# Patient Record
Sex: MALE | Race: WHITE | NOT HISPANIC OR LATINO | Employment: OTHER | URBAN - METROPOLITAN AREA
[De-identification: names, ages, dates, MRNs, and addresses within clinical notes are randomized per-mention and may not be internally consistent; named-entity substitution may affect disease eponyms.]

---

## 2019-04-17 ENCOUNTER — OFFICE VISIT (OUTPATIENT)
Dept: ORTHOPEDIC SURGERY | Facility: CLINIC | Age: 74
End: 2019-04-17

## 2019-04-17 VITALS — OXYGEN SATURATION: 98 % | WEIGHT: 253.31 LBS | HEIGHT: 75 IN | HEART RATE: 74 BPM | BODY MASS INDEX: 31.5 KG/M2

## 2019-04-17 DIAGNOSIS — E11.65 UNCONTROLLED TYPE 2 DIABETES MELLITUS WITH HYPERGLYCEMIA (HCC): ICD-10-CM

## 2019-04-17 DIAGNOSIS — E11.42 TYPE 2 DIABETES MELLITUS WITH DIABETIC POLYNEUROPATHY, WITHOUT LONG-TERM CURRENT USE OF INSULIN (HCC): ICD-10-CM

## 2019-04-17 DIAGNOSIS — R22.41 MASS OF LESSER TOE OF RIGHT FOOT: ICD-10-CM

## 2019-04-17 DIAGNOSIS — S90.821A BLISTER (NONTHERMAL), RIGHT FOOT, INITIAL ENCOUNTER: ICD-10-CM

## 2019-04-17 DIAGNOSIS — M79.671 RIGHT FOOT PAIN: Primary | ICD-10-CM

## 2019-04-17 DIAGNOSIS — R09.89 DECREASED PULSES IN FEET: ICD-10-CM

## 2019-04-17 DIAGNOSIS — I99.8 VASCULAR CALCIFICATION: ICD-10-CM

## 2019-04-17 PROCEDURE — 99204 OFFICE O/P NEW MOD 45 MIN: CPT | Performed by: ORTHOPAEDIC SURGERY

## 2019-04-17 RX ORDER — LANOLIN ALCOHOL/MO/W.PET/CERES
1000 CREAM (GRAM) TOPICAL DAILY
COMMUNITY

## 2019-04-17 RX ORDER — LOSARTAN POTASSIUM 100 MG/1
100 TABLET ORAL DAILY
Refills: 3 | COMMUNITY
Start: 2019-02-18 | End: 2021-05-29 | Stop reason: HOSPADM

## 2019-04-17 RX ORDER — NEBIVOLOL HYDROCHLORIDE 10 MG/1
5 TABLET ORAL 2 TIMES DAILY
Refills: 7 | Status: ON HOLD | COMMUNITY
Start: 2019-03-04 | End: 2020-07-20

## 2019-04-17 RX ORDER — PANTOPRAZOLE SODIUM 40 MG/1
40 TABLET, DELAYED RELEASE ORAL DAILY
Refills: 2 | COMMUNITY
Start: 2019-03-31

## 2019-04-17 RX ORDER — CEPHALEXIN 500 MG/1
500 CAPSULE ORAL 2 TIMES DAILY
COMMUNITY
End: 2019-05-01 | Stop reason: HOSPADM

## 2019-04-17 RX ORDER — HYDROCHLOROTHIAZIDE 25 MG/1
25 TABLET ORAL DAILY
Refills: 3 | Status: ON HOLD | COMMUNITY
Start: 2019-03-05 | End: 2021-05-28

## 2019-04-17 RX ORDER — METOCLOPRAMIDE 10 MG/1
10 TABLET ORAL 2 TIMES DAILY
Refills: 8 | COMMUNITY
Start: 2019-03-31

## 2019-04-17 RX ORDER — AMLODIPINE BESYLATE 10 MG/1
10 TABLET ORAL NIGHTLY
Refills: 0 | COMMUNITY
Start: 2019-03-04

## 2019-04-17 RX ORDER — CLONIDINE HYDROCHLORIDE 0.1 MG/1
0.1 TABLET ORAL 2 TIMES DAILY
Status: ON HOLD | COMMUNITY
End: 2022-08-24

## 2019-04-17 NOTE — PROGRESS NOTES
NEW PATIENT    Patient: Amol Aguirre  : 1945    Primary Care Provider: Donte Briones MD    Requesting Provider: As above    Pain of the Right Foot (Big toe)      History    Chief Complaint: Right foot problems    History of Present Illness: This is a very pleasant gentleman here today with his wife.  He thought he was just coming regarding a cyst on his great toe, it turns out he has quite significant problems in a very complicated history.  He has had a several month history of a painful nodule on the dorsal aspect of the right great toe at the level of the DIP joint.  It hurts more with activity and in shoes.  He has aching and throbbing.  However he also went fishing this past weekend.  He wore his usual shoes, and he was out fishing sitting down all day.  When he got home and took the right shoe off, he found an enormous blister.  The way I can put this together, is that he has significant venous stasis, the shoes got too tight, and caused necrosis on the right foot.  The blister is about 10 x 8 cm on the dorsal lateral aspect of the right foot.  It was unroofed by his general surgeon.  They have been using Silvadene on it.  He also has been on antibiotics, there is some local redness around the blistered area, but no proximal cellulitis.  He also is diabetic, for more than 20 years.  His most recent hemoglobin A1c was 8 indicating poor control.  He is not aware of any specific vascular problems.  He also has plantar fibromatosis, and Dupuytren's in the left hand.  Neither of those are symptomatic.    Current Outpatient Medications on File Prior to Visit   Medication Sig Dispense Refill   • amLODIPine (NORVASC) 10 MG tablet Take 10 mg by mouth Daily.  0   • BYSTOLIC 10 MG tablet TK /2 T PO BID  7   • cephalexin (KEFLEX) 500 MG capsule Take 500 mg by mouth 2 (Two) Times a Day.     • CloNIDine (CATAPRES) 0.1 MG tablet Take 0.1 mg by mouth 2 (Two) Times a Day.     • hydrochlorothiazide  (HYDRODIURIL) 25 MG tablet Take  by mouth Daily.  3   • Insulin Glargine (TOUJEO SOLOSTAR) 300 UNIT/ML solution pen-injector Inject  under the skin into the appropriate area as directed.     • losartan (COZAAR) 100 MG tablet Take 100 mg by mouth Daily.  3   • metFORMIN (GLUCOPHAGE) 500 MG tablet TK 2 TS PO BID  1   • metoclopramide (REGLAN) 10 MG tablet TK 1 T PO BEFORE MEALS AND QHS  8   • pantoprazole (PROTONIX) 40 MG EC tablet Take 40 mg by mouth Daily.  2   • vitamin B-12 (CYANOCOBALAMIN) 1000 MCG tablet Take 1,000 mcg by mouth Daily.       No current facility-administered medications on file prior to visit.       No Known Allergies   Past Medical History:   Diagnosis Date   • Acid reflux    • Diabetes (CMS/HCC)    • Hypertension      Past Surgical History:   Procedure Laterality Date   • CHOLECYSTECTOMY     • KNEE SURGERY Right     TKA     Family History   Problem Relation Age of Onset   • Cancer Mother    • Hypertension Mother    • Hypertension Father    • Heart attack Father       Social History     Socioeconomic History   • Marital status:      Spouse name: Not on file   • Number of children: Not on file   • Years of education: Not on file   • Highest education level: Not on file   Tobacco Use   • Smoking status: Former Smoker     Types: Cigarettes     Start date:      Last attempt to quit:      Years since quittin.3   • Smokeless tobacco: Never Used   Substance and Sexual Activity   • Alcohol use: No     Frequency: Never   • Drug use: No   • Sexual activity: Defer        Review of Systems   Constitutional: Negative.    HENT: Negative.    Eyes: Positive for visual disturbance.   Respiratory: Positive for cough, shortness of breath and wheezing.    Cardiovascular: Negative.    Gastrointestinal: Positive for nausea.   Endocrine: Negative.    Genitourinary: Positive for difficulty urinating.   Musculoskeletal: Positive for arthralgias (foot pain) and back pain.   Skin: Negative.   "  Allergic/Immunologic: Negative.    Neurological: Positive for light-headedness and numbness.   Hematological: Negative.    Psychiatric/Behavioral: Negative.        The following portions of the patient's history were reviewed and updated as appropriate: allergies, current medications, past family history, past medical history, past social history, past surgical history and problem list.    Physical Exam:   Pulse 74   Ht 190.5 cm (75\")   Wt 115 kg (253 lb 4.9 oz)   SpO2 98%   BMI 31.66 kg/m²   GENERAL: Body habitus: obese    Lower extremity edema: Right: 2+ pitting; Leftt: 1+ pitting    Varicose veins:  Right: venous stasis pigment and shiny, atrophic skin; Left: venous stasis pigment and shiny, atrophic skin    Gait: antalgic     Mental Status:  awake and alert; oriented to person, place, and time    Voice:  clear  SKIN:  venous stasis pigment    Hair Growth:  Right:diminished; Left:  diminished  NAILS: Toenails: thick  HEENT: Head: Normocephalic, atraumatic,  without obvious abnormality.  eye: normal external eye, no icterus  ears: normal external ears  nose: normal external nose  pharynx: dental hygiene adequate  PULM:  Repiratory effort normal  CV:  Dorsalis Pedis:  Right: not palpable; Left:not palpable    Posterior Tibial: Right:not palpable; Left:not palpable    Capillary Refill:  Brisk  MSK:  Hand:right handed and Dupuytren's left, Mild, small contracture      Tibia:  Right:  tender over subcutaneous border; Left:  tender over subcutaneous border      Ankle:  Right: non tender; Left:  non tender      Foot:  Right:  Very large dorsal lateral blister, the base is healing, he has some erythema surrounding it but no proximal cellulitis.  He has a tender nodule on the dorsal aspect of the great toe at the DIP joint level slightly lateral aspect.  Moderate hammertoes.  No other tenderness.  Dense decrease in sensation to the [place] fiber; Left:  non tender      NEURO: Heel Walking:  Right:  unable " to test; Left:  unable to test    Toe Walking:  Right:  unable to test; Left:  unable to test     [place]-Dewey 5.07 monofilament test: Almost completely absent both feet    Lower extremity sensation: diminished     Reflexes:  Biceps:  Right:  2+; Left:  2+           Quads:  Right:  0; Left:  0           Ankle:  Right:  0; Left:  0      Calf Atrophy:none    Motor Function: all 5/5         Medical Decision Making    Data Review:   ordered and reviewed x-rays today, reviewed prior lab results and reviewed outside records    Assessment and Plan/ Diagnosis/Treatment options:   1. Right foot pain  He came here initially just for the painful lesion on the right great toe.  But he has a multitude of problems.  I do not palpate pulses in either foot and he has a vascular calcification on his radiographs down to the very smallest vessels.  Before we make any type of plan for the mass on the great toe we need noninvasive vascular studies.  Also we need an MRI to determine if the mass is solid or cystic.  If it is cystic and he has limited blood flow or adequate blood flow, I can try to aspirate it.  If he has adequate blood flow we can either aspirate or operate on it.  The data we are going to obtain however is critical to make those decisions.  If it were painful, another option would be to simply observe it.  I will see him back following the studies.    2. Decreased pulses in feet  As above I do not palpate pulses in either foot, and he has extensive vascular calcifications on x-ray      3. Vascular calcification  As above      4. Uncontrolled type 2 diabetes mellitus with hyperglycemia (CMS/HCC)  He needs to improve his glucose control, he has dense neuropathy and extensive vascular calcification if his A1c is not 7 or less, I will not operate on him we will simply aspirate the lesion      5. Type 2 diabetes mellitus with diabetic polyneuropathy, without long-term current use of insulin (CMS/HCC)  As above    6.  Blister (nonthermal), right foot, initial encounter  I think he had swelling in his shoe because necrosis in this foot.  He has more edema on the right compared with the left.  He absolutely needs to start doing diabetic foot care.  I would also recommend he wear support stockings once this blistered area heals.  For today we redressed it for him and I want him to use Ace wraps toes to knee.  They should not be wrapped tightly they should just be gently rolled on to provide compression.    7. Mass of lesser toe of right foot  As above

## 2019-04-22 ENCOUNTER — HOSPITAL ENCOUNTER (INPATIENT)
Facility: HOSPITAL | Age: 74
LOS: 9 days | Discharge: HOME | End: 2019-05-01
Attending: INTERNAL MEDICINE | Admitting: INTERNAL MEDICINE

## 2019-04-22 ENCOUNTER — TELEPHONE (OUTPATIENT)
Dept: ORTHOPEDIC SURGERY | Facility: CLINIC | Age: 74
End: 2019-04-22

## 2019-04-22 DIAGNOSIS — R09.89 DECREASED PULSES IN FEET: ICD-10-CM

## 2019-04-22 DIAGNOSIS — M79.671 RIGHT FOOT PAIN: ICD-10-CM

## 2019-04-22 DIAGNOSIS — Z74.09 IMPAIRED FUNCTIONAL MOBILITY, BALANCE, GAIT, AND ENDURANCE: Primary | ICD-10-CM

## 2019-04-22 DIAGNOSIS — E10.621 DIABETIC ULCER OF TOE OF LEFT FOOT ASSOCIATED WITH TYPE 1 DIABETES MELLITUS, WITH NECROSIS OF BONE (HCC): ICD-10-CM

## 2019-04-22 DIAGNOSIS — E11.42 TYPE 2 DIABETES MELLITUS WITH DIABETIC POLYNEUROPATHY, WITHOUT LONG-TERM CURRENT USE OF INSULIN (HCC): ICD-10-CM

## 2019-04-22 DIAGNOSIS — L97.524 DIABETIC ULCER OF TOE OF LEFT FOOT ASSOCIATED WITH TYPE 1 DIABETES MELLITUS, WITH NECROSIS OF BONE (HCC): ICD-10-CM

## 2019-04-22 DIAGNOSIS — S90.821A BLISTER (NONTHERMAL), RIGHT FOOT, INITIAL ENCOUNTER: ICD-10-CM

## 2019-04-22 PROBLEM — E11.621 DIABETIC FOOT ULCERS (HCC): Status: ACTIVE | Noted: 2019-04-22

## 2019-04-22 PROBLEM — L97.509 DIABETIC FOOT ULCERS (HCC): Status: ACTIVE | Noted: 2019-04-22

## 2019-04-22 LAB
GLUCOSE BLDC GLUCOMTR-MCNC: 257 MG/DL (ref 70–130)
GLUCOSE BLDC GLUCOMTR-MCNC: 271 MG/DL (ref 70–130)

## 2019-04-22 PROCEDURE — 63710000001 INSULIN DETEMIR PER 5 UNITS: Performed by: INTERNAL MEDICINE

## 2019-04-22 PROCEDURE — 25010000002 DAPTOMYCIN PER 1 MG: Performed by: INTERNAL MEDICINE

## 2019-04-22 PROCEDURE — 99222 1ST HOSP IP/OBS MODERATE 55: CPT | Performed by: ORTHOPAEDIC SURGERY

## 2019-04-22 PROCEDURE — 25010000002 HEPARIN (PORCINE) PER 1000 UNITS: Performed by: INTERNAL MEDICINE

## 2019-04-22 PROCEDURE — 25010000002 PIPERACILLIN SOD-TAZOBACTAM PER 1 G: Performed by: INTERNAL MEDICINE

## 2019-04-22 PROCEDURE — 82962 GLUCOSE BLOOD TEST: CPT

## 2019-04-22 PROCEDURE — 63710000001 INSULIN LISPRO (HUMAN) PER 5 UNITS: Performed by: INTERNAL MEDICINE

## 2019-04-22 PROCEDURE — 63710000001 DIPHENHYDRAMINE PER 50 MG: Performed by: INTERNAL MEDICINE

## 2019-04-22 RX ORDER — LOSARTAN POTASSIUM 25 MG/1
100 TABLET ORAL DAILY
Status: DISCONTINUED | OUTPATIENT
Start: 2019-04-22 | End: 2019-04-25

## 2019-04-22 RX ORDER — DOCUSATE SODIUM 100 MG/1
100 CAPSULE, LIQUID FILLED ORAL 2 TIMES DAILY PRN
Status: DISCONTINUED | OUTPATIENT
Start: 2019-04-22 | End: 2019-05-01 | Stop reason: HOSPADM

## 2019-04-22 RX ORDER — SODIUM CHLORIDE 0.9 % (FLUSH) 0.9 %
3-10 SYRINGE (ML) INJECTION AS NEEDED
Status: DISCONTINUED | OUTPATIENT
Start: 2019-04-22 | End: 2019-05-01 | Stop reason: HOSPADM

## 2019-04-22 RX ORDER — DIPHENHYDRAMINE HCL 25 MG
25 CAPSULE ORAL NIGHTLY PRN
Status: DISCONTINUED | OUTPATIENT
Start: 2019-04-22 | End: 2019-05-01 | Stop reason: HOSPADM

## 2019-04-22 RX ORDER — CLONIDINE HYDROCHLORIDE 0.1 MG/1
0.1 TABLET ORAL EVERY 12 HOURS SCHEDULED
Status: DISCONTINUED | OUTPATIENT
Start: 2019-04-22 | End: 2019-05-01 | Stop reason: HOSPADM

## 2019-04-22 RX ORDER — NEBIVOLOL 5 MG/1
5 TABLET ORAL
Status: DISCONTINUED | OUTPATIENT
Start: 2019-04-22 | End: 2019-05-01 | Stop reason: HOSPADM

## 2019-04-22 RX ORDER — BUDESONIDE AND FORMOTEROL FUMARATE DIHYDRATE 80; 4.5 UG/1; UG/1
2 AEROSOL RESPIRATORY (INHALATION)
COMMUNITY

## 2019-04-22 RX ORDER — AMLODIPINE BESYLATE 10 MG/1
10 TABLET ORAL DAILY
Status: DISCONTINUED | OUTPATIENT
Start: 2019-04-22 | End: 2019-05-01 | Stop reason: HOSPADM

## 2019-04-22 RX ORDER — ASPIRIN 81 MG/1
81 TABLET ORAL DAILY
COMMUNITY
End: 2021-03-26

## 2019-04-22 RX ORDER — HEPARIN SODIUM 5000 [USP'U]/ML
5000 INJECTION, SOLUTION INTRAVENOUS; SUBCUTANEOUS EVERY 8 HOURS SCHEDULED
Status: DISCONTINUED | OUTPATIENT
Start: 2019-04-22 | End: 2019-05-01 | Stop reason: HOSPADM

## 2019-04-22 RX ORDER — DEXTROSE MONOHYDRATE 25 G/50ML
25 INJECTION, SOLUTION INTRAVENOUS
Status: DISCONTINUED | OUTPATIENT
Start: 2019-04-22 | End: 2019-05-01 | Stop reason: HOSPADM

## 2019-04-22 RX ORDER — NICOTINE POLACRILEX 4 MG
15 LOZENGE BUCCAL
Status: DISCONTINUED | OUTPATIENT
Start: 2019-04-22 | End: 2019-05-01 | Stop reason: HOSPADM

## 2019-04-22 RX ORDER — ACETAMINOPHEN 325 MG/1
650 TABLET ORAL EVERY 4 HOURS PRN
Status: DISCONTINUED | OUTPATIENT
Start: 2019-04-22 | End: 2019-05-01 | Stop reason: HOSPADM

## 2019-04-22 RX ORDER — BUDESONIDE AND FORMOTEROL FUMARATE DIHYDRATE 80; 4.5 UG/1; UG/1
2 AEROSOL RESPIRATORY (INHALATION)
Status: DISCONTINUED | OUTPATIENT
Start: 2019-04-22 | End: 2019-05-01 | Stop reason: HOSPADM

## 2019-04-22 RX ORDER — SODIUM CHLORIDE 0.9 % (FLUSH) 0.9 %
3 SYRINGE (ML) INJECTION EVERY 12 HOURS SCHEDULED
Status: DISCONTINUED | OUTPATIENT
Start: 2019-04-22 | End: 2019-05-01 | Stop reason: HOSPADM

## 2019-04-22 RX ORDER — METOCLOPRAMIDE 5 MG/1
10 TABLET ORAL
Status: DISCONTINUED | OUTPATIENT
Start: 2019-04-22 | End: 2019-05-01 | Stop reason: HOSPADM

## 2019-04-22 RX ORDER — MUPIROCIN CALCIUM 20 MG/G
CREAM TOPICAL EVERY 12 HOURS SCHEDULED
Status: DISCONTINUED | OUTPATIENT
Start: 2019-04-22 | End: 2019-05-01 | Stop reason: CLARIF

## 2019-04-22 RX ADMIN — MUPIROCIN: 2 CREAM TOPICAL at 22:55

## 2019-04-22 RX ADMIN — ACETAMINOPHEN 650 MG: 325 TABLET, FILM COATED ORAL at 22:56

## 2019-04-22 RX ADMIN — DAPTOMYCIN 600 MG: 500 INJECTION, POWDER, LYOPHILIZED, FOR SOLUTION INTRAVENOUS at 20:52

## 2019-04-22 RX ADMIN — CLONIDINE HYDROCHLORIDE 0.1 MG: 0.1 TABLET ORAL at 22:56

## 2019-04-22 RX ADMIN — TAZOBACTAM SODIUM AND PIPERACILLIN SODIUM 3.38 G: 375; 3 INJECTION, SOLUTION INTRAVENOUS at 22:34

## 2019-04-22 RX ADMIN — METOCLOPRAMIDE 10 MG: 10 TABLET ORAL at 22:56

## 2019-04-22 RX ADMIN — INSULIN LISPRO 4 UNITS: 100 INJECTION, SOLUTION INTRAVENOUS; SUBCUTANEOUS at 22:57

## 2019-04-22 RX ADMIN — NEBIVOLOL HYDROCHLORIDE 5 MG: 5 TABLET ORAL at 23:01

## 2019-04-22 RX ADMIN — SILVER SULFADIAZINE: 10 CREAM TOPICAL at 22:00

## 2019-04-22 RX ADMIN — HEPARIN SODIUM 5000 UNITS: 5000 INJECTION INTRAVENOUS; SUBCUTANEOUS at 22:57

## 2019-04-22 RX ADMIN — DIPHENHYDRAMINE HYDROCHLORIDE 25 MG: 25 CAPSULE ORAL at 22:57

## 2019-04-22 RX ADMIN — INSULIN DETEMIR 30 UNITS: 100 INJECTION, SOLUTION SUBCUTANEOUS at 22:57

## 2019-04-23 ENCOUNTER — APPOINTMENT (OUTPATIENT)
Dept: CARDIOLOGY | Facility: HOSPITAL | Age: 74
End: 2019-04-23

## 2019-04-23 LAB
ANION GAP SERPL CALCULATED.3IONS-SCNC: 14 MMOL/L
BUN BLD-MCNC: 13 MG/DL (ref 8–23)
BUN/CREAT SERPL: 9.2 (ref 7–25)
CALCIUM SPEC-SCNC: 9.5 MG/DL (ref 8.6–10.5)
CHLORIDE SERPL-SCNC: 102 MMOL/L (ref 98–107)
CO2 SERPL-SCNC: 22 MMOL/L (ref 22–29)
CREAT BLD-MCNC: 1.41 MG/DL (ref 0.76–1.27)
CRP SERPL-MCNC: 3.65 MG/DL (ref 0–0.5)
DEPRECATED RDW RBC AUTO: 43.4 FL (ref 37–54)
ERYTHROCYTE [DISTWIDTH] IN BLOOD BY AUTOMATED COUNT: 13.4 % (ref 12.3–15.4)
ERYTHROCYTE [SEDIMENTATION RATE] IN BLOOD: 66 MM/HR (ref 0–20)
GFR SERPL CREATININE-BSD FRML MDRD: 49 ML/MIN/1.73
GLUCOSE BLD-MCNC: 250 MG/DL (ref 65–99)
GLUCOSE BLDC GLUCOMTR-MCNC: 140 MG/DL (ref 70–130)
GLUCOSE BLDC GLUCOMTR-MCNC: 243 MG/DL (ref 70–130)
GLUCOSE BLDC GLUCOMTR-MCNC: 287 MG/DL (ref 70–130)
GLUCOSE BLDC GLUCOMTR-MCNC: 297 MG/DL (ref 70–130)
HCT VFR BLD AUTO: 34.5 % (ref 37.5–51)
HGB BLD-MCNC: 10.9 G/DL (ref 13–17.7)
MCH RBC QN AUTO: 27.9 PG (ref 26.6–33)
MCHC RBC AUTO-ENTMCNC: 31.6 G/DL (ref 31.5–35.7)
MCV RBC AUTO: 88.2 FL (ref 79–97)
PLATELET # BLD AUTO: 317 10*3/MM3 (ref 140–450)
PMV BLD AUTO: 9.4 FL (ref 6–12)
POTASSIUM BLD-SCNC: 4 MMOL/L (ref 3.5–5.2)
RBC # BLD AUTO: 3.91 10*6/MM3 (ref 4.14–5.8)
SODIUM BLD-SCNC: 138 MMOL/L (ref 136–145)
WBC NRBC COR # BLD: 6.54 10*3/MM3 (ref 3.4–10.8)

## 2019-04-23 PROCEDURE — 93923 UPR/LXTR ART STDY 3+ LVLS: CPT | Performed by: INTERNAL MEDICINE

## 2019-04-23 PROCEDURE — 63710000001 DIPHENHYDRAMINE PER 50 MG: Performed by: INTERNAL MEDICINE

## 2019-04-23 PROCEDURE — 63710000001 INSULIN DETEMIR PER 5 UNITS: Performed by: INTERNAL MEDICINE

## 2019-04-23 PROCEDURE — 94799 UNLISTED PULMONARY SVC/PX: CPT

## 2019-04-23 PROCEDURE — 94640 AIRWAY INHALATION TREATMENT: CPT

## 2019-04-23 PROCEDURE — 86140 C-REACTIVE PROTEIN: CPT | Performed by: INTERNAL MEDICINE

## 2019-04-23 PROCEDURE — 87205 SMEAR GRAM STAIN: CPT | Performed by: ORTHOPAEDIC SURGERY

## 2019-04-23 PROCEDURE — 87070 CULTURE OTHR SPECIMN AEROBIC: CPT | Performed by: ORTHOPAEDIC SURGERY

## 2019-04-23 PROCEDURE — 25010000002 PIPERACILLIN SOD-TAZOBACTAM PER 1 G: Performed by: INTERNAL MEDICINE

## 2019-04-23 PROCEDURE — 87186 SC STD MICRODIL/AGAR DIL: CPT | Performed by: ORTHOPAEDIC SURGERY

## 2019-04-23 PROCEDURE — 85027 COMPLETE CBC AUTOMATED: CPT | Performed by: INTERNAL MEDICINE

## 2019-04-23 PROCEDURE — 99231 SBSQ HOSP IP/OBS SF/LOW 25: CPT | Performed by: ORTHOPAEDIC SURGERY

## 2019-04-23 PROCEDURE — 82962 GLUCOSE BLOOD TEST: CPT

## 2019-04-23 PROCEDURE — 86901 BLOOD TYPING SEROLOGIC RH(D): CPT

## 2019-04-23 PROCEDURE — 97116 GAIT TRAINING THERAPY: CPT

## 2019-04-23 PROCEDURE — 86900 BLOOD TYPING SEROLOGIC ABO: CPT

## 2019-04-23 PROCEDURE — 93923 UPR/LXTR ART STDY 3+ LVLS: CPT

## 2019-04-23 PROCEDURE — 80048 BASIC METABOLIC PNL TOTAL CA: CPT

## 2019-04-23 PROCEDURE — 25010000002 HEPARIN (PORCINE) PER 1000 UNITS: Performed by: INTERNAL MEDICINE

## 2019-04-23 PROCEDURE — 85652 RBC SED RATE AUTOMATED: CPT | Performed by: INTERNAL MEDICINE

## 2019-04-23 PROCEDURE — 97161 PT EVAL LOW COMPLEX 20 MIN: CPT

## 2019-04-23 PROCEDURE — 25010000002 DAPTOMYCIN PER 1 MG: Performed by: INTERNAL MEDICINE

## 2019-04-23 RX ORDER — NICOTINE POLACRILEX 4 MG
15 LOZENGE BUCCAL
Status: DISCONTINUED | OUTPATIENT
Start: 2019-04-23 | End: 2019-05-01 | Stop reason: HOSPADM

## 2019-04-23 RX ORDER — DEXTROSE MONOHYDRATE 25 G/50ML
25 INJECTION, SOLUTION INTRAVENOUS
Status: DISCONTINUED | OUTPATIENT
Start: 2019-04-23 | End: 2019-05-01 | Stop reason: HOSPADM

## 2019-04-23 RX ADMIN — AMLODIPINE BESYLATE 10 MG: 10 TABLET ORAL at 08:27

## 2019-04-23 RX ADMIN — SODIUM CHLORIDE, PRESERVATIVE FREE 3 ML: 5 INJECTION INTRAVENOUS at 08:27

## 2019-04-23 RX ADMIN — CLONIDINE HYDROCHLORIDE 0.1 MG: 0.1 TABLET ORAL at 08:27

## 2019-04-23 RX ADMIN — BUDESONIDE AND FORMOTEROL FUMARATE DIHYDRATE 2 PUFF: 80; 4.5 AEROSOL RESPIRATORY (INHALATION) at 19:54

## 2019-04-23 RX ADMIN — MUPIROCIN: 2 CREAM TOPICAL at 20:38

## 2019-04-23 RX ADMIN — BUDESONIDE AND FORMOTEROL FUMARATE DIHYDRATE 2 PUFF: 80; 4.5 AEROSOL RESPIRATORY (INHALATION) at 09:52

## 2019-04-23 RX ADMIN — DAPTOMYCIN 600 MG: 500 INJECTION, POWDER, LYOPHILIZED, FOR SOLUTION INTRAVENOUS at 20:30

## 2019-04-23 RX ADMIN — ACETAMINOPHEN 650 MG: 325 TABLET, FILM COATED ORAL at 08:26

## 2019-04-23 RX ADMIN — DIPHENHYDRAMINE HYDROCHLORIDE 25 MG: 25 CAPSULE ORAL at 20:30

## 2019-04-23 RX ADMIN — HEPARIN SODIUM 5000 UNITS: 5000 INJECTION INTRAVENOUS; SUBCUTANEOUS at 20:30

## 2019-04-23 RX ADMIN — METOCLOPRAMIDE 10 MG: 10 TABLET ORAL at 08:26

## 2019-04-23 RX ADMIN — METOCLOPRAMIDE 10 MG: 10 TABLET ORAL at 20:30

## 2019-04-23 RX ADMIN — HEPARIN SODIUM 5000 UNITS: 5000 INJECTION INTRAVENOUS; SUBCUTANEOUS at 15:04

## 2019-04-23 RX ADMIN — SILVER SULFADIAZINE: 10 CREAM TOPICAL at 20:38

## 2019-04-23 RX ADMIN — CLONIDINE HYDROCHLORIDE 0.1 MG: 0.1 TABLET ORAL at 20:30

## 2019-04-23 RX ADMIN — LOSARTAN POTASSIUM 100 MG: 25 TABLET, FILM COATED ORAL at 08:27

## 2019-04-23 RX ADMIN — HEPARIN SODIUM 5000 UNITS: 5000 INJECTION INTRAVENOUS; SUBCUTANEOUS at 05:17

## 2019-04-23 RX ADMIN — INSULIN LISPRO 4 UNITS: 100 INJECTION, SOLUTION INTRAVENOUS; SUBCUTANEOUS at 18:35

## 2019-04-23 RX ADMIN — INSULIN DETEMIR 30 UNITS: 100 INJECTION, SOLUTION SUBCUTANEOUS at 20:31

## 2019-04-23 RX ADMIN — INSULIN LISPRO 6 UNITS: 100 INJECTION, SOLUTION INTRAVENOUS; SUBCUTANEOUS at 20:31

## 2019-04-23 RX ADMIN — METOCLOPRAMIDE 10 MG: 10 TABLET ORAL at 18:34

## 2019-04-23 RX ADMIN — DOCUSATE SODIUM 100 MG: 100 CAPSULE, LIQUID FILLED ORAL at 08:26

## 2019-04-23 RX ADMIN — METOCLOPRAMIDE 10 MG: 10 TABLET ORAL at 12:22

## 2019-04-23 RX ADMIN — INSULIN LISPRO 3 UNITS: 100 INJECTION, SOLUTION INTRAVENOUS; SUBCUTANEOUS at 12:22

## 2019-04-23 RX ADMIN — TAZOBACTAM SODIUM AND PIPERACILLIN SODIUM 3.38 G: 375; 3 INJECTION, SOLUTION INTRAVENOUS at 20:15

## 2019-04-23 RX ADMIN — TAZOBACTAM SODIUM AND PIPERACILLIN SODIUM 3.38 G: 375; 3 INJECTION, SOLUTION INTRAVENOUS at 12:22

## 2019-04-23 RX ADMIN — MUPIROCIN: 2 CREAM TOPICAL at 08:26

## 2019-04-23 RX ADMIN — TAZOBACTAM SODIUM AND PIPERACILLIN SODIUM 3.38 G: 375; 3 INJECTION, SOLUTION INTRAVENOUS at 05:17

## 2019-04-23 RX ADMIN — ACETAMINOPHEN 650 MG: 325 TABLET, FILM COATED ORAL at 18:34

## 2019-04-23 RX ADMIN — NEBIVOLOL HYDROCHLORIDE 5 MG: 5 TABLET ORAL at 08:27

## 2019-04-24 ENCOUNTER — APPOINTMENT (OUTPATIENT)
Dept: MRI IMAGING | Facility: HOSPITAL | Age: 74
End: 2019-04-24

## 2019-04-24 LAB
ABO GROUP BLD: NORMAL
ABO GROUP BLD: NORMAL
ALBUMIN SERPL-MCNC: 3.4 G/DL (ref 3.5–5.2)
ALBUMIN/GLOB SERPL: 1 G/DL
ALP SERPL-CCNC: 131 U/L (ref 39–117)
ALT SERPL W P-5'-P-CCNC: 43 U/L (ref 1–41)
ANION GAP SERPL CALCULATED.3IONS-SCNC: 14 MMOL/L
AST SERPL-CCNC: 24 U/L (ref 1–40)
BH CV LOWER ARTERIAL LEFT ABI RATIO: 1.5
BH CV LOWER ARTERIAL LEFT DORSALIS PEDIS SYS MAX: 246 MMHG
BH CV LOWER ARTERIAL LEFT HIGH THIGH SYS MAX: 223 MMHG
BH CV LOWER ARTERIAL LEFT LOW THIGH SYS MAX: 2.1 MMHG
BH CV LOWER ARTERIAL LEFT POPLITEAL SYS MAX: 237 MMHG
BH CV LOWER ARTERIAL LEFT POST TIBIAL SYS MAX: 226 MMHG
BH CV LOWER ARTERIAL RIGHT ABI RATIO: 0.75
BH CV LOWER ARTERIAL RIGHT HIGH THIGH SYS MAX: 248 MMHG
BH CV LOWER ARTERIAL RIGHT LOW THIGH SYS MAX: 2.2 MMHG
BH CV LOWER ARTERIAL RIGHT POPLITEAL SYS MAX: 240 MMHG
BH CV LOWER ARTERIAL RIGHT POST TIBIAL SYS MAX: 120 MMHG
BILIRUB SERPL-MCNC: 0.3 MG/DL (ref 0.2–1.2)
BLD GP AB SCN SERPL QL: NEGATIVE
BUN BLD-MCNC: 10 MG/DL (ref 8–23)
BUN/CREAT SERPL: 8 (ref 7–25)
CALCIUM SPEC-SCNC: 9.4 MG/DL (ref 8.6–10.5)
CHLORIDE SERPL-SCNC: 106 MMOL/L (ref 98–107)
CK SERPL-CCNC: 27 U/L (ref 20–200)
CO2 SERPL-SCNC: 21 MMOL/L (ref 22–29)
CREAT BLD-MCNC: 1.25 MG/DL (ref 0.76–1.27)
GFR SERPL CREATININE-BSD FRML MDRD: 56 ML/MIN/1.73
GLOBULIN UR ELPH-MCNC: 3.4 GM/DL
GLUCOSE BLD-MCNC: 132 MG/DL (ref 65–99)
GLUCOSE BLDC GLUCOMTR-MCNC: 124 MG/DL (ref 70–130)
GLUCOSE BLDC GLUCOMTR-MCNC: 156 MG/DL (ref 70–130)
GLUCOSE BLDC GLUCOMTR-MCNC: 181 MG/DL (ref 70–130)
GLUCOSE BLDC GLUCOMTR-MCNC: 217 MG/DL (ref 70–130)
GLUCOSE BLDC GLUCOMTR-MCNC: 284 MG/DL (ref 70–130)
POTASSIUM BLD-SCNC: 4 MMOL/L (ref 3.5–5.2)
PROT SERPL-MCNC: 6.8 G/DL (ref 6–8.5)
RH BLD: NEGATIVE
RH BLD: NEGATIVE
SODIUM BLD-SCNC: 141 MMOL/L (ref 136–145)
T&S EXPIRATION DATE: NORMAL
UPPER ARTERIAL LEFT ARM BRACHIAL SYS MAX: 159 MMHG
UPPER ARTERIAL RIGHT ARM BRACHIAL SYS MAX: 148 MMHG

## 2019-04-24 PROCEDURE — 82550 ASSAY OF CK (CPK): CPT | Performed by: INTERNAL MEDICINE

## 2019-04-24 PROCEDURE — 86850 RBC ANTIBODY SCREEN: CPT | Performed by: PHYSICIAN ASSISTANT

## 2019-04-24 PROCEDURE — 86901 BLOOD TYPING SEROLOGIC RH(D): CPT | Performed by: PHYSICIAN ASSISTANT

## 2019-04-24 PROCEDURE — 99231 SBSQ HOSP IP/OBS SF/LOW 25: CPT | Performed by: ORTHOPAEDIC SURGERY

## 2019-04-24 PROCEDURE — 25010000002 PIPERACILLIN SOD-TAZOBACTAM PER 1 G: Performed by: INTERNAL MEDICINE

## 2019-04-24 PROCEDURE — 05HY33Z INSERTION OF INFUSION DEVICE INTO UPPER VEIN, PERCUTANEOUS APPROACH: ICD-10-PCS | Performed by: INTERNAL MEDICINE

## 2019-04-24 PROCEDURE — 25010000002 HEPARIN (PORCINE) PER 1000 UNITS: Performed by: INTERNAL MEDICINE

## 2019-04-24 PROCEDURE — C1751 CATH, INF, PER/CENT/MIDLINE: HCPCS

## 2019-04-24 PROCEDURE — 97116 GAIT TRAINING THERAPY: CPT

## 2019-04-24 PROCEDURE — 25010000002 DAPTOMYCIN PER 1 MG: Performed by: INTERNAL MEDICINE

## 2019-04-24 PROCEDURE — A9577 INJ MULTIHANCE: HCPCS | Performed by: INTERNAL MEDICINE

## 2019-04-24 PROCEDURE — 82962 GLUCOSE BLOOD TEST: CPT

## 2019-04-24 PROCEDURE — 94799 UNLISTED PULMONARY SVC/PX: CPT

## 2019-04-24 PROCEDURE — 73720 MRI LWR EXTREMITY W/O&W/DYE: CPT

## 2019-04-24 PROCEDURE — G0108 DIAB MANAGE TRN  PER INDIV: HCPCS | Performed by: REGISTERED NURSE

## 2019-04-24 PROCEDURE — 0 GADOBENATE DIMEGLUMINE 529 MG/ML SOLUTION: Performed by: INTERNAL MEDICINE

## 2019-04-24 PROCEDURE — 63710000001 INSULIN DETEMIR PER 5 UNITS: Performed by: INTERNAL MEDICINE

## 2019-04-24 PROCEDURE — C1894 INTRO/SHEATH, NON-LASER: HCPCS

## 2019-04-24 PROCEDURE — 80053 COMPREHEN METABOLIC PANEL: CPT | Performed by: INTERNAL MEDICINE

## 2019-04-24 PROCEDURE — 86900 BLOOD TYPING SEROLOGIC ABO: CPT | Performed by: PHYSICIAN ASSISTANT

## 2019-04-24 PROCEDURE — 63710000001 DIPHENHYDRAMINE PER 50 MG: Performed by: INTERNAL MEDICINE

## 2019-04-24 RX ORDER — SODIUM CHLORIDE 0.9 % (FLUSH) 0.9 %
10 SYRINGE (ML) INJECTION AS NEEDED
Status: DISCONTINUED | OUTPATIENT
Start: 2019-04-24 | End: 2019-05-01 | Stop reason: HOSPADM

## 2019-04-24 RX ORDER — SODIUM CHLORIDE 0.9 % (FLUSH) 0.9 %
10 SYRINGE (ML) INJECTION EVERY 12 HOURS SCHEDULED
Status: DISCONTINUED | OUTPATIENT
Start: 2019-04-24 | End: 2019-05-01 | Stop reason: HOSPADM

## 2019-04-24 RX ADMIN — METOCLOPRAMIDE 10 MG: 10 TABLET ORAL at 20:18

## 2019-04-24 RX ADMIN — METOCLOPRAMIDE 10 MG: 10 TABLET ORAL at 13:13

## 2019-04-24 RX ADMIN — HEPARIN SODIUM 5000 UNITS: 5000 INJECTION INTRAVENOUS; SUBCUTANEOUS at 13:25

## 2019-04-24 RX ADMIN — DIPHENHYDRAMINE HYDROCHLORIDE 25 MG: 25 CAPSULE ORAL at 20:33

## 2019-04-24 RX ADMIN — TAZOBACTAM SODIUM AND PIPERACILLIN SODIUM 3.38 G: 375; 3 INJECTION, SOLUTION INTRAVENOUS at 13:25

## 2019-04-24 RX ADMIN — CLONIDINE HYDROCHLORIDE 0.1 MG: 0.1 TABLET ORAL at 20:17

## 2019-04-24 RX ADMIN — INSULIN LISPRO 10 UNITS: 100 INJECTION, SOLUTION INTRAVENOUS; SUBCUTANEOUS at 08:18

## 2019-04-24 RX ADMIN — BUDESONIDE AND FORMOTEROL FUMARATE DIHYDRATE 2 PUFF: 80; 4.5 AEROSOL RESPIRATORY (INHALATION) at 08:49

## 2019-04-24 RX ADMIN — INSULIN DETEMIR 30 UNITS: 100 INJECTION, SOLUTION SUBCUTANEOUS at 20:33

## 2019-04-24 RX ADMIN — ACETAMINOPHEN 650 MG: 325 TABLET, FILM COATED ORAL at 20:33

## 2019-04-24 RX ADMIN — GADOBENATE DIMEGLUMINE 15 ML: 529 INJECTION, SOLUTION INTRAVENOUS at 13:30

## 2019-04-24 RX ADMIN — CLONIDINE HYDROCHLORIDE 0.1 MG: 0.1 TABLET ORAL at 08:18

## 2019-04-24 RX ADMIN — INSULIN LISPRO 4 UNITS: 100 INJECTION, SOLUTION INTRAVENOUS; SUBCUTANEOUS at 17:59

## 2019-04-24 RX ADMIN — INSULIN LISPRO 2 UNITS: 100 INJECTION, SOLUTION INTRAVENOUS; SUBCUTANEOUS at 13:15

## 2019-04-24 RX ADMIN — INSULIN LISPRO 2 UNITS: 100 INJECTION, SOLUTION INTRAVENOUS; SUBCUTANEOUS at 08:18

## 2019-04-24 RX ADMIN — TAZOBACTAM SODIUM AND PIPERACILLIN SODIUM 3.38 G: 375; 3 INJECTION, SOLUTION INTRAVENOUS at 20:19

## 2019-04-24 RX ADMIN — DAPTOMYCIN 600 MG: 500 INJECTION, POWDER, LYOPHILIZED, FOR SOLUTION INTRAVENOUS at 20:19

## 2019-04-24 RX ADMIN — BUDESONIDE AND FORMOTEROL FUMARATE DIHYDRATE 2 PUFF: 80; 4.5 AEROSOL RESPIRATORY (INHALATION) at 21:13

## 2019-04-24 RX ADMIN — AMLODIPINE BESYLATE 10 MG: 10 TABLET ORAL at 08:18

## 2019-04-24 RX ADMIN — TAZOBACTAM SODIUM AND PIPERACILLIN SODIUM 3.38 G: 375; 3 INJECTION, SOLUTION INTRAVENOUS at 05:33

## 2019-04-24 RX ADMIN — SODIUM CHLORIDE, PRESERVATIVE FREE 10 ML: 5 INJECTION INTRAVENOUS at 22:21

## 2019-04-24 RX ADMIN — MUPIROCIN: 2 CREAM TOPICAL at 20:18

## 2019-04-24 RX ADMIN — METOCLOPRAMIDE 10 MG: 10 TABLET ORAL at 17:58

## 2019-04-24 RX ADMIN — HEPARIN SODIUM 5000 UNITS: 5000 INJECTION INTRAVENOUS; SUBCUTANEOUS at 05:33

## 2019-04-24 RX ADMIN — INSULIN LISPRO 10 UNITS: 100 INJECTION, SOLUTION INTRAVENOUS; SUBCUTANEOUS at 17:58

## 2019-04-24 RX ADMIN — INSULIN LISPRO 10 UNITS: 100 INJECTION, SOLUTION INTRAVENOUS; SUBCUTANEOUS at 13:14

## 2019-04-24 RX ADMIN — SILVER SULFADIAZINE: 10 CREAM TOPICAL at 20:18

## 2019-04-24 RX ADMIN — MUPIROCIN: 2 CREAM TOPICAL at 08:19

## 2019-04-24 RX ADMIN — HEPARIN SODIUM 5000 UNITS: 5000 INJECTION INTRAVENOUS; SUBCUTANEOUS at 20:18

## 2019-04-24 RX ADMIN — NEBIVOLOL HYDROCHLORIDE 5 MG: 5 TABLET ORAL at 08:18

## 2019-04-24 RX ADMIN — INSULIN LISPRO 6 UNITS: 100 INJECTION, SOLUTION INTRAVENOUS; SUBCUTANEOUS at 20:20

## 2019-04-24 RX ADMIN — METOCLOPRAMIDE 10 MG: 10 TABLET ORAL at 08:18

## 2019-04-24 RX ADMIN — SODIUM CHLORIDE, PRESERVATIVE FREE 3 ML: 5 INJECTION INTRAVENOUS at 20:20

## 2019-04-24 RX ADMIN — LOSARTAN POTASSIUM 100 MG: 25 TABLET, FILM COATED ORAL at 08:18

## 2019-04-25 ENCOUNTER — APPOINTMENT (OUTPATIENT)
Dept: GENERAL RADIOLOGY | Facility: HOSPITAL | Age: 74
End: 2019-04-25

## 2019-04-25 ENCOUNTER — APPOINTMENT (OUTPATIENT)
Dept: CT IMAGING | Facility: HOSPITAL | Age: 74
End: 2019-04-25

## 2019-04-25 LAB
ANION GAP SERPL CALCULATED.3IONS-SCNC: 20 MMOL/L
ARTERIAL PATENCY WRIST A: ABNORMAL
ATMOSPHERIC PRESS: ABNORMAL MMHG
BACTERIA SPEC AEROBE CULT: ABNORMAL
BACTERIA SPEC AEROBE CULT: ABNORMAL
BASE EXCESS BLDA CALC-SCNC: -3.5 MMOL/L (ref 0–2)
BDY SITE: ABNORMAL
BODY TEMPERATURE: 37 C
BUN BLD-MCNC: 12 MG/DL (ref 8–23)
BUN/CREAT SERPL: 6.9 (ref 7–25)
CALCIUM SPEC-SCNC: 9.3 MG/DL (ref 8.6–10.5)
CHLORIDE SERPL-SCNC: 104 MMOL/L (ref 98–107)
CO2 BLDA-SCNC: 20.7 MMOL/L (ref 23–27)
CO2 SERPL-SCNC: 18 MMOL/L (ref 22–29)
COHGB MFR BLD: 0.5 % (ref 0–2)
CREAT BLD-MCNC: 1.74 MG/DL (ref 0.76–1.27)
D DIMER PPP FEU-MCNC: >20 MCGFEU/ML (ref 0–0.56)
EPAP: 0
GFR SERPL CREATININE-BSD FRML MDRD: 38 ML/MIN/1.73
GLUCOSE BLD-MCNC: 214 MG/DL (ref 65–99)
GLUCOSE BLDC GLUCOMTR-MCNC: 139 MG/DL (ref 70–130)
GLUCOSE BLDC GLUCOMTR-MCNC: 237 MG/DL (ref 70–130)
GLUCOSE BLDC GLUCOMTR-MCNC: 258 MG/DL (ref 70–130)
GLUCOSE BLDC GLUCOMTR-MCNC: 273 MG/DL (ref 70–130)
GLUCOSE BLDC GLUCOMTR-MCNC: 89 MG/DL (ref 70–130)
GRAM STN SPEC: ABNORMAL
HCO3 BLDA-SCNC: 19.8 MMOL/L (ref 20–26)
HCT VFR BLD CALC: 36.4 %
HGB BLDA-MCNC: 11.9 G/DL (ref 13.5–17.5)
HOROWITZ INDEX BLD+IHG-RTO: 28 %
IPAP: 0
METHGB BLD QL: 1.1 % (ref 0–1.5)
MODALITY: ABNORMAL
NOTE: ABNORMAL
OXYHGB MFR BLDV: 88.4 % (ref 94–99)
PAW @ PEAK INSP FLOW SETTING VENT: 0 CMH2O
PCO2 BLDA: 29.6 MM HG
PCO2 TEMP ADJ BLD: 29.6 MM HG (ref 35–48)
PH BLDA: 7.43 PH UNITS (ref 7.35–7.45)
PH, TEMP CORRECTED: 7.43 PH UNITS
PO2 BLDA: 55.6 MM HG (ref 83–108)
PO2 TEMP ADJ BLD: 55.6 MM HG (ref 83–108)
POTASSIUM BLD-SCNC: 3.7 MMOL/L (ref 3.5–5.2)
POTASSIUM BLD-SCNC: 3.7 MMOL/L (ref 3.5–5.2)
SODIUM BLD-SCNC: 142 MMOL/L (ref 136–145)
TOTAL RATE: 0 BREATHS/MINUTE
TROPONIN T SERPL-MCNC: <0.01 NG/ML (ref 0–0.03)
TROPONIN T SERPL-MCNC: <0.01 NG/ML (ref 0–0.03)

## 2019-04-25 PROCEDURE — 25010000002 DAPTOMYCIN PER 1 MG: Performed by: INTERNAL MEDICINE

## 2019-04-25 PROCEDURE — 94799 UNLISTED PULMONARY SVC/PX: CPT

## 2019-04-25 PROCEDURE — 25010000002 MORPHINE PER 10 MG: Performed by: INTERNAL MEDICINE

## 2019-04-25 PROCEDURE — 63710000001 INSULIN DETEMIR PER 5 UNITS: Performed by: INTERNAL MEDICINE

## 2019-04-25 PROCEDURE — 99221 1ST HOSP IP/OBS SF/LOW 40: CPT | Performed by: THORACIC SURGERY (CARDIOTHORACIC VASCULAR SURGERY)

## 2019-04-25 PROCEDURE — 80048 BASIC METABOLIC PNL TOTAL CA: CPT | Performed by: INTERNAL MEDICINE

## 2019-04-25 PROCEDURE — 84132 ASSAY OF SERUM POTASSIUM: CPT | Performed by: ANESTHESIOLOGY

## 2019-04-25 PROCEDURE — 82805 BLOOD GASES W/O2 SATURATION: CPT

## 2019-04-25 PROCEDURE — 25010000002 HEPARIN (PORCINE) PER 1000 UNITS: Performed by: INTERNAL MEDICINE

## 2019-04-25 PROCEDURE — 36600 WITHDRAWAL OF ARTERIAL BLOOD: CPT

## 2019-04-25 PROCEDURE — 99231 SBSQ HOSP IP/OBS SF/LOW 25: CPT | Performed by: ORTHOPAEDIC SURGERY

## 2019-04-25 PROCEDURE — 25010000002 PROCHLORPERAZINE EDISYLATE PER 10 MG: Performed by: INTERNAL MEDICINE

## 2019-04-25 PROCEDURE — 93005 ELECTROCARDIOGRAM TRACING: CPT | Performed by: INTERNAL MEDICINE

## 2019-04-25 PROCEDURE — 82962 GLUCOSE BLOOD TEST: CPT

## 2019-04-25 PROCEDURE — 25010000002 ONDANSETRON PER 1 MG: Performed by: INTERNAL MEDICINE

## 2019-04-25 PROCEDURE — 71045 X-RAY EXAM CHEST 1 VIEW: CPT

## 2019-04-25 PROCEDURE — 71275 CT ANGIOGRAPHY CHEST: CPT

## 2019-04-25 PROCEDURE — 85379 FIBRIN DEGRADATION QUANT: CPT | Performed by: INTERNAL MEDICINE

## 2019-04-25 PROCEDURE — 0 IOPAMIDOL PER 1 ML: Performed by: INTERNAL MEDICINE

## 2019-04-25 PROCEDURE — 63710000001 DIPHENHYDRAMINE PER 50 MG: Performed by: INTERNAL MEDICINE

## 2019-04-25 PROCEDURE — 93010 ELECTROCARDIOGRAM REPORT: CPT | Performed by: INTERNAL MEDICINE

## 2019-04-25 PROCEDURE — 84484 ASSAY OF TROPONIN QUANT: CPT | Performed by: INTERNAL MEDICINE

## 2019-04-25 PROCEDURE — 25010000002 PIPERACILLIN SOD-TAZOBACTAM PER 1 G: Performed by: INTERNAL MEDICINE

## 2019-04-25 RX ORDER — ASPIRIN 325 MG
325 TABLET ORAL ONCE
Status: COMPLETED | OUTPATIENT
Start: 2019-04-25 | End: 2019-04-25

## 2019-04-25 RX ORDER — DIPHENHYDRAMINE HCL 25 MG
25 CAPSULE ORAL EVERY 6 HOURS
Status: DISCONTINUED | OUTPATIENT
Start: 2019-04-25 | End: 2019-05-01 | Stop reason: HOSPADM

## 2019-04-25 RX ORDER — MORPHINE SULFATE 2 MG/ML
2 INJECTION, SOLUTION INTRAMUSCULAR; INTRAVENOUS ONCE
Status: COMPLETED | OUTPATIENT
Start: 2019-04-25 | End: 2019-04-25

## 2019-04-25 RX ORDER — PROCHLORPERAZINE 25 MG
25 SUPPOSITORY, RECTAL RECTAL EVERY 12 HOURS PRN
Status: DISCONTINUED | OUTPATIENT
Start: 2019-04-25 | End: 2019-05-01 | Stop reason: HOSPADM

## 2019-04-25 RX ORDER — FAMOTIDINE 10 MG/ML
20 INJECTION, SOLUTION INTRAVENOUS ONCE
Status: CANCELLED | OUTPATIENT
Start: 2019-04-25 | End: 2019-04-25

## 2019-04-25 RX ORDER — DIPHENHYDRAMINE HCL 25 MG
25 CAPSULE ORAL ONCE
Status: COMPLETED | OUTPATIENT
Start: 2019-04-25 | End: 2019-04-25

## 2019-04-25 RX ORDER — NITROGLYCERIN 0.4 MG/1
0.4 TABLET SUBLINGUAL
Status: DISCONTINUED | OUTPATIENT
Start: 2019-04-25 | End: 2019-05-01 | Stop reason: HOSPADM

## 2019-04-25 RX ORDER — SODIUM CHLORIDE 0.9 % (FLUSH) 0.9 %
3-10 SYRINGE (ML) INJECTION AS NEEDED
Status: CANCELLED | OUTPATIENT
Start: 2019-04-25

## 2019-04-25 RX ORDER — SODIUM CHLORIDE 0.9 % (FLUSH) 0.9 %
3 SYRINGE (ML) INJECTION EVERY 12 HOURS SCHEDULED
Status: CANCELLED | OUTPATIENT
Start: 2019-04-25

## 2019-04-25 RX ORDER — SODIUM CHLORIDE, SODIUM LACTATE, POTASSIUM CHLORIDE, CALCIUM CHLORIDE 600; 310; 30; 20 MG/100ML; MG/100ML; MG/100ML; MG/100ML
125 INJECTION, SOLUTION INTRAVENOUS CONTINUOUS
Status: DISCONTINUED | OUTPATIENT
Start: 2019-04-25 | End: 2019-05-01 | Stop reason: HOSPADM

## 2019-04-25 RX ORDER — PROCHLORPERAZINE MALEATE 5 MG/1
5 TABLET ORAL EVERY 6 HOURS PRN
Status: DISCONTINUED | OUTPATIENT
Start: 2019-04-25 | End: 2019-05-01 | Stop reason: HOSPADM

## 2019-04-25 RX ORDER — ONDANSETRON 2 MG/ML
4 INJECTION INTRAMUSCULAR; INTRAVENOUS EVERY 6 HOURS PRN
Status: DISCONTINUED | OUTPATIENT
Start: 2019-04-25 | End: 2019-05-01 | Stop reason: HOSPADM

## 2019-04-25 RX ADMIN — MORPHINE SULFATE 2 MG: 2 INJECTION, SOLUTION INTRAMUSCULAR; INTRAVENOUS at 04:30

## 2019-04-25 RX ADMIN — IOPAMIDOL 80 ML: 755 INJECTION, SOLUTION INTRAVENOUS at 07:15

## 2019-04-25 RX ADMIN — DIPHENHYDRAMINE HYDROCHLORIDE 25 MG: 25 CAPSULE ORAL at 09:31

## 2019-04-25 RX ADMIN — TAZOBACTAM SODIUM AND PIPERACILLIN SODIUM 3.38 G: 375; 3 INJECTION, SOLUTION INTRAVENOUS at 12:34

## 2019-04-25 RX ADMIN — INSULIN LISPRO 10 UNITS: 100 INJECTION, SOLUTION INTRAVENOUS; SUBCUTANEOUS at 12:34

## 2019-04-25 RX ADMIN — DIPHENHYDRAMINE HYDROCHLORIDE 25 MG: 25 CAPSULE ORAL at 04:48

## 2019-04-25 RX ADMIN — DIPHENHYDRAMINE HYDROCHLORIDE 25 MG: 25 CAPSULE ORAL at 18:18

## 2019-04-25 RX ADMIN — ASPIRIN 325 MG ORAL TABLET 325 MG: 325 PILL ORAL at 05:05

## 2019-04-25 RX ADMIN — DAPTOMYCIN 600 MG: 500 INJECTION, POWDER, LYOPHILIZED, FOR SOLUTION INTRAVENOUS at 20:50

## 2019-04-25 RX ADMIN — METOCLOPRAMIDE 10 MG: 10 TABLET ORAL at 20:59

## 2019-04-25 RX ADMIN — METOCLOPRAMIDE 10 MG: 10 TABLET ORAL at 12:58

## 2019-04-25 RX ADMIN — INSULIN LISPRO 6 UNITS: 100 INJECTION, SOLUTION INTRAVENOUS; SUBCUTANEOUS at 21:00

## 2019-04-25 RX ADMIN — METOCLOPRAMIDE 10 MG: 10 TABLET ORAL at 18:15

## 2019-04-25 RX ADMIN — LOSARTAN POTASSIUM 100 MG: 25 TABLET, FILM COATED ORAL at 09:31

## 2019-04-25 RX ADMIN — NEBIVOLOL HYDROCHLORIDE 5 MG: 5 TABLET ORAL at 09:31

## 2019-04-25 RX ADMIN — ONDANSETRON 4 MG: 2 INJECTION INTRAMUSCULAR; INTRAVENOUS at 04:37

## 2019-04-25 RX ADMIN — INSULIN DETEMIR 15 UNITS: 100 INJECTION, SOLUTION SUBCUTANEOUS at 09:30

## 2019-04-25 RX ADMIN — INSULIN LISPRO 10 UNITS: 100 INJECTION, SOLUTION INTRAVENOUS; SUBCUTANEOUS at 09:33

## 2019-04-25 RX ADMIN — DIPHENHYDRAMINE HYDROCHLORIDE 25 MG: 25 CAPSULE ORAL at 21:00

## 2019-04-25 RX ADMIN — AMLODIPINE BESYLATE 10 MG: 10 TABLET ORAL at 09:31

## 2019-04-25 RX ADMIN — TAZOBACTAM SODIUM AND PIPERACILLIN SODIUM 3.38 G: 375; 3 INJECTION, SOLUTION INTRAVENOUS at 05:05

## 2019-04-25 RX ADMIN — HEPARIN SODIUM 5000 UNITS: 5000 INJECTION INTRAVENOUS; SUBCUTANEOUS at 21:00

## 2019-04-25 RX ADMIN — HEPARIN SODIUM 5000 UNITS: 5000 INJECTION INTRAVENOUS; SUBCUTANEOUS at 14:13

## 2019-04-25 RX ADMIN — SILVER SULFADIAZINE: 10 CREAM TOPICAL at 21:11

## 2019-04-25 RX ADMIN — INSULIN LISPRO 10 UNITS: 100 INJECTION, SOLUTION INTRAVENOUS; SUBCUTANEOUS at 18:15

## 2019-04-25 RX ADMIN — SODIUM CHLORIDE, POTASSIUM CHLORIDE, SODIUM LACTATE AND CALCIUM CHLORIDE 100 ML/HR: 600; 310; 30; 20 INJECTION, SOLUTION INTRAVENOUS at 09:35

## 2019-04-25 RX ADMIN — CLONIDINE HYDROCHLORIDE 0.1 MG: 0.1 TABLET ORAL at 09:31

## 2019-04-25 RX ADMIN — TAZOBACTAM SODIUM AND PIPERACILLIN SODIUM 3.38 G: 375; 3 INJECTION, SOLUTION INTRAVENOUS at 22:00

## 2019-04-25 RX ADMIN — PROCHLORPERAZINE EDISYLATE 5 MG: 5 INJECTION INTRAMUSCULAR; INTRAVENOUS at 06:05

## 2019-04-25 RX ADMIN — ACETAMINOPHEN 650 MG: 325 TABLET, FILM COATED ORAL at 21:07

## 2019-04-25 RX ADMIN — INSULIN LISPRO 6 UNITS: 100 INJECTION, SOLUTION INTRAVENOUS; SUBCUTANEOUS at 12:34

## 2019-04-25 RX ADMIN — MUPIROCIN: 2 CREAM TOPICAL at 12:37

## 2019-04-25 RX ADMIN — BUDESONIDE AND FORMOTEROL FUMARATE DIHYDRATE 2 PUFF: 80; 4.5 AEROSOL RESPIRATORY (INHALATION) at 19:37

## 2019-04-25 RX ADMIN — INSULIN LISPRO 4 UNITS: 100 INJECTION, SOLUTION INTRAVENOUS; SUBCUTANEOUS at 09:31

## 2019-04-25 RX ADMIN — MUPIROCIN: 2 CREAM TOPICAL at 21:12

## 2019-04-25 RX ADMIN — CLONIDINE HYDROCHLORIDE 0.1 MG: 0.1 TABLET ORAL at 20:58

## 2019-04-25 RX ADMIN — INSULIN DETEMIR 30 UNITS: 100 INJECTION, SOLUTION SUBCUTANEOUS at 21:05

## 2019-04-25 RX ADMIN — BUDESONIDE AND FORMOTEROL FUMARATE DIHYDRATE 2 PUFF: 80; 4.5 AEROSOL RESPIRATORY (INHALATION) at 08:06

## 2019-04-26 LAB
ANION GAP SERPL CALCULATED.3IONS-SCNC: 13 MMOL/L
BACTERIA SPEC AEROBE CULT: NORMAL
BUN BLD-MCNC: 15 MG/DL (ref 8–23)
BUN/CREAT SERPL: 8.3 (ref 7–25)
CALCIUM SPEC-SCNC: 8.5 MG/DL (ref 8.6–10.5)
CHLORIDE SERPL-SCNC: 104 MMOL/L (ref 98–107)
CO2 SERPL-SCNC: 22 MMOL/L (ref 22–29)
CREAT BLD-MCNC: 1.81 MG/DL (ref 0.76–1.27)
GFR SERPL CREATININE-BSD FRML MDRD: 37 ML/MIN/1.73
GLUCOSE BLD-MCNC: 134 MG/DL (ref 65–99)
GLUCOSE BLDC GLUCOMTR-MCNC: 116 MG/DL (ref 70–130)
GLUCOSE BLDC GLUCOMTR-MCNC: 127 MG/DL (ref 70–130)
GLUCOSE BLDC GLUCOMTR-MCNC: 169 MG/DL (ref 70–130)
GLUCOSE BLDC GLUCOMTR-MCNC: 220 MG/DL (ref 70–130)
GRAM STN SPEC: NORMAL
POTASSIUM BLD-SCNC: 3.9 MMOL/L (ref 3.5–5.2)
SODIUM BLD-SCNC: 139 MMOL/L (ref 136–145)

## 2019-04-26 PROCEDURE — 82962 GLUCOSE BLOOD TEST: CPT

## 2019-04-26 PROCEDURE — 63710000001 DIPHENHYDRAMINE PER 50 MG: Performed by: INTERNAL MEDICINE

## 2019-04-26 PROCEDURE — 80048 BASIC METABOLIC PNL TOTAL CA: CPT | Performed by: INTERNAL MEDICINE

## 2019-04-26 PROCEDURE — 25010000002 HEPARIN (PORCINE) PER 1000 UNITS: Performed by: INTERNAL MEDICINE

## 2019-04-26 PROCEDURE — 25010000002 DAPTOMYCIN PER 1 MG: Performed by: INTERNAL MEDICINE

## 2019-04-26 PROCEDURE — 25010000002 ERTAPENEM PER 500 MG: Performed by: INTERNAL MEDICINE

## 2019-04-26 PROCEDURE — 63710000001 INSULIN DETEMIR PER 5 UNITS: Performed by: INTERNAL MEDICINE

## 2019-04-26 PROCEDURE — 94799 UNLISTED PULMONARY SVC/PX: CPT

## 2019-04-26 PROCEDURE — 99231 SBSQ HOSP IP/OBS SF/LOW 25: CPT | Performed by: PHYSICIAN ASSISTANT

## 2019-04-26 PROCEDURE — 25010000002 PIPERACILLIN SOD-TAZOBACTAM PER 1 G: Performed by: INTERNAL MEDICINE

## 2019-04-26 PROCEDURE — 99231 SBSQ HOSP IP/OBS SF/LOW 25: CPT | Performed by: ORTHOPAEDIC SURGERY

## 2019-04-26 RX ADMIN — BUDESONIDE AND FORMOTEROL FUMARATE DIHYDRATE 2 PUFF: 80; 4.5 AEROSOL RESPIRATORY (INHALATION) at 20:38

## 2019-04-26 RX ADMIN — CLONIDINE HYDROCHLORIDE 0.1 MG: 0.1 TABLET ORAL at 08:35

## 2019-04-26 RX ADMIN — CLONIDINE HYDROCHLORIDE 0.1 MG: 0.1 TABLET ORAL at 22:24

## 2019-04-26 RX ADMIN — METOCLOPRAMIDE 10 MG: 10 TABLET ORAL at 17:31

## 2019-04-26 RX ADMIN — INSULIN DETEMIR 30 UNITS: 100 INJECTION, SOLUTION SUBCUTANEOUS at 22:25

## 2019-04-26 RX ADMIN — INSULIN LISPRO 10 UNITS: 100 INJECTION, SOLUTION INTRAVENOUS; SUBCUTANEOUS at 08:36

## 2019-04-26 RX ADMIN — DIPHENHYDRAMINE HYDROCHLORIDE 25 MG: 25 CAPSULE ORAL at 11:26

## 2019-04-26 RX ADMIN — HEPARIN SODIUM 5000 UNITS: 5000 INJECTION INTRAVENOUS; SUBCUTANEOUS at 22:25

## 2019-04-26 RX ADMIN — INSULIN LISPRO 10 UNITS: 100 INJECTION, SOLUTION INTRAVENOUS; SUBCUTANEOUS at 17:32

## 2019-04-26 RX ADMIN — SODIUM CHLORIDE, PRESERVATIVE FREE 10 ML: 5 INJECTION INTRAVENOUS at 22:56

## 2019-04-26 RX ADMIN — HEPARIN SODIUM 5000 UNITS: 5000 INJECTION INTRAVENOUS; SUBCUTANEOUS at 05:27

## 2019-04-26 RX ADMIN — BUDESONIDE AND FORMOTEROL FUMARATE DIHYDRATE 2 PUFF: 80; 4.5 AEROSOL RESPIRATORY (INHALATION) at 09:51

## 2019-04-26 RX ADMIN — ERTAPENEM SODIUM 1 G: 1 INJECTION, POWDER, LYOPHILIZED, FOR SOLUTION INTRAMUSCULAR; INTRAVENOUS at 12:55

## 2019-04-26 RX ADMIN — AMLODIPINE BESYLATE 10 MG: 10 TABLET ORAL at 08:36

## 2019-04-26 RX ADMIN — ACETAMINOPHEN 650 MG: 325 TABLET, FILM COATED ORAL at 22:34

## 2019-04-26 RX ADMIN — DAPTOMYCIN 600 MG: 500 INJECTION, POWDER, LYOPHILIZED, FOR SOLUTION INTRAVENOUS at 22:25

## 2019-04-26 RX ADMIN — METOCLOPRAMIDE 10 MG: 10 TABLET ORAL at 22:24

## 2019-04-26 RX ADMIN — METOCLOPRAMIDE 10 MG: 10 TABLET ORAL at 08:35

## 2019-04-26 RX ADMIN — METOCLOPRAMIDE 10 MG: 10 TABLET ORAL at 12:54

## 2019-04-26 RX ADMIN — HEPARIN SODIUM 5000 UNITS: 5000 INJECTION INTRAVENOUS; SUBCUTANEOUS at 13:00

## 2019-04-26 RX ADMIN — DIPHENHYDRAMINE HYDROCHLORIDE 25 MG: 25 CAPSULE ORAL at 22:25

## 2019-04-26 RX ADMIN — MUPIROCIN: 2 CREAM TOPICAL at 12:56

## 2019-04-26 RX ADMIN — DIPHENHYDRAMINE HYDROCHLORIDE 25 MG: 25 CAPSULE ORAL at 17:31

## 2019-04-26 RX ADMIN — TAZOBACTAM SODIUM AND PIPERACILLIN SODIUM 3.38 G: 375; 3 INJECTION, SOLUTION INTRAVENOUS at 05:27

## 2019-04-26 RX ADMIN — INSULIN LISPRO 10 UNITS: 100 INJECTION, SOLUTION INTRAVENOUS; SUBCUTANEOUS at 12:55

## 2019-04-26 RX ADMIN — NEBIVOLOL HYDROCHLORIDE 5 MG: 5 TABLET ORAL at 08:35

## 2019-04-26 RX ADMIN — INSULIN LISPRO 2 UNITS: 100 INJECTION, SOLUTION INTRAVENOUS; SUBCUTANEOUS at 08:37

## 2019-04-26 RX ADMIN — INSULIN LISPRO 4 UNITS: 100 INJECTION, SOLUTION INTRAVENOUS; SUBCUTANEOUS at 22:25

## 2019-04-26 RX ADMIN — DIPHENHYDRAMINE HYDROCHLORIDE 25 MG: 25 CAPSULE ORAL at 03:21

## 2019-04-27 LAB
ANION GAP SERPL CALCULATED.3IONS-SCNC: 13 MMOL/L
BUN BLD-MCNC: 12 MG/DL (ref 8–23)
BUN/CREAT SERPL: 9 (ref 7–25)
CALCIUM SPEC-SCNC: 9.3 MG/DL (ref 8.6–10.5)
CHLORIDE SERPL-SCNC: 104 MMOL/L (ref 98–107)
CO2 SERPL-SCNC: 24 MMOL/L (ref 22–29)
CREAT BLD-MCNC: 1.33 MG/DL (ref 0.76–1.27)
GFR SERPL CREATININE-BSD FRML MDRD: 52 ML/MIN/1.73
GLUCOSE BLD-MCNC: 131 MG/DL (ref 65–99)
GLUCOSE BLDC GLUCOMTR-MCNC: 121 MG/DL (ref 70–130)
GLUCOSE BLDC GLUCOMTR-MCNC: 148 MG/DL (ref 70–130)
GLUCOSE BLDC GLUCOMTR-MCNC: 156 MG/DL (ref 70–130)
GLUCOSE BLDC GLUCOMTR-MCNC: 221 MG/DL (ref 70–130)
POTASSIUM BLD-SCNC: 4.3 MMOL/L (ref 3.5–5.2)
SODIUM BLD-SCNC: 141 MMOL/L (ref 136–145)

## 2019-04-27 PROCEDURE — 63710000001 INSULIN DETEMIR PER 5 UNITS: Performed by: INTERNAL MEDICINE

## 2019-04-27 PROCEDURE — 25010000002 HEPARIN (PORCINE) PER 1000 UNITS: Performed by: INTERNAL MEDICINE

## 2019-04-27 PROCEDURE — 63710000001 INSULIN LISPRO (HUMAN) PER 5 UNITS: Performed by: INTERNAL MEDICINE

## 2019-04-27 PROCEDURE — 82962 GLUCOSE BLOOD TEST: CPT

## 2019-04-27 PROCEDURE — 25010000002 DAPTOMYCIN PER 1 MG: Performed by: INTERNAL MEDICINE

## 2019-04-27 PROCEDURE — 94799 UNLISTED PULMONARY SVC/PX: CPT

## 2019-04-27 PROCEDURE — 80048 BASIC METABOLIC PNL TOTAL CA: CPT | Performed by: INTERNAL MEDICINE

## 2019-04-27 PROCEDURE — 63710000001 DIPHENHYDRAMINE PER 50 MG: Performed by: INTERNAL MEDICINE

## 2019-04-27 PROCEDURE — 25010000002 ERTAPENEM PER 500 MG: Performed by: INTERNAL MEDICINE

## 2019-04-27 RX ADMIN — DIPHENHYDRAMINE HYDROCHLORIDE 25 MG: 25 CAPSULE ORAL at 16:41

## 2019-04-27 RX ADMIN — INSULIN LISPRO 10 UNITS: 100 INJECTION, SOLUTION INTRAVENOUS; SUBCUTANEOUS at 08:19

## 2019-04-27 RX ADMIN — DIPHENHYDRAMINE HYDROCHLORIDE 25 MG: 25 CAPSULE ORAL at 04:22

## 2019-04-27 RX ADMIN — MUPIROCIN: 2 CREAM TOPICAL at 10:30

## 2019-04-27 RX ADMIN — HEPARIN SODIUM 5000 UNITS: 5000 INJECTION INTRAVENOUS; SUBCUTANEOUS at 05:45

## 2019-04-27 RX ADMIN — INSULIN LISPRO 4 UNITS: 100 INJECTION, SOLUTION INTRAVENOUS; SUBCUTANEOUS at 20:36

## 2019-04-27 RX ADMIN — INSULIN DETEMIR 30 UNITS: 100 INJECTION, SOLUTION SUBCUTANEOUS at 20:36

## 2019-04-27 RX ADMIN — METOCLOPRAMIDE 10 MG: 10 TABLET ORAL at 12:33

## 2019-04-27 RX ADMIN — INSULIN LISPRO 10 UNITS: 100 INJECTION, SOLUTION INTRAVENOUS; SUBCUTANEOUS at 16:41

## 2019-04-27 RX ADMIN — DAPTOMYCIN 600 MG: 500 INJECTION, POWDER, LYOPHILIZED, FOR SOLUTION INTRAVENOUS at 20:39

## 2019-04-27 RX ADMIN — CLONIDINE HYDROCHLORIDE 0.1 MG: 0.1 TABLET ORAL at 08:20

## 2019-04-27 RX ADMIN — ACETAMINOPHEN 650 MG: 325 TABLET, FILM COATED ORAL at 19:04

## 2019-04-27 RX ADMIN — DIPHENHYDRAMINE HYDROCHLORIDE 25 MG: 25 CAPSULE ORAL at 20:35

## 2019-04-27 RX ADMIN — METOCLOPRAMIDE 10 MG: 10 TABLET ORAL at 16:41

## 2019-04-27 RX ADMIN — DIPHENHYDRAMINE HYDROCHLORIDE 25 MG: 25 CAPSULE ORAL at 12:30

## 2019-04-27 RX ADMIN — NEBIVOLOL HYDROCHLORIDE 5 MG: 5 TABLET ORAL at 08:20

## 2019-04-27 RX ADMIN — METOCLOPRAMIDE 10 MG: 10 TABLET ORAL at 20:35

## 2019-04-27 RX ADMIN — HEPARIN SODIUM 5000 UNITS: 5000 INJECTION INTRAVENOUS; SUBCUTANEOUS at 20:36

## 2019-04-27 RX ADMIN — ERTAPENEM SODIUM 1 G: 1 INJECTION, POWDER, LYOPHILIZED, FOR SOLUTION INTRAMUSCULAR; INTRAVENOUS at 08:18

## 2019-04-27 RX ADMIN — METOCLOPRAMIDE 10 MG: 10 TABLET ORAL at 08:20

## 2019-04-27 RX ADMIN — SODIUM CHLORIDE, PRESERVATIVE FREE 10 ML: 5 INJECTION INTRAVENOUS at 20:36

## 2019-04-27 RX ADMIN — INSULIN LISPRO 2 UNITS: 100 INJECTION, SOLUTION INTRAVENOUS; SUBCUTANEOUS at 08:19

## 2019-04-27 RX ADMIN — BUDESONIDE AND FORMOTEROL FUMARATE DIHYDRATE 2 PUFF: 80; 4.5 AEROSOL RESPIRATORY (INHALATION) at 19:14

## 2019-04-27 RX ADMIN — CLONIDINE HYDROCHLORIDE 0.1 MG: 0.1 TABLET ORAL at 20:35

## 2019-04-27 RX ADMIN — BUDESONIDE AND FORMOTEROL FUMARATE DIHYDRATE 2 PUFF: 80; 4.5 AEROSOL RESPIRATORY (INHALATION) at 09:35

## 2019-04-27 RX ADMIN — SODIUM CHLORIDE, PRESERVATIVE FREE 10 ML: 5 INJECTION INTRAVENOUS at 08:23

## 2019-04-27 RX ADMIN — HEPARIN SODIUM 5000 UNITS: 5000 INJECTION INTRAVENOUS; SUBCUTANEOUS at 16:41

## 2019-04-27 RX ADMIN — AMLODIPINE BESYLATE 10 MG: 10 TABLET ORAL at 08:20

## 2019-04-27 RX ADMIN — INSULIN LISPRO 10 UNITS: 100 INJECTION, SOLUTION INTRAVENOUS; SUBCUTANEOUS at 12:29

## 2019-04-28 LAB
ANION GAP SERPL CALCULATED.3IONS-SCNC: 14 MMOL/L
BUN BLD-MCNC: 9 MG/DL (ref 8–23)
BUN/CREAT SERPL: 6.3 (ref 7–25)
CALCIUM SPEC-SCNC: 9.2 MG/DL (ref 8.6–10.5)
CHLORIDE SERPL-SCNC: 102 MMOL/L (ref 98–107)
CO2 SERPL-SCNC: 24 MMOL/L (ref 22–29)
CREAT BLD-MCNC: 1.44 MG/DL (ref 0.76–1.27)
GFR SERPL CREATININE-BSD FRML MDRD: 48 ML/MIN/1.73
GLUCOSE BLD-MCNC: 142 MG/DL (ref 65–99)
GLUCOSE BLDC GLUCOMTR-MCNC: 117 MG/DL (ref 70–130)
GLUCOSE BLDC GLUCOMTR-MCNC: 160 MG/DL (ref 70–130)
GLUCOSE BLDC GLUCOMTR-MCNC: 165 MG/DL (ref 70–130)
GLUCOSE BLDC GLUCOMTR-MCNC: 186 MG/DL (ref 70–130)
POTASSIUM BLD-SCNC: 4.4 MMOL/L (ref 3.5–5.2)
SODIUM BLD-SCNC: 140 MMOL/L (ref 136–145)

## 2019-04-28 PROCEDURE — 82962 GLUCOSE BLOOD TEST: CPT

## 2019-04-28 PROCEDURE — 63710000001 DIPHENHYDRAMINE PER 50 MG: Performed by: INTERNAL MEDICINE

## 2019-04-28 PROCEDURE — 80048 BASIC METABOLIC PNL TOTAL CA: CPT | Performed by: PHYSICIAN ASSISTANT

## 2019-04-28 PROCEDURE — 94799 UNLISTED PULMONARY SVC/PX: CPT

## 2019-04-28 PROCEDURE — 25010000002 HEPARIN (PORCINE) PER 1000 UNITS: Performed by: INTERNAL MEDICINE

## 2019-04-28 PROCEDURE — 25010000002 ERTAPENEM PER 500 MG: Performed by: INTERNAL MEDICINE

## 2019-04-28 PROCEDURE — 25010000002 DAPTOMYCIN PER 1 MG: Performed by: INTERNAL MEDICINE

## 2019-04-28 PROCEDURE — 63710000001 INSULIN DETEMIR PER 5 UNITS: Performed by: INTERNAL MEDICINE

## 2019-04-28 PROCEDURE — 99231 SBSQ HOSP IP/OBS SF/LOW 25: CPT | Performed by: THORACIC SURGERY (CARDIOTHORACIC VASCULAR SURGERY)

## 2019-04-28 RX ORDER — METAXALONE 800 MG/1
800 TABLET ORAL 3 TIMES DAILY PRN
Status: DISCONTINUED | OUTPATIENT
Start: 2019-04-28 | End: 2019-05-01 | Stop reason: HOSPADM

## 2019-04-28 RX ADMIN — DIPHENHYDRAMINE HYDROCHLORIDE 25 MG: 25 CAPSULE ORAL at 09:29

## 2019-04-28 RX ADMIN — MUPIROCIN 1 APPLICATION: 2 CREAM TOPICAL at 21:54

## 2019-04-28 RX ADMIN — BUDESONIDE AND FORMOTEROL FUMARATE DIHYDRATE 2 PUFF: 80; 4.5 AEROSOL RESPIRATORY (INHALATION) at 07:55

## 2019-04-28 RX ADMIN — CLONIDINE HYDROCHLORIDE 0.1 MG: 0.1 TABLET ORAL at 08:15

## 2019-04-28 RX ADMIN — HEPARIN SODIUM 5000 UNITS: 5000 INJECTION INTRAVENOUS; SUBCUTANEOUS at 06:08

## 2019-04-28 RX ADMIN — INSULIN LISPRO 10 UNITS: 100 INJECTION, SOLUTION INTRAVENOUS; SUBCUTANEOUS at 12:07

## 2019-04-28 RX ADMIN — INSULIN LISPRO 10 UNITS: 100 INJECTION, SOLUTION INTRAVENOUS; SUBCUTANEOUS at 08:14

## 2019-04-28 RX ADMIN — METAXALONE 800 MG: 800 TABLET ORAL at 13:15

## 2019-04-28 RX ADMIN — HEPARIN SODIUM 5000 UNITS: 5000 INJECTION INTRAVENOUS; SUBCUTANEOUS at 21:51

## 2019-04-28 RX ADMIN — INSULIN LISPRO 2 UNITS: 100 INJECTION, SOLUTION INTRAVENOUS; SUBCUTANEOUS at 18:19

## 2019-04-28 RX ADMIN — HEPARIN SODIUM 5000 UNITS: 5000 INJECTION INTRAVENOUS; SUBCUTANEOUS at 15:42

## 2019-04-28 RX ADMIN — SODIUM CHLORIDE, PRESERVATIVE FREE 3 ML: 5 INJECTION INTRAVENOUS at 21:52

## 2019-04-28 RX ADMIN — SILVER SULFADIAZINE 1 APPLICATION: 10 CREAM TOPICAL at 21:54

## 2019-04-28 RX ADMIN — METAXALONE 800 MG: 800 TABLET ORAL at 21:51

## 2019-04-28 RX ADMIN — ERTAPENEM SODIUM 1 G: 1 INJECTION, POWDER, LYOPHILIZED, FOR SOLUTION INTRAMUSCULAR; INTRAVENOUS at 09:29

## 2019-04-28 RX ADMIN — METOCLOPRAMIDE 10 MG: 10 TABLET ORAL at 08:14

## 2019-04-28 RX ADMIN — BUDESONIDE AND FORMOTEROL FUMARATE DIHYDRATE 2 PUFF: 80; 4.5 AEROSOL RESPIRATORY (INHALATION) at 20:14

## 2019-04-28 RX ADMIN — DIPHENHYDRAMINE HYDROCHLORIDE 25 MG: 25 CAPSULE ORAL at 21:51

## 2019-04-28 RX ADMIN — AMLODIPINE BESYLATE 10 MG: 10 TABLET ORAL at 08:14

## 2019-04-28 RX ADMIN — ACETAMINOPHEN 650 MG: 325 TABLET, FILM COATED ORAL at 22:03

## 2019-04-28 RX ADMIN — INSULIN DETEMIR 30 UNITS: 100 INJECTION, SOLUTION SUBCUTANEOUS at 21:52

## 2019-04-28 RX ADMIN — NEBIVOLOL HYDROCHLORIDE 5 MG: 5 TABLET ORAL at 08:14

## 2019-04-28 RX ADMIN — SODIUM CHLORIDE, PRESERVATIVE FREE 10 ML: 5 INJECTION INTRAVENOUS at 09:29

## 2019-04-28 RX ADMIN — METOCLOPRAMIDE 10 MG: 10 TABLET ORAL at 21:54

## 2019-04-28 RX ADMIN — DOCUSATE SODIUM 100 MG: 100 CAPSULE, LIQUID FILLED ORAL at 21:51

## 2019-04-28 RX ADMIN — DIPHENHYDRAMINE HYDROCHLORIDE 25 MG: 25 CAPSULE ORAL at 15:43

## 2019-04-28 RX ADMIN — DIPHENHYDRAMINE HYDROCHLORIDE 25 MG: 25 CAPSULE ORAL at 04:06

## 2019-04-28 RX ADMIN — INSULIN LISPRO 2 UNITS: 100 INJECTION, SOLUTION INTRAVENOUS; SUBCUTANEOUS at 12:08

## 2019-04-28 RX ADMIN — METOCLOPRAMIDE 10 MG: 10 TABLET ORAL at 18:18

## 2019-04-28 RX ADMIN — MUPIROCIN: 2 CREAM TOPICAL at 09:30

## 2019-04-28 RX ADMIN — METOCLOPRAMIDE 10 MG: 10 TABLET ORAL at 12:07

## 2019-04-28 RX ADMIN — ACETAMINOPHEN 650 MG: 325 TABLET, FILM COATED ORAL at 12:55

## 2019-04-28 RX ADMIN — INSULIN LISPRO 10 UNITS: 100 INJECTION, SOLUTION INTRAVENOUS; SUBCUTANEOUS at 18:19

## 2019-04-28 RX ADMIN — DAPTOMYCIN 600 MG: 500 INJECTION, POWDER, LYOPHILIZED, FOR SOLUTION INTRAVENOUS at 21:52

## 2019-04-28 RX ADMIN — CLONIDINE HYDROCHLORIDE 0.1 MG: 0.1 TABLET ORAL at 21:51

## 2019-04-28 RX ADMIN — ACETAMINOPHEN 650 MG: 325 TABLET, FILM COATED ORAL at 06:15

## 2019-04-28 RX ADMIN — INSULIN LISPRO 2 UNITS: 100 INJECTION, SOLUTION INTRAVENOUS; SUBCUTANEOUS at 08:15

## 2019-04-29 LAB
25(OH)D3 SERPL-MCNC: 14.8 NG/ML (ref 30–100)
ANION GAP SERPL CALCULATED.3IONS-SCNC: 14 MMOL/L
BACTERIA UR QL AUTO: NORMAL /HPF
BILIRUB UR QL STRIP: NEGATIVE
BUN BLD-MCNC: 10 MG/DL (ref 8–23)
BUN/CREAT SERPL: 6.8 (ref 7–25)
CALCIUM SPEC-SCNC: 9 MG/DL (ref 8.6–10.5)
CHLORIDE SERPL-SCNC: 104 MMOL/L (ref 98–107)
CLARITY UR: CLEAR
CO2 SERPL-SCNC: 23 MMOL/L (ref 22–29)
COLOR UR: YELLOW
CREAT BLD-MCNC: 1.46 MG/DL (ref 0.76–1.27)
CREAT UR-MCNC: 53.3 MG/DL
DEPRECATED RDW RBC AUTO: 43.5 FL (ref 37–54)
EOSINOPHIL SPEC QL MICRO: 0 % EOS/100 CELLS (ref 0–0)
ERYTHROCYTE [DISTWIDTH] IN BLOOD BY AUTOMATED COUNT: 13.6 % (ref 12.3–15.4)
GFR SERPL CREATININE-BSD FRML MDRD: 47 ML/MIN/1.73
GLUCOSE BLD-MCNC: 135 MG/DL (ref 65–99)
GLUCOSE BLDC GLUCOMTR-MCNC: 149 MG/DL (ref 70–130)
GLUCOSE BLDC GLUCOMTR-MCNC: 169 MG/DL (ref 70–130)
GLUCOSE BLDC GLUCOMTR-MCNC: 174 MG/DL (ref 70–130)
GLUCOSE BLDC GLUCOMTR-MCNC: 181 MG/DL (ref 70–130)
GLUCOSE UR STRIP-MCNC: NEGATIVE MG/DL
HCT VFR BLD AUTO: 32.3 % (ref 37.5–51)
HGB BLD-MCNC: 10.5 G/DL (ref 13–17.7)
HGB UR QL STRIP.AUTO: NEGATIVE
HYALINE CASTS UR QL AUTO: NORMAL /LPF
KETONES UR QL STRIP: NEGATIVE
LDH SERPL-CCNC: 391 U/L (ref 135–225)
LEUKOCYTE ESTERASE UR QL STRIP.AUTO: NEGATIVE
MCH RBC QN AUTO: 28.4 PG (ref 26.6–33)
MCHC RBC AUTO-ENTMCNC: 32.5 G/DL (ref 31.5–35.7)
MCV RBC AUTO: 87.3 FL (ref 79–97)
NITRITE UR QL STRIP: NEGATIVE
PH UR STRIP.AUTO: 5.5 [PH] (ref 5–8)
PHOSPHATE SERPL-MCNC: 3.3 MG/DL (ref 2.5–4.5)
PLATELET # BLD AUTO: 322 10*3/MM3 (ref 140–450)
PMV BLD AUTO: 8.8 FL (ref 6–12)
POTASSIUM BLD-SCNC: 4.4 MMOL/L (ref 3.5–5.2)
PROT UR QL STRIP: ABNORMAL
PROT UR-MCNC: 38.3 MG/DL
PTH-INTACT SERPL-MCNC: 77.2 PG/ML (ref 15–65)
RBC # BLD AUTO: 3.7 10*6/MM3 (ref 4.14–5.8)
RBC # UR: NORMAL /HPF
REF LAB TEST METHOD: NORMAL
SODIUM BLD-SCNC: 141 MMOL/L (ref 136–145)
SODIUM UR-SCNC: 45 MMOL/L
SP GR UR STRIP: 1.01 (ref 1–1.03)
SQUAMOUS #/AREA URNS HPF: NORMAL /HPF
URATE SERPL-MCNC: 5.6 MG/DL (ref 3.4–7)
UROBILINOGEN UR QL STRIP: ABNORMAL
WBC NRBC COR # BLD: 7.64 10*3/MM3 (ref 3.4–10.8)
WBC UR QL AUTO: NORMAL /HPF

## 2019-04-29 PROCEDURE — 81001 URINALYSIS AUTO W/SCOPE: CPT | Performed by: INTERNAL MEDICINE

## 2019-04-29 PROCEDURE — 87205 SMEAR GRAM STAIN: CPT | Performed by: INTERNAL MEDICINE

## 2019-04-29 PROCEDURE — 84156 ASSAY OF PROTEIN URINE: CPT | Performed by: INTERNAL MEDICINE

## 2019-04-29 PROCEDURE — 84100 ASSAY OF PHOSPHORUS: CPT | Performed by: INTERNAL MEDICINE

## 2019-04-29 PROCEDURE — 82570 ASSAY OF URINE CREATININE: CPT | Performed by: INTERNAL MEDICINE

## 2019-04-29 PROCEDURE — 94799 UNLISTED PULMONARY SVC/PX: CPT

## 2019-04-29 PROCEDURE — 25010000002 ERTAPENEM PER 500 MG: Performed by: INTERNAL MEDICINE

## 2019-04-29 PROCEDURE — 82306 VITAMIN D 25 HYDROXY: CPT | Performed by: INTERNAL MEDICINE

## 2019-04-29 PROCEDURE — 63710000001 DIPHENHYDRAMINE PER 50 MG: Performed by: INTERNAL MEDICINE

## 2019-04-29 PROCEDURE — 83615 LACTATE (LD) (LDH) ENZYME: CPT | Performed by: INTERNAL MEDICINE

## 2019-04-29 PROCEDURE — 63710000001 INSULIN LISPRO (HUMAN) PER 5 UNITS: Performed by: INTERNAL MEDICINE

## 2019-04-29 PROCEDURE — 25010000002 DAPTOMYCIN PER 1 MG: Performed by: INTERNAL MEDICINE

## 2019-04-29 PROCEDURE — 80048 BASIC METABOLIC PNL TOTAL CA: CPT | Performed by: NURSE PRACTITIONER

## 2019-04-29 PROCEDURE — 25010000002 ONDANSETRON PER 1 MG: Performed by: INTERNAL MEDICINE

## 2019-04-29 PROCEDURE — 83970 ASSAY OF PARATHORMONE: CPT | Performed by: INTERNAL MEDICINE

## 2019-04-29 PROCEDURE — 99231 SBSQ HOSP IP/OBS SF/LOW 25: CPT | Performed by: THORACIC SURGERY (CARDIOTHORACIC VASCULAR SURGERY)

## 2019-04-29 PROCEDURE — 85027 COMPLETE CBC AUTOMATED: CPT | Performed by: INTERNAL MEDICINE

## 2019-04-29 PROCEDURE — 99231 SBSQ HOSP IP/OBS SF/LOW 25: CPT | Performed by: ORTHOPAEDIC SURGERY

## 2019-04-29 PROCEDURE — 82962 GLUCOSE BLOOD TEST: CPT

## 2019-04-29 PROCEDURE — 84550 ASSAY OF BLOOD/URIC ACID: CPT | Performed by: INTERNAL MEDICINE

## 2019-04-29 PROCEDURE — 63710000001 INSULIN DETEMIR PER 5 UNITS: Performed by: INTERNAL MEDICINE

## 2019-04-29 PROCEDURE — 84300 ASSAY OF URINE SODIUM: CPT | Performed by: INTERNAL MEDICINE

## 2019-04-29 PROCEDURE — 25010000002 HEPARIN (PORCINE) PER 1000 UNITS: Performed by: INTERNAL MEDICINE

## 2019-04-29 RX ORDER — ACETYLCYSTEINE 200 MG/ML
600 SOLUTION ORAL; RESPIRATORY (INHALATION) EVERY 12 HOURS SCHEDULED
Status: DISCONTINUED | OUTPATIENT
Start: 2019-04-29 | End: 2019-05-01 | Stop reason: HOSPADM

## 2019-04-29 RX ADMIN — ERTAPENEM SODIUM 1 G: 1 INJECTION, POWDER, LYOPHILIZED, FOR SOLUTION INTRAMUSCULAR; INTRAVENOUS at 09:32

## 2019-04-29 RX ADMIN — ONDANSETRON 4 MG: 2 INJECTION INTRAMUSCULAR; INTRAVENOUS at 19:43

## 2019-04-29 RX ADMIN — AMLODIPINE BESYLATE 10 MG: 10 TABLET ORAL at 08:31

## 2019-04-29 RX ADMIN — BUDESONIDE AND FORMOTEROL FUMARATE DIHYDRATE 2 PUFF: 80; 4.5 AEROSOL RESPIRATORY (INHALATION) at 08:17

## 2019-04-29 RX ADMIN — DIPHENHYDRAMINE HYDROCHLORIDE 25 MG: 25 CAPSULE ORAL at 10:39

## 2019-04-29 RX ADMIN — ACETYLCYSTEINE 600 MG: 200 SOLUTION ORAL; RESPIRATORY (INHALATION) at 21:08

## 2019-04-29 RX ADMIN — NEBIVOLOL HYDROCHLORIDE 5 MG: 5 TABLET ORAL at 08:31

## 2019-04-29 RX ADMIN — METOCLOPRAMIDE 10 MG: 10 TABLET ORAL at 08:31

## 2019-04-29 RX ADMIN — DAPTOMYCIN 600 MG: 500 INJECTION, POWDER, LYOPHILIZED, FOR SOLUTION INTRAVENOUS at 21:07

## 2019-04-29 RX ADMIN — CLONIDINE HYDROCHLORIDE 0.1 MG: 0.1 TABLET ORAL at 08:32

## 2019-04-29 RX ADMIN — SILVER SULFADIAZINE 1 APPLICATION: 10 CREAM TOPICAL at 21:09

## 2019-04-29 RX ADMIN — MUPIROCIN 1 APPLICATION: 2 CREAM TOPICAL at 21:09

## 2019-04-29 RX ADMIN — SODIUM CHLORIDE, PRESERVATIVE FREE 10 ML: 5 INJECTION INTRAVENOUS at 21:09

## 2019-04-29 RX ADMIN — DIPHENHYDRAMINE HYDROCHLORIDE 25 MG: 25 CAPSULE ORAL at 21:08

## 2019-04-29 RX ADMIN — METOCLOPRAMIDE 10 MG: 10 TABLET ORAL at 17:33

## 2019-04-29 RX ADMIN — METOCLOPRAMIDE 10 MG: 10 TABLET ORAL at 12:37

## 2019-04-29 RX ADMIN — METAXALONE 800 MG: 800 TABLET ORAL at 12:37

## 2019-04-29 RX ADMIN — HEPARIN SODIUM 5000 UNITS: 5000 INJECTION INTRAVENOUS; SUBCUTANEOUS at 15:22

## 2019-04-29 RX ADMIN — INSULIN LISPRO 2 UNITS: 100 INJECTION, SOLUTION INTRAVENOUS; SUBCUTANEOUS at 12:38

## 2019-04-29 RX ADMIN — INSULIN LISPRO 2 UNITS: 100 INJECTION, SOLUTION INTRAVENOUS; SUBCUTANEOUS at 08:31

## 2019-04-29 RX ADMIN — SODIUM CHLORIDE, PRESERVATIVE FREE 3 ML: 5 INJECTION INTRAVENOUS at 19:44

## 2019-04-29 RX ADMIN — DOCUSATE SODIUM 100 MG: 100 CAPSULE, LIQUID FILLED ORAL at 21:08

## 2019-04-29 RX ADMIN — CLONIDINE HYDROCHLORIDE 0.1 MG: 0.1 TABLET ORAL at 21:07

## 2019-04-29 RX ADMIN — METOCLOPRAMIDE 10 MG: 10 TABLET ORAL at 21:07

## 2019-04-29 RX ADMIN — INSULIN LISPRO 2 UNITS: 100 INJECTION, SOLUTION INTRAVENOUS; SUBCUTANEOUS at 21:08

## 2019-04-29 RX ADMIN — BUDESONIDE AND FORMOTEROL FUMARATE DIHYDRATE 2 PUFF: 80; 4.5 AEROSOL RESPIRATORY (INHALATION) at 20:58

## 2019-04-29 RX ADMIN — METAXALONE 800 MG: 800 TABLET ORAL at 21:07

## 2019-04-29 RX ADMIN — INSULIN LISPRO 10 UNITS: 100 INJECTION, SOLUTION INTRAVENOUS; SUBCUTANEOUS at 08:31

## 2019-04-29 RX ADMIN — INSULIN LISPRO 10 UNITS: 100 INJECTION, SOLUTION INTRAVENOUS; SUBCUTANEOUS at 12:38

## 2019-04-29 RX ADMIN — INSULIN DETEMIR 30 UNITS: 100 INJECTION, SOLUTION SUBCUTANEOUS at 21:07

## 2019-04-29 RX ADMIN — DIPHENHYDRAMINE HYDROCHLORIDE 25 MG: 25 CAPSULE ORAL at 15:22

## 2019-04-29 RX ADMIN — HEPARIN SODIUM 5000 UNITS: 5000 INJECTION INTRAVENOUS; SUBCUTANEOUS at 21:08

## 2019-04-29 RX ADMIN — INSULIN LISPRO 10 UNITS: 100 INJECTION, SOLUTION INTRAVENOUS; SUBCUTANEOUS at 17:33

## 2019-04-29 RX ADMIN — MUPIROCIN: 2 CREAM TOPICAL at 09:32

## 2019-04-29 RX ADMIN — SODIUM CHLORIDE, PRESERVATIVE FREE 10 ML: 5 INJECTION INTRAVENOUS at 09:31

## 2019-04-30 ENCOUNTER — ANESTHESIA (OUTPATIENT)
Dept: PERIOP | Facility: HOSPITAL | Age: 74
End: 2019-04-30

## 2019-04-30 ENCOUNTER — ANESTHESIA EVENT (OUTPATIENT)
Dept: PERIOP | Facility: HOSPITAL | Age: 74
End: 2019-04-30

## 2019-04-30 ENCOUNTER — APPOINTMENT (OUTPATIENT)
Dept: GENERAL RADIOLOGY | Facility: HOSPITAL | Age: 74
End: 2019-04-30

## 2019-04-30 LAB
ACT BLD: 153 SECONDS (ref 82–152)
ANION GAP SERPL CALCULATED.3IONS-SCNC: 13 MMOL/L
BUN BLD-MCNC: 11 MG/DL (ref 8–23)
BUN/CREAT SERPL: 7.6 (ref 7–25)
CALCIUM SPEC-SCNC: 9.6 MG/DL (ref 8.6–10.5)
CHLORIDE SERPL-SCNC: 101 MMOL/L (ref 98–107)
CK SERPL-CCNC: 88 U/L (ref 20–200)
CO2 SERPL-SCNC: 24 MMOL/L (ref 22–29)
CREAT BLD-MCNC: 1.45 MG/DL (ref 0.76–1.27)
GFR SERPL CREATININE-BSD FRML MDRD: 47 ML/MIN/1.73
GLUCOSE BLD-MCNC: 169 MG/DL (ref 65–99)
GLUCOSE BLDC GLUCOMTR-MCNC: 155 MG/DL (ref 70–130)
GLUCOSE BLDC GLUCOMTR-MCNC: 160 MG/DL (ref 70–130)
GLUCOSE BLDC GLUCOMTR-MCNC: 175 MG/DL (ref 70–130)
GLUCOSE BLDC GLUCOMTR-MCNC: 200 MG/DL (ref 70–130)
POTASSIUM BLD-SCNC: 4.4 MMOL/L (ref 3.5–5.2)
SODIUM BLD-SCNC: 138 MMOL/L (ref 136–145)

## 2019-04-30 PROCEDURE — 63710000001 INSULIN LISPRO (HUMAN) PER 5 UNITS: Performed by: INTERNAL MEDICINE

## 2019-04-30 PROCEDURE — 94799 UNLISTED PULMONARY SVC/PX: CPT

## 2019-04-30 PROCEDURE — 25010000002 HEPARIN (PORCINE) PER 1000 UNITS: Performed by: THORACIC SURGERY (CARDIOTHORACIC VASCULAR SURGERY)

## 2019-04-30 PROCEDURE — 0 IODIXANOL PER 1 ML: Performed by: THORACIC SURGERY (CARDIOTHORACIC VASCULAR SURGERY)

## 2019-04-30 PROCEDURE — 80048 BASIC METABOLIC PNL TOTAL CA: CPT | Performed by: INTERNAL MEDICINE

## 2019-04-30 PROCEDURE — 25010000002 DAPTOMYCIN PER 1 MG: Performed by: INTERNAL MEDICINE

## 2019-04-30 PROCEDURE — 25010000002 ERTAPENEM PER 500 MG: Performed by: INTERNAL MEDICINE

## 2019-04-30 PROCEDURE — 75625 CONTRAST EXAM ABDOMINL AORTA: CPT

## 2019-04-30 PROCEDURE — C1894 INTRO/SHEATH, NON-LASER: HCPCS | Performed by: THORACIC SURGERY (CARDIOTHORACIC VASCULAR SURGERY)

## 2019-04-30 PROCEDURE — 75716 ARTERY X-RAYS ARMS/LEGS: CPT

## 2019-04-30 PROCEDURE — 75716 ARTERY X-RAYS ARMS/LEGS: CPT | Performed by: THORACIC SURGERY (CARDIOTHORACIC VASCULAR SURGERY)

## 2019-04-30 PROCEDURE — 63710000001 INSULIN DETEMIR PER 5 UNITS: Performed by: INTERNAL MEDICINE

## 2019-04-30 PROCEDURE — 75625 CONTRAST EXAM ABDOMINL AORTA: CPT | Performed by: THORACIC SURGERY (CARDIOTHORACIC VASCULAR SURGERY)

## 2019-04-30 PROCEDURE — 99231 SBSQ HOSP IP/OBS SF/LOW 25: CPT | Performed by: THORACIC SURGERY (CARDIOTHORACIC VASCULAR SURGERY)

## 2019-04-30 PROCEDURE — 63710000001 DIPHENHYDRAMINE PER 50 MG: Performed by: INTERNAL MEDICINE

## 2019-04-30 PROCEDURE — C1887 CATHETER, GUIDING: HCPCS | Performed by: THORACIC SURGERY (CARDIOTHORACIC VASCULAR SURGERY)

## 2019-04-30 PROCEDURE — 25010000002 PHENYLEPHRINE PER 1 ML: Performed by: ANESTHESIOLOGY

## 2019-04-30 PROCEDURE — 82962 GLUCOSE BLOOD TEST: CPT

## 2019-04-30 PROCEDURE — C1769 GUIDE WIRE: HCPCS | Performed by: THORACIC SURGERY (CARDIOTHORACIC VASCULAR SURGERY)

## 2019-04-30 PROCEDURE — 36200 PLACE CATHETER IN AORTA: CPT | Performed by: THORACIC SURGERY (CARDIOTHORACIC VASCULAR SURGERY)

## 2019-04-30 PROCEDURE — B41DYZZ FLUOROSCOPY OF AORTA AND BILATERAL LOWER EXTREMITY ARTERIES USING OTHER CONTRAST: ICD-10-PCS | Performed by: THORACIC SURGERY (CARDIOTHORACIC VASCULAR SURGERY)

## 2019-04-30 PROCEDURE — 82550 ASSAY OF CK (CPK): CPT | Performed by: INTERNAL MEDICINE

## 2019-04-30 PROCEDURE — 25010000002 HEPARIN (PORCINE) PER 1000 UNITS: Performed by: INTERNAL MEDICINE

## 2019-04-30 PROCEDURE — 25010000002 PROPOFOL 10 MG/ML EMULSION: Performed by: ANESTHESIOLOGY

## 2019-04-30 PROCEDURE — 85347 COAGULATION TIME ACTIVATED: CPT

## 2019-04-30 RX ORDER — SODIUM CHLORIDE 9 MG/ML
INJECTION, SOLUTION INTRAVENOUS CONTINUOUS PRN
Status: DISCONTINUED | OUTPATIENT
Start: 2019-04-30 | End: 2019-04-30 | Stop reason: SURG

## 2019-04-30 RX ORDER — SODIUM CHLORIDE 0.9 % (FLUSH) 0.9 %
3-10 SYRINGE (ML) INJECTION AS NEEDED
Status: DISCONTINUED | OUTPATIENT
Start: 2019-04-30 | End: 2019-04-30 | Stop reason: HOSPADM

## 2019-04-30 RX ORDER — MAGNESIUM HYDROXIDE 1200 MG/15ML
LIQUID ORAL AS NEEDED
Status: DISCONTINUED | OUTPATIENT
Start: 2019-04-30 | End: 2019-04-30 | Stop reason: HOSPADM

## 2019-04-30 RX ORDER — PROPOFOL 10 MG/ML
VIAL (ML) INTRAVENOUS AS NEEDED
Status: DISCONTINUED | OUTPATIENT
Start: 2019-04-30 | End: 2019-04-30 | Stop reason: SURG

## 2019-04-30 RX ORDER — LIDOCAINE HYDROCHLORIDE 10 MG/ML
0.5 INJECTION, SOLUTION EPIDURAL; INFILTRATION; INTRACAUDAL; PERINEURAL ONCE AS NEEDED
Status: DISCONTINUED | OUTPATIENT
Start: 2019-04-30 | End: 2019-04-30 | Stop reason: HOSPADM

## 2019-04-30 RX ORDER — SODIUM CHLORIDE, SODIUM LACTATE, POTASSIUM CHLORIDE, CALCIUM CHLORIDE 600; 310; 30; 20 MG/100ML; MG/100ML; MG/100ML; MG/100ML
9 INJECTION, SOLUTION INTRAVENOUS CONTINUOUS
Status: DISCONTINUED | OUTPATIENT
Start: 2019-04-30 | End: 2019-05-01 | Stop reason: HOSPADM

## 2019-04-30 RX ORDER — SODIUM CHLORIDE 0.9 % (FLUSH) 0.9 %
3 SYRINGE (ML) INJECTION EVERY 12 HOURS SCHEDULED
Status: DISCONTINUED | OUTPATIENT
Start: 2019-04-30 | End: 2019-04-30 | Stop reason: HOSPADM

## 2019-04-30 RX ORDER — LABETALOL HYDROCHLORIDE 5 MG/ML
5 INJECTION, SOLUTION INTRAVENOUS ONCE
Status: COMPLETED | OUTPATIENT
Start: 2019-04-30 | End: 2019-04-30

## 2019-04-30 RX ORDER — IODIXANOL 320 MG/ML
INJECTION, SOLUTION INTRAVASCULAR AS NEEDED
Status: DISCONTINUED | OUTPATIENT
Start: 2019-04-30 | End: 2019-04-30 | Stop reason: HOSPADM

## 2019-04-30 RX ORDER — FAMOTIDINE 20 MG/1
20 TABLET, FILM COATED ORAL ONCE
Status: DISCONTINUED | OUTPATIENT
Start: 2019-04-30 | End: 2019-04-30 | Stop reason: HOSPADM

## 2019-04-30 RX ORDER — LIDOCAINE HYDROCHLORIDE 10 MG/ML
INJECTION, SOLUTION INFILTRATION; PERINEURAL AS NEEDED
Status: DISCONTINUED | OUTPATIENT
Start: 2019-04-30 | End: 2019-04-30 | Stop reason: HOSPADM

## 2019-04-30 RX ORDER — SODIUM CHLORIDE 450 MG/100ML
100 INJECTION, SOLUTION INTRAVENOUS CONTINUOUS
Status: CANCELLED | OUTPATIENT
Start: 2019-04-30 | End: 2019-05-06

## 2019-04-30 RX ORDER — FAMOTIDINE 10 MG/ML
20 INJECTION, SOLUTION INTRAVENOUS ONCE
Status: DISCONTINUED | OUTPATIENT
Start: 2019-04-30 | End: 2019-04-30 | Stop reason: HOSPADM

## 2019-04-30 RX ORDER — FAMOTIDINE 20 MG/1
20 TABLET, FILM COATED ORAL ONCE
Status: COMPLETED | OUTPATIENT
Start: 2019-04-30 | End: 2019-04-30

## 2019-04-30 RX ADMIN — CLONIDINE HYDROCHLORIDE 0.1 MG: 0.1 TABLET ORAL at 21:08

## 2019-04-30 RX ADMIN — ACETYLCYSTEINE 600 MG: 200 SOLUTION ORAL; RESPIRATORY (INHALATION) at 21:09

## 2019-04-30 RX ADMIN — BUDESONIDE AND FORMOTEROL FUMARATE DIHYDRATE 2 PUFF: 80; 4.5 AEROSOL RESPIRATORY (INHALATION) at 21:45

## 2019-04-30 RX ADMIN — DAPTOMYCIN 600 MG: 500 INJECTION, POWDER, LYOPHILIZED, FOR SOLUTION INTRAVENOUS at 21:30

## 2019-04-30 RX ADMIN — METAXALONE 800 MG: 800 TABLET ORAL at 21:07

## 2019-04-30 RX ADMIN — DIPHENHYDRAMINE HYDROCHLORIDE 25 MG: 25 CAPSULE ORAL at 21:08

## 2019-04-30 RX ADMIN — INSULIN LISPRO 2 UNITS: 100 INJECTION, SOLUTION INTRAVENOUS; SUBCUTANEOUS at 09:27

## 2019-04-30 RX ADMIN — SODIUM CHLORIDE, POTASSIUM CHLORIDE, SODIUM LACTATE AND CALCIUM CHLORIDE 125 ML/HR: 600; 310; 30; 20 INJECTION, SOLUTION INTRAVENOUS at 01:29

## 2019-04-30 RX ADMIN — METOCLOPRAMIDE 10 MG: 10 TABLET ORAL at 21:08

## 2019-04-30 RX ADMIN — PHENYLEPHRINE HYDROCHLORIDE 50 MCG: 10 INJECTION INTRAVENOUS at 18:22

## 2019-04-30 RX ADMIN — INSULIN DETEMIR 30 UNITS: 100 INJECTION, SOLUTION SUBCUTANEOUS at 21:09

## 2019-04-30 RX ADMIN — INSULIN LISPRO 2 UNITS: 100 INJECTION, SOLUTION INTRAVENOUS; SUBCUTANEOUS at 21:08

## 2019-04-30 RX ADMIN — HEPARIN SODIUM 5000 UNITS: 5000 INJECTION INTRAVENOUS; SUBCUTANEOUS at 21:08

## 2019-04-30 RX ADMIN — PROPOFOL 100 MG: 10 INJECTION, EMULSION INTRAVENOUS at 18:22

## 2019-04-30 RX ADMIN — CLONIDINE HYDROCHLORIDE 0.1 MG: 0.1 TABLET ORAL at 09:27

## 2019-04-30 RX ADMIN — BUDESONIDE AND FORMOTEROL FUMARATE DIHYDRATE 2 PUFF: 80; 4.5 AEROSOL RESPIRATORY (INHALATION) at 09:15

## 2019-04-30 RX ADMIN — NEBIVOLOL HYDROCHLORIDE 5 MG: 5 TABLET ORAL at 09:28

## 2019-04-30 RX ADMIN — ERTAPENEM SODIUM 1 G: 1 INJECTION, POWDER, LYOPHILIZED, FOR SOLUTION INTRAMUSCULAR; INTRAVENOUS at 09:27

## 2019-04-30 RX ADMIN — FAMOTIDINE 20 MG: 20 TABLET ORAL at 15:21

## 2019-04-30 RX ADMIN — AMLODIPINE BESYLATE 10 MG: 10 TABLET ORAL at 09:27

## 2019-04-30 RX ADMIN — METAXALONE 800 MG: 800 TABLET ORAL at 12:55

## 2019-04-30 RX ADMIN — SODIUM CHLORIDE: 9 INJECTION, SOLUTION INTRAVENOUS at 18:05

## 2019-04-30 RX ADMIN — SODIUM CHLORIDE, POTASSIUM CHLORIDE, SODIUM LACTATE AND CALCIUM CHLORIDE 125 ML/HR: 600; 310; 30; 20 INJECTION, SOLUTION INTRAVENOUS at 21:36

## 2019-04-30 RX ADMIN — ACETAMINOPHEN 650 MG: 325 TABLET, FILM COATED ORAL at 21:08

## 2019-04-30 RX ADMIN — LABETALOL 20 MG/4 ML (5 MG/ML) INTRAVENOUS SYRINGE 5 MG: at 19:28

## 2019-04-30 RX ADMIN — SODIUM CHLORIDE, PRESERVATIVE FREE 3 ML: 5 INJECTION INTRAVENOUS at 09:29

## 2019-04-30 RX ADMIN — SILVER SULFADIAZINE 1 APPLICATION: 10 CREAM TOPICAL at 21:32

## 2019-04-30 RX ADMIN — PROPOFOL 50 MG: 10 INJECTION, EMULSION INTRAVENOUS at 18:23

## 2019-04-30 RX ADMIN — SODIUM CHLORIDE, PRESERVATIVE FREE 3 ML: 5 INJECTION INTRAVENOUS at 21:31

## 2019-04-30 RX ADMIN — MUPIROCIN 1 APPLICATION: 2 CREAM TOPICAL at 21:32

## 2019-04-30 RX ADMIN — INSULIN LISPRO 2 UNITS: 100 INJECTION, SOLUTION INTRAVENOUS; SUBCUTANEOUS at 12:51

## 2019-05-01 VITALS
HEART RATE: 120 BPM | TEMPERATURE: 97.7 F | SYSTOLIC BLOOD PRESSURE: 140 MMHG | DIASTOLIC BLOOD PRESSURE: 78 MMHG | RESPIRATION RATE: 16 BRPM | WEIGHT: 252 LBS | HEIGHT: 75 IN | BODY MASS INDEX: 31.33 KG/M2 | OXYGEN SATURATION: 95 %

## 2019-05-01 LAB
ANION GAP SERPL CALCULATED.3IONS-SCNC: 12 MMOL/L
BUN BLD-MCNC: 9 MG/DL (ref 8–23)
BUN/CREAT SERPL: 7.2 (ref 7–25)
CALCIUM SPEC-SCNC: 9 MG/DL (ref 8.6–10.5)
CHLORIDE SERPL-SCNC: 104 MMOL/L (ref 98–107)
CO2 SERPL-SCNC: 24 MMOL/L (ref 22–29)
CREAT BLD-MCNC: 1.25 MG/DL (ref 0.76–1.27)
GFR SERPL CREATININE-BSD FRML MDRD: 56 ML/MIN/1.73
GLUCOSE BLD-MCNC: 199 MG/DL (ref 65–99)
GLUCOSE BLDC GLUCOMTR-MCNC: 145 MG/DL (ref 70–130)
GLUCOSE BLDC GLUCOMTR-MCNC: 182 MG/DL (ref 70–130)
POTASSIUM BLD-SCNC: 4.1 MMOL/L (ref 3.5–5.2)
SODIUM BLD-SCNC: 140 MMOL/L (ref 136–145)

## 2019-05-01 PROCEDURE — 82962 GLUCOSE BLOOD TEST: CPT

## 2019-05-01 PROCEDURE — 80048 BASIC METABOLIC PNL TOTAL CA: CPT | Performed by: INTERNAL MEDICINE

## 2019-05-01 PROCEDURE — 94799 UNLISTED PULMONARY SVC/PX: CPT

## 2019-05-01 PROCEDURE — 25010000002 ERTAPENEM PER 500 MG: Performed by: INTERNAL MEDICINE

## 2019-05-01 PROCEDURE — 99231 SBSQ HOSP IP/OBS SF/LOW 25: CPT | Performed by: ORTHOPAEDIC SURGERY

## 2019-05-01 PROCEDURE — 25010000002 HEPARIN (PORCINE) PER 1000 UNITS: Performed by: INTERNAL MEDICINE

## 2019-05-01 PROCEDURE — 63710000001 DIPHENHYDRAMINE PER 50 MG: Performed by: INTERNAL MEDICINE

## 2019-05-01 RX ORDER — PSEUDOEPHEDRINE HCL 30 MG
100 TABLET ORAL 2 TIMES DAILY PRN
Qty: 60 EACH | Refills: 0 | Status: SHIPPED | OUTPATIENT
Start: 2019-05-01 | End: 2020-07-31

## 2019-05-01 RX ORDER — METAXALONE 800 MG/1
800 TABLET ORAL 3 TIMES DAILY PRN
Qty: 30 TABLET | Refills: 0 | Status: SHIPPED | OUTPATIENT
Start: 2019-05-01 | End: 2020-07-31

## 2019-05-01 RX ADMIN — AMLODIPINE BESYLATE 10 MG: 10 TABLET ORAL at 09:36

## 2019-05-01 RX ADMIN — METOCLOPRAMIDE 10 MG: 10 TABLET ORAL at 12:38

## 2019-05-01 RX ADMIN — SODIUM CHLORIDE, POTASSIUM CHLORIDE, SODIUM LACTATE AND CALCIUM CHLORIDE 125 ML/HR: 600; 310; 30; 20 INJECTION, SOLUTION INTRAVENOUS at 05:31

## 2019-05-01 RX ADMIN — HEPARIN SODIUM 5000 UNITS: 5000 INJECTION INTRAVENOUS; SUBCUTANEOUS at 05:31

## 2019-05-01 RX ADMIN — METOCLOPRAMIDE 10 MG: 10 TABLET ORAL at 09:36

## 2019-05-01 RX ADMIN — DIPHENHYDRAMINE HYDROCHLORIDE 25 MG: 25 CAPSULE ORAL at 05:31

## 2019-05-01 RX ADMIN — NEBIVOLOL HYDROCHLORIDE 5 MG: 5 TABLET ORAL at 09:36

## 2019-05-01 RX ADMIN — ACETYLCYSTEINE 600 MG: 200 SOLUTION ORAL; RESPIRATORY (INHALATION) at 09:37

## 2019-05-01 RX ADMIN — INSULIN LISPRO 2 UNITS: 100 INJECTION, SOLUTION INTRAVENOUS; SUBCUTANEOUS at 12:39

## 2019-05-01 RX ADMIN — INSULIN LISPRO 10 UNITS: 100 INJECTION, SOLUTION INTRAVENOUS; SUBCUTANEOUS at 12:39

## 2019-05-01 RX ADMIN — MUPIROCIN 1 APPLICATION: 2 CREAM TOPICAL at 11:29

## 2019-05-01 RX ADMIN — BUDESONIDE AND FORMOTEROL FUMARATE DIHYDRATE 2 PUFF: 80; 4.5 AEROSOL RESPIRATORY (INHALATION) at 08:57

## 2019-05-01 RX ADMIN — DIPHENHYDRAMINE HYDROCHLORIDE 25 MG: 25 CAPSULE ORAL at 09:36

## 2019-05-01 RX ADMIN — HEPARIN SODIUM 5000 UNITS: 5000 INJECTION INTRAVENOUS; SUBCUTANEOUS at 14:53

## 2019-05-01 RX ADMIN — SODIUM CHLORIDE, PRESERVATIVE FREE 3 ML: 5 INJECTION INTRAVENOUS at 09:36

## 2019-05-01 RX ADMIN — ERTAPENEM SODIUM 1 G: 1 INJECTION, POWDER, LYOPHILIZED, FOR SOLUTION INTRAMUSCULAR; INTRAVENOUS at 09:41

## 2019-05-01 RX ADMIN — INSULIN LISPRO 10 UNITS: 100 INJECTION, SOLUTION INTRAVENOUS; SUBCUTANEOUS at 09:37

## 2019-05-01 RX ADMIN — CLONIDINE HYDROCHLORIDE 0.1 MG: 0.1 TABLET ORAL at 09:36

## 2019-05-01 RX ADMIN — METAXALONE 800 MG: 800 TABLET ORAL at 12:38

## 2019-05-01 NOTE — PROGRESS NOTES
Continued Stay Note  [facility]     Patient Name: Amol Aguirre  MRN: [id number]  Today's Date: 5/1/2019    Admit Date: 4/22/2019    Discharge Plan     Row Name 05/01/19 5583       Plan    Plan Comments  Pt and wife agreeable to one time copayment of $10 for IV antibiotics and home infusions.     Row Name 05/01/19 6929       Plan    Plan Comments  Pt to be discharged home today with wife and do home antibiotic infusions through [facility]. CM contacted Usman at [facility] and orders have been received for antibiotics for atleast 6 weeks. Usman will run bower check and notify CM.  Cm contacted [facility] and arranged for follow up visit with Dr. Garcia on May 9th at 1:15pm.  Pt has had PICC line dressing done today and will have dressing changes and lab draws in office weekly. Pt and wife are agreeable to discharge plan and deny further needs at this time.     Final Discharge Disposition Code  01 - home or self-care        Discharge Codes    No documentation.       Expected Discharge Date and Time     Expected Discharge Date Expected Discharge Time    May 1, 2019             Kasey Crawford

## 2019-05-01 NOTE — PLAN OF CARE
Problem: Patient Care Overview  Goal: Plan of Care Review  Outcome: Ongoing (interventions implemented as appropriate)   05/01/19 0636   Coping/Psychosocial   Plan of Care Reviewed With patient   Plan of Care Review   Progress no change   OTHER   Outcome Summary Right foot numb. DRessing CDI. Single lumen PICC line dressing change this am to ADRIAN. Patient is ordered bedrest until 0700. CHG precautions maintained. Left femoral dressing CDI, no drainage nor swelling. Positve pulses. RIght pedal pulse unable to palpate without usage of doppler.        Problem: Fall Risk (Adult)  Goal: Absence of Fall  Outcome: Ongoing (interventions implemented as appropriate)      Problem: Wound (Includes Pressure Injury) (Adult)  Goal: Signs and Symptoms of Listed Potential Problems Will be Absent, Minimized or Managed (Wound)  Outcome: Ongoing (interventions implemented as appropriate)

## 2019-05-01 NOTE — PROGRESS NOTES
Infectious Disease Progress note  Amol Aguirre  1945  [id number]    Date of Consult: 4/23/2019  Admission Date: 4/22/2019    Requesting Provider: Dr Juarez  Evaluating Physician: Easton Garcia MD    Chief Complaint: foot ulcer    Reason for Consultation: foot ulcer    History of present illness:   Patient is a 74 y.o.  Yr old male with history of poorly controlled DM2, venous stasis disease, asthma, HTN. Recent travel to [place] to go fishing then developed ulceration on his foot.  He was out fishing all day and developed swelling and sweaty feet and when he took off his shoe he noticed ulceration on the dorsal aspect of the right foot.  He was seen by his PCP and started on Keflex and Silvadene on 4/12.  Despite antibiotics and wound care the wound progresses.  He was seen by Dr Juarez last week due to mass on his right great toe.  Dr Juarez though the chronic venous stasis, could be contributing to the ulceration.  She is scheduled vascular studies and MRI.  Soon after that visit he presented to [facility] on 4/19with increasing redness and a new ulceration between his third and fourth toes.  MRI done 4/22 with possible early changes in the fourth metatarsal head of osteomyelitis.  Wound culture there grew staph epidermidis and enterococcus. Given daptomycin, linezolid and zosyn.  A1c at [facility] was 10.5     He was transferred to [facility] yesterday for further evaluation. Dr Juarez contacted me to help manage antibiotics.  WBC normal.  Inflammatory markers slightly elevated.  Wound cultures obtained and are pending.  He has been started on empiric daptomycin and Zosyn.  Afebrile thus far.  He is noticed some recession of erythema since starting antibiotics.  He says the drainage has always been serous and was never purulent.    4/24: Stable.  Tolerating current antibiotics well.  Afebrile.  Multiple vascular and imaging studies are pending.  Not much pain in his foot.   Thinks the swelling is improving    4/25: Had what sounds like acute anaphylaxis to chlorhexidine wash prior to angiogram this morning.  He had acute onset of chest pressure, hives and tongue swelling.  This resolved with Benadryl.  Currently he feels well, rash much improved as well as tongue swelling.  Angiogram on hold pending improvement of creatinine    4/26: Stable overnight. No further medication reactions. Feels well. No pain in foot.     4/29: stable over the weekend except creatine not much better. No more hives. Awaiting clearance for angiogram. He has been up walking around.    4/30: stable. Awaiting aortogram later today. No new complaints.     5/1: s/p aortogram yesterday: severe RLE arterial occulusion with no targets amenable to revascularization. He is worried about findings. Having more muscle spasms today.     ROS:  No fevers or chills. No n/v/d. No rash. No new ADR to Abx.       Allergies   Allergen Reactions   • Chlorhexidine Shortness Of Breath, Itching and Rash     Pt developed a rash, itching, and shortness of breath after receiving a CHG bath on 4/24/19.   • Latex Itching       Antibiotics:  Anti-Infectives (From admission, onward)    Ordered     Dose/Rate Route Frequency Start Stop    04/26/19 0952  ertapenem (INVanz) 1 g/100 mL 0.9% NS VTB (mbp)     Lulú Lopez, [facility] reviewed the order on 04/28/19 1412.   Ordering Provider:  Easton Garcia MD    1 g  over 30 Minutes Intravenous Every 24 Hours Scheduled 04/26/19 1100 05/26/19 0859    04/22/19 1859  DAPTOmycin (CUBICIN) 600 mg in sodium chloride 0.9 % 50 mL IVPB     Comments:  Consult to dose Dapto   Easton Garcia MD reviewed the order on 04/23/19 1149.   Ordering Provider:  Easton Garcia MD    6 mg/kg × 96.3 kg (Adjusted)  100 mL/hr over 30 Minutes Intravenous Every 24 Hours 04/22/19 2000 05/07/19 1147    04/22/19 1903  piperacillin-tazobactam (ZOSYN) 3.375 g in iso-osmotic dextrose 50 ml (premix)     Ordering  Provider:  Pennie Foster MD    3.375 g  over 30 Minutes Intravenous Once 04/22/19 2000 04/22/19 2304          Other Medications:  Current Facility-Administered Medications   Medication Dose Route Frequency Provider Last Rate Last Dose   • acetaminophen (TYLENOL) tablet 650 mg  650 mg Oral Q4H PRN Pennie Foster MD   650 mg at 04/30/19 2108   • acetylcysteine (MUCOMYST) 20 % solution 600 mg  600 mg Oral Q12H Parish, Marisela Littlejohn MD   600 mg at 04/30/19 2109   • amLODIPine (NORVASC) tablet 10 mg  10 mg Oral Daily Pennie Foster MD   10 mg at 04/30/19 0927   • budesonide-formoterol (SYMBICORT) 80-4.5 MCG/ACT inhaler 2 puff  2 puff Inhalation BID - RT Pennie Foster MD   2 puff at 05/01/19 0857   • CloNIDine (CATAPRES) tablet 0.1 mg  0.1 mg Oral Q12H Pennie Foster MD   0.1 mg at 04/30/19 2108   • DAPTOmycin (CUBICIN) 600 mg in sodium chloride 0.9 % 50 mL IVPB  6 mg/kg (Adjusted) Intravenous Q24H Easton Garcia  mL/hr at 04/30/19 2130 600 mg at 04/30/19 2130   • dextrose (D50W) 25 g/ 50mL Intravenous Solution 25 g  25 g Intravenous Q15 Min PRN Pennie Foster MD       • dextrose (D50W) 25 g/ 50mL Intravenous Solution 25 g  25 g Intravenous Q15 Min PRN Pennie Foster MD       • dextrose (GLUTOSE) oral gel 15 g  15 g Oral Q15 Min PRN Pennie Foster MD       • dextrose (GLUTOSE) oral gel 15 g  15 g Oral Q15 Min PRN Pennie Foster MD       • diphenhydrAMINE (BENADRYL) capsule 25 mg  25 mg Oral Nightly PRN Pennie Foster MD   25 mg at 04/27/19 2035   • diphenhydrAMINE (BENADRYL) capsule 25 mg  25 mg Oral Q6H Pennie Foster MD   25 mg at 05/01/19 0531   • docusate sodium (COLACE) capsule 100 mg  100 mg Oral BID PRN Pennie Foster MD   100 mg at 04/29/19 2108   • ertapenem (INVanz) 1 g/100 mL 0.9% NS VTB (mbp)  1 g Intravenous Q24H Easton Garcia MD   1 g at 04/30/19 0945   • glucagon (human recombinant) (GLUCAGEN DIAGNOSTIC) injection 1  mg  1 mg Subcutaneous PRN Pennie Foster MD       • glucagon (human recombinant) (GLUCAGEN DIAGNOSTIC) injection 1 mg  1 mg Subcutaneous PRN Pennie Foster MD       • heparin (porcine) 5000 UNIT/ML injection 5,000 Units  5,000 Units Subcutaneous Q8H Pennie Foster MD   5,000 Units at 05/01/19 0531   • insulin detemir (LEVEMIR) injection 30 Units  30 Units Subcutaneous Nightly Pennie Foster MD   30 Units at 04/30/19 2109   • insulin lispro (humaLOG) injection 0-9 Units  0-9 Units Subcutaneous 4x Daily With Meals & Nightly Pennie Foster MD   2 Units at 04/30/19 2108   • insulin lispro (humaLOG) injection 10 Units  10 Units Subcutaneous TID With Meals Pennie Foster MD   10 Units at 04/29/19 1733   • lactated ringers infusion  9 mL/hr Intravenous Continuous Enrique Bethea MD       • lactated ringers infusion  125 mL/hr Intravenous Continuous Parish, Marisela Littlejohn  mL/hr at 05/01/19 0531 125 mL/hr at 05/01/19 0531   • lactated ringers infusion  9 mL/hr Intravenous Continuous Sina Malin MD       • metaxalone (SKELAXIN) tablet 800 mg  800 mg Oral TID PRN Erica Reyez APRN   800 mg at 04/30/19 2107   • metoclopramide (REGLAN) tablet 10 mg  10 mg Oral 4x Daily AC & at Bedtime Pennie Foster MD   10 mg at 04/30/19 2108   • mupirocin (BACTROBAN) 2 % cream   Topical Q12H Juju Weber MD   1 application at 04/30/19 2132   • nebivolol (BYSTOLIC) tablet 5 mg  5 mg Oral Q24H Pennie Foster MD   5 mg at 04/30/19 0928   • nitroglycerin (NITROSTAT) SL tablet 0.4 mg  0.4 mg Sublingual Q5 Min PRN Pennie Foster MD       • ondansetron (ZOFRAN) injection 4 mg  4 mg Intravenous Q6H PRN Pennie Foster MD   4 mg at 04/1945   • Pharmacy dosing Zosyn   Does not apply Continuous PRN Pennie Foster MD       • prochlorperazine (COMPAZINE) injection 5 mg  5 mg Intravenous Q6H PRN Pennie Foster MD   5 mg at 04/25/19 0605    Or   •  "prochlorperazine (COMPAZINE) tablet 5 mg  5 mg Oral Q6H PRN Pennie Foster MD        Or   • prochlorperazine (COMPAZINE) suppository 25 mg  25 mg Rectal Q12H PRN Pennie Foster MD       • silver sulfadiazine (SILVADENE, SSD) 1 % cream   Topical Q24H Juju Weber MD   1 application at 04/30/19 2132   • sodium chloride 0.9 % flush 10 mL  10 mL Intravenous Q12H Easton Garcia MD   10 mL at 04/29/19 2109   • sodium chloride 0.9 % flush 10 mL  10 mL Intravenous PRN Easton Garcia MD       • sodium chloride 0.9 % flush 3 mL  3 mL Intravenous Q12H Pennie Foster MD   3 mL at 04/30/19 2131   • sodium chloride 0.9 % flush 3-10 mL  3-10 mL Intravenous PRN Pennie Foster MD           Physical Exam:   Vital Signs   /68 (BP Location: Left arm, Patient Position: Lying)   Pulse 82   Temp 98 °F (36.7 °C) (Oral)   Resp 16   Ht 190.5 cm (75\")   Wt 114 kg (252 lb)   SpO2 95%   BMI 31.50 kg/m²     GENERAL: Awake and alert, in no acute distress.   HEENT: Normocephalic, atraumatic.  PERRL. EOMI. No conjunctival injection. No icterus.  No evidence of tongue swelling   HEART: RRR; No murmur.  LUNGS: Clear to auscultation bilaterally without wheezing, rales, rhonchi. Normal respiratory effort. Nonlabored.  ABDOMEN: Soft, nontender, nondistended. Positive bowel sounds. No rebound or guarding.  R foot: 4 x 5 cm superficial ulceration of the dorsal aspect of the right foot with improved erythema but still has significant mucoid drainage.  Also an area of ulceration in between the third and fourth toes, dry.   Area is nontender to palpation  L foot: no lesions   MSK: FROM without joint effusions noted arms/legs.    SKIN: Hives have nearly resolved  NEURO: Oriented to PPT. bilateral lower extremity neuropathy  PSYCHIATRIC: Normal insight and judgement. Cooperative with PE  PICC line    Laboratory Data    Results from last 7 days   Lab Units 04/29/19  0505   WBC 10*3/mm3 7.64   HEMOGLOBIN g/dL " 10.5*   HEMATOCRIT % 32.3*   PLATELETS 10*3/mm3 322     Results from last 7 days   Lab Units 05/01/19  0533   SODIUM mmol/L 140   POTASSIUM mmol/L 4.1   CHLORIDE mmol/L 104   CO2 mmol/L 24.0   BUN mg/dL 9   CREATININE mg/dL 1.25   GLUCOSE mg/dL 199*   CALCIUM mg/dL 9.0     Estimated Creatinine Clearance: 68.5 mL/min (by C-G formula based on SCr of 1.25 mg/dL).                Microbiology:       [facility]:  4/19 wound culture: light growth Staph epidermitis and Enterococcus faecalis, (PCN sensitive)  4/19 blood cx negative      [facility]   4/23 wound culture x2: Klebsiella and Morganella       Radiology:  Ct Angiogram Chest With & Without Contrast    Result Date: 4/25/2019  1. Negative for pulmonary embolus. 2. 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 No dissection. 3. Extensive atherosclerotic disease throughout the thoracic aorta and coronary arteries. 4. Mild elevation of the right hemidiaphragm and mild bibasilar atelectasis. No acute pulmonary process. 5. Indeterminate 2 cm soft tissue density along the posterior inferior aspect of the right thyroid lobe. Exophytic thyroid lesion is not excluded and further characterization with nonemergent thyroid ultrasound may be considered. Signer Name: Robin Charles MD  Signed: 4/25/2019 6:41 AM  Workstation Name: [id number]     Xr Chest 1 View    Result Date: 4/25/2019  No acute findings. Signer Name: Jorge Jolly MD  Signed: 4/25/2019 5:12 AM  Workstation Name: [id number]     Mri Foot Right With & Without Contrast    Result Date: 4/27/2019  1. Suspected mild/early osteomyelitis of the fourth digit. 2. Peripheral edema and enhancement of the fifth metatarsal head, concerning for early articular infection. 3. Reactive marrow edema in the third and fifth digit phalanges, osteomyelitis is not suspected. 4. Capsular thickening and what appears to be enhancing fibrosis along the dorsal margin of the first MTP joint, and plantar margin of the first-second webspace. No  evidence of underlying inflammatory fluid collection or cyst.   D:  04/24/2019 E:  04/24/2019  This report was finalized on 4/27/2019 11:00 AM by DR. Navneet Patton MD.         MRI done at [facility] on 4/22:   probable reactive edema in the head of the fourth metatarsal, cannot exclude early osteomyelitis    Impression:   1. Diabetic foot ulceration and infection, acute osteomyelitis of 4th metatarsal: based on rpt MRI done at [facility]. Coag negative staph and enterococcus from OSH could just both be skin gee.  Repeat cultures with Klebsiella and Morganella at [facility].  Certainly at risk for polymicrobial infection with gram-negative bacteria due to poorly controlled diabetes. Poor blood flow also contributing.     2. Severe LE arterial vascular disease, especially in RLE: based on 4/30 aortogram, no targets amenable to revascularization  3. Poorly controlled DM2  4. CKD stage 3  5. Asthma  6. Anaphylaxis to chlorhexidine: improved    PLAN:   - trend BMP  - weekly CK while on daptomycin  - weekly ESR, CRP    - Continue daptomycin 600 mg daily for gram positive coverage  - ertapenem 1 gm daily    - Will need a prolonged course of the above antibiotics 6 weeks at least, if not longer due to poor blood flow. Final duration TBD based on clinical improvement, labs, etc. Likely home on the above IV antibiotics through [facility]. Discussed with case management who will begin pricing antibiotics.    - wound care per protocol     Discharge orders:  - IV daptomycin 600 mg daily  - IV ertapenem 1 gm daily   - Sterile PICC dressing change weekly. Can be done in [facility] office  - Weekly labs CBC w/diff, CMP, ESR, CRP, CK [facility]  - Follow up in ID clinic a few days after discharge.     Will follow. High risk for limb loss if not treated aggressively.     Easton Garcia MD  5/1/2019

## 2019-05-01 NOTE — DISCHARGE INSTR - ACTIVITY
Dressing Changes daily and limited activity per details that patient and wife discussed thouruoghly with Dr. Larson at the bedside

## 2019-05-01 NOTE — PROGRESS NOTES
Orthopedic Foot/Ankle Progress Note    Subjective   Chief Complaint:right diabetic foot problems    Systemic or Specific Complaints: I discussed the problem with Dr White.  I discussed with the patient and his wife.  I think that at this time it is safer to continue to treat the toe non-operatively.  My concern is that a toe amputation would have to be left open (because of where the ulcer is, it would be nearly impossible to close the incision) and that would be less likely to heal than continuing dressing changes and antibiotics to see if the toe ulcer will heal.  He understands.  If it gets worse, we might have to do the toe amputation, or even a BKA.  He reports he has been thinking about the problem, and if the toe does not heal he would likely elect for a BKA.    Objective     Vital signs in last 24 hours:  Temp:  [97.5 °F (36.4 °C)-98.1 °F (36.7 °C)] 98.1 °F (36.7 °C)  Heart Rate:  [] 106  Resp:  [16-18] 16  BP: (134-162)/(61-83) 144/77    Neurovascular: No change in dense neuropathy   Wound: Right foot unchanged         Data Review  Lab Results (last 24 hours)     Procedure Component Value Units Date/Time    POC Glucose Once [[id number]]  (Abnormal) Collected:  05/01/19 1125    Specimen:  Blood Updated:  05/01/19 1134     Glucose 182 mg/dL     POC Glucose Once [[id number]]  (Abnormal) Collected:  05/01/19 0723    Specimen:  Blood Updated:  05/01/19 0736     Glucose 145 mg/dL     Basic Metabolic Panel [[id number]]  (Abnormal) Collected:  05/01/19 0533    Specimen:  Blood Updated:  05/01/19 0627     Glucose 199 mg/dL      BUN 9 mg/dL      Creatinine 1.25 mg/dL      Sodium 140 mmol/L      Potassium 4.1 mmol/L      Chloride 104 mmol/L      CO2 24.0 mmol/L      Calcium 9.0 mg/dL      eGFR Non African Amer 56 mL/min/1.73      BUN/Creatinine Ratio 7.2     Anion Gap 12.0 mmol/L     Narrative:       GFR Normal >60  Chronic Kidney Disease <60  Kidney Failure <15    POC Glucose Once [[id number]]  (Abnormal)  Collected:  04/30/19 2031    Specimen:  Blood Updated:  04/30/19 2035     Glucose 160 mg/dL     POC Activated Clotting Time [[id number]]  (Abnormal) Collected:  04/30/19 1917    Specimen:  Blood Updated:  04/30/19 1921     Activated Clotting Time  153 Seconds      Comment: Serial Number: [id number]:  [id number]       POC Glucose Once [[id number]]  (Abnormal) Collected:  04/30/19 1641    Specimen:  Blood Updated:  04/30/19 1644     Glucose 155 mg/dL             Assessment/Plan   Diagnosis- FVD, osteomyelitis likely of toe, DM, neuropathy   as above, I discussed the problems in detail with patient and wife.  Ok to go home with antibiotics and dressing changes.  Very limited activity .  I will see him in a week, sooner if any problems          Juju Weber MD    Date: 5/1/2019  Time: 12:13 PM

## 2019-05-01 NOTE — PROGRESS NOTES
Continued Stay Note  [facility]     Patient Name: Amol Aguirre  MRN: [id number]  Today's Date: 5/1/2019    Admit Date: 4/22/2019    Discharge Plan     Row Name 05/01/19 1059       Plan    Plan Comments  Pt to be discharged home today with wife and do home antibiotic infusions through [facility]. CM contacted Usman at [facility] and orders have been received for antibiotics for atleast 6 weeks. Usman will run bower check and notify CM.  Cm contacted [facility] and arranged for follow up visit with Dr. Garcia on May 9th at 1:15pm.  Pt has had PICC line dressing done today and will have dressing changes and lab draws in office weekly. Pt and wife are agreeable to discharge plan and deny further needs at this time.     Final Discharge Disposition Code  01 - home or self-care        Discharge Codes    No documentation.       Expected Discharge Date and Time     Expected Discharge Date Expected Discharge Time    May 1, 2019             Kasey Crawford

## 2019-05-01 NOTE — PROGRESS NOTES
"   LOS: 9 days    Patient Care Team:  Donte Briones MD as PCP - General (Internal Medicine)    Reason For Visit:  F/U CKD  Subjective           Review of Systems:    Pulm: No soa   CV:  No CP      Objective       acetylcysteine 600 mg Oral Q12H   amLODIPine 10 mg Oral Daily   budesonide-formoterol 2 puff Inhalation BID - RT   CloNIDine 0.1 mg Oral Q12H   DAPTOmycin 6 mg/kg (Adjusted) Intravenous Q24H   diphenhydrAMINE 25 mg Oral Q6H   ertapenem 1 g Intravenous Q24H   heparin (porcine) 5,000 Units Subcutaneous Q8H   insulin detemir 30 Units Subcutaneous Nightly   insulin lispro 0-9 Units Subcutaneous 4x Daily With Meals & Nightly   insulin lispro 10 Units Subcutaneous TID With Meals   metoclopramide 10 mg Oral 4x Daily AC & at Bedtime   mupirocin  Topical Q12H   nebivolol 5 mg Oral Q24H   silver sulfadiazine  Topical Q24H   sodium chloride 10 mL Intravenous Q12H   sodium chloride 3 mL Intravenous Q12H       lactated ringers 9 mL/hr    lactated ringers 125 mL/hr Last Rate: 125 mL/hr (05/01/19 0531)   lactated ringers 9 mL/hr    Pharmacy Consult           Vital Signs:  Blood pressure 146/68, pulse 71, temperature 98 °F (36.7 °C), temperature source Oral, resp. rate 16, height 190.5 cm (75\"), weight 114 kg (252 lb), SpO2 95 %.    Flowsheet Rows      First Filed Value   Admission Height  190.5 cm (75\") Documented at 04/22/2019 1822   Admission Weight  114 kg (252 lb) Documented at 04/22/2019 1822          04/30 0701 - 05/01 0700  In: 2594 [I.V.:2594]  Out: 900 [Urine:900]    Physical Exam:    General Appearance: NAD, alert and cooperative, Ox3  Eyes: PER, conjunctivae and sclerae normal, no icterus  Lungs: respirations regular and unlabored, no crepitus, clear to auscultation  Heart/CV: regular rhythm & normal rate, no murmur, no gallop, no rub and no edema  Abdomen: not distended, soft, non-tender, no masses,  bowel sounds present  Skin: No rash, Warm and dry    Radiology:            Labs:  Results from last " 7 days   Lab Units 04/29/19  0505   WBC 10*3/mm3 7.64   HEMOGLOBIN g/dL 10.5*   HEMATOCRIT % 32.3*   PLATELETS 10*3/mm3 322     Results from last 7 days   Lab Units 05/01/19  0533 04/30/19  0501 04/29/19  1630 04/29/19  0420 04/28/19  1000   SODIUM mmol/L 140 138  --  141 140   POTASSIUM mmol/L 4.1 4.4  --  4.4 4.4   CHLORIDE mmol/L 104 101  --  104 102   CO2 mmol/L 24.0 24.0  --  23.0 24.0   BUN mg/dL 9 11  --  10 9   CREATININE mg/dL 1.25 1.45*  --  1.46* 1.44*   CALCIUM mg/dL 9.0 9.6  --  9.0 9.2   PHOSPHORUS mg/dL  --   --  3.3  --   --      Results from last 7 days   Lab Units 05/01/19  0533   GLUCOSE mg/dL 199*           Results from last 7 days   Lab Units 04/25/19  0440   PH, ARTERIAL pH units 7.433   PO2 ART mm Hg 55.6*   PCO2, ARTERIAL mm Hg 29.6   HCO3 ART mmol/L 19.8*       Results from last 7 days   Lab Units 04/29/19  1818   COLOR UA  Yellow   CLARITY UA  Clear   PH, URINE  5.5   SPECIFIC GRAVITY, URINE  1.012   GLUCOSE UA  Negative   KETONES UA  Negative   BILIRUBIN UA  Negative   PROTEIN UA  30 mg/dL (1+)*   BLOOD UA  Negative   LEUKOCYTES UA  Negative   NITRITE UA  Negative       Estimated Creatinine Clearance: 68.5 mL/min (by C-G formula based on SCr of 1.25 mg/dL).      Assessment       Blister (nonthermal), right foot, initial encounter    Right foot pain    Decreased pulses in feet    Type 2 diabetes mellitus with diabetic polyneuropathy, without long-term current use of insulin (CMS/[facility])    Diabetic foot ulcers (CMS/[facility])            Impression: CKD. ANEMIA. SO FAR STABLE GFR AFTER CONTRAST YESTERDAY.             Recommendations: LAB AM.      Amol Delvalle MD  05/01/19  8:10 AM

## 2019-05-01 NOTE — DISCHARGE SUMMARY
Patient Name: Amol Aguirre  MRN: [id number]  : 1945  DOS: 2019    Attending: Pennie Foster MD    Primary Care Provider: Donte Briones MD    Date of Admission:.2019  5:47 PM    Date of Discharge:  2019    Discharge Diagnosis:     S/P aortogram with run-offs    Diabetic ulcer right 4th toe.    Blister (nonthermal), right foot, initial encounter    Right foot pain    Decreased pulses in feet, PAD.    Type 2 diabetes mellitus with diabetic polyneuropathy     Diabetic foot ulcers (CMS/HCC)    Acute kidney failure chronic kidney disease stage III, baseline at discharge.    Chest pain, in setting of ADR to skin surgical prep. Ruled out for PE/MI.    Skin rash, anaphylactic reaction, ADR to chlorhexidine.    Mass on right toe, being followed by Dr. Juarez      Hospital Course  Patient is a 74 y.o. male presented in transfer from [facility] for higher level of care and specialty consultation.  He has long standing hx of DM2 for close to 25 years. Has been on insulin for about 2 years. Doesn't check his blood glucose regularly. Recent Hgb A1C 8. He has dense DPN and evidence of PAD.     He saw  for the 1st time last week on Wed. Apparently on Monday he had a large blister on the doral aspect of right foot noted at the end of a day where he was fishing with shoes removed at end of the day.  The blister was unroofed by his surgeon . Pt was on Keflex.  Noted 's visit with findings c/w PAD, mass on right great toe, among other problems addressed.     Friday 3 days prior to admission he was admitted to [facility] with above and a foot ulcer in the webspace 3rd -4th, and was noted to have AKF on CKD 3.      He received abx over the weekend ( Zosyn, Dapto, ? Zyvox). Noted worsening of ulcer clinically.   He had an MRI done there, disc with patient.   was contacted and I accepted him in direct admit for further medical management.    Known hx of DPN, CKD, Diabetic  gastroparesis ( on reglan).    CT surgery was consulted to evaluation vascular supply to lower extremities. He had an anaphylactic reaction to the skin prep for his procedure with acute onset of chest pressure, hives and tongue swelling. This resolved with Benadryl. His procedure was postponed. He also had elevated creatinine further delaying his procedure. After cleared by nephrology, he underwent aortogram with run-off. Unfortunately, he was found to have severe RLE arterial occulusion with no targets amenable to revascularization    [facility], Dr. Garcia was consulted for antibiotic management. He received IV antibiotics while inpatient. He will be discharged with a PICC line and IV antibiotics to be infused at home. He will follow-up outpatient.    Nephrology was consulted due to increased creatinine and clearance for procedure with contrast. He was given IVF and mucomyst prior to procedure for renal protection.  Patient has stage III CKD that is stable currently.     He will need to follow-up with his PCP Friday for BMP and DM management.     While inpatient his metformin was stopped and mealtime bolus insulin was added to his DM regimen for better glucose control. He was seen by DM educator.        Consultants:  Dr. Juarez, orthopedics  Dr. White, cardiothoracic surgery  Dr. Garcia, [facility]  Dr. Lugo, nephrology    Procedures Performed  Procedure(s):  AORTAGRAM WITH OR WITHOUT RUNOFFS    Surgeon(s):  Carrillo White MD      Pertinent Test Results:    I reviewed the patient's new clinical results.   Results from last 7 days   Lab Units 04/29/19  0505   WBC 10*3/mm3 7.64   HEMOGLOBIN g/dL 10.5*   HEMATOCRIT % 32.3*   PLATELETS 10*3/mm3 322     Results from last 7 days   Lab Units 05/01/19  0533 04/30/19  0501 04/29/19  0420   SODIUM mmol/L 140 138 141   POTASSIUM mmol/L 4.1 4.4 4.4   CHLORIDE mmol/L 104 101 104   CO2 mmol/L 24.0 24.0 23.0   BUN mg/dL 9 11 10   CREATININE mg/dL 1.25 1.45* 1.46*   CALCIUM mg/dL 9.0  "9.6 9.0   GLUCOSE mg/dL 199* 169* 135*     Results for ASAEL MONDRAGON (MRN [id number]) as of 2019 15:51   Ref. Range 2019 20:31 2019 05:33 2019 07:23 2019 11:25   Glucose Latest Ref Range: 70 - 130 mg/dL 160 (H) 199 (H) 145 (H) 182 (H)     I reviewed the patient's new imaging including images and reports.      Physical therapy: () Pt increased gait distance to 120 feet with CGA, RW and post-op shoe. Pt refused use of knee walker and maintains TDWB on R LE. Pt anticipates discharge home tomorrow, will continue to progress mobility as able.     Discharge Assessment:    Vital Signs  Visit Vitals  /78 (BP Location: Left arm, Patient Position: Sitting)   Pulse 120   Temp 97.7 °F (36.5 °C) (Oral)   Resp 16   Ht 190.5 cm (75\")   Wt 114 kg (252 lb)   SpO2 95%   BMI 31.50 kg/m²     Temp (24hrs), Av.9 °F (36.6 °C), Min:97.7 °F (36.5 °C), Max:98.1 °F (36.7 °C)      General Appearance:    Alert, cooperative, in no acute distress   Lungs:     Clear to auscultation,respirations regular, even and                   unlabored    Heart:    Regular rhythm and normal rate, normal S1 and S2   Abdomen:     Normal bowel sounds, no masses, no organomegaly, soft        non-tender, non-distended, no guarding, no rebound                 tenderness   Extremities:   Dressing on right foot  CDI.  Postop shoe on   Pulses:   Pulses palpable and equal bilaterally   Skin:   No bleeding, bruising or rash. PICC   Neurologic:   Cranial nerves 2 - 12 grossly intact, sensation intact       Discharge Disposition: Home    Discharge Medications     Discharge Medications      New Medications      Instructions Start Date   DAPTOmycin 600 mg in sodium chloride 0.9 % 50 mL   6 mg/kg, Intravenous, Every 24 Hours, Per [facility]      docusate sodium 100 MG capsule   100 mg, Oral, 2 Times Daily PRN      ertapenem 1 gm/100ml solution IV  Commonly known as:  INVanz   1 g, Intravenous, Every 24 Hours Scheduled, Per [facility]   Start " Date:  5/2/2019     insulin lispro 100 UNIT/ML injection  Commonly known as:  humaLOG   10 Units, Subcutaneous, 3 Times Daily With Meals      metaxalone 800 MG tablet  Commonly known as:  SKELAXIN   800 mg, Oral, 3 Times Daily PRN      mupirocin 2 % ointment  Commonly known as:  BACTROBAN   Topical, Every 12 Hours Scheduled      silver sulfadiazine 1 % cream  Commonly known as:  SILVADENE, SSD   Topical, Every 24 Hours Scheduled         Continue These Medications      Instructions Start Date   amLODIPine 10 MG tablet  Commonly known as:  NORVASC   10 mg, Oral, Daily      aspirin 81 MG EC tablet   81 mg, Oral, Daily      budesonide-formoterol 80-4.5 MCG/ACT inhaler  Commonly known as:  SYMBICORT   2 puffs, Inhalation, 2 Times Daily - RT      BYSTOLIC 10 MG tablet  Generic drug:  nebivolol   TK 1/2 T PO BID      CloNIDine 0.1 MG tablet  Commonly known as:  CATAPRES   0.1 mg, Oral, 2 Times Daily      hydrochlorothiazide 25 MG tablet  Commonly known as:  HYDRODIURIL   Oral, Daily      losartan 100 MG tablet  Commonly known as:  COZAAR   100 mg, Oral, Daily      metoclopramide 10 MG tablet  Commonly known as:  REGLAN   TK 1 T PO BEFORE MEALS AND QHS      pantoprazole 40 MG EC tablet  Commonly known as:  PROTONIX   40 mg, Oral, Daily      TOUJEO SOLOSTAR 300 UNIT/ML solution pen-injector  Generic drug:  Insulin Glargine   Subcutaneous      vitamin B-12 1000 MCG tablet  Commonly known as:  CYANOCOBALAMIN   1,000 mcg, Oral, Daily         Stop These Medications    cephalexin 500 MG capsule  Commonly known as:  KEFLEX     metFORMIN 500 MG tablet  Commonly known as:  GLUCOPHAGE            Discharge Diet: Consistent carb diet    Activity at Discharge: Very limited activity     Follow-up Appointments  Dr. Juarez per her orders  Renal per orders  [facility] per orders  PCP Friday BMP and DM management  Dr. White per orders    Discharge took over 30 min.    VIPUL Alvarez  05/01/19  3:57 PM     I have personally performed the  evaluation on this patient. My history is consistant  with above documentation . My exam finding are listed above. I have personally reviewed and discussed the above formulated discharge plan with patient, his wife, and Adriano.      CC: Dr. Briones

## 2019-05-02 ENCOUNTER — READMISSION MANAGEMENT (OUTPATIENT)
Dept: CALL CENTER | Facility: HOSPITAL | Age: 74
End: 2019-05-02

## 2019-05-02 NOTE — OUTREACH NOTE
Prep Survey      Responses   Facility patient discharged from?  [place]   Is patient eligible?  Yes   Discharge diagnosis  S/P aortogram with run-offs, Diabetic ulcer right 4th toe   Does the patient have one of the following disease processes/diagnoses(primary or secondary)?  Other   Does the patient have Home health ordered?  No   Is there a DME ordered?  Yes   What DME was ordered?  PICC line and IV antibiotics per [facility]   Comments regarding appointments  PICC line dressing changes and lab draws in [facility] office weekly   Prep survey completed?  Yes          Mariana Fairchild RN

## 2019-05-03 ENCOUNTER — READMISSION MANAGEMENT (OUTPATIENT)
Dept: CALL CENTER | Facility: HOSPITAL | Age: 74
End: 2019-05-03

## 2019-05-03 NOTE — OUTREACH NOTE
Medical Week 1 Survey      Responses   Facility patient discharged from?  [place]   Does the patient have one of the following disease processes/diagnoses(primary or secondary)?  Other   Is there a successful TCM telephone encounter documented?  No   Week 1 attempt successful?  No   Unsuccessful attempts  Attempt 1          Leisa Duncan RN

## 2019-05-06 ENCOUNTER — READMISSION MANAGEMENT (OUTPATIENT)
Dept: CALL CENTER | Facility: HOSPITAL | Age: 74
End: 2019-05-06

## 2019-05-06 NOTE — OUTREACH NOTE
Medical Week 1 Survey      Responses   Facility patient discharged from?  [facility]   Does the patient have one of the following disease processes/diagnoses(primary or secondary)?  Other   Is there a successful TCM telephone encounter documented?  No   Week 1 attempt successful?  Yes   Call start time  1125   Call end time  1127   Discharge diagnosis  S/P aortogram with run-offs, Diabetic ulcer right 4th toe   Is patient permission given to speak with other caregiver?  Yes   List who call center can speak with  wife   Medication alerts for this patient  Wife is doing IV abx infusions   Meds reviewed with patient/caregiver?  Yes   Is the patient having any side effects they believe may be caused by any medication additions or changes?  No   Does the patient have all medications ordered at discharge?  Yes   Is the patient taking all medications as directed (includes completed medication regime)?  Yes   Comments regarding appointments  PICC line dressing changes and lab draws in [facility] office weekly   Does the patient have a primary care provider?   Yes   Does the patient have an appointment with their PCP within 7 days of discharge?  Yes   Has the patient kept scheduled appointments due by today?  Yes   What DME was ordered?  PICC line and IV antibiotics per [facility]   Psychosocial issues?  No   Comments  Wife feels wound looking some better. n   Did the patient receive a copy of their discharge instructions?  Yes   Nursing interventions  Reviewed instructions with patient   What is the patient's perception of their health status since discharge?  Improving   Is the patient/caregiver able to teach back signs and symptoms related to disease process for when to call PCP?  Yes   Is the patient/caregiver able to teach back signs and symptoms related to disease process for when to call 911?  Yes   Is the patient/caregiver able to teach back the hierarchy of who to call/visit for symptoms/problems? PCP,  Specialist, Home health nurse, Urgent Care, ED, 911  Yes   Week 1 call completed?  Yes          Cheyenne Kruger RN

## 2019-05-09 ENCOUNTER — LAB (OUTPATIENT)
Dept: LAB | Facility: HOSPITAL | Age: 74
End: 2019-05-09

## 2019-05-09 ENCOUNTER — TRANSCRIBE ORDERS (OUTPATIENT)
Dept: LAB | Facility: HOSPITAL | Age: 74
End: 2019-05-09

## 2019-05-09 DIAGNOSIS — M86.171 ACUTE OSTEOMYELITIS OF RIGHT ANKLE OR FOOT (HCC): ICD-10-CM

## 2019-05-09 DIAGNOSIS — M86.171 ACUTE OSTEOMYELITIS OF RIGHT ANKLE OR FOOT (HCC): Primary | ICD-10-CM

## 2019-05-09 LAB
ALBUMIN SERPL-MCNC: 3.6 G/DL (ref 3.5–5.2)
ALBUMIN/GLOB SERPL: 0.8 G/DL
ALP SERPL-CCNC: 143 U/L (ref 39–117)
ALT SERPL W P-5'-P-CCNC: 33 U/L (ref 1–41)
ANION GAP SERPL CALCULATED.3IONS-SCNC: 14 MMOL/L
AST SERPL-CCNC: 35 U/L (ref 1–40)
BASOPHILS # BLD AUTO: 0.05 10*3/MM3 (ref 0–0.2)
BASOPHILS NFR BLD AUTO: 0.5 % (ref 0–1.5)
BILIRUB SERPL-MCNC: 0.3 MG/DL (ref 0.2–1.2)
BUN BLD-MCNC: 31 MG/DL (ref 8–23)
BUN/CREAT SERPL: 19.6 (ref 7–25)
CALCIUM SPEC-SCNC: 10.6 MG/DL (ref 8.6–10.5)
CHLORIDE SERPL-SCNC: 100 MMOL/L (ref 98–107)
CK SERPL-CCNC: 53 U/L (ref 20–200)
CO2 SERPL-SCNC: 22 MMOL/L (ref 22–29)
CREAT BLD-MCNC: 1.58 MG/DL (ref 0.76–1.27)
CRP SERPL-MCNC: 2.37 MG/DL (ref 0–0.5)
DEPRECATED RDW RBC AUTO: 42.1 FL (ref 37–54)
EOSINOPHIL # BLD AUTO: 0.4 10*3/MM3 (ref 0–0.4)
EOSINOPHIL NFR BLD AUTO: 3.7 % (ref 0.3–6.2)
ERYTHROCYTE [DISTWIDTH] IN BLOOD BY AUTOMATED COUNT: 13.4 % (ref 12.3–15.4)
ERYTHROCYTE [SEDIMENTATION RATE] IN BLOOD: 67 MM/HR (ref 0–20)
GFR SERPL CREATININE-BSD FRML MDRD: 43 ML/MIN/1.73
GLOBULIN UR ELPH-MCNC: 4.4 GM/DL
GLUCOSE BLD-MCNC: 323 MG/DL (ref 65–99)
HCT VFR BLD AUTO: 37.2 % (ref 37.5–51)
HGB BLD-MCNC: 12.3 G/DL (ref 13–17.7)
IMM GRANULOCYTES # BLD AUTO: 0.03 10*3/MM3 (ref 0–0.05)
IMM GRANULOCYTES NFR BLD AUTO: 0.3 % (ref 0–0.5)
LYMPHOCYTES # BLD AUTO: 1.38 10*3/MM3 (ref 0.7–3.1)
LYMPHOCYTES NFR BLD AUTO: 12.9 % (ref 19.6–45.3)
MCH RBC QN AUTO: 28.6 PG (ref 26.6–33)
MCHC RBC AUTO-ENTMCNC: 33.1 G/DL (ref 31.5–35.7)
MCV RBC AUTO: 86.5 FL (ref 79–97)
MONOCYTES # BLD AUTO: 0.7 10*3/MM3 (ref 0.1–0.9)
MONOCYTES NFR BLD AUTO: 6.6 % (ref 5–12)
NEUTROPHILS # BLD AUTO: 8.11 10*3/MM3 (ref 1.7–7)
NEUTROPHILS NFR BLD AUTO: 76 % (ref 42.7–76)
PLATELET # BLD AUTO: 623 10*3/MM3 (ref 140–450)
PMV BLD AUTO: 9.9 FL (ref 6–12)
POTASSIUM BLD-SCNC: 5.9 MMOL/L (ref 3.5–5.2)
PROT SERPL-MCNC: 8 G/DL (ref 6–8.5)
RBC # BLD AUTO: 4.3 10*6/MM3 (ref 4.14–5.8)
SODIUM BLD-SCNC: 136 MMOL/L (ref 136–145)
WBC NRBC COR # BLD: 10.67 10*3/MM3 (ref 3.4–10.8)

## 2019-05-09 PROCEDURE — 86140 C-REACTIVE PROTEIN: CPT | Performed by: INTERNAL MEDICINE

## 2019-05-09 PROCEDURE — 85025 COMPLETE CBC W/AUTO DIFF WBC: CPT

## 2019-05-09 PROCEDURE — 36415 COLL VENOUS BLD VENIPUNCTURE: CPT | Performed by: INTERNAL MEDICINE

## 2019-05-09 PROCEDURE — 85652 RBC SED RATE AUTOMATED: CPT

## 2019-05-09 PROCEDURE — 80053 COMPREHEN METABOLIC PANEL: CPT

## 2019-05-09 PROCEDURE — 82550 ASSAY OF CK (CPK): CPT

## 2019-05-10 ENCOUNTER — LAB (OUTPATIENT)
Dept: LAB | Facility: HOSPITAL | Age: 74
End: 2019-05-10

## 2019-05-10 ENCOUNTER — TRANSCRIBE ORDERS (OUTPATIENT)
Dept: LAB | Facility: HOSPITAL | Age: 74
End: 2019-05-10

## 2019-05-10 DIAGNOSIS — L97.509 DIABETIC FOOT ULCER WITH OSTEOMYELITIS (HCC): ICD-10-CM

## 2019-05-10 DIAGNOSIS — A49.8 KLEBSIELLA INFECTION: ICD-10-CM

## 2019-05-10 DIAGNOSIS — A49.8 STAPHYLOCOCCUS EPIDERMIDIS INFECTION: ICD-10-CM

## 2019-05-10 DIAGNOSIS — L97.512 RIGHT FOOT ULCER, WITH FAT LAYER EXPOSED (HCC): ICD-10-CM

## 2019-05-10 DIAGNOSIS — M86.9 DIABETIC FOOT ULCER WITH OSTEOMYELITIS (HCC): ICD-10-CM

## 2019-05-10 DIAGNOSIS — A49.1 ENTEROCOCCAL INFECTION: ICD-10-CM

## 2019-05-10 DIAGNOSIS — E11.69 DIABETIC FOOT ULCER WITH OSTEOMYELITIS (HCC): ICD-10-CM

## 2019-05-10 DIAGNOSIS — IMO0002: ICD-10-CM

## 2019-05-10 DIAGNOSIS — E11.621 DIABETIC FOOT ULCER WITH OSTEOMYELITIS (HCC): ICD-10-CM

## 2019-05-10 DIAGNOSIS — M86.10 ACUTE OSTEOMYELITIS (HCC): ICD-10-CM

## 2019-05-10 DIAGNOSIS — N18.30 CHRONIC KIDNEY DISEASE, STAGE III (MODERATE) (HCC): ICD-10-CM

## 2019-05-10 DIAGNOSIS — I73.9 PVD (PERIPHERAL VASCULAR DISEASE) (HCC): ICD-10-CM

## 2019-05-10 DIAGNOSIS — M86.10 ACUTE OSTEOMYELITIS (HCC): Primary | ICD-10-CM

## 2019-05-10 DIAGNOSIS — I10 BENIGN ESSENTIAL HYPERTENSION: ICD-10-CM

## 2019-05-10 DIAGNOSIS — I87.8 CHRONIC VENOUS STASIS: ICD-10-CM

## 2019-05-10 LAB — POTASSIUM BLD-SCNC: 4.9 MMOL/L (ref 3.5–5.1)

## 2019-05-10 PROCEDURE — 84132 ASSAY OF SERUM POTASSIUM: CPT

## 2019-05-13 ENCOUNTER — OFFICE VISIT (OUTPATIENT)
Dept: ORTHOPEDIC SURGERY | Facility: CLINIC | Age: 74
End: 2019-05-13

## 2019-05-13 VITALS — BODY MASS INDEX: 31.25 KG/M2 | HEART RATE: 69 BPM | WEIGHT: 251.32 LBS | HEIGHT: 75 IN | OXYGEN SATURATION: 98 %

## 2019-05-13 DIAGNOSIS — L97.514 SKIN ULCER OF TOE OF RIGHT FOOT WITH NECROSIS OF BONE (HCC): ICD-10-CM

## 2019-05-13 DIAGNOSIS — E11.42 TYPE 2 DIABETES MELLITUS WITH DIABETIC POLYNEUROPATHY, WITHOUT LONG-TERM CURRENT USE OF INSULIN (HCC): ICD-10-CM

## 2019-05-13 DIAGNOSIS — R22.41 MASS OF LESSER TOE OF RIGHT FOOT: ICD-10-CM

## 2019-05-13 DIAGNOSIS — E11.65 UNCONTROLLED TYPE 2 DIABETES MELLITUS WITH HYPERGLYCEMIA (HCC): Primary | ICD-10-CM

## 2019-05-13 DIAGNOSIS — S90.821A BLISTER (NONTHERMAL), RIGHT FOOT, INITIAL ENCOUNTER: ICD-10-CM

## 2019-05-13 PROCEDURE — 97597 DBRDMT OPN WND 1ST 20 CM/<: CPT | Performed by: ORTHOPAEDIC SURGERY

## 2019-05-13 PROCEDURE — 99213 OFFICE O/P EST LOW 20 MIN: CPT | Performed by: ORTHOPAEDIC SURGERY

## 2019-05-13 NOTE — PROGRESS NOTES
ESTABLISHED PATIENT    Patient: Amol Aguirre  : 1945    Primary Care Provider: Donte Briones MD    Requesting Provider: As above    Follow-up of the Right Foot (After MRI 2019, RIZWAN 2019)      History    Chief Complaint: Right diabetic foot problems    History of Present Illness: He returns with his wife after being hospitalized for cellulitis in the right foot, the very large pressure blister on the dorsal foot, and gangrene of the fourth toe.  Dr. Easton Garcia was his infectious disease consultant.  Dr. Carrillo Jimenez evaluated his vascular status.  He has severe small vessel disease in the right leg.  He is currently on antibiotics, MRI showed probable osteomyelitis of the fourth toe, he has a gangrenous ulcer on the medial aspect of the fourth toe.  The very large dorsal pressure blister area, is slowly granulating on the foot.  He has been nonweightbearing.  They have been doing Silvadene dressing on the blister area, Bactroban on the necrotic ulcer.  We talked in the hospital about whether the fourth toe could be amputated on its own.  Both Dr. Jimenez and I did not think that he had enough blood flow to heal that area.  Currently our plan is to see if it will auto amputate.  The patient and his wife understand, and the patient has expressed a desire to proceed with a below-knee amputation if the antibiotics and time do not allow the problem to heal.  He originally was sent to me with a mass on the great toe, but clearly he does not have enough healing potential to operate on that.  The MRI showed that the mass is probably fibrous tissue.    Current Outpatient Medications on File Prior to Visit   Medication Sig Dispense Refill   • amLODIPine (NORVASC) 10 MG tablet Take 10 mg by mouth Daily.  0   • aspirin 81 MG EC tablet Take 81 mg by mouth Daily.     • budesonide-formoterol (SYMBICORT) 80-4.5 MCG/ACT inhaler Inhale 2 puffs 2 (Two) Times a Day.     • BYSTOLIC 10 MG tablet TK  1/2 T PO BID  7   • CloNIDine (CATAPRES) 0.1 MG tablet Take 0.1 mg by mouth 2 (Two) Times a Day.     • DICLOFENAC PO Take  by mouth.     • docusate sodium 100 MG capsule Take 100 mg by mouth 2 (Two) Times a Day As Needed for Constipation. 60 each 0   • ertapenem (INVanz) 1 g/100 mL 0.9% NS VTB (mbp) Infuse 100 mL into a venous catheter Daily for 24 doses. Per [facility] 2400 mL 0   • hydrochlorothiazide (HYDRODIURIL) 25 MG tablet Take  by mouth Daily.  3   • Insulin Glargine (TOUJEO SOLOSTAR) 300 UNIT/ML solution pen-injector Inject  under the skin into the appropriate area as directed.     • insulin lispro (humaLOG) 100 UNIT/ML injection Inject 10 Units under the skin into the appropriate area as directed 3 (Three) Times a Day With Meals for 30 days. 900 Units 0   • losartan (COZAAR) 100 MG tablet Take 100 mg by mouth Daily.  3   • metaxalone (SKELAXIN) 800 MG tablet Take 1 tablet by mouth 3 (Three) Times a Day As Needed for Muscle Spasms. 30 tablet 0   • metoclopramide (REGLAN) 10 MG tablet TK 1 T PO BEFORE MEALS AND QHS  8   • mupirocin (BACTROBAN) 2 % ointment Apply  topically to the appropriate area as directed Every 12 (Twelve) Hours.     • pantoprazole (PROTONIX) 40 MG EC tablet Take 40 mg by mouth Daily.  2   • silver sulfadiazine (SILVADENE, SSD) 1 % cream Apply  topically to the appropriate area as directed Daily.     • vitamin B-12 (CYANOCOBALAMIN) 1000 MCG tablet Take 1,000 mcg by mouth Daily.       No current facility-administered medications on file prior to visit.       Allergies   Allergen Reactions   • Chlorhexidine Shortness Of Breath, Itching and Rash     Pt developed a rash, itching, and shortness of breath after receiving a CHG bath on 4/24/19.   • Latex Itching      Past Medical History:   Diagnosis Date   • Acid reflux    • Asthma    • Diabetes (CMS/HCC)    • Hypertension      Past Surgical History:   Procedure Laterality Date   • AORTAGRAM N/A 4/30/2019    Procedure: AORTAGRAM WITH OR WITHOUT  "RUNOFFS;  Surgeon: Carrillo White MD;  Location: [place] [address];  Service: Vascular   • CHOLECYSTECTOMY     • KNEE SURGERY Right     TKA     Family History   Problem Relation Age of Onset   • Cancer Mother    • Hypertension Mother    • Hypertension Father    • Heart attack Father       Social History     Socioeconomic History   • Marital status:      Spouse name: Not on file   • Number of children: Not on file   • Years of education: Not on file   • Highest education level: Not on file   Tobacco Use   • Smoking status: Former Smoker     Types: Cigarettes     Start date:      Last attempt to quit:      Years since quittin.3   • Smokeless tobacco: Never Used   Substance and Sexual Activity   • Alcohol use: No     Frequency: Never   • Drug use: No   • Sexual activity: Defer        Review of Systems   Constitutional: Negative.    HENT: Negative.    Eyes: Negative.    Respiratory: Negative.    Cardiovascular: Negative.    Gastrointestinal: Negative.    Endocrine: Negative.    Genitourinary: Negative.    Musculoskeletal: Positive for arthralgias.   Skin: Negative.    Allergic/Immunologic: Negative.    Neurological: Negative.    Hematological: Negative.    Psychiatric/Behavioral: Negative.        The following portions of the patient's history were reviewed and updated as appropriate: allergies, current medications, past family history, past medical history, past social history, past surgical history and problem list.    Physical Exam:   Pulse 69   Ht 190.5 cm (75\")   Wt 114 kg (251 lb 5.2 oz)   SpO2 98%   BMI 31.41 kg/m²   GENERAL: Body habitus: overweight    Lower extremity edema: Left: trace; Right: none    Varicose veins:  Left: mild; Right: mild    Gait: using walker     Mental Status:  awake and alert; oriented to person, place, and time    Voice:  clear      MSK:      Foot:  Right:  No pulses palpable in the right foot, change in the slowly granulating area of the very large blister on " the dorsal lateral foot.  It encompasses almost the whole area from the third metatarsal laterally.  The fourth toe medial ulcer shows a necrotic area and dried area that may be periosteum.  The toe is still faintly rather than the other toes, but much less swelling than when he was in the hospital.  No purulence.  I debrided loose skin and tissue from both areas on his foot.  This was done using sterile technique, pickups and scissors.  It was sharp debridement, approximately 5 cm².  No change in dense neuropathy, no new ulcers, no signs of new Charcot    ; Left:  non tender      Medical Decision Making    Data Review:   reviewed radiology images and reviewed radiology results    Assessment/Plan/Diagnosis/Treatment Options:   1. Skin ulcer of toe of right foot with necrosis of bone (CMS/HCC)  Gangrenous ulcer on the fourth toe.  Its less swollen than it was in the hospital, and dryer.  Again were going to see if it will auto amputate.  The antibiotics have helped the cellulitis.  The MRI was suggestive of early osteomyelitis of the phalanx.  He does not have enough blood flow for primary toe amputation.  My concern would be that the wound would have to be left open, and that would be even less likely to heal then this gangrenous ulcer.  Continue Bactroban on this ulcer.  Silvadene on the dorsal lateral blistered area.  I will see him in 4 weeks.  Sooner if anything gets worse.  Per Dr. White and his vascular colleagues, there is no way to improve the blood flow based on the angiogram.  If this does not auto amputate, we will talk about higher amputation.    2. Uncontrolled type 2 diabetes mellitus with hyperglycemia (CMS/HCC)  He had better glucose control in the hospital, he needs to continue that at home    3. Type 2 diabetes mellitus with diabetic polyneuropathy, without long-term current use of insulin (CMS/HCC)  Dense neuropathy, significant vascular disease on the right    4. Blister (nonthermal), right foot,  initial encounter  As above are going to use Silvadene    5. Mass of lesser toe of right foot  He was originally sent to me for a mass of the great toe, the MRI shows its probably fibrous,  there is no way it can be operated on because of the lack of blood flow

## 2019-05-14 ENCOUNTER — READMISSION MANAGEMENT (OUTPATIENT)
Dept: CALL CENTER | Facility: HOSPITAL | Age: 74
End: 2019-05-14

## 2019-05-14 NOTE — OUTREACH NOTE
Medical Week 2 Survey      Responses   Facility patient discharged from?  [facility]   Does the patient have one of the following disease processes/diagnoses(primary or secondary)?  Other   Week 2 attempt successful?  Yes   Call start time  1449   Discharge diagnosis  S/P aortogram with run-offs, Diabetic ulcer right 4th toe   Call end time  1454   Person spoke with today (if not patient) and relationship  Janie, wife   Meds reviewed with patient/caregiver?  Yes   Is the patient having any side effects they believe may be caused by any medication additions or changes?  No   Does the patient have all medications ordered at discharge?  Yes   Is the patient taking all medications as directed (includes completed medication regime)?  Yes   Does the patient have a primary care provider?   Yes   Does the patient have an appointment with their PCP within 7 days of discharge?  Yes   Has the patient kept scheduled appointments due by today?  Yes   Has home health visited the patient within 72 hours of discharge?  N/A   Psychosocial issues?  No   Comments  wife states toe is healing, according to Dr Weber,    Did the patient receive a copy of their discharge instructions?  Yes   Nursing interventions  Reviewed instructions with patient   Is the patient/caregiver able to teach back signs and symptoms related to disease process for when to call PCP?  Yes   Is the patient/caregiver able to teach back signs and symptoms related to disease process for when to call 911?  Yes   Is the patient/caregiver able to teach back the hierarchy of who to call/visit for symptoms/problems? PCP, Specialist, Home health nurse, Urgent Care, ED, 911  Yes   Week 2 Call Completed?  Yes          Jennifer Mera RN

## 2019-05-16 ENCOUNTER — TRANSCRIBE ORDERS (OUTPATIENT)
Dept: LAB | Facility: HOSPITAL | Age: 74
End: 2019-05-16

## 2019-05-16 ENCOUNTER — LAB (OUTPATIENT)
Dept: LAB | Facility: HOSPITAL | Age: 74
End: 2019-05-16

## 2019-05-16 DIAGNOSIS — E87.5 HYPERPOTASSEMIA: ICD-10-CM

## 2019-05-16 DIAGNOSIS — M86.171 ACUTE OSTEOMYELITIS OF RIGHT ANKLE OR FOOT (HCC): ICD-10-CM

## 2019-05-16 DIAGNOSIS — E87.5 HYPERPOTASSEMIA: Primary | ICD-10-CM

## 2019-05-16 LAB
ALBUMIN SERPL-MCNC: 3.5 G/DL (ref 3.5–5.2)
ALBUMIN/GLOB SERPL: 0.9 G/DL
ALP SERPL-CCNC: 149 U/L (ref 39–117)
ALT SERPL W P-5'-P-CCNC: 33 U/L (ref 1–41)
ANION GAP SERPL CALCULATED.3IONS-SCNC: 15 MMOL/L
AST SERPL-CCNC: 25 U/L (ref 1–40)
BASOPHILS # BLD AUTO: 0.06 10*3/MM3 (ref 0–0.2)
BASOPHILS NFR BLD AUTO: 0.5 % (ref 0–1.5)
BILIRUB SERPL-MCNC: 0.4 MG/DL (ref 0.2–1.2)
BUN BLD-MCNC: 34 MG/DL (ref 8–23)
BUN/CREAT SERPL: 20.7 (ref 7–25)
CALCIUM SPEC-SCNC: 10.3 MG/DL (ref 8.6–10.5)
CHLORIDE SERPL-SCNC: 99 MMOL/L (ref 98–107)
CK SERPL-CCNC: 28 U/L (ref 20–200)
CO2 SERPL-SCNC: 21 MMOL/L (ref 22–29)
CREAT BLD-MCNC: 1.64 MG/DL (ref 0.76–1.27)
CRP SERPL-MCNC: 14.04 MG/DL (ref 0–0.5)
DEPRECATED RDW RBC AUTO: 41.9 FL (ref 37–54)
EOSINOPHIL # BLD AUTO: 0.62 10*3/MM3 (ref 0–0.4)
EOSINOPHIL NFR BLD AUTO: 5 % (ref 0.3–6.2)
ERYTHROCYTE [DISTWIDTH] IN BLOOD BY AUTOMATED COUNT: 13.3 % (ref 12.3–15.4)
ERYTHROCYTE [SEDIMENTATION RATE] IN BLOOD: 77 MM/HR (ref 0–20)
GFR SERPL CREATININE-BSD FRML MDRD: 41 ML/MIN/1.73
GLOBULIN UR ELPH-MCNC: 4 GM/DL
GLUCOSE BLD-MCNC: 327 MG/DL (ref 65–99)
HCT VFR BLD AUTO: 36.1 % (ref 37.5–51)
HGB BLD-MCNC: 11.7 G/DL (ref 13–17.7)
IMM GRANULOCYTES # BLD AUTO: 0.03 10*3/MM3 (ref 0–0.05)
IMM GRANULOCYTES NFR BLD AUTO: 0.2 % (ref 0–0.5)
LYMPHOCYTES # BLD AUTO: 1.17 10*3/MM3 (ref 0.7–3.1)
LYMPHOCYTES NFR BLD AUTO: 9.4 % (ref 19.6–45.3)
MCH RBC QN AUTO: 27.7 PG (ref 26.6–33)
MCHC RBC AUTO-ENTMCNC: 32.4 G/DL (ref 31.5–35.7)
MCV RBC AUTO: 85.5 FL (ref 79–97)
MONOCYTES # BLD AUTO: 0.79 10*3/MM3 (ref 0.1–0.9)
MONOCYTES NFR BLD AUTO: 6.3 % (ref 5–12)
NEUTROPHILS # BLD AUTO: 9.82 10*3/MM3 (ref 1.7–7)
NEUTROPHILS NFR BLD AUTO: 78.6 % (ref 42.7–76)
PLATELET # BLD AUTO: 535 10*3/MM3 (ref 140–450)
PMV BLD AUTO: 9.9 FL (ref 6–12)
POTASSIUM BLD-SCNC: 4.9 MMOL/L (ref 3.5–5.2)
PROT SERPL-MCNC: 7.5 G/DL (ref 6–8.5)
RBC # BLD AUTO: 4.22 10*6/MM3 (ref 4.14–5.8)
SODIUM BLD-SCNC: 135 MMOL/L (ref 136–145)
WBC NRBC COR # BLD: 12.49 10*3/MM3 (ref 3.4–10.8)

## 2019-05-16 PROCEDURE — 85652 RBC SED RATE AUTOMATED: CPT

## 2019-05-16 PROCEDURE — 85025 COMPLETE CBC W/AUTO DIFF WBC: CPT

## 2019-05-16 PROCEDURE — 86140 C-REACTIVE PROTEIN: CPT | Performed by: RADIOLOGY

## 2019-05-16 PROCEDURE — 80053 COMPREHEN METABOLIC PANEL: CPT

## 2019-05-16 PROCEDURE — 36415 COLL VENOUS BLD VENIPUNCTURE: CPT | Performed by: RADIOLOGY

## 2019-05-16 PROCEDURE — 82550 ASSAY OF CK (CPK): CPT

## 2019-05-22 ENCOUNTER — READMISSION MANAGEMENT (OUTPATIENT)
Dept: CALL CENTER | Facility: HOSPITAL | Age: 74
End: 2019-05-22

## 2019-05-22 NOTE — OUTREACH NOTE
Medical Week 3 Survey      Responses   Facility patient discharged from?  [facility]   Does the patient have one of the following disease processes/diagnoses(primary or secondary)?  Other   Week 3 attempt successful?  Yes   Call start time  1228   Call end time  1231   Discharge diagnosis  S/P aortogram with run-offs, Diabetic ulcer right 4th toe   Meds reviewed with patient/caregiver?  Yes   Is the patient having any side effects they believe may be caused by any medication additions or changes?  No   Does the patient have all medications ordered at discharge?  Yes   Is the patient taking all medications as directed (includes completed medication regime)?  Yes   Does the patient have a primary care provider?   Yes   Does the patient have an appointment with their PCP within 7 days of discharge?  Yes   Has the patient kept scheduled appointments due by today?  Yes   Has home health visited the patient within 72 hours of discharge?  N/A   Psychosocial issues?  No   Comments  wife states toe is healing, according to Dr Weber,    Did the patient receive a copy of their discharge instructions?  Yes   Nursing interventions  Reviewed instructions with patient   What is the patient's perception of their health status since discharge?  Improving   Is the patient/caregiver able to teach back signs and symptoms related to disease process for when to call PCP?  Yes   Is the patient/caregiver able to teach back signs and symptoms related to disease process for when to call 911?  Yes   Is the patient/caregiver able to teach back the hierarchy of who to call/visit for symptoms/problems? PCP, Specialist, Home health nurse, Urgent Care, ED, 911  Yes   Week 3 Call Completed?  Yes          Dorothy Jasso RN

## 2019-05-23 ENCOUNTER — TRANSCRIBE ORDERS (OUTPATIENT)
Dept: LAB | Facility: HOSPITAL | Age: 74
End: 2019-05-23

## 2019-05-23 ENCOUNTER — LAB (OUTPATIENT)
Dept: LAB | Facility: HOSPITAL | Age: 74
End: 2019-05-23

## 2019-05-23 DIAGNOSIS — E87.5 HYPERPOTASSEMIA: Primary | ICD-10-CM

## 2019-05-23 DIAGNOSIS — M86.171 ACUTE OSTEOMYELITIS OF RIGHT ANKLE OR FOOT (HCC): ICD-10-CM

## 2019-05-23 DIAGNOSIS — E87.5 HYPERPOTASSEMIA: ICD-10-CM

## 2019-05-23 LAB
ALBUMIN SERPL-MCNC: 3.7 G/DL (ref 3.5–5.2)
ALBUMIN/GLOB SERPL: 0.8 G/DL
ALP SERPL-CCNC: 167 U/L (ref 39–117)
ALT SERPL W P-5'-P-CCNC: 34 U/L (ref 1–41)
ANION GAP SERPL CALCULATED.3IONS-SCNC: 16 MMOL/L
AST SERPL-CCNC: 34 U/L (ref 1–40)
BASOPHILS # BLD AUTO: 0.06 10*3/MM3 (ref 0–0.2)
BASOPHILS NFR BLD AUTO: 0.7 % (ref 0–1.5)
BILIRUB SERPL-MCNC: 0.3 MG/DL (ref 0.2–1.2)
BUN BLD-MCNC: 32 MG/DL (ref 8–23)
BUN/CREAT SERPL: 20.6 (ref 7–25)
CALCIUM SPEC-SCNC: 10.6 MG/DL (ref 8.6–10.5)
CHLORIDE SERPL-SCNC: 99 MMOL/L (ref 98–107)
CK SERPL-CCNC: 33 U/L (ref 20–200)
CO2 SERPL-SCNC: 22 MMOL/L (ref 22–29)
CREAT BLD-MCNC: 1.55 MG/DL (ref 0.76–1.27)
CRP SERPL-MCNC: 5.76 MG/DL (ref 0–0.5)
DEPRECATED RDW RBC AUTO: 41.7 FL (ref 37–54)
EOSINOPHIL # BLD AUTO: 0.44 10*3/MM3 (ref 0–0.4)
EOSINOPHIL NFR BLD AUTO: 4.9 % (ref 0.3–6.2)
ERYTHROCYTE [DISTWIDTH] IN BLOOD BY AUTOMATED COUNT: 13.4 % (ref 12.3–15.4)
ERYTHROCYTE [SEDIMENTATION RATE] IN BLOOD: 98 MM/HR (ref 0–20)
GFR SERPL CREATININE-BSD FRML MDRD: 44 ML/MIN/1.73
GLOBULIN UR ELPH-MCNC: 4.5 GM/DL
GLUCOSE BLD-MCNC: 257 MG/DL (ref 65–99)
HCT VFR BLD AUTO: 36.1 % (ref 37.5–51)
HGB BLD-MCNC: 11.6 G/DL (ref 13–17.7)
IMM GRANULOCYTES # BLD AUTO: 0.03 10*3/MM3 (ref 0–0.05)
IMM GRANULOCYTES NFR BLD AUTO: 0.3 % (ref 0–0.5)
LYMPHOCYTES # BLD AUTO: 1.31 10*3/MM3 (ref 0.7–3.1)
LYMPHOCYTES NFR BLD AUTO: 14.6 % (ref 19.6–45.3)
MCH RBC QN AUTO: 27.4 PG (ref 26.6–33)
MCHC RBC AUTO-ENTMCNC: 32.1 G/DL (ref 31.5–35.7)
MCV RBC AUTO: 85.3 FL (ref 79–97)
MONOCYTES # BLD AUTO: 0.49 10*3/MM3 (ref 0.1–0.9)
MONOCYTES NFR BLD AUTO: 5.5 % (ref 5–12)
NEUTROPHILS # BLD AUTO: 6.68 10*3/MM3 (ref 1.7–7)
NEUTROPHILS NFR BLD AUTO: 74.3 % (ref 42.7–76)
PLATELET # BLD AUTO: 503 10*3/MM3 (ref 140–450)
PMV BLD AUTO: 10.2 FL (ref 6–12)
POTASSIUM BLD-SCNC: 5.1 MMOL/L (ref 3.5–5.2)
PROT SERPL-MCNC: 8.2 G/DL (ref 6–8.5)
RBC # BLD AUTO: 4.23 10*6/MM3 (ref 4.14–5.8)
SODIUM BLD-SCNC: 137 MMOL/L (ref 136–145)
WBC NRBC COR # BLD: 8.98 10*3/MM3 (ref 3.4–10.8)

## 2019-05-23 PROCEDURE — 86140 C-REACTIVE PROTEIN: CPT

## 2019-05-23 PROCEDURE — 85025 COMPLETE CBC W/AUTO DIFF WBC: CPT

## 2019-05-23 PROCEDURE — 82550 ASSAY OF CK (CPK): CPT | Performed by: INTERNAL MEDICINE

## 2019-05-23 PROCEDURE — 85652 RBC SED RATE AUTOMATED: CPT

## 2019-05-23 PROCEDURE — 80053 COMPREHEN METABOLIC PANEL: CPT

## 2019-05-23 PROCEDURE — 36415 COLL VENOUS BLD VENIPUNCTURE: CPT

## 2019-05-30 ENCOUNTER — READMISSION MANAGEMENT (OUTPATIENT)
Dept: CALL CENTER | Facility: HOSPITAL | Age: 74
End: 2019-05-30

## 2019-05-30 ENCOUNTER — LAB (OUTPATIENT)
Dept: LAB | Facility: HOSPITAL | Age: 74
End: 2019-05-30

## 2019-05-30 ENCOUNTER — TRANSCRIBE ORDERS (OUTPATIENT)
Dept: LAB | Facility: HOSPITAL | Age: 74
End: 2019-05-30

## 2019-05-30 DIAGNOSIS — E87.5 HYPERPOTASSEMIA: ICD-10-CM

## 2019-05-30 DIAGNOSIS — M86.171 ACUTE OSTEOMYELITIS OF RIGHT ANKLE OR FOOT (HCC): ICD-10-CM

## 2019-05-30 DIAGNOSIS — E87.5 HYPERPOTASSEMIA: Primary | ICD-10-CM

## 2019-05-30 LAB
ALBUMIN SERPL-MCNC: 3.7 G/DL (ref 3.5–5.2)
ALBUMIN/GLOB SERPL: 0.9 G/DL
ALP SERPL-CCNC: 170 U/L (ref 39–117)
ALT SERPL W P-5'-P-CCNC: 31 U/L (ref 1–41)
ANION GAP SERPL CALCULATED.3IONS-SCNC: 11 MMOL/L
AST SERPL-CCNC: 25 U/L (ref 1–40)
BASOPHILS # BLD AUTO: 0.05 10*3/MM3 (ref 0–0.2)
BASOPHILS NFR BLD AUTO: 0.5 % (ref 0–1.5)
BILIRUB SERPL-MCNC: 0.3 MG/DL (ref 0.2–1.2)
BUN BLD-MCNC: 28 MG/DL (ref 8–23)
BUN/CREAT SERPL: 17.9 (ref 7–25)
CALCIUM SPEC-SCNC: 11 MG/DL (ref 8.6–10.5)
CHLORIDE SERPL-SCNC: 97 MMOL/L (ref 98–107)
CK SERPL-CCNC: 20 U/L (ref 20–200)
CO2 SERPL-SCNC: 24 MMOL/L (ref 22–29)
CREAT BLD-MCNC: 1.56 MG/DL (ref 0.76–1.27)
CRP SERPL-MCNC: 2.6 MG/DL (ref 0–0.5)
DEPRECATED RDW RBC AUTO: 41.9 FL (ref 37–54)
EOSINOPHIL # BLD AUTO: 0.53 10*3/MM3 (ref 0–0.4)
EOSINOPHIL NFR BLD AUTO: 5.3 % (ref 0.3–6.2)
ERYTHROCYTE [DISTWIDTH] IN BLOOD BY AUTOMATED COUNT: 13.5 % (ref 12.3–15.4)
ERYTHROCYTE [SEDIMENTATION RATE] IN BLOOD: 109 MM/HR (ref 0–20)
GFR SERPL CREATININE-BSD FRML MDRD: 43 ML/MIN/1.73
GLOBULIN UR ELPH-MCNC: 4.2 GM/DL
GLUCOSE BLD-MCNC: 339 MG/DL (ref 65–99)
HCT VFR BLD AUTO: 37.5 % (ref 37.5–51)
HGB BLD-MCNC: 12.1 G/DL (ref 13–17.7)
IMM GRANULOCYTES # BLD AUTO: 0.02 10*3/MM3 (ref 0–0.05)
IMM GRANULOCYTES NFR BLD AUTO: 0.2 % (ref 0–0.5)
LYMPHOCYTES # BLD AUTO: 1.91 10*3/MM3 (ref 0.7–3.1)
LYMPHOCYTES NFR BLD AUTO: 19 % (ref 19.6–45.3)
MCH RBC QN AUTO: 27.5 PG (ref 26.6–33)
MCHC RBC AUTO-ENTMCNC: 32.3 G/DL (ref 31.5–35.7)
MCV RBC AUTO: 85.2 FL (ref 79–97)
MONOCYTES # BLD AUTO: 0.45 10*3/MM3 (ref 0.1–0.9)
MONOCYTES NFR BLD AUTO: 4.5 % (ref 5–12)
NEUTROPHILS # BLD AUTO: 7.11 10*3/MM3 (ref 1.7–7)
NEUTROPHILS NFR BLD AUTO: 70.7 % (ref 42.7–76)
PLATELET # BLD AUTO: 612 10*3/MM3 (ref 140–450)
PMV BLD AUTO: 10.1 FL (ref 6–12)
POTASSIUM BLD-SCNC: 5.2 MMOL/L (ref 3.5–5.2)
PROT SERPL-MCNC: 7.9 G/DL (ref 6–8.5)
RBC # BLD AUTO: 4.4 10*6/MM3 (ref 4.14–5.8)
SODIUM BLD-SCNC: 132 MMOL/L (ref 136–145)
WBC NRBC COR # BLD: 10.05 10*3/MM3 (ref 3.4–10.8)

## 2019-05-30 PROCEDURE — 36415 COLL VENOUS BLD VENIPUNCTURE: CPT

## 2019-05-30 PROCEDURE — 82550 ASSAY OF CK (CPK): CPT | Performed by: INTERNAL MEDICINE

## 2019-05-30 PROCEDURE — 85025 COMPLETE CBC W/AUTO DIFF WBC: CPT

## 2019-05-30 PROCEDURE — 86140 C-REACTIVE PROTEIN: CPT

## 2019-05-30 PROCEDURE — 80053 COMPREHEN METABOLIC PANEL: CPT

## 2019-05-30 PROCEDURE — 85652 RBC SED RATE AUTOMATED: CPT

## 2019-05-30 NOTE — OUTREACH NOTE
Medical Week 4 Survey      Responses   Facility patient discharged from?  [place]   Does the patient have one of the following disease processes/diagnoses(primary or secondary)?  Other   Week 4 attempt successful?  Yes   Call start time  1349   Call end time  1349   Discharge diagnosis  S/P aortogram with run-offs, Diabetic ulcer right 4th toe   Is patient permission given to speak with other caregiver?  Yes   List who call center can speak with  wife   Meds reviewed with patient/caregiver?  Yes   Is the patient having any side effects they believe may be caused by any medication additions or changes?  No   Is the patient taking all medications as directed (includes completed medication regime)?  Yes   Has the patient kept scheduled appointments due by today?  Yes   Is the patient still receiving Home Health Services?  N/A   Psychosocial issues?  No   What is the patient's perception of their health status since discharge?  Improving   Is the patient/caregiver able to teach back signs and symptoms related to disease process for when to call PCP?  Yes   Is the patient/caregiver able to teach back signs and symptoms related to disease process for when to call 911?  Yes   Is the patient/caregiver able to teach back the hierarchy of who to call/visit for symptoms/problems? PCP, Specialist, Home health nurse, Urgent Care, ED, 911  Yes   Week 4 Call Completed?  Yes   Would the patient like one additional call?  No   Graduated  Yes   Did the patient feel the follow up calls were helpful during their recovery period?  Yes   Was the number of calls appropriate?  Yes          Nany Salinas RN

## 2019-05-31 ENCOUNTER — OFFICE VISIT (OUTPATIENT)
Dept: ORTHOPEDIC SURGERY | Facility: CLINIC | Age: 74
End: 2019-05-31

## 2019-05-31 VITALS — HEART RATE: 80 BPM | WEIGHT: 251.32 LBS | OXYGEN SATURATION: 99 % | BODY MASS INDEX: 31.25 KG/M2 | HEIGHT: 75 IN

## 2019-05-31 DIAGNOSIS — R09.89 DECREASED PULSES IN FEET: ICD-10-CM

## 2019-05-31 DIAGNOSIS — E11.42 TYPE 2 DIABETES MELLITUS WITH DIABETIC POLYNEUROPATHY, WITHOUT LONG-TERM CURRENT USE OF INSULIN (HCC): ICD-10-CM

## 2019-05-31 DIAGNOSIS — I99.8 VASCULAR CALCIFICATION: ICD-10-CM

## 2019-05-31 DIAGNOSIS — I96 GANGRENE (HCC): ICD-10-CM

## 2019-05-31 DIAGNOSIS — S90.821A BLISTER (NONTHERMAL), RIGHT FOOT, INITIAL ENCOUNTER: ICD-10-CM

## 2019-05-31 DIAGNOSIS — L97.514 SKIN ULCER OF TOE OF RIGHT FOOT WITH NECROSIS OF BONE (HCC): Primary | ICD-10-CM

## 2019-05-31 PROCEDURE — 99214 OFFICE O/P EST MOD 30 MIN: CPT | Performed by: ORTHOPAEDIC SURGERY

## 2019-05-31 NOTE — PROGRESS NOTES
ESTABLISHED PATIENT    Patient: Amol Aguirre  : 1945    Primary Care Provider: Donte Briones MD    Requesting Provider: As above    Follow-up and Pain of the Right Foot (Skin Ulcer of Toe of Right Foot with Necrosis of Bone )      History    Chief Complaint: Worsening of right foot gangrene fourth toe    History of Present Illness: He returns today with his wife, his wife notes that there is more necrotic tissue on the fourth toe.  She notes that a round spot popped up, it was black, and it has now coalesced with the medial side of the fourth toe which is almost fully gangrenous.  It is dry gangrene, but it is necrotic.  He has not had any fevers or chills.  The very large dorsal foot wound has not made any progress in healing.  He believes that he is resigned to proceeding with a below-knee amputation.    Current Outpatient Medications on File Prior to Visit   Medication Sig Dispense Refill   • amLODIPine (NORVASC) 10 MG tablet Take 10 mg by mouth Daily.  0   • aspirin 81 MG EC tablet Take 81 mg by mouth Daily.     • budesonide-formoterol (SYMBICORT) 80-4.5 MCG/ACT inhaler Inhale 2 puffs 2 (Two) Times a Day.     • BYSTOLIC 10 MG tablet TK  T PO BID  7   • CloNIDine (CATAPRES) 0.1 MG tablet Take 0.1 mg by mouth 2 (Two) Times a Day.     • DICLOFENAC PO Take  by mouth.     • docusate sodium 100 MG capsule Take 100 mg by mouth 2 (Two) Times a Day As Needed for Constipation. 60 each 0   • hydrochlorothiazide (HYDRODIURIL) 25 MG tablet Take  by mouth Daily.  3   • Insulin Glargine (TOUJEO SOLOSTAR) 300 UNIT/ML solution pen-injector Inject  under the skin into the appropriate area as directed.     • insulin lispro (humaLOG) 100 UNIT/ML injection Inject 10 Units under the skin into the appropriate area as directed 3 (Three) Times a Day With Meals for 30 days. 900 Units 0   • losartan (COZAAR) 100 MG tablet Take 100 mg by mouth Daily.  3   • metaxalone (SKELAXIN) 800 MG tablet Take 1 tablet by  mouth 3 (Three) Times a Day As Needed for Muscle Spasms. 30 tablet 0   • metoclopramide (REGLAN) 10 MG tablet TK 1 T PO BEFORE MEALS AND QHS  8   • mupirocin (BACTROBAN) 2 % ointment Apply  topically to the appropriate area as directed Every 12 (Twelve) Hours.     • pantoprazole (PROTONIX) 40 MG EC tablet Take 40 mg by mouth Daily.  2   • silver sulfadiazine (SILVADENE, SSD) 1 % cream Apply  topically to the appropriate area as directed Daily.     • vitamin B-12 (CYANOCOBALAMIN) 1000 MCG tablet Take 1,000 mcg by mouth Daily.       No current facility-administered medications on file prior to visit.       Allergies   Allergen Reactions   • Chlorhexidine Shortness Of Breath, Itching and Rash     Pt developed a rash, itching, and shortness of breath after receiving a CHG bath on 19.   • Latex Itching      Past Medical History:   Diagnosis Date   • Acid reflux    • Asthma    • Diabetes (CMS/HCC)    • Hypertension      Past Surgical History:   Procedure Laterality Date   • AORTAGRAM N/A 2019    Procedure: AORTAGRAM WITH OR WITHOUT RUNOFFS;  Surgeon: Carrillo White MD;  Location: [place] [address];  Service: Vascular   • CHOLECYSTECTOMY     • KNEE SURGERY Right     TKA     Family History   Problem Relation Age of Onset   • Cancer Mother    • Hypertension Mother    • Hypertension Father    • Heart attack Father       Social History     Socioeconomic History   • Marital status:      Spouse name: Not on file   • Number of children: Not on file   • Years of education: Not on file   • Highest education level: Not on file   Tobacco Use   • Smoking status: Former Smoker     Types: Cigarettes     Start date:      Last attempt to quit:      Years since quittin.4   • Smokeless tobacco: Never Used   Substance and Sexual Activity   • Alcohol use: No     Frequency: Never   • Drug use: No   • Sexual activity: Defer        Review of Systems   Constitutional: Negative.    HENT: Negative.    Eyes: Negative.  "   Respiratory: Negative.    Cardiovascular: Negative.    Gastrointestinal: Negative.    Endocrine: Negative.    Genitourinary: Negative.    Musculoskeletal: Positive for joint swelling.   Skin: Negative.    Allergic/Immunologic: Negative.    Neurological: Negative.    Hematological: Negative.    Psychiatric/Behavioral: Negative.        The following portions of the patient's history were reviewed and updated as appropriate: allergies, current medications, past family history, past medical history, past social history, past surgical history and problem list.    Physical Exam:   Pulse 80   Ht 190.5 cm (75\")   Wt 114 kg (251 lb 5.2 oz)   SpO2 99%   BMI 31.41 kg/m²   GENERAL: Body habitus: overweight    Lower extremity edema: Left: trace; Right: trace    Gait: in wheelchair     Mental Status:  awake and alert; oriented to person, place, and time  MSK  Diabetic Foot Exam:  Right: the entire medial aspect of the 4th toe is necrotic, dry, no purulence, toe is thinner and more dessicated than last vist, no healing of the very large dorsal area on the foot that started with a blister from his boot, foot is cool Left:  No ulcer  NEURO Sensation:  absent     Medical Decision Making    Data Review:   phone conversation with physician I discussed this with Dr Hernandez (Dr Garcia out of [facility])    Assessment/Plan/Diagnosis/Treatment Options:   1. Gangrene (CMS/HCC)  The gangrene on the toe has progressed, the toe is thinner, desiccated now.  The very large wound on the dorsal foot has made no progress.  He and his wife and I discussed this in detail.  He is understandably sad, but he would like to proceed with below knee amputation.  I think this is the most reasonable plan.  A simple toe amputation will not heal - the toe is necrotic into the MTPJ now, and given the lack of progress of the very large dorsal woundPlan to do this next Tuesday.  We discussed the risks including but not limited to: death, infection, neurovascular " damage, strokes, heart attacks, blood clots, chronic pain, deformity, stiffness, need for  further surgery,  Higher amputation, etc.  Questions asked and answered in detail.  We dicussed going to rehab after to get stronger.          2. Skin ulcer of toe of right foot with necrosis of bone (CMS/HCC)  See above    3. Type 2 diabetes mellitus with diabetic polyneuropathy, without long-term current use of insulin (CMS/HCC)  Will re-check HGA1c    4. Vascular calcification  Severe small vessel disease    5. Decreased pulses in feet  Severe disease, no reconstruction    6. Blister (nonthermal), right foot, initial encounter  No progress inhealing      I did knee xray to look at total knee prosthesis, we will be able to do a standard BKA below the hardware

## 2019-06-03 ENCOUNTER — ANESTHESIA EVENT (OUTPATIENT)
Dept: PERIOP | Facility: HOSPITAL | Age: 74
End: 2019-06-03

## 2019-06-03 RX ORDER — OXYCODONE HYDROCHLORIDE AND ACETAMINOPHEN 5; 325 MG/1; MG/1
1-2 TABLET ORAL EVERY 6 HOURS PRN
Qty: 60 TABLET | Refills: 0 | Status: SHIPPED | OUTPATIENT
Start: 2019-06-03 | End: 2019-10-09

## 2019-06-03 RX ORDER — HYDROCODONE BITARTRATE AND ACETAMINOPHEN 7.5; 325 MG/1; MG/1
1-2 TABLET ORAL EVERY 6 HOURS PRN
Qty: 60 TABLET | Refills: 0 | Status: SHIPPED | OUTPATIENT
Start: 2019-06-03 | End: 2019-10-09

## 2019-06-03 RX ORDER — FAMOTIDINE 10 MG/ML
20 INJECTION, SOLUTION INTRAVENOUS ONCE
Status: CANCELLED | OUTPATIENT
Start: 2019-06-03 | End: 2019-06-03

## 2019-06-03 RX ORDER — ONDANSETRON 4 MG/1
4 TABLET, FILM COATED ORAL EVERY 6 HOURS PRN
Qty: 30 TABLET | Refills: 0 | Status: SHIPPED | OUTPATIENT
Start: 2019-06-03 | End: 2020-07-20 | Stop reason: HOSPADM

## 2019-06-04 ENCOUNTER — ANESTHESIA (OUTPATIENT)
Dept: PERIOP | Facility: HOSPITAL | Age: 74
End: 2019-06-04

## 2019-06-04 ENCOUNTER — HOSPITAL ENCOUNTER (INPATIENT)
Facility: HOSPITAL | Age: 74
LOS: 4 days | Discharge: NURSING, REHAB OR LONG-TERM CARE | End: 2019-06-08
Attending: ORTHOPAEDIC SURGERY | Admitting: ORTHOPAEDIC SURGERY

## 2019-06-04 DIAGNOSIS — L97.514 SKIN ULCER OF TOE OF RIGHT FOOT WITH NECROSIS OF BONE (HCC): ICD-10-CM

## 2019-06-04 DIAGNOSIS — I96 GANGRENE (HCC): ICD-10-CM

## 2019-06-04 DIAGNOSIS — Z74.09 IMPAIRED MOBILITY AND ADLS: Primary | ICD-10-CM

## 2019-06-04 DIAGNOSIS — E11.42 TYPE 2 DIABETES MELLITUS WITH DIABETIC POLYNEUROPATHY, WITHOUT LONG-TERM CURRENT USE OF INSULIN (HCC): ICD-10-CM

## 2019-06-04 DIAGNOSIS — S90.821A BLISTER (NONTHERMAL), RIGHT FOOT, INITIAL ENCOUNTER: ICD-10-CM

## 2019-06-04 DIAGNOSIS — Z78.9 IMPAIRED MOBILITY AND ADLS: Primary | ICD-10-CM

## 2019-06-04 DIAGNOSIS — R09.89 DECREASED PULSES IN FEET: ICD-10-CM

## 2019-06-04 DIAGNOSIS — I99.8 VASCULAR CALCIFICATION: ICD-10-CM

## 2019-06-04 PROBLEM — N18.9 CKD (CHRONIC KIDNEY DISEASE): Status: ACTIVE | Noted: 2019-06-04

## 2019-06-04 PROBLEM — I10 HTN (HYPERTENSION): Status: ACTIVE | Noted: 2019-06-04

## 2019-06-04 PROBLEM — Z89.511 S/P BKA (BELOW KNEE AMPUTATION), RIGHT (HCC): Status: ACTIVE | Noted: 2019-06-04

## 2019-06-04 LAB
ANION GAP SERPL CALCULATED.3IONS-SCNC: 17 MMOL/L
BASOPHILS # BLD AUTO: 0.05 10*3/MM3 (ref 0–0.2)
BASOPHILS NFR BLD AUTO: 0.5 % (ref 0–1.5)
BUN BLD-MCNC: 33 MG/DL (ref 8–23)
BUN/CREAT SERPL: 24.3 (ref 7–25)
CALCIUM SPEC-SCNC: 10.7 MG/DL (ref 8.6–10.5)
CHLORIDE SERPL-SCNC: 98 MMOL/L (ref 98–107)
CO2 SERPL-SCNC: 21 MMOL/L (ref 22–29)
CREAT BLD-MCNC: 1.36 MG/DL (ref 0.76–1.27)
DEPRECATED RDW RBC AUTO: 41.7 FL (ref 37–54)
EOSINOPHIL # BLD AUTO: 0.44 10*3/MM3 (ref 0–0.4)
EOSINOPHIL NFR BLD AUTO: 4.3 % (ref 0.3–6.2)
ERYTHROCYTE [DISTWIDTH] IN BLOOD BY AUTOMATED COUNT: 13.5 % (ref 12.3–15.4)
GFR SERPL CREATININE-BSD FRML MDRD: 51 ML/MIN/1.73
GLUCOSE BLD-MCNC: 281 MG/DL (ref 65–99)
GLUCOSE BLDC GLUCOMTR-MCNC: 257 MG/DL (ref 70–130)
GLUCOSE BLDC GLUCOMTR-MCNC: 259 MG/DL (ref 70–130)
GLUCOSE BLDC GLUCOMTR-MCNC: 297 MG/DL (ref 70–130)
HCT VFR BLD AUTO: 34.5 % (ref 37.5–51)
HGB BLD-MCNC: 11.1 G/DL (ref 13–17.7)
IMM GRANULOCYTES # BLD AUTO: 0.01 10*3/MM3 (ref 0–0.05)
IMM GRANULOCYTES NFR BLD AUTO: 0.1 % (ref 0–0.5)
LYMPHOCYTES # BLD AUTO: 1.65 10*3/MM3 (ref 0.7–3.1)
LYMPHOCYTES NFR BLD AUTO: 16.3 % (ref 19.6–45.3)
MCH RBC QN AUTO: 27.1 PG (ref 26.6–33)
MCHC RBC AUTO-ENTMCNC: 32.2 G/DL (ref 31.5–35.7)
MCV RBC AUTO: 84.1 FL (ref 79–97)
MONOCYTES # BLD AUTO: 0.61 10*3/MM3 (ref 0.1–0.9)
MONOCYTES NFR BLD AUTO: 6 % (ref 5–12)
NEUTROPHILS # BLD AUTO: 7.4 10*3/MM3 (ref 1.7–7)
NEUTROPHILS NFR BLD AUTO: 72.9 % (ref 42.7–76)
PLATELET # BLD AUTO: 492 10*3/MM3 (ref 140–450)
PMV BLD AUTO: 9.7 FL (ref 6–12)
POTASSIUM BLD-SCNC: 4.8 MMOL/L (ref 3.5–5.2)
RBC # BLD AUTO: 4.1 10*6/MM3 (ref 4.14–5.8)
SODIUM BLD-SCNC: 136 MMOL/L (ref 136–145)
WBC NRBC COR # BLD: 10.15 10*3/MM3 (ref 3.4–10.8)

## 2019-06-04 PROCEDURE — 85025 COMPLETE CBC W/AUTO DIFF WBC: CPT | Performed by: ANESTHESIOLOGY

## 2019-06-04 PROCEDURE — 87206 SMEAR FLUORESCENT/ACID STAI: CPT | Performed by: ORTHOPAEDIC SURGERY

## 2019-06-04 PROCEDURE — 87176 TISSUE HOMOGENIZATION CULTR: CPT | Performed by: ORTHOPAEDIC SURGERY

## 2019-06-04 PROCEDURE — 25010000002 ERTAPENEM PER 500 MG: Performed by: ORTHOPAEDIC SURGERY

## 2019-06-04 PROCEDURE — 0Y6H0Z1 DETACHMENT AT RIGHT LOWER LEG, HIGH, OPEN APPROACH: ICD-10-PCS | Performed by: ORTHOPAEDIC SURGERY

## 2019-06-04 PROCEDURE — 25010000002 ROPIVACAINE PER 1 MG: Performed by: NURSE ANESTHETIST, CERTIFIED REGISTERED

## 2019-06-04 PROCEDURE — 87075 CULTR BACTERIA EXCEPT BLOOD: CPT | Performed by: ORTHOPAEDIC SURGERY

## 2019-06-04 PROCEDURE — 63710000001 DIPHENHYDRAMINE PER 50 MG: Performed by: ORTHOPAEDIC SURGERY

## 2019-06-04 PROCEDURE — 27880 AMPUTATION OF LOWER LEG: CPT | Performed by: ORTHOPAEDIC SURGERY

## 2019-06-04 PROCEDURE — A9270 NON-COVERED ITEM OR SERVICE: HCPCS | Performed by: NURSE PRACTITIONER

## 2019-06-04 PROCEDURE — 94640 AIRWAY INHALATION TREATMENT: CPT

## 2019-06-04 PROCEDURE — 63710000001 INSULIN DETEMIR PER 5 UNITS: Performed by: NURSE PRACTITIONER

## 2019-06-04 PROCEDURE — 25010000002 FENTANYL CITRATE (PF) 100 MCG/2ML SOLUTION: Performed by: NURSE ANESTHETIST, CERTIFIED REGISTERED

## 2019-06-04 PROCEDURE — 25010000002 ONDANSETRON PER 1 MG: Performed by: NURSE ANESTHETIST, CERTIFIED REGISTERED

## 2019-06-04 PROCEDURE — 87205 SMEAR GRAM STAIN: CPT | Performed by: ORTHOPAEDIC SURGERY

## 2019-06-04 PROCEDURE — 87070 CULTURE OTHR SPECIMN AEROBIC: CPT | Performed by: ORTHOPAEDIC SURGERY

## 2019-06-04 PROCEDURE — 82962 GLUCOSE BLOOD TEST: CPT

## 2019-06-04 PROCEDURE — 94799 UNLISTED PULMONARY SVC/PX: CPT

## 2019-06-04 PROCEDURE — 25010000002 PROPOFOL 10 MG/ML EMULSION: Performed by: NURSE ANESTHETIST, CERTIFIED REGISTERED

## 2019-06-04 PROCEDURE — 87116 MYCOBACTERIA CULTURE: CPT | Performed by: ORTHOPAEDIC SURGERY

## 2019-06-04 PROCEDURE — 87102 FUNGUS ISOLATION CULTURE: CPT | Performed by: ORTHOPAEDIC SURGERY

## 2019-06-04 PROCEDURE — 25010000002 DAPTOMYCIN PER 1 MG: Performed by: ORTHOPAEDIC SURGERY

## 2019-06-04 PROCEDURE — 63710000001 INSULIN LISPRO (HUMAN) PER 5 UNITS: Performed by: NURSE PRACTITIONER

## 2019-06-04 PROCEDURE — 88311 DECALCIFY TISSUE: CPT | Performed by: ORTHOPAEDIC SURGERY

## 2019-06-04 PROCEDURE — 89060 EXAM SYNOVIAL FLUID CRYSTALS: CPT | Performed by: ORTHOPAEDIC SURGERY

## 2019-06-04 PROCEDURE — 80048 BASIC METABOLIC PNL TOTAL CA: CPT | Performed by: ANESTHESIOLOGY

## 2019-06-04 PROCEDURE — 25010000002 DEXAMETHASONE PER 1 MG: Performed by: NURSE ANESTHETIST, CERTIFIED REGISTERED

## 2019-06-04 PROCEDURE — 88307 TISSUE EXAM BY PATHOLOGIST: CPT | Performed by: ORTHOPAEDIC SURGERY

## 2019-06-04 RX ORDER — ONDANSETRON 2 MG/ML
INJECTION INTRAMUSCULAR; INTRAVENOUS AS NEEDED
Status: DISCONTINUED | OUTPATIENT
Start: 2019-06-04 | End: 2019-06-04 | Stop reason: SURG

## 2019-06-04 RX ORDER — PROMETHAZINE HYDROCHLORIDE 25 MG/ML
12.5 INJECTION, SOLUTION INTRAMUSCULAR; INTRAVENOUS EVERY 4 HOURS PRN
Status: DISCONTINUED | OUTPATIENT
Start: 2019-06-04 | End: 2019-06-08 | Stop reason: HOSPADM

## 2019-06-04 RX ORDER — PANTOPRAZOLE SODIUM 40 MG/1
40 TABLET, DELAYED RELEASE ORAL
Status: DISCONTINUED | OUTPATIENT
Start: 2019-06-05 | End: 2019-06-08 | Stop reason: HOSPADM

## 2019-06-04 RX ORDER — LABETALOL HYDROCHLORIDE 5 MG/ML
10 INJECTION, SOLUTION INTRAVENOUS EVERY 4 HOURS PRN
Status: DISCONTINUED | OUTPATIENT
Start: 2019-06-04 | End: 2019-06-08 | Stop reason: HOSPADM

## 2019-06-04 RX ORDER — DIPHENHYDRAMINE HYDROCHLORIDE 50 MG/ML
25 INJECTION INTRAMUSCULAR; INTRAVENOUS NIGHTLY PRN
Status: DISCONTINUED | OUTPATIENT
Start: 2019-06-04 | End: 2019-06-08 | Stop reason: HOSPADM

## 2019-06-04 RX ORDER — PROMETHAZINE HYDROCHLORIDE 25 MG/ML
6.25 INJECTION, SOLUTION INTRAMUSCULAR; INTRAVENOUS ONCE AS NEEDED
Status: DISCONTINUED | OUTPATIENT
Start: 2019-06-04 | End: 2019-06-04 | Stop reason: HOSPADM

## 2019-06-04 RX ORDER — NEBIVOLOL 5 MG/1
10 TABLET ORAL
Status: DISCONTINUED | OUTPATIENT
Start: 2019-06-04 | End: 2019-06-04

## 2019-06-04 RX ORDER — FAMOTIDINE 20 MG/1
20 TABLET, FILM COATED ORAL ONCE
Status: COMPLETED | OUTPATIENT
Start: 2019-06-04 | End: 2019-06-04

## 2019-06-04 RX ORDER — SODIUM CHLORIDE, SODIUM LACTATE, POTASSIUM CHLORIDE, CALCIUM CHLORIDE 600; 310; 30; 20 MG/100ML; MG/100ML; MG/100ML; MG/100ML
9 INJECTION, SOLUTION INTRAVENOUS CONTINUOUS
Status: DISCONTINUED | OUTPATIENT
Start: 2019-06-04 | End: 2019-06-08 | Stop reason: HOSPADM

## 2019-06-04 RX ORDER — METAXALONE 800 MG/1
800 TABLET ORAL 3 TIMES DAILY PRN
Status: DISCONTINUED | OUTPATIENT
Start: 2019-06-04 | End: 2019-06-08 | Stop reason: HOSPADM

## 2019-06-04 RX ORDER — LIDOCAINE HYDROCHLORIDE 10 MG/ML
INJECTION, SOLUTION INFILTRATION; PERINEURAL AS NEEDED
Status: DISCONTINUED | OUTPATIENT
Start: 2019-06-04 | End: 2019-06-04 | Stop reason: SURG

## 2019-06-04 RX ORDER — LOSARTAN POTASSIUM 25 MG/1
100 TABLET ORAL DAILY
Status: DISCONTINUED | OUTPATIENT
Start: 2019-06-05 | End: 2019-06-08 | Stop reason: HOSPADM

## 2019-06-04 RX ORDER — NEBIVOLOL 5 MG/1
10 TABLET ORAL
Status: DISCONTINUED | OUTPATIENT
Start: 2019-06-05 | End: 2019-06-08 | Stop reason: HOSPADM

## 2019-06-04 RX ORDER — SODIUM CHLORIDE 0.9 % (FLUSH) 0.9 %
3-10 SYRINGE (ML) INJECTION AS NEEDED
Status: DISCONTINUED | OUTPATIENT
Start: 2019-06-04 | End: 2019-06-04 | Stop reason: HOSPADM

## 2019-06-04 RX ORDER — NALOXONE HCL 0.4 MG/ML
0.4 VIAL (ML) INJECTION
Status: DISCONTINUED | OUTPATIENT
Start: 2019-06-04 | End: 2019-06-08 | Stop reason: HOSPADM

## 2019-06-04 RX ORDER — ROPIVACAINE HYDROCHLORIDE 5 MG/ML
INJECTION, SOLUTION EPIDURAL; INFILTRATION; PERINEURAL AS NEEDED
Status: DISCONTINUED | OUTPATIENT
Start: 2019-06-04 | End: 2019-06-04 | Stop reason: SURG

## 2019-06-04 RX ORDER — CHOLECALCIFEROL (VITAMIN D3) 125 MCG
5 CAPSULE ORAL NIGHTLY PRN
Status: DISCONTINUED | OUTPATIENT
Start: 2019-06-04 | End: 2019-06-08 | Stop reason: HOSPADM

## 2019-06-04 RX ORDER — ATROPINE SULFATE 1 MG/ML
0.5 INJECTION, SOLUTION INTRAMUSCULAR; INTRAVENOUS; SUBCUTANEOUS ONCE AS NEEDED
Status: DISCONTINUED | OUTPATIENT
Start: 2019-06-04 | End: 2019-06-04 | Stop reason: HOSPADM

## 2019-06-04 RX ORDER — DIPHENHYDRAMINE HCL 25 MG
25 CAPSULE ORAL NIGHTLY PRN
Status: DISCONTINUED | OUTPATIENT
Start: 2019-06-04 | End: 2019-06-08 | Stop reason: HOSPADM

## 2019-06-04 RX ORDER — DEXTROSE MONOHYDRATE 25 G/50ML
25 INJECTION, SOLUTION INTRAVENOUS
Status: DISCONTINUED | OUTPATIENT
Start: 2019-06-04 | End: 2019-06-08 | Stop reason: HOSPADM

## 2019-06-04 RX ORDER — PROPOFOL 10 MG/ML
VIAL (ML) INTRAVENOUS AS NEEDED
Status: DISCONTINUED | OUTPATIENT
Start: 2019-06-04 | End: 2019-06-04 | Stop reason: SURG

## 2019-06-04 RX ORDER — SODIUM CHLORIDE 0.9 % (FLUSH) 0.9 %
3 SYRINGE (ML) INJECTION EVERY 12 HOURS SCHEDULED
Status: DISCONTINUED | OUTPATIENT
Start: 2019-06-04 | End: 2019-06-04 | Stop reason: HOSPADM

## 2019-06-04 RX ORDER — NICOTINE POLACRILEX 4 MG
15 LOZENGE BUCCAL
Status: DISCONTINUED | OUTPATIENT
Start: 2019-06-04 | End: 2019-06-08 | Stop reason: HOSPADM

## 2019-06-04 RX ORDER — OXYCODONE HYDROCHLORIDE AND ACETAMINOPHEN 5; 325 MG/1; MG/1
2 TABLET ORAL EVERY 4 HOURS PRN
Status: DISCONTINUED | OUTPATIENT
Start: 2019-06-04 | End: 2019-06-08 | Stop reason: HOSPADM

## 2019-06-04 RX ORDER — CLONIDINE HYDROCHLORIDE 0.1 MG/1
0.1 TABLET ORAL EVERY 12 HOURS SCHEDULED
Status: DISCONTINUED | OUTPATIENT
Start: 2019-06-04 | End: 2019-06-08 | Stop reason: HOSPADM

## 2019-06-04 RX ORDER — LIDOCAINE HYDROCHLORIDE 10 MG/ML
0.5 INJECTION, SOLUTION EPIDURAL; INFILTRATION; INTRACAUDAL; PERINEURAL ONCE AS NEEDED
Status: COMPLETED | OUTPATIENT
Start: 2019-06-04 | End: 2019-06-04

## 2019-06-04 RX ORDER — MAGNESIUM HYDROXIDE 1200 MG/15ML
LIQUID ORAL AS NEEDED
Status: DISCONTINUED | OUTPATIENT
Start: 2019-06-04 | End: 2019-06-04 | Stop reason: HOSPADM

## 2019-06-04 RX ORDER — AMLODIPINE BESYLATE 10 MG/1
10 TABLET ORAL DAILY
Status: DISCONTINUED | OUTPATIENT
Start: 2019-06-04 | End: 2019-06-08 | Stop reason: HOSPADM

## 2019-06-04 RX ORDER — ACETAMINOPHEN 325 MG/1
650 TABLET ORAL EVERY 6 HOURS PRN
Status: DISCONTINUED | OUTPATIENT
Start: 2019-06-04 | End: 2019-06-08 | Stop reason: HOSPADM

## 2019-06-04 RX ORDER — PROMETHAZINE HYDROCHLORIDE 25 MG/1
25 SUPPOSITORY RECTAL ONCE AS NEEDED
Status: DISCONTINUED | OUTPATIENT
Start: 2019-06-04 | End: 2019-06-04 | Stop reason: HOSPADM

## 2019-06-04 RX ORDER — DOCUSATE SODIUM 100 MG/1
100 CAPSULE, LIQUID FILLED ORAL 2 TIMES DAILY PRN
Status: DISCONTINUED | OUTPATIENT
Start: 2019-06-04 | End: 2019-06-08 | Stop reason: HOSPADM

## 2019-06-04 RX ORDER — OXYCODONE HYDROCHLORIDE AND ACETAMINOPHEN 5; 325 MG/1; MG/1
1 TABLET ORAL EVERY 4 HOURS PRN
Status: DISCONTINUED | OUTPATIENT
Start: 2019-06-04 | End: 2019-06-08 | Stop reason: HOSPADM

## 2019-06-04 RX ORDER — METOCLOPRAMIDE 10 MG/1
10 TABLET ORAL
Status: DISCONTINUED | OUTPATIENT
Start: 2019-06-04 | End: 2019-06-08 | Stop reason: HOSPADM

## 2019-06-04 RX ORDER — EPHEDRINE SULFATE 50 MG/ML
5 INJECTION, SOLUTION INTRAVENOUS ONCE AS NEEDED
Status: DISCONTINUED | OUTPATIENT
Start: 2019-06-04 | End: 2019-06-04 | Stop reason: HOSPADM

## 2019-06-04 RX ORDER — FENTANYL CITRATE 50 UG/ML
50 INJECTION, SOLUTION INTRAMUSCULAR; INTRAVENOUS
Status: DISCONTINUED | OUTPATIENT
Start: 2019-06-04 | End: 2019-06-04 | Stop reason: HOSPADM

## 2019-06-04 RX ORDER — HYDROCODONE BITARTRATE AND ACETAMINOPHEN 7.5; 325 MG/1; MG/1
1 TABLET ORAL EVERY 4 HOURS PRN
Status: DISCONTINUED | OUTPATIENT
Start: 2019-06-04 | End: 2019-06-08 | Stop reason: HOSPADM

## 2019-06-04 RX ORDER — PROMETHAZINE HYDROCHLORIDE 25 MG/1
25 TABLET ORAL ONCE AS NEEDED
Status: DISCONTINUED | OUTPATIENT
Start: 2019-06-04 | End: 2019-06-04 | Stop reason: HOSPADM

## 2019-06-04 RX ORDER — BISACODYL 10 MG
10 SUPPOSITORY, RECTAL RECTAL DAILY PRN
Status: DISCONTINUED | OUTPATIENT
Start: 2019-06-04 | End: 2019-06-08 | Stop reason: HOSPADM

## 2019-06-04 RX ORDER — DIPHENOXYLATE HYDROCHLORIDE AND ATROPINE SULFATE 2.5; .025 MG/1; MG/1
1 TABLET ORAL DAILY
Status: DISCONTINUED | OUTPATIENT
Start: 2019-06-05 | End: 2019-06-08 | Stop reason: HOSPADM

## 2019-06-04 RX ORDER — HYDROMORPHONE HYDROCHLORIDE 1 MG/ML
0.5 INJECTION, SOLUTION INTRAMUSCULAR; INTRAVENOUS; SUBCUTANEOUS
Status: DISCONTINUED | OUTPATIENT
Start: 2019-06-04 | End: 2019-06-04 | Stop reason: HOSPADM

## 2019-06-04 RX ORDER — ROPIVACAINE HYDROCHLORIDE 2 MG/ML
8 INJECTION, SOLUTION EPIDURAL; INFILTRATION CONTINUOUS
Status: DISCONTINUED | OUTPATIENT
Start: 2019-06-04 | End: 2019-06-08 | Stop reason: HOSPADM

## 2019-06-04 RX ORDER — SODIUM CHLORIDE AND POTASSIUM CHLORIDE 150; 450 MG/100ML; MG/100ML
75 INJECTION, SOLUTION INTRAVENOUS CONTINUOUS
Status: DISCONTINUED | OUTPATIENT
Start: 2019-06-04 | End: 2019-06-05

## 2019-06-04 RX ORDER — FENTANYL CITRATE 50 UG/ML
INJECTION, SOLUTION INTRAMUSCULAR; INTRAVENOUS AS NEEDED
Status: DISCONTINUED | OUTPATIENT
Start: 2019-06-04 | End: 2019-06-04 | Stop reason: SURG

## 2019-06-04 RX ORDER — BACITRACIN 50000 [IU]/1
INJECTION, POWDER, FOR SOLUTION INTRAMUSCULAR AS NEEDED
Status: DISCONTINUED | OUTPATIENT
Start: 2019-06-04 | End: 2019-06-04 | Stop reason: HOSPADM

## 2019-06-04 RX ORDER — DEXAMETHASONE SODIUM PHOSPHATE 4 MG/ML
INJECTION, SOLUTION INTRA-ARTICULAR; INTRALESIONAL; INTRAMUSCULAR; INTRAVENOUS; SOFT TISSUE AS NEEDED
Status: DISCONTINUED | OUTPATIENT
Start: 2019-06-04 | End: 2019-06-04 | Stop reason: SURG

## 2019-06-04 RX ORDER — ONDANSETRON 2 MG/ML
4 INJECTION INTRAMUSCULAR; INTRAVENOUS EVERY 6 HOURS PRN
Status: DISCONTINUED | OUTPATIENT
Start: 2019-06-04 | End: 2019-06-08 | Stop reason: HOSPADM

## 2019-06-04 RX ORDER — BUDESONIDE AND FORMOTEROL FUMARATE DIHYDRATE 80; 4.5 UG/1; UG/1
2 AEROSOL RESPIRATORY (INHALATION)
Status: DISCONTINUED | OUTPATIENT
Start: 2019-06-04 | End: 2019-06-08 | Stop reason: HOSPADM

## 2019-06-04 RX ORDER — HYDROCODONE BITARTRATE AND ACETAMINOPHEN 7.5; 325 MG/1; MG/1
2 TABLET ORAL EVERY 4 HOURS PRN
Status: DISCONTINUED | OUTPATIENT
Start: 2019-06-04 | End: 2019-06-08 | Stop reason: HOSPADM

## 2019-06-04 RX ADMIN — PROPOFOL 160 MG: 10 INJECTION, EMULSION INTRAVENOUS at 12:17

## 2019-06-04 RX ADMIN — INSULIN LISPRO 6 UNITS: 100 INJECTION, SOLUTION INTRAVENOUS; SUBCUTANEOUS at 20:38

## 2019-06-04 RX ADMIN — ROPIVACAINE HYDROCHLORIDE 8 ML/HR: 2 INJECTION, SOLUTION EPIDURAL; INFILTRATION at 13:55

## 2019-06-04 RX ADMIN — AMLODIPINE BESYLATE 10 MG: 10 TABLET ORAL at 15:39

## 2019-06-04 RX ADMIN — POTASSIUM CHLORIDE AND SODIUM CHLORIDE 75 ML/HR: 450; 150 INJECTION, SOLUTION INTRAVENOUS at 15:40

## 2019-06-04 RX ADMIN — INSULIN LISPRO 6 UNITS: 100 INJECTION, SOLUTION INTRAVENOUS; SUBCUTANEOUS at 17:31

## 2019-06-04 RX ADMIN — INSULIN DETEMIR 30 UNITS: 100 INJECTION, SOLUTION SUBCUTANEOUS at 20:35

## 2019-06-04 RX ADMIN — LIDOCAINE HYDROCHLORIDE 50 MG: 10 INJECTION, SOLUTION INFILTRATION; PERINEURAL at 12:18

## 2019-06-04 RX ADMIN — FAMOTIDINE 20 MG: 20 TABLET, FILM COATED ORAL at 10:27

## 2019-06-04 RX ADMIN — HYDROCODONE BITARTRATE AND ACETAMINOPHEN 1 TABLET: 7.5; 325 TABLET ORAL at 22:00

## 2019-06-04 RX ADMIN — PROPOFOL 50 MG: 10 INJECTION, EMULSION INTRAVENOUS at 12:52

## 2019-06-04 RX ADMIN — DEXAMETHASONE SODIUM PHOSPHATE 8 MG: 4 INJECTION, SOLUTION INTRAMUSCULAR; INTRAVENOUS at 12:18

## 2019-06-04 RX ADMIN — ROPIVACAINE HYDROCHLORIDE 10 ML: 5 INJECTION, SOLUTION EPIDURAL; INFILTRATION; PERINEURAL at 13:28

## 2019-06-04 RX ADMIN — FENTANYL CITRATE 100 MCG: 50 INJECTION, SOLUTION INTRAMUSCULAR; INTRAVENOUS at 13:00

## 2019-06-04 RX ADMIN — LIDOCAINE HYDROCHLORIDE 0.5 ML: 10 INJECTION, SOLUTION EPIDURAL; INFILTRATION; INTRACAUDAL; PERINEURAL at 10:34

## 2019-06-04 RX ADMIN — INSULIN LISPRO 10 UNITS: 100 INJECTION, SOLUTION INTRAVENOUS; SUBCUTANEOUS at 17:32

## 2019-06-04 RX ADMIN — ERTAPENEM SODIUM 1 G: 1 INJECTION, POWDER, LYOPHILIZED, FOR SOLUTION INTRAMUSCULAR; INTRAVENOUS at 11:03

## 2019-06-04 RX ADMIN — DAPTOMYCIN 600 MG: 500 INJECTION, POWDER, LYOPHILIZED, FOR SOLUTION INTRAVENOUS at 11:58

## 2019-06-04 RX ADMIN — MELATONIN TAB 5 MG 5 MG: 5 TAB at 22:01

## 2019-06-04 RX ADMIN — BUDESONIDE AND FORMOTEROL FUMARATE DIHYDRATE 2 PUFF: 80; 4.5 AEROSOL RESPIRATORY (INHALATION) at 20:39

## 2019-06-04 RX ADMIN — METOCLOPRAMIDE HYDROCHLORIDE 10 MG: 10 TABLET ORAL at 17:30

## 2019-06-04 RX ADMIN — METOCLOPRAMIDE HYDROCHLORIDE 10 MG: 10 TABLET ORAL at 20:35

## 2019-06-04 RX ADMIN — DAPTOMYCIN 600 MG: 500 INJECTION, POWDER, LYOPHILIZED, FOR SOLUTION INTRAVENOUS at 12:18

## 2019-06-04 RX ADMIN — CLONIDINE HYDROCHLORIDE 0.1 MG: 0.1 TABLET ORAL at 20:35

## 2019-06-04 RX ADMIN — PROPOFOL 40 MG: 10 INJECTION, EMULSION INTRAVENOUS at 12:51

## 2019-06-04 RX ADMIN — DIPHENHYDRAMINE HYDROCHLORIDE 25 MG: 25 CAPSULE ORAL at 22:00

## 2019-06-04 RX ADMIN — ONDANSETRON 4 MG: 2 INJECTION INTRAMUSCULAR; INTRAVENOUS at 13:31

## 2019-06-04 RX ADMIN — EPHEDRINE SULFATE 10 MG: 50 INJECTION INTRAMUSCULAR; INTRAVENOUS; SUBCUTANEOUS at 13:10

## 2019-06-04 RX ADMIN — SODIUM CHLORIDE, POTASSIUM CHLORIDE, SODIUM LACTATE AND CALCIUM CHLORIDE 9 ML/HR: 600; 310; 30; 20 INJECTION, SOLUTION INTRAVENOUS at 10:34

## 2019-06-04 NOTE — OP NOTE
Operative Report    06/04/19  1:31 PM    Preoperative diagnosis: gangrene right foot/toe    Postoperative diagnosis: Same    Anesthesia: Gen. with blocks for postop pain control    Surgeon: Juju Weber M.D.    Assistant: Francoise MIN, present for the entire procedure including prepping, draping, retraction, closure, dressing.    Operative procedure: Right below-knee amputation    Operative indications: This is an extremely pleasant 74-year-old gentleman with long-standing poorly controlled diabetes with severe neuropathy and severe vascular disease.  He has a gangrene of the fourth toe, and he has a very large gangrenous wound on the dorsal lateral aspect of the right foot.  It occurred when he wore a boot all day while he was fishing.  He has failed to progress in healing.  He has had angiograms which showed severe small vessel disease with no reconstructable vessels.  He has had progression of the gangrene on the toe.  He and his wife and I discussed the options.  Given the extraordinarily poor chances of healing the foot he elected to proceed with below-knee amputation.  At the time of surgery I did not find any sign of infection at the level of amputation.  He had exceedingly calcified vessels that were present in all compartments.    Operative procedure: The patient was taken to the operating room where general anesthesia was induced without difficulty.  He was already on IV antibiotics, he was given preoperative blocks.  The right leg was prepped and draped in the usual sterile fashion with a well-padded tourniquet on the thigh.  The appropriate timeout was called.  The leg was elevated and tourniquet inflated to 350 mmHg, tourniquet time was 24 minutes.  The gangrenous foot had been prepped so as to completely exclude it from the clean portion of the procedure.  The amputation bone cut was marked 10 cm below the tibial tubercle, and the skin flap was marked 1 cm further.  I incised this sharply and  create a posterior L-shaped flap.  The muscles were divided with electrocautery.  The vessels were suture-ligated.  The vessels were extremely heavily calcified.  The nerves were placed on stretch, transected sharply, and allowed to retract proximally.  The tibia was transected with an oscillating saw at 10 cm distal to the tibial tubercle.  Care was taken to create a smooth anterior bevel.  The fibula was transected 1 cm proximal.  The posterior flap was then created bluntly.  The vessels were suture-ligated.  The nerves were placed on stretch transected sharply and allowed to retract proximally.  The posterior muscles were transected at the distal end of the flap.  The sural nerve was identified placed on stretch and at least 10 cm of it was removed sharply and allowed to retract proximally.  Tourniquet was released.  There was good hemostasis.  Cultures of the tibia were sent per routine and there is no signs of infection.  Pulsavac was used to irrigate copiously with antibiotic solution.  The muscle flap was then sutured with creating a myodesis to the tibia and anterior muscle compartments.  Incision was closed with Monocryl and staples.  Incision was dressed sterilely, he was placed in the stump wrapping dressing and had a posterior splint applied.  He was awakened, extubated and transferred to recovery room in stable condition.  Postop plan will be admission for antibiotics and pain control.    Estimated blood loss: 50 cc    Specimens: Cultures and permanent    Drains: None    Complications: None    Juju Weber MD  06/04/19  1:31 PM

## 2019-06-04 NOTE — ANESTHESIA PREPROCEDURE EVALUATION
Anesthesia Evaluation                  Airway   Mallampati: I  TM distance: >3 FB  Neck ROM: full  No difficulty expected  Dental - normal exam     Pulmonary - normal exam   (+) asthma,   Cardiovascular - normal exam    (+) hypertension,       Neuro/Psych  (+) numbness,     GI/Hepatic/Renal/Endo    (+)  GERD,  diabetes mellitus,     Musculoskeletal     Abdominal  - normal exam    Bowel sounds: normal.   Substance History      OB/GYN          Other                        Anesthesia Plan    ASA 3     general   (POPLITEAL CATH/ADDUCTOR BLOCK(MIX) FOR POST OP PAIN)  intravenous induction   Anesthetic plan, all risks, benefits, and alternatives have been provided, discussed and informed consent has been obtained with: patient.    Plan discussed with CRNA.

## 2019-06-04 NOTE — PLAN OF CARE
Problem: Patient Care Overview  Goal: Plan of Care Review  Outcome: Ongoing (interventions implemented as appropriate)   06/04/19 1757   Coping/Psychosocial   Plan of Care Reviewed With patient;spouse   Plan of Care Review   Progress improving   OTHER   Outcome Summary vitals WNL pt room air denies pain right lower extremity up on pillows pt has single picc on rt uper arm for infusions iv cath on left handnerve cath at 8 sites CDI

## 2019-06-04 NOTE — BRIEF OP NOTE
Orthopedics right below knee amputation  Brief Op Note    Amol Aguirre  6/4/2019    Pre-op Diagnosis:   Gangrene (CMS/HCC) [I96]  Skin ulcer of toe of right foot with necrosis of bone (CMS/HCC) [L97.514]  Type 2 diabetes mellitus with diabetic polyneuropathy, without long-term current use of insulin (CMS/HCC) [E11.42]  Vascular calcification [I99.8]  Decreased pulses in feet [R09.89]  Blister (nonthermal), right foot, initial encounter [S90.821A] gangrene, dry, right foot    Post-op Diagnosis:  same       Post-Op Diagnosis Codes:     * Gangrene (CMS/HCC) [I96]     * Skin ulcer of toe of right foot with necrosis of bone (CMS/HCC) [L97.514]     * Type 2 diabetes mellitus with diabetic polyneuropathy, without long-term current use of insulin (CMS/HCC) [E11.42]     * Vascular calcification [I99.8]     * Decreased pulses in feet [R09.89]     * Blister (nonthermal), right foot, initial encounter [S90.821A]    Procedure(s):  right below knee amputation    Surgeon(s):  Juju Weber MD    Anesthesia:  General with Block    Staff:   Circulator: Simon Garcia RN; Farrah Arauz RN; Jerri Mckeon RN  Scrub Person: Elezaar Smith; Debra Nichole  Assistant: Francoise Obrien PA-C    Estimated Blood Loss:50cc      Specimens: culture and permanent      Drains:  none    Complications:  None    Tourniquet:: 24min    Dressing:splint    Disposition:rr stable    Juju Weber MD     Date: 6/4/2019  Time: 1:30 PM

## 2019-06-04 NOTE — PROGRESS NOTES
Orthopedic Foot/Ankle Progress Note    Subjective     Post-Op: right BKA   Systemic or Specific Complaints: some pain, sleepy  Objective     Vital signs in last 24 hours:  Temp:  [97.8 °F (36.6 °C)-98 °F (36.7 °C)] 98 °F (36.7 °C)  Heart Rate:  [57-61] 61  Resp:  [16] 16  BP: (128-138)/(59-71) 138/60    Neurovascular: Right stump dry dressing   Wound: Dry dressing right BKA         Data Review  Lab Results (last 24 hours)     Procedure Component Value Units Date/Time    Tissue Pathology Exam [[id number]] Collected:  06/04/19 1257    Specimen:  Tissue from Leg, Right Updated:  06/04/19 1321    Basic Metabolic Panel [[id number]]  (Abnormal) Collected:  06/04/19 1036    Specimen:  Blood Updated:  06/04/19 1210     Glucose 281 mg/dL      BUN 33 mg/dL      Creatinine 1.36 mg/dL      Sodium 136 mmol/L      Potassium 4.8 mmol/L      Chloride 98 mmol/L      CO2 21.0 mmol/L      Calcium 10.7 mg/dL      eGFR Non African Amer 51 mL/min/1.73      BUN/Creatinine Ratio 24.3     Anion Gap 17.0 mmol/L     Narrative:       GFR Normal >60  Chronic Kidney Disease <60  Kidney Failure <15    CBC & Differential [[id number]] Collected:  06/04/19 1035    Specimen:  Blood Updated:  06/04/19 1048    Narrative:       The following orders were created for panel order CBC & Differential.  Procedure                               Abnormality         Status                     ---------                               -----------         ------                     CBC Auto Differential[[id number]]        Abnormal            Final result                 Please view results for these tests on the individual orders.    CBC Auto Differential [[id number]]  (Abnormal) Collected:  06/04/19 1035    Specimen:  Blood Updated:  06/04/19 1048     WBC 10.15 10*3/mm3      RBC 4.10 10*6/mm3      Hemoglobin 11.1 g/dL      Hematocrit 34.5 %      MCV 84.1 fL      MCH 27.1 pg      MCHC 32.2 g/dL      RDW 13.5 %      RDW-SD 41.7 fl      MPV 9.7 fL      Platelets 492  10*3/mm3      Neutrophil % 72.9 %      Lymphocyte % 16.3 %      Monocyte % 6.0 %      Eosinophil % 4.3 %      Basophil % 0.5 %      Immature Grans % 0.1 %      Neutrophils, Absolute 7.40 10*3/mm3      Lymphocytes, Absolute 1.65 10*3/mm3      Monocytes, Absolute 0.61 10*3/mm3      Eosinophils, Absolute 0.44 10*3/mm3      Basophils, Absolute 0.05 10*3/mm3      Immature Grans, Absolute 0.01 10*3/mm3     POC Glucose Once [[id number]]  (Abnormal) Collected:  06/04/19 1039    Specimen:  Blood Updated:  06/04/19 1042     Glucose 259 mg/dL             Assessment/Plan     Status post- stable, admit for pain control, [facility] [place] placement           Juju Weber MD    Date: 6/4/2019  Time: 2:02 PM

## 2019-06-04 NOTE — CONSULTS
Infectious Disease Consult  Amol Aguirre  1945  [id number]    Date of Consult: 6/4/2019  Admission Date: 6/4/2019    Requesting Provider: Juju Juarez MD  Evaluating Physician: Easton Garcia MD    Chief Complaint: foot infection    Reason for Consultation: s/p BKA    History of present illness:   Patient is a 74 y.o.  Yr old male with history of poorly controlled DM2, venous stasis disease, asthma, HTN. Recent travel to [place] to go fishing then developed ulceration on his foot.  He was out fishing all day and developed swelling and sweaty feet and when he took off his shoe he noticed ulceration on the dorsal aspect of the right foot.  He was seen by his PCP and started on Keflex and Silvadene on 4/12.  Despite antibiotics and wound care the wound progresses.  He was seen by Dr Juarez last week due to mass on his right great toe.  Dr Juarez though the chronic venous stasis, could be contributing to the ulceration.  She is scheduled vascular studies and MRI.  Soon after that visit he presented to [facility] on 4/19with increasing redness and a new ulceration between his third and fourth toes.  MRI done 4/22 with possible early changes in the fourth metatarsal head of osteomyelitis.  Wound culture there grew staph epidermidis and enterococcus. Given daptomycin, linezolid and zosyn.  A1c at [facility] was 10.5      He was transferred to [facility] on 4/22 for further evaluation. Dr Juarez contacted me to help manage antibiotics.    Wound culture grew Klebsiella and Morganella.  He had an aortogram which showed significant right lower extremity peripheral vascular disease however no sufficient targets for revascularization.  He was discharged on daptomycin and ertapenem and I followed him for the next few weeks in clinic.  Initially he seemed to improve however last week he was noted to have acute worsening of the ulceration in his foot which progressed to steve gangrene.    DeGnore evaluated him and recommended BKA which was performed today.     Resting comfortably post-op with family in the room.       Review of Systems  Constitutional-- No Fever, chills or sweats.  Appetite ok  Heent-- No new vision, hearing or throat complaints.  No epistaxis or oral sores.  Denies odynophagia or dysphagia.  No headache, photophobia or neck stiffness.  CV-- No chest pain, palpitation or syncope  Resp-- No SOB/cough/Hemoptysis  GI- No nausea, vomiting, or diarrhea.   -- No dysuria, hematuria, or flank pain.  Denies hesitancy, urgency or flank pain.  Lymph- no swollen lymph nodes in neck/axilla or groin.   Heme- No active bruising or bleeding  MS-- wound infection as above  Neuro-- No acute focal weakness   Skin-- No rashes, lesions, ulcers. No skin breakdown      Past Medical History:   Diagnosis Date   • Acid reflux    • Asthma    • Diabetes (CMS/HCC)    • Hypertension        Past Surgical History:   Procedure Laterality Date   • AORTAGRAM N/A 2019    Procedure: AORTAGRAM WITH OR WITHOUT RUNOFFS;  Surgeon: Carrillo White MD;  Location: [place] [address];  Service: Vascular   • CHOLECYSTECTOMY     • KNEE SURGERY Right     TKA       Social History     Socioeconomic History   • Marital status:      Spouse name: Not on file   • Number of children: Not on file   • Years of education: Not on file   • Highest education level: Not on file   Tobacco Use   • Smoking status: Former Smoker     Types: Cigarettes     Start date:      Last attempt to quit:      Years since quittin.4   • Smokeless tobacco: Never Used   Substance and Sexual Activity   • Alcohol use: No     Frequency: Never   • Drug use: No   • Sexual activity: Defer       family history includes Cancer in his mother; Heart attack in his father; Hypertension in his father and mother.    Allergies   Allergen Reactions   • Chlorhexidine Shortness Of Breath, Itching and Rash     Pt developed a rash, itching, and shortness of  breath after receiving a CHG bath on 4/24/19.   • Latex Itching       Antibiotics:  Anti-Infectives (From admission, onward)    Ordered     Dose/Rate Route Frequency Start Stop    06/04/19 1017  DAPTOmycin (CUBICIN) 600 mg in sodium chloride 0.9 % 50 mL IVPB     Ordering Provider:  Juju Weber MD    600 mg  100 mL/hr over 30 Minutes Intravenous Once 06/04/19 1019 06/04/19 1218    06/04/19 1017  ertapenem (INVanz) 1 g/100 mL 0.9% NS VTB (mbp)     Ordering Provider:  Juju Weber MD    1 g  over 30 Minutes Intravenous Once 06/04/19 1019 06/04/19 1133          Other Medications:  Current Facility-Administered Medications   Medication Dose Route Frequency Provider Last Rate Last Dose   • acetaminophen (TYLENOL) tablet 650 mg  650 mg Oral Q6H PRN Erica Reyez APRN       • amLODIPine (NORVASC) tablet 10 mg  10 mg Oral Daily Erica Reyez APRN   10 mg at 06/04/19 1539   • bisacodyl (DULCOLAX) suppository 10 mg  10 mg Rectal Daily PRN Juju Weber MD       • budesonide-formoterol (SYMBICORT) 80-4.5 MCG/ACT inhaler 2 puff  2 puff Inhalation BID - RT Erica Reyez APRN       • CloNIDine (CATAPRES) tablet 0.1 mg  0.1 mg Oral Q12H Erica Reyez APRN       • dextrose (D50W) 25 g/ 50mL Intravenous Solution 25 g  25 g Intravenous Q15 Min PRN Erica Reyez APRN       • dextrose (GLUTOSE) oral gel 15 g  15 g Oral Q15 Min PRN Erica Reyez APRN       • diphenhydrAMINE (BENADRYL) capsule 25 mg  25 mg Oral Nightly PRN Juju Weber MD        Or   • diphenhydrAMINE (BENADRYL) injection 25 mg  25 mg Intravenous Nightly PRN Juju Weber MD       • docusate sodium (COLACE) capsule 100 mg  100 mg Oral BID PRN Erica Reyez APRN       • [START ON 6/5/2019] enoxaparin (LOVENOX) syringe 40 mg  40 mg Subcutaneous Daily Juju Weber MD       • glucagon (human recombinant) (GLUCAGEN DIAGNOSTIC) injection 1 mg  1 mg Subcutaneous PRN Erica Reyez APRN       • HYDROcodone-acetaminophen  (NORCO) 7.5-325 MG per tablet 1 tablet  1 tablet Oral Q4H PRN Juju Weber MD       • HYDROcodone-acetaminophen (NORCO) 7.5-325 MG per tablet 2 tablet  2 tablet Oral Q4H PRN Juju Weber MD       • HYDROmorphone (DILAUDID) injection 1 mg  1 mg Intravenous Q2H PRN Juju Weber MD        And   • naloxone (NARCAN) injection 0.4 mg  0.4 mg Intravenous Q5 Min PRN Juju Weber MD       • insulin detemir (LEVEMIR) injection 30 Units  30 Units Subcutaneous Nightly Erica Reyez APRN       • insulin lispro (humaLOG) injection 0-9 Units  0-9 Units Subcutaneous 4x Daily With Meals & Nightly Erica Reyez APRN   6 Units at 06/04/19 1731   • insulin lispro (humaLOG) injection 10 Units  10 Units Subcutaneous TID With Meals Erica Reyez APRN   10 Units at 06/04/19 1732   • labetalol (NORMODYNE,TRANDATE) injection 10 mg  10 mg Intravenous Q4H PRN Erica Reyez APRN       • lactated ringers infusion  9 mL/hr Intravenous Continuous Dmitry Gonzalez MD 9 mL/hr at 06/04/19 1034 9 mL/hr at 06/04/19 1034   • [START ON 6/5/2019] losartan (COZAAR) tablet 100 mg  100 mg Oral Daily Erica Reyez APRN       • magnesium hydroxide (MILK OF MAGNESIA) suspension 2400 mg/10mL 10 mL  10 mL Oral Daily PRN Juju Weber MD       • melatonin tablet 5 mg  5 mg Oral Nightly PRN Erica Reyez APRN       • metaxalone (SKELAXIN) tablet 800 mg  800 mg Oral TID PRN Erica Reyez APRN       • metoclopramide (REGLAN) tablet 10 mg  10 mg Oral 4x Daily AC & at Bedtime Erica Reyez APRN   10 mg at 06/04/19 1730   • [START ON 6/5/2019] multivitamin (THERAGRAN) tablet 1 tablet  1 tablet Oral Daily Juju Weber MD       • [START ON 6/5/2019] nebivolol (BYSTOLIC) tablet 10 mg  10 mg Oral Q24H Nghia Ronquillo formerly [facility]       • ondansetron (ZOFRAN) injection 4 mg  4 mg Intravenous Q6H PRN Juju Weber MD       • oxyCODONE-acetaminophen (PERCOCET) 5-325 MG per tablet 1 tablet  1 tablet Oral Q4H PRN Juju Weber,  "MD       • oxyCODONE-acetaminophen (PERCOCET) 5-325 MG per tablet 2 tablet  2 tablet Oral Q4H PRN Juju Weber MD       • [START ON 6/5/2019] pantoprazole (PROTONIX) EC tablet 40 mg  40 mg Oral Q AM Erica Reyez APRN       • promethazine (PHENERGAN) injection 12.5 mg  12.5 mg Intramuscular Q4H PRN Juju Weber MD       • promethazine (PHENERGAN) injection 12.5 mg  12.5 mg Intravenous Q4H PRN Juju Weber MD       • ropivacaine (NAROPIN) 0.2% peripheral nerve cath (moog)  8 mL/hr Peripheral Nerve Continuous Nghia Oliva CRNA 8 mL/hr at 06/04/19 1355 8 mL/hr at 06/04/19 1355   • sodium chloride 0.45 % with KCl 20 mEq/L infusion  75 mL/hr Intravenous Continuous Juju Weber MD 75 mL/hr at 06/04/19 1540 75 mL/hr at 06/04/19 1540   • sodium chloride 0.9 % bolus 500 mL  500 mL Intravenous TID PRN Erica Reyez APRN           Physical Exam:   Vital Signs   /55 (BP Location: Right arm, Patient Position: Lying)   Pulse 62   Temp 98.2 °F (36.8 °C) (Temporal)   Resp 16   Ht 190.5 cm (75\")   Wt 110 kg (242 lb)   SpO2 98%   BMI 30.25 kg/m²     GENERAL: Awake and alert, in no acute distress.   HEENT: Normocephalic, atraumatic.  PERRL. EOMI. No conjunctival injection. No icterus. Oropharynx clear without evidence of thrush or exudate.   NECK: Supple without nuchal rigidity.  HEART: RRR; No murmur.  LUNGS: Clear to auscultation bilaterally without wheezing, rales, rhonchi. Normal respiratory effort. Nonlabored.  ABDOMEN: Soft, nontender, nondistended. Positive bowel sounds. No rebound or guarding.  EXT:  No cyanosis, clubbing or edema.  : Without Mcdonald catheter.  MSK: s/p BKA on the right, bandaged  SKIN: Warm and dry without cutaneous eruptions on Inspection/palpation.    NEURO: Oriented to PPT. No focal deficits on motor/sensory exam at arms/legs.  PSYCHIATRIC: Normal insight and judgement. Cooperative with [facility]    Laboratory Data    Results from last 7 days   Lab Units " 06/04/19  1035 05/30/19  1237   WBC 10*3/mm3 10.15 10.05   HEMOGLOBIN g/dL 11.1* 12.1*   HEMATOCRIT % 34.5* 37.5   PLATELETS 10*3/mm3 492* 612*     Results from last 7 days   Lab Units 06/04/19  1036   SODIUM mmol/L 136   POTASSIUM mmol/L 4.8   CHLORIDE mmol/L 98   CO2 mmol/L 21.0*   BUN mg/dL 33*   CREATININE mg/dL 1.36*   GLUCOSE mg/dL 281*   CALCIUM mg/dL 10.7*     Estimated Creatinine Clearance: 61.9 mL/min (A) (by C-G formula based on SCr of 1.36 mg/dL (H)).  Results from last 7 days   Lab Units 05/30/19  1237   ALK PHOS U/L 170*   BILIRUBIN mg/dL 0.3   ALT (SGPT) U/L 31   AST (SGOT) U/L 25     Results from last 7 days   Lab Units 05/30/19  1237   SED RATE mm/hr 109*     Results from last 7 days   Lab Units 05/30/19  1237   CRP mg/dL 2.60*       Microbiology:      [facility]:  4/19 wound culture: light growth Staph epidermitis and Enterococcus faecalis, (PCN sensitive)  4/19 blood cx negative      [facility]   4/23 wound culture x2: Klebsiella and Morganella     6/4 OR cultures pending      Radiology:  No radiology results for the last 7 days       Impression:   1. Diabetic foot ulceration and acute osteomyelitis of 4th metatarsal, steve gangrene of the foot, s/p BKA on 6/4. Prior cultures with Klebsiella and Morganella at [facility].     2. Severe LE arterial vascular disease, especially in RLE: based on 4/30 aortogram, no targets amenable to revascularization  2. Poorly controlled DM2  3. CKD stage 3  4. Asthma  5. Anaphylaxis to chlorhexidine: improved    PLAN: Thank you for asking us to see Amol Aguirre, I recommend the following:     - f/u pending OR cultures, although I expect these will be negative  - trend CBC, CMP    - continue daptomycin and ertapenem for a few days post-op to ensure good wound healing    - tentative discharge plans for discharge to [facility] per patient. I can follow him there if needed    Easton Garcia MD  6/4/2019

## 2019-06-04 NOTE — INTERVAL H&P NOTE
Pre-Op H&P (See Recent Office Note Attached for Full H&P)    Review of Systems:  General ROS:  no fever, chills, rashes, No change since last office visit  Cardiovascular ROS: no chest pain or dyspnea on exertion.  History of +stress echo with nonobstructive disease on LHC at [facility] 2017 per pt report.  Obtained EKG from 2017 and unchanged EKG today.  Respiratory ROS: no cough, shortness of breath, or wheezing    Meds:    No current facility-administered medications on file prior to encounter.      Current Outpatient Medications on File Prior to Encounter   Medication Sig Dispense Refill   • amLODIPine (NORVASC) 10 MG tablet Take 10 mg by mouth Daily.  0   • aspirin 81 MG EC tablet Take 81 mg by mouth Daily.     • budesonide-formoterol (SYMBICORT) 80-4.5 MCG/ACT inhaler Inhale 2 puffs 2 (Two) Times a Day.     • BYSTOLIC 10 MG tablet TK 1/2 T PO BID  7   • CloNIDine (CATAPRES) 0.1 MG tablet Take 0.1 mg by mouth 2 (Two) Times a Day.     • DICLOFENAC PO Take  by mouth.     • hydrochlorothiazide (HYDRODIURIL) 25 MG tablet Take  by mouth Daily.  3   • Insulin Glargine (TOUJEO SOLOSTAR) 300 UNIT/ML solution pen-injector Inject  under the skin into the appropriate area as directed.     • losartan (COZAAR) 100 MG tablet Take 100 mg by mouth Daily.  3   • metaxalone (SKELAXIN) 800 MG tablet Take 1 tablet by mouth 3 (Three) Times a Day As Needed for Muscle Spasms. 30 tablet 0   • metoclopramide (REGLAN) 10 MG tablet TK 1 T PO BEFORE MEALS AND QHS  8   • pantoprazole (PROTONIX) 40 MG EC tablet Take 40 mg by mouth Daily.  2   • silver sulfadiazine (SILVADENE, SSD) 1 % cream Apply  topically to the appropriate area as directed Daily.     • vitamin B-12 (CYANOCOBALAMIN) 1000 MCG tablet Take 1,000 mcg by mouth Daily.     • docusate sodium 100 MG capsule Take 100 mg by mouth 2 (Two) Times a Day As Needed for Constipation. 60 each 0   • mupirocin (BACTROBAN) 2 % ointment Apply  topically to the appropriate area as directed Every 12  "(Twelve) Hours.         Vital Signs:  /71 (BP Location: Right arm, Patient Position: Lying)   Pulse 61   Temp 97.8 °F (36.6 °C) (Tympanic)   Resp 16   Ht 190.5 cm (75\")   Wt 110 kg (242 lb)   SpO2 98%   BMI 30.25 kg/m²     Physical Exam:    CV:  S1S2 regular rate and rhythm, no murmur               Resp:  Clear to auscultation; respirations regular, even and unlabored    Results Review:     Lab Results   Component Value Date    WBC 10.15 06/04/2019    HGB 11.1 (L) 06/04/2019    HCT 34.5 (L) 06/04/2019    MCV 84.1 06/04/2019     (H) 06/04/2019    NEUTROABS 7.40 (H) 06/04/2019    GLUCOSE 339 (H) 05/30/2019    BUN 28 (H) 05/30/2019    CREATININE 1.56 (H) 05/30/2019    EGFRIFNONA 43 (L) 05/30/2019     (L) 05/30/2019    K 5.2 05/30/2019    CL 97 (L) 05/30/2019    CO2 24.0 05/30/2019    PHOS 3.3 04/29/2019    CALCIUM 11.0 (H) 05/30/2019    ALBUMIN 3.70 05/30/2019    AST 25 05/30/2019    ALT 31 05/30/2019    BILITOT 0.3 05/30/2019        I reviewed the patient's new clinical results.    Cancer Staging (if applicable)  Cancer Patient: __ yes _x_no __unknown; If yes, clinical stage T:__ N:__M:__, stage group or __N/A    Assessment/Plan:    1. Gangrene (CMS/HCC)  The gangrene on the toe has progressed, the toe is thinner, desiccated now.  The very large wound on the dorsal foot has made no progress.  He and his wife and I discussed this in detail.  He is understandably sad, but he would like to proceed with below knee amputation.  I think this is the most reasonable plan.  A simple toe amputation will not heal - the toe is necrotic into the MTPJ now, and given the lack of progress of the very large dorsal wound.  We discussed the risks including but not limited to: death, infection, neurovascular damage, strokes, heart attacks, blood clots, chronic pain, deformity, stiffness, need for  further surgery,  Higher amputation, etc.  Questions asked and answered in detail.  We dicussed going to rehab after " to get stronger.      2. Skin ulcer of toe of right foot with necrosis of bone (CMS/HCC)  See above    3. Type 2 diabetes mellitus with diabetic polyneuropathy, without long-term current use of insulin (CMS/HCC)  Will re-check HGA1c    4. Vascular calcification  Severe small vessel disease    5. Decreased pulses in feet  Severe disease, no reconstruction    6. Blister (nonthermal), right foot, initial encounter  No progress inhealing    I did knee xray to look at total knee prosthesis, we will be able to do a standard BKA below the hardware    VIPUL Lugo  6/4/2019   11:14 AM

## 2019-06-04 NOTE — ANESTHESIA POSTPROCEDURE EVALUATION
Patient: Amol Aguirre    Procedure Summary     Date:  06/04/19 Room / Location:   [facility]  [facility]    Anesthesia Start:  1213 Anesthesia Stop:  1342    Procedure:  right below knee amputation (Right Leg Lower) Diagnosis:       Gangrene (CMS/HCC)      Skin ulcer of toe of right foot with necrosis of bone (CMS/HCC)      Type 2 diabetes mellitus with diabetic polyneuropathy, without long-term current use of insulin (CMS/HCC)      Vascular calcification      Decreased pulses in feet      Blister (nonthermal), right foot, initial encounter      (Gangrene (CMS/HCC) [I96])      (Skin ulcer of toe of right foot with necrosis of bone (CMS/HCC) [L97.514])      (Type 2 diabetes mellitus with diabetic polyneuropathy, without long-term current use of insulin (CMS/HCC) [E11.42])      (Vascular calcification [I99.8])      (Decreased pulses in feet [R09.89])      (Blister (nonthermal), right foot, initial encounter [S90.821A])    Surgeon:  Juju Weber MD Provider:  Cy Whiting MD    Anesthesia Type:  general ASA Status:  3          Anesthesia Type: general  Last vitals  BP   128/71 (06/04/19 1048)   Temp   97.8 °F (36.6 °C) (06/04/19 1048)   Pulse   61 (06/04/19 1048)   Resp   16 (06/04/19 1048)     SpO2   98 % (06/04/19 1048)     Post Anesthesia Care and Evaluation    Patient location during evaluation: PACU  Patient participation: complete - patient participated  Level of consciousness: awake and alert  Pain score: 0  Pain management: adequate  Airway patency: patent  Anesthetic complications: No anesthetic complications  PONV Status: none  Cardiovascular status: hemodynamically stable and acceptable  Respiratory status: nonlabored ventilation, acceptable and nasal cannula  Hydration status: acceptable

## 2019-06-04 NOTE — PLAN OF CARE
Problem: Pain, Acute (Adult)  Goal: Identify Related Risk Factors and Signs and Symptoms  Outcome: Ongoing (interventions implemented as appropriate)   06/04/19 3467   Pain, Acute (Adult)   Related Risk Factors (Acute Pain) procedure/treatment   Signs and Symptoms (Acute Pain) alteration in muscle tone

## 2019-06-04 NOTE — ANESTHESIA PROCEDURE NOTES
Airway  Urgency: elective    Airway not difficult    General Information and Staff    Patient location during procedure: OR  Anesthesiologist: Tra Anderson MD    Indications and Patient Condition  Indications for airway management: airway protection    Preoxygenated: yes  Mask difficulty assessment: 1 - vent by mask    Final Airway Details  Final airway type: supraglottic airway      Successful airway: classic  Size 5    Number of attempts at approach: 1    Additional Comments  LMA placed without difficulty, ventilation with assist, equal breath sounds and symmetric chest rise and fall

## 2019-06-04 NOTE — H&P
Patient Name: Amol Aguirre  MRN: [id number]  : 1945  DOS: 2019    Attending: Juju Juarez MD    Primary Care Provider: Donte Briones MD      Chief complaint:  gangrene right foot/toe    Subjective   Patient is a 74 y.o. male presented for Right below-knee amputation by Dr. Juarez.    He underwent surgery under GA. He tolerated surgery well and is admitted for further medical management.    He is known to us from previous admission 19 for DM ulcer.     From previous admission: (((CT surgery was consulted to evaluation vascular supply to lower extremities. He had an anaphylactic reaction to the skin prep for his procedure with acute onset of chest pressure, hives and tongue swelling. This resolved with Benadryl. His procedure was postponed. He also had elevated creatinine further delaying his procedure. After cleared by nephrology, he underwent aortogram with run-off. Unfortunately, he was found to have severe RLE arterial occulusion with no targets amenable to revascularization.   [facility], Dr. Garcia was consulted for antibiotic management. He received IV antibiotics while inpatient. He will be discharged with a PICC line and IV antibiotics to be infused at home. He will follow-up outpatient.   Nephrology was consulted due to increased creatinine and clearance for procedure with contrast. He was given IVF and mucomyst prior to procedure for renal protection.  Patient has stage III CKD that is stable currently.)))     Per Dr. Juarez's note: ((This is an extremely pleasant 74-year-old gentleman with long-standing poorly controlled diabetes with severe neuropathy and severe vascular disease.  He has a gangrene of the fourth toe, and he has a very large gangrenous wound on the dorsal lateral aspect of the right foot.  It occurred when he wore a boot all day while he was fishing.  He has failed to progress in healing.  He has had angiograms which showed severe small vessel disease  with no reconstructable vessels.  He has had progression of the gangrene on the toe.  He and his wife and I discussed the options.  Given the extraordinarily poor chances of healing the foot he elected to proceed with below-knee amputation.  At the time of surgery I did not find any sign of infection at the level of amputation.  He had exceedingly calcified vessels that were present in all compartments.))    When seen postop he is doing well. His pain is well controlled. He denies nausea, shortness of breath or chest pain. No hx of DVT or PE.      Allergies:  Allergies   Allergen Reactions   • Chlorhexidine Shortness Of Breath, Itching and Rash     Pt developed a rash, itching, and shortness of breath after receiving a CHG bath on 4/24/19.   • Latex Itching       Meds:  Medications Prior to Admission   Medication Sig Dispense Refill Last Dose   • amLODIPine (NORVASC) 10 MG tablet Take 10 mg by mouth Daily.  0 6/4/2019 at 0730   • aspirin 81 MG EC tablet Take 81 mg by mouth Daily.   6/3/2019 at 193   • budesonide-formoterol (SYMBICORT) 80-4.5 MCG/ACT inhaler Inhale 2 puffs 2 (Two) Times a Day.   Past Week at Unknown time   • BYSTOLIC 10 MG tablet TK 1/2 T PO BID  7 6/4/2019 at 0730   • CloNIDine (CATAPRES) 0.1 MG tablet Take 0.1 mg by mouth 2 (Two) Times a Day.   6/4/2019 at 0730   • DICLOFENAC PO Take  by mouth.   Past Week at Unknown time   • hydrochlorothiazide (HYDRODIURIL) 25 MG tablet Take  by mouth Daily.  3 6/3/2019 at 0730   • HYDROcodone-acetaminophen (NORCO) 7.5-325 MG per tablet Take 1-2 tablets by mouth Every 6 (Six) Hours As Needed for Moderate Pain  (as needed for pain). 60 tablet 0 6/3/2019 at 1930   • Insulin Glargine (TOUJEO SOLOSTAR) 300 UNIT/ML solution pen-injector Inject  under the skin into the appropriate area as directed.   6/3/2019 at 1930   • losartan (COZAAR) 100 MG tablet Take 100 mg by mouth Daily.  3 6/4/2019 at 0730   • metaxalone (SKELAXIN) 800 MG tablet Take 1 tablet by mouth 3  (Three) Times a Day As Needed for Muscle Spasms. 30 tablet 0 Past Month at Unknown time   • metoclopramide (REGLAN) 10 MG tablet TK 1 T PO BEFORE MEALS AND QHS  8 6/3/2019 at 1930   • pantoprazole (PROTONIX) 40 MG EC tablet Take 40 mg by mouth Daily.  2 6/3/2019 at 0730   • silver sulfadiazine (SILVADENE, SSD) 1 % cream Apply  topically to the appropriate area as directed Daily.   6/3/2019 at 1930   • vitamin B-12 (CYANOCOBALAMIN) 1000 MCG tablet Take 1,000 mcg by mouth Daily.   6/3/2019 at 0730   • docusate sodium 100 MG capsule Take 100 mg by mouth 2 (Two) Times a Day As Needed for Constipation. 60 each 0 never used   • mupirocin (BACTROBAN) 2 % ointment Apply  topically to the appropriate area as directed Every 12 (Twelve) Hours.   not used   • ondansetron (ZOFRAN) 4 MG tablet Take 1 tablet by mouth Every 6 (Six) Hours As Needed for Nausea or Vomiting. 30 tablet 0 not used   • oxyCODONE-acetaminophen (PERCOCET) 5-325 MG per tablet Take 1-2 tablets by mouth Every 6 (Six) Hours As Needed for Severe Pain  (as needed for severe pain). 60 tablet 0 not used       History:   Past Medical History:   Diagnosis Date   • Acid reflux    • Asthma    • Diabetes (CMS/HCC)    • Hypertension      Past Surgical History:   Procedure Laterality Date   • AORTAGRAM N/A 2019    Procedure: AORTAGRAM WITH OR WITHOUT RUNOFFS;  Surgeon: Carrillo White MD;  Location: [place] [address];  Service: Vascular   • CHOLECYSTECTOMY     • KNEE SURGERY Right     TKA     Family History   Problem Relation Age of Onset   • Cancer Mother    • Hypertension Mother    • Hypertension Father    • Heart attack Father      Social History     Tobacco Use   • Smoking status: Former Smoker     Types: Cigarettes     Start date:      Last attempt to quit: 1975     Years since quittin.4   • Smokeless tobacco: Never Used   Substance Use Topics   • Alcohol use: No     Frequency: Never   • Drug use: No   He is  with no children. 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    Review of Systems  Pertinent items are noted in HPI, all other systems reviewed and negative    Vital Signs  Visit Vitals  /55 (BP Location: Right arm, Patient Position: Lying)   Pulse 62   Temp 98.2 °F (36.8 °C) (Temporal)   Resp 16   Ht 190.5 cm (75\")   Wt 110 kg (242 lb)   SpO2 98%   BMI 30.25 kg/m²       Physical Exam:    General Appearance:    Alert, cooperative, in no acute distress   Head:    Normocephalic, without obvious abnormality, atraumatic   Eyes:            Lids and lashes normal, conjunctivae and sclerae normal, no   icterus, no pallor, corneas clear   Ears:    Ears appear intact with no abnormalities noted   Neck:   No adenopathy, supple, trachea midline, no thyromegaly   Lungs:     Clear to auscultation,respirations regular, even and                   unlabored    Heart:    Regular rhythm and normal rate, normal S1 and S2, no            murmur, no gallop   Abdomen:     Normal bowel sounds, no masses, no organomegaly, soft        non-tender, non-distended, no guarding, no rebound                 tenderness   Genitalia:    Deferred   Extremities:   Right stump dressing CDI. Nerve block present   Pulses:   Pulses palpable and equal bilaterally   Skin:   No bleeding, bruising or rash   Neurologic:   Cranial nerves 2 - 12 grossly intact, sensation intact      I reviewed the patient's new clinical results.       Results from last 7 days   Lab Units 06/04/19  1035 05/30/19  1237   WBC 10*3/mm3 10.15 10.05   HEMOGLOBIN g/dL 11.1* 12.1*   HEMATOCRIT % 34.5* 37.5   PLATELETS 10*3/mm3 492* 612*     Results from last 7 days   Lab Units 06/04/19  1036 05/30/19  1237   SODIUM mmol/L 136 132*   POTASSIUM mmol/L 4.8 5.2   CHLORIDE mmol/L 98 97*   CO2 mmol/L 21.0* 24.0   BUN mg/dL 33* 28*   CREATININE mg/dL 1.36* 1.56*   CALCIUM mg/dL 10.7* 11.0*   BILIRUBIN mg/dL  --  0.3   ALK PHOS U/L  --  170*   ALT (SGPT) U/L  --  31   AST (SGOT) U/L  --  25   GLUCOSE mg/dL 281* 339*     Lab Results "   Component Value Date    HGBA1C 6.5 (H) 08/18/2014       Assessment and Plan:     S/P BKA (below knee amputation), right (CMS/HCC)    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 PT/OT- RLE NWB  2. Pain control-prns, popliteal nerve block   3. IS-encourage  4. DVT proph- mechs/Lovenox  5. Bowel regimen  6. Resume home medications as appropriate  7. Monitor post-op labs  8. Discharge planning for [facility]. CM consulted    Consult [facility]    HTN  - Continue home norvasc, bystolic, catapres, cozaar  - Hold HCTZ  - Monitor BP   - Holding parameters for BP meds  - Labetalol PRN for SBP>170    DM  - hgb A1c in AM  - Levemir nightly  - Mealtime bolus with holding paramenters  - Accu-Check AC and HS with moderate dose SSI    CKD  - BMP in AM      VIPUL Alvarez  06/04/19  3:07 PM

## 2019-06-05 PROBLEM — E87.5 HYPERKALEMIA: Status: ACTIVE | Noted: 2019-06-05

## 2019-06-05 PROBLEM — D72.829 LEUKOCYTOSIS: Status: ACTIVE | Noted: 2019-06-05

## 2019-06-05 LAB
ANION GAP SERPL CALCULATED.3IONS-SCNC: 14 MMOL/L
BUN BLD-MCNC: 38 MG/DL (ref 8–23)
BUN/CREAT SERPL: 25.9 (ref 7–25)
CALCIUM SPEC-SCNC: 9.4 MG/DL (ref 8.6–10.5)
CHLORIDE SERPL-SCNC: 100 MMOL/L (ref 98–107)
CO2 SERPL-SCNC: 18 MMOL/L (ref 22–29)
CREAT BLD-MCNC: 1.47 MG/DL (ref 0.76–1.27)
DEPRECATED RDW RBC AUTO: 41.2 FL (ref 37–54)
ERYTHROCYTE [DISTWIDTH] IN BLOOD BY AUTOMATED COUNT: 13.6 % (ref 12.3–15.4)
GFR SERPL CREATININE-BSD FRML MDRD: 47 ML/MIN/1.73
GLUCOSE BLD-MCNC: 266 MG/DL (ref 65–99)
GLUCOSE BLDC GLUCOMTR-MCNC: 208 MG/DL (ref 70–130)
GLUCOSE BLDC GLUCOMTR-MCNC: 230 MG/DL (ref 70–130)
GLUCOSE BLDC GLUCOMTR-MCNC: 242 MG/DL (ref 70–130)
GLUCOSE BLDC GLUCOMTR-MCNC: 247 MG/DL (ref 70–130)
HBA1C MFR BLD: 9.8 % (ref 4.8–5.6)
HCT VFR BLD AUTO: 30.6 % (ref 37.5–51)
HGB BLD-MCNC: 10.1 G/DL (ref 13–17.7)
MCH RBC QN AUTO: 27.6 PG (ref 26.6–33)
MCHC RBC AUTO-ENTMCNC: 33 G/DL (ref 31.5–35.7)
MCV RBC AUTO: 83.6 FL (ref 79–97)
PLATELET # BLD AUTO: 421 10*3/MM3 (ref 140–450)
PMV BLD AUTO: 9.6 FL (ref 6–12)
POTASSIUM BLD-SCNC: 5.5 MMOL/L (ref 3.5–5.2)
RBC # BLD AUTO: 3.66 10*6/MM3 (ref 4.14–5.8)
SODIUM BLD-SCNC: 132 MMOL/L (ref 136–145)
WBC NRBC COR # BLD: 14.07 10*3/MM3 (ref 3.4–10.8)

## 2019-06-05 PROCEDURE — 83036 HEMOGLOBIN GLYCOSYLATED A1C: CPT | Performed by: NURSE PRACTITIONER

## 2019-06-05 PROCEDURE — 82962 GLUCOSE BLOOD TEST: CPT

## 2019-06-05 PROCEDURE — G0378 HOSPITAL OBSERVATION PER HR: HCPCS

## 2019-06-05 PROCEDURE — 94799 UNLISTED PULMONARY SVC/PX: CPT

## 2019-06-05 PROCEDURE — 25010000002 DAPTOMYCIN PER 1 MG: Performed by: INTERNAL MEDICINE

## 2019-06-05 PROCEDURE — 97162 PT EVAL MOD COMPLEX 30 MIN: CPT

## 2019-06-05 PROCEDURE — 85027 COMPLETE CBC AUTOMATED: CPT | Performed by: ORTHOPAEDIC SURGERY

## 2019-06-05 PROCEDURE — 63710000001 DIPHENHYDRAMINE PER 50 MG: Performed by: ORTHOPAEDIC SURGERY

## 2019-06-05 PROCEDURE — 63710000001 INSULIN DETEMIR PER 5 UNITS: Performed by: INTERNAL MEDICINE

## 2019-06-05 PROCEDURE — 97110 THERAPEUTIC EXERCISES: CPT

## 2019-06-05 PROCEDURE — 63710000001 INSULIN LISPRO (HUMAN) PER 5 UNITS: Performed by: NURSE PRACTITIONER

## 2019-06-05 PROCEDURE — 25010000002 ROPIVACAINE PER 1 MG: Performed by: NURSE ANESTHETIST, CERTIFIED REGISTERED

## 2019-06-05 PROCEDURE — 25010000002 ENOXAPARIN PER 10 MG: Performed by: ORTHOPAEDIC SURGERY

## 2019-06-05 PROCEDURE — 80048 BASIC METABOLIC PNL TOTAL CA: CPT | Performed by: ORTHOPAEDIC SURGERY

## 2019-06-05 PROCEDURE — 25010000002 ERTAPENEM PER 500 MG: Performed by: INTERNAL MEDICINE

## 2019-06-05 PROCEDURE — 99024 POSTOP FOLLOW-UP VISIT: CPT | Performed by: ORTHOPAEDIC SURGERY

## 2019-06-05 RX ORDER — SODIUM CHLORIDE 9 MG/ML
50 INJECTION, SOLUTION INTRAVENOUS CONTINUOUS
Status: DISCONTINUED | OUTPATIENT
Start: 2019-06-05 | End: 2019-06-08 | Stop reason: HOSPADM

## 2019-06-05 RX ADMIN — CLONIDINE HYDROCHLORIDE 0.1 MG: 0.1 TABLET ORAL at 20:42

## 2019-06-05 RX ADMIN — MUPIROCIN: 20 OINTMENT TOPICAL at 10:19

## 2019-06-05 RX ADMIN — INSULIN LISPRO 4 UNITS: 100 INJECTION, SOLUTION INTRAVENOUS; SUBCUTANEOUS at 17:35

## 2019-06-05 RX ADMIN — INSULIN LISPRO 4 UNITS: 100 INJECTION, SOLUTION INTRAVENOUS; SUBCUTANEOUS at 20:42

## 2019-06-05 RX ADMIN — PANTOPRAZOLE SODIUM 40 MG: 40 TABLET, DELAYED RELEASE ORAL at 06:24

## 2019-06-05 RX ADMIN — NEBIVOLOL HYDROCHLORIDE 10 MG: 5 TABLET ORAL at 08:02

## 2019-06-05 RX ADMIN — LOSARTAN POTASSIUM 100 MG: 25 TABLET, FILM COATED ORAL at 08:02

## 2019-06-05 RX ADMIN — INSULIN LISPRO 10 UNITS: 100 INJECTION, SOLUTION INTRAVENOUS; SUBCUTANEOUS at 08:03

## 2019-06-05 RX ADMIN — DIPHENHYDRAMINE HYDROCHLORIDE 25 MG: 25 CAPSULE ORAL at 20:49

## 2019-06-05 RX ADMIN — INSULIN LISPRO 10 UNITS: 100 INJECTION, SOLUTION INTRAVENOUS; SUBCUTANEOUS at 11:45

## 2019-06-05 RX ADMIN — INSULIN LISPRO 4 UNITS: 100 INJECTION, SOLUTION INTRAVENOUS; SUBCUTANEOUS at 11:43

## 2019-06-05 RX ADMIN — ROPIVACAINE HYDROCHLORIDE 8 ML/HR: 2 INJECTION, SOLUTION EPIDURAL; INFILTRATION at 12:40

## 2019-06-05 RX ADMIN — METOCLOPRAMIDE HYDROCHLORIDE 10 MG: 10 TABLET ORAL at 11:49

## 2019-06-05 RX ADMIN — BUDESONIDE AND FORMOTEROL FUMARATE DIHYDRATE 2 PUFF: 80; 4.5 AEROSOL RESPIRATORY (INHALATION) at 20:25

## 2019-06-05 RX ADMIN — DAPTOMYCIN 550 MG: 500 INJECTION, POWDER, LYOPHILIZED, FOR SOLUTION INTRAVENOUS at 11:41

## 2019-06-05 RX ADMIN — AMLODIPINE BESYLATE 10 MG: 10 TABLET ORAL at 08:02

## 2019-06-05 RX ADMIN — INSULIN DETEMIR 15 UNITS: 100 INJECTION, SOLUTION SUBCUTANEOUS at 11:42

## 2019-06-05 RX ADMIN — METOCLOPRAMIDE HYDROCHLORIDE 10 MG: 10 TABLET ORAL at 20:42

## 2019-06-05 RX ADMIN — BUDESONIDE AND FORMOTEROL FUMARATE DIHYDRATE 2 PUFF: 80; 4.5 AEROSOL RESPIRATORY (INHALATION) at 07:31

## 2019-06-05 RX ADMIN — Medication 1 TABLET: at 08:02

## 2019-06-05 RX ADMIN — INSULIN LISPRO 4 UNITS: 100 INJECTION, SOLUTION INTRAVENOUS; SUBCUTANEOUS at 08:03

## 2019-06-05 RX ADMIN — SODIUM CHLORIDE 50 ML/HR: 9 INJECTION, SOLUTION INTRAVENOUS at 10:19

## 2019-06-05 RX ADMIN — INSULIN DETEMIR 35 UNITS: 100 INJECTION, SOLUTION SUBCUTANEOUS at 20:41

## 2019-06-05 RX ADMIN — MELATONIN TAB 5 MG 5 MG: 5 TAB at 20:49

## 2019-06-05 RX ADMIN — METOCLOPRAMIDE HYDROCHLORIDE 10 MG: 10 TABLET ORAL at 08:01

## 2019-06-05 RX ADMIN — METOCLOPRAMIDE HYDROCHLORIDE 10 MG: 10 TABLET ORAL at 17:34

## 2019-06-05 RX ADMIN — HYDROCODONE BITARTRATE AND ACETAMINOPHEN 1 TABLET: 7.5; 325 TABLET ORAL at 20:49

## 2019-06-05 RX ADMIN — CLONIDINE HYDROCHLORIDE 0.1 MG: 0.1 TABLET ORAL at 08:02

## 2019-06-05 RX ADMIN — ENOXAPARIN SODIUM 40 MG: 40 INJECTION SUBCUTANEOUS at 08:03

## 2019-06-05 RX ADMIN — INSULIN LISPRO 10 UNITS: 100 INJECTION, SOLUTION INTRAVENOUS; SUBCUTANEOUS at 17:34

## 2019-06-05 RX ADMIN — ERTAPENEM SODIUM 1 G: 1 INJECTION, POWDER, LYOPHILIZED, FOR SOLUTION INTRAMUSCULAR; INTRAVENOUS at 10:31

## 2019-06-05 NOTE — PLAN OF CARE
Problem: Patient Care Overview  Goal: Plan of Care Review  Outcome: Ongoing (interventions implemented as appropriate)   06/05/19 0806   Coping/Psychosocial   Plan of Care Reviewed With patient;spouse   OTHER   Outcome Summary Patient demonstrated difficulty controlling his walker. I have concerns about him going home with is wife who may not be able to assist him as he is a large man. Recommend Inpatient Acute Rehab at discharge.

## 2019-06-05 NOTE — PROGRESS NOTES
"IM progress note      Amol Aguirre  [id number]  1945     LOS: 1 day     Attending: Juju Weber MD    Primary Care Provider: Donte Briones MD      Chief Complaint/Reason for visit:  RBKA    Subjective   Doing well. Good pain control. Denies f/c/n/v/sob/cp.  (Noted, patient seen, agree.)wy  Objective     Vital Signs  Visit Vitals  /60 (BP Location: Left arm, Patient Position: Sitting)   Pulse 64   Temp 97.9 °F (36.6 °C) (Oral)   Resp 16   Ht 190.5 cm (75\")   Wt 110 kg (242 lb)   SpO2 95%   BMI 30.25 kg/m²     Temp (24hrs), Av.9 °F (36.6 °C), Min:97.6 °F (36.4 °C), Max:98.3 °F (36.8 °C)      Nutrition: PO    Respiratory: RA    Physical Therapy: Patient demonstrated difficulty controling his walker. I have consernes  about him going home with is wife who may not be able to assist him as he is  a large mas    Physical Exam:     General Appearance:    Alert, cooperative, in no acute distress   Head:    Normocephalic, without obvious abnormality, atraumatic    Lungs:     Normal effort, symmetric chest rise, no crepitus, clear to      auscultation bilaterally             Heart:    Regular rhythm and normal rate, normal S1 and S2   Abdomen:     Normal bowel sounds, no masses, no organomegaly, soft        non-tender, non-distended, no guarding, no rebound                tenderness   Extremities:   Right stump dressing CDI. Nerve block present    Pulses:   Pulses palpable and equal bilaterally   Skin:   No bleeding, bruising or rash   Neurologic:   Moves all extremities with no obvious focal motor deficit.  Cranial nerves 2 - 12 grossly intact     Results Review:     I reviewed the patient's new clinical results.   Results from last 7 days   Lab Units 19  0454 19  1035 19  1237   WBC 10*3/mm3 14.07* 10.15 10.05   HEMOGLOBIN g/dL 10.1* 11.1* 12.1*   HEMATOCRIT % 30.6* 34.5* 37.5   PLATELETS 10*3/mm3 421 492* 612*     Results from last 7 days   Lab Units 19  0454 " 06/04/19  1036 05/30/19  1237   SODIUM mmol/L 132* 136 132*   POTASSIUM mmol/L 5.5* 4.8 5.2   CHLORIDE mmol/L 100 98 97*   CO2 mmol/L 18.0* 21.0* 24.0   BUN mg/dL 38* 33* 28*   CREATININE mg/dL 1.47* 1.36* 1.56*   CALCIUM mg/dL 9.4 10.7* 11.0*   BILIRUBIN mg/dL  --   --  0.3   ALK PHOS U/L  --   --  170*   ALT (SGPT) U/L  --   --  31   AST (SGOT) U/L  --   --  25   GLUCOSE mg/dL 266* 281* 339*     Results for ASAEL MONDRAGON (MRN [id number]) as of 6/5/2019 15:46   Ref. Range 6/4/2019 20:37 6/5/2019 04:54 6/5/2019 07:06 6/5/2019 11:36   Glucose Latest Ref Range: 70 - 130 mg/dL 297 (H) 266 (H) 247 (H) 242 (H)     I reviewed the patient's new imaging including images and reports.    All medications reviewed.     amLODIPine 10 mg Oral Daily   budesonide-formoterol 2 puff Inhalation BID - RT   CloNIDine 0.1 mg Oral Q12H   DAPTOmycin 6 mg/kg (Adjusted) Intravenous Q24H   enoxaparin 40 mg Subcutaneous Daily   ertapenem 1 g Intravenous Q24H   insulin detemir 30 Units Subcutaneous Nightly   insulin lispro 0-9 Units Subcutaneous 4x Daily With Meals & Nightly   insulin lispro 10 Units Subcutaneous TID With Meals   losartan 100 mg Oral Daily   metoclopramide 10 mg Oral 4x Daily AC & at Bedtime   multivitamin 1 tablet Oral Daily   mupirocin  Topical Q24H   nebivolol 10 mg Oral Q24H   pantoprazole 40 mg Oral Q AM       Assessment/Plan     S/P BKA (below knee amputation), right (CMS/HCC)    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 PT/OT- NWB RLE  2. Pain control-prns, popliteal nerve block   3. IS-encouraged  4. DVT proph- mechs/Lovenox  5. Bowel regimen  6. Monitor post-op labs  7. DC planning for  [facility]. CM following    [facility], Dr. Garcia  - continue daptomycin and ertapenem. Assuming OR cultures negative, will treat for 3 days post op, with last dose on 6/7    HTN  - Continue home norvasc, bystolic, catapres, cozaar  - Hold HCTZ  - Monitor BP   - Holding parameters for BP meds  - Labetalol PRN for SBP>170     DM  - hgb A1c on 6/5/19 9.8  - Levemir nightly. Additional dose of levemir this morning ( increased HS Levemir and meal humalog)wy  - Mealtime bolus with holding paramenters  - Accu-Check AC and HS with moderate dose SSI     CKD (stable)    Hyponatremia, hypokalemia  - IVFs changed  - BMP in AM      VIPUL Alvarez  06/05/19  3:43 PM   I have personally performed the evaluation on this patient. My history is consistent  with HPI obtained. My exam findings are listed above. I have personally reviewed and discussed the above formulated treatment plan with pt, wife, and . VIPUL.wy.

## 2019-06-05 NOTE — PLAN OF CARE
Problem: Patient Care Overview  Goal: Plan of Care Review  Outcome: Ongoing (interventions implemented as appropriate)   06/05/19 1713   Coping/Psychosocial   Plan of Care Reviewed With patient   Plan of Care Review   Progress improving   OTHER   Outcome Summary patient has had minimal complaints of pain managed wtith ropivicain pump rate unchanged.  Denies numbness. Has been able to transfer to chair with assist of two and up to [facility]. Stump rewrapped by PT as it had accidentally come off while working with them Dr. Juarez notified. Vitals stable. Voiding spontaneously. BG remains elevated 15 units of levemir given once in the morning bS remains in the 200's will continue to monitor         Problem: Skin Injury Risk (Adult)  Goal: Identify Related Risk Factors and Signs and Symptoms  Outcome: Ongoing (interventions implemented as appropriate)   06/05/19 1713   Skin Injury Risk (Adult)   Related Risk Factors (Skin Injury Risk) advanced age;mobility impaired     Goal: Skin Health and Integrity  Outcome: Ongoing (interventions implemented as appropriate)   06/05/19 1713   Skin Injury Risk (Adult)   Skin Health and Integrity making progress toward outcome       Problem: Fall Risk (Adult)  Goal: Identify Related Risk Factors and Signs and Symptoms  Outcome: Ongoing (interventions implemented as appropriate)   06/05/19 1713   Fall Risk (Adult)   Related Risk Factors (Fall Risk) age-related changes;fatigue/slow reaction;gait/mobility problems   Signs and Symptoms (Fall Risk) presence of risk factors     Goal: Absence of Fall  Outcome: Ongoing (interventions implemented as appropriate)   06/05/19 1713   Fall Risk (Adult)   Absence of Fall making progress toward outcome       Problem: Diabetes, Type 2 (Adult)  Goal: Signs and Symptoms of Listed Potential Problems Will be Absent, Minimized or Managed (Diabetes, Type 2)  Outcome: Ongoing (interventions implemented as appropriate)   06/05/19 1713   Goal/Outcome Evaluation    Problems Assessed (Type 2 Diabetes) all   Problems Present (Type 2 Diabetes) hyperglycemia

## 2019-06-05 NOTE — PROGRESS NOTES
Discharge Planning Assessment  [facility]     Patient Name: Amol Aguirre  MRN: [id number]  Today's Date: 6/5/2019    Admit Date: 6/4/2019    Discharge Needs Assessment     Row Name 06/05/19 1019       Living Environment    Lives With  spouse    Current Living Arrangements  home/apartment/condo    Primary Care Provided by  self    Provides Primary Care For  no one    Family Caregiver if Needed  spouse    Quality of Family Relationships  helpful;involved;supportive    Able to Return to Prior Arrangements  other (see comments)       Resource/Environmental Concerns    Resource/Environmental Concerns  none    Transportation Concerns  car, none       Transition Planning    Patient/Family Anticipates Transition to  inpatient rehabilitation facility    Patient/Family Anticipated Services at Transition  none    Transportation Anticipated  family or friend will provide       Discharge Needs Assessment    Readmission Within the Last 30 Days  no previous admission in last 30 days    Concerns to be Addressed  discharge planning    Equipment Currently Used at Home  cane, straight;walker, rolling;wheelchair    Anticipated Changes Related to Illness  none    Equipment Needed After Discharge  none    Discharge Facility/Level of Care Needs  acute rehab    Offered/Gave Vendor List  yes    Patient's Choice of Community Agency(s)  [facility]        Discharge Plan     Row Name 06/05/19 1020       Plan    Plan  IDP    Patient/Family in Agreement with Plan  yes    Plan Comments  Pt lives in [place] co w wife who will transport at d/c. Pt wants to go to [facility]. Obs status explained but being monitored. Cherelle quiros [facility] took referral and will monitor.  Pt on Cubicin but AB TBD - he will need LT AB.  Pt has DME from knee replacement 2 years ago. His home is not handicap accessible, and his Wheelchair is Bariatric and doesn't fit through the doors in his home. Wife wants to purchase a smaller one. The RW he has was paid by insurance 2 years ago -  if he goes to [facility] it will not be pursued by CM here. Pt has not had HH in the past - He went to outpt PT at [facility] [place].  Pt PCP is Donte Briones and he has an affordable copay for his meds w his insurance. Pt goal is TBD but poss [facility] w wife to transport. CM will follow.    Final Discharge Disposition Code  03 - skilled nursing facility (SNF)        Destination      No service coordination in this encounter.      Durable Medical Equipment      No service coordination in this encounter.      Dialysis/Infusion      No service coordination in this encounter.      Home Medical Care      No service coordination in this encounter.      Therapy      No service coordination in this encounter.      Community Resources      No service coordination in this encounter.          Demographic Summary     Row Name 06/05/19 1018       General Information    Admission Type  observation    Arrived From  home    Referral Source  admission list    Reason for Consult  discharge planning    Preferred Language  English     Used During This Interaction  no       Contact Information    Permission Granted to Share Info With  ;family/designee    Contact Information Comments  Janie (wife/EC)  [phone]        Functional Status     Row Name 06/05/19 1018       Functional Status    Usual Activity Tolerance  good       Functional Status, IADL    Medications  assistive person    Meal Preparation  assistive person    Housekeeping  assistive person    Laundry  assistive person    Shopping  assistive person        Psychosocial    No documentation.       Abuse/Neglect    No documentation.       Legal    No documentation.       Substance Abuse    No documentation.       Patient Forms    No documentation.           Kenia Owens RN

## 2019-06-05 NOTE — THERAPY EVALUATION
Acute Care - Physical Therapy Initial Evaluation  [facility]     Patient Name: Amol Aguirre  : 1945  MRN: [id number]  Today's Date: 2019   Onset of Illness/Injury or Date of Surgery: 19  Date of Referral to PT: 19  Referring Physician: Dr Weber      Admit Date: 2019    Visit Dx:     ICD-10-CM ICD-9-CM   1. Gangrene (CMS/Formerly [facility]) I96 785.4   2. Skin ulcer of toe of right foot with necrosis of bone (CMS/Formerly [facility]) L97.514 707.15   3. Type 2 diabetes mellitus with diabetic polyneuropathy, without long-term current use of insulin (CMS/Formerly [facility]) E11.42 250.60     357.2   4. Vascular calcification I99.8 459.89   5. Decreased pulses in feet R09.89 785.9   6. Blister (nonthermal), right foot, initial encounter S90.821A 917.2     Patient Active Problem List   Diagnosis   • Right foot pain   • Decreased pulses in feet   • Vascular calcification   • Uncontrolled type 2 diabetes mellitus with hyperglycemia (CMS/Formerly [facility])   • Type 2 diabetes mellitus with diabetic polyneuropathy, without long-term current use of insulin (CMS/Formerly [facility])   • Mass of lesser toe of right foot   • Blister (nonthermal), right foot, initial encounter   • Diabetic foot ulcers (CMS/Formerly [facility])   • Skin ulcer of toe of right foot with necrosis of bone (CMS/Formerly [facility])   • Gangrene (CMS/Formerly [facility])   • S/P BKA (below knee amputation), right (CMS/Formerly [facility])   • HTN (hypertension)   • CKD (chronic kidney disease)     Past Medical History:   Diagnosis Date   • Acid reflux    • Asthma    • Diabetes (CMS/Formerly [facility])    • Hypertension      Past Surgical History:   Procedure Laterality Date   • AORTAGRAM N/A 2019    Procedure: AORTAGRAM WITH OR WITHOUT RUNOFFS;  Surgeon: Carrillo White MD;  Location: [facility] OR 15;  Service: Vascular   • BELOW KNEE AMPUTATION Right 2019    Procedure: BELOW KNEE AMPUTATION RIGHT;  Surgeon: Juju Weber MD;  Location: [facility] OR;  Service: Orthopedics   • CHOLECYSTECTOMY     • KNEE SURGERY Right     TKA        PT ASSESSMENT (last 12  hours)      Physical Therapy Evaluation     Row Name 06/05/19 0850          PT Evaluation Time/Intention    Subjective Information  no complaints  -SC     Document Type  evaluation  -SC     Mode of Treatment  physical therapy  -SC     Patient Effort  excellent  -SC     Symptoms Noted During/After Treatment  none  -SC     Comment  dressing fell to floor off his leg when in standing and could not be replaced. MD called to address this  (Significant)  stump rewrapped after treatment per MD request  -SC     Row Name 06/05/19 0850          General Information    Patient Profile Reviewed?  yes  -SC     Onset of Illness/Injury or Date of Surgery  06/04/19  -SC     Referring Physician  Dr Juarez  -SC     Patient Observations  alert;cooperative;agree to therapy  -SC     Patient/Family Observations  wife  -SC     General Observations of Patient  dressing intact in supine / fell off in standing--wound looks good, minimal bleeding along staple line, nerve cath intact  -SC     Prior Level of Function  independent:;all household mobility;gait;transfer used walker and heel wt only to walk in home  -SC     Equipment Currently Used at Home  wheelchair;commode, bedside;walker, rolling  -SC     Pertinent History of Current Functional Problem  hx of DM, not with gangrene R foot s/p R BKA  -SC     Existing Precautions/Restrictions  fall;other (see comments)  (Significant)  nerve cath- lack of proprioception  -SC     Risks Reviewed  patient and family:;increased discomfort;change in vital signs;increased drainage  -SC     Benefits Reviewed  family:;improve function;increase independence;increase strength  -SC     Barriers to Rehab  previous functional deficit  -SC     Row Name 06/05/19 0850          Relationship/Environment    Primary Source of Support/Comfort  spouse  -SC     Row Name 06/05/19 0850          Resource/Environmental Concerns    Current Living Arrangements  home/apartment/condo  -SC     Row Name 06/05/19 0850           Cognitive Assessment/Intervention- PT/OT    Orientation Status (Cognition)  oriented x 4  -SC     Follows Commands (Cognition)  follows one step commands;over 90% accuracy  -SC     Safety Deficit (Cognitive)  judgment;problem solving  -SC     Row Name 06/05/19 0850          Safety Issues, Functional Mobility    Safety Issues Affecting Function (Mobility)  judgment;problem solving  -SC     Impairments Affecting Function (Mobility)  balance;motor control;coordination;sensation/sensory awareness;strength  -SC     Comment, Safety Issues/Impairments (Mobility)  nerve cath affecting R leg coordination /proprioception  -SC     Row Name 06/05/19 0850          Mobility Assessment/Treatment    Extremity Weight-bearing Status  right lower extremity  -SC     Right Lower Extremity (Weight-bearing Status)  non weight-bearing (NWB)  -SC     Row Name 06/05/19 0850          Bed Mobility Assessment/Treatment    Bed Mobility Assessment/Treatment  scooting/bridging;supine-sit  -SC     Scooting/Bridging [place] (Bed Mobility)  conditional independence  -SC     Bed Mobility, Safety Issues  decreased use of legs for bridging/pushing  -SC     Assistive Device (Bed Mobility)  bed rails  -SC     Row Name 06/05/19 0850          Transfer Assessment/Treatment    Transfer Assessment/Treatment  sit-stand transfer;stand-sit transfer;bed-chair transfer  -SC     Comment (Transfers)  worked on standing from edge of bed with cues for hand placement. Demonstrated lack of control over R leg 2 to nerve cath. Transfer to chair required mod assist especially with keeping walker close to him.  DRESSING FELL OFF ON STANDING AND UNABLE TO REPLACE.   -SC     Bed-Chair [place] (Transfers)  2 person assist;moderate assist (50% patient effort)  -SC     Assistive Device (Bed-Chair Transfers)  walker, front-wheeled  -SC     Sit-Stand [place] (Transfers)  verbal cues;contact guard;2 person assist  -SC     Stand-Sit [place] (Transfers)  minimum  assist (75% patient effort);verbal cues  -[facility] Name 06/05/19 0850          Sit-Stand Transfer    Assistive Device (Sit-Stand Transfers)  walker, front-wheeled  -[facility] Name 06/05/19 0850          Stand-Sit Transfer    Assistive Device (Stand-Sit Transfers)  walker, front-wheeled  -[facility] Name 06/05/19 0850          Gait/Stairs Assessment/Training    Comment (Gait/Stairs)  SEE TRANSFERS--LIMITED TO JUST CHAIR TRANSFERS AS HIS PROTECTIVE DRESSING FELL OFF IN STANDING .  -[facility] Name 06/05/19 0850          General ROM    GENERAL ROM COMMENTS  WFL ALL JTS  -[facility] Name 06/05/19 0850          MMT (Manual Muscle Testing)    Rt Lower Ext  -- QUADS 3+/5, HIP FLEX 3+/5  -SC     Lt Lower Ext  -- grossly 4+/5   -[facility] Name 06/05/19 0850          Motor Assessment/Intervention    Additional Documentation  Balance (Group);Balance Interventions (Group);Gross Motor Coordination (Group);Therapeutic Exercise (Group);Therapeutic Exercise Interventions (Group)  -[facility] Name 06/05/19 0850          Therapeutic Exercise    90388 - PT Therapeutic Exercise Minutes  10  -SC     32388 - PT Therapeutic Activity Minutes  5  -[facility] Name 06/05/19 0850          Therapeutic Exercise    Lower Extremity (Therapeutic Exercise)  quad sets, bilateral;SLR (straight leg raise), right  -SC     Lower Extremity Range of Motion (Therapeutic Exercise)  hip abduction/adduction, right  -SC     Exercise Type (Therapeutic Exercise)  AROM (active range of motion);AAROM (active assistive range of motion);isometric contraction, static  -SC     Position (Therapeutic Exercise)  supine  -SC     Sets/Reps (Therapeutic Exercise)  10  -[facility] Name 06/05/19 0850          Gross Motor Coordination    Gross Motor Impairments  motor control  -SC     Gross Motor Skill, Impairments Detail  r leg impaired 2 to nerve cath  -[facility] Name 06/05/19 0850          Balance    Balance  dynamic standing balance;static sitting balance;static  standing balance;dynamic sitting balance  -SC     Row Name 06/05/19 0850          Static Standing Balance    Level of [place] (Supported Standing, Static Balance)  contact guard assist;2 person assist  -SC     Assistive Device Utilized (Supported Standing, Static Balance)  walker, rolling  -SC     Row Name 06/05/19 0850          Dynamic Standing Balance    Level of [place], Reaches Outside Midline (Standing, Dynamic Balance)  2 person assist;moderate assist, 50 to 74% patient effort  -SC     Assistive Device Utilized (Supported Standing, Dynamic Balance)  walker, rolling  -SC     Comment, Reaches Outside Midline (Standing, Dynamic Balance)  cues for staying close to walker  -SC     Row Name 06/05/19 0850          Sensory Assessment/Intervention    Sensory General Assessment  light touch sensation deficits identified;proprioception deficits identified r BKA 2 to nerve cath  -[facility] Name 06/05/19 0850          Pain Assessment    Additional Documentation  Pain Scale: Numbers Pre/Post-Treatment (Group)  -[facility] Name 06/05/19 0850          Pain Scale: Numbers Pre/Post-Treatment    Pain Scale: Numbers, Pretreatment  0/10 - no pain  -SC     Pain Scale: Numbers, Post-Treatment  0/10 - no pain  -SC     Row Name             Wound 06/04/19 1309 Right leg incision    Wound - Properties Group Date first assessed: 06/04/19  -LD Time first assessed: 1309  -LD Side: Right  -LD Location: leg  -LD Type: incision  -LD    Row Name 06/05/19 0850          Coping    Observed Emotional State  calm;cooperative  -SC     Verbalized Emotional State  acceptance  -SC     Row Name 06/05/19 0850          Plan of Care Review    Plan of Care Reviewed With  patient;spouse  -SC     Row Name 06/05/19 0850          Physical Therapy Clinical Impression    Date of Referral to PT  06/05/19  -SC     PT Diagnosis (PT Clinical Impression)  impaired mobility  -SC     Patient/Family Goals Statement (PT Clinical Impression)  walk  -SC      Criteria for Skilled Interventions Met (PT Clinical Impression)  yes;treatment indicated  -SC     Pathology/Pathophysiology Noted (Describe Specifically for Each System)  neuromuscular;musculoskeletal;integumentary  -SC     Impairments Found (describe specific impairments)  gait, locomotion, and balance;integumentary integrity;joint integrity and mobility;motor function;muscle performance;sensory Integrity  -SC     Rehab Potential (PT Clinical Summary)  good, to achieve stated therapy goals  -SC     Care Plan Review (PT)  risks/benefits reviewed;care plan/treatment goals reviewed;evaluation/treatment results reviewed  -SC     Care Plan Review, Other Participant (PT Clinical Impression)  spouse  -SC     Patient/Family Concerns, Anticipated Discharge Disposition (PT)  wife conserned about caring for patient  -SC     Row Name 06/05/19 0850          Physical Therapy Goals    Bed Mobility Goal Selection (PT)  bed mobility, PT goal 1  -SC     Transfer Goal Selection (PT)  transfer, PT goal 1  -SC     Gait Training Goal Selection (PT)  gait training, PT goal 1  -SC     Wheelchair Locomotion Goal Selection (PT)  wheelchair locomotion, PT goal 1  -SC     Additional Documentation  Wheelchair Locomotion Goal Selection (PT) (Row)  -SC     Row Name 06/05/19 0850          Bed Mobility Goal 1 (PT)    Activity/Assistive Device (Bed Mobility Goal 1, PT)  sit to supine/supine to sit  -SC     [place] Level/Cues Needed (Bed Mobility Goal 1, PT)  independent  -SC     Time Frame (Bed Mobility Goal 1, PT)  long term goal (LTG);10 days  -SC     Progress/Outcomes (Bed Mobility Goal 1, PT)  goal met  -SC     Row Name 06/05/19 0850          Transfer Goal 1 (PT)    Activity/Assistive Device (Transfer Goal 1, PT)  sit-to-stand/stand-to-sit;bed-to-chair/chair-to-bed;walker, rolling  -SC     [place] Level/Cues Needed (Transfer Goal 1, PT)  standby assist  -SC     Time Frame (Transfer Goal 1, PT)  long term goal (LTG);10 days  -SC      Row Name 06/05/19 0850          Gait Training Goal 1 (PT)    Activity/Assistive Device (Gait Training Goal 1, PT)  gait (walking locomotion);walker, rolling  -SC     [place] Level (Gait Training Goal 1, PT)  contact guard assist  -SC     Distance (Gait Goal 1, PT)  50  -SC     Time Frame (Gait Training Goal 1, PT)  long term goal (LTG);10 days  -SC     Row Name 06/05/19 0850          Wheelchair Locomotion Goal 1 (PT)    Activity (Wheelchair Locomotion Goal 1, PT)  steering;doorway navigation;stopping;forward propulsion;backward propulsion  -SC     [place] Level/Cues Needed (Wheelchair Locomotion Goal 1, PT)  independent  -SC     Distance Goal 1 (Wheelchair Locomotion, PT)  150  -SC     Time Frame (Wheelchair Locomotion Goal 1, PT)  long term goal (LTG);10 days  -SC     Row Name 06/05/19 0850          Patient Education Goal (PT)    Activity (Patient Education Goal, PT)  hep, safety precautions  -SC     [place]/Cues/Accuracy (Memory Goal 2, PT)  demonstrates adequately  -SC     Time Frame (Patient Education Goal, PT)  long term goal (LTG);10 days  -SC     Row Name 06/05/19 0850          Positioning and Restraints    Pre-Treatment Position  in bed  -SC     Post Treatment Position  chair  -SC     In Chair  sitting;reclined;notified nsg;encouraged to call for assist;call light within reach;exit alarm on;with family/caregiver  -SC       User Key  (r) = Recorded By, (t) = Taken By, (c) = Cosigned By    Initials Name Provider Type    SC Cyndy Fofana, PT Physical Therapist    Farrah Sepulveda RN Registered Nurse        Physical Therapy Education     Title: PT OT SLP Therapies (Done)     Topic: Physical Therapy (Done)     Point: Mobility training (Done)     Learning Progress Summary           Patient RAHEEL Tam, ILYA COY,NR by SC at 6/5/2019  8:50 AM    Comment:  reviewed safety with mobility                   Point: Home exercise program (Done)     Learning Progress Summary           Patient RAHEEL Tam,  ILYA COY NR by SC at 6/5/2019  8:50 AM    Comment:  reviewed safety with mobility                   Point: Body mechanics (Done)     Learning Progress Summary           Patient RAHEEL Tam, ILYA COY NR by SC at 6/5/2019  8:50 AM    Comment:  reviewed safety with mobility                   Point: Precautions (Done)     Learning Progress Summary           Patient RAHEEL Tam, ILYA COY,DIANE by SC at 6/5/2019  8:50 AM    Comment:  reviewed safety with mobility                               User Key     Initials Effective Dates Name Provider Type Discipline    SC 06/19/15 -  Cyndy Fofana, PT Physical Therapist PT              PT Recommendation and Plan  Anticipated Discharge Disposition (PT): inpatient rehabilitation facility  Planned Therapy Interventions (PT Eval): bed mobility training, gait training, home exercise program, patient/family education, strengthening, transfer training, wheelchair management/propulsion training  Therapy Frequency (PT Clinical Impression): daily  Outcome Summary/Treatment Plan (PT)  Anticipated Equipment Needs at Discharge (PT): (NEW W/C)  Anticipated Discharge Disposition (PT): inpatient rehabilitation facility  Patient/Family Concerns, Anticipated Discharge Disposition (PT): wife conserned about caring for patient  Plan of Care Reviewed With: patient, spouse  Outcome Summary: Patient demonstrated difficulty controling his walker. I have consernes  about him going home with is wife who may not be able to assist him as he is  a large mas  Outcome Measures     Row Name 06/05/19 0845             How much help from another person do you currently need...    Turning from your back to your side while in flat bed without using bedrails?  3  -SC      Moving from lying on back to sitting on the side of a flat bed without bedrails?  3  -SC      Moving to and from a bed to a chair (including a wheelchair)?  2  -SC      Standing up from a chair using your arms (e.g., wheelchair, bedside chair)?  2  -SC       Climbing 3-5 steps with a railing?  1  -SC      To walk in hospital room?  2  -SC      AM-PAC 6 Clicks Score  13  -SC         Functional Assessment    Outcome Measure Options  AM-PAC 6 Clicks Basic Mobility (PT)  -SC        User Key  (r) = Recorded By, (t) = Taken By, (c) = Cosigned By    Initials Name Provider Type    SC Cyndy Fofana, PT Physical Therapist         Time Calculation:   PT Charges     Row Name 06/05/19 0850             Time Calculation    Start Time  0850  -SC      PT Received On  06/05/19  -SC      PT Goal Re-Cert Due Date  06/15/19  -SC         Time Calculation- PT    TCU Minutes- PT  15 min  -SC         Timed Charges    09012 - PT Therapeutic Exercise Minutes  10  -SC      92679 - PT Therapeutic Activity Minutes  5  -SC        User Key  (r) = Recorded By, (t) = Taken By, (c) = Cosigned By    Initials Name Provider Type    SC Cyndy Fofana, PT Physical Therapist        Therapy Charges for Today     Code Description Service Date Service Provider Modifiers Qty    89122203922 HC PT THER PROC EA 15 MIN 6/5/2019 Cyndy Fofana, PT GP 1    02254479521 HC PT EVAL MOD COMPLEXITY 4 6/5/2019 Cyndy Fofana, PT GP 1    06128550904 HC PT THER SUPP EA 15 MIN 6/5/2019 Cyndy Fofana, PT GP 2          PT G-Codes  Outcome Measure Options: AM-PAC 6 Clicks Basic Mobility (PT)  AM-PAC 6 Clicks Score: 13      Cyndy Fofana, PT  6/5/2019

## 2019-06-05 NOTE — PROGRESS NOTES
Infectious Disease Progress Note  Amol Aguirre  1945  [id number]    Date of Consult: 6/4/2019  Admission Date: 6/4/2019    Requesting Provider: Juju Juarez MD  Evaluating Physician: Easton Garcia MD    Chief Complaint: foot infection    Reason for Consultation: s/p BKA    History of present illness:   Patient is a 74 y.o.  Yr old male with history of poorly controlled DM2, venous stasis disease, asthma, HTN. Recent travel to [place] to go fishing then developed ulceration on his foot.  He was out fishing all day and developed swelling and sweaty feet and when he took off his shoe he noticed ulceration on the dorsal aspect of the right foot.  He was seen by his PCP and started on Keflex and Silvadene on 4/12.  Despite antibiotics and wound care the wound progresses.  He was seen by Dr Juarez last week due to mass on his right great toe.  Dr Juarez though the chronic venous stasis, could be contributing to the ulceration.  She is scheduled vascular studies and MRI.  Soon after that visit he presented to [facility] on 4/19with increasing redness and a new ulceration between his third and fourth toes.  MRI done 4/22 with possible early changes in the fourth metatarsal head of osteomyelitis.  Wound culture there grew staph epidermidis and enterococcus. Given daptomycin, linezolid and zosyn.  A1c at [facility] was 10.5      He was transferred to [facility] on 4/22 for further evaluation. Dr Juarez contacted me to help manage antibiotics.    Wound culture grew Klebsiella and Morganella.  He had an aortogram which showed significant right lower extremity peripheral vascular disease however no sufficient targets for revascularization.  He was discharged on daptomycin and ertapenem and I followed him for the next few weeks in clinic.  Initially he seemed to improve however last week he was noted to have acute worsening of the ulceration in his foot which progressed to steve  gangrene.  Dr. Weber evaluated him and recommended BKA which was performed today.     Resting comfortably post-op with family in the room.     6/5: doing well. No pain in right leg. Eating ok. No other new complaints. Anxious for discharge.     ROS:  No fevers or chills. No n/v/d. No rash. No new ADR to Abx.       Allergies   Allergen Reactions   • Chlorhexidine Shortness Of Breath, Itching and Rash     Pt developed a rash, itching, and shortness of breath after receiving a CHG bath on 4/24/19.   • Latex Itching       Antibiotics:  Anti-Infectives (From admission, onward)    Ordered     Dose/Rate Route Frequency Start Stop    06/04/19 1826  DAPTOmycin (CUBICIN) 550 mg in sodium chloride 0.9 % 50 mL IVPB     Ordering Provider:  Easton Garcia MD    6 mg/kg × 94.7 kg (Adjusted)  100 mL/hr over 30 Minutes Intravenous Every 24 Hours 06/05/19 1200 06/12/19 1159    06/04/19 1826  ertapenem (INVanz) 1 g/100 mL 0.9% NS VTB (mbp)     Ordering Provider:  Easton Garcia MD    1 g  over 30 Minutes Intravenous Every 24 Hours 06/05/19 1100 06/12/19 1059    06/04/19 1017  DAPTOmycin (CUBICIN) 600 mg in sodium chloride 0.9 % 50 mL IVPB     Ordering Provider:  Juju Weber MD    600 mg  100 mL/hr over 30 Minutes Intravenous Once 06/04/19 1019 06/04/19 1218    06/04/19 1017  ertapenem (INVanz) 1 g/100 mL 0.9% NS VTB (mbp)     Ordering Provider:  Juju Weber MD    1 g  over 30 Minutes Intravenous Once 06/04/19 1019 06/04/19 1133          Other Medications:  Current Facility-Administered Medications   Medication Dose Route Frequency Provider Last Rate Last Dose   • acetaminophen (TYLENOL) tablet 650 mg  650 mg Oral Q6H PRN Erica Reyez APRN       • amLODIPine (NORVASC) tablet 10 mg  10 mg Oral Daily Erica Reyez APRN   10 mg at 06/05/19 0802   • bisacodyl (DULCOLAX) suppository 10 mg  10 mg Rectal Daily PRN Juju Weber MD       • budesonide-formoterol (SYMBICORT) 80-4.5 MCG/ACT inhaler 2 puff  2  puff Inhalation BID - RT Erica Reyez APRN   2 puff at 06/05/19 0731   • CloNIDine (CATAPRES) tablet 0.1 mg  0.1 mg Oral Q12H Erica Reyez APRN   0.1 mg at 06/05/19 0802   • DAPTOmycin (CUBICIN) 550 mg in sodium chloride 0.9 % 50 mL IVPB  6 mg/kg (Adjusted) Intravenous Q24H Easton Garcia  mL/hr at 06/05/19 1141 550 mg at 06/05/19 1141   • dextrose (D50W) 25 g/ 50mL Intravenous Solution 25 g  25 g Intravenous Q15 Min PRN Erica Reyez APRN       • dextrose (GLUTOSE) oral gel 15 g  15 g Oral Q15 Min PRN Erica Reyez APRN       • diphenhydrAMINE (BENADRYL) capsule 25 mg  25 mg Oral Nightly PRN Juju Weber MD   25 mg at 06/04/19 2200    Or   • diphenhydrAMINE (BENADRYL) injection 25 mg  25 mg Intravenous Nightly PRN Juju Weber MD       • docusate sodium (COLACE) capsule 100 mg  100 mg Oral BID PRN Erica Reyez APRN       • enoxaparin (LOVENOX) syringe 40 mg  40 mg Subcutaneous Daily Juju Weber MD   40 mg at 06/05/19 0803   • ertapenem (INVanz) 1 g/100 mL 0.9% NS VTB (mbp)  1 g Intravenous Q24H Easton Garcia MD   1 g at 06/05/19 1031   • glucagon (human recombinant) (GLUCAGEN DIAGNOSTIC) injection 1 mg  1 mg Subcutaneous PRN Erica Reyez APRN       • HYDROcodone-acetaminophen (NORCO) 7.5-325 MG per tablet 1 tablet  1 tablet Oral Q4H PRN Juju Weber MD   1 tablet at 06/04/19 2200   • HYDROcodone-acetaminophen (NORCO) 7.5-325 MG per tablet 2 tablet  2 tablet Oral Q4H PRN Juju Weber MD       • HYDROmorphone (DILAUDID) injection 1 mg  1 mg Intravenous Q2H PRN Juju Weber MD        And   • naloxone (NARCAN) injection 0.4 mg  0.4 mg Intravenous Q5 Min PRN Juju Weber MD       • insulin detemir (LEVEMIR) injection 30 Units  30 Units Subcutaneous Nightly Erica Reyez APRN   30 Units at 06/04/19 2035   • insulin lispro (humaLOG) injection 0-9 Units  0-9 Units Subcutaneous 4x Daily With Meals & Nightly Ercia Reyez APRN   4 Units at  06/05/19 1143   • insulin lispro (humaLOG) injection 10 Units  10 Units Subcutaneous TID With Meals Erica Reyez, VIPUL   10 Units at 06/05/19 1145   • labetalol (NORMODYNE,TRANDATE) injection 10 mg  10 mg Intravenous Q4H PRN Erica Reyez, APROMAR       • lactated ringers infusion  9 mL/hr Intravenous Continuous Dmitry Gonzalez MD 9 mL/hr at 06/04/19 1034 9 mL/hr at 06/04/19 1034   • losartan (COZAAR) tablet 100 mg  100 mg Oral Daily Erica Reyez, APRN   100 mg at 06/05/19 0802   • magnesium hydroxide (MILK OF MAGNESIA) suspension 2400 mg/10mL 10 mL  10 mL Oral Daily PRN Juju Weber MD       • melatonin tablet 5 mg  5 mg Oral Nightly PRN Erica Reyez APRN   5 mg at 06/04/19 2201   • metaxalone (SKELAXIN) tablet 800 mg  800 mg Oral TID PRN Erica Reyez APRN       • metoclopramide (REGLAN) tablet 10 mg  10 mg Oral 4x Daily AC & at Bedtime Erica Reyez APRN   10 mg at 06/05/19 1149   • multivitamin (THERAGRAN) tablet 1 tablet  1 tablet Oral Daily Juju Weber MD   1 tablet at 06/05/19 0802   • mupirocin (BACTROBAN) 2 % ointment   Topical Q24H Juju Weber MD       • nebivolol (BYSTOLIC) tablet 10 mg  10 mg Oral Q24H Nghia Ronquillo [facility]   10 mg at 06/05/19 0802   • ondansetron (ZOFRAN) injection 4 mg  4 mg Intravenous Q6H PRN Juju Weber MD       • oxyCODONE-acetaminophen (PERCOCET) 5-325 MG per tablet 1 tablet  1 tablet Oral Q4H PRN Juju Weber MD       • oxyCODONE-acetaminophen (PERCOCET) 5-325 MG per tablet 2 tablet  2 tablet Oral Q4H PRN Juju Weber MD       • pantoprazole (PROTONIX) EC tablet 40 mg  40 mg Oral Q AM Erica Reyez, APRN   40 mg at 06/05/19 0624   • promethazine (PHENERGAN) injection 12.5 mg  12.5 mg Intramuscular Q4H PRN Juju Weber MD       • promethazine (PHENERGAN) injection 12.5 mg  12.5 mg Intravenous Q4H PRN Juju Weber MD       • ropivacaine (NAROPIN) 0.2% peripheral nerve cath (moog)  8 mL/hr Peripheral Nerve Continuous Oliva,  "Nghia THOMAS, CRNA 8 mL/hr at 06/04/19 1355 8 mL/hr at 06/04/19 1355   • sodium chloride 0.9 % bolus 500 mL  500 mL Intravenous TID PRN Erica Reyez APRN       • sodium chloride 0.9 % infusion  50 mL/hr Intravenous Continuous Erica Reyez APRN 50 mL/hr at 06/05/19 1019 50 mL/hr at 06/05/19 1019       Physical Exam:   Vital Signs   /60 (BP Location: Left arm, Patient Position: Sitting)   Pulse 64   Temp 97.9 °F (36.6 °C) (Oral)   Resp 16   Ht 190.5 cm (75\")   Wt 110 kg (242 lb)   SpO2 95%   BMI 30.25 kg/m²     GENERAL: Awake and alert, in no acute distress.   HEENT: Normocephalic, atraumatic.  PERRL. EOMI. No conjunctival injection. No icterus. Oropharynx clear without evidence of thrush or exudate.   HEART: RRR; No murmur.  LUNGS: Clear to auscultation bilaterally without wheezing, rales, rhonchi. Normal respiratory effort. Nonlabored.  ABDOMEN: Soft, nontender, nondistended. Positive bowel sounds. No rebound or guarding.  EXT:  No cyanosis, clubbing or edema.  : Without Mcdonald catheter.  MSK: s/p BKA on the right, bandaged, non-tender. + nerve block  SKIN: Warm and dry without cutaneous eruptions on Inspection/palpation.    NEURO: Oriented to PPT. No focal deficits on motor/sensory exam at arms/legs.  PSYCHIATRIC: Normal insight and judgement. Cooperative with NICOLE CUEVAS [facility]    Laboratory Data    Results from last 7 days   Lab Units 06/05/19  0454 06/04/19  1035 05/30/19  1237   WBC 10*3/mm3 14.07* 10.15 10.05   HEMOGLOBIN g/dL 10.1* 11.1* 12.1*   HEMATOCRIT % 30.6* 34.5* 37.5   PLATELETS 10*3/mm3 421 492* 612*     Results from last 7 days   Lab Units 06/05/19  0454   SODIUM mmol/L 132*   POTASSIUM mmol/L 5.5*   CHLORIDE mmol/L 100   CO2 mmol/L 18.0*   BUN mg/dL 38*   CREATININE mg/dL 1.47*   GLUCOSE mg/dL 266*   CALCIUM mg/dL 9.4     Estimated Creatinine Clearance: 57.3 mL/min (A) (by C-G formula based on SCr of 1.47 mg/dL (H)).  Results from last 7 days   Lab Units 05/30/19  1237   ALK PHOS U/L " 170*   BILIRUBIN mg/dL 0.3   ALT (SGPT) U/L 31   AST (SGOT) U/L 25     Results from last 7 days   Lab Units 05/30/19  1237   SED RATE mm/hr 109*     Results from last 7 days   Lab Units 05/30/19  1237   CRP mg/dL 2.60*       Microbiology:      [facility]:  4/19 wound culture: light growth Staph epidermitis and Enterococcus faecalis, (PCN sensitive)  4/19 blood cx negative      [facility]   4/23 wound culture x2: Klebsiella and Morganella     6/4 OR cultures pending, NGTD      Radiology:  No radiology results for the last 7 days       Impression:   1. Diabetic foot ulceration and acute osteomyelitis of 4th metatarsal, steve gangrene of the foot, s/p BKA on 6/4. Prior cultures with Klebsiella and Morganella at [facility].   OR cultures pending  2. Severe LE arterial vascular disease, especially in RLE: based on 4/30 aortogram, no targets amenable to revascularization  2. Poorly controlled DM2  3. CKD stage 3  4. Asthma  5. Anaphylaxis to chlorhexidine: improved    PLAN:    - f/u pending OR cultures, although I expect these will be negative  - trend CBC, CMP    - continue daptomycin and ertapenem. Assuming OR cultures negative, will treat for 3 days post op, with last dose on 6/7    - Discharge planning in process. If he goes to [facility] I can follow him there if needed    Easton Garcia MD  6/5/2019

## 2019-06-05 NOTE — PLAN OF CARE
Problem: Patient Care Overview  Goal: Plan of Care Review  Outcome: Ongoing (interventions implemented as appropriate)   06/05/19 0549   Coping/Psychosocial   Plan of Care Reviewed With patient   Plan of Care Review   Progress no change   OTHER   Outcome Summary VSS, voids well, picc in right arm, ropivicane @ 8 ML/HR, will continue to monitor for changes.        Problem: Pain, Acute (Adult)  Goal: Identify Related Risk Factors and Signs and Symptoms  Outcome: Ongoing (interventions implemented as appropriate)      Problem: Skin Injury Risk (Adult)  Goal: Identify Related Risk Factors and Signs and Symptoms  Outcome: Ongoing (interventions implemented as appropriate)      Problem: Fall Risk (Adult)  Goal: Identify Related Risk Factors and Signs and Symptoms  Outcome: Ongoing (interventions implemented as appropriate)      Problem: Diabetes, Type 2 (Adult)  Goal: Signs and Symptoms of Listed Potential Problems Will be Absent, Minimized or Managed (Diabetes, Type 2)  Outcome: Ongoing (interventions implemented as appropriate)

## 2019-06-06 PROBLEM — D64.9 ANEMIA: Status: ACTIVE | Noted: 2019-06-06

## 2019-06-06 PROBLEM — G89.18 ACUTE POSTOPERATIVE PAIN: Status: ACTIVE | Noted: 2019-06-06

## 2019-06-06 LAB
ANION GAP SERPL CALCULATED.3IONS-SCNC: 12 MMOL/L
BUN BLD-MCNC: 33 MG/DL (ref 8–23)
BUN/CREAT SERPL: 27.7 (ref 7–25)
CALCIUM SPEC-SCNC: 9.6 MG/DL (ref 8.6–10.5)
CHLORIDE SERPL-SCNC: 103 MMOL/L (ref 98–107)
CO2 SERPL-SCNC: 23 MMOL/L (ref 22–29)
CREAT BLD-MCNC: 1.19 MG/DL (ref 0.76–1.27)
DEPRECATED RDW RBC AUTO: 41.8 FL (ref 37–54)
ERYTHROCYTE [DISTWIDTH] IN BLOOD BY AUTOMATED COUNT: 13.8 % (ref 12.3–15.4)
GFR SERPL CREATININE-BSD FRML MDRD: 59 ML/MIN/1.73
GLUCOSE BLD-MCNC: 140 MG/DL (ref 65–99)
GLUCOSE BLDC GLUCOMTR-MCNC: 123 MG/DL (ref 70–130)
GLUCOSE BLDC GLUCOMTR-MCNC: 152 MG/DL (ref 70–130)
GLUCOSE BLDC GLUCOMTR-MCNC: 152 MG/DL (ref 70–130)
GLUCOSE BLDC GLUCOMTR-MCNC: 168 MG/DL (ref 70–130)
HCT VFR BLD AUTO: 27.8 % (ref 37.5–51)
HGB BLD-MCNC: 9.1 G/DL (ref 13–17.7)
MCH RBC QN AUTO: 27.6 PG (ref 26.6–33)
MCHC RBC AUTO-ENTMCNC: 32.7 G/DL (ref 31.5–35.7)
MCV RBC AUTO: 84.2 FL (ref 79–97)
PLATELET # BLD AUTO: 381 10*3/MM3 (ref 140–450)
PMV BLD AUTO: 9.2 FL (ref 6–12)
POTASSIUM BLD-SCNC: 4.2 MMOL/L (ref 3.5–5.2)
RBC # BLD AUTO: 3.3 10*6/MM3 (ref 4.14–5.8)
SODIUM BLD-SCNC: 138 MMOL/L (ref 136–145)
WBC NRBC COR # BLD: 10.41 10*3/MM3 (ref 3.4–10.8)

## 2019-06-06 PROCEDURE — G0378 HOSPITAL OBSERVATION PER HR: HCPCS

## 2019-06-06 PROCEDURE — 80048 BASIC METABOLIC PNL TOTAL CA: CPT | Performed by: NURSE PRACTITIONER

## 2019-06-06 PROCEDURE — 25010000002 ENOXAPARIN PER 10 MG: Performed by: ORTHOPAEDIC SURGERY

## 2019-06-06 PROCEDURE — 63710000001 DIPHENHYDRAMINE PER 50 MG: Performed by: ORTHOPAEDIC SURGERY

## 2019-06-06 PROCEDURE — 94799 UNLISTED PULMONARY SVC/PX: CPT

## 2019-06-06 PROCEDURE — 25010000002 DAPTOMYCIN PER 1 MG: Performed by: INTERNAL MEDICINE

## 2019-06-06 PROCEDURE — 63710000001 INSULIN LISPRO (HUMAN) PER 5 UNITS: Performed by: NURSE PRACTITIONER

## 2019-06-06 PROCEDURE — 97110 THERAPEUTIC EXERCISES: CPT

## 2019-06-06 PROCEDURE — 63710000001 INSULIN LISPRO (HUMAN) PER 5 UNITS: Performed by: INTERNAL MEDICINE

## 2019-06-06 PROCEDURE — 25010000002 ERTAPENEM PER 500 MG: Performed by: INTERNAL MEDICINE

## 2019-06-06 PROCEDURE — 25010000002 ROPIVACAINE PER 1 MG: Performed by: NURSE ANESTHETIST, CERTIFIED REGISTERED

## 2019-06-06 PROCEDURE — 63710000001 INSULIN DETEMIR PER 5 UNITS: Performed by: INTERNAL MEDICINE

## 2019-06-06 PROCEDURE — 97116 GAIT TRAINING THERAPY: CPT

## 2019-06-06 PROCEDURE — 99024 POSTOP FOLLOW-UP VISIT: CPT | Performed by: ORTHOPAEDIC SURGERY

## 2019-06-06 PROCEDURE — 85027 COMPLETE CBC AUTOMATED: CPT | Performed by: INTERNAL MEDICINE

## 2019-06-06 PROCEDURE — 82962 GLUCOSE BLOOD TEST: CPT

## 2019-06-06 PROCEDURE — 97165 OT EVAL LOW COMPLEX 30 MIN: CPT

## 2019-06-06 RX ADMIN — BUDESONIDE AND FORMOTEROL FUMARATE DIHYDRATE 2 PUFF: 80; 4.5 AEROSOL RESPIRATORY (INHALATION) at 20:59

## 2019-06-06 RX ADMIN — MELATONIN TAB 5 MG 5 MG: 5 TAB at 20:47

## 2019-06-06 RX ADMIN — MUPIROCIN: 20 OINTMENT TOPICAL at 08:23

## 2019-06-06 RX ADMIN — INSULIN LISPRO 14 UNITS: 100 INJECTION, SOLUTION INTRAVENOUS; SUBCUTANEOUS at 17:52

## 2019-06-06 RX ADMIN — HYDROCODONE BITARTRATE AND ACETAMINOPHEN 1 TABLET: 7.5; 325 TABLET ORAL at 08:15

## 2019-06-06 RX ADMIN — PANTOPRAZOLE SODIUM 40 MG: 40 TABLET, DELAYED RELEASE ORAL at 05:30

## 2019-06-06 RX ADMIN — CLONIDINE HYDROCHLORIDE 0.1 MG: 0.1 TABLET ORAL at 20:47

## 2019-06-06 RX ADMIN — LOSARTAN POTASSIUM 100 MG: 25 TABLET, FILM COATED ORAL at 08:15

## 2019-06-06 RX ADMIN — HYDROCODONE BITARTRATE AND ACETAMINOPHEN 1 TABLET: 7.5; 325 TABLET ORAL at 20:46

## 2019-06-06 RX ADMIN — ROPIVACAINE HYDROCHLORIDE 8 ML/HR: 2 INJECTION, SOLUTION EPIDURAL; INFILTRATION at 11:20

## 2019-06-06 RX ADMIN — DAPTOMYCIN 550 MG: 500 INJECTION, POWDER, LYOPHILIZED, FOR SOLUTION INTRAVENOUS at 11:59

## 2019-06-06 RX ADMIN — METOCLOPRAMIDE HYDROCHLORIDE 10 MG: 10 TABLET ORAL at 08:15

## 2019-06-06 RX ADMIN — INSULIN LISPRO 2 UNITS: 100 INJECTION, SOLUTION INTRAVENOUS; SUBCUTANEOUS at 12:00

## 2019-06-06 RX ADMIN — Medication 1 TABLET: at 08:15

## 2019-06-06 RX ADMIN — INSULIN LISPRO 14 UNITS: 100 INJECTION, SOLUTION INTRAVENOUS; SUBCUTANEOUS at 11:59

## 2019-06-06 RX ADMIN — METOCLOPRAMIDE HYDROCHLORIDE 10 MG: 10 TABLET ORAL at 10:41

## 2019-06-06 RX ADMIN — INSULIN LISPRO 14 UNITS: 100 INJECTION, SOLUTION INTRAVENOUS; SUBCUTANEOUS at 08:16

## 2019-06-06 RX ADMIN — METOCLOPRAMIDE HYDROCHLORIDE 10 MG: 10 TABLET ORAL at 17:55

## 2019-06-06 RX ADMIN — DIPHENHYDRAMINE HYDROCHLORIDE 25 MG: 25 CAPSULE ORAL at 20:47

## 2019-06-06 RX ADMIN — INSULIN DETEMIR 35 UNITS: 100 INJECTION, SOLUTION SUBCUTANEOUS at 20:45

## 2019-06-06 RX ADMIN — INSULIN LISPRO 2 UNITS: 100 INJECTION, SOLUTION INTRAVENOUS; SUBCUTANEOUS at 17:53

## 2019-06-06 RX ADMIN — HYDROCODONE BITARTRATE AND ACETAMINOPHEN 1 TABLET: 7.5; 325 TABLET ORAL at 03:38

## 2019-06-06 RX ADMIN — INSULIN LISPRO 2 UNITS: 100 INJECTION, SOLUTION INTRAVENOUS; SUBCUTANEOUS at 20:56

## 2019-06-06 RX ADMIN — BUDESONIDE AND FORMOTEROL FUMARATE DIHYDRATE 2 PUFF: 80; 4.5 AEROSOL RESPIRATORY (INHALATION) at 09:32

## 2019-06-06 RX ADMIN — CLONIDINE HYDROCHLORIDE 0.1 MG: 0.1 TABLET ORAL at 08:15

## 2019-06-06 RX ADMIN — METOCLOPRAMIDE HYDROCHLORIDE 10 MG: 10 TABLET ORAL at 20:47

## 2019-06-06 RX ADMIN — ERTAPENEM SODIUM 1 G: 1 INJECTION, POWDER, LYOPHILIZED, FOR SOLUTION INTRAMUSCULAR; INTRAVENOUS at 10:41

## 2019-06-06 RX ADMIN — ENOXAPARIN SODIUM 40 MG: 40 INJECTION SUBCUTANEOUS at 08:16

## 2019-06-06 RX ADMIN — HYDROCODONE BITARTRATE AND ACETAMINOPHEN 1 TABLET: 7.5; 325 TABLET ORAL at 14:28

## 2019-06-06 RX ADMIN — NEBIVOLOL HYDROCHLORIDE 10 MG: 5 TABLET ORAL at 08:15

## 2019-06-06 RX ADMIN — SODIUM CHLORIDE 50 ML/HR: 9 INJECTION, SOLUTION INTRAVENOUS at 22:45

## 2019-06-06 RX ADMIN — AMLODIPINE BESYLATE 10 MG: 10 TABLET ORAL at 08:15

## 2019-06-06 NOTE — PLAN OF CARE
Problem: Patient Care Overview  Goal: Plan of Care Review  Outcome: Ongoing (interventions implemented as appropriate)   06/06/19 1110   Coping/Psychosocial   Plan of Care Reviewed With patient   Plan of Care Review   Progress improving   OTHER   Outcome Summary Patient demonstrated improved motor control with exercises and gait. He ambulated 20 feet with contact assistance. He fatigues quickly.

## 2019-06-06 NOTE — PROGRESS NOTES
"IM progress note      Amol Aguirre  [id number]  1945     LOS: 1 day     Attending: Juju Weber MD    Primary Care Provider: Donte Briones MD      Chief Complaint/Reason for visit:  MARSHALL    Subjective   Feels good. Having more pain today. Denies f/c/n/v/sob/cp.    Objective     Vital Signs  Visit Vitals  /66 (BP Location: Left arm, Patient Position: Lying)   Pulse 57   Temp 97.4 °F (36.3 °C) (Axillary)   Resp 16   Ht 190.5 cm (75\")   Wt 110 kg (242 lb)   SpO2 96%   BMI 30.25 kg/m²     Temp (24hrs), Av.6 °F (36.4 °C), Min:97.4 °F (36.3 °C), Max:97.8 °F (36.6 °C)      Nutrition: PO    Respiratory: RA    Physical Therapy: Patient demonstrated improved motor control with exercises and gait. He ambulated 20 feet with contact assistance. He fatigues quickly.    Physical Exam:     General Appearance:    Alert, cooperative, in no acute distress   Head:    Normocephalic, without obvious abnormality, atraumatic    Lungs:     Normal effort, symmetric chest rise, no crepitus, clear to      auscultation bilaterally             Heart:    Regular rhythm and normal rate, normal S1 and S2   Abdomen:     Normal bowel sounds, no masses, no organomegaly, soft        non-tender, non-distended, no guarding, no rebound                tenderness   Extremities:   Right stump dressing CDI. Nerve block present    Pulses:   Pulses palpable and equal bilaterally   Skin:   No bleeding, bruising or rash   Neurologic:   Moves all extremities with no obvious focal motor deficit.  Cranial nerves 2 - 12 grossly intact     Results Review:     I reviewed the patient's new clinical results.   Results from last 7 days   Lab Units 19  0540 19  04519  1035   WBC 10*3/mm3 10.41 14.07* 10.15   HEMOGLOBIN g/dL 9.1* 10.1* 11.1*   HEMATOCRIT % 27.8* 30.6* 34.5*   PLATELETS 10*3/mm3 381 421 492*     Results from last 7 days   Lab Units 19  0540 19  1036   SODIUM mmol/L 138 132* " 136   POTASSIUM mmol/L 4.2 5.5* 4.8   CHLORIDE mmol/L 103 100 98   CO2 mmol/L 23.0 18.0* 21.0*   BUN mg/dL 33* 38* 33*   CREATININE mg/dL 1.19 1.47* 1.36*   CALCIUM mg/dL 9.6 9.4 10.7*   GLUCOSE mg/dL 140* 266* 281*     Results for ASAEL MONDRAGON (MRN [id number]) as of 6/6/2019 12:54   Ref. Range 6/5/2019 20:32 6/6/2019 05:40 6/6/2019 07:28 6/6/2019 11:40   Glucose Latest Ref Range: 70 - 130 mg/dL 208 (H) 140 (H) 123 152 (H)     I reviewed the patient's new imaging including images and reports.    All medications reviewed.     amLODIPine 10 mg Oral Daily   budesonide-formoterol 2 puff Inhalation BID - RT   CloNIDine 0.1 mg Oral Q12H   DAPTOmycin 6 mg/kg (Adjusted) Intravenous Q24H   enoxaparin 40 mg Subcutaneous Daily   ertapenem 1 g Intravenous Q24H   insulin detemir 35 Units Subcutaneous Nightly   insulin lispro 0-9 Units Subcutaneous 4x Daily With Meals & Nightly   insulin lispro 14 Units Subcutaneous TID With Meals   losartan 100 mg Oral Daily   metoclopramide 10 mg Oral 4x Daily AC & at Bedtime   multivitamin 1 tablet Oral Daily   mupirocin  Topical Q24H   nebivolol 10 mg Oral Q24H   pantoprazole 40 mg Oral Q AM       Assessment/Plan     S/P BKA (below knee amputation), right (CMS/HCC)    Skin ulcer of toe of right foot with necrosis of bone (CMS/HCC)    Decreased pulses in feet    Blister (nonthermal), right foot, initial encounter    Gangrene (CMS/HCC)    Vascular calcification    Type 2 diabetes mellitus with diabetic polyneuropathy, without long-term current use of insulin (CMS/HCC)    HTN (hypertension)    CKD (chronic kidney disease)    Leukocytosis, likely reactive    Hyperkalemia    Anemia    Acute postoperative pain      Plan  1. PT/OT- NWB RLE  2. Pain control-prns, popliteal nerve block   3. IS-encouraged  4. DVT proph- mechs/Lovenox  5. Bowel regimen  6. Monitor post-op labs  7. DC planning for [facility]. CM following    [facility], Dr. Garcia  - continue daptomycin and ertapenem. Assuming OR cultures  negative, will treat for 3 days post op, with last dose on 6/7    HTN  - Continue home norvasc, bystolic, catapres, cozaar  - Hold HCTZ  - Monitor BP   - Holding parameters for BP meds  - Labetalol PRN for SBP>170     DM  - hgb A1c on 6/5/19 9.8  - Levemir nightly  - Mealtime bolus with holding paramenters  - Accu-Check AC and HS with moderate dose SSI     CKD (stable)    Hyponatremia, hypokalemia, resolved      VIPUL Alvarez  06/06/19  12:55 PM   I

## 2019-06-06 NOTE — PROGRESS NOTES
This is a note for 6/5/19- I saw the patient at 8:30am, and then had to go to the office.  I was so busy in the office that I forgot to enter the daily progress note for this patient.  I left the office at 8:30pm and did not realize that I had forgotten to put a note in until this morning.      S: he had reasonable pain control 6/5/19    O: no fever, vital signs stable,   PE: stump dressing dry     A/P: stable, plan for PT, [facility] placement.        -cultures pending (no sign of infection at the time of surgery at the amputation level      -HCT-30.6      -glucose 266

## 2019-06-06 NOTE — THERAPY TREATMENT NOTE
Acute Care - Physical Therapy Treatment Note  [facility]     Patient Name: Amol Aguirre  : 1945  MRN: [id number]  Today's Date: 2019  Onset of Illness/Injury or Date of Surgery: 19  Date of Referral to PT: 19  Referring Physician: Dr Juarez    Admit Date: 2019    Visit Dx:    ICD-10-CM ICD-9-CM   1. Gangrene (CMS/[facility]) I96 785.4   2. 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 Type 2 diabetes mellitus with diabetic polyneuropathy, without long-term current use of insulin (CMS/[facility]) E11.42 250.60     357.2   4. Vascular calcification I99.8 459.89   5. Decreased pulses in feet R09.89 785.9   6. 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 (CMS/[facility])   • Gangrene (CMS/[facility])   • S/P BKA (below knee amputation), right (CMS/[facility])   • HTN (hypertension)   • CKD (chronic kidney disease)   • Leukocytosis, likely reactive   • Hyperkalemia       Therapy Treatment    Rehabilitation Treatment Summary     Row Name 19 1110             Treatment Time/Intention    Discipline  physical therapist  -SC      Document Type  therapy note (daily note)  -SC      Subjective Information  no complaints  -SC      Mode of Treatment  physical therapy  -SC      Patient/Family Observations  wife  -SC      Care Plan Review  risks/benefits reviewed  -SC      Therapy Frequency (PT Clinical Impression)  daily  -SC      Patient Effort  excellent  -SC      Existing  Precautions/Restrictions  fall  -SC      Treatment Considerations/Comments  DR Juarez rewrapped stump this am  -SC      Patient Response to Treatment  improving  -SC      Recorded by [SC] Cyndy Fofana, PT 06/06/19 1205      Row Name 06/06/19 1110             Cognitive Assessment/Intervention- PT/OT    Orientation Status (Cognition)  oriented x 4  -SC      Follows Commands (Cognition)  follows one step commands;over 90% accuracy  -SC      Safety Deficit (Cognitive)  judgment  -SC      Recorded by [SC] Cyndy Fofana, PT 06/06/19 1205      Row Name 06/06/19 1110             Safety Issues, Functional Mobility    Safety Issues Affecting Function (Mobility)  insight into deficits/self awareness;judgment  -SC      Impairments Affecting Function (Mobility)  balance;motor control;coordination;sensation/sensory awareness;strength  -SC      Recorded by [SC] Cyndy Fofana, PT 06/06/19 1205      Row Name 06/06/19 1110             Mobility Assessment/Intervention    Extremity Weight-bearing Status  right lower extremity  -SC      Right Lower Extremity (Weight-bearing Status)  non weight-bearing (NWB)  -SC      Recorded by [SC] Cyndy Fofana, PT 06/06/19 1205      Row Name 06/06/19 1110             Bed Mobility Assessment/Treatment    Bed Mobility Assessment/Treatment  scooting/bridging;supine-sit  -SC      Scooting/Bridging [place] (Bed Mobility)  conditional independence;independent  -SC      Supine-Sit [place] (Bed Mobility)  conditional independence  -SC      Assistive Device (Bed Mobility)  bed rails  -SC      Recorded by [SC] Cyndy Fofana, PT 06/06/19 1205      Row Name 06/06/19 1110             Transfer Assessment/Treatment    Transfer Assessment/Treatment  sit-stand transfer;stand-sit transfer  -SC      Comment (Transfers)  cues for hand placement . Demonstrated good technique  -SC      Recorded by [SC] Cyndy Fofana, PT 06/06/19 1205      Row Name 06/06/19 1110             Sit-Stand Transfer     Sit-Stand Harper (Transfers)  verbal cues;contact guard;2 person assist  -SC      Assistive Device (Sit-Stand Transfers)  walker, front-wheeled  -SC      Recorded by [SC] Cyndy Fofana, PT 06/06/19 1205      Row Name 06/06/19 1110             Stand-Sit Transfer    Stand-Sit [place] (Transfers)  verbal cues;2 person assist;contact guard  -SC      Recorded by [SC] Cyndy Fofana, PT 06/06/19 1205      Row Name 06/06/19 1110             Gait/Stairs Assessment/Training    42922 - Gait Training Minutes   10  -SC      Gait/Stairs Assessment/Training  gait/ambulation independence  -SC      Harper Level (Gait)  verbal cues;contact guard;2 person assist  -SC      Assistive Device (Gait)  walker, front-wheeled  -SC      Distance in Feet (Gait)  20  -SC      Pattern (Gait)  step-to  -SC      Deviations/Abnormal Patterns (Gait)  stride length decreased;liza decreased  -SC      Comment (Gait/Stairs)  Cues for sequencing walker and pushing with arms. Followed by chair and sat when fatigued  -SC      Recorded by [SC] Cyndy Fofana, PT 06/06/19 1205      Row Name 06/06/19 1110             Therapeutic Exercise    35644 - PT Therapeutic Exercise Minutes  10  -SC      19304 - PT Therapeutic Activity Minutes  3  -SC      Recorded by [SC] Cyndy Fofana, PT 06/06/19 1205      Row Name 06/06/19 1110             Therapeutic Exercise    Lower Extremity (Therapeutic Exercise)  LAQ (long arc quad), right;quad sets, right;SLR (straight leg raise), right hip abd/add  -SC      Position (Therapeutic Exercise)  seated;supine  -SC      Sets/Reps (Therapeutic Exercise)  10-20  -SC      Recorded by [SC] yCndy Fofana, PT 06/06/19 1205      Row Name 06/06/19 1110             Gross Motor Coordination    Gross Motor Impairments  motor control  -SC      Recorded by [SC] Cyndy Fofana, PT 06/06/19 1205      Row Name 06/06/19 1110             Balance    Balance  static sitting balance;static standing balance;dynamic sitting  balance;dynamic standing balance  -SC      Recorded by [SC] Cyndy Fofana, PT 06/06/19 1205      Row Name 06/06/19 1110             Static Sitting Balance    Level of [place] (Unsupported Sitting, Static Balance)  independent  -SC      Sitting Position (Unsupported Sitting, Static Balance)  sitting on edge of bed  -SC      Recorded by [SC] Cyndy Fofana, PT 06/06/19 1205      Row Name 06/06/19 1110             Dynamic Sitting Balance    Level of [place], Reaches Outside Midline (Sitting, Dynamic Balance)  independent  -SC      Sitting Position, Reaches Outside Midline (Sitting, Dynamic Balance)  sitting on edge of bed  -SC      Recorded by [SC] Cyndy Fofana, PT 06/06/19 1205      Row Name 06/06/19 1110             Static Standing Balance    Level of [place] (Supported Standing, Static Balance)  2 person assist;contact guard assist  -SC      Assistive Device Utilized (Supported Standing, Static Balance)  walker, rolling  -SC      Comment (Supported Standing, Static Balance)  cues for keeping wt shifted on L side  -SC      Recorded by [SC] Cyndy Fofana, PT 06/06/19 1205      Row Name 06/06/19 1110             Dynamic Standing Balance    Level of [place], Reaches Outside Midline (Standing, Dynamic Balance)  contact guard assist;2 person assist  -SC      Assistive Device Utilized (Supported Standing, Dynamic Balance)  walker, rolling  -SC      Comment, Reaches Outside Midline (Standing, Dynamic Balance)  cues for correct step length  -SC      Recorded by [SC] Cyndy Fofana, PT 06/06/19 1205      Row Name 06/06/19 1110             Positioning and Restraints    Pre-Treatment Position  in bed  -SC      Post Treatment Position  chair  -SC      In Chair  sitting;reclined;notified nsg;call light within reach;encouraged to call for assist;exit alarm on  -SC      Recorded by [SC] Cyndy Fofana, PT 06/06/19 1205      Row Name 06/06/19 1110             Pain Assessment    Additional Documentation   Pain Scale: FACES Pre/Post-Treatment (Group)  -SC      Recorded by [SC] Cyndy Fofana, PT 06/06/19 1205      Row Name 06/06/19 1110             Pain Scale: Numbers Pre/Post-Treatment    Pain Location - Side  Right  -SC      Pain Location - Orientation  lower  -SC      Pain Location  extremity  -SC      Recorded by [SC] Cyndy Fofana, PT 06/06/19 1205      Row Name 06/06/19 1110             Pain Scale: FACES Pre/Post-Treatment    Pain: FACES Scale, Pretreatment  2-->hurts little bit  -SC      Pain: FACES Scale, Post-Treatment  2-->hurts little bit  -SC      Recorded by [SC] Cyndy Fofana, PT 06/06/19 1205      Row Name                Wound 06/04/19 1309 Right leg incision    Wound - Properties Group Date first assessed: 06/04/19 [LD] Time first assessed: 1309 [LD] Side: Right [LD] Location: leg [LD] Type: incision [LD] Recorded by:  [LD] Farrah Arauz RN 06/04/19 1309    Row Name 06/06/19 1110             Coping    Observed Emotional State  calm;cooperative  -SC      Verbalized Emotional State  acceptance  -SC      Recorded by [SC] Cyndy Fofana, PT 06/06/19 1205      Row Name 06/06/19 1110             Plan of Care Review    Plan of Care Reviewed With  patient  -SC      Recorded by [SC] Cyndy Fofana, PT 06/06/19 1205      Row Name 06/06/19 1110             Outcome Summary/Treatment Plan (PT)    Daily Summary of Progress (PT)  progress toward functional goals is good  -SC      Recorded by [SC] Cyndy Fofana, PT 06/06/19 1205        User Key  (r) = Recorded By, (t) = Taken By, (c) = Cosigned By    Initials Name Effective Dates Discipline    SC Cyndy Fofana, PT 06/19/15 -  PT    LD Farrah Arauz RN 06/16/16 -  Nurse          Wound 06/04/19 1309 Right leg incision (Active)   Dressing Appearance dry;intact;no drainage 6/6/2019  8:15 AM   Closure ASHLEE 6/6/2019  8:15 AM   Base dressing in place, unable to visualize 6/6/2019  8:15 AM   Drainage Amount none 6/6/2019  8:15 AM   Dressing Care, Wound  elastic bandage 6/6/2019  8:15 AM           Physical Therapy Education     Title: PT OT SLP Therapies (In Progress)     Topic: Physical Therapy (In Progress)     Point: Mobility training (In Progress)     Learning Progress Summary           Patient Eager, E, NR by SC at 6/6/2019 11:10 AM    Comment:  reviewed safety with mobility    Eager, E, VU,DU,NR by SC at 6/5/2019  8:50 AM    Comment:  reviewed safety with mobility                   Point: Home exercise program (In Progress)     Learning Progress Summary           Patient Eager, E, NR by SC at 6/6/2019 11:10 AM    Comment:  reviewed safety with mobility    Eager, E, VU,DU,NR by SC at 6/5/2019  8:50 AM    Comment:  reviewed safety with mobility                   Point: Body mechanics (In Progress)     Learning Progress Summary           Patient Eager, E, NR by SC at 6/6/2019 11:10 AM    Comment:  reviewed safety with mobility    Eager, E, VU,DU,NR by SC at 6/5/2019  8:50 AM    Comment:  reviewed safety with mobility                   Point: Precautions (In Progress)     Learning Progress Summary           Patient Eager, E, NR by SC at 6/6/2019 11:10 AM    Comment:  reviewed safety with mobility    Eager, E, VU,DU,NR by SC at 6/5/2019  8:50 AM    Comment:  reviewed safety with mobility                               User Key     Initials Effective Dates Name Provider Type Discipline    SC 06/19/15 -  Cyndy Fofana, PT Physical Therapist PT                PT Recommendation and Plan  Anticipated Discharge Disposition (PT): inpatient rehabilitation facility  Planned Therapy Interventions (PT Eval): bed mobility training, gait training, home exercise program, patient/family education, strengthening, transfer training, wheelchair management/propulsion training  Therapy Frequency (PT Clinical Impression): daily  Outcome Summary/Treatment Plan (PT)  Daily Summary of Progress (PT): progress toward functional goals is good  Anticipated Equipment Needs at Discharge (PT):  (NEW W/C)  Anticipated Discharge Disposition (PT): inpatient rehabilitation facility  Patient/Family Concerns, Anticipated Discharge Disposition (PT): wife conserned about caring for patient  Plan of Care Reviewed With: patient  Progress: improving  Outcome Summary: Patient demonstrated improved motor control with exercises and gait. He ambulated 20 feet with contact assistance. He fatigues quickly.  Outcome Measures     Row Name 06/06/19 1110 06/05/19 0850          How much help from another person do you currently need...    Turning from your back to your side while in flat bed without using bedrails?  3  -SC  3  -SC     Moving from lying on back to sitting on the side of a flat bed without bedrails?  3  -SC  3  -SC     Moving to and from a bed to a chair (including a wheelchair)?  3  -SC  2  -SC     Standing up from a chair using your arms (e.g., wheelchair, bedside chair)?  3  -SC  2  -SC     Climbing 3-5 steps with a railing?  1  -SC  1  -SC     To walk in hospital room?  3  -SC  2  -SC     AM-PAC 6 Clicks Score  16  -SC  13  -SC        Functional Assessment    Outcome Measure Options  AM-PAC 6 Clicks Basic Mobility (PT)  -SC  AM-PAC 6 Clicks Basic Mobility (PT)  -SC       User Key  (r) = Recorded By, (t) = Taken By, (c) = Cosigned By    Initials Name Provider Type    SC Cyndy Fofana, PT Physical Therapist         Time Calculation:   PT Charges     Row Name 06/06/19 1207 06/06/19 1110          Time Calculation    Start Time  --  1110  -SC     PT Received On  --  06/06/19  -SC     PT Goal Re-Cert Due Date  --  06/15/19  -SC        Time Calculation- PT    TCU Minutes- PT  23 min  -SC  --        Timed Charges    22072 - PT Therapeutic Exercise Minutes  --  10  -SC     29253 - Gait Training Minutes   --  10  -SC     66592 - PT Therapeutic Activity Minutes  --  3  -SC       User Key  (r) = Recorded By, (t) = Taken By, (c) = Cosigned By    Initials Name Provider Type    Cyndy Deluna, PT Physical Therapist         Therapy Charges for Today     Code Description Service Date Service Provider Modifiers Qty    05893442893 HC PT THER PROC EA 15 MIN 6/5/2019 Ct, Cyndy ARCOS, PT GP 1    27514012174 HC PT EVAL MOD COMPLEXITY 4 6/5/2019 Ct, Cyndy ARCOS, PT GP 1    45126872741 HC PT THER SUPP EA 15 MIN 6/5/2019 Ct, Kalion JOSSELYN, PT GP 2    16730940729 HC PT THER PROC EA 15 MIN 6/6/2019 Ct, Cyndy ARCOS, PT GP 1    63715992584 HC GAIT TRAINING EA 15 MIN 6/6/2019 Ct, Cyndy ARCOS, PT GP 1    15034207718 HC PT THER SUPP EA 15 MIN 6/6/2019 Ct, Cyndy ARCOS, PT GP 2          PT G-Codes  Outcome Measure Options: AM-PAC 6 Clicks Basic Mobility (PT)  AM-PAC 6 Clicks Score: 16    Cyndy POSADA Ct, PT  6/6/2019

## 2019-06-06 NOTE — PLAN OF CARE
Problem: Patient Care Overview  Goal: Plan of Care Review  Outcome: Ongoing (interventions implemented as appropriate)   06/06/19 3316   Coping/Psychosocial   Plan of Care Reviewed With spouse;patient   OTHER   Outcome Summary Pt CGA for t/f and lower body dressing task. Pt limited by pain and balance. Pt requiring assist with ADLs and mobility. Recommend D/C to inpatient rehabilitation.

## 2019-06-06 NOTE — THERAPY EVALUATION
Acute Care - Occupational Therapy Initial Evaluation  [facility]     Patient Name: Amol Aguirre  : 1945  MRN: [id number]  Today's Date: 2019  Onset of Illness/Injury or Date of Surgery: (P) 01  Date of Referral to OT: (P) 19  Referring Physician: MD Weber (P).    Admit Date: 2019       ICD-10-CM ICD-9-CM   1. Impaired mobility and ADLs Z74.09 799.89   2. Gangrene (CMS/[facility]) I96 785.4   3. Skin ulcer of toe of right foot with necrosis of bone (CMS/HCC) L97.514 707.15   4. Type 2 diabetes mellitus with diabetic polyneuropathy, without long-term current use of insulin (CMS/HCC) E11.42 250.60     357.2   5. Vascular calcification I99.8 459.89   6. Decreased pulses in feet R09.89 785.9   7. Blister (nonthermal), right foot, initial encounter S90.821A 917.2     Patient Active Problem List   Diagnosis   • Right foot pain   • Decreased pulses in feet   • Vascular calcification   • Uncontrolled type 2 diabetes mellitus with hyperglycemia (CMS/HCC)   • Type 2 diabetes mellitus with diabetic polyneuropathy, without long-term current use of insulin (CMS/HCC)   • Mass of lesser toe of right foot   • Blister (nonthermal), right foot, initial encounter   • Diabetic foot ulcers (CMS/HCC)   • Skin ulcer of toe of right foot with necrosis of bone (CMS/[facility])   • Gangrene (CMS/HCC)   • S/P BKA (below knee amputation), right (CMS/HCC)   • HTN (hypertension)   • CKD (chronic kidney disease)   • Leukocytosis, likely reactive   • Hyperkalemia   • Anemia   • Acute postoperative pain     Past Medical History:   Diagnosis Date   • Acid reflux    • Asthma    • Diabetes (CMS/HCC)    • Hypertension      Past Surgical History:   Procedure Laterality Date   • AORTAGRAM N/A 2019    Procedure: AORTAGRAM WITH OR WITHOUT RUNOFFS;  Surgeon: Carrillo White MD;  Location: [place] [address];  Service: Vascular   • BELOW KNEE AMPUTATION Right 2019    Procedure: BELOW KNEE AMPUTATION RIGHT;  Surgeon:  Juju Weber MD;  Location: [facility];  Service: Orthopedics   • CHOLECYSTECTOMY     • KNEE SURGERY Right     TKA          OT ASSESSMENT FLOWSHEET (last 12 hours)      Occupational Therapy Evaluation     Row Name 06/06/19 1711                   OT Evaluation Time/Intention    Subjective Information  no complaints  (Pended)   -        Document Type  evaluation  (Pended)   -        Mode of Treatment  occupational therapy  (Pended)   -        Patient Effort  good  (Pended)   -        Symptoms Noted During/After Treatment  none  (Pended)   -        Comment  I attest as a qualified practitioner that I was present for the entire session of this patient's care without engagement in other tasks and that the student's participation was guided by my direction and skilled clinical judgement.   (Pended)   -           General Information    Patient Profile Reviewed?  yes  (Pended)   -        Onset of Illness/Injury or Date of Surgery  06/04/01  (Pended)   -        Referring Physician  MD Tia.  (Pended)   -        Patient Observations  alert;cooperative;agree to therapy  (Pended)   -        Patient/Family Observations  Spouse present.  (Pended)   -        Prior Level of Function  independent:;feeding;grooming;dressing;bathing;cooking;cleaning;transfer  (Pended)   -        Equipment Currently Used at Home  wheelchair;walker, rolling;shower chair  (Pended)   -        Pertinent History of Current Functional Problem  Pt with gangrene of R foot and history of DM2. Pt POD#2 R BKA.  (Pended)   -        Existing Precautions/Restrictions  fall  (Significant)   (Pended)  Nerve Catheter. R BKA  -        Risks Reviewed  patient:;spouse/S.O.:;LOB;nausea/vomiting;dizziness;increased discomfort;change in vital signs;increased drainage;lines disloged  (Pended)   -        Benefits Reviewed  patient:;spouse/S.O.:;improve function;increase independence;increase strength;increase balance;decrease  pain;decrease risk of DVT;improve skin integrity;increase knowledge  (Pended)   -        Barriers to Rehab  none identified  (Pended)   -           Relationship/Environment    Lives With  spouse  (Pended)   -        Family Caregiver if Needed  spouse  (Pended)   -           Resource/Environmental Concerns    Current Living Arrangements  home/apartment/condo  (Pended)   -        Resource/Environmental Concerns  home accessibility  (Pended)   -        Home Accessibility Concerns  bathroom not accessible  (Pended)   -           Cognitive Assessment/Interventions    Additional Documentation  Cognitive Assessment/Intervention (Group)  (Pended)   -           Cognitive Assessment/Intervention- PT/OT    Affect/Mental Status (Cognitive)  WNL  (Pended)   -        Orientation Status (Cognition)  oriented x 4  (Pended)   -        Follows Commands (Cognition)  follows one step commands;over 90% accuracy  (Pended)   -        Personal Safety Interventions  fall prevention program maintained;gait belt;nonskid shoes/slippers when out of bed  (Pended)   -           Safety Issues, Functional Mobility    Impairments Affecting Function (Mobility)  balance;pain;sensation/sensory awareness  (Pended)   -           Mobility Assessment/Treatment    Extremity Weight-bearing Status  right lower extremity  (Pended)   -        Right Lower Extremity (Weight-bearing Status)  non weight-bearing (NWB)  (Pended)   -           Bed Mobility Assessment/Treatment    Bed Mobility Assessment/Treatment  scooting/bridging;supine-sit;sit-supine  (Pended)   -        Scooting/Bridging [place] (Bed Mobility)  supervision  (Pended)   -        Supine-Sit [place] (Bed Mobility)  supervision  (Pended)   -        Sit-Supine [place] (Bed Mobility)  supervision  (Pended)   -        Bed Mobility, Safety Issues  decreased use of legs for bridging/pushing  (Pended)   -        Assistive Device (Bed Mobility)  bed  rails;head of bed elevated  (Pended)   -        Comment (Bed Mobility)  Pt able to maintain NWB status during bed mobility.  (Pended)   -           Functional Mobility    Functional Mobility- Ind. Level  not tested  (Pended)   -           Transfer Assessment/Treatment    Transfer Assessment/Treatment  sit-stand transfer;stand-sit transfer  (Pended)   -        Maintains Weight-bearing Status (Transfers)  able to maintain  (Pended)   -        Comment (Transfers)  Pt with no increased dizzines in standing.   (Pended)   -           Sit-Stand Transfer    Sit-Stand [place] (Transfers)  contact guard;verbal cues  (Pended)   -        Assistive Device (Sit-Stand Transfers)  walker, front-wheeled  (Pended)   -           Stand-Sit Transfer    Stand-Sit [place] (Transfers)  contact guard;verbal cues  (Pended)   -        Assistive Device (Stand-Sit Transfers)  walker, front-wheeled  (Pended)   -           ADL Assessment/Intervention    BADL Assessment/Intervention  lower body dressing;bathing  (Pended)   -           Lower Body Dressing Assessment/Training    Lower Body Dressing [place] Level  doff;don;socks;contact guard assist  (Pended)   -        Lower Body Dressing Position  edge of bed sitting  (Pended)   -        Comment (Lower Body Dressing)  Pt able to cross leg to don/doff sock.   (Pended)   -           BADL Safety/Performance    Impairments, BADL Safety/Performance  balance;pain;sensory awareness  (Pended)   -           General ROM    GENERAL ROM COMMENTS  BUE WFL  (Pended)   -           MMT (Manual Muscle Testing)    General MMT Comments  BUE 5/5  (Pended)   -           Motor Assessment/Interventions    Additional Documentation  Balance (Group)  (Pended)   -           Balance    Balance  static sitting balance;static standing balance;dynamic sitting balance  (Pended)   -           Static Sitting Balance    Level of [place] (Unsupported Sitting, Static Balance)   supervision  (Pended)   -        Sitting Position (Unsupported Sitting, Static Balance)  sitting on edge of bed  (Pended)   -        Time Able to Maintain Position (Unsupported Sitting, Static Balance)  3 to 4 minutes  (Pended)   -           Dynamic Sitting Balance    Level of [place], Reaches Outside Midline (Sitting, Dynamic Balance)  supervision  (Pended)   -        Sitting Position, Reaches Outside Midline (Sitting, Dynamic Balance)  sitting on edge of bed  (Pended)   -        Comment, Reaches Outside Midline (Sitting, Dynamic Balance)  Pt with no LOB during LB dressing task.   (Pended)   -           Static Standing Balance    Level of [place] (Supported Standing, Static Balance)  contact guard assist  (Pended)   -        Time Able to Maintain Position (Supported Standing, Static Balance)  15 to 30 seconds  (Pended)   -        Assistive Device Utilized (Supported Standing, Static Balance)  walker, rolling  (Pended)   -        Comment (Supported Standing, Static Balance)  Pt with no LOB during standing.   (Pended)   -           Positioning and Restraints    Pre-Treatment Position  in bed  (Pended)   -        Post Treatment Position  bed  (Pended)   -        In Bed  supine;call light within reach;encouraged to call for assist;exit alarm on;with family/caregiver;RLE elevated  (Pended)   -           Pain Assessment    Additional Documentation  Pain Scale: Numbers Pre/Post-Treatment (Group)  (Pended)   -           Pain Scale: Numbers Pre/Post-Treatment    Pain Scale: Numbers, Pretreatment  3/10  (Pended)   -        Pain Scale: Numbers, Post-Treatment  3/10  (Pended)   -        Pain Location - Side  Right  (Pended)   -        Pain Location - Orientation  generalized  (Pended)   -        Pain Location  knee  (Pended)   -        Pre/Post Treatment Pain Comment  Tolerated.  (Pended)   -        Pain Intervention(s)  Repositioned;Ambulation/increased activity  (Pended)    -CH           Wound 06/04/19 1309 Right leg incision    Wound - Properties Group Date first assessed: 06/04/19  -LD Time first assessed: 1309  -LD Side: Right  -LD Location: leg  -LD Type: incision  -LD       Clinical Impression (OT)    Date of Referral to OT  06/04/19  (Pended)   -        OT Diagnosis  Decreased independence with ADLs  (Pended)   -CH        Patient/Family Goals Statement (OT Eval)  Return to PLOF.   (Pended)   -CH        Criteria for Skilled Therapeutic Interventions Met (OT Eval)  yes;treatment indicated  (Pended)   -        Rehab Potential (OT Eval)  good, to achieve stated therapy goals  (Pended)   -        Therapy Frequency (OT Eval)  daily  (Pended)   -        Anticipated Discharge Disposition (OT)  inpatient rehabilitation facility  (Pended)   -           Vital Signs    Pre Systolic BP Rehab  147  (Pended)   -CH        Pre Treatment Diastolic BP  75  (Pended)   -CH        Pretreatment Heart Rate (beats/min)  61  (Pended)   -CH        Posttreatment Heart Rate (beats/min)  67  (Pended)   -CH        Pre SpO2 (%)  94  (Pended)   -CH        O2 Delivery Pre Treatment  room air  (Pended)   -CH        Post SpO2 (%)  94  (Pended)   -CH        O2 Delivery Post Treatment  room air  (Pended)   -CH        Pre Patient Position  Supine  (Pended)   -CH        Intra Patient Position  Standing  (Pended)   -CH        Post Patient Position  Supine  (Pended)   -           OT Goals    Transfer Goal Selection (OT)  transfer, OT goal 1  (Pended)   -        Dressing Goal Selection (OT)  dressing, OT goal 1  (Pended)   -        Toileting Goal Selection (OT)  toileting, OT goal 1  (Pended)   -        Balance Goal Selection (OT)  balance, OT goal 1  (Pended)   -        Additional Documentation  —  (Pended)   -           Transfer Goal 1 (OT)    Activity/Assistive Device (Transfer Goal 1, OT)  sit-to-stand/stand-to-sit;toilet  (Pended)   -        [place] Level/Cues Needed (Transfer Goal 1, OT)   minimum assist (75% or more patient effort);verbal cues required  (Pended)   -        Time Frame (Transfer Goal 1, OT)  long term goal (LTG);by discharge  (Pended)   -CH        Progress/Outcome (Transfer Goal 1, OT)  goal ongoing  (Pended)   -           Dressing Goal 1 (OT)    Activity/Assistive Device (Dressing Goal 1, OT)  lower body dressing  (Pended)  Don/Doff sock and pants.  -        [place]/Cues Needed (Dressing Goal 1, OT)  minimum assist (75% or more patient effort);verbal cues required  (Pended)   -        Time Frame (Dressing Goal 1, OT)  long term goal (LTG);by discharge  (Pended)   -        Progress/Outcome (Dressing Goal 1, OT)  goal ongoing  (Pended)   -           Toileting Goal 1 (OT)    Activity/Device (Toileting Goal 1, OT)  adjust/manage clothing;perform perineal hygiene;commode, bedside without drop arms  (Pended)   -        [place] Level/Cues Needed (Toileting Goal 1, OT)  minimum assist (75% or more patient effort);verbal cues required  (Pended)   -        Time Frame (Toileting Goal 1, OT)  long term goal (LTG);by discharge  (Pended)   -        Progress/Outcome (Toileting Goal 1, OT)  goal ongoing  (Pended)   -           Balance Goal 1 (OT)    Activity/Assistive Device (Balance Goal 1, OT)  standing, dynamic  (Pended)  While completeing LB dressing.   -        [place] Level/Cues Needed (Balance Goal 1, OT)  moderate assist (50-74% patient effort);verbal cues required  (Pended)   -        Time Frame (Balance Goal 1, OT)  long term goal (LTG);by discharge  (Pended)   -        Progress/Outcomes (Balance Goal 1, OT)  goal ongoing  (Pended)   -           Living Environment    Home Accessibility  tub/shower is not walk in  (Pended)   -          User Key  (r) = Recorded By, (t) = Taken By, (c) = Cosigned By    Initials Name Effective Dates    Farrah Sepulveda RN 06/16/16 -     CH Ray Lyles, OT Student 03/13/19 -          Occupational  Therapy Education     Title: PT OT SLP Therapies (In Progress)     Topic: Occupational Therapy (In Progress)     Point: ADL training (Done)     Description: Instruct learner(s) on proper safety adaptation and remediation techniques during self care or transfers.   Instruct in proper use of assistive devices.    Learning Progress Summary           Patient Acceptance, E, VU by  at 6/6/2019  5:11 PM    Comment:  Pt educated on safe t/f techniques, the benefits of therapy and the role of OT.   Significant Other Acceptance, E, VU by  at 6/6/2019  5:11 PM    Comment:  Pt educated on safe t/f techniques, the benefits of therapy and the role of OT.                   Point: Precautions (Done)     Description: Instruct learner(s) on prescribed precautions during self-care and functional transfers.    Learning Progress Summary           Patient Acceptance, E, VU by  at 6/6/2019  5:11 PM    Comment:  Pt educated on safe t/f techniques, the benefits of therapy and the role of OT.   Significant Other Acceptance, E, VU by  at 6/6/2019  5:11 PM    Comment:  Pt educated on safe t/f techniques, the benefits of therapy and the role of OT.                   Point: Body mechanics (Done)     Description: Instruct learner(s) on proper positioning and spine alignment during self-care, functional mobility activities and/or exercises.    Learning Progress Summary           Patient Acceptance, E, VU by  at 6/6/2019  5:11 PM    Comment:  Pt educated on safe t/f techniques, the benefits of therapy and the role of OT.   Significant Other Acceptance, E, VU by  at 6/6/2019  5:11 PM    Comment:  Pt educated on safe t/f techniques, the benefits of therapy and the role of OT.                               User Key     Initials Effective Dates Name Provider Type Discipline     03/13/19 -  Ray Lyles OT Student OT Student OT                  OT Recommendation and Plan  Outcome Summary/Treatment Plan (OT)  Anticipated  Discharge Disposition (OT): (P) inpatient rehabilitation facility  Therapy Frequency (OT Eval): (P) daily  Plan of Care Review  Plan of Care Reviewed With: (P) spouse, patient  Plan of Care Reviewed With: (P) spouse, patient  Outcome Summary: (P) Pt CGA for t/f and lower body dressing task. Pt limited by pain and balance. Pt requiring assist with ADLs and mobility. Recommend D/C to inpatient rehabilitation.    Outcome Measures     Row Name 06/06/19 1711 06/06/19 1110 06/05/19 0850       How much help from another person do you currently need...    Turning from your back to your side while in flat bed without using bedrails?  —  3  -SC  3  -SC    Moving from lying on back to sitting on the side of a flat bed without bedrails?  —  3  -SC  3  -SC    Moving to and from a bed to a chair (including a wheelchair)?  —  3  -SC  2  -SC    Standing up from a chair using your arms (e.g., wheelchair, bedside chair)?  —  3  -SC  2  -SC    Climbing 3-5 steps with a railing?  —  1  -SC  1  -SC    To walk in hospital room?  —  3  -SC  2  -SC    AM-PAC 6 Clicks Score  —  16  -SC  13  -SC       How much help from another is currently needed...    Putting on and taking off regular lower body clothing?  2  (Pended)   -CH  —  —    Bathing (including washing, rinsing, and drying)  2  (Pended)   -CH  —  —    Toileting (which includes using toilet bed pan or urinal)  2  (Pended)   -CH  —  —    Putting on and taking off regular upper body clothing  3  (Pended)   -CH  —  —    Taking care of personal grooming (such as brushing teeth)  3  (Pended)   -CH  —  —    Eating meals  3  (Pended)   -CH  —  —    Score  15  (Pended)   -AR (r) CH (t)  —  —       Functional Assessment    Outcome Measure Options  AM-PAC 6 Clicks Daily Activity (OT)  (Pended)   -CH  AM-PAC 6 Clicks Basic Mobility (PT)  -SC  AM-PAC 6 Clicks Basic Mobility (PT)  -SC      User Key  (r) = Recorded By, (t) = Taken By, (c) = Cosigned By    Initials Name Provider Type    ANAN Fofana,  Cyndy ARCOS, PT Physical Therapist    Erica Lim, OT Occupational Therapist    CH Ray Lyles, OT Student OT Student          Time Calculation:   Time Calculation- OT     Row Name 06/06/19 1711 06/06/19 1110          Time Calculation- OT    OT Start Time  1711  (Pended)   -CH  —     OT Received On  06/06/19  (Pended)   -CH  —     OT Goal Re-Cert Due Date  06/16/19  (Pended)   -CH  —        Timed Charges    03379 - Gait Training Minutes   —  10  -SC       User Key  (r) = Recorded By, (t) = Taken By, (c) = Cosigned By    Initials Name Provider Type    Cyndy Deluna, PT Physical Therapist    CH Ray Lyles, OT Student OT Student        Therapy Charges for Today     Code Description Service Date Service Provider Modifiers Qty    [id number]  OT EVAL LOW COMPLEXITY 4 6/6/2019 Ray Lyles, OT Student GO 1               JEROME Hinson  6/6/2019

## 2019-06-06 NOTE — PLAN OF CARE
Problem: Patient Care Overview  Goal: Plan of Care Review  Outcome: Ongoing (interventions implemented as appropriate)   06/06/19 1540   Coping/Psychosocial   Plan of Care Reviewed With patient   Plan of Care Review   Progress improving   OTHER   Outcome Summary Patient has been able to ambulate aprox 20 ft. Has been up to chair today. Had a BM this moring better glucose control today. Moderate RLE pain managed with Norco. Able to void vitals stable. Dressing CDI will continue to monitor        Problem: Skin Injury Risk (Adult)  Goal: Identify Related Risk Factors and Signs and Symptoms  Outcome: Ongoing (interventions implemented as appropriate)   06/06/19 1540   Skin Injury Risk (Adult)   Related Risk Factors (Skin Injury Risk) advanced age;mobility impaired       Problem: Fall Risk (Adult)  Goal: Identify Related Risk Factors and Signs and Symptoms  Outcome: Outcome(s) achieved Date Met: 06/06/19 06/06/19 1540   Fall Risk (Adult)   Related Risk Factors (Fall Risk) age-related changes;fatigue/slow reaction;gait/mobility problems   Signs and Symptoms (Fall Risk) presence of risk factors     Goal: Absence of Fall  Outcome: Ongoing (interventions implemented as appropriate)   06/06/19 1540   Fall Risk (Adult)   Absence of Fall achieves outcome

## 2019-06-06 NOTE — PROGRESS NOTES
Orthopedic  Progress Note    Subjective     Post-Operative Day: 2 Days Post-Ops/p right BKA    Systemic or Specific Complaints: some pain over night  Objective     Vital signs in last 24 hours:  Temp:  [97.4 °F (36.3 °C)-97.9 °F (36.6 °C)] 97.5 °F (36.4 °C)  Heart Rate:  [59-78] 78  Resp:  [16-18] 16  BP: (105-162)/(60-84) 162/84    Neurovascular: No change in bilateral dense neuropathy   Wound: Right stump dressing changed, healing well, no erythema, no necrosis, minimal swelling, small spot of old blood on dressing         Data Review  Lab Results (last 24 hours)     Procedure Component Value Units Date/Time    Tissue / Bone Culture - Tissue, Leg, Right [[id number]] Collected:  06/04/19 1301    Specimen:  Tissue from Leg, Right Updated:  06/06/19 1048     Tissue Culture No growth at 2 days     Gram Stain No WBCs or organisms seen    POC Glucose Once [[id number]]  (Normal) Collected:  06/06/19 0728    Specimen:  Blood Updated:  06/06/19 0730     Glucose 123 mg/dL     Basic Metabolic Panel [[id number]]  (Abnormal) Collected:  06/06/19 0540    Specimen:  Blood Updated:  06/06/19 0727     Glucose 140 mg/dL      BUN 33 mg/dL      Creatinine 1.19 mg/dL      Sodium 138 mmol/L      Potassium 4.2 mmol/L      Chloride 103 mmol/L      CO2 23.0 mmol/L      Calcium 9.6 mg/dL      eGFR Non African Amer 59 mL/min/1.73      BUN/Creatinine Ratio 27.7     Anion Gap 12.0 mmol/L     Narrative:       GFR Normal >60  Chronic Kidney Disease <60  Kidney Failure <15    CBC (No Diff) [[id number]]  (Abnormal) Collected:  06/06/19 0540    Specimen:  Blood Updated:  06/06/19 0600     WBC 10.41 10*3/mm3      RBC 3.30 10*6/mm3      Hemoglobin 9.1 g/dL      Hematocrit 27.8 %      MCV 84.2 fL      MCH 27.6 pg      MCHC 32.7 g/dL      RDW 13.8 %      RDW-SD 41.8 fl      MPV 9.2 fL      Platelets 381 10*3/mm3     POC Glucose Once [[id number]]  (Abnormal) Collected:  06/05/19 2032    Specimen:  Blood Updated:  06/05/19 2037     Glucose 208 mg/dL     POC  Glucose Once [[id number]]  (Abnormal) Collected:  06/05/19 1550    Specimen:  Blood Updated:  06/05/19 1601     Glucose 230 mg/dL     POC Glucose Once [[id number]]  (Abnormal) Collected:  06/05/19 1136    Specimen:  Blood Updated:  06/05/19 1149     Glucose 242 mg/dL     AFB Culture - Tissue, Leg, Right [[id number]] Collected:  06/04/19 1301    Specimen:  Tissue from Leg, Right Updated:  06/05/19 1117     AFB Stain No acid fast bacilli seen on concentrated smear            Assessment/Plan     Status post- right BKA- doing well, some pain overnight      -rehab placement, at risk for falls at home, his wife cannot lift him     -HCT 27- blood loss anemia, will check in am     -cultures pending, if negative stop antibiotics tomorrow.       -small ulcer left toe, clean no sign of infection           Juju Weber MD    Date: 6/6/2019  Time: 11:13 AM

## 2019-06-06 NOTE — PROGRESS NOTES
[facility]    Acute pain service Inpatient Progress Note    Patient Name: Amol Aguirre  :  1945  MRN:  [id number]        Acute Pain  Service Inpatient Progress Note:    Analgesia:Good  Pain Score:4/10  LOC: alert and awake  Resp Status: room air  Cardiac: VS stable  Side Effects:None  Catheter Site:clean, dressing intact and dry  Cath type: peripheral nerve cath with ON Q  Infusion rate: 8ml/hr  Catheter Plan:Catheter to remain Insitu and Continue catheter infusion rate unchanged  Comments: Patient having pain in the femoral nerve distribution.

## 2019-06-06 NOTE — PLAN OF CARE
Problem: Patient Care Overview  Goal: Plan of Care Review  Outcome: Ongoing (interventions implemented as appropriate)   06/06/19 0558   Coping/Psychosocial   Plan of Care Reviewed With patient   Plan of Care Review   Progress no change   OTHER   Outcome Summary ropivican running at 8ML/HR, VSS, minimal pain, 1 PRn pain intervention needed during the night, voids well, will continue to monitor for changes.        Problem: Pain, Acute (Adult)  Goal: Identify Related Risk Factors and Signs and Symptoms  Outcome: Ongoing (interventions implemented as appropriate)      Problem: Skin Injury Risk (Adult)  Goal: Identify Related Risk Factors and Signs and Symptoms  Outcome: Ongoing (interventions implemented as appropriate)      Problem: Fall Risk (Adult)  Goal: Identify Related Risk Factors and Signs and Symptoms  Outcome: Ongoing (interventions implemented as appropriate)      Problem: Diabetes, Type 2 (Adult)  Goal: Signs and Symptoms of Listed Potential Problems Will be Absent, Minimized or Managed (Diabetes, Type 2)  Outcome: Ongoing (interventions implemented as appropriate)

## 2019-06-07 LAB
BACTERIA SPEC AEROBE CULT: NORMAL
DEPRECATED RDW RBC AUTO: 44 FL (ref 37–54)
ERYTHROCYTE [DISTWIDTH] IN BLOOD BY AUTOMATED COUNT: 13.8 % (ref 12.3–15.4)
GLUCOSE BLDC GLUCOMTR-MCNC: 129 MG/DL (ref 70–130)
GLUCOSE BLDC GLUCOMTR-MCNC: 134 MG/DL (ref 70–130)
GLUCOSE BLDC GLUCOMTR-MCNC: 171 MG/DL (ref 70–130)
GLUCOSE BLDC GLUCOMTR-MCNC: 203 MG/DL (ref 70–130)
GRAM STN SPEC: NORMAL
HCT VFR BLD AUTO: 28.9 % (ref 37.5–51)
HGB BLD-MCNC: 8.9 G/DL (ref 13–17.7)
MCH RBC QN AUTO: 26.8 PG (ref 26.6–33)
MCHC RBC AUTO-ENTMCNC: 30.8 G/DL (ref 31.5–35.7)
MCV RBC AUTO: 87 FL (ref 79–97)
PLATELET # BLD AUTO: 364 10*3/MM3 (ref 140–450)
PMV BLD AUTO: 10 FL (ref 6–12)
RBC # BLD AUTO: 3.32 10*6/MM3 (ref 4.14–5.8)
WBC NRBC COR # BLD: 7.99 10*3/MM3 (ref 3.4–10.8)

## 2019-06-07 PROCEDURE — 25010000002 ERTAPENEM PER 500 MG: Performed by: INTERNAL MEDICINE

## 2019-06-07 PROCEDURE — 63710000001 INSULIN DETEMIR PER 5 UNITS: Performed by: INTERNAL MEDICINE

## 2019-06-07 PROCEDURE — 63710000001 INSULIN LISPRO (HUMAN) PER 5 UNITS: Performed by: NURSE PRACTITIONER

## 2019-06-07 PROCEDURE — 99024 POSTOP FOLLOW-UP VISIT: CPT | Performed by: ORTHOPAEDIC SURGERY

## 2019-06-07 PROCEDURE — 85027 COMPLETE CBC AUTOMATED: CPT | Performed by: ORTHOPAEDIC SURGERY

## 2019-06-07 PROCEDURE — 82962 GLUCOSE BLOOD TEST: CPT

## 2019-06-07 PROCEDURE — 25010000002 DAPTOMYCIN PER 1 MG: Performed by: INTERNAL MEDICINE

## 2019-06-07 PROCEDURE — 97116 GAIT TRAINING THERAPY: CPT

## 2019-06-07 PROCEDURE — 25010000002 ENOXAPARIN PER 10 MG: Performed by: ORTHOPAEDIC SURGERY

## 2019-06-07 PROCEDURE — 63710000001 DIPHENHYDRAMINE PER 50 MG: Performed by: ORTHOPAEDIC SURGERY

## 2019-06-07 PROCEDURE — 97110 THERAPEUTIC EXERCISES: CPT

## 2019-06-07 PROCEDURE — 94799 UNLISTED PULMONARY SVC/PX: CPT

## 2019-06-07 PROCEDURE — 63710000001 INSULIN LISPRO (HUMAN) PER 5 UNITS: Performed by: INTERNAL MEDICINE

## 2019-06-07 PROCEDURE — 25010000002 ROPIVACAINE PER 1 MG: Performed by: NURSE ANESTHETIST, CERTIFIED REGISTERED

## 2019-06-07 RX ORDER — CHOLECALCIFEROL (VITAMIN D3) 125 MCG
5 CAPSULE ORAL NIGHTLY PRN
Start: 2019-06-07 | End: 2021-08-27

## 2019-06-07 RX ORDER — ACETAMINOPHEN 325 MG/1
650 TABLET ORAL EVERY 6 HOURS PRN
Start: 2019-06-07 | End: 2019-10-09

## 2019-06-07 RX ORDER — BUPIVACAINE HYDROCHLORIDE 2.5 MG/ML
INJECTION, SOLUTION EPIDURAL; INFILTRATION; INTRACAUDAL
Status: DISCONTINUED | OUTPATIENT
Start: 2019-06-07 | End: 2019-06-07 | Stop reason: SURG

## 2019-06-07 RX ADMIN — INSULIN LISPRO 14 UNITS: 100 INJECTION, SOLUTION INTRAVENOUS; SUBCUTANEOUS at 16:47

## 2019-06-07 RX ADMIN — INSULIN LISPRO 4 UNITS: 100 INJECTION, SOLUTION INTRAVENOUS; SUBCUTANEOUS at 20:28

## 2019-06-07 RX ADMIN — ROPIVACAINE HYDROCHLORIDE 8 ML/HR: 2 INJECTION, SOLUTION EPIDURAL; INFILTRATION at 10:51

## 2019-06-07 RX ADMIN — HYDROCODONE BITARTRATE AND ACETAMINOPHEN 1 TABLET: 7.5; 325 TABLET ORAL at 05:07

## 2019-06-07 RX ADMIN — LOSARTAN POTASSIUM 100 MG: 25 TABLET, FILM COATED ORAL at 08:11

## 2019-06-07 RX ADMIN — ERTAPENEM SODIUM 1 G: 1 INJECTION, POWDER, LYOPHILIZED, FOR SOLUTION INTRAMUSCULAR; INTRAVENOUS at 10:51

## 2019-06-07 RX ADMIN — METOCLOPRAMIDE HYDROCHLORIDE 10 MG: 10 TABLET ORAL at 08:11

## 2019-06-07 RX ADMIN — ENOXAPARIN SODIUM 40 MG: 40 INJECTION SUBCUTANEOUS at 09:59

## 2019-06-07 RX ADMIN — BUDESONIDE AND FORMOTEROL FUMARATE DIHYDRATE 2 PUFF: 80; 4.5 AEROSOL RESPIRATORY (INHALATION) at 21:02

## 2019-06-07 RX ADMIN — PANTOPRAZOLE SODIUM 40 MG: 40 TABLET, DELAYED RELEASE ORAL at 05:07

## 2019-06-07 RX ADMIN — BUDESONIDE AND FORMOTEROL FUMARATE DIHYDRATE 2 PUFF: 80; 4.5 AEROSOL RESPIRATORY (INHALATION) at 08:42

## 2019-06-07 RX ADMIN — MUPIROCIN: 20 OINTMENT TOPICAL at 08:14

## 2019-06-07 RX ADMIN — ACETAMINOPHEN 650 MG: 325 TABLET ORAL at 14:54

## 2019-06-07 RX ADMIN — BUPIVACAINE HYDROCHLORIDE 30 ML: 2.5 INJECTION, SOLUTION EPIDURAL; INFILTRATION; INTRACAUDAL; PERINEURAL at 08:40

## 2019-06-07 RX ADMIN — INSULIN LISPRO 2 UNITS: 100 INJECTION, SOLUTION INTRAVENOUS; SUBCUTANEOUS at 16:48

## 2019-06-07 RX ADMIN — MELATONIN TAB 5 MG 5 MG: 5 TAB at 20:22

## 2019-06-07 RX ADMIN — METOCLOPRAMIDE HYDROCHLORIDE 10 MG: 10 TABLET ORAL at 20:22

## 2019-06-07 RX ADMIN — DOCUSATE SODIUM 100 MG: 100 CAPSULE, LIQUID FILLED ORAL at 10:51

## 2019-06-07 RX ADMIN — CLONIDINE HYDROCHLORIDE 0.1 MG: 0.1 TABLET ORAL at 08:11

## 2019-06-07 RX ADMIN — AMLODIPINE BESYLATE 10 MG: 10 TABLET ORAL at 08:11

## 2019-06-07 RX ADMIN — METOCLOPRAMIDE HYDROCHLORIDE 10 MG: 10 TABLET ORAL at 16:47

## 2019-06-07 RX ADMIN — INSULIN LISPRO 14 UNITS: 100 INJECTION, SOLUTION INTRAVENOUS; SUBCUTANEOUS at 08:11

## 2019-06-07 RX ADMIN — Medication 1 TABLET: at 08:11

## 2019-06-07 RX ADMIN — DIPHENHYDRAMINE HYDROCHLORIDE 25 MG: 25 CAPSULE ORAL at 20:22

## 2019-06-07 RX ADMIN — INSULIN LISPRO 14 UNITS: 100 INJECTION, SOLUTION INTRAVENOUS; SUBCUTANEOUS at 12:04

## 2019-06-07 RX ADMIN — NEBIVOLOL HYDROCHLORIDE 10 MG: 5 TABLET ORAL at 14:54

## 2019-06-07 RX ADMIN — METOCLOPRAMIDE HYDROCHLORIDE 10 MG: 10 TABLET ORAL at 10:51

## 2019-06-07 RX ADMIN — HYDROCODONE BITARTRATE AND ACETAMINOPHEN 1 TABLET: 7.5; 325 TABLET ORAL at 20:26

## 2019-06-07 RX ADMIN — CLONIDINE HYDROCHLORIDE 0.1 MG: 0.1 TABLET ORAL at 20:19

## 2019-06-07 RX ADMIN — INSULIN DETEMIR 35 UNITS: 100 INJECTION, SOLUTION SUBCUTANEOUS at 20:26

## 2019-06-07 RX ADMIN — DAPTOMYCIN 550 MG: 500 INJECTION, POWDER, LYOPHILIZED, FOR SOLUTION INTRAVENOUS at 12:04

## 2019-06-07 NOTE — PLAN OF CARE
Problem: Patient Care Overview  Goal: Plan of Care Review  Outcome: Ongoing (interventions implemented as appropriate)   06/07/19 1549   Coping/Psychosocial   Plan of Care Reviewed With patient   Plan of Care Review   Progress improving   OTHER   Outcome Summary Pain well controlled with ropivicaine and tylenol, up with PT and ambulated to door, up in chair for a few hrs. PICC line discontinued by April STiffanie ayers RN, Antibiotics completed. Plan for H on saturday, voiding well, VSS       Problem: Pain, Acute (Adult)  Goal: Identify Related Risk Factors and Signs and Symptoms  Outcome: Ongoing (interventions implemented as appropriate)   06/07/19 1549   Pain, Acute (Adult)   Related Risk Factors (Acute Pain) patient perception;procedure/treatment;surgery   Signs and Symptoms (Acute Pain) verbalization of pain descriptors       Problem: Skin Injury Risk (Adult)  Goal: Identify Related Risk Factors and Signs and Symptoms  Outcome: Ongoing (interventions implemented as appropriate)   06/07/19 1549   Skin Injury Risk (Adult)   Related Risk Factors (Skin Injury Risk) hospitalization prolonged;mobility impaired

## 2019-06-07 NOTE — PROGRESS NOTES
Orthopedic  Progress Note    Subjective     Post-Operative Day: 3 Days Post-Op s/p right BKA    Systemic or Specific Complaints: doing well, pain well controlled  Objective     Vital signs in last 24 hours:  Temp:  [97.4 °F (36.3 °C)-98.9 °F (37.2 °C)] 97.5 °F (36.4 °C)  Heart Rate:  [57-71] 62  Resp:  [16-18] 18  BP: (124-154)/() 148/77    Neurovascular: No change in dense neuropathy   Wound: Right stump clean, dry         Data Review  Lab Results (last 24 hours)     Procedure Component Value Units Date/Time    POC Glucose Once [[id number]]  (Abnormal) Collected:  06/07/19 0734    Specimen:  Blood Updated:  06/07/19 0735     Glucose 134 mg/dL     CBC (No Diff) [[id number]]  (Abnormal) Collected:  06/07/19 0415    Specimen:  Blood Updated:  06/07/19 0523     WBC 7.99 10*3/mm3      RBC 3.32 10*6/mm3      Hemoglobin 8.9 g/dL      Hematocrit 28.9 %      MCV 87.0 fL      MCH 26.8 pg      MCHC 30.8 g/dL      RDW 13.8 %      RDW-SD 44.0 fl      MPV 10.0 fL      Platelets 364 10*3/mm3     POC Glucose Once [[id number]]  (Abnormal) Collected:  06/06/19 2055    Specimen:  Blood Updated:  06/06/19 2057     Glucose 168 mg/dL     Tissue Pathology Exam [[id number]] Collected:  06/04/19 1257    Specimen:  Tissue from Leg, Right Updated:  06/06/19 1712     Case Report --     Surgical Pathology Report                         Case: [id number]                                  Authorizing Provider:  Juju Weber MD        Collected:           06/04/2019 12:57 PM          Ordering Location:     [facility]   Received:            06/04/2019 01:21 PM                                 OR                                                                           Pathologist:           Juan Pablo Mckenzie MD                                                           Specimen:    Leg, Right, right lower leg                                                                 Clinical Information --     The working history is type 2  diabetes, gangrene and skin ulcer.       Final Diagnosis --      RIGHT LOWER LEG AMPUTATION:  Gangrenous necrosis with cutaneous ulceration and acute inflammation.  Small vessel occlusive disease  Cutaneous, subcutaneous and skeletal muscular margin of resection viable.  Calcific atherosclerotic luminal narrowing, anterior and posterior tibial arteries.  Bone, no demonstrable osteomyelitis in planes of section examined.    DGD/dlb                 Gross Description --     Received fresh labeled as right lower leg is a 38.5 cm in length intact right below the knee amputation including 17.0 cm of exposed tibia and fibula at the proximal aspect.  The attached foot measures 29.0 cm in length and has five intact digits showing yellow thickened toenails.  On the lateral aspect of the foot there is a 9.0 x 7.0 cm gray/pink, necrotic, well demarcated lesion 18.5 cm from the skin and soft tissue resection margin and 33.0 cm from the bone resection margin.  The lesion extends to the underlying calcified bone.  The remaining skin is dry and flaky.  No other masses are appreciated.  The anterior tibial and posterior tibial vessels show severe atherosclerotic plaque formation and luminal stenosis.  Representative sections are submitted.  Summary of sections:  A - skin and soft tissue margin, taken en face; B - marrow from the tibial resection margin; C - gangrenous lesion; D - bone underlying the gangrenous lesion submitted following decalcification and E - anterior and posterior tibial vessels.  HBM/dlb        Microscopic Description --     Sections of the amputation show a viable cutaneous, subcutaneous and muscular margin.  The lesion shows ulceration and necrosis with an acute inflammatory infiltrate in the region.  Many of the blood vessels show near total obliteration of their lumen.  Sections of the bone show viable osteocytes within the lacunae and no definite acute inflammation.        POC Glucose Once [[id number]]   (Abnormal) Collected:  06/06/19 1636    Specimen:  Blood Updated:  06/06/19 1638     Glucose 152 mg/dL     POC Glucose Once [[id number]]  (Abnormal) Collected:  06/06/19 1140    Specimen:  Blood Updated:  06/06/19 1143     Glucose 152 mg/dL     Tissue / Bone Culture - Tissue, Leg, Right [[id number]] Collected:  06/04/19 1301    Specimen:  Tissue from Leg, Right Updated:  06/06/19 1048     Tissue Culture No growth at 2 days     Gram Stain No WBCs or organisms seen            Assessment/Plan     Status post- stable, ok to go to rehab, see me in 2 weeks or sooner if any problems           Juju Weber MD    Date: 6/7/2019  Time: 8:43 AM

## 2019-06-07 NOTE — PROGRESS NOTES
Case Management Discharge Note    Final Note: Mr. Aguirre has an inpatient rehab bed at [facility] on Saturday, 6/8/1, if medically ready.  [facility] ambulance is scheduled for Saturday at 12noon.  PCS form in the chart.  Please call report to [facility] at ph [phone].  Please have a copy of the DC summary and AVS in the DC packet.  Thank you.    Destination - Selection Complete      Service Provider Request Status Selected Services Address Phone Number Fax Number    [facility] Selected Inpatient Rehabilitation [address] [id number] [phone]      Durable Medical Equipment      No service has been selected for the patient.      Dialysis/Infusion      No service has been selected for the patient.      Home Medical Care      No service has been selected for the patient.      Therapy      No service has been selected for the patient.      Community Resources      No service has been selected for the patient.        Transportation Services  Ambulance: [facility]    Final Discharge Disposition Code: 62 - inpatient rehab facility

## 2019-06-07 NOTE — PROGRESS NOTES
"IM progress note      Amol Aguirre  [id number]  1945     LOS: 3 days     Attending: Juju Weber MD    Primary Care Provider: Donte Briones MD      Chief Complaint/Reason for visit:  RBKA    Subjective   Doing well. Better pain control today. Denies f/c/n/v/sob/cp.  ( noted/ agree. Overall better. Ambulated with PT 20 ft)wy  Objective     Vital Signs  Visit Vitals  /65 (BP Location: Left arm, Patient Position: Lying)   Pulse 65   Temp 97.7 °F (36.5 °C) (Oral)   Resp 16   Ht 190.5 cm (75\")   Wt 110 kg (242 lb)   SpO2 96%   BMI 30.25 kg/m²     Temp (24hrs), Av °F (36.7 °C), Min:97.5 °F (36.4 °C), Max:98.9 °F (37.2 °C)      Nutrition: PO    Respiratory: RA    Physical Therapy: patient ambulated 20 feet with step to gait pattern, followed with chair for safety, distance limited by weakness and fatigue. HEP completed.    Physical Exam:     General Appearance:    Alert, cooperative, in no acute distress   Head:    Normocephalic, without obvious abnormality, atraumatic    Lungs:     Normal effort, symmetric chest rise, no crepitus, clear to      auscultation bilaterally             Heart:    Regular rhythm and normal rate, normal S1 and S2   Abdomen:     Normal bowel sounds, no masses, no organomegaly, soft        non-tender, non-distended, no guarding, no rebound                tenderness   Extremities:   Right stump dressing CDI. Nerve block present    Pulses:   Pulses palpable and equal bilaterally   Skin:   No bleeding, bruising or rash   Neurologic:   Moves all extremities with no obvious focal motor deficit.  Cranial nerves 2 - 12 grossly intact     Results Review:     I reviewed the patient's new clinical results.   Results from last 7 days   Lab Units 19  0415 19  0540 19  0454   WBC 10*3/mm3 7.99 10.41 14.07*   HEMOGLOBIN g/dL 8.9* 9.1* 10.1*   HEMATOCRIT % 28.9* 27.8* 30.6*   PLATELETS 10*3/mm3 364 381 421     Results from last 7 days   Lab Units " 06/06/19  0540 06/05/19  0454 06/04/19  1036   SODIUM mmol/L 138 132* 136   POTASSIUM mmol/L 4.2 5.5* 4.8   CHLORIDE mmol/L 103 100 98   CO2 mmol/L 23.0 18.0* 21.0*   BUN mg/dL 33* 38* 33*   CREATININE mg/dL 1.19 1.47* 1.36*   CALCIUM mg/dL 9.6 9.4 10.7*   GLUCOSE mg/dL 140* 266* 281*     Results for ASAEL MONDRAGON (MRN [id number]) as of 6/7/2019 13:56   Ref. Range 6/6/2019 16:36 6/6/2019 20:55 6/7/2019 04:15 6/7/2019 07:34 6/7/2019 11:42   Glucose Latest Ref Range: 70 - 130 mg/dL 152 (H) 168 (H)  134 (H) 129     I reviewed the patient's new imaging including images and reports.    All medications reviewed.     amLODIPine 10 mg Oral Daily   budesonide-formoterol 2 puff Inhalation BID - RT   CloNIDine 0.1 mg Oral Q12H   enoxaparin 40 mg Subcutaneous Daily   insulin detemir 35 Units Subcutaneous Nightly   insulin lispro 0-9 Units Subcutaneous 4x Daily With Meals & Nightly   insulin lispro 14 Units Subcutaneous TID With Meals   losartan 100 mg Oral Daily   metoclopramide 10 mg Oral 4x Daily AC & at Bedtime   multivitamin 1 tablet Oral Daily   mupirocin  Topical Q24H   nebivolol 10 mg Oral Q24H   pantoprazole 40 mg Oral Q AM       Assessment/Plan     S/P BKA (below knee amputation), right (CMS/HCC)    Skin ulcer of toe of right foot with necrosis of bone (CMS/HCC)    Decreased pulses in feet    Blister (nonthermal), right foot, initial encounter    Gangrene (CMS/HCC)    Vascular calcification    Type 2 diabetes mellitus with diabetic polyneuropathy, without long-term current use of insulin (CMS/HCC)    HTN (hypertension)    CKD (chronic kidney disease)    Leukocytosis, likely reactive    Hyperkalemia    Anemia    Acute postoperative pain      Plan  1. PT/OT- NWB RLE  2. Pain control-prns, popliteal nerve block   3. IS-encouraged  4. DVT proph- mechs/Lovenox  5. Bowel regimen  6. Monitor post-op labs  7. DC planning for [facility] tomorrow. CM following    [facility], Dr. Garcia  - completed abx, PICC dc'd    HTN  -  Continue home norvasc, bystolic, catapres, cozaar  - Hold HCTZ  - Monitor BP   - Holding parameters for BP meds  - Labetalol PRN for SBP>170     DM  - hgb A1c on 6/5/19 9.8  - Levemir nightly  - Mealtime bolus with holding paramenters  - Accu-Check AC and HS with moderate dose SSI     CKD (stable)    Hyponatremia, hypokalemia, resolved      VIPUL Alvarez  06/07/19  1:55 PM     I have personally performed the evaluation on this patient. My history is consistent  with HPI obtained. My exam findings are listed above. I have personally reviewed and discussed the above formulated treatment plan with pt and [email or web address]

## 2019-06-07 NOTE — ANESTHESIA PROCEDURE NOTES
Peripheral Block      Patient reassessed immediately prior to procedure    Patient location during procedure: pre-op  Start time: 6/4/2019 8:41 AM  Reason for block: at surgeon's request and post-op pain management  Performed by  Anesthesiologist: Dmitry Gonzalez MD  Preanesthetic Checklist  Completed: patient identified, site marked, surgical consent, pre-op evaluation, timeout performed, IV checked, risks and benefits discussed and monitors and equipment checked  Prep:  Sterile barriers:cap, gloves, mask and sterile barriers  Prep: ChloraPrep  Patient monitoring: blood pressure monitoring, continuous pulse oximetry and EKG  Procedure  Sedation:yes  Performed under: local infiltration  Guidance:ultrasound guided  Images:still images obtained    Laterality:right  Block Type:popliteal  Injection Technique:catheter  Needle Type:echogenic  Needle Gauge:18 G  Resistance on Injection: none  Catheter Size:20 G  Cath Depth at skin: 12 cm    Medications Used: bupivacaine PF (MARCAINE) 0.25 % injection, 30 mL      Post Assessment  Injection Assessment: negative aspiration for heme, no paresthesia on injection and incremental injection  Patient Tolerance:comfortable throughout block  Complications:no  Additional Notes  Procedure:                                                         The pt was placed in  lateral position.  The Insertion site was  prepped and Draped in sterile fashion.  The pt was anesthetized with  IV Sedation( see meds).  Skin and cutaneous tissue was infiltrated and anesthetized with 1% Lidocaine 3 mls via a 25g needle.  A BBraun 4 inch 18g echogenic needle was then  inserted approximately 3 cm proximal to the popliteal sylvia a at the lateral mid biceps femoris and advanced In-plane with Ultrasound guidance.  Normal Saline PSF was utilized for hydrodissection of tissue.  The popliteal artery was visualized and the common peroneal and tibial bifurcation was located.  LA injection spread was visualized,  injection was incremental 1-5ml, injection pressure was normal or little, no intraneural injection, no vascular injection.  .  A BBraun 20g wire stylet catheter was placed via the needle with ultrasound visualization and confirmation with NS fluid bolus. The labeled Catheter was then secured at insertions site with skin afix,  mastisol, steristrips  and a CHG transparent dressing was placed over. Thank you

## 2019-06-07 NOTE — PROGRESS NOTES
Continued Stay Note  [facility]     Patient Name: Amol Aguirre  MRN: [id number]  Today's Date: 6/7/2019    Admit Date: 6/4/2019    Discharge Plan     Row Name 06/07/19 0838       Plan    Plan  [facility]    Patient/Family in Agreement with Plan  yes    Plan Comments  Followed up with Cherelle at [facility] this morning regarding Mr. Aguirre rehab referral.  [facility] is continuing to follow Mr. Aguirre for medical readiness and final antibiotic plan.    CM will updated Mr. Aceves's wife by phone and will plan to see the patient at the bedside today.   CM will continue to follow.    Final Discharge Disposition Code  62 - inpatient rehab facility        Discharge Codes    No documentation.             Ellen Mccabe

## 2019-06-07 NOTE — THERAPY TREATMENT NOTE
Acute Care - Physical Therapy Treatment Note  [facility]     Patient Name: Amol Aguirre  : 1945  MRN: [id number]  Today's Date: 2019  Onset of Illness/Injury or Date of Surgery: 01  Date of Referral to PT: 19  Referring Physician: MD Tia.    Admit Date: 2019    Visit Dx:    ICD-10-CM ICD-9-CM   1. Impaired mobility and ADLs Z74.09 799.89   2. Gangrene (CMS/Formerly [facility]) I96 785.4   3. Skin ulcer of toe of right foot with necrosis of bone (CMS/Formerly [facility]) L97.514 707.15   4. Type 2 diabetes mellitus with diabetic polyneuropathy, without long-term current use of insulin (CMS/Formerly [facility]) E11.42 250.60     357.2   5. Vascular calcification I99.8 459.89   6. Decreased pulses in feet R09.89 785.9   7. Blister (nonthermal), right foot, initial encounter S90.821A 917.2     Patient Active Problem List   Diagnosis   • Right foot pain   • Decreased pulses in feet   • Vascular calcification   • Uncontrolled type 2 diabetes mellitus with hyperglycemia (CMS/Formerly [facility])   • Type 2 diabetes mellitus with diabetic polyneuropathy, without long-term current use of insulin (CMS/Formerly [facility])   • Mass of lesser toe of right foot   • Blister (nonthermal), right foot, initial encounter   • Diabetic foot ulcers (CMS/Formerly [facility])   • Skin ulcer of toe of right foot with necrosis of bone (CMS/Formerly [facility])   • Gangrene (CMS/Formerly [facility])   • S/P BKA (below knee amputation), right (CMS/Formerly [facility])   • HTN (hypertension)   • CKD (chronic kidney disease)   • Leukocytosis, likely reactive   • Hyperkalemia   • Anemia   • Acute postoperative pain       Therapy Treatment    Rehabilitation Treatment Summary     Row Name 19 0855             Treatment Time/Intention    Discipline  physical therapy assistant  -AS      Document Type  therapy note (daily note)  -AS      Subjective Information  no complaints  -AS      Mode of Treatment  physical therapy  -AS      Patient/Family Observations  no family at bedside, patient supine and agreed to therapy.  -AS      Patient Effort   good  -AS      Existing Precautions/Restrictions  fall;other (see comments) R BKA, nerve catheter  -AS      Recorded by [AS] Misti Starkey, [facility] 06/07/19 1050      Row Name 06/07/19 0855             Cognitive Assessment/Intervention- PT/OT    Affect/Mental Status (Cognitive)  WNL  -AS      Orientation Status (Cognition)  oriented x 4  -AS      Follows Commands (Cognition)  follows one step commands;over 90% accuracy;repetition of directions required;verbal cues/prompting required  -AS      Safety Deficit (Cognitive)  mild deficit;safety precautions awareness;safety precautions follow-through/compliance  -AS      Personal Safety Interventions  fall prevention program maintained;gait belt;nonskid shoes/slippers when out of bed;other (see comments) exit alarm  -AS      Recorded by [AS] Misti Starkey, [facility] 06/07/19 1050      Row Name 06/07/19 0855             Bed Mobility Assessment/Treatment    Supine-Sit [place] (Bed Mobility)  set up;supervision  -AS      Comment (Bed Mobility)  patient demonstrated safe technique  -AS      Recorded by [AS] Misti Starkey, [facility] 06/07/19 1050      Row Name 06/07/19 0855             Sit-Stand Transfer    Sit-Stand [place] (Transfers)  verbal cues;minimum assist (75% patient effort)  -AS      Assistive Device (Sit-Stand Transfers)  walker, front-wheeled  -AS      Recorded by [AS] Misti Starkey, [facility] 06/07/19 1050      Row Name 06/07/19 0855             Stand-Sit Transfer    Stand-Sit [place] (Transfers)  verbal cues;contact guard;2 person assist  -AS      Assistive Device (Stand-Sit Transfers)  walker, front-wheeled  -AS      Recorded by [AS] Misti Starkey, [facility] 06/07/19 1050      Row Name 06/07/19 0855             Gait/Stairs Assessment/Training    16177 - Gait Training Minutes   15  -AS      Gait/Stairs Assessment/Training  gait/ambulation assistive device  -AS      [place] Level (Gait)  verbal cues;contact guard;2 person assist  -AS       Assistive Device (Gait)  walker, front-wheeled  -AS      Distance in Feet (Gait)  20  -AS      Pattern (Gait)  step-to  -AS      Deviations/Abnormal Patterns (Gait)  liza decreased;gait speed decreased  -AS      Comment (Gait/Stairs)  patient ambulated 20 feet with step to gait pattern, faollowed with chair for safety, distance limited by weakness and fatigue.  -AS      Recorded by [AS] Misti Starkey, [facility] 06/07/19 1050      Row Name 06/07/19 0855             Motor Skills Assessment/Interventions    Additional Documentation  Therapeutic Exercise (Group)  -AS      Recorded by [AS] Misti Starkey, [facility] 06/07/19 1050      Row Name 06/07/19 0855             Therapeutic Exercise    29084 - PT Therapeutic Exercise Minutes  8  -AS      Recorded by [AS] Misti Starkey [facility] 06/07/19 1050      Row Name 06/07/19 0855             Therapeutic Exercise    Lower Extremity (Therapeutic Exercise)  gluteal sets;LAQ (long arc quad), right;marching while seated;quad sets, right  -AS      Lower Extremity Range of Motion (Therapeutic Exercise)  hip flexion/extension, right;hip abduction/adduction, right  -AS      Exercise Type (Therapeutic Exercise)  AROM (active range of motion)  -AS      Position (Therapeutic Exercise)  supine;seated  -AS      Sets/Reps (Therapeutic Exercise)  1/10  -AS      Recorded by [AS] Misti Starkey, [facility] 06/07/19 1050      Row Name 06/07/19 0855             Positioning and Restraints    Pre-Treatment Position  in bed  -AS      Post Treatment Position  chair  -AS      In Chair  reclined;call light within reach;encouraged to call for assist;exit alarm on;waffle cushion;on mechanical lift sling;RLE elevated  -AS      Recorded by [AS] Misti Starkey, [facility] 06/07/19 1050      Row Name 06/07/19 0855             Pain Scale: Numbers Pre/Post-Treatment    Pain Scale: Numbers, Pretreatment  0/10 - no pain  -AS      Pain Scale: Numbers, Post-Treatment  0/10 - no pain  -AS      Recorded by [AS]  Misti Starkey, PTA 06/07/19 1050      Row Name                Wound 06/04/19 1309 Right leg incision    Wound - Properties Group Date first assessed: 06/04/19 [LD] Time first assessed: 1309 [LD] Side: Right [LD] Location: leg [LD] Type: incision [LD] Recorded by:  [SAMARA] Farrah Arauz RN 06/04/19 1309      User Key  (r) = Recorded By, (t) = Taken By, (c) = Cosigned By    Initials Name Effective Dates Discipline    AS Misti Starkey, PTA 06/22/15 -  PT    LD Farrah Arauz RN 06/16/16 -  Nurse          Wound 06/04/19 1309 Right leg incision (Active)   Dressing Appearance dry;intact;no drainage 6/6/2019  8:46 PM   Closure ASHLEE 6/6/2019  8:46 PM   Base dressing in place, unable to visualize 6/6/2019  8:46 PM   Drainage Amount none 6/6/2019  8:46 PM   Dressing Care, Wound elastic bandage 6/6/2019  8:46 PM           Physical Therapy Education     Title: PT OT SLP Therapies (In Progress)     Topic: Physical Therapy (In Progress)     Point: Mobility training (In Progress)     Learning Progress Summary           Patient Acceptance, E, NR by AS at 6/7/2019 10:50 AM    Eager, E, NR by SC at 6/6/2019 11:10 AM    Comment:  reviewed safety with mobility    Eager, E, VU,DU,NR by SC at 6/5/2019  8:50 AM    Comment:  reviewed safety with mobility                   Point: Home exercise program (In Progress)     Learning Progress Summary           Patient Acceptance, E, NR by AS at 6/7/2019 10:50 AM    Eager, E, NR by SC at 6/6/2019 11:10 AM    Comment:  reviewed safety with mobility    Eager, E, VU,DU,NR by SC at 6/5/2019  8:50 AM    Comment:  reviewed safety with mobility                   Point: Body mechanics (In Progress)     Learning Progress Summary           Patient Acceptance, E, NR by AS at 6/7/2019 10:50 AM    Eager, E, NR by SC at 6/6/2019 11:10 AM    Comment:  reviewed safety with mobility    Eager, E, VU,DU,NR by SC at 6/5/2019  8:50 AM    Comment:  reviewed safety with mobility                   Point:  Precautions (In Progress)     Learning Progress Summary           Patient Peyton, RAHEEL, NR by AS at 6/7/2019 10:50 AM    RAHEEL Tam, NR by SC at 6/6/2019 11:10 AM    Comment:  reviewed safety with mobility    RAHEEL Tam VU, DU,NR by SC at 6/5/2019  8:50 AM    Comment:  reviewed safety with mobility                               User Key     Initials Effective Dates Name Provider Type Discipline    SC 06/19/15 -  Cyndy Fofana, PT Physical Therapist PT    AS 06/22/15 -  Misti Starkey PTA Physical Therapy Assistant PT                PT Recommendation and Plan     Plan of Care Reviewed With: patient  Progress: improving  Outcome Summary: patient ambulated 20 feet with step to gait pattern, followed with chair for safety, distance limited by weakness and fatigue. HEP completed.  Outcome Measures     Row Name 06/07/19 0855 06/06/19 1711 06/06/19 1110       How much help from another person do you currently need...    Turning from your back to your side while in flat bed without using bedrails?  4  -AS  --  3  -SC    Moving from lying on back to sitting on the side of a flat bed without bedrails?  4  -AS  --  3  -SC    Moving to and from a bed to a chair (including a wheelchair)?  3  -AS  --  3  -SC    Standing up from a chair using your arms (e.g., wheelchair, bedside chair)?  3  -AS  --  3  -SC    Climbing 3-5 steps with a railing?  1  -AS  --  1  -SC    To walk in hospital room?  3  -AS  --  3  -SC    AM-PAC 6 Clicks Score  18  -AS  --  16  -SC       How much help from another is currently needed...    Putting on and taking off regular lower body clothing?  --  2  -AR (r) CH (t) AR (c)  --    Bathing (including washing, rinsing, and drying)  --  2  -AR (r) CH (t) AR (c)  --    Toileting (which includes using toilet bed pan or urinal)  --  2  -AR (r) CH (t) AR (c)  --    Putting on and taking off regular upper body clothing  --  3  -AR (r) CH (t) AR (c)  --    Taking care of personal grooming (such as brushing  teeth)  --  3  -AR (r) CH (t) AR (c)  --    Eating meals  --  3  -AR (r) CH (t) AR (c)  --    Score  --  15  -AR (r) CH (t)  --       Functional Assessment    Outcome Measure Options  AM-PAC 6 Clicks Basic Mobility (PT)  -AS  AM-PAC 6 Clicks Daily Activity (OT)  -AR (r) CH (t) AR (c)  AM-[facility] 6 Clicks Basic Mobility (PT)  -SC    Row Name 06/05/19 0850             How much help from another person do you currently need...    Turning from your back to your side while in flat bed without using bedrails?  3  -SC      Moving from lying on back to sitting on the side of a flat bed without bedrails?  3  -SC      Moving to and from a bed to a chair (including a wheelchair)?  2  -SC      Standing up from a chair using your arms (e.g., wheelchair, bedside chair)?  2  -SC      Climbing 3-5 steps with a railing?  1  -SC      To walk in hospital room?  2  -SC      AM-PAC 6 Clicks Score  13  -SC         Functional Assessment    Outcome Measure Options  AM-[facility] 6 Clicks Basic Mobility (PT)  -SC        User Key  (r) = Recorded By, (t) = Taken By, (c) = Cosigned By    Initials Name Provider Type    SC Cyndy Fofana, PT Physical Therapist    Erica Lim, OT Occupational Therapist    AS Misti Starkey, HANNAH Physical Therapy Assistant    Ray Hathaway, OT Student OT Student         Time Calculation:   PT Charges     Row Name 06/07/19 0855             Time Calculation    Start Time  0855  -AS      PT Received On  06/07/19  -AS      PT Goal Re-Cert Due Date  06/15/19  -AS         Timed Charges    01920 - PT Therapeutic Exercise Minutes  8  -AS      71229 - Gait Training Minutes   15  -AS        User Key  (r) = Recorded By, (t) = Taken By, (c) = Cosigned By    Initials Name Provider Type    AS Misti Starkey, PTA Physical Therapy Assistant        Therapy Charges for Today     Code Description Service Date Service Provider Modifiers Qty    75105872097 HC GAIT TRAINING EA 15 MIN 6/7/2019 Misti Starkey,  PTA GP 1    44138098917 HC PT THER PROC EA 15 MIN 6/7/2019 Misti Starkey, PTA GP 1    32800302509 HC PT THER SUPP EA 15 MIN 6/7/2019 Misti Starkey, PTA GP 2          PT G-Codes  Outcome Measure Options: AM-PAC 6 Clicks Basic Mobility (PT)  AM-PAC 6 Clicks Score: 18  Score: 15    Misti Starkey PTA  6/7/2019

## 2019-06-07 NOTE — PROGRESS NOTES
Infectious Disease Progress Note  Amol Aguirre  1945  [id number]    Date of Consult: 6/4/2019  Admission Date: 6/4/2019    Requesting Provider: Juju Juarez MD  Evaluating Physician: Easton Garcia MD    Chief Complaint: foot infection    Reason for Consultation: s/p BKA    History of present illness:   Patient is a 74 y.o.  Yr old male with history of poorly controlled DM2, venous stasis disease, asthma, HTN. Recent travel to [place] to go fishing then developed ulceration on his foot.  He was out fishing all day and developed swelling and sweaty feet and when he took off his shoe he noticed ulceration on the dorsal aspect of the right foot.  He was seen by his PCP and started on Keflex and Silvadene on 4/12.  Despite antibiotics and wound care the wound progresses.  He was seen by Dr Juarez last week due to mass on his right great toe.  Dr Juarez though the chronic venous stasis, could be contributing to the ulceration.  She is scheduled vascular studies and MRI.  Soon after that visit he presented to [facility] on 4/19with increasing redness and a new ulceration between his third and fourth toes.  MRI done 4/22 with possible early changes in the fourth metatarsal head of osteomyelitis.  Wound culture there grew staph epidermidis and enterococcus. Given daptomycin, linezolid and zosyn.  A1c at [facility] was 10.5      He was transferred to [facility] on 4/22 for further evaluation. Dr Juarez contacted me to help manage antibiotics.    Wound culture grew Klebsiella and Morganella.  He had an aortogram which showed significant right lower extremity peripheral vascular disease however no sufficient targets for revascularization.  He was discharged on daptomycin and ertapenem and I followed him for the next few weeks in clinic.  Initially he seemed to improve however last week he was noted to have acute worsening of the ulceration in his foot which progressed to steve  gangrene.  Dr. Weber evaluated him and recommended BKA which was performed today.     Resting comfortably post-op with family in the room.     6/5: doing well. No pain in right leg. Eating ok. No other new complaints. Anxious for discharge.     6/7: Recovering well from surgery.  Still has nerve block to the right leg and pain is well controlled.  He is afebrile.  He is eating better. Dressing changed yesterday    ROS:  No fevers or chills. No n/v/d. No rash. No new ADR to Abx.       Allergies   Allergen Reactions   • Chlorhexidine Shortness Of Breath, Itching and Rash     Pt developed a rash, itching, and shortness of breath after receiving a CHG bath on 4/24/19.   • Latex Itching       Antibiotics:  Anti-Infectives (From admission, onward)    Ordered     Dose/Rate Route Frequency Start Stop    06/04/19 1826  DAPTOmycin (CUBICIN) 550 mg in sodium chloride 0.9 % 50 mL IVPB     Ordering Provider:  Easton Garcia MD    6 mg/kg × 94.7 kg (Adjusted)  100 mL/hr over 30 Minutes Intravenous Every 24 Hours 06/05/19 1200 06/12/19 1159    06/04/19 1826  ertapenem (INVanz) 1 g/100 mL 0.9% NS VTB (mbp)     Shannon Patel, [facility] reviewed the order on 06/07/19 0829.   Ordering Provider:  Easton Garcia MD    1 g  over 30 Minutes Intravenous Every 24 Hours 06/05/19 1100 06/12/19 1059    06/04/19 1017  DAPTOmycin (CUBICIN) 600 mg in sodium chloride 0.9 % 50 mL IVPB     Ordering Provider:  Juju Weber MD    600 mg  100 mL/hr over 30 Minutes Intravenous Once 06/04/19 1019 06/04/19 1218    06/04/19 1017  ertapenem (INVanz) 1 g/100 mL 0.9% NS VTB (mbp)     Ordering Provider:  Juju Weber MD    1 g  over 30 Minutes Intravenous Once 06/04/19 1019 06/04/19 1133          Other Medications:  Current Facility-Administered Medications   Medication Dose Route Frequency Provider Last Rate Last Dose   • acetaminophen (TYLENOL) tablet 650 mg  650 mg Oral Q6H PRN Erica Reyez, VIPUL       • amLODIPine (NORVASC) tablet  10 mg  10 mg Oral Daily Erica Reyez APRN   10 mg at 06/07/19 0811   • bisacodyl (DULCOLAX) suppository 10 mg  10 mg Rectal Daily PRN Juju Weber MD       • budesonide-formoterol (SYMBICORT) 80-4.5 MCG/ACT inhaler 2 puff  2 puff Inhalation BID - RT Erica Reyez APRN   2 puff at 06/07/19 0842   • CloNIDine (CATAPRES) tablet 0.1 mg  0.1 mg Oral Q12H Erica Reyez APRN   0.1 mg at 06/07/19 0811   • DAPTOmycin (CUBICIN) 550 mg in sodium chloride 0.9 % 50 mL IVPB  6 mg/kg (Adjusted) Intravenous Q24H Easton Garcia  mL/hr at 06/06/19 1159 550 mg at 06/06/19 1159   • dextrose (D50W) 25 g/ 50mL Intravenous Solution 25 g  25 g Intravenous Q15 Min PRN Erica Reyez APRN       • dextrose (GLUTOSE) oral gel 15 g  15 g Oral Q15 Min PRN Erica Reyez APRN       • diphenhydrAMINE (BENADRYL) capsule 25 mg  25 mg Oral Nightly PRN Juju Weber MD   25 mg at 06/06/19 2047    Or   • diphenhydrAMINE (BENADRYL) injection 25 mg  25 mg Intravenous Nightly PRN Juju Weber MD       • docusate sodium (COLACE) capsule 100 mg  100 mg Oral BID PRN Erica Reyez APRN       • enoxaparin (LOVENOX) syringe 40 mg  40 mg Subcutaneous Daily Juju Weber MD   40 mg at 06/07/19 0959   • ertapenem (INVanz) 1 g/100 mL 0.9% NS VTB (mbp)  1 g Intravenous Q24H Easton Garcia MD   1 g at 06/06/19 1041   • glucagon (human recombinant) (GLUCAGEN DIAGNOSTIC) injection 1 mg  1 mg Subcutaneous PRN Erica Reyez APRN       • HYDROcodone-acetaminophen (NORCO) 7.5-325 MG per tablet 1 tablet  1 tablet Oral Q4H PRN Juju Weber MD   1 tablet at 06/07/19 0507   • HYDROcodone-acetaminophen (NORCO) 7.5-325 MG per tablet 2 tablet  2 tablet Oral Q4H PRN Juju Weber MD       • HYDROmorphone (DILAUDID) injection 1 mg  1 mg Intravenous Q2H PRN Juju Weber MD        And   • naloxone (NARCAN) injection 0.4 mg  0.4 mg Intravenous Q5 Min PRN Juju Weber MD       • insulin detemir (LEVEMIR)  injection 35 Units  35 Units Subcutaneous Nightly Pennie Foster MD   35 Units at 06/06/19 2045   • insulin lispro (humaLOG) injection 0-9 Units  0-9 Units Subcutaneous 4x Daily With Meals & Nightly Erica Reyez APRN   2 Units at 06/06/19 2056   • insulin lispro (humaLOG) injection 14 Units  14 Units Subcutaneous TID With Meals Pennie Foster MD   14 Units at 06/07/19 0811   • labetalol (NORMODYNE,TRANDATE) injection 10 mg  10 mg Intravenous Q4H PRN Erica Reyez, APRN       • lactated ringers infusion  9 mL/hr Intravenous Continuous Dmitry Gonzalez MD 9 mL/hr at 06/04/19 1034 9 mL/hr at 06/04/19 1034   • losartan (COZAAR) tablet 100 mg  100 mg Oral Daily Erica Reyez APRN   100 mg at 06/07/19 0811   • magnesium hydroxide (MILK OF MAGNESIA) suspension 2400 mg/10mL 10 mL  10 mL Oral Daily PRN Juju Weber MD       • melatonin tablet 5 mg  5 mg Oral Nightly PRN Erica Reyez APRN   5 mg at 06/06/19 2047   • metaxalone (SKELAXIN) tablet 800 mg  800 mg Oral TID PRN Erica Reyez APRN       • metoclopramide (REGLAN) tablet 10 mg  10 mg Oral 4x Daily AC & at Bedtime Erica Reyez APRN   10 mg at 06/07/19 0811   • multivitamin (THERAGRAN) tablet 1 tablet  1 tablet Oral Daily Juju Weber MD   1 tablet at 06/07/19 0811   • mupirocin (BACTROBAN) 2 % ointment   Topical Q24H Juju Weber MD       • nebivolol (BYSTOLIC) tablet 10 mg  10 mg Oral Q24H Nghia Ronquillo [facility]   10 mg at 06/06/19 0815   • ondansetron (ZOFRAN) injection 4 mg  4 mg Intravenous Q6H PRN Juju Weber MD       • oxyCODONE-acetaminophen (PERCOCET) 5-325 MG per tablet 1 tablet  1 tablet Oral Q4H PRN Juju Weber MD       • oxyCODONE-acetaminophen (PERCOCET) 5-325 MG per tablet 2 tablet  2 tablet Oral Q4H PRN Juju Weber MD       • pantoprazole (PROTONIX) EC tablet 40 mg  40 mg Oral Q AM Erica Reyez APRN   40 mg at 06/07/19 0507   • promethazine (PHENERGAN) injection 12.5 mg  12.5 mg  "Intramuscular Q4H PRN Juju Weber MD       • promethazine (PHENERGAN) injection 12.5 mg  12.5 mg Intravenous Q4H PRN Juju Weber MD       • ropivacaine (NAROPIN) 0.2% peripheral nerve cath (moog)  8 mL/hr Peripheral Nerve Continuous Nghia Oliva, CRNA 8 mL/hr at 06/06/19 1120 8 mL/hr at 06/06/19 1120   • sodium chloride 0.9 % bolus 500 mL  500 mL Intravenous TID PRN Erica Reyez APRN       • sodium chloride 0.9 % infusion  50 mL/hr Intravenous Continuous Erica Reyez APRN 50 mL/hr at 06/06/19 2245 50 mL/hr at 06/06/19 2245       Physical Exam:   Vital Signs   /67 (BP Location: Left arm, Patient Position: Lying)   Pulse 62   Temp 97.5 °F (36.4 °C) (Oral)   Resp 18   Ht 190.5 cm (75\")   Wt 110 kg (242 lb)   SpO2 94%   BMI 30.25 kg/m²     GENERAL: Awake and alert, in no acute distress.   HEENT: Normocephalic, atraumatic.  PERRL. EOMI. No conjunctival injection. No icterus.   HEART: RRR; No murmur.  LUNGS: Clear to auscultation bilaterally without wheezing, rales, rhonchi.   ABDOMEN: Soft, nontender, nondistended. Positive bowel sounds. No rebound or guarding.  EXT:  No cyanosis, clubbing or edema.  : Without Mcdonald catheter.  MSK: s/p BKA on the right, bandaged, non-tender. + nerve block  SKIN: Warm and dry without cutaneous eruptions on Inspection/palpation.    NEURO: Oriented to PPT. No focal deficits on motor/sensory exam at arms/legs.  PSYCHIATRIC: Normal insight and judgement. Cooperative with NICOLE CUEVAS [facility]    Laboratory Data    Results from last 7 days   Lab Units 06/07/19  0415 06/06/19  0540 06/05/19  0454   WBC 10*3/mm3 7.99 10.41 14.07*   HEMOGLOBIN g/dL 8.9* 9.1* 10.1*   HEMATOCRIT % 28.9* 27.8* 30.6*   PLATELETS 10*3/mm3 364 381 421     Results from last 7 days   Lab Units 06/06/19  0540   SODIUM mmol/L 138   POTASSIUM mmol/L 4.2   CHLORIDE mmol/L 103   CO2 mmol/L 23.0   BUN mg/dL 33*   CREATININE mg/dL 1.19   GLUCOSE mg/dL 140*   CALCIUM mg/dL 9.6     Estimated " Creatinine Clearance: 70.7 mL/min (by C-G formula based on SCr of 1.19 mg/dL).                Microbiology:      [facility]:  4/19 wound culture: light growth Staph epidermitis and Enterococcus faecalis, (PCN sensitive)  4/19 blood cx negative      [facility]   4/23 wound culture x2: Klebsiella and Morganella     6/4 OR cultures pending, NGTD      Radiology:  No radiology results for the last 7 days       Impression:   1. Diabetic foot ulceration and acute osteomyelitis of 4th metatarsal, steve gangrene of the foot, s/p BKA on 6/4. Prior cultures with Klebsiella and Morganella at [facility].   OR cultures negative so far.  2. Severe LE arterial vascular disease, especially in RLE: based on 4/30 aortogram, no targets amenable to revascularization  2. Poorly controlled DM2  3. CKD stage 3  4. Asthma    PLAN:    - f/u pending OR cultures.  Routine bacterial is negative.  Acid-fast, anaerobic and fungal are pending    -Final doses of daptomycin and ertapenem around noon today.  This will complete a standard 3-day postop course. Monitor off antibiotics  - remove PICC after last doses of antibiotics    - wound care per prootcol    - ok to transfer to rehab after antibiotics are completed today from my standpoint.  I will sign off for now but I am happy to reevaluate him at [facility] if there are any new issues    Easton Garcia MD  6/7/2019

## 2019-06-07 NOTE — PROGRESS NOTES
[place]    Acute pain service Inpatient Progress Note    Patient Name: Amol Aguirre  :  1945  MRN:  [id number]        Acute Pain  Service Inpatient Progress Note:    Analgesia:Good  LOC: alert and awake  Resp Status: room air  Cardiac: VS stable  Side Effects:None  Catheter Site:clean, dressing intact and dry  Cath type: peripheral nerve cath with ON Q  Infusion rate: 8ml/hr  Catheter Plan:Catheter to remain Insitu and Continue catheter infusion rate unchanged  Comments: We will keep the infusion until the time he leaves for rehab

## 2019-06-07 NOTE — ADDENDUM NOTE
Addendum  created 06/07/19 0841 by Nghia Oliva, CRNA    Child order released for a procedure order, Intraprocedure Blocks edited, LDA created via procedure documentation, LDA removed via procedure documentation, Sign clinical note

## 2019-06-07 NOTE — NURSING NOTE
Prior to central line removal, order for the removal of catheter was verified, patient was assessed, necessary materials were gathered and   Patient and wife educated regarding procedure .    Patient was positioned supine to ensure that the insertion site was at or below the level of the heart.    Hands were washed, clean gloves (latex free) were applied and central line dressing was gently removed. Catheter exit site was not cultured.     A new pair of clean gloves (latex free) were then applied. Insertion site was cleansed with Betadine swab using a circular motion beginning at the insertion site and moving outward for 30 seconds and allowed to dry.     Clamp on line was not present.     Patient preformed valsalva maneuver during line removal.     The central line was grasped at the insertion site and slowly pulled outward parallel to the skin. Resistance not met.    After central line was completely removed, a sterile 4x4 gauze pad was used to apply light pressure until bleeding stopped. At that time, petroleum-based gauze and a sterile occlusive dressing was applied to exit site.     Patient was instructed to keep dressing in place for at least 24 hours and recline.     Catheter was inspected after removal and was intact}. Tip of central line was not sent for culture. Patient tolerated procedure well.

## 2019-06-07 NOTE — PLAN OF CARE
Problem: Patient Care Overview  Goal: Plan of Care Review  Outcome: Ongoing (interventions implemented as appropriate)   06/07/19 1050   Coping/Psychosocial   Plan of Care Reviewed With patient   Plan of Care Review   Progress improving   OTHER   Outcome Summary patient ambulated 20 feet with step to gait pattern, followed with chair for safety, distance limited by weakness and fatigue. HEP completed.

## 2019-06-07 NOTE — DISCHARGE SUMMARY
Patient Name: Amol Aguirre  MRN: [id number]  : 1945  DOS: 2019    Attending: Juju Juarez MD    Primary Care Provider: Donte Briones MD    Date of Admission:.2019  9:54 AM    Date of Discharge:  2019    Discharge Diagnosis:     S/P BKA (below knee amputation), right (CMS/HCC)    Decreased pulses in feet    Vascular calcification    Type 2 diabetes mellitus with diabetic polyneuropathy, without long-term current use of insulin (CMS/HCC)    Blister (nonthermal), right foot, initial encounter    Skin ulcer of toe of right foot with necrosis of bone (CMS/HCC)    Gangrene (CMS/HCC)    HTN (hypertension)    CKD (chronic kidney disease)    Leukocytosis, likely reactive    Hyperkalemia    Anemia    Acute postoperative pain      Hospital Course  Patient is a 74 y.o. male presented for Right below-knee amputation by Dr. Juarez.     He underwent surgery under GA. He tolerated surgery well and was admitted for further medical management.     He is known to us from previous admission 19 for DM ulcer.      From previous admission: (((CT surgery was consulted to evaluation vascular supply to lower extremities. He had an anaphylactic reaction to the skin prep for his procedure with acute onset of chest pressure, hives and tongue swelling. This resolved with Benadryl. His procedure was postponed. He also had elevated creatinine further delaying his procedure. After cleared by nephrology, he underwent aortogram with run-off. Unfortunately, he was found to have severe RLE arterial occulusion with no targets amenable to revascularization.   [facility], Dr. Garcia was consulted for antibiotic management. He received IV antibiotics while inpatient. He will be discharged with a PICC line and IV antibiotics to be infused at home. He will follow-up outpatient.   Nephrology was consulted due to increased creatinine and clearance for procedure with contrast. He was given IVF and mucomyst prior to  procedure for renal protection.  Patient has stage III CKD that is stable currently.)))     Per Dr. Juarez's note: ((This is an extremely pleasant 74-year-old gentleman with long-standing poorly controlled diabetes with severe neuropathy and severe vascular disease.  He has a gangrene of the fourth toe, and he has a very large gangrenous wound on the dorsal lateral aspect of the right foot.  It occurred when he wore a boot all day while he was fishing.  He has failed to progress in healing.  He has had angiograms which showed severe small vessel disease with no reconstructable vessels.  He has had progression of the gangrene on the toe.  He and his wife and I discussed the options.  Given the extraordinarily poor chances of healing the foot he elected to proceed with below-knee amputation.  At the time of surgery I did not find any sign of infection at the level of amputation.  He had exceedingly calcified vessels that were present in all compartments.))    Dr. Garcia, [facility], was consulted for antibiotic management. The patient had a PICC line and received IV daptomycin for 3 days postop until cultures were negative. The PICC line will be discontinued prior to discharge.    Adjustments were made to his insulin while inpatient for better glucose control. While inpatient he was given Levemir 35 units nightly, 14 units of Humalog TID with meals and additional sliding scale.    Patient was provided pain medications as needed for pain control, along with popliteal nerve block infusion of Ropivacaine.    Adjustments were made to pain medications to optimize postop pain management. Risks and benefits of opiate medications discussed with patient.    He was seen by PT has progressed well over his stay.  He used an IS for atelectasis prophylaxis and Lovenox along with mechanicals for DVT prophylaxis.  Home medications were resumed as appropriate, and labs were monitored and remained fairly stable.     With the progress he  has made, he is ready for DC to [facility] today.    Discussed with patient regarding plan and he shows understanding and agreement.          Procedures Performed  06/04/19 1:31 PM     Preoperative diagnosis: gangrene right foot/toe     Postoperative diagnosis: Same     Anesthesia: Gen. with blocks for postop pain control     Surgeon: Juju Weber M.D.     Operative procedure: Right below-knee amputation      Pertinent Test Results:    I reviewed the patient's new clinical results.   Results from last 7 days   Lab Units 06/07/19  0415 06/06/19  0540 06/05/19  0454   WBC 10*3/mm3 7.99 10.41 14.07*   HEMOGLOBIN g/dL 8.9* 9.1* 10.1*   HEMATOCRIT % 28.9* 27.8* 30.6*   PLATELETS 10*3/mm3 364 381 421     Results from last 7 days   Lab Units 06/06/19  0540 06/05/19 0454 06/04/19  1036   SODIUM mmol/L 138 132* 136   POTASSIUM mmol/L 4.2 5.5* 4.8   CHLORIDE mmol/L 103 100 98   CO2 mmol/L 23.0 18.0* 21.0*   BUN mg/dL 33* 38* 33*   CREATININE mg/dL 1.19 1.47* 1.36*   CALCIUM mg/dL 9.6 9.4 10.7*   GLUCOSE mg/dL 140* 266* 281*        Results for ASAEL MONDRAGON (MRN [id number]) as of 6/8/2019 08:26   Ref. Range 6/7/2019 11:42 6/7/2019 15:57 6/7/2019 20:25 6/8/2019 04:51 6/8/2019 07:29   Glucose Latest Ref Range: 70 - 130 mg/dL 129 171 (H) 203 (H)  143 (H)     Microbiology Results (last 21 days)     Collected  Updated  Procedure  Result Status      06/04/2019 1301  06/07/2019 1246  Anaerobic Culture - Tissue, Leg, Right [[id number]]   Tissue from Leg, Right     Preliminary result  Anaerobic Culture No anaerobes isolated at 3 days             06/04/2019 1301  06/04/2019 1414  Fungus Culture - Tissue, Leg, Right [[id number]]   Tissue from Leg, Right     In process  No result data             06/04/2019 1301  06/07/2019 0906  Tissue / Bone Culture - Tissue, Leg, Right [[id number]]   Tissue from Leg, Right     Final result  Tissue Culture No growth at 3 days   Gram Stain No WBCs or organisms seen             06/04/2019 1301  06/05/2019  "1117  AFB Culture - Tissue, Leg, Right [[id number]]   Tissue from Leg, Right     Preliminary result  AFB Stain No acid fast bacilli seen on concentrated smear                 I reviewed the patient's new imaging including images and reports.      Physical therapy: patient ambulated 20 feet with step to gait pattern, followed with chair for safety, distance limited by weakness and fatigue. HEP completed.    Discharge Assessment:    Vital Signs  Visit Vitals  /84 (BP Location: Left arm, Patient Position: Lying)   Pulse 62   Temp 98.1 °F (36.7 °C) (Oral)   Resp 16   Ht 190.5 cm (75\")   Wt 110 kg (242 lb)   SpO2 96%   BMI 30.25 kg/m²     Temp (24hrs), Av.3 °F (36.8 °C), Min:97.7 °F (36.5 °C), Max:98.6 °F (37 °C)      General Appearance:    Alert, cooperative, in no acute distress   Lungs:     Clear to auscultation,respirations regular, even and                   unlabored    Heart:    Regular rhythm and normal rate, normal S1 and S2   Abdomen:     Normal bowel sounds, no masses, no organomegaly, soft        non-tender, non-distended, no guarding, no rebound                 tenderness   Extremities:   Right stump dressing CDI.Popliteal  Nerve block cath present ( to be removed prior to discharge)        Skin:   No bleeding, bruising or rash   Neurologic:   Cranial nerves 2 - 12 grossly intact, sensation intact       Discharge Disposition: [facility]    Discharge Medications     Discharge Medications      New Medications      Instructions Start Date   acetaminophen 325 MG tablet  Commonly known as:  TYLENOL   650 mg, Oral, Every 6 Hours PRN      enoxaparin 40 MG/0.4ML solution syringe  Commonly known as:  LOVENOX   40 mg, Subcutaneous, Daily, For 2 weeks      melatonin 5 MG tablet tablet   5 mg, Oral, Nightly PRN         Continue These Medications      Instructions Start Date   amLODIPine 10 MG tablet  Commonly known as:  NORVASC   10 mg, Oral, Daily      aspirin 81 MG EC tablet   81 mg, Oral, Daily    "   budesonide-formoterol 80-4.5 MCG/ACT inhaler  Commonly known as:  SYMBICORT   2 puffs, Inhalation, 2 Times Daily - RT      BYSTOLIC 10 MG tablet  Generic drug:  nebivolol   TK 1/2 T PO BID      CloNIDine 0.1 MG tablet  Commonly known as:  CATAPRES   0.1 mg, Oral, 2 Times Daily      DICLOFENAC PO  Notes to patient:  RESUME AS TAKING AT HOME   Oral      docusate sodium 100 MG capsule   100 mg, Oral, 2 Times Daily PRN      hydrochlorothiazide 25 MG tablet  Commonly known as:  HYDRODIURIL   Oral, Daily      HYDROcodone-acetaminophen 7.5-325 MG per tablet  Commonly known as:  NORCO   1-2 tablets, Oral, Every 6 Hours PRN      losartan 100 MG tablet  Commonly known as:  COZAAR   100 mg, Oral, Daily      metaxalone 800 MG tablet  Commonly known as:  SKELAXIN   800 mg, Oral, 3 Times Daily PRN      metoclopramide 10 MG tablet  Commonly known as:  REGLAN   TK 1 T PO BEFORE MEALS AND QHS      ondansetron 4 MG tablet  Commonly known as:  ZOFRAN   4 mg, Oral, Every 6 Hours PRN      oxyCODONE-acetaminophen 5-325 MG per tablet  Commonly known as:  PERCOCET   1-2 tablets, Oral, Every 6 Hours PRN      pantoprazole 40 MG EC tablet  Commonly known as:  PROTONIX   40 mg, Oral, Daily      TOUJEO SOLOSTAR 300 UNIT/ML solution pen-injector  Generic drug:  Insulin Glargine  Notes to patient:  RESUME AS TAKING AT HOME   Subcutaneous      vitamin B-12 1000 MCG tablet  Commonly known as:  CYANOCOBALAMIN   1,000 mcg, Oral, Daily         Stop These Medications    mupirocin 2 % ointment  Commonly known as:  BACTROBAN     silver sulfadiazine 1 % cream  Commonly known as:  SILVADENE, SSD          Of note is that the insulin regimen we are using currently at the hospital includes Levemir 35 units subcutaneously at night, Humalog 14 units subcutaneously 3 times daily with meals, and a Humalog correction protocol ranging from 0 to 9 units on a sliding scale.      Discharge Diet: Consistent carb diet    Activity at Discharge: NWB RLE    Follow-up  Appointments  Dr. Juarez per her orders    Discharge took over 30 min.    Pennie Foster MD  06/08/19  8:25 AM

## 2019-06-08 VITALS
BODY MASS INDEX: 30.09 KG/M2 | SYSTOLIC BLOOD PRESSURE: 138 MMHG | TEMPERATURE: 98.4 F | HEIGHT: 75 IN | DIASTOLIC BLOOD PRESSURE: 65 MMHG | RESPIRATION RATE: 16 BRPM | WEIGHT: 242 LBS | HEART RATE: 65 BPM | OXYGEN SATURATION: 94 %

## 2019-06-08 LAB
CK SERPL-CCNC: 150 U/L (ref 20–200)
CYTO UR: NORMAL
GLUCOSE BLDC GLUCOMTR-MCNC: 143 MG/DL (ref 70–130)
GLUCOSE BLDC GLUCOMTR-MCNC: 160 MG/DL (ref 70–130)
LAB AP CASE REPORT: NORMAL
LAB AP CLINICAL INFORMATION: NORMAL
PATH REPORT.FINAL DX SPEC: NORMAL
PATH REPORT.GROSS SPEC: NORMAL

## 2019-06-08 PROCEDURE — 82962 GLUCOSE BLOOD TEST: CPT

## 2019-06-08 PROCEDURE — 99024 POSTOP FOLLOW-UP VISIT: CPT | Performed by: ORTHOPAEDIC SURGERY

## 2019-06-08 PROCEDURE — 63710000001 INSULIN LISPRO (HUMAN) PER 5 UNITS: Performed by: NURSE PRACTITIONER

## 2019-06-08 PROCEDURE — 82550 ASSAY OF CK (CPK): CPT

## 2019-06-08 PROCEDURE — 97110 THERAPEUTIC EXERCISES: CPT

## 2019-06-08 PROCEDURE — 25010000002 ENOXAPARIN PER 10 MG: Performed by: ORTHOPAEDIC SURGERY

## 2019-06-08 PROCEDURE — 94799 UNLISTED PULMONARY SVC/PX: CPT

## 2019-06-08 PROCEDURE — 63710000001 INSULIN LISPRO (HUMAN) PER 5 UNITS: Performed by: INTERNAL MEDICINE

## 2019-06-08 RX ADMIN — ENOXAPARIN SODIUM 40 MG: 40 INJECTION SUBCUTANEOUS at 08:05

## 2019-06-08 RX ADMIN — CLONIDINE HYDROCHLORIDE 0.1 MG: 0.1 TABLET ORAL at 08:06

## 2019-06-08 RX ADMIN — INSULIN LISPRO 14 UNITS: 100 INJECTION, SOLUTION INTRAVENOUS; SUBCUTANEOUS at 08:06

## 2019-06-08 RX ADMIN — BUDESONIDE AND FORMOTEROL FUMARATE DIHYDRATE 2 PUFF: 80; 4.5 AEROSOL RESPIRATORY (INHALATION) at 09:19

## 2019-06-08 RX ADMIN — LOSARTAN POTASSIUM 100 MG: 25 TABLET, FILM COATED ORAL at 08:05

## 2019-06-08 RX ADMIN — INSULIN LISPRO 2 UNITS: 100 INJECTION, SOLUTION INTRAVENOUS; SUBCUTANEOUS at 12:00

## 2019-06-08 RX ADMIN — METOCLOPRAMIDE HYDROCHLORIDE 10 MG: 10 TABLET ORAL at 08:06

## 2019-06-08 RX ADMIN — HYDROCODONE BITARTRATE AND ACETAMINOPHEN 1 TABLET: 7.5; 325 TABLET ORAL at 11:59

## 2019-06-08 RX ADMIN — NEBIVOLOL HYDROCHLORIDE 10 MG: 5 TABLET ORAL at 08:05

## 2019-06-08 RX ADMIN — METOCLOPRAMIDE HYDROCHLORIDE 10 MG: 10 TABLET ORAL at 11:29

## 2019-06-08 RX ADMIN — Medication 1 TABLET: at 08:06

## 2019-06-08 RX ADMIN — PANTOPRAZOLE SODIUM 40 MG: 40 TABLET, DELAYED RELEASE ORAL at 05:51

## 2019-06-08 RX ADMIN — INSULIN LISPRO 14 UNITS: 100 INJECTION, SOLUTION INTRAVENOUS; SUBCUTANEOUS at 11:59

## 2019-06-08 RX ADMIN — AMLODIPINE BESYLATE 10 MG: 10 TABLET ORAL at 08:06

## 2019-06-08 RX ADMIN — MUPIROCIN 1 APPLICATION: 20 OINTMENT TOPICAL at 08:08

## 2019-06-08 NOTE — PLAN OF CARE
Problem: Patient Care Overview  Goal: Plan of Care Review  Outcome: Ongoing (interventions implemented as appropriate)   06/08/19 1140   Coping/Psychosocial   Plan of Care Reviewed With patient;spouse   Plan of Care Review   Progress improving   OTHER   Outcome Summary Pt able to perform supine to/from sit with supervision. Good participation and tolerance for LE therapeutic exercise. Continue per IPPT POC.

## 2019-06-08 NOTE — THERAPY TREATMENT NOTE
Acute Care - Physical Therapy Treatment Note  [facility]     Patient Name: Amol Aguirre  : 1945  MRN: [id number]  Today's Date: 2019  Onset of Illness/Injury or Date of Surgery: 01  Date of Referral to PT: 19  Referring Physician: MD Tia.    Admit Date: 2019    Visit Dx:    ICD-10-CM ICD-9-CM   1. Impaired mobility and ADLs Z74.09 799.89   2. Gangrene (CMS/[facility]) I96 785.4   3. Skin ulcer of toe of right foot with necrosis of bone (CMS/[facility]) L97.514 707.15   4. Type 2 diabetes mellitus with diabetic polyneuropathy, without long-term current use of insulin (CMS/[facility]) E11.42 250.60     357.2   5. Vascular calcification I99.8 459.89   6. Decreased pulses in feet R09.89 785.9   7. Blister (nonthermal), right foot, initial encounter S90.821A 917.2     Patient Active Problem List   Diagnosis   • Right foot pain   • Decreased pulses in feet   • Vascular calcification   • Uncontrolled type 2 diabetes mellitus with hyperglycemia (CMS/[facility])   • Type 2 diabetes mellitus with diabetic polyneuropathy, without long-term current use of insulin (CMS/[facility])   • Mass of lesser toe of right foot   • Blister (nonthermal), right foot, initial encounter   • Diabetic foot ulcers (CMS/[facility])   • Skin ulcer of toe of right foot with necrosis of bone (CMS/[facility])   • Gangrene (CMS/[facility])   • S/P BKA (below knee amputation), right (CMS/[facility])   • HTN (hypertension)   • CKD (chronic kidney disease)   • Leukocytosis, likely reactive   • Hyperkalemia   • Anemia   • Acute postoperative pain       Therapy Treatment    Rehabilitation Treatment Summary     Row Name 19 1104             Treatment Time/Intention    Discipline  physical therapist  -ES      Document Type  therapy note (daily note)  -ES      Subjective Information  no complaints  -ES      Mode of Treatment  physical therapy  -ES      Patient/Family Observations  wife present  -ES      Therapy Frequency (PT Clinical Impression)  daily  -ES      Patient  Effort  good  -ES      Existing Precautions/Restrictions  fall;other (see comments);non-weight bearing;right nerve cath  -ES      Recorded by [ES] Karen Dickerson, PT 06/08/19 1140      Row Name 06/08/19 1104             Vital Signs    Pre Systolic BP Rehab  -- VSS, RN cleared for tx  -ES      Recorded by [ES] Karen Dickerson, PT 06/08/19 1140      Row Name 06/08/19 1104             Cognitive Assessment/Intervention- PT/OT    Affect/Mental Status (Cognitive)  WNL  -ES      Orientation Status (Cognition)  oriented x 4  -ES      Follows Commands (Cognition)  follows one step commands;over 90% accuracy  -ES      Safety Deficit (Cognitive)  mild deficit;safety precautions awareness;safety precautions follow-through/compliance  -ES      Personal Safety Interventions  fall prevention program maintained;muscle strengthening facilitated  -ES      Recorded by [ES] Karen Dickerson, PT 06/08/19 1140      Row Name 06/08/19 1104             Mobility Assessment/Intervention    Extremity Weight-bearing Status  right lower extremity  -ES      Right Lower Extremity (Weight-bearing Status)  non weight-bearing (NWB)  -ES      Recorded by [ES] Karen Dickerson, PT 06/08/19 1140      Row Name 06/08/19 1104             Bed Mobility Assessment/Treatment    Bed Mobility Assessment/Treatment  supine-sit;sit-supine  -ES      Scooting/Bridging [place] (Bed Mobility)  conditional independence  -ES      Supine-Sit [place] (Bed Mobility)  supervision  -ES      Sit-Supine [place] (Bed Mobility)  supervision  -ES      Bed Mobility, Safety Issues  decreased use of legs for bridging/pushing  -ES      Assistive Device (Bed Mobility)  bed rails;head of bed elevated  -ES      Recorded by [ES] Karen Dickerson, PT 06/08/19 1140      Row Name 06/08/19 1104             Transfer Assessment/Treatment    Comment (Transfers)  Deferred, pt request exercises only due to discharging shortly  -ES      Recorded by [ES] Karen Dickerson, PT 06/08/19  1140      Row Name 06/08/19 1104             Therapeutic Exercise    17161 - PT Therapeutic Exercise Minutes  16  -ES      Recorded by [ES] Karen Dickerson, PT 06/08/19 1140      Row Name 06/08/19 1104             Therapeutic Exercise    Lower Extremity (Therapeutic Exercise)  gluteal sets;LAQ (long arc quad), bilateral;marching while seated;quad sets, bilateral  -ES      Exercise Type (Therapeutic Exercise)  AROM (active range of motion);isometric contraction, static  -ES      Position (Therapeutic Exercise)  supine;seated  -ES      Sets/Reps (Therapeutic Exercise)  1/15  -ES      Recorded by [ES] Karen Dickerson, PT 06/08/19 1140      Row Name 06/08/19 1104             Static Sitting Balance    Level of [place] (Unsupported Sitting, Static Balance)  supervision  -ES      Sitting Position (Unsupported Sitting, Static Balance)  sitting on edge of bed  -ES      Time Able to Maintain Position (Unsupported Sitting, Static Balance)  4 to 5 minutes  -ES      Recorded by [ES] Karen Dickerson, PT 06/08/19 1140      Row Name 06/08/19 1104             Positioning and Restraints    Pre-Treatment Position  in bed  -ES      Post Treatment Position  bed  -ES      In Bed  supine;call light within reach;encouraged to call for assist;with family/caregiver  -ES      Recorded by [ES] Karen Dickerson, PT 06/08/19 1140      Row Name 06/08/19 1104             Pain Scale: Numbers Pre/Post-Treatment    Pain Scale: Numbers, Pretreatment  0/10 - no pain  -ES      Pain Scale: Numbers, Post-Treatment  0/10 - no pain  -ES      Recorded by [ES] Karen Dickerson, PT 06/08/19 1140      Row Name                Wound 06/04/19 1309 Right leg incision    Wound - Properties Group Date first assessed: 06/04/19 [LD] Time first assessed: 1309 [LD] Side: Right [LD] Location: leg [LD] Type: incision [LD] Recorded by:  [LD] Farrah Arauz RN 06/04/19 1309    Row Name                Wound 06/07/19 1452 Right anterior;upper arm puncture    Wound -  Properties Group Date first assessed: 06/07/19 [AS] Time first assessed: 1452 [AS] Side: Right [AS] Orientation: anterior;upper [AS] Location: arm [AS] Type: puncture [AS] Recorded by:  [AS] Ana April SHANNON, RN 06/07/19 1628      User Key  (r) = Recorded By, (t) = Taken By, (c) = Cosigned By    Initials Name Effective Dates Discipline    LD Farrah Arauz RN 06/16/16 -  Nurse    LAURENCE Perez April SHANNON, RN 06/16/16 -  Nurse    Karen Nevarez, PT 11/13/17 -  PT          Wound 06/04/19 1309 Right leg incision (Active)   Dressing Appearance dry;intact;no drainage 6/8/2019 10:00 AM   Closure ASHLEE 6/8/2019  8:20 AM   Base dressing in place, unable to visualize 6/8/2019  8:20 AM   Drainage Amount none 6/8/2019  8:20 AM   Dressing Care, Wound elastic bandage 6/8/2019  8:20 AM       Wound 06/07/19 1452 Right anterior;upper arm puncture (Active)   Dressing Appearance intact 6/8/2019 10:00 AM   Dressing Care, Wound dressing removed;open to air 6/8/2019 11:41 AM           Physical Therapy Education     Title: PT OT SLP Therapies (In Progress)     Topic: Physical Therapy (Done)     Point: Mobility training (Done)     Learning Progress Summary           Patient Acceptance, E, VU by ES at 6/8/2019 11:40 AM    Acceptance, E, NR by AS at 6/7/2019 10:50 AM    Eager, E, NR by SC at 6/6/2019 11:10 AM    Comment:  reviewed safety with mobility    Eager, E, VU,DU,NR by SC at 6/5/2019  8:50 AM    Comment:  reviewed safety with mobility   Family Acceptance, E, VU by ES at 6/8/2019 11:40 AM                   Point: Home exercise program (Done)     Learning Progress Summary           Patient Acceptance, E, VU by ES at 6/8/2019 11:40 AM    Acceptance, E, NR by AS at 6/7/2019 10:50 AM    Eager, E, NR by SC at 6/6/2019 11:10 AM    Comment:  reviewed safety with mobility    Eager, E, VU,DU,NR by SC at 6/5/2019  8:50 AM    Comment:  reviewed safety with mobility   Family RAHEEL Todd VU by SONALI at 6/8/2019 11:40 AM                   Point:  Body mechanics (Done)     Learning Progress Summary           Patient Acceptance, E, VU by  at 6/8/2019 11:40 AM    Acceptance, E, NR by AS at 6/7/2019 10:50 AM    Eager, E, NR by SC at 6/6/2019 11:10 AM    Comment:  reviewed safety with mobility    Eager, E, VU,DU,NR by SC at 6/5/2019  8:50 AM    Comment:  reviewed safety with mobility   Family Acceptance, E, VU by  at 6/8/2019 11:40 AM                   Point: Precautions (Done)     Learning Progress Summary           Patient Acceptance, E, VU by  at 6/8/2019 11:40 AM    Acceptance, E, NR by AS at 6/7/2019 10:50 AM    Eager, E, NR by SC at 6/6/2019 11:10 AM    Comment:  reviewed safety with mobility    Eager, E, VU,DU,NR by SC at 6/5/2019  8:50 AM    Comment:  reviewed safety with mobility   Family Acceptance, E, VU by  at 6/8/2019 11:40 AM                               User Key     Initials Effective Dates Name Provider Type Discipline    SC 06/19/15 -  Cyndy Fofana, PT Physical Therapist PT    AS 06/22/15 -  Misti Starkey, PTA Physical Therapy Assistant PT     11/13/17 -  Karen Dickerson, PT Physical Therapist PT                PT Recommendation and Plan  Therapy Frequency (PT Clinical Impression): daily  Plan of Care Reviewed With: patient, spouse  Progress: improving  Outcome Summary: Pt able to perform supine to/from sit with supervision.  Good participation and tolerance for LE therapeutic exercise.  Continue per IPPT POC.  Outcome Measures     Row Name 06/08/19 1104 06/07/19 0855 06/06/19 1711       How much help from another person do you currently need...    Turning from your back to your side while in flat bed without using bedrails?  4  -ES  4  -AS  --    Moving from lying on back to sitting on the side of a flat bed without bedrails?  4  -ES  4  -AS  --    Moving to and from a bed to a chair (including a wheelchair)?  3  -ES  3  -AS  --    Standing up from a chair using your arms (e.g., wheelchair, bedside chair)?  3  -ES  3  -AS  --     Climbing 3-5 steps with a railing?  1  -ES  1  -AS  --    To walk in hospital room?  3  -ES  3  -AS  --    AM-PAC 6 Clicks Score  18  -ES  18  -AS  --       How much help from another is currently needed...    Putting on and taking off regular lower body clothing?  --  --  2  -AR (r) CH (t) AR (c)    Bathing (including washing, rinsing, and drying)  --  --  2  -AR (r) CH (t) AR (c)    Toileting (which includes using toilet bed pan or urinal)  --  --  2  -AR (r) CH (t) AR (c)    Putting on and taking off regular upper body clothing  --  --  3  -AR (r) CH (t) AR (c)    Taking care of personal grooming (such as brushing teeth)  --  --  3  -AR (r) CH (t) AR (c)    Eating meals  --  --  3  -AR (r) CH (t) AR (c)    Score  --  --  15  -AR (r) CH (t)       Functional Assessment    Outcome Measure Options  AM-PAC 6 Clicks Basic Mobility (PT)  -ES  AM-PAC 6 Clicks Basic Mobility (PT)  -AS  AM-PAC 6 Clicks Daily Activity (OT)  -AR (r) CH (t) AR (c)    Row Name 06/06/19 1110             How much help from another person do you currently need...    Turning from your back to your side while in flat bed without using bedrails?  3  -SC      Moving from lying on back to sitting on the side of a flat bed without bedrails?  3  -SC      Moving to and from a bed to a chair (including a wheelchair)?  3  -SC      Standing up from a chair using your arms (e.g., wheelchair, bedside chair)?  3  -SC      Climbing 3-5 steps with a railing?  1  -SC      To walk in hospital room?  3  -SC      AM-PAC 6 Clicks Score  16  -SC         Functional Assessment    Outcome Measure Options  AM-[facility] 6 Clicks Basic Mobility (PT)  -SC        User Key  (r) = Recorded By, (t) = Taken By, (c) = Cosigned By    Initials Name Provider Type    SC Cyndy Fofana, PT Physical Therapist    Erica Lim, OT Occupational Therapist    AS Misti Starkey, PTA Physical Therapy Assistant    Karen Nevarez, PT Physical Therapist    Ray Hathaway  J, OT Student OT Student         Time Calculation:   PT Charges     Row Name 06/08/19 1104             Time Calculation    Start Time  1104  -ES      PT Received On  06/08/19  -ES      PT Goal Re-Cert Due Date  06/15/19  -ES         Timed Charges    65684 - PT Therapeutic Exercise Minutes  16  -ES        User Key  (r) = Recorded By, (t) = Taken By, (c) = Cosigned By    Initials Name Provider Type    ES Karen Dickerson, PT Physical Therapist        Therapy Charges for Today     Code Description Service Date Service Provider Modifiers Qty    00598905678 HC PT THER PROC EA 15 MIN 6/8/2019 Karen Dickerson, PT GP 1          PT G-Codes  Outcome Measure Options: AM-PAC 6 Clicks Basic Mobility (PT)  AM-PAC 6 Clicks Score: 18  Score: 15    Karen Dickerson PT  6/8/2019

## 2019-06-08 NOTE — PROGRESS NOTES
[facility]    Acute pain service Inpatient Progress Note    Patient Name: Amol Aguirre  :  1945  MRN:  [id number]        Acute Pain  Service Inpatient Progress Note:    Analgesia:Good  Pain Score:2/10  LOC: alert and awake  Resp Status: room air  Cardiac: VS stable  Side Effects:None  Catheter Site:clean, dressing intact and dry  Cath type: peripheral nerve cath with ON Q  Infusion rate: 6ml/hr  Catheter Plan:Catheter to remain Insitu and Continue catheter infusion rate unchanged  Comments: No issues with pain control; to  rehab today

## 2019-06-08 NOTE — PROGRESS NOTES
"  SUBJECTIVE  Patient resting comfortably.  Pain well controlled.  No events overnight.    OBJECTIVE  Temp (24hrs), Av.1 °F (36.7 °C), Min:97.5 °F (36.4 °C), Max:98.6 °F (37 °C)    Blood pressure 134/71, pulse 62, temperature 98.4 °F (36.9 °C), temperature source Oral, resp. rate 18, height 190.5 cm (75\"), weight 110 kg (242 lb), SpO2 94 %.    Lab Results (last 24 hours)     Procedure Component Value Units Date/Time    CK [[id number]]  (Normal) Collected:  19 0451    Specimen:  Blood Updated:  19 0659     Creatine Kinase 150 U/L     POC Glucose Once [[id number]]  (Abnormal) Collected:  19    Specimen:  Blood Updated:  19 203     Glucose 203 mg/dL     POC Glucose Once [[id number]]  (Abnormal) Collected:  19 1557    Specimen:  Blood Updated:  19 1559     Glucose 171 mg/dL     Anaerobic Culture - Tissue, Leg, Right [[id number]] Collected:  19 1301    Specimen:  Tissue from Leg, Right Updated:  19 1246     Anaerobic Culture No anaerobes isolated at 3 days    POC Glucose Once [[id number]]  (Normal) Collected:  19 1142    Specimen:  Blood Updated:  19 1143     Glucose 129 mg/dL     Tissue / Bone Culture - Tissue, Leg, Right [[id number]] Collected:  19 1301    Specimen:  Tissue from Leg, Right Updated:  19 0906     Tissue Culture No growth at 3 days     Gram Stain No WBCs or organisms seen    POC Glucose Once [[id number]]  (Abnormal) Collected:  19 0734    Specimen:  Blood Updated:  19 0735     Glucose 134 mg/dL             PHYSICAL EXAM  Right lower extremity: Dressing remains clean, dry and intact.  Trace swelling of extremity.  Intact knee flexion and extension.       S/P BKA (below knee amputation), right (CMS/HCC)    Decreased pulses in feet    Vascular calcification    Type 2 diabetes mellitus with diabetic polyneuropathy, without long-term current use of insulin (CMS/HCC)    Blister (nonthermal), right foot, initial encounter    " Skin ulcer of toe of right foot with necrosis of bone (CMS/HCC)    Gangrene (CMS/HCC)    HTN (hypertension)    CKD (chronic kidney disease)    Leukocytosis, likely reactive    Hyperkalemia    Anemia    Acute postoperative pain      PLAN / DISPOSITION:  4 Days Post-Op right below-knee amputation    Discharge to [facility] today.  Follow-up with Dr. Weber in 2 weeks.    Mark Anderson MD  06/08/19  7:03 AM

## 2019-06-08 NOTE — PLAN OF CARE
Problem: Patient Care Overview  Goal: Plan of Care Review  Outcome: Ongoing (interventions implemented as appropriate)   06/08/19 0145   Coping/Psychosocial   Plan of Care Reviewed With patient   Plan of Care Review   Progress improving   OTHER   Outcome Summary VSS. Previous PICC line site absent of drainage. Wife at bedside and supportive. Pain well managed with Ropivicaine and Norco. Patient plans to discharge Saturday to [facility].        Problem: Pain, Acute (Adult)  Goal: Identify Related Risk Factors and Signs and Symptoms  Outcome: Ongoing (interventions implemented as appropriate)      Problem: Skin Injury Risk (Adult)  Goal: Identify Related Risk Factors and Signs and Symptoms  Outcome: Ongoing (interventions implemented as appropriate)      Problem: Fall Risk (Adult)  Goal: Absence of Fall  Outcome: Outcome(s) achieved Date Met: 06/08/19      Problem: Diabetes, Type 2 (Adult)  Goal: Signs and Symptoms of Listed Potential Problems Will be Absent, Minimized or Managed (Diabetes, Type 2)  Outcome: Ongoing (interventions implemented as appropriate)

## 2019-06-09 LAB — BACTERIA SPEC ANAEROBE CULT: NORMAL

## 2019-06-17 ENCOUNTER — OFFICE VISIT (OUTPATIENT)
Dept: ORTHOPEDIC SURGERY | Facility: CLINIC | Age: 74
End: 2019-06-17

## 2019-06-17 DIAGNOSIS — I96 GANGRENE (HCC): Primary | ICD-10-CM

## 2019-06-17 PROCEDURE — 29540 STRAPPING ANKLE &/FOOT: CPT | Performed by: ORTHOPAEDIC SURGERY

## 2019-06-17 PROCEDURE — 99024 POSTOP FOLLOW-UP VISIT: CPT | Performed by: ORTHOPAEDIC SURGERY

## 2019-06-17 NOTE — PROGRESS NOTES
Post-op (2 weeks s/p (R) BKA 06/04/2019)      Amol Aguirre is 2 weeks status post right below-knee amputation, 6/4/2019. He reports no fever, chills.  He reports pain is well controlled.  They have been taking lovenox for DVT prophylaxis.  They have been non weight bearing.  He has been at [facility], he is going home today, his wife is with him    Past Surgical History:   Procedure Laterality Date   • AORTAGRAM N/A 4/30/2019    Procedure: AORTAGRAM WITH OR WITHOUT RUNOFFS;  Surgeon: Carrillo White MD;  Location:  [facility];  Service: Vascular   • BELOW KNEE AMPUTATION Right 6/4/2019    Procedure: BELOW KNEE AMPUTATION RIGHT;  Surgeon: Juju Weber MD;  Location:  [facility] OR;  Service: Orthopedics   • CHOLECYSTECTOMY     • KNEE SURGERY Right     TKA       There were no vitals taken for this visit.        No erythema, no drainage, no sign of infection, normal post op swelling.  Right below-knee amputation stump looks very good    none  e   Assessment and Plan:   1. Gangren(CMS/HCC)  The stump is healing well.  We removed some of the medial and lateral staples, the rest should stay in place.  He was placed back into a stump wrapping dressing.  Continue daily stump wrapping, I will see him in 2 weeks to possibly remove the remaining sutures.    Of note, he was initially sent to me with a mass on the right great toe.  This was before he developed the severe wounds and gangrene.  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

## 2019-06-18 ENCOUNTER — READMISSION MANAGEMENT (OUTPATIENT)
Dept: CALL CENTER | Facility: HOSPITAL | Age: 74
End: 2019-06-18

## 2019-06-18 NOTE — OUTREACH NOTE
Prep Survey      Responses   Facility patient discharged from?  [facility]   Is patient eligible?  Yes   Discharge diagnosis  /P BKA (below knee amputation), right    Does the patient have one of the following disease processes/diagnoses(primary or secondary)?  General Surgery   Does the patient have Home health ordered?  Yes   What is the Home health agency?   [facility]    Is there a DME ordered?  No   General alerts for this patient  DC from sub acute care on 6/17   Prep survey completed?  Yes          Mariana Fairchild RN

## 2019-06-24 ENCOUNTER — READMISSION MANAGEMENT (OUTPATIENT)
Dept: CALL CENTER | Facility: HOSPITAL | Age: 74
End: 2019-06-24

## 2019-06-24 NOTE — OUTREACH NOTE
General Surgery Week 3 Survey      Responses   Facility patient discharged from?  [facility]   Does the patient have one of the following disease processes/diagnoses(primary or secondary)?  General Surgery   Week 3 attempt successful?  Yes   Call start time  1009   Call end time  1010   General alerts for this patient  DC from sub acute care on 6/17   Discharge diagnosis  /P BKA (below knee amputation), right    Meds reviewed with patient/caregiver?  Yes   Is the patient having any side effects they believe may be caused by any medication additions or changes?  No   Does the patient have all medications related to this admission filled (includes all antibiotics, pain medications, etc.)  Yes   Is the patient taking all medications as directed (includes completed medication regime)?  Yes   Does the patient have a follow up appointment scheduled with their surgeon?  Yes   Has the patient kept scheduled appointments due by today?  Yes   What is the Home health agency?   [facility]    Psychosocial issues?  No   Did the patient receive a copy of their discharge instructions?  Yes   Nursing interventions  Reviewed instructions with patient   What is the patient's perception of their health status since discharge?  Improving   Nursing interventions  Nurse provided patient education   Is the patient /caregiver able to teach back basic post-op care?  Lifting as instructed by MD in discharge instructions, Continue use of incentive spirometry at least 1 week post discharge   Is the patient/caregiver able to teach back signs and symptoms of incisional infection?  Increased redness, swelling or pain at the incisonal site, Increased drainage or bleeding, Incisional warmth, Pus or odor from incision, Fever   Is the patient/caregiver able to teach back steps to recovery at home?  Set small, achievable goals for return to baseline health, Rest and rebuild strength, gradually increase activity, Weigh daily, Make a list of questions for  surgeon's appointment   Is the patient/caregiver able to teach back the hierarchy of who to call/visit for symptoms/problems? PCP, Specialist, Home health nurse, Urgent Care, ED, 911  Yes   Week 3 call completed?  Yes          Dorothy Jasso RN

## 2019-07-01 ENCOUNTER — OFFICE VISIT (OUTPATIENT)
Dept: ORTHOPEDIC SURGERY | Facility: CLINIC | Age: 74
End: 2019-07-01

## 2019-07-01 DIAGNOSIS — E11.65 UNCONTROLLED TYPE 2 DIABETES MELLITUS WITH HYPERGLYCEMIA (HCC): ICD-10-CM

## 2019-07-01 DIAGNOSIS — Z89.511 S/P BKA (BELOW KNEE AMPUTATION), RIGHT (HCC): Primary | ICD-10-CM

## 2019-07-01 DIAGNOSIS — M1A.0711 IDIOPATHIC CHRONIC GOUT OF RIGHT FOOT WITH TOPHUS: ICD-10-CM

## 2019-07-01 PROCEDURE — 99024 POSTOP FOLLOW-UP VISIT: CPT | Performed by: ORTHOPAEDIC SURGERY

## 2019-07-01 NOTE — PROGRESS NOTES
Post-op (2 week f/u- 4 weeks s/p (R) BKA 06/04/2019)      Amol Aguirre is 4 weeks status post right below-knee amputation, 6/4/2019. He reports no fever, chills.  He reports pain is well controlled.  They have been taking aspirin for DVT prophylaxis.  They have been non weight bearing.      Past Surgical History:   Procedure Laterality Date   • AORTAGRAM N/A 4/30/2019    Procedure: AORTAGRAM WITH OR WITHOUT RUNOFFS;  Surgeon: Carrillo White MD;  Location:  [facility];  Service: Vascular   • BELOW KNEE AMPUTATION Right 6/4/2019    Procedure: BELOW KNEE AMPUTATION RIGHT;  Surgeon: Juju Weber MD;  Location:  [facility];  Service: Orthopedics   • CHOLECYSTECTOMY     • KNEE SURGERY Right     TKA       There were no vitals taken for this visit.        No erythema, no drainage, no sign of infection, normal post op swelling.  Right below-knee amputation stump is healed, a few small scabs, no signs of infection    none    Assessment and Plan:   1. S/P BKA (below knee amputation), right (CMS/[facility])  We remove the remaining staples.  He may now start using a stump .  They may start fitting him for a prosthesis.  I gave him a prescription for both of these.  I will see him again in 3 months, hopefully walking in his prosthesis.  He would like to do outpatient physical therapy at [facility], I will put a prescription in the system    2. 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  They went back to the amputation specimen and found that the bump was a gouty tophus.  He reports he has had gout for years.    3. Uncontrolled type 2 diabetes mellitus with hyperglycemia (CMS/[facility])  He is working on better glucose control

## 2019-07-16 LAB
FUNGUS WND CULT: NORMAL
MYCOBACTERIUM SPEC CULT: NORMAL
NIGHT BLUE STAIN TISS: NORMAL

## 2019-09-30 ENCOUNTER — OFFICE VISIT (OUTPATIENT)
Dept: ORTHOPEDIC SURGERY | Facility: CLINIC | Age: 74
End: 2019-09-30

## 2019-09-30 VITALS — HEIGHT: 75 IN | BODY MASS INDEX: 30.21 KG/M2 | WEIGHT: 243 LBS | HEART RATE: 63 BPM | OXYGEN SATURATION: 99 %

## 2019-09-30 DIAGNOSIS — L97.521 DIABETIC ULCER OF TOE OF LEFT FOOT ASSOCIATED WITH TYPE 2 DIABETES MELLITUS, LIMITED TO BREAKDOWN OF SKIN (HCC): ICD-10-CM

## 2019-09-30 DIAGNOSIS — Z89.511 S/P BKA (BELOW KNEE AMPUTATION), RIGHT (HCC): Primary | ICD-10-CM

## 2019-09-30 DIAGNOSIS — E11.621 DIABETIC ULCER OF TOE OF LEFT FOOT ASSOCIATED WITH TYPE 2 DIABETES MELLITUS, LIMITED TO BREAKDOWN OF SKIN (HCC): ICD-10-CM

## 2019-09-30 PROCEDURE — 99212 OFFICE O/P EST SF 10 MIN: CPT | Performed by: ORTHOPAEDIC SURGERY

## 2019-09-30 RX ORDER — THERMOMETER, ELECTRONIC,ORAL
EACH MISCELLANEOUS
COMMUNITY
End: 2020-07-31

## 2019-09-30 NOTE — PROGRESS NOTES
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  He is walking very well with a cane.  His prosthesis fits well.  He reports he is trying to work on better glucose control.  His wife is with him.    Current Outpatient Medications on File Prior to Visit   Medication Sig Dispense Refill   • acetaminophen (TYLENOL) 325 MG tablet Take 2 tablets by mouth Every 6 (Six) Hours As Needed for Mild Pain .     • amLODIPine (NORVASC) 10 MG tablet Take 10 mg by mouth Daily.  0   • aspirin 81 MG EC tablet Take 81 mg by mouth Daily.     • budesonide-formoterol (SYMBICORT) 80-4.5 MCG/ACT inhaler Inhale 2 puffs 2 (Two) Times a Day.     • BYSTOLIC 10 MG tablet TK 1/2 T PO BID  7   • CloNIDine (CATAPRES) 0.1 MG tablet Take 0.1 mg by mouth 2 (Two) Times a Day.     • DICLOFENAC PO Take  by mouth.     • docusate sodium 100 MG capsule Take 100 mg by mouth 2 (Two) Times a Day As Needed for Constipation. 60 each 0   • enoxaparin (LOVENOX) 40 MG/0.4ML solution syringe Inject 0.4 mL under the skin into the appropriate area as directed Daily. For 2 weeks 5.6 mL    • hydrochlorothiazide (HYDRODIURIL) 25 MG tablet Take  by mouth Daily.  3   • HYDROcodone-acetaminophen (NORCO) 7.5-325 MG per tablet Take 1-2 tablets by mouth Every 6 (Six) Hours As Needed for Moderate Pain  (as needed for pain). 60 tablet 0   • Insulin Glargine (TOUJEO SOLOSTAR) 300 UNIT/ML solution pen-injector Inject  under the skin into the appropriate area as directed.     • losartan (COZAAR) 100 MG tablet Take 100 mg by mouth Daily.  3   • melatonin 5 MG tablet tablet Take 1 tablet by mouth At  Night As Needed (sleep).     • metaxalone (SKELAXIN) 800 MG tablet Take 1 tablet by mouth 3 (Three) Times a Day As Needed for Muscle Spasms. 30 tablet 0   • metoclopramide (REGLAN) 10 MG tablet TK 1 T PO BEFORE MEALS AND QHS  8   • ondansetron (ZOFRAN) 4 MG tablet Take 1 tablet by mouth Every 6 (Six) Hours As Needed for Nausea or Vomiting. 30 tablet 0   • oxyCODONE-acetaminophen (PERCOCET) 5-325 MG per tablet Take 1-2 tablets by mouth Every 6 (Six) Hours As Needed for Severe Pain  (as needed for severe pain). 60 tablet 0   • pantoprazole (PROTONIX) 40 MG EC tablet Take 40 mg by mouth Daily.  2   • tolnaftate (TINACTIN) 1 % cream tolnaftate 1 % topical cream   Apply twice a day between the toes.   Obtain over-the-counter     • vitamin B-12 (CYANOCOBALAMIN) 1000 MCG tablet Take 1,000 mcg by mouth Daily.       No current facility-administered medications on file prior to visit.       Allergies   Allergen Reactions   • Chlorhexidine Shortness Of Breath, Itching and Rash     Pt developed a rash, itching, and shortness of breath after receiving a CHG bath on 4/24/19.   • Latex Itching      Past Medical History:   Diagnosis Date   • Acid reflux    • Asthma    • Diabetes (CMS/[facility])    • Hypertension      Past Surgical History:   Procedure Laterality Date   • AORTAGRAM N/A 4/30/2019    Procedure: AORTAGRAM WITH OR WITHOUT RUNOFFS;  Surgeon: Carrillo White MD;  Location: [facility] HYBRID OR 15;  Service: Vascular   • BELOW KNEE AMPUTATION Right 6/4/2019    Procedure: BELOW KNEE AMPUTATION RIGHT;  Surgeon: Juju Weber MD;  Location: [facility] OR;  Service: Orthopedics   • CHOLECYSTECTOMY     • KNEE SURGERY Right     TKA     Family History   Problem Relation Age of Onset   • Cancer Mother    • Hypertension Mother    • Hypertension Father    • Heart attack Father       Social History     Socioeconomic History   • Marital status:      Spouse name: Not on file   • Number of children: Not on file   • Years of education: Not on  "file   • Highest education level: Not on file   Tobacco Use   • Smoking status: Former Smoker     Types: Cigarettes     Start date:      Last attempt to quit:      Years since quittin.7   • Smokeless tobacco: Never Used   Substance and Sexual Activity   • Alcohol use: No     Frequency: Never   • Drug use: No   • Sexual activity: Defer        Review of Systems   Constitutional: Negative.    HENT: Negative.    Eyes: Negative.    Respiratory: Negative.    Cardiovascular: Negative.    Gastrointestinal: Negative.    Endocrine: Negative.    Genitourinary: Negative.    Musculoskeletal: Positive for arthralgias.   Skin: Negative.    Allergic/Immunologic: Negative.    Neurological: Negative.    Hematological: Negative.    Psychiatric/Behavioral: Negative.        The following portions of the patient's history were reviewed and updated as appropriate: allergies, current medications, past family history, past medical history, past social history, past surgical history and problem list.    Physical Exam:   Pulse 63   Ht 190.5 cm (75\")   Wt 110 kg (243 lb)   SpO2 99%   BMI 30.37 kg/m²   GENERAL: Body habitus: overweight        Gait: using cane     Mental Status:  awake and alert; oriented to person, place, and time  MSK:  [place]:  Right:  Right stump is well-healed, very good alignment, good muscle flap; Left:  non tender        Ankle:  ; Left:  non tender        Foot:  ; Left:  Tiny abrasion on the third toe DIP joint, due to his shoe, no signs of infection, it is superficial    NEURO Sensation:  absent    Medical Decision Making    Data Review:   none    Assessment/Plan/Diagnosis/Treatment Options:   1. S/P BKA (below knee amputation), right (CMS/HCC)  He is doing very well with respect to the right BKA, he has excellent gait.  I will see him in 6 months.    2. Diabetic ulcer of toe of left foot associated with type 2 diabetes mellitus, limited to breakdown of skin (CMS/HCC)  Tiny ulcer on left toe.  He needs " to stay out of the shoe until it heals.  He needs to apply Bactroban which she has at home.  He and his wife understand.  I cautioned him again about taking good care of the left foot so that he does not lose it.

## 2019-10-09 ENCOUNTER — OFFICE VISIT (OUTPATIENT)
Dept: ORTHOPEDIC SURGERY | Facility: CLINIC | Age: 74
End: 2019-10-09

## 2019-10-09 ENCOUNTER — HOSPITAL ENCOUNTER (INPATIENT)
Facility: HOSPITAL | Age: 74
LOS: 2 days | Discharge: HOME | End: 2019-10-11
Attending: ORTHOPAEDIC SURGERY | Admitting: ORTHOPAEDIC SURGERY

## 2019-10-09 VITALS — OXYGEN SATURATION: 98 % | HEIGHT: 75 IN | BODY MASS INDEX: 30.15 KG/M2 | HEART RATE: 75 BPM | WEIGHT: 242.51 LBS

## 2019-10-09 DIAGNOSIS — S90.821A BLISTER (NONTHERMAL), RIGHT FOOT, INITIAL ENCOUNTER: ICD-10-CM

## 2019-10-09 DIAGNOSIS — Z89.511 S/P BKA (BELOW KNEE AMPUTATION), RIGHT (HCC): ICD-10-CM

## 2019-10-09 DIAGNOSIS — L02.612 CELLULITIS AND ABSCESS OF TOE OF LEFT FOOT: Primary | ICD-10-CM

## 2019-10-09 DIAGNOSIS — M1A.0711 IDIOPATHIC CHRONIC GOUT OF RIGHT FOOT WITH TOPHUS: ICD-10-CM

## 2019-10-09 DIAGNOSIS — R09.89 DECREASED PULSES IN FEET: ICD-10-CM

## 2019-10-09 DIAGNOSIS — L97.521 DIABETIC ULCER OF TOE OF LEFT FOOT ASSOCIATED WITH TYPE 2 DIABETES MELLITUS, LIMITED TO BREAKDOWN OF SKIN (HCC): ICD-10-CM

## 2019-10-09 DIAGNOSIS — L03.032 CELLULITIS AND ABSCESS OF TOE OF LEFT FOOT: Primary | ICD-10-CM

## 2019-10-09 DIAGNOSIS — E11.621 DIABETIC ULCER OF TOE OF LEFT FOOT ASSOCIATED WITH TYPE 2 DIABETES MELLITUS, LIMITED TO BREAKDOWN OF SKIN (HCC): Primary | ICD-10-CM

## 2019-10-09 DIAGNOSIS — E11.621 DIABETIC ULCER OF TOE OF LEFT FOOT ASSOCIATED WITH TYPE 2 DIABETES MELLITUS, LIMITED TO BREAKDOWN OF SKIN (HCC): ICD-10-CM

## 2019-10-09 DIAGNOSIS — L97.521 DIABETIC ULCER OF TOE OF LEFT FOOT ASSOCIATED WITH TYPE 2 DIABETES MELLITUS, LIMITED TO BREAKDOWN OF SKIN (HCC): Primary | ICD-10-CM

## 2019-10-09 DIAGNOSIS — E11.65 UNCONTROLLED TYPE 2 DIABETES MELLITUS WITH HYPERGLYCEMIA (HCC): ICD-10-CM

## 2019-10-09 PROBLEM — L03.90 CELLULITIS: Status: ACTIVE | Noted: 2019-10-09

## 2019-10-09 PROBLEM — Z79.4 TYPE 2 DIABETES MELLITUS WITH DIABETIC POLYNEUROPATHY, WITH LONG-TERM CURRENT USE OF INSULIN (HCC): Status: ACTIVE | Noted: 2019-04-17

## 2019-10-09 LAB
ANION GAP SERPL CALCULATED.3IONS-SCNC: 14 MMOL/L (ref 5–15)
BASOPHILS # BLD AUTO: 0.05 10*3/MM3 (ref 0–0.2)
BASOPHILS NFR BLD AUTO: 0.6 % (ref 0–1.5)
BUN BLD-MCNC: 29 MG/DL (ref 8–23)
BUN/CREAT SERPL: 20.3 (ref 7–25)
CALCIUM SPEC-SCNC: 9.2 MG/DL (ref 8.6–10.5)
CHLORIDE SERPL-SCNC: 104 MMOL/L (ref 98–107)
CO2 SERPL-SCNC: 22 MMOL/L (ref 22–29)
CREAT BLD-MCNC: 1.43 MG/DL (ref 0.76–1.27)
CRP SERPL-MCNC: 0.58 MG/DL (ref 0–0.5)
D-LACTATE SERPL-SCNC: 1.2 MMOL/L (ref 0.5–2)
DEPRECATED RDW RBC AUTO: 45.4 FL (ref 37–54)
EOSINOPHIL # BLD AUTO: 0.34 10*3/MM3 (ref 0–0.4)
EOSINOPHIL NFR BLD AUTO: 3.8 % (ref 0.3–6.2)
ERYTHROCYTE [DISTWIDTH] IN BLOOD BY AUTOMATED COUNT: 14.1 % (ref 12.3–15.4)
ERYTHROCYTE [SEDIMENTATION RATE] IN BLOOD: 19 MM/HR (ref 0–20)
GFR SERPL CREATININE-BSD FRML MDRD: 48 ML/MIN/1.73
GLUCOSE BLD-MCNC: 107 MG/DL (ref 65–99)
GLUCOSE BLDC GLUCOMTR-MCNC: 159 MG/DL (ref 70–130)
GLUCOSE BLDC GLUCOMTR-MCNC: 235 MG/DL (ref 70–130)
HBA1C MFR BLD: 7.8 % (ref 4.8–5.6)
HCT VFR BLD AUTO: 37.5 % (ref 37.5–51)
HGB BLD-MCNC: 11.6 G/DL (ref 13–17.7)
IMM GRANULOCYTES # BLD AUTO: 0.03 10*3/MM3 (ref 0–0.05)
IMM GRANULOCYTES NFR BLD AUTO: 0.3 % (ref 0–0.5)
LYMPHOCYTES # BLD AUTO: 1.74 10*3/MM3 (ref 0.7–3.1)
LYMPHOCYTES NFR BLD AUTO: 19.5 % (ref 19.6–45.3)
MCH RBC QN AUTO: 26.9 PG (ref 26.6–33)
MCHC RBC AUTO-ENTMCNC: 30.9 G/DL (ref 31.5–35.7)
MCV RBC AUTO: 87 FL (ref 79–97)
MONOCYTES # BLD AUTO: 0.51 10*3/MM3 (ref 0.1–0.9)
MONOCYTES NFR BLD AUTO: 5.7 % (ref 5–12)
NEUTROPHILS # BLD AUTO: 6.24 10*3/MM3 (ref 1.7–7)
NEUTROPHILS NFR BLD AUTO: 70.1 % (ref 42.7–76)
NRBC BLD AUTO-RTO: 0 /100 WBC (ref 0–0.2)
PLATELET # BLD AUTO: 282 10*3/MM3 (ref 140–450)
PMV BLD AUTO: 10.2 FL (ref 6–12)
POTASSIUM BLD-SCNC: 4.6 MMOL/L (ref 3.5–5.2)
RBC # BLD AUTO: 4.31 10*6/MM3 (ref 4.14–5.8)
SODIUM BLD-SCNC: 140 MMOL/L (ref 136–145)
WBC NRBC COR # BLD: 8.91 10*3/MM3 (ref 3.4–10.8)

## 2019-10-09 PROCEDURE — 25010000002 ENOXAPARIN PER 10 MG: Performed by: ORTHOPAEDIC SURGERY

## 2019-10-09 PROCEDURE — 85652 RBC SED RATE AUTOMATED: CPT | Performed by: NURSE PRACTITIONER

## 2019-10-09 PROCEDURE — 99214 OFFICE O/P EST MOD 30 MIN: CPT | Performed by: ORTHOPAEDIC SURGERY

## 2019-10-09 PROCEDURE — 83036 HEMOGLOBIN GLYCOSYLATED A1C: CPT | Performed by: ORTHOPAEDIC SURGERY

## 2019-10-09 PROCEDURE — 87040 BLOOD CULTURE FOR BACTERIA: CPT | Performed by: ORTHOPAEDIC SURGERY

## 2019-10-09 PROCEDURE — 80048 BASIC METABOLIC PNL TOTAL CA: CPT | Performed by: ORTHOPAEDIC SURGERY

## 2019-10-09 PROCEDURE — 82962 GLUCOSE BLOOD TEST: CPT

## 2019-10-09 PROCEDURE — 63710000001 INSULIN DETEMIR PER 5 UNITS: Performed by: NURSE PRACTITIONER

## 2019-10-09 PROCEDURE — 86140 C-REACTIVE PROTEIN: CPT | Performed by: NURSE PRACTITIONER

## 2019-10-09 PROCEDURE — 85025 COMPLETE CBC W/AUTO DIFF WBC: CPT | Performed by: ORTHOPAEDIC SURGERY

## 2019-10-09 PROCEDURE — 25010000002 ERTAPENEM PER 500 MG: Performed by: INTERNAL MEDICINE

## 2019-10-09 PROCEDURE — 83605 ASSAY OF LACTIC ACID: CPT | Performed by: ORTHOPAEDIC SURGERY

## 2019-10-09 PROCEDURE — 63710000001 INSULIN LISPRO (HUMAN) PER 5 UNITS: Performed by: NURSE PRACTITIONER

## 2019-10-09 PROCEDURE — 25010000002 DAPTOMYCIN PER 1 MG: Performed by: INTERNAL MEDICINE

## 2019-10-09 RX ORDER — AMLODIPINE BESYLATE 10 MG/1
10 TABLET ORAL DAILY
Status: DISCONTINUED | OUTPATIENT
Start: 2019-10-10 | End: 2019-10-11 | Stop reason: HOSPADM

## 2019-10-09 RX ORDER — PANTOPRAZOLE SODIUM 40 MG/1
40 TABLET, DELAYED RELEASE ORAL DAILY
Status: DISCONTINUED | OUTPATIENT
Start: 2019-10-10 | End: 2019-10-11 | Stop reason: HOSPADM

## 2019-10-09 RX ORDER — LABETALOL HYDROCHLORIDE 5 MG/ML
10 INJECTION, SOLUTION INTRAVENOUS EVERY 4 HOURS PRN
Status: DISCONTINUED | OUTPATIENT
Start: 2019-10-09 | End: 2019-10-11 | Stop reason: HOSPADM

## 2019-10-09 RX ORDER — BUDESONIDE AND FORMOTEROL FUMARATE DIHYDRATE 80; 4.5 UG/1; UG/1
2 AEROSOL RESPIRATORY (INHALATION)
Status: DISCONTINUED | OUTPATIENT
Start: 2019-10-09 | End: 2019-10-11 | Stop reason: HOSPADM

## 2019-10-09 RX ORDER — CHOLECALCIFEROL (VITAMIN D3) 125 MCG
5 CAPSULE ORAL NIGHTLY PRN
Status: DISCONTINUED | OUTPATIENT
Start: 2019-10-09 | End: 2019-10-11 | Stop reason: HOSPADM

## 2019-10-09 RX ORDER — ONDANSETRON 4 MG/1
4 TABLET, FILM COATED ORAL EVERY 6 HOURS PRN
Status: DISCONTINUED | OUTPATIENT
Start: 2019-10-09 | End: 2019-10-11 | Stop reason: HOSPADM

## 2019-10-09 RX ORDER — ACETAMINOPHEN 325 MG/1
650 TABLET ORAL EVERY 6 HOURS PRN
Status: DISCONTINUED | OUTPATIENT
Start: 2019-10-09 | End: 2019-10-11 | Stop reason: HOSPADM

## 2019-10-09 RX ORDER — NICOTINE POLACRILEX 4 MG
15 LOZENGE BUCCAL
Status: DISCONTINUED | OUTPATIENT
Start: 2019-10-09 | End: 2019-10-11 | Stop reason: HOSPADM

## 2019-10-09 RX ORDER — METOCLOPRAMIDE 10 MG/1
10 TABLET ORAL
Status: DISCONTINUED | OUTPATIENT
Start: 2019-10-09 | End: 2019-10-11 | Stop reason: HOSPADM

## 2019-10-09 RX ORDER — ONDANSETRON 2 MG/ML
4 INJECTION INTRAMUSCULAR; INTRAVENOUS EVERY 6 HOURS PRN
Status: DISCONTINUED | OUTPATIENT
Start: 2019-10-09 | End: 2019-10-11 | Stop reason: HOSPADM

## 2019-10-09 RX ORDER — NEBIVOLOL 5 MG/1
5 TABLET ORAL
Status: DISCONTINUED | OUTPATIENT
Start: 2019-10-10 | End: 2019-10-11 | Stop reason: HOSPADM

## 2019-10-09 RX ORDER — SODIUM CHLORIDE 0.9 % (FLUSH) 0.9 %
10 SYRINGE (ML) INJECTION EVERY 12 HOURS SCHEDULED
Status: DISCONTINUED | OUTPATIENT
Start: 2019-10-09 | End: 2019-10-11 | Stop reason: HOSPADM

## 2019-10-09 RX ORDER — CLONIDINE HYDROCHLORIDE 0.1 MG/1
0.1 TABLET ORAL EVERY 12 HOURS SCHEDULED
Status: DISCONTINUED | OUTPATIENT
Start: 2019-10-09 | End: 2019-10-11 | Stop reason: HOSPADM

## 2019-10-09 RX ORDER — DEXTROSE MONOHYDRATE 25 G/50ML
25 INJECTION, SOLUTION INTRAVENOUS
Status: DISCONTINUED | OUTPATIENT
Start: 2019-10-09 | End: 2019-10-11 | Stop reason: HOSPADM

## 2019-10-09 RX ORDER — SODIUM CHLORIDE 0.9 % (FLUSH) 0.9 %
10 SYRINGE (ML) INJECTION AS NEEDED
Status: DISCONTINUED | OUTPATIENT
Start: 2019-10-09 | End: 2019-10-11 | Stop reason: HOSPADM

## 2019-10-09 RX ORDER — METAXALONE 800 MG/1
800 TABLET ORAL 3 TIMES DAILY PRN
Status: DISCONTINUED | OUTPATIENT
Start: 2019-10-09 | End: 2019-10-11 | Stop reason: HOSPADM

## 2019-10-09 RX ORDER — DOCUSATE SODIUM 100 MG/1
100 CAPSULE, LIQUID FILLED ORAL 2 TIMES DAILY PRN
Status: DISCONTINUED | OUTPATIENT
Start: 2019-10-09 | End: 2019-10-11 | Stop reason: HOSPADM

## 2019-10-09 RX ORDER — LOSARTAN POTASSIUM 50 MG/1
100 TABLET ORAL DAILY
Status: DISCONTINUED | OUTPATIENT
Start: 2019-10-10 | End: 2019-10-11 | Stop reason: HOSPADM

## 2019-10-09 RX ADMIN — INSULIN DETEMIR 38 UNITS: 100 INJECTION, SOLUTION SUBCUTANEOUS at 21:43

## 2019-10-09 RX ADMIN — METOCLOPRAMIDE HYDROCHLORIDE 10 MG: 10 TABLET ORAL at 18:36

## 2019-10-09 RX ADMIN — INSULIN LISPRO 14 UNITS: 100 INJECTION, SOLUTION INTRAVENOUS; SUBCUTANEOUS at 18:36

## 2019-10-09 RX ADMIN — CLONIDINE HYDROCHLORIDE 0.1 MG: 0.1 TABLET ORAL at 21:43

## 2019-10-09 RX ADMIN — ENOXAPARIN SODIUM 40 MG: 40 INJECTION SUBCUTANEOUS at 18:36

## 2019-10-09 RX ADMIN — MELATONIN TAB 5 MG 5 MG: 5 TAB at 23:07

## 2019-10-09 RX ADMIN — ERTAPENEM SODIUM 1 G: 1 INJECTION, POWDER, LYOPHILIZED, FOR SOLUTION INTRAMUSCULAR; INTRAVENOUS at 18:37

## 2019-10-09 RX ADMIN — DAPTOMYCIN 550 MG: 500 INJECTION, POWDER, LYOPHILIZED, FOR SOLUTION INTRAVENOUS at 18:36

## 2019-10-09 RX ADMIN — METOCLOPRAMIDE HYDROCHLORIDE 10 MG: 10 TABLET ORAL at 21:43

## 2019-10-09 NOTE — PROGRESS NOTES
ESTABLISHED PATIENT    Patient: Amol Aguirre  : 1945    Primary Care Provider: Donte Briones MD    Requesting Provider: As above    Follow-up (1 week f/u; left foot)      History    Chief Complaint: left 3rd toe cellulitis    History of Present Illness: he returns with the left 3rd toe much worse.  He reports that he has been wearing a sandal as I recommended, however now the third toe is erythematous, and he is developed a large blister over the anterior ankle.  No problems with the right below-knee amputation.  No fever no chills.    Current Outpatient Medications on File Prior to Visit   Medication Sig Dispense Refill   • amLODIPine (NORVASC) 10 MG tablet Take 10 mg by mouth Daily.  0   • aspirin 81 MG EC tablet Take 81 mg by mouth Daily.     • budesonide-formoterol (SYMBICORT) 80-4.5 MCG/ACT inhaler Inhale 2 puffs 2 (Two) Times a Day.     • BYSTOLIC 10 MG tablet TK 1/2 T PO BID  7   • CloNIDine (CATAPRES) 0.1 MG tablet Take 0.1 mg by mouth 2 (Two) Times a Day.     • docusate sodium 100 MG capsule Take 100 mg by mouth 2 (Two) Times a Day As Needed for Constipation. 60 each 0   • enoxaparin (LOVENOX) 40 MG/0.4ML solution syringe Inject 0.4 mL under the skin into the appropriate area as directed Daily. For 2 weeks 5.6 mL    • hydrochlorothiazide (HYDRODIURIL) 25 MG tablet Take  by mouth Daily.  3   • Insulin Glargine (TOUJEO SOLOSTAR) 300 UNIT/ML solution pen-injector Inject  under the skin into the appropriate area as directed.     • losartan (COZAAR) 100 MG tablet Take 100 mg by mouth Daily.  3   • melatonin 5 MG tablet tablet Take 1 tablet by mouth At Night As Needed (sleep).     • metaxalone (SKELAXIN) 800 MG tablet Take 1 tablet by mouth 3 (Three) Times a Day As Needed for Muscle Spasms. 30 tablet 0   • metoclopramide (REGLAN) 10 MG tablet TK 1 T PO BEFORE MEALS AND QHS  8   • ondansetron (ZOFRAN) 4 MG tablet Take 1 tablet by mouth Every 6 (Six) Hours As Needed for Nausea or  Vomiting. 30 tablet 0   • pantoprazole (PROTONIX) 40 MG EC tablet Take 40 mg by mouth Daily.  2   • tolnaftate (TINACTIN) 1 % cream tolnaftate 1 % topical cream   Apply twice a day between the toes.   Obtain over-the-counter     • vitamin B-12 (CYANOCOBALAMIN) 1000 MCG tablet Take 1,000 mcg by mouth Daily.     • [DISCONTINUED] acetaminophen (TYLENOL) 325 MG tablet Take 2 tablets by mouth Every 6 (Six) Hours As Needed for Mild Pain .     • [DISCONTINUED] DICLOFENAC PO Take  by mouth.     • [DISCONTINUED] HYDROcodone-acetaminophen (NORCO) 7.5-325 MG per tablet Take 1-2 tablets by mouth Every 6 (Six) Hours As Needed for Moderate Pain  (as needed for pain). 60 tablet 0   • [DISCONTINUED] oxyCODONE-acetaminophen (PERCOCET) 5-325 MG per tablet Take 1-2 tablets by mouth Every 6 (Six) Hours As Needed for Severe Pain  (as needed for severe pain). 60 tablet 0     No current facility-administered medications on file prior to visit.       Allergies   Allergen Reactions   • Chlorhexidine Shortness Of Breath, Itching and Rash     Pt developed a rash, itching, and shortness of breath after receiving a CHG bath on 4/24/19.   • Latex Itching      Past Medical History:   Diagnosis Date   • Acid reflux    • Asthma    • Diabetes (CMS/HCC)    • Hypertension      Past Surgical History:   Procedure Laterality Date   • AORTAGRAM N/A 4/30/2019    Procedure: AORTAGRAM WITH OR WITHOUT RUNOFFS;  Surgeon: Carrillo White MD;  Location: [facility] HYBRID OR 15;  Service: Vascular   • BELOW KNEE AMPUTATION Right 6/4/2019    Procedure: BELOW KNEE AMPUTATION RIGHT;  Surgeon: Juju Weber MD;  Location: [facility] OR;  Service: Orthopedics   • CHOLECYSTECTOMY     • KNEE SURGERY Right     TKA     Family History   Problem Relation Age of Onset   • Cancer Mother    • Hypertension Mother    • Hypertension Father    • Heart attack Father       Social History     Socioeconomic History   • Marital status:      Spouse name: Not on file   • Number of  "children: Not on file   • Years of education: Not on file   • Highest education level: Not on file   Tobacco Use   • Smoking status: Former Smoker     Types: Cigarettes     Start date:      Last attempt to quit:      Years since quittin.8   • Smokeless tobacco: Never Used   Substance and Sexual Activity   • Alcohol use: No     Frequency: Never   • Drug use: No   • Sexual activity: Defer        Review of Systems   Constitutional: Negative.    HENT: Negative.    Eyes: Negative.    Respiratory: Negative.    Cardiovascular: Negative.    Gastrointestinal: Negative.    Endocrine: Negative.    Genitourinary: Negative.    Musculoskeletal: Positive for arthralgias and joint swelling.   Skin: Negative.    Allergic/Immunologic: Negative.    Neurological: Negative.    Hematological: Negative.    Psychiatric/Behavioral: Negative.        The following portions of the patient's history were reviewed and updated as appropriate: allergies, current medications, past family history, past medical history, past social history, past surgical history and problem list.    Physical Exam:   Pulse 75   Ht 190.5 cm (75\")   Wt 110 kg (242 lb 8.1 oz)   SpO2 98%   BMI 30.31 kg/m²   GENERAL: Body habitus: trunkal obesity    Lower extremity edema: Left: trace; Right: none    Gait: using cane     Mental Status:  awake and alert; oriented to person, place, and time  MSK  Diabetic Foot Exam:Left:  The left third toe is much worse, the small scab that he had has now turned into a full-thickness ulcer, I can feel bone in the base, the third toe is erythematous.  Proximal to this he has a clear fluid-filled very large blister on the anterior ankle.  I sterilely prepped it and decompressed it.  No obvious necrosis at this point.  Pulses are diminished as previously.  Sensation is markedly diminished.                         NEURO Sensation:  diminished             Medical Decision Making    Data Review:   ordered and reviewed x-rays today " and phone conversation with physician    Assessment/Plan/Diagnosis/Treatment Options:   1. Diabetic ulcer of toe of left foot associated with type 2 diabetes mellitus, limited to breakdown of skin (CMS/HCC)  The toe is much worse, we need to admit him.  We dressed the toe and blister with Bactroban.  I discussed this with Dr. Garcia, and Dr. ALCANTARA.  We will repeat ABIs and probably have Dr. Jimenez see him.  High risk for left below-knee amputation.      2. Decreased pulses in feet  As above    3. Blister (nonthermal), right foot, initial encounter  As above    4. Uncontrolled type 2 diabetes mellitus with hyperglycemia (CMS/HCC)  We will recheck A1c    5. S/P BKA (below knee amputation), right (CMS/HCC)  Doing well on the right    6. 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

## 2019-10-09 NOTE — H&P
Patient Name: Amol Aguirre  MRN: [id number]  : 1945  DOS: 10/9/2019    Attending: Juju Weber MD    Primary Care Provider: Donte Briones MD      Chief complaint:  left 3rd toe cellulitis    Subjective   Patient is a 74 y.o. male presented for direct admission from Dr. Weber's office.    He developed a red area on his left 3rd toe on 19. Each day the red area has gotten larger, more swollen with ulcer formation increasing in size. On 10/3 he developed a blister over the anterior ankle that burst and refilled with fluid by the next morning. He denies fevers, chills or night sweats.    He is known to us from previous admission 2019 for right BKA, which he recovered well after a short stay at [facility].     When seen he is doing ok. He denies pain or other complaints. He denies nausea, shortness of breath or chest pain. No hx of DVT or PE.     ( Above is noted/ agree. Feels ok. No f/chills. No c/o pain. Apparently ulcer worsened over 1 week with swelling and erythema noted on visit to  today)wy    Allergies:  Allergies   Allergen Reactions   • Chlorhexidine Shortness Of Breath, Itching and Rash     Pt developed a rash, itching, and shortness of breath after receiving a [facility] bath on 19.   • Latex Itching       Meds:  Medications Prior to Admission   Medication Sig Dispense Refill Last Dose   • amLODIPine (NORVASC) 10 MG tablet Take 10 mg by mouth Daily.  0 Taking   • aspirin 81 MG EC tablet Take 81 mg by mouth Daily.   Taking   • budesonide-formoterol (SYMBICORT) 80-4.5 MCG/ACT inhaler Inhale 2 puffs 2 (Two) Times a Day.   Taking   • BYSTOLIC 10 MG tablet TK 1/2 T PO BID  7 Taking   • CloNIDine (CATAPRES) 0.1 MG tablet Take 0.1 mg by mouth 2 (Two) Times a Day.   Taking   • docusate sodium 100 MG capsule Take 100 mg by mouth 2 (Two) Times a Day As Needed for Constipation. 60 each 0 Taking   • enoxaparin (LOVENOX) 40 MG/0.4ML solution syringe Inject 0.4 mL under the skin  into the appropriate area as directed Daily. For 2 weeks 5.6 mL  Taking   • hydrochlorothiazide (HYDRODIURIL) 25 MG tablet Take  by mouth Daily.  3 Taking   • Insulin Glargine (TOUJEO SOLOSTAR) 300 UNIT/ML solution pen-injector Inject  under the skin into the appropriate area as directed.   Taking   • losartan (COZAAR) 100 MG tablet Take 100 mg by mouth Daily.  3 Taking   • melatonin 5 MG tablet tablet Take 1 tablet by mouth At Night As Needed (sleep).   Taking   • metaxalone (SKELAXIN) 800 MG tablet Take 1 tablet by mouth 3 (Three) Times a Day As Needed for Muscle Spasms. 30 tablet 0 Taking   • metoclopramide (REGLAN) 10 MG tablet TK 1 T PO BEFORE MEALS AND QHS  8 Taking   • ondansetron (ZOFRAN) 4 MG tablet Take 1 tablet by mouth Every 6 (Six) Hours As Needed for Nausea or Vomiting. 30 tablet 0 Taking   • pantoprazole (PROTONIX) 40 MG EC tablet Take 40 mg by mouth Daily.  2 Taking   • tolnaftate (TINACTIN) 1 % cream tolnaftate 1 % topical cream   Apply twice a day between the toes.   Obtain over-the-counter   Taking   • vitamin B-12 (CYANOCOBALAMIN) 1000 MCG tablet Take 1,000 mcg by mouth Daily.   Taking       History:   Past Medical History:   Diagnosis Date   • Acid reflux    • Asthma    • Diabetes (CMS/HCC)    • Hypertension      Past Surgical History:   Procedure Laterality Date   • AORTAGRAM N/A 4/30/2019    Procedure: AORTAGRAM WITH OR WITHOUT RUNOFFS;  Surgeon: Carrillo Wihte MD;  Location: [facility] HYBRID OR 15;  Service: Vascular   • BELOW KNEE AMPUTATION Right 6/4/2019    Procedure: BELOW KNEE AMPUTATION RIGHT;  Surgeon: Juju Weber MD;  Location: [facility] OR;  Service: Orthopedics   • CHOLECYSTECTOMY     • KNEE SURGERY Right     TKA     Family History   Problem Relation Age of Onset   • Cancer Mother    • Hypertension Mother    • Hypertension Father    • Heart attack Father      Social History     Tobacco Use   • Smoking status: Former Smoker     Types: Cigarettes     Start date: 1965     Last attempt  "to quit: 1975     Years since quittin.8   • Smokeless tobacco: Never Used   Substance Use Topics   • Alcohol use: No     Frequency: Never   • Drug use: No   He is  with no children. He is retired from [email or web address].    Review of Systems  Pertinent items are noted in HPI, all other systems reviewed and negative    Vital Signs  Visit Vitals  /71 (BP Location: Right arm, Patient Position: Lying)   Pulse 55   Temp 98.5 °F (36.9 °C) (Oral)   Resp 16   Ht 190.5 cm (75\")   Wt 112 kg (246 lb)   SpO2 98%   BMI 30.75 kg/m²       Physical Exam:    General Appearance:    Alert, cooperative, in no acute distress   Head:    Normocephalic, without obvious abnormality, atraumatic   Eyes:            Lids and lashes normal, conjunctivae and sclerae normal, no   icterus, no pallor, corneas clear   Ears:    Ears appear intact with no abnormalities noted   Neck:   No adenopathy, supple, trachea midline, no thyromegaly   Lungs:     Clear to auscultation, respirations regular, even and                   unlabored    Heart:    Regular rhythm and normal rate, normal S1 and S2, no            murmur, no gallop   Abdomen:     Normal bowel sounds, no masses, no organomegaly, soft        non-tender, non-distended, no guarding, no rebound                 tenderness   Genitalia:    Deferred   Extremities:   R BKA with stump sock CDI. Left 3rd toe red, swollen with ulcer. Anterior ankle with large blister wrapped in kerlix   Pulses:   Pulses palpable and equal bilaterally   Skin:   No bleeding, bruising or rash   Neurologic:   Cranial nerves 2 - 12 grossly intact      I reviewed the patient's new clinical results.       Lab Results   Component Value Date    HGBA1C 9.80 (H) 2019     Study Result     AP and lateral left toes, ordered for cellulitis of third toe, I do not see any definite erosions in the third toe, there are extensive vascular calcifications, there are erosions in the first MTPJ consistent with chronic gout, no " comparison       Assessment and Plan:     left 3rd toe cellulitis    Diabetic ulcer of toe of left foot associated with type 2 diabetes mellitus, limited to breakdown of skin (CMS/[facility])    Type 2 diabetes mellitus with diabetic polyneuropathy, with long-term current use of insulin (CMS/[facility])    HTN (hypertension)    CKD (chronic kidney disease)    Hx of Uncontrolled DM2.    Plan  1. DA from Dr. Juarez's office for left 3rd toe cellulitis[facility]. Per Dr. uJarez: ((We will repeat ABIs and probably have Dr. Jimenez see him.  High risk for left below-knee amputation.))  2. Pain control-prns   3. IS-encourage  4. DVT proph- mechs/Lovenox  5. Bowel regimen  6. Resume home medications as appropriate  7. Monitor labs, pending     Consult Dr. Jose DEL VALLE  - abx recs     HTN  - Continue home norvasc, bystolic, catapres, cozaar  - Hold HCTZ  - Monitor BP   - Holding parameters for BP meds  - Labetalol PRN for SBP>170     DM  - hgb A1c pending  - Levemir nightly  - Mealtime bolus with holding paramenters  - Accu-Check AC and HS with moderate dose SSI  - DM educator     CKD  - BMP pending        VIPUL Alvarez  10/09/19  2:17 PM     I have personally performed the evaluation on this patient. My history is consistent  with HPI obtained. My exam findings are listed above. I have personally reviewed and discussed the above formulated treatment plan with pt, wife, and AH.APRN.  Discussed with Domingo and Jose.wy  Hgb A1C 7.8 down from 9.8 in June .wy

## 2019-10-09 NOTE — H&P (VIEW-ONLY)
ESTABLISHED PATIENT    Patient: Amol Aguirre  : 1945    Primary Care Provider: Donte Briones MD    Requesting Provider: As above    Follow-up (1 week f/u; left foot)      History    Chief Complaint: left 3rd toe cellulitis    History of Present Illness: he returns with the left 3rd toe much worse.  He reports that he has been wearing a sandal as I recommended, however now the third toe is erythematous, and he is developed a large blister over the anterior ankle.  No problems with the right below-knee amputation.  No fever no chills.    Current Outpatient Medications on File Prior to Visit   Medication Sig Dispense Refill   • amLODIPine (NORVASC) 10 MG tablet Take 10 mg by mouth Daily.  0   • aspirin 81 MG EC tablet Take 81 mg by mouth Daily.     • budesonide-formoterol (SYMBICORT) 80-4.5 MCG/ACT inhaler Inhale 2 puffs 2 (Two) Times a Day.     • BYSTOLIC 10 MG tablet TK 1/2 T PO BID  7   • CloNIDine (CATAPRES) 0.1 MG tablet Take 0.1 mg by mouth 2 (Two) Times a Day.     • docusate sodium 100 MG capsule Take 100 mg by mouth 2 (Two) Times a Day As Needed for Constipation. 60 each 0   • enoxaparin (LOVENOX) 40 MG/0.4ML solution syringe Inject 0.4 mL under the skin into the appropriate area as directed Daily. For 2 weeks 5.6 mL    • hydrochlorothiazide (HYDRODIURIL) 25 MG tablet Take  by mouth Daily.  3   • Insulin Glargine (TOUJEO SOLOSTAR) 300 UNIT/ML solution pen-injector Inject  under the skin into the appropriate area as directed.     • losartan (COZAAR) 100 MG tablet Take 100 mg by mouth Daily.  3   • melatonin 5 MG tablet tablet Take 1 tablet by mouth At Night As Needed (sleep).     • metaxalone (SKELAXIN) 800 MG tablet Take 1 tablet by mouth 3 (Three) Times a Day As Needed for Muscle Spasms. 30 tablet 0   • metoclopramide (REGLAN) 10 MG tablet TK 1 T PO BEFORE MEALS AND QHS  8   • ondansetron (ZOFRAN) 4 MG tablet Take 1 tablet by mouth Every 6 (Six) Hours As Needed for Nausea or  Vomiting. 30 tablet 0   • pantoprazole (PROTONIX) 40 MG EC tablet Take 40 mg by mouth Daily.  2   • tolnaftate (TINACTIN) 1 % cream tolnaftate 1 % topical cream   Apply twice a day between the toes.   Obtain over-the-counter     • vitamin B-12 (CYANOCOBALAMIN) 1000 MCG tablet Take 1,000 mcg by mouth Daily.     • [DISCONTINUED] acetaminophen (TYLENOL) 325 MG tablet Take 2 tablets by mouth Every 6 (Six) Hours As Needed for Mild Pain .     • [DISCONTINUED] DICLOFENAC PO Take  by mouth.     • [DISCONTINUED] HYDROcodone-acetaminophen (NORCO) 7.5-325 MG per tablet Take 1-2 tablets by mouth Every 6 (Six) Hours As Needed for Moderate Pain  (as needed for pain). 60 tablet 0   • [DISCONTINUED] oxyCODONE-acetaminophen (PERCOCET) 5-325 MG per tablet Take 1-2 tablets by mouth Every 6 (Six) Hours As Needed for Severe Pain  (as needed for severe pain). 60 tablet 0     No current facility-administered medications on file prior to visit.       Allergies   Allergen Reactions   • Chlorhexidine Shortness Of Breath, Itching and Rash     Pt developed a rash, itching, and shortness of breath after receiving a CHG bath on 4/24/19.   • Latex Itching      Past Medical History:   Diagnosis Date   • Acid reflux    • Asthma    • Diabetes (CMS/HCC)    • Hypertension      Past Surgical History:   Procedure Laterality Date   • AORTAGRAM N/A 4/30/2019    Procedure: AORTAGRAM WITH OR WITHOUT RUNOFFS;  Surgeon: Carrillo White MD;  Location: [facility] HYBRID OR 15;  Service: Vascular   • BELOW KNEE AMPUTATION Right 6/4/2019    Procedure: BELOW KNEE AMPUTATION RIGHT;  Surgeon: Juju Weber MD;  Location: [facility] OR;  Service: Orthopedics   • CHOLECYSTECTOMY     • KNEE SURGERY Right     TKA     Family History   Problem Relation Age of Onset   • Cancer Mother    • Hypertension Mother    • Hypertension Father    • Heart attack Father       Social History     Socioeconomic History   • Marital status:      Spouse name: Not on file   • Number of  "children: Not on file   • Years of education: Not on file   • Highest education level: Not on file   Tobacco Use   • Smoking status: Former Smoker     Types: Cigarettes     Start date:      Last attempt to quit:      Years since quittin.8   • Smokeless tobacco: Never Used   Substance and Sexual Activity   • Alcohol use: No     Frequency: Never   • Drug use: No   • Sexual activity: Defer        Review of Systems   Constitutional: Negative.    HENT: Negative.    Eyes: Negative.    Respiratory: Negative.    Cardiovascular: Negative.    Gastrointestinal: Negative.    Endocrine: Negative.    Genitourinary: Negative.    Musculoskeletal: Positive for arthralgias and joint swelling.   Skin: Negative.    Allergic/Immunologic: Negative.    Neurological: Negative.    Hematological: Negative.    Psychiatric/Behavioral: Negative.        The following portions of the patient's history were reviewed and updated as appropriate: allergies, current medications, past family history, past medical history, past social history, past surgical history and problem list.    Physical Exam:   Pulse 75   Ht 190.5 cm (75\")   Wt 110 kg (242 lb 8.1 oz)   SpO2 98%   BMI 30.31 kg/m²   GENERAL: Body habitus: trunkal obesity    Lower extremity edema: Left: trace; Right: none    Gait: using cane     Mental Status:  awake and alert; oriented to person, place, and time  MSK  Diabetic Foot Exam:Left:  The left third toe is much worse, the small scab that he had has now turned into a full-thickness ulcer, I can feel bone in the base, the third toe is erythematous.  Proximal to this he has a clear fluid-filled very large blister on the anterior ankle.  I sterilely prepped it and decompressed it.  No obvious necrosis at this point.  Pulses are diminished as previously.  Sensation is markedly diminished.                         NEURO Sensation:  diminished             Medical Decision Making    Data Review:   ordered and reviewed x-rays today " and phone conversation with physician    Assessment/Plan/Diagnosis/Treatment Options:   1. Diabetic ulcer of toe of left foot associated with type 2 diabetes mellitus, limited to breakdown of skin (CMS/HCC)  The toe is much worse, we need to admit him.  We dressed the toe and blister with Bactroban.  I discussed this with Dr. Garcia, and Dr. ALCANTARA.  We will repeat ABIs and probably have Dr. Jimenez see him.  High risk for left below-knee amputation.      2. Decreased pulses in feet  As above    3. Blister (nonthermal), right foot, initial encounter  As above    4. Uncontrolled type 2 diabetes mellitus with hyperglycemia (CMS/HCC)  We will recheck A1c    5. S/P BKA (below knee amputation), right (CMS/HCC)  Doing well on the right    6. 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

## 2019-10-09 NOTE — CONSULTS
Discussed and taught patient about type 2 diabetes self-management, risk factors, and importance of blood glucose control to reduce complications. Target blood glucose readings and A1c goals per ADA were reviewed. Mr. Aguirre states his A1c was 7.4 2 weeks ago at his PCP in [place]. Previous A1c was 9.8. Mr. Aguirre states he has been trying to watch carbs. Signs, symptoms and treatment of hyperglycemia and hypoglycemia were discussed. Lifestyle changes such as physical activity with MD approval and healthy eating were encouraged. Stressed the importance of strict blood sugar control after surgery to prevent complications such as infection and to promote healing of incision.  Instructed to  check blood sugar 2-3 times per day and to call PCP if  Blood glucose is trending higher than 180. He was encouraged to keep record of blood glucose readings to take to follow up appointment with PCP.  Pt was offered a free op follow up appointment however declined at this time.

## 2019-10-09 NOTE — CONSULTS
Infectious Disease Consult  Amol Aguirre  1945  [id number]    Date of Consult: 10/9/2019  Admission Date: 10/9/2019    Requesting Provider: Juju Weber MD  Evaluating Physician: Easton Garcia MD    Chief Complaint: left toe infection    Reason for Consultation: diabetic foot infection    History of present illness:   Patient is a 74 y.o.  Yr old male with history of poorly controlled DM2, venous stasis disease, asthma, HTN.  I saw him earlier this year in June after he developed an ulceration on his right foot after fishing trip to [place].  He was treated initially with oral antibiotics and then was referred to Dr Weber due to mass on his right great toe.  Soon after that visit he presented to [facility] on 4/19with increasing redness and a new ulceration between his third and fourth toes.  MRI done 4/22 with possible early changes in the fourth metatarsal head of osteomyelitis.  Wound culture there grew staph epidermidis and enterococcus. Given daptomycin, linezolid and zosyn.  A1c at [facility] was 10.5      He was transferred to [facility] on 4/22 for further evaluation. Dr Weber contacted me to help manage antibiotics.    Wound culture grew Klebsiella and Morganella.  He had an aortogram which showed significant right lower extremity peripheral vascular disease however no sufficient targets for revascularization.  He was discharged on daptomycin and ertapenem and I followed him for the next few weeks in clinic.  Initially he seemed to improve however last week he was noted to have acute worsening of the ulceration in his foot which progressed to steve gangrene.  Dr. Weber evaluated him and recommended BKA which was performed 6/4/19. Treated with daptomycin and ertapenem for 3 days post-op then discharged off antibiotics.       10/9: I was asked to reevaluate by Dr Weber.  Had a routine follow-up visit he was noted to have an erythematous ulceration over the left  third toe.  He is been admitted for further work-up.  Vascular studies planned.  He says he did not even know the ulcer existed until it was discovered in the clinic.  No fevers, chills.  No drainage from the wound.  It is nontender.  He is now independently ambulatory with a prosthesis on right BKA. Resting comfortably.      Review of Systems  Constitutional-- No Fever, chills or sweats.  Appetite ok, and no malaise.  Heent-- No new vision, hearing or throat complaints.  No epistaxis or oral sores.  Denies odynophagia or dysphagia.  No headache, photophobia or neck stiffness.  CV-- No chest pain, palpitation or syncope  Resp-- No SOB/cough/Hemoptysis  GI- No nausea, vomiting, or diarrhea.   -- No dysuria, hematuria, or flank pain.  Denies hesitancy, urgency or flank pain.  Lymph- no swollen lymph nodes in neck/axilla or groin.   Heme- No active bruising or bleeding  MS-- no swelling or pain in the bones or joints of arms/legs.  BKA well-healed on the right  Neuro-- No acute focal weakness or numbness in the arms or legs.  Skin-- No rashes, lesions, ulcers. No skin breakdown      Past Medical History:   Diagnosis Date   • Acid reflux    • Asthma    • Diabetes (CMS/[facility])    • Hypertension        Past Surgical History:   Procedure Laterality Date   • AORTAGRAM N/A 4/30/2019    Procedure: AORTAGRAM WITH OR WITHOUT RUNOFFS;  Surgeon: Carrillo White MD;  [facility]: [facility] OR 15;  Service: Vascular   • BELOW KNEE AMPUTATION Right 6/4/2019    Procedure: BELOW KNEE AMPUTATION RIGHT;  Surgeon: Juju Weber MD;  Location: [facility];  Service: Orthopedics   • CHOLECYSTECTOMY     • KNEE SURGERY Right     TKA       Social History     Socioeconomic History   • Marital status:      Spouse name: Not on file   • Number of children: Not on file   • Years of education: Not on file   • Highest education level: Not on file   Tobacco Use   • Smoking status: Former Smoker     Types: Cigarettes     Start date: 1965      Last attempt to quit: 1975     Years since quittin.8   • Smokeless tobacco: Never Used   Substance and Sexual Activity   • Alcohol use: No     Frequency: Never   • Drug use: No   • Sexual activity: Defer       family history includes Cancer in his mother; Heart attack in his father; Hypertension in his father and mother.    Allergies   Allergen Reactions   • Chlorhexidine Shortness Of Breath, Itching and Rash     Pt developed a rash, itching, and shortness of breath after receiving a CHG bath on 19.   • Latex Itching       Antibiotics:  Anti-Infectives (From admission, onward)    None          Other Medications:  Current Facility-Administered Medications   Medication Dose Route Frequency Provider Last Rate Last Dose   • acetaminophen (TYLENOL) tablet 650 mg  650 mg Oral Q6H PRN Erica Reyez APRN       • [START ON 10/10/2019] amLODIPine (NORVASC) tablet 10 mg  10 mg Oral Daily Erica Reyez APRN       • budesonide-formoterol (SYMBICORT) 80-4.5 MCG/ACT inhaler 2 puff  2 puff Inhalation BID - RT Erica Reyez APRN       • cloNIDine (CATAPRES) tablet 0.1 mg  0.1 mg Oral Q12H Erica Reyez APRN       • dextrose (D50W) 25 g/ 50mL Intravenous Solution 25 g  25 g Intravenous Q15 Min PRN Erica Reyez APRN       • dextrose (GLUTOSE) oral gel 15 g  15 g Oral Q15 Min PRN Erica Reyez APRN       • docusate sodium (COLACE) capsule 100 mg  100 mg Oral BID PRN Erica Reyez APRN       • enoxaparin (LOVENOX) syringe 40 mg  40 mg Subcutaneous Q24H Juju Weber MD       • glucagon (human recombinant) (GLUCAGEN DIAGNOSTIC) injection 1 mg  1 mg Subcutaneous Q15 Min PRN Erica Reyez APRN       • insulin detemir (LEVEMIR) injection 38 Units  38 Units Subcutaneous Nightly Erica Reyez APRN       • insulin lispro (humaLOG) injection 0-9 Units  0-9 Units Subcutaneous 4x Daily With Meals & Nightly Erica Reyez APRN       • insulin lispro (humaLOG) injection 14 Units  14 Units  "Subcutaneous TID With Meals Erica Reyez, APRN       • labetalol (NORMODYNE,TRANDATE) injection 10 mg  10 mg Intravenous Q4H PRN Erica Reyez APRN       • [START ON 10/10/2019] losartan (COZAAR) tablet 100 mg  100 mg Oral Daily Erica Reyez APRN       • melatonin tablet 5 mg  5 mg Oral Nightly PRN Erica Reyez APRN       • metaxalone (SKELAXIN) tablet 800 mg  800 mg Oral TID PRN Erica Reyez APRN       • metoclopramide (REGLAN) tablet 10 mg  10 mg Oral 4x Daily AC & at Bedtime Erica Reyez APRN       • [START ON 10/10/2019] nebivolol (BYSTOLIC) tablet 5 mg  5 mg Oral Q24H Erica Reyez APRN       • ondansetron (ZOFRAN) injection 4 mg  4 mg Intravenous Q6H PRN Erica Reyez APRN       • ondansetron (ZOFRAN) tablet 4 mg  4 mg Oral Q6H PRN Erica Reyez APRN       • [START ON 10/10/2019] pantoprazole (PROTONIX) EC tablet 40 mg  40 mg Oral Daily Erica Reyez, VIPUL       • sodium chloride 0.9 % bolus 500 mL  500 mL Intravenous TID PRN Erica Reyez APRN       • sodium chloride 0.9 % flush 10 mL  10 mL Intravenous Q12H Erica Reyez APRN       • sodium chloride 0.9 % flush 10 mL  10 mL Intravenous PRN Erica Reyez APRN           Physical Exam:   Vital Signs   /71 (BP Location: Right arm, Patient Position: Lying)   Pulse 55   Temp 98.5 °F (36.9 °C) (Oral)   Resp 16   Ht 190.5 cm (75\")   Wt 112 kg (246 lb)   SpO2 98%   BMI 30.75 kg/m²     GENERAL: Awake and alert, in no acute distress.   HEENT: Normocephalic, atraumatic.  PERRL. EOMI. No conjunctival injection. No icterus.    HEART: RRR; No murmur.  LUNGS: Clear to auscultation bilaterally without wheezing, rales, rhonchi. Normal respiratory effort. Nonlabored.  ABDOMEN: Soft, nontender, nondistended. Positive bowel sounds. No rebound or guarding.  EXT: BKA well-healed on the right.  Ulceration on the superior aspect of the left third toe with surrounding erythema, edema  : Without Mcdonald catheter. "   MSK: FROM without joint effusions noted arms/legs.    SKIN: Warm and dry without cutaneous eruptions on Inspection/palpation.    NEURO: Oriented to PPT.  Left lower extremity neuropathy  PSYCHIATRIC: Normal insight and judgement. Cooperative with PE    Laboratory Data    Results from last 7 days   Lab Units 10/09/19  1447   WBC 10*3/mm3 8.91   HEMOGLOBIN g/dL 11.6*   HEMATOCRIT % 37.5   PLATELETS 10*3/mm3 282     Results from last 7 days   Lab Units 10/09/19  1447   SODIUM mmol/L 140   POTASSIUM mmol/L 4.6   CHLORIDE mmol/L 104   CO2 mmol/L 22.0   BUN mg/dL 29*   CREATININE mg/dL 1.43*   GLUCOSE mg/dL 107*   CALCIUM mg/dL 9.2     Estimated Creatinine Clearance: 59.4 mL/min (A) (by C-G formula based on SCr of 1.43 mg/dL (H)).      Results from last 7 days   Lab Units 10/09/19  1447   SED RATE mm/hr 19     Results from last 7 days   Lab Units 10/09/19  1447   CRP mg/dL 0.58*       Microbiology:     I reviewed prior culture data  4/23 wound culture grew Morganella and Klebsiella       10/9 blood cx pending     Radiology:  No radiology results for the last 7 days       Impression:   1. Acute left 3rd toe ulceration and cellulitis: Soft tissue infection at least if not osteomyelitis.  Complicated by known significant peripheral vascular disease and diabetes.  Prior cultures noted as above.  ESR normal and CRP mildly elevated  2. Recent Right BKA due to diabetic foot infection  3. Severe LE arterial vascular disease, especially in RLE: based on 4/30 aortogram, no targets amenable to revascularization  4. Poorly controlled DM2  5. CKD stage 3: at baseline  6. Asthma    PLAN:    - f/u pending blood cultures  - Superficial wound culture will not be helpful here since it is dry  - Trend CBC, BMP  - XR left foot pending    - f/u pending vascular studies, imaging    - start daptomycin 6 mg/kg daily  - start ertapenem 1 gm daily    I will follow up any potential surgical plans. Antibiotic duration TBD based on pending  workup    Discussed with Dr Foster and Dr Weber. I will follow. High risk for further limb loss.     Easton Garcia MD  10/9/2019

## 2019-10-10 ENCOUNTER — APPOINTMENT (OUTPATIENT)
Dept: CARDIOLOGY | Facility: HOSPITAL | Age: 74
End: 2019-10-10

## 2019-10-10 ENCOUNTER — APPOINTMENT (OUTPATIENT)
Dept: MRI IMAGING | Facility: HOSPITAL | Age: 74
End: 2019-10-10

## 2019-10-10 LAB
ANION GAP SERPL CALCULATED.3IONS-SCNC: 13 MMOL/L (ref 5–15)
BH CV LOWER ARTERIAL LEFT ABI RATIO: 0.81
BH CV LOWER ARTERIAL LEFT DORSALIS PEDIS SYS MAX: 240 MMHG
BH CV LOWER ARTERIAL LEFT LOW THIGH SYS MAX: 176 MMHG
BH CV LOWER ARTERIAL LEFT POPLITEAL SYS MAX: 255 MMHG
BH CV LOWER ARTERIAL LEFT POST TIBIAL SYS MAX: 123 MMHG
BUN BLD-MCNC: 26 MG/DL (ref 8–23)
BUN/CREAT SERPL: 17.9 (ref 7–25)
CALCIUM SPEC-SCNC: 9.5 MG/DL (ref 8.6–10.5)
CHLORIDE SERPL-SCNC: 103 MMOL/L (ref 98–107)
CO2 SERPL-SCNC: 26 MMOL/L (ref 22–29)
CREAT BLD-MCNC: 1.45 MG/DL (ref 0.76–1.27)
DEPRECATED RDW RBC AUTO: 43.8 FL (ref 37–54)
ERYTHROCYTE [DISTWIDTH] IN BLOOD BY AUTOMATED COUNT: 14.1 % (ref 12.3–15.4)
GFR SERPL CREATININE-BSD FRML MDRD: 47 ML/MIN/1.73
GLUCOSE BLD-MCNC: 138 MG/DL (ref 65–99)
GLUCOSE BLDC GLUCOMTR-MCNC: 125 MG/DL (ref 70–130)
GLUCOSE BLDC GLUCOMTR-MCNC: 128 MG/DL (ref 70–130)
GLUCOSE BLDC GLUCOMTR-MCNC: 150 MG/DL (ref 70–130)
GLUCOSE BLDC GLUCOMTR-MCNC: 222 MG/DL (ref 70–130)
HCT VFR BLD AUTO: 35.9 % (ref 37.5–51)
HGB BLD-MCNC: 11.2 G/DL (ref 13–17.7)
MCH RBC QN AUTO: 26.6 PG (ref 26.6–33)
MCHC RBC AUTO-ENTMCNC: 31.2 G/DL (ref 31.5–35.7)
MCV RBC AUTO: 85.3 FL (ref 79–97)
PHOSPHATE SERPL-MCNC: 4.4 MG/DL (ref 2.5–4.5)
PLATELET # BLD AUTO: 280 10*3/MM3 (ref 140–450)
PMV BLD AUTO: 10 FL (ref 6–12)
POTASSIUM BLD-SCNC: 4.2 MMOL/L (ref 3.5–5.2)
RBC # BLD AUTO: 4.21 10*6/MM3 (ref 4.14–5.8)
SODIUM BLD-SCNC: 142 MMOL/L (ref 136–145)
UPPER ARTERIAL LEFT ARM BRACHIAL SYS MAX: 152 MMHG
WBC NRBC COR # BLD: 7.01 10*3/MM3 (ref 3.4–10.8)

## 2019-10-10 PROCEDURE — 82962 GLUCOSE BLOOD TEST: CPT

## 2019-10-10 PROCEDURE — 99222 1ST HOSP IP/OBS MODERATE 55: CPT | Performed by: THORACIC SURGERY (CARDIOTHORACIC VASCULAR SURGERY)

## 2019-10-10 PROCEDURE — 85027 COMPLETE CBC AUTOMATED: CPT | Performed by: NURSE PRACTITIONER

## 2019-10-10 PROCEDURE — 25010000002 DAPTOMYCIN PER 1 MG: Performed by: INTERNAL MEDICINE

## 2019-10-10 PROCEDURE — A9577 INJ MULTIHANCE: HCPCS | Performed by: ORTHOPAEDIC SURGERY

## 2019-10-10 PROCEDURE — 99232 SBSQ HOSP IP/OBS MODERATE 35: CPT | Performed by: ORTHOPAEDIC SURGERY

## 2019-10-10 PROCEDURE — 84100 ASSAY OF PHOSPHORUS: CPT

## 2019-10-10 PROCEDURE — 93923 UPR/LXTR ART STDY 3+ LVLS: CPT | Performed by: INTERNAL MEDICINE

## 2019-10-10 PROCEDURE — 94799 UNLISTED PULMONARY SVC/PX: CPT

## 2019-10-10 PROCEDURE — 73720 MRI LWR EXTREMITY W/O&W/DYE: CPT

## 2019-10-10 PROCEDURE — 94640 AIRWAY INHALATION TREATMENT: CPT

## 2019-10-10 PROCEDURE — 63710000001 INSULIN DETEMIR PER 5 UNITS: Performed by: NURSE PRACTITIONER

## 2019-10-10 PROCEDURE — 0 GADOBENATE DIMEGLUMINE 529 MG/ML SOLUTION: Performed by: ORTHOPAEDIC SURGERY

## 2019-10-10 PROCEDURE — 80048 BASIC METABOLIC PNL TOTAL CA: CPT | Performed by: NURSE PRACTITIONER

## 2019-10-10 PROCEDURE — 25010000002 ERTAPENEM PER 500 MG: Performed by: INTERNAL MEDICINE

## 2019-10-10 PROCEDURE — 25010000002 ENOXAPARIN PER 10 MG: Performed by: ORTHOPAEDIC SURGERY

## 2019-10-10 PROCEDURE — 93923 UPR/LXTR ART STDY 3+ LVLS: CPT

## 2019-10-10 RX ADMIN — ENOXAPARIN SODIUM 40 MG: 40 INJECTION SUBCUTANEOUS at 17:07

## 2019-10-10 RX ADMIN — METOCLOPRAMIDE HYDROCHLORIDE 10 MG: 10 TABLET ORAL at 17:07

## 2019-10-10 RX ADMIN — INSULIN DETEMIR 38 UNITS: 100 INJECTION, SOLUTION SUBCUTANEOUS at 21:34

## 2019-10-10 RX ADMIN — METOCLOPRAMIDE HYDROCHLORIDE 10 MG: 10 TABLET ORAL at 07:25

## 2019-10-10 RX ADMIN — ERTAPENEM SODIUM 1 G: 1 INJECTION, POWDER, LYOPHILIZED, FOR SOLUTION INTRAMUSCULAR; INTRAVENOUS at 17:38

## 2019-10-10 RX ADMIN — SODIUM CHLORIDE, PRESERVATIVE FREE 10 ML: 5 INJECTION INTRAVENOUS at 09:39

## 2019-10-10 RX ADMIN — INSULIN LISPRO 14 UNITS: 100 INJECTION, SOLUTION INTRAVENOUS; SUBCUTANEOUS at 09:39

## 2019-10-10 RX ADMIN — MELATONIN TAB 5 MG 5 MG: 5 TAB at 21:39

## 2019-10-10 RX ADMIN — BUDESONIDE AND FORMOTEROL FUMARATE DIHYDRATE 2 PUFF: 80; 4.5 AEROSOL RESPIRATORY (INHALATION) at 08:25

## 2019-10-10 RX ADMIN — DAPTOMYCIN 550 MG: 500 INJECTION, POWDER, LYOPHILIZED, FOR SOLUTION INTRAVENOUS at 17:07

## 2019-10-10 RX ADMIN — LOSARTAN POTASSIUM 100 MG: 50 TABLET ORAL at 09:37

## 2019-10-10 RX ADMIN — CLONIDINE HYDROCHLORIDE 0.1 MG: 0.1 TABLET ORAL at 09:38

## 2019-10-10 RX ADMIN — CLONIDINE HYDROCHLORIDE 0.1 MG: 0.1 TABLET ORAL at 21:34

## 2019-10-10 RX ADMIN — GADOBENATE DIMEGLUMINE 19 ML: 529 INJECTION, SOLUTION INTRAVENOUS at 13:45

## 2019-10-10 RX ADMIN — MUPIROCIN: 20 OINTMENT TOPICAL at 09:39

## 2019-10-10 RX ADMIN — METOCLOPRAMIDE HYDROCHLORIDE 10 MG: 10 TABLET ORAL at 05:44

## 2019-10-10 RX ADMIN — PANTOPRAZOLE SODIUM 40 MG: 40 TABLET, DELAYED RELEASE ORAL at 09:38

## 2019-10-10 RX ADMIN — BUDESONIDE AND FORMOTEROL FUMARATE DIHYDRATE 2 PUFF: 80; 4.5 AEROSOL RESPIRATORY (INHALATION) at 19:59

## 2019-10-10 RX ADMIN — INSULIN LISPRO 14 UNITS: 100 INJECTION, SOLUTION INTRAVENOUS; SUBCUTANEOUS at 17:38

## 2019-10-10 RX ADMIN — AMLODIPINE BESYLATE 10 MG: 10 TABLET ORAL at 09:38

## 2019-10-10 RX ADMIN — NEBIVOLOL HYDROCHLORIDE 5 MG: 5 TABLET ORAL at 09:38

## 2019-10-10 RX ADMIN — METOCLOPRAMIDE HYDROCHLORIDE 10 MG: 10 TABLET ORAL at 21:34

## 2019-10-10 NOTE — PROGRESS NOTES
"IM progress note      Amol Aguirre  [id number]  1945     LOS: 1 day     Attending: Juju Weber MD    Primary Care Provider: Donte Briones MD      Chief Complaint/Reason for visit:  No chief complaint on file.      Subjective   Feels ok. No f/c/n/vom/sob.   Was evaluated by  . No further vascular study is needed.    Objective        Visit Vitals  /63 (BP Location: Right arm, Patient Position: Sitting)   Pulse 63   Temp 97.9 °F (36.6 °C) (Oral)   Resp 16   Ht 190.5 cm (75\")   Wt 112 kg (246 lb)   SpO2 98%   BMI 30.75 kg/m²     Temp (24hrs), Av.1 °F (36.7 °C), Min:97.5 °F (36.4 °C), Max:98.5 °F (36.9 °C)      Intake/Output:  No intake or output data in the 24 hours ending 10/10/19 1008            Physical Exam:     General Appearance:    Alert, cooperative, in no acute distress   Head:    Normocephalic, without obvious abnormality, atraumatic    Lungs:     Normal effort, symmetric chest rise, no crepitus, clear to      auscultation bilaterally                   Heart:    Regular rhythm and normal rate, normal S1 and S2    Abdomen:     Normal bowel sounds, no masses, no organomegaly, soft        non-tender, non-distended, no guarding, no rebound                tenderness   Extremities:   No clubbing, cyanosis or edema.  No deformities.   Right BKA.  Left 3rd toe with ulceration, erythema , swelling.   Pulses:   Pulses palpable and equal bilaterally   Skin:   No bleeding, bruising or rash       I reviewed the patient's new clinical results.   Results from last 7 days   Lab Units 10/10/19  0523 10/09/19  1447   WBC 10*3/mm3 7.01 8.91   HEMOGLOBIN g/dL 11.2* 11.6*   HEMATOCRIT % 35.9* 37.5   PLATELETS 10*3/mm3 280 282     Results from last 7 days   Lab Units 10/10/19  0523 10/09/19  1447   SODIUM mmol/L 142 140   POTASSIUM mmol/L 4.2 4.6   CHLORIDE mmol/L 103 104   CO2 mmol/L 26.0 22.0   BUN mg/dL 26* 29*   CREATININE mg/dL 1.45* 1.43*   CALCIUM mg/dL 9.5 9.2   GLUCOSE mg/dL " 138* 107*     I reviewed the patient's new imaging including images and reports.    Results for ASAEL MONDRAGON (MRN [id number]) as of 10/10/2019 10:10   Ref. Range 10/9/2019 20:16 10/10/2019 05:23 10/10/2019 07:47 10/10/2019 07:57   Glucose Latest Ref Range: 70 - 130 mg/dL 159 (H) 138 (H) 125 128       All medications reviewed.     amLODIPine 10 mg Oral Daily   budesonide-formoterol 2 puff Inhalation BID - RT   cloNIDine 0.1 mg Oral Q12H   DAPTOmycin 6 mg/kg (Adjusted) Intravenous Q24H   enoxaparin 40 mg Subcutaneous Q24H   ertapenem 1 g Intravenous Q24H   insulin detemir 38 Units Subcutaneous Nightly   insulin lispro 0-9 Units Subcutaneous 4x Daily With Meals & Nightly   insulin lispro 14 Units Subcutaneous TID With Meals   losartan 100 mg Oral Daily   metoclopramide 10 mg Oral 4x Daily AC & at Bedtime   mupirocin  Topical Daily   nebivolol 5 mg Oral Q24H   pantoprazole 40 mg Oral Daily   sodium chloride 10 mL Intravenous Q12H       acetaminophen 650 mg Q6H PRN   dextrose 25 g Q15 Min PRN   dextrose 15 g Q15 Min PRN   docusate sodium 100 mg BID PRN   glucagon (human recombinant) 1 mg Q15 Min PRN   labetalol 10 mg Q4H PRN   melatonin 5 mg Nightly PRN   metaxalone 800 mg TID PRN   ondansetron 4 mg Q6H PRN   ondansetron 4 mg Q6H PRN   sodium chloride 500 mL TID PRN   sodium chloride 10 mL PRN       Assessment/Plan       left 3rd toe cellulitis    Cellulitis and abscess of toe of left foot    Type 2 diabetes mellitus with diabetic polyneuropathy, with long-term current use of insulin (CMS/HCC)    Diabetic ulcer of toe of left foot associated with type 2 diabetes mellitus, limited to breakdown of skin (CMS/HCC)    HTN (hypertension)    CKD (chronic kidney disease)    Plan  Continue abx per ID , Dapto, Invanz.  Tightly control blood glucose.  Decision about possible surgery, amputation of left 3rd toe , per . Good chance of healing, as far as blood flow is concerned per vascular.     HTN  - Continue  home norvasc, bystolic, catapres, cozaar  - Hold HCTZ  - Monitor BP   - Holding parameters for BP meds  - Labetalol PRN for SBP>170     DM  - hgb A1c 7.8  - Levemir nightly  - Mealtime bolus with holding paramenters  - Accu-Check AC and HS with moderate dose SSI  - DM educator, seen.     CKD  - stable, avoid nephrotoxic agents.    Pennie Foster MD  10/10/19  10:08 AM

## 2019-10-10 NOTE — PROGRESS NOTES
Cardiothoracic Surgery Progress Note      POD #:     LOS: 1 day      Subjective: Asked to see the patient by  regarding the patient's vascular status to the left foot in the face of a recently developed ulceration on the dorsum of the third toe at the mid phalangeal joint space as well as ulceration at the ankle malleolar level on the anterior shin.    Chief Complaint: New ulcer on the left third toe as well as ulceration of the anterior shin at the malleolar level.    Objective:  Vital Signs vital signs below noted  Temp:  [97.5 °F (36.4 °C)-98.5 °F (36.9 °C)] 97.9 °F (36.6 °C)  Heart Rate:  [55-75] 63  Resp:  [16-18] 17  BP: (116-164)/(47-98) 155/63    Physical Exam:   General Appearance: Oriented x3   Lungs:   Heart:   Skin: Patient has an ulceration superficially at the anterior tibial shin at the malleolar level measures approximately 3 cm in greatest diameter  Patient has not ulceration with scabbing at the distal phalangeal joint space that appears to be superficial with some erythema surrounding the ulceration.    Pulse evaluation: Patient has strongly palpable left popliteal artery pulse.  No palpable pedal pulses.  Strong dopplerable dorsalis pedis pulse with audible triphasic flow.  Very weak dopplerable posterior tibial pulse       Incision:     Results:  Results from last 7 days   Lab Units 10/10/19  0523   WBC 10*3/mm3 7.01   HEMOGLOBIN g/dL 11.2*   HEMATOCRIT % 35.9*   PLATELETS 10*3/mm3 280     Results from last 7 days   Lab Units 10/10/19  0523   SODIUM mmol/L 142   POTASSIUM mmol/L 4.2   CHLORIDE mmol/L 103   CO2 mmol/L 26.0   BUN mg/dL 26*   CREATININE mg/dL 1.45*   GLUCOSE mg/dL 138*   CALCIUM mg/dL 9.5   I reviewed the patient's aortogram with runoff done in April 2019.  The aortogram reveals single-vessel runoff to the left foot via the anterior tibial artery into the left foot with flow to the dorsalis pedis artery.  Based on my examination today I think the patient still has  excellent single-vessel runoff via the anterior tibial artery into the left foot into the dorsalis pedis artery distribution.      Assessment: #1.  Status post right below-knee amputation performed in June 2019 for ischemic right lower extremity.  2.  Recent development of a pressure ulceration of the left third toe at the distal phalangeal joint space area with some surrounding erythema.  Also ulceration of the anterior shin area at the malleolar level of unknown clear etiology.  3.  By my examination as well as review of the angiogram performed April I think the patient still has arterial flow into the left foot via the anterior tibial artery into the dorsalis pedis.  I do not think any further vascular studies are warranted at this time and if needed I think a left third toe amputation could be performed with minimal risk of nonhealing of the surgical wound.      Plan: I feel as though if amputation of this left third toe is needed it could be performed without any further need for vascular studies.  I think there is an excellent chance healing of the amputation of this toe if it is felt to be surgically indicated.    I am not clear as to the etiology ulceration of this left ankle area.  It appears to be due to some rubbing affect causing a blistering of the skin.  The patient states he is not wearing any high top shoes that might be a source for such an ulceration.  He does however have very severe diabetic neuropathy and could have had some other type of trauma causing this ulcerative area without his knowledge.      Carrillo White MD - 10/10/19 - 8:23 AM

## 2019-10-10 NOTE — CONSULTS
CTS Consult    Patient Care Team:  Donte Briones MD as PCP - General (Internal Medicine)  Easton Garcia MD as Consulting Physician (Infectious Diseases)  Carrillo White MD as Surgeon (Cardiothoracic Surgery)      Reason for Consult:      HPI  Patient is a 74 y.o. male with a history of hypertension, diabetes mellitus type 2, chronic kidney disease stage III, s/p right BKA with Dr. Weber on 6/4/2019 who was direct admitted from Dr. Weber's office yesterday secondary to worsening erythema and ulceration of third toe on the left foot. In April, he was treated with IV antibiotics for an ulceration between third and fourth toes and had a CTA performed by Dr. White revealing significant right lower extremity peripheral vascular disease with insufficient targets for revascularization.  He improved initially but ultimately had progressively worsening of the ulceration with gangrene requiring right BKA on 6/4/19.  Patient denies fever, chills, nausea, vomiting or purulent drainage.  Patient ambulates independently with a right BKA prosthesis.     Review of Systems  Constitutional: Negative for malaise/fatigue.  Negative for chills, fever, night sweats and weight loss.  HENT: Negative for hearing loss, odynophagia and sore throat.    Cardiovascular: Negative for claudication and dyspnea on exertion.  Negative for chest pain, leg swelling, orthopnea and palpitations.  Respiratory: Negative for shortness of breath.  Negative for cough and hemoptysis.  Endocrine:  Negative for cold intolerance, heat intolerance, polydipsia, polyphagia and polyuria.  Hematologic/Lymphatic: Negative for easy bruising/bleeding.  Musculoskeletal: Negative for joint pain, joint swelling and myalgias.  Gastrointestinal: Negative for abdominal pain, constipation, diarrhea, hematemesis, hematochezia, melena, nausea and vomiting.  Genitourinary: Negative for dysuria, frequency and hematuria.  Neurological: Negative for focal  weakness, headaches, numbness and seizures.  Psychiatric/Behavioral: Negative for depression and suicidal ideas.  The patient is not nervous/anxious.    All other systems are reviewed and are negative.    History  Past Medical History:   Diagnosis Date   • Acid reflux    • Asthma    • Diabetes (CMS/HCC)    • Hypertension      Past Surgical History:   Procedure Laterality Date   • AORTAGRAM N/A 2019    Procedure: AORTAGRAM WITH OR WITHOUT RUNOFFS;  Surgeon: Carrillo White MD;  Location: [facility] HYBRID OR 15;  Service: Vascular   • BELOW KNEE AMPUTATION Right 2019    Procedure: BELOW KNEE AMPUTATION RIGHT;  Surgeon: Juju Weber MD;  Location: [facility] OR;  Service: Orthopedics   • CHOLECYSTECTOMY     • KNEE SURGERY Right     TKA     Family History   Problem Relation Age of Onset   • Cancer Mother    • Hypertension Mother    • Hypertension Father    • Heart attack Father      Social History     Tobacco Use   • Smoking status: Former Smoker     Types: Cigarettes     Start date:      Last attempt to quit:      Years since quittin.8   • Smokeless tobacco: Never Used   Substance Use Topics   • Alcohol use: No     Frequency: Never   • Drug use: No     Medications Prior to Admission   Medication Sig Dispense Refill Last Dose   • amLODIPine (NORVASC) 10 MG tablet Take 10 mg by mouth Daily.  0 10/9/2019 at Unknown time   • aspirin 81 MG EC tablet Take 81 mg by mouth Daily.   10/9/2019 at Unknown time   • BYSTOLIC 10 MG tablet TK 1/2 T PO BID  7 10/9/2019 at Unknown time   • CloNIDine (CATAPRES) 0.1 MG tablet Take 0.1 mg by mouth 2 (Two) Times a Day.   10/9/2019 at Unknown time   • hydrochlorothiazide (HYDRODIURIL) 25 MG tablet Take  by mouth Daily.  3 10/9/2019 at Unknown time   • Insulin Glargine (TOUJEO SOLOSTAR) 300 UNIT/ML solution pen-injector Inject  under the skin into the appropriate area as directed.   10/8/2019 at Unknown time   • losartan (COZAAR) 100 MG tablet Take 100 mg by mouth Daily.  3  10/9/2019 at Unknown time   • metoclopramide (REGLAN) 10 MG tablet TK 1 T PO BEFORE MEALS AND QHS  8 10/9/2019 at Unknown time   • pantoprazole (PROTONIX) 40 MG EC tablet Take 40 mg by mouth Daily.  2 10/9/2019 at Unknown time   • vitamin B-12 (CYANOCOBALAMIN) 1000 MCG tablet Take 1,000 mcg by mouth Daily.   10/9/2019 at Unknown time   • budesonide-formoterol (SYMBICORT) 80-4.5 MCG/ACT inhaler Inhale 2 puffs 2 (Two) Times a Day.   Taking   • docusate sodium 100 MG capsule Take 100 mg by mouth 2 (Two) Times a Day As Needed for Constipation. (Patient not taking: Reported on 10/9/2019) 60 each 0 Not Taking at Unknown time   • enoxaparin (LOVENOX) 40 MG/0.4ML solution syringe Inject 0.4 mL under the skin into the appropriate area as directed Daily. For 2 weeks (Patient not taking: Reported on 10/9/2019) 5.6 mL  Not Taking at Unknown time   • melatonin 5 MG tablet tablet Take 1 tablet by mouth At Night As Needed (sleep).   Taking   • metaxalone (SKELAXIN) 800 MG tablet Take 1 tablet by mouth 3 (Three) Times a Day As Needed for Muscle Spasms. (Patient not taking: Reported on 10/9/2019) 30 tablet 0 Not Taking at Unknown time   • ondansetron (ZOFRAN) 4 MG tablet Take 1 tablet by mouth Every 6 (Six) Hours As Needed for Nausea or Vomiting. (Patient not taking: Reported on 10/9/2019) 30 tablet 0 Not Taking at Unknown time   • tolnaftate (TINACTIN) 1 % cream tolnaftate 1 % topical cream   Apply twice a day between the toes.   Obtain over-the-counter   Taking     Allergies:  Chlorhexidine and Latex    Objective    Vital Signs  Temp:  [97.5 °F (36.4 °C)-98.5 °F (36.9 °C)] 97.9 °F (36.6 °C)  Heart Rate:  [55-75] 63  Resp:  [16-18] 17  BP: (116-164)/(47-98) 155/63    Physical Exam:  General Appearance: alert, appears stated age and cooperative  Head: normocephalic, without obvious abnormality and atraumatic  Throat: gums healthy and no oral lesions  Neck: no adenopathy, suppple, trachea midline, no thyromegaly, no carotid bruit and  no JVD  Lungs: clear to auscultation, respirations regular, respirations even and respirations unlabored  Heart: regular rhythm & normal rate, normal S1, S2, no murmur, no debby, no rub and no click  Abdomen: normal bowel sounds, no masses and soft non-tender  Neurologic: Mental Status orientated to person, place, time and situation  Extremities:  Good doppler signal in left DP.  Diminished but present doppler signal in left PT.  Right BKA stump with prosthesis sleeve intact.  Skin:  Left 3rd toe with ulceration on dorsum with surrounding erythema. No drainage or fluctuance. Blister on anterior aspect of right ankle.        Data Review:  Results from last 7 days   Lab Units 10/10/19  0523   WBC 10*3/mm3 7.01   HEMOGLOBIN g/dL 11.2*   HEMATOCRIT % 35.9*   PLATELETS 10*3/mm3 280     Results from last 7 days   Lab Units 10/10/19  0523   SODIUM mmol/L 142   POTASSIUM mmol/L 4.2   CHLORIDE mmol/L 103   CO2 mmol/L 26.0   BUN mg/dL 26*   CREATININE mg/dL 1.45*   GLUCOSE mg/dL 138*   CALCIUM mg/dL 9.5     Coagulation: No results found for: INR, APTT  Cardiac markers:     ABGs:       Invalid input(s): PO2      Imaging Results (last 72 hours)     ** No results found for the last 72 hours. **          Assessment:        left 3rd toe cellulitis    Type 2 diabetes mellitus with diabetic polyneuropathy, with long-term current use of insulin (CMS/HCC)    Diabetic ulcer of toe of left foot associated with type 2 diabetes mellitus, limited to breakdown of skin (CMS/HCC)    HTN (hypertension)    CKD (chronic kidney disease)    Cellulitis and abscess of toe of left foot        Plan:  Arterial duplex of BLE pending  Continue antibiotics as per ID  Will discuss with Dr. White-possible amputation of left third toe  Addendum: I agree with the above assessment and plan    Kristel Freeman PA-C  10/10/19  8:14 AM

## 2019-10-10 NOTE — PLAN OF CARE
Problem: Patient Care Overview  Goal: Plan of Care Review  Outcome: Ongoing (interventions implemented as appropriate)   10/10/19 0417   Coping/Psychosocial   Plan of Care Reviewed With patient   Plan of Care Review   Progress no change   OTHER   Outcome Summary Pt slept well throughout the night. Dressing CDI. VSS. Walks well with prostetic leg. No pain throughout the night.        Problem: Skin and Soft Tissue Infection (Adult)  Goal: Signs and Symptoms of Listed Potential Problems Will be Absent, Minimized or Managed (Skin and Soft Tissue Infection)  Outcome: Ongoing (interventions implemented as appropriate)      Problem: Diabetes, Type 2 (Adult)  Goal: Signs and Symptoms of Listed Potential Problems Will be Absent, Minimized or Managed (Diabetes, Type 2)  Outcome: Ongoing (interventions implemented as appropriate)      Problem: Fall Risk (Adult)  Goal: Identify Related Risk Factors and Signs and Symptoms  Outcome: Ongoing (interventions implemented as appropriate)    Goal: Absence of Fall  Outcome: Ongoing (interventions implemented as appropriate)

## 2019-10-10 NOTE — PROGRESS NOTES
Discharge Planning Assessment  [facility]     Patient Name: Amol Aguirre  MRN: [id number]  Today's Date: 10/10/2019    Admit Date: 10/9/2019    Discharge Needs Assessment     Row Name 10/10/19 1500       Living Environment    Lives With  spouse    Name(s) of Who Lives With Patient  Wife:  Janie    Family Caregiver if Needed  spouse    Quality of Family Relationships  helpful;supportive    Able to Return to Prior Arrangements  yes    Living Arrangement Comments  Mr. Aguirre lives with his wife in a one story home [place].  No stairs or steps.       Resource/Environmental Concerns    Transportation Concerns  car, none       Transition Planning    Patient/Family Anticipates Transition to  home with family    Transportation Anticipated  family or friend will provide       Discharge Needs Assessment    Equipment Currently Used at Home  walker, rolling;wheelchair;cane, quad    Equipment Needed After Discharge  none        Discharge Plan     Row Name 10/10/19 1501       Plan    Plan  Home with wife's assistance    Patient/Family in Agreement with Plan  yes    Plan Comments  Met with Mr. Aguirre and his wife, Janie, at the bedside for discharge planning.  Mr. Aguirre was a recent admission at [facility] last June for a right BKA.  He rehabbed at [facility] after last admission for approx. 2 weeks, had [facility] and then [facility].  Per Dr. Juarez's note, he will likely have a toe amputation.  Mr. Aguirre stated that his wife is available to assist him at home as needed.    CM will continue to follow and assist with DC needs.     Final Discharge Disposition Code  06 - home with home health care        Destination      No service coordination in this encounter.      Durable Medical Equipment      No service coordination in this encounter.      Dialysis/Infusion      No service coordination in this encounter.      Home Medical Care      No service coordination in this encounter.      Therapy      No  service coordination in this encounter.      Community Resources      No service coordination in this encounter.          Demographic Summary     Row Name 10/10/19 1458       General Information    Admission Type  inpatient    Arrived From  home    Reason for Consult  discharge planning    General Information Comments  PCP:  Donte Briones       Contact Information    Permission Granted to Share Info With      Contact Information Comments  Wife:  mirella Lima [phone]        Functional Status     Row Name 10/10/19 1459       Functional Status    Usual Activity Tolerance  good    Current Activity Tolerance  good       Functional Status, IADL    Medications  independent    Meal Preparation  independent    Housekeeping  independent    Laundry  independent       Mental Status    General Appearance WDL  WDL        Psychosocial    No documentation.       Abuse/Neglect    No documentation.       Legal     Row Name 10/10/19 1459       Financial/Legal    Who Manages Finances if Patient Unable  No advance directives.    Finance Comments  Mr. Aguirre has prescription drug coverage with Medicare, Med D and ePAR.   Verified insurance with patient.        Substance Abuse    No documentation.       Patient Forms    No documentation.           Ellen Mccabe RN

## 2019-10-10 NOTE — PROGRESS NOTES
"                  Clinical Nutrition     Reason for Visit:   Diagnosis, Nonhealing wound or pressure ulcer    Patient Name: Amol Aguirre  YOB: 1945  MRN: [id number]  Date of Encounter: 10/10/19 9:43 AM  Admission date: 10/9/2019    Nutrition Assessment   Assessment     Admission diagnosis  L 3rd toe cellulitis / diabetic ulcer    Additional PMH/procedures:  HTN  GERD  DM2  Asthma  CKD?stage    R knee surgery  R BKA (6/2019)  [facility]    Reported/Observed/Food/Nutrition Related History:      Patient reports appetite not great, about the same as it was prior to admission. Reports some intentional weight loss prior to admission; estimates ~5 - 10 lbs. Patient eating outside food brought in by wife. Patient states he drinks Boost supplements at home. Willing to try Premier Protein supplement for increase protein needs and proper wound healing. Patient reports he has been watching his blood sugars at home. Current A1c of 7.8% down from 9.8% about 2 months ago.      Anthropometrics     Height: 190.5 cm (75\")  Last filed wt: Weight: 112 kg (246 lb) (10/09/19 1406)  Weight Method: Standing scale    BMI: n/a due to R BKA    Ideal Body Weight (IBW) (kg): 90.45  Admission wt: 242 lb 8.1 oz  Method obtained: weight per charting 9/9    Labs reviewed     Results from last 7 days   Lab Units 10/10/19  0523 10/09/19  1447   GLUCOSE mg/dL 138* 107*   BUN mg/dL 26* 29*   CREATININE mg/dL 1.45* 1.43*   SODIUM mmol/L 142 140   CHLORIDE mmol/L 103 104   POTASSIUM mmol/L 4.2 4.6     Results from last 7 days   Lab Units 10/09/19  1447   CRP mg/dL 0.58*       Results from last 7 days   Lab Units 10/10/19  0757 10/10/19  0747 10/09/19  2016 10/09/19  1649   GLUCOSE mg/dL 128 125 159* 235*     Lab Results   Lab Value Date/Time    HGBA1C 7.80 (H) 10/09/2019 1447    HGBA1C 9.80 (H) 06/05/2019 0454       Medications reviewed   Pertinent: daptomycin IV, invanz IV, insulin, reglan, protonix      Needs Assessment (10/10) "       Height used 190.5 cm (75 in)   Weight used 246 lb (112 kg)   Adjusted  lbs (82.8 kgs)     Estimated need Method/Equation used Result    Energy/Calorie need   ~2100 kcals/d 16 - 20 kcal/kg actBW 1792 - 2200             Protein   ~118 g/day 1.0 g/kg actual BW  1.5 g/kg adjusted  g  124 g            Current Nutrition Prescription     PO: Diet Regular; Cardiac, Consistent Carbohydrate, Renal  Intake: insufficient PO data      Nutrition Diagnosis     10/10  Problem Increased nutrient needs (protein)   Etiology Skin integrity   Signs/Symptoms L 3rd toe cellulitis w/ possible need for amputation       Nutrition Intervention   1.  Follow treatment progress, Care plan reviewed  2.  Advise alternate selection, Interview for preferences   3. Phosphorus lab ordered to determine renal function and need for Renal diet restriction. Phosphorus WNL. Renal restriction removed. RDN to monitor renal function labs and adjust diet modifications as medically appropriate  4. Supplement provided - Premier Protein Daily  5. Encouraged oral / supplemental intake    Goal:   General: Nutrition support treatment  PO: Establish PO, Meet estimated needs      Monitoring/Evaluation:   Per protocol, PO intake, Supplement intake, Pertinent labs, Skin status    Will Continue to follow per protocol    Jerri Patricia RDN, LD  Time Spent: 35 minutes

## 2019-10-10 NOTE — PLAN OF CARE
Problem: Patient Care Overview  Goal: Plan of Care Review  Outcome: Ongoing (interventions implemented as appropriate)   10/10/19 1634   Coping/Psychosocial   Plan of Care Reviewed With patient;spouse   Plan of Care Review   Progress no change   OTHER   Outcome Summary Pt. alert, oriented x 4, and pleasant. VSS. He has had no c/o pain and is voiding well per urinal. He went down for a MRI and an arterial doppler. He has been sitting up in the chair today. His FSBG have ran high, insulin given per orders. IV antibiotics given per orders. Bactroban gauze, kerlix, and coban placed on his left 3rd toe and anterior ankle per orders. Will continue to monitor.       Problem: Skin and Soft Tissue Infection (Adult)  Goal: Signs and Symptoms of Listed Potential Problems Will be Absent, Minimized or Managed (Skin and Soft Tissue Infection)  Outcome: Ongoing (interventions implemented as appropriate)   10/10/19 1634   Goal/Outcome Evaluation   Problems Assessed (Skin and Soft Tissue Infection) all   Problems Present (Skin/Soft Tissue Inf) none       Problem: Diabetes, Type 2 (Adult)  Goal: Signs and Symptoms of Listed Potential Problems Will be Absent, Minimized or Managed (Diabetes, Type 2)  Outcome: Ongoing (interventions implemented as appropriate)   10/10/19 1634   Goal/Outcome Evaluation   Problems Assessed (Type 2 Diabetes) all   Problems Present (Type 2 Diabetes) hyperglycemia;situational response       Problem: Fall Risk (Adult)  Goal: Identify Related Risk Factors and Signs and Symptoms  Outcome: Ongoing (interventions implemented as appropriate)   10/10/19 1634   Fall Risk (Adult)   Related Risk Factors (Fall Risk) fatigue/slow reaction;gait/mobility problems;inadequate lighting;environment unfamiliar   Signs and Symptoms (Fall Risk) presence of risk factors

## 2019-10-10 NOTE — PROGRESS NOTES
Orthopedic Foot/Ankle Progress Note    Subjective   Chief Complaint:cellulitis, infected left toe    Systemic or Specific Complaints: no complaints  Objective     Vital signs in last 24 hours:  Temp:  [97.5 °F (36.4 °C)-98.5 °F (36.9 °C)] 97.9 °F (36.6 °C)  Heart Rate:  [55-75] 63  Resp:  [16-18] 16  BP: (116-164)/(47-98) 155/63    GENERAL: Body habitus: overweight    Lower extremity edema: Right: none; Left: none    Varicose veins:  ; Left: none    Gait: using cane     Mental Status:  awake and alert; oriented to person, place, and time    Voice:  clear  SKIN:  Lower extremity: ulcer elft 3rd toe still erythema around DIP joint, none proximal.  the blister over the ankle is decompressed, no erythema, no necrosis  HEENT: Head: Normocephalic, atraumatic,  without obvious abnormality.  PULM:  Repiratory effort normal  CV:  Dorsalis Pedis:  ; Left:not palpable    Posterior Tibial:  Left:not palpable    Capillary Refill:  Brisk  MSK:  Hand:intrinsic wasting      Tibia:  Right:  right BKA stable; Left:  non tender      Ankle:   Left:  blister decompressed, no erythema      Foot:   Left:  toe still red around necrotic ulcer at DIP, but not proximal    Neurovascular: no change   Wound: as above      Data Review  Lab Results (last 24 hours)     Procedure Component Value Units Date/Time    Phosphorus [[id number]]  (Normal) Collected:  10/10/19 0523    Specimen:  Blood Updated:  10/10/19 0959     Phosphorus 4.4 mg/dL     POC Glucose Once [[id number]]  (Normal) Collected:  10/10/19 0757    Specimen:  Blood Updated:  10/10/19 0800     Glucose 128 mg/dL     POC Glucose Once [[id number]]  (Normal) Collected:  10/10/19 0747    Specimen:  Blood Updated:  10/10/19 0750     Glucose 125 mg/dL     Basic Metabolic Panel [[id number]]  (Abnormal) Collected:  10/10/19 0523    Specimen:  Blood Updated:  10/10/19 0604     Glucose 138 mg/dL      BUN 26 mg/dL      Creatinine 1.45 mg/dL      Sodium 142 mmol/L      Potassium 4.2 mmol/L      Chloride  103 mmol/L      CO2 26.0 mmol/L      Calcium 9.5 mg/dL      eGFR Non African Amer 47 mL/min/1.73      BUN/Creatinine Ratio 17.9     Anion Gap 13.0 mmol/L     Narrative:       GFR Normal >60  Chronic Kidney Disease <60  Kidney Failure <15    CBC (No Diff) [[id number]]  (Abnormal) Collected:  10/10/19 0523    Specimen:  Blood Updated:  10/10/19 0555     WBC 7.01 10*3/mm3      RBC 4.21 10*6/mm3      Hemoglobin 11.2 g/dL      Hematocrit 35.9 %      MCV 85.3 fL      MCH 26.6 pg      MCHC 31.2 g/dL      RDW 14.1 %      RDW-SD 43.8 fl      MPV 10.0 fL      Platelets 280 10*3/mm3     POC Glucose Once [[id number]]  (Abnormal) Collected:  10/09/19 2016    Specimen:  Blood Updated:  10/09/19 2036     Glucose 159 mg/dL     POC Glucose Once [[id number]]  (Abnormal) Collected:  10/09/19 1649    Specimen:  Blood Updated:  10/09/19 1710     Glucose 235 mg/dL     Lactic Acid, Plasma [[id number]]  (Normal) Collected:  10/09/19 1447    Specimen:  Blood Updated:  10/09/19 1613     Lactate 1.2 mmol/L      Comment: Falsely depressed results may occur on samples drawn from patients receiving N-Acetylcysteine (NAC) or Metamizole.       Basic Metabolic Panel [[id number]]  (Abnormal) Collected:  10/09/19 1447    Specimen:  Blood Updated:  10/09/19 1604     Glucose 107 mg/dL      BUN 29 mg/dL      Creatinine 1.43 mg/dL      Sodium 140 mmol/L      Potassium 4.6 mmol/L      Chloride 104 mmol/L      CO2 22.0 mmol/L      Calcium 9.2 mg/dL      eGFR Non African Amer 48 mL/min/1.73      BUN/Creatinine Ratio 20.3     Anion Gap 14.0 mmol/L     Narrative:       GFR Normal >60  Chronic Kidney Disease <60  Kidney Failure <15    C-reactive Protein [[id number]]  (Abnormal) Collected:  10/09/19 1447    Specimen:  Blood Updated:  10/09/19 1604     C-Reactive Protein 0.58 mg/dL     Hemoglobin A1c [[id number]]  (Abnormal) Collected:  10/09/19 1447    Specimen:  Blood Updated:  10/09/19 1602     Hemoglobin A1C 7.80 %     Narrative:       Hemoglobin A1C  Ranges:    Increased Risk for Diabetes  5.7% to 6.4%  Diabetes                     >= 6.5%  Diabetic Goal                < 7.0%    Sedimentation Rate [[id number]]  (Normal) Collected:  10/09/19 1447    Specimen:  Blood Updated:  10/09/19 1547     Sed Rate 19 mm/hr     CBC Auto Differential [[id number]]  (Abnormal) Collected:  10/09/19 1447    Specimen:  Blood Updated:  10/09/19 1542     WBC 8.91 10*3/mm3      RBC 4.31 10*6/mm3      Hemoglobin 11.6 g/dL      Hematocrit 37.5 %      MCV 87.0 fL      MCH 26.9 pg      MCHC 30.9 g/dL      RDW 14.1 %      RDW-SD 45.4 fl      MPV 10.2 fL      Platelets 282 10*3/mm3      Neutrophil % 70.1 %      Lymphocyte % 19.5 %      Monocyte % 5.7 %      Eosinophil % 3.8 %      Basophil % 0.6 %      Immature Grans % 0.3 %      Neutrophils, Absolute 6.24 10*3/mm3      Lymphocytes, Absolute 1.74 10*3/mm3      Monocytes, Absolute 0.51 10*3/mm3      Eosinophils, Absolute 0.34 10*3/mm3      Basophils, Absolute 0.05 10*3/mm3      Immature Grans, Absolute 0.03 10*3/mm3      nRBC 0.0 /100 WBC     Blood Culture - Blood, Arm, Left [[id number]] Collected:  10/09/19 1445    Specimen:  Blood from Arm, Left Updated:  10/09/19 1530    Blood Culture - Blood, Arm, Right [[id number]] Collected:  10/09/19 1448    Specimen:  Blood from Arm, Right Updated:  10/09/19 1530            Assessment/Plan      1. Cellulitis and ulcer left 3rd toe, on IV abx.  Dr White notes that a toe amputation has reasonable chance to heal if needed.  On my exam the ulcer goes to bone, and I think the toe probably needs to be amputated.  I discussed this with patient and his wife.  We will do an MRI to see if the bone is involved.            Juju Weber MD    Date: 10/10/2019  Time: 10:39 AM

## 2019-10-11 VITALS
OXYGEN SATURATION: 96 % | TEMPERATURE: 98 F | BODY MASS INDEX: 30.59 KG/M2 | SYSTOLIC BLOOD PRESSURE: 158 MMHG | DIASTOLIC BLOOD PRESSURE: 70 MMHG | RESPIRATION RATE: 18 BRPM | WEIGHT: 246 LBS | HEART RATE: 68 BPM | HEIGHT: 75 IN

## 2019-10-11 LAB
GLUCOSE BLDC GLUCOMTR-MCNC: 164 MG/DL (ref 70–130)
GLUCOSE BLDC GLUCOMTR-MCNC: 217 MG/DL (ref 70–130)

## 2019-10-11 PROCEDURE — 82962 GLUCOSE BLOOD TEST: CPT

## 2019-10-11 PROCEDURE — 94799 UNLISTED PULMONARY SVC/PX: CPT

## 2019-10-11 PROCEDURE — 99232 SBSQ HOSP IP/OBS MODERATE 35: CPT | Performed by: ORTHOPAEDIC SURGERY

## 2019-10-11 RX ORDER — CIPROFLOXACIN 500 MG/1
500 TABLET, FILM COATED ORAL 2 TIMES DAILY
Qty: 10 TABLET | Refills: 0 | Status: SHIPPED | OUTPATIENT
Start: 2019-10-11 | End: 2019-10-17 | Stop reason: HOSPADM

## 2019-10-11 RX ORDER — CEPHALEXIN 500 MG/1
500 CAPSULE ORAL 4 TIMES DAILY
Qty: 20 CAPSULE | Refills: 0 | Status: SHIPPED | OUTPATIENT
Start: 2019-10-11 | End: 2019-10-17 | Stop reason: HOSPADM

## 2019-10-11 RX ADMIN — METOCLOPRAMIDE HYDROCHLORIDE 10 MG: 10 TABLET ORAL at 08:08

## 2019-10-11 RX ADMIN — AMLODIPINE BESYLATE 10 MG: 10 TABLET ORAL at 08:08

## 2019-10-11 RX ADMIN — PANTOPRAZOLE SODIUM 40 MG: 40 TABLET, DELAYED RELEASE ORAL at 08:08

## 2019-10-11 RX ADMIN — BUDESONIDE AND FORMOTEROL FUMARATE DIHYDRATE 2 PUFF: 80; 4.5 AEROSOL RESPIRATORY (INHALATION) at 08:43

## 2019-10-11 RX ADMIN — MUPIROCIN: 20 OINTMENT TOPICAL at 08:10

## 2019-10-11 RX ADMIN — INSULIN LISPRO 14 UNITS: 100 INJECTION, SOLUTION INTRAVENOUS; SUBCUTANEOUS at 11:45

## 2019-10-11 RX ADMIN — SODIUM CHLORIDE, PRESERVATIVE FREE 10 ML: 5 INJECTION INTRAVENOUS at 08:09

## 2019-10-11 RX ADMIN — INSULIN LISPRO 14 UNITS: 100 INJECTION, SOLUTION INTRAVENOUS; SUBCUTANEOUS at 08:14

## 2019-10-11 RX ADMIN — CLONIDINE HYDROCHLORIDE 0.1 MG: 0.1 TABLET ORAL at 08:08

## 2019-10-11 RX ADMIN — METOCLOPRAMIDE HYDROCHLORIDE 10 MG: 10 TABLET ORAL at 05:52

## 2019-10-11 RX ADMIN — NEBIVOLOL HYDROCHLORIDE 5 MG: 5 TABLET ORAL at 08:08

## 2019-10-11 RX ADMIN — METOCLOPRAMIDE HYDROCHLORIDE 10 MG: 10 TABLET ORAL at 11:44

## 2019-10-11 RX ADMIN — LOSARTAN POTASSIUM 100 MG: 50 TABLET ORAL at 08:08

## 2019-10-11 NOTE — DISCHARGE SUMMARY
Patient Name: Amol Aguirre  MRN: [id number]  : 1945  DOS: 10/11/2019    Attending: Juju Juarez MD    Primary Care Provider: Donte Briones MD    Date of Admission:.10/9/2019 12:27 PM    Date of Discharge:  10/11/2019    Discharge Diagnosis:   left 3rd toe cellulitis    Diabetic ulcer of toe of left foot associated with type 2 diabetes mellitus, limited to breakdown of skin (CMS/[facility])    Type 2 diabetes mellitus with diabetic polyneuropathy, with long-term current use of insulin (CMS/[facility])    HTN (hypertension)    CKD (chronic kidney disease)    Cellulitis and abscess of toe of left foot      Hospital Course  Patient is a 74 y.o. male presented for direct admission from Dr. Juarez's office.     He developed a red area on his left 3rd toe on 19. Each day the red area has gotten larger, more swollen with ulcer formation increasing in size. On 10/3 he developed a blister over the anterior ankle that burst and refilled with fluid by the next morning. He denies fevers, chills or night sweats.     He is known to us from previous admission 2019 for right BKA, which he recovered well after a short stay at [facility].       [facility], Dr. Garcia was consulted for antibiotic management. The patient will be discharged on oral antibiotics pending surgery on Tuesday.    Dr. White, cardiothoracic sx, was consulted for vascular evaluation. Patient underwent doppler and had previous angiogram in April. It is felt that the patient has sufficient blood flow to his foot for proper healing postop.    Dr. Juarez, orthopedic sx, follows and plans for amputation of left 3rd toe on Tuesday.     Consultants:   Dr. Juarez, orthopedic sx  Dr. Garcia, [facility]  Dr. White, cardiothoracic sx    Pertinent Test Results:    I reviewed the patient's new clinical results.   Results from last 7 days   Lab Units 10/10/19  0523 10/09/19  1447   WBC 10*3/mm3 7.01 8.91   HEMOGLOBIN g/dL 11.2* 11.6*   HEMATOCRIT % 35.9* 37.5    PLATELETS 10*3/mm3 280 282     Results from last 7 days   Lab Units 10/10/19  0523 10/09/19  1447   SODIUM mmol/L 142 140   POTASSIUM mmol/L 4.2 4.6   CHLORIDE mmol/L 103 104   CO2 mmol/L 26.0 22.0   BUN mg/dL 26* 29*   CREATININE mg/dL 1.45* 1.43*   CALCIUM mg/dL 9.5 9.2   GLUCOSE mg/dL 138* 107*     Results for ASAEL MONDRAGON (MRN [id number]) as of 10/11/2019 16:08   Ref. Range 10/10/2019 15:39 10/10/2019 20:29 10/11/2019 08:14 10/11/2019 11:41   Glucose Latest Ref Range: 70 - 130 mg/dL 222 (H) 150 (H) 164 (H) 217 (H)     Interpretation Summary     · Left ankle-brachial index is 0.81 with posterior tibial measurement suggesting mild arterial occlusive disease and 1.58 with dorsalis pedis measurement which is nondiagnostic due to inability to adequately compress the vessels. Toe brachial index was not calculated.  · The patient is status post right below-knee amputation.     Study Result     EXAMINATION: MRI FOOT LEFT W WO CONTRAST- 10/10/2019     INDICATION: Abnormal findings on diagnostic imaging of body structures     TECHNIQUE: Sagittal T1 and STIR, axial and coronal T1 and T2 fat and  water weighted [place] series of fat saturation, pre and post contrast  axial T1 fat-sat images and post gadolinium contrast sagittal and  coronal T1 fat sat images of the left foot.     COMPARISON: Plain film images of the toes and midfoot of today's date.     FINDINGS: Patient history indicates necrotic ulcer of third digit with  cellulitis, evaluate for osteomyelitis. History of gout. 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     The study is fairly heavily motion degraded but there are no consistent  findings of marrow edema in the third digit, or elsewhere. There is some  circumferential edema in the soft tissues around the margins of the  third proximal middle and distal phalanges, but no abnormality  underlying bony structures and no evidence to suggest septic  "arthritis.  There is no evidence of discrete drainable ulcer. Postcontrast images  show diffuse soft tissue enhancement of the great toe, but no obvious  marrow enhancement. No asymmetric enhancement is seen elsewhere. Some  generalized edema is seen in the deep plantar soft tissues again without  evidence of discrete, drainable collection. Tophus formation along the  medial aspect of the first MTP joint is seen as marked low signal  dropout, presumably as a result of mineralized tophus. There is trace  edema in the immediately adjacent cortex of the base of the first  proximal phalanx and metatarsal head. No significant periarticular  abnormalities appreciated elsewhere. Included images of the ankle show  no obvious tendinopathy or ligamentous injury.     IMPRESSION:  1. Findings consistent with gout of the first MTP joint, with tophus  formation and minimal adjacent cortical edema.  2. No evidence of osteomyelitis of the third digit elsewhere.  3. Mild diffuse edema and enhancement of the third digit soft tissues  consistent with cellulitis.      D:  10/10/2019  E:  10/10/2019     I reviewed the patient's new imaging including images and reports.      Discharge Assessment:    Vital Signs  Visit Vitals  /70 (BP Location: Right arm, Patient Position: Lying)   Pulse 68   Temp 98 °F (36.7 °C) (Oral)   Resp 18   Ht 190.5 cm (75\")   Wt 112 kg (246 lb)   SpO2 96%   BMI 30.75 kg/m²     Temp (24hrs), Av °F (36.7 °C), Min:97.9 °F (36.6 °C), Max:98 °F (36.7 °C)      General Appearance:    Alert, cooperative, in no acute distress   Lungs:     Clear to auscultation,respirations regular, even and                   unlabored    Heart:    Regular rhythm and normal rate, normal S1 and S2   Abdomen:     Normal bowel sounds, no masses, no organomegaly, soft        non-tender, non-distended, no guarding, no rebound                 tenderness   Extremities:   Right BKA.  Left 3rd toe with ulceration, erythema , swelling. "   Pulses:   Pulses palpable and equal bilaterally   Skin:   No bleeding, bruising or rash   Neurologic:   Cranial nerves 2 - 12 grossly intact       Discharge Disposition: Home    Discharge Medications     Discharge Medications      New Medications      Instructions Start Date   cephalexin 500 MG capsule  Commonly known as:  KEFLEX   500 mg, Oral, 4 Times Daily      ciprofloxacin 500 MG tablet  Commonly known as:  CIPRO   500 mg, Oral, 2 Times Daily         Continue These Medications      Instructions Start Date   amLODIPine 10 MG tablet  Commonly known as:  NORVASC   10 mg, Oral, Daily      aspirin 81 MG EC tablet   81 mg, Oral, Daily      budesonide-formoterol 80-4.5 MCG/ACT inhaler  Commonly known as:  SYMBICORT   2 puffs, Inhalation, 2 Times Daily - RT      BYSTOLIC 10 MG tablet  Generic drug:  nebivolol   TK 1/2 T PO BID      cloNIDine 0.1 MG tablet  Commonly known as:  CATAPRES   0.1 mg, Oral, 2 Times Daily      docusate sodium 100 MG capsule  Commonly known as:  COLACE   100 mg, Oral, 2 Times Daily PRN      hydroCHLOROthiazide 25 MG tablet  Commonly known as:  HYDRODIURIL   Oral, Daily      losartan 100 MG tablet  Commonly known as:  COZAAR   100 mg, Oral, Daily      melatonin 5 MG tablet tablet   5 mg, Oral, Nightly PRN      metaxalone 800 MG tablet  Commonly known as:  SKELAXIN   800 mg, Oral, 3 Times Daily PRN      metoclopramide 10 MG tablet  Commonly known as:  REGLAN   TK 1 T PO BEFORE MEALS AND QHS      ondansetron 4 MG tablet  Commonly known as:  ZOFRAN   4 mg, Oral, Every 6 Hours PRN      pantoprazole 40 MG EC tablet  Commonly known as:  PROTONIX   40 mg, Oral, Daily      tolnaftate 1 % cream  Commonly known as:  TINACTIN   tolnaftate 1 % topical cream   Apply twice a day between the toes.   Obtain over-the-counter      TOUJEO SOLOSTAR 300 UNIT/ML solution pen-injector injection  Generic drug:  Insulin Glargine   Subcutaneous      vitamin B-12 1000 MCG tablet  Commonly known as:  CYANOCOBALAMIN    1,000 mcg, Oral, Daily         Stop These Medications    enoxaparin 40 MG/0.4ML solution syringe  Commonly known as:  LOVENOX            Discharge Diet: Consistent carb diet    Activity at Discharge: ambulate    Follow-up Appointments  Will return Tuesday for scheduled surgery by Dr. Jaurez    Discharge took over 30 min.    VIPUL Alvarez  10/11/19  12:09 PM

## 2019-10-11 NOTE — PROGRESS NOTES
Orthopedic Foot/Ankle Progress Note    Subjective   Chief Complaint: Left third toe infection    Systemic or Specific Complaints: The MRI does not show definite osteomyelitis.  The toe however it remains red.  The redness is confined to the toe it does not radiate proximally.  To my evaluation the ulcer on the toe goes to bone.  The patient and his wife and I discussed this in detail.  I think there is 2 options.  One is to continue antibiotics and see if this will heal.  My concern with that is that the lesion on the toe went from being a tiny scab to being a necrotic ulcer quite quickly.  I think the chances of this healing are low.  The other option is to amputate the toe.  Dr. Jimenez feels that he has enough blood flow to heal this.  I agree.  Patient and his wife discussed it and he would like to proceed with toe amputation on Tuesday.  He would like to go home in the interim and I think that is fine.  I will discuss antibiotics with Dr. Garcia.  Objective     Vital signs in last 24 hours:  Temp:  [97.9 °F (36.6 °C)-98 °F (36.7 °C)] 98 °F (36.7 °C)  Heart Rate:  [68-80] 68  Resp:  [16-18] 18  BP: (141-160)/(63-81) 158/70    GENERAL: Body habitus: trunkal obesity    Lower extremity edema: Right: none; Left: none        Gait: using cane     Mental Status:  awake and alert; oriented to person, place, and time    Voice:  clear  SKIN:  Lower extremity: Left third toe ulcer remains over DIP joint, its necrotic, the toe remains red in that area  HEENT: Head: Normocephalic, atraumatic,  without obvious abnormality.  PULM:  Repiratory effort normal  CV:      Capillary Refill:  Brisk  MSK:  Hand:intrinsic wasting          Foot: Left:  No change in the toe ulcer      Data Review  Lab Results (last 24 hours)     Procedure Component Value Units Date/Time    POC Glucose Once [[id number]]  (Abnormal) Collected:  10/11/19 0814    Specimen:  Blood Updated:  10/11/19 0816     Glucose 164 mg/dL     POC Glucose Once [[id number]]   (Abnormal) Collected:  10/10/19 2029    Specimen:  Blood Updated:  10/10/19 2031     Glucose 150 mg/dL     Blood Culture - Blood, Arm, Right [[id number]] Collected:  10/09/19 1448    Specimen:  Blood from Arm, Right Updated:  10/10/19 1545     Blood Culture No growth at 24 hours    Blood Culture - Blood, Arm, Left [[id number]] Collected:  10/09/19 1445    Specimen:  Blood from Arm, Left Updated:  10/10/19 1545     Blood Culture No growth at 24 hours    POC Glucose Once [[id number]]  (Abnormal) Collected:  10/10/19 1539    Specimen:  Blood Updated:  10/10/19 1540     Glucose 222 mg/dL             Assessment/Plan     As above the MRI does not show definite osteomyelitis.  But the ulcer is necrotic and to my evaluation it goes right to the bone.  We talked about the options.  He would like to proceed with toe amputation.  We discussed the risks including death, infection, neurovascular damage, higher amputation, wound healing problems, heart attacks, strokes, blood clots, etc.  Questions asked and answered in detail.          Juju Weber MD    Date: 10/11/2019  Time: 11:55 AM

## 2019-10-11 NOTE — PLAN OF CARE
Problem: Patient Care Overview  Goal: Plan of Care Review  Outcome: Ongoing (interventions implemented as appropriate)   10/11/19 8822   Coping/Psychosocial   Plan of Care Reviewed With patient   Plan of Care Review   Progress no change   OTHER   Outcome Summary Pt remains A&Ox4, and is very pleasant. Pt has denied pain during shift. Pt VSS throughout shift. Pt dressing remains c/d/i. Pt ambulates well w no assistance, and has refused bed alarm during shift. Pt continues to void spontaneously. Pt has slept on and off during shift. Will continue to monitor.        Problem: Skin and Soft Tissue Infection (Adult)  Goal: Signs and Symptoms of Listed Potential Problems Will be Absent, Minimized or Managed (Skin and Soft Tissue Infection)  Outcome: Ongoing (interventions implemented as appropriate)      Problem: Diabetes, Type 2 (Adult)  Goal: Signs and Symptoms of Listed Potential Problems Will be Absent, Minimized or Managed (Diabetes, Type 2)  Outcome: Ongoing (interventions implemented as appropriate)

## 2019-10-12 ENCOUNTER — READMISSION MANAGEMENT (OUTPATIENT)
Dept: CALL CENTER | Facility: HOSPITAL | Age: 74
End: 2019-10-12

## 2019-10-12 NOTE — OUTREACH NOTE
Prep Survey      Responses   Facility patient discharged from?  [facility]   Is patient eligible?  Yes   Discharge diagnosis  left 3rd toe cellulitis   Does the patient have one of the following disease processes/diagnoses(primary or secondary)?  Other   Does the patient have Home health ordered?  No   Is there a DME ordered?  No   Prep survey completed?  Yes          Dixie Ortez RN

## 2019-10-14 LAB
BACTERIA SPEC AEROBE CULT: NORMAL
BACTERIA SPEC AEROBE CULT: NORMAL

## 2019-10-14 RX ORDER — HYDROCODONE BITARTRATE AND ACETAMINOPHEN 7.5; 325 MG/1; MG/1
1-2 TABLET ORAL EVERY 6 HOURS PRN
Qty: 45 TABLET | Refills: 0 | Status: SHIPPED | OUTPATIENT
Start: 2019-10-14 | End: 2019-10-23

## 2019-10-14 RX ORDER — OXYCODONE HYDROCHLORIDE AND ACETAMINOPHEN 5; 325 MG/1; MG/1
1-2 TABLET ORAL EVERY 6 HOURS PRN
Qty: 45 TABLET | Refills: 0 | Status: SHIPPED | OUTPATIENT
Start: 2019-10-14 | End: 2019-10-23

## 2019-10-14 RX ORDER — ONDANSETRON 4 MG/1
4 TABLET, FILM COATED ORAL EVERY 6 HOURS PRN
Qty: 30 TABLET | Refills: 0 | Status: SHIPPED | OUTPATIENT
Start: 2019-10-14 | End: 2019-10-17 | Stop reason: HOSPADM

## 2019-10-14 NOTE — TELEPHONE ENCOUNTER
CALLED PATIENT TO ADVISE OF 1:30PM ARRIVAL TIME FOR SURGERY ON 10/15/19 WITH DR. PAGAN, NO ANSWER.  LEFT MESSAGE WITH SURGERY DETAILS AND REQUEST TO RETURN MY CALL.

## 2019-10-15 ENCOUNTER — ANESTHESIA (OUTPATIENT)
Dept: PERIOP | Facility: HOSPITAL | Age: 74
End: 2019-10-15

## 2019-10-15 ENCOUNTER — ANESTHESIA EVENT (OUTPATIENT)
Dept: PERIOP | Facility: HOSPITAL | Age: 74
End: 2019-10-15

## 2019-10-15 ENCOUNTER — READMISSION MANAGEMENT (OUTPATIENT)
Dept: CALL CENTER | Facility: HOSPITAL | Age: 74
End: 2019-10-15

## 2019-10-15 ENCOUNTER — HOSPITAL ENCOUNTER (INPATIENT)
Facility: HOSPITAL | Age: 74
LOS: 2 days | Discharge: HOME | End: 2019-10-17
Attending: ORTHOPAEDIC SURGERY | Admitting: ORTHOPAEDIC SURGERY

## 2019-10-15 DIAGNOSIS — L97.521 DIABETIC ULCER OF TOE OF LEFT FOOT ASSOCIATED WITH TYPE 2 DIABETES MELLITUS, LIMITED TO BREAKDOWN OF SKIN (HCC): ICD-10-CM

## 2019-10-15 DIAGNOSIS — S98.132A AMPUTATION OF TOE OF LEFT FOOT (HCC): Primary | ICD-10-CM

## 2019-10-15 DIAGNOSIS — E11.621 DIABETIC ULCER OF TOE OF LEFT FOOT ASSOCIATED WITH TYPE 2 DIABETES MELLITUS, LIMITED TO BREAKDOWN OF SKIN (HCC): ICD-10-CM

## 2019-10-15 DIAGNOSIS — L02.612 CELLULITIS AND ABSCESS OF TOE OF LEFT FOOT: ICD-10-CM

## 2019-10-15 DIAGNOSIS — L03.032 CELLULITIS AND ABSCESS OF TOE OF LEFT FOOT: ICD-10-CM

## 2019-10-15 LAB
ANION GAP SERPL CALCULATED.3IONS-SCNC: 11 MMOL/L (ref 5–15)
BUN BLD-MCNC: 30 MG/DL (ref 8–23)
BUN/CREAT SERPL: 19.7 (ref 7–25)
CALCIUM SPEC-SCNC: 9.9 MG/DL (ref 8.6–10.5)
CHLORIDE SERPL-SCNC: 105 MMOL/L (ref 98–107)
CO2 SERPL-SCNC: 23 MMOL/L (ref 22–29)
CREAT BLD-MCNC: 1.52 MG/DL (ref 0.76–1.27)
DEPRECATED RDW RBC AUTO: 44.3 FL (ref 37–54)
ERYTHROCYTE [DISTWIDTH] IN BLOOD BY AUTOMATED COUNT: 14.1 % (ref 12.3–15.4)
GFR SERPL CREATININE-BSD FRML MDRD: 45 ML/MIN/1.73
GLUCOSE BLD-MCNC: 196 MG/DL (ref 65–99)
GLUCOSE BLDC GLUCOMTR-MCNC: 100 MG/DL (ref 70–130)
GLUCOSE BLDC GLUCOMTR-MCNC: 144 MG/DL (ref 70–130)
GLUCOSE BLDC GLUCOMTR-MCNC: 184 MG/DL (ref 70–130)
HCT VFR BLD AUTO: 37.3 % (ref 37.5–51)
HGB BLD-MCNC: 11.5 G/DL (ref 13–17.7)
MCH RBC QN AUTO: 26.4 PG (ref 26.6–33)
MCHC RBC AUTO-ENTMCNC: 30.8 G/DL (ref 31.5–35.7)
MCV RBC AUTO: 85.7 FL (ref 79–97)
PLATELET # BLD AUTO: 321 10*3/MM3 (ref 140–450)
PMV BLD AUTO: 10.3 FL (ref 6–12)
POTASSIUM BLD-SCNC: 4.8 MMOL/L (ref 3.5–5.2)
RBC # BLD AUTO: 4.35 10*6/MM3 (ref 4.14–5.8)
SODIUM BLD-SCNC: 139 MMOL/L (ref 136–145)
WBC NRBC COR # BLD: 7.66 10*3/MM3 (ref 3.4–10.8)

## 2019-10-15 PROCEDURE — 87176 TISSUE HOMOGENIZATION CULTR: CPT | Performed by: ORTHOPAEDIC SURGERY

## 2019-10-15 PROCEDURE — 87102 FUNGUS ISOLATION CULTURE: CPT | Performed by: ORTHOPAEDIC SURGERY

## 2019-10-15 PROCEDURE — 87075 CULTR BACTERIA EXCEPT BLOOD: CPT | Performed by: ORTHOPAEDIC SURGERY

## 2019-10-15 PROCEDURE — 80048 BASIC METABOLIC PNL TOTAL CA: CPT | Performed by: ORTHOPAEDIC SURGERY

## 2019-10-15 PROCEDURE — 88311 DECALCIFY TISSUE: CPT | Performed by: ORTHOPAEDIC SURGERY

## 2019-10-15 PROCEDURE — 63710000001 INSULIN LISPRO (HUMAN) PER 5 UNITS: Performed by: NURSE PRACTITIONER

## 2019-10-15 PROCEDURE — 25010000002 ERTAPENEM PER 500 MG: Performed by: ORTHOPAEDIC SURGERY

## 2019-10-15 PROCEDURE — 25010000002 DAPTOMYCIN PER 1 MG: Performed by: ORTHOPAEDIC SURGERY

## 2019-10-15 PROCEDURE — 87116 MYCOBACTERIA CULTURE: CPT | Performed by: ORTHOPAEDIC SURGERY

## 2019-10-15 PROCEDURE — 28820 AMPUTATION OF TOE: CPT | Performed by: ORTHOPAEDIC SURGERY

## 2019-10-15 PROCEDURE — 94640 AIRWAY INHALATION TREATMENT: CPT

## 2019-10-15 PROCEDURE — 88305 TISSUE EXAM BY PATHOLOGIST: CPT | Performed by: ORTHOPAEDIC SURGERY

## 2019-10-15 PROCEDURE — 85027 COMPLETE CBC AUTOMATED: CPT | Performed by: ORTHOPAEDIC SURGERY

## 2019-10-15 PROCEDURE — 25010000002 FENTANYL CITRATE (PF) 100 MCG/2ML SOLUTION: Performed by: NURSE ANESTHETIST, CERTIFIED REGISTERED

## 2019-10-15 PROCEDURE — 0Y6U0Z1 DETACHMENT AT LEFT 3RD TOE, HIGH, OPEN APPROACH: ICD-10-PCS | Performed by: ORTHOPAEDIC SURGERY

## 2019-10-15 PROCEDURE — 82962 GLUCOSE BLOOD TEST: CPT

## 2019-10-15 PROCEDURE — 87206 SMEAR FLUORESCENT/ACID STAI: CPT | Performed by: ORTHOPAEDIC SURGERY

## 2019-10-15 PROCEDURE — 63710000001 INSULIN DETEMIR PER 5 UNITS: Performed by: NURSE PRACTITIONER

## 2019-10-15 PROCEDURE — 87205 SMEAR GRAM STAIN: CPT | Performed by: ORTHOPAEDIC SURGERY

## 2019-10-15 PROCEDURE — 87070 CULTURE OTHR SPECIMN AEROBIC: CPT | Performed by: ORTHOPAEDIC SURGERY

## 2019-10-15 PROCEDURE — 25010000002 PROPOFOL 10 MG/ML EMULSION: Performed by: NURSE ANESTHETIST, CERTIFIED REGISTERED

## 2019-10-15 RX ORDER — NEBIVOLOL 10 MG/1
10 TABLET ORAL
Status: DISCONTINUED | OUTPATIENT
Start: 2019-10-16 | End: 2019-10-17 | Stop reason: HOSPADM

## 2019-10-15 RX ORDER — LABETALOL HYDROCHLORIDE 5 MG/ML
10 INJECTION, SOLUTION INTRAVENOUS EVERY 4 HOURS PRN
Status: DISCONTINUED | OUTPATIENT
Start: 2019-10-15 | End: 2019-10-17 | Stop reason: HOSPADM

## 2019-10-15 RX ORDER — OXYCODONE HYDROCHLORIDE AND ACETAMINOPHEN 5; 325 MG/1; MG/1
1 TABLET ORAL EVERY 4 HOURS PRN
Status: DISCONTINUED | OUTPATIENT
Start: 2019-10-15 | End: 2019-10-17 | Stop reason: HOSPADM

## 2019-10-15 RX ORDER — SODIUM CHLORIDE 0.9 % (FLUSH) 0.9 %
3-10 SYRINGE (ML) INJECTION AS NEEDED
Status: CANCELLED | OUTPATIENT
Start: 2019-10-15

## 2019-10-15 RX ORDER — MEPERIDINE HYDROCHLORIDE 25 MG/ML
12.5 INJECTION INTRAMUSCULAR; INTRAVENOUS; SUBCUTANEOUS
Status: DISCONTINUED | OUTPATIENT
Start: 2019-10-15 | End: 2019-10-15 | Stop reason: HOSPADM

## 2019-10-15 RX ORDER — AMLODIPINE BESYLATE 10 MG/1
10 TABLET ORAL DAILY
Status: DISCONTINUED | OUTPATIENT
Start: 2019-10-15 | End: 2019-10-17 | Stop reason: HOSPADM

## 2019-10-15 RX ORDER — LOSARTAN POTASSIUM 50 MG/1
100 TABLET ORAL DAILY
Status: DISCONTINUED | OUTPATIENT
Start: 2019-10-16 | End: 2019-10-17 | Stop reason: HOSPADM

## 2019-10-15 RX ORDER — BISACODYL 10 MG
10 SUPPOSITORY, RECTAL RECTAL DAILY PRN
Status: DISCONTINUED | OUTPATIENT
Start: 2019-10-15 | End: 2019-10-17 | Stop reason: HOSPADM

## 2019-10-15 RX ORDER — FENTANYL CITRATE 50 UG/ML
50 INJECTION, SOLUTION INTRAMUSCULAR; INTRAVENOUS
Status: DISCONTINUED | OUTPATIENT
Start: 2019-10-15 | End: 2019-10-15 | Stop reason: HOSPADM

## 2019-10-15 RX ORDER — SODIUM CHLORIDE 0.9 % (FLUSH) 0.9 %
3-10 SYRINGE (ML) INJECTION AS NEEDED
Status: DISCONTINUED | OUTPATIENT
Start: 2019-10-15 | End: 2019-10-15 | Stop reason: HOSPADM

## 2019-10-15 RX ORDER — PROMETHAZINE HYDROCHLORIDE 25 MG/ML
6.25 INJECTION, SOLUTION INTRAMUSCULAR; INTRAVENOUS ONCE AS NEEDED
Status: CANCELLED | OUTPATIENT
Start: 2019-10-15

## 2019-10-15 RX ORDER — METOCLOPRAMIDE 10 MG/1
10 TABLET ORAL
Status: DISCONTINUED | OUTPATIENT
Start: 2019-10-15 | End: 2019-10-17 | Stop reason: HOSPADM

## 2019-10-15 RX ORDER — FAMOTIDINE 10 MG/ML
20 INJECTION, SOLUTION INTRAVENOUS ONCE
Status: CANCELLED | OUTPATIENT
Start: 2019-10-15 | End: 2019-10-15

## 2019-10-15 RX ORDER — PROMETHAZINE HYDROCHLORIDE 25 MG/1
25 SUPPOSITORY RECTAL ONCE AS NEEDED
Status: CANCELLED | OUTPATIENT
Start: 2019-10-15

## 2019-10-15 RX ORDER — LABETALOL HYDROCHLORIDE 5 MG/ML
5 INJECTION, SOLUTION INTRAVENOUS
Status: DISCONTINUED | OUTPATIENT
Start: 2019-10-15 | End: 2019-10-15 | Stop reason: HOSPADM

## 2019-10-15 RX ORDER — HYDROMORPHONE HYDROCHLORIDE 1 MG/ML
0.5 INJECTION, SOLUTION INTRAMUSCULAR; INTRAVENOUS; SUBCUTANEOUS
Status: DISCONTINUED | OUTPATIENT
Start: 2019-10-15 | End: 2019-10-15 | Stop reason: HOSPADM

## 2019-10-15 RX ORDER — HYDRALAZINE HYDROCHLORIDE 20 MG/ML
5 INJECTION INTRAMUSCULAR; INTRAVENOUS
Status: DISCONTINUED | OUTPATIENT
Start: 2019-10-15 | End: 2019-10-15 | Stop reason: HOSPADM

## 2019-10-15 RX ORDER — DEXTROSE MONOHYDRATE 25 G/50ML
25 INJECTION, SOLUTION INTRAVENOUS
Status: DISCONTINUED | OUTPATIENT
Start: 2019-10-15 | End: 2019-10-17 | Stop reason: HOSPADM

## 2019-10-15 RX ORDER — PROMETHAZINE HYDROCHLORIDE 25 MG/ML
12.5 INJECTION, SOLUTION INTRAMUSCULAR; INTRAVENOUS EVERY 4 HOURS PRN
Status: DISCONTINUED | OUTPATIENT
Start: 2019-10-15 | End: 2019-10-17 | Stop reason: HOSPADM

## 2019-10-15 RX ORDER — SODIUM CHLORIDE 0.9 % (FLUSH) 0.9 %
3 SYRINGE (ML) INJECTION EVERY 12 HOURS SCHEDULED
Status: CANCELLED | OUTPATIENT
Start: 2019-10-15

## 2019-10-15 RX ORDER — METAXALONE 800 MG/1
800 TABLET ORAL 3 TIMES DAILY PRN
Status: DISCONTINUED | OUTPATIENT
Start: 2019-10-15 | End: 2019-10-17 | Stop reason: HOSPADM

## 2019-10-15 RX ORDER — HYDROCODONE BITARTRATE AND ACETAMINOPHEN 5; 325 MG/1; MG/1
1 TABLET ORAL ONCE AS NEEDED
Status: DISCONTINUED | OUTPATIENT
Start: 2019-10-15 | End: 2019-10-15 | Stop reason: HOSPADM

## 2019-10-15 RX ORDER — CHOLECALCIFEROL (VITAMIN D3) 125 MCG
5 CAPSULE ORAL NIGHTLY PRN
Status: DISCONTINUED | OUTPATIENT
Start: 2019-10-15 | End: 2019-10-17 | Stop reason: HOSPADM

## 2019-10-15 RX ORDER — NALOXONE HCL 0.4 MG/ML
0.4 VIAL (ML) INJECTION
Status: DISCONTINUED | OUTPATIENT
Start: 2019-10-15 | End: 2019-10-17 | Stop reason: HOSPADM

## 2019-10-15 RX ORDER — SODIUM CHLORIDE AND POTASSIUM CHLORIDE 150; 450 MG/100ML; MG/100ML
50 INJECTION, SOLUTION INTRAVENOUS CONTINUOUS
Status: DISCONTINUED | OUTPATIENT
Start: 2019-10-15 | End: 2019-10-17 | Stop reason: HOSPADM

## 2019-10-15 RX ORDER — OXYCODONE HYDROCHLORIDE AND ACETAMINOPHEN 5; 325 MG/1; MG/1
2 TABLET ORAL EVERY 4 HOURS PRN
Status: DISCONTINUED | OUTPATIENT
Start: 2019-10-15 | End: 2019-10-17 | Stop reason: HOSPADM

## 2019-10-15 RX ORDER — HYDROMORPHONE HYDROCHLORIDE 1 MG/ML
0.5 INJECTION, SOLUTION INTRAMUSCULAR; INTRAVENOUS; SUBCUTANEOUS
Status: DISCONTINUED | OUTPATIENT
Start: 2019-10-15 | End: 2019-10-17 | Stop reason: HOSPADM

## 2019-10-15 RX ORDER — NALOXONE HCL 0.4 MG/ML
0.4 VIAL (ML) INJECTION AS NEEDED
Status: DISCONTINUED | OUTPATIENT
Start: 2019-10-15 | End: 2019-10-15 | Stop reason: HOSPADM

## 2019-10-15 RX ORDER — SODIUM CHLORIDE 0.9 % (FLUSH) 0.9 %
3 SYRINGE (ML) INJECTION EVERY 12 HOURS SCHEDULED
Status: DISCONTINUED | OUTPATIENT
Start: 2019-10-15 | End: 2019-10-15 | Stop reason: HOSPADM

## 2019-10-15 RX ORDER — PROMETHAZINE HYDROCHLORIDE 25 MG/1
25 TABLET ORAL ONCE AS NEEDED
Status: CANCELLED | OUTPATIENT
Start: 2019-10-15

## 2019-10-15 RX ORDER — CLONIDINE HYDROCHLORIDE 0.1 MG/1
0.1 TABLET ORAL EVERY 12 HOURS SCHEDULED
Status: DISCONTINUED | OUTPATIENT
Start: 2019-10-15 | End: 2019-10-17 | Stop reason: HOSPADM

## 2019-10-15 RX ORDER — DIPHENHYDRAMINE HYDROCHLORIDE 50 MG/ML
25 INJECTION INTRAMUSCULAR; INTRAVENOUS NIGHTLY PRN
Status: DISCONTINUED | OUTPATIENT
Start: 2019-10-15 | End: 2019-10-17 | Stop reason: HOSPADM

## 2019-10-15 RX ORDER — ONDANSETRON 2 MG/ML
4 INJECTION INTRAMUSCULAR; INTRAVENOUS EVERY 6 HOURS PRN
Status: DISCONTINUED | OUTPATIENT
Start: 2019-10-15 | End: 2019-10-17 | Stop reason: HOSPADM

## 2019-10-15 RX ORDER — FENTANYL CITRATE 50 UG/ML
INJECTION, SOLUTION INTRAMUSCULAR; INTRAVENOUS AS NEEDED
Status: DISCONTINUED | OUTPATIENT
Start: 2019-10-15 | End: 2019-10-15 | Stop reason: SURG

## 2019-10-15 RX ORDER — BUDESONIDE AND FORMOTEROL FUMARATE DIHYDRATE 80; 4.5 UG/1; UG/1
2 AEROSOL RESPIRATORY (INHALATION)
Status: DISCONTINUED | OUTPATIENT
Start: 2019-10-15 | End: 2019-10-17 | Stop reason: HOSPADM

## 2019-10-15 RX ORDER — PANTOPRAZOLE SODIUM 40 MG/1
40 TABLET, DELAYED RELEASE ORAL
Status: DISCONTINUED | OUTPATIENT
Start: 2019-10-16 | End: 2019-10-17 | Stop reason: HOSPADM

## 2019-10-15 RX ORDER — ONDANSETRON 2 MG/ML
4 INJECTION INTRAMUSCULAR; INTRAVENOUS ONCE AS NEEDED
Status: DISCONTINUED | OUTPATIENT
Start: 2019-10-15 | End: 2019-10-15 | Stop reason: HOSPADM

## 2019-10-15 RX ORDER — HYDROCODONE BITARTRATE AND ACETAMINOPHEN 7.5; 325 MG/1; MG/1
2 TABLET ORAL EVERY 4 HOURS PRN
Status: DISCONTINUED | OUTPATIENT
Start: 2019-10-15 | End: 2019-10-17 | Stop reason: HOSPADM

## 2019-10-15 RX ORDER — SODIUM CHLORIDE, SODIUM LACTATE, POTASSIUM CHLORIDE, CALCIUM CHLORIDE 600; 310; 30; 20 MG/100ML; MG/100ML; MG/100ML; MG/100ML
9 INJECTION, SOLUTION INTRAVENOUS CONTINUOUS
Status: DISCONTINUED | OUTPATIENT
Start: 2019-10-15 | End: 2019-10-17 | Stop reason: HOSPADM

## 2019-10-15 RX ORDER — FENTANYL CITRATE 50 UG/ML
50 INJECTION, SOLUTION INTRAMUSCULAR; INTRAVENOUS
Status: DISCONTINUED | OUTPATIENT
Start: 2019-10-15 | End: 2019-10-17 | Stop reason: HOSPADM

## 2019-10-15 RX ORDER — DIPHENOXYLATE HYDROCHLORIDE AND ATROPINE SULFATE 2.5; .025 MG/1; MG/1
1 TABLET ORAL DAILY
Status: DISCONTINUED | OUTPATIENT
Start: 2019-10-15 | End: 2019-10-17 | Stop reason: HOSPADM

## 2019-10-15 RX ORDER — IPRATROPIUM BROMIDE AND ALBUTEROL SULFATE 2.5; .5 MG/3ML; MG/3ML
3 SOLUTION RESPIRATORY (INHALATION) ONCE AS NEEDED
Status: DISCONTINUED | OUTPATIENT
Start: 2019-10-15 | End: 2019-10-15 | Stop reason: HOSPADM

## 2019-10-15 RX ORDER — NICOTINE POLACRILEX 4 MG
15 LOZENGE BUCCAL
Status: DISCONTINUED | OUTPATIENT
Start: 2019-10-15 | End: 2019-10-17 | Stop reason: HOSPADM

## 2019-10-15 RX ORDER — HYDROCODONE BITARTRATE AND ACETAMINOPHEN 7.5; 325 MG/1; MG/1
1 TABLET ORAL EVERY 4 HOURS PRN
Status: DISCONTINUED | OUTPATIENT
Start: 2019-10-15 | End: 2019-10-17 | Stop reason: HOSPADM

## 2019-10-15 RX ORDER — FAMOTIDINE 20 MG/1
20 TABLET, FILM COATED ORAL ONCE
Status: COMPLETED | OUTPATIENT
Start: 2019-10-15 | End: 2019-10-15

## 2019-10-15 RX ORDER — DIPHENHYDRAMINE HCL 25 MG
25 CAPSULE ORAL NIGHTLY PRN
Status: DISCONTINUED | OUTPATIENT
Start: 2019-10-15 | End: 2019-10-17 | Stop reason: HOSPADM

## 2019-10-15 RX ORDER — LIDOCAINE HYDROCHLORIDE 10 MG/ML
0.5 INJECTION, SOLUTION EPIDURAL; INFILTRATION; INTRACAUDAL; PERINEURAL ONCE AS NEEDED
Status: COMPLETED | OUTPATIENT
Start: 2019-10-15 | End: 2019-10-15

## 2019-10-15 RX ADMIN — AMLODIPINE BESYLATE 10 MG: 10 TABLET ORAL at 20:01

## 2019-10-15 RX ADMIN — CLONIDINE HYDROCHLORIDE 0.1 MG: 0.1 TABLET ORAL at 20:00

## 2019-10-15 RX ADMIN — FENTANYL CITRATE 50 MCG: 50 INJECTION, SOLUTION INTRAMUSCULAR; INTRAVENOUS at 13:47

## 2019-10-15 RX ADMIN — ERTAPENEM SODIUM 1 G: 1 INJECTION, POWDER, LYOPHILIZED, FOR SOLUTION INTRAMUSCULAR; INTRAVENOUS at 15:49

## 2019-10-15 RX ADMIN — METOCLOPRAMIDE 10 MG: 10 TABLET ORAL at 20:01

## 2019-10-15 RX ADMIN — POTASSIUM CHLORIDE AND SODIUM CHLORIDE 50 ML/HR: 450; 150 INJECTION, SOLUTION INTRAVENOUS at 20:10

## 2019-10-15 RX ADMIN — FENTANYL CITRATE 50 MCG: 50 INJECTION, SOLUTION INTRAMUSCULAR; INTRAVENOUS at 14:35

## 2019-10-15 RX ADMIN — POTASSIUM CHLORIDE AND SODIUM CHLORIDE 50 ML/HR: 450; 150 INJECTION, SOLUTION INTRAVENOUS at 15:07

## 2019-10-15 RX ADMIN — BUDESONIDE AND FORMOTEROL FUMARATE DIHYDRATE 2 PUFF: 80; 4.5 AEROSOL RESPIRATORY (INHALATION) at 20:19

## 2019-10-15 RX ADMIN — PROPOFOL 50 MCG/KG/MIN: 10 INJECTION, EMULSION INTRAVENOUS at 13:47

## 2019-10-15 RX ADMIN — INSULIN LISPRO 14 UNITS: 100 INJECTION, SOLUTION INTRAVENOUS; SUBCUTANEOUS at 17:12

## 2019-10-15 RX ADMIN — DAPTOMYCIN 550 MG: 500 INJECTION, POWDER, LYOPHILIZED, FOR SOLUTION INTRAVENOUS at 15:08

## 2019-10-15 RX ADMIN — SODIUM CHLORIDE, POTASSIUM CHLORIDE, SODIUM LACTATE AND CALCIUM CHLORIDE: 600; 310; 30; 20 INJECTION, SOLUTION INTRAVENOUS at 13:43

## 2019-10-15 RX ADMIN — MELATONIN TAB 5 MG 5 MG: 5 TAB at 20:00

## 2019-10-15 RX ADMIN — LIDOCAINE HYDROCHLORIDE 0.5 ML: 10 INJECTION, SOLUTION EPIDURAL; INFILTRATION; INTRACAUDAL; PERINEURAL at 12:45

## 2019-10-15 RX ADMIN — SODIUM CHLORIDE, POTASSIUM CHLORIDE, SODIUM LACTATE AND CALCIUM CHLORIDE 9 ML/HR: 600; 310; 30; 20 INJECTION, SOLUTION INTRAVENOUS at 12:46

## 2019-10-15 RX ADMIN — METOCLOPRAMIDE 10 MG: 10 TABLET ORAL at 17:11

## 2019-10-15 RX ADMIN — INSULIN DETEMIR 38 UNITS: 100 INJECTION, SOLUTION SUBCUTANEOUS at 21:10

## 2019-10-15 RX ADMIN — FAMOTIDINE 20 MG: 20 TABLET ORAL at 12:45

## 2019-10-15 NOTE — OUTREACH NOTE
Medical Week 1 Survey      Responses   Facility patient discharged from?  [facility]   Does the patient have one of the following disease processes/diagnoses(primary or secondary)?  Other   Is there a successful TCM telephone encounter documented?  No   Week 1 attempt successful?  No   Revoke  Readmitted          Taryn Genao RN

## 2019-10-15 NOTE — ANESTHESIA PREPROCEDURE EVALUATION
Anesthesia Evaluation     Patient summary reviewed and Nursing notes reviewed   no history of anesthetic complications:  NPO Solid Status: > 8 hours  NPO Liquid Status: > 8 hours           Airway   Mallampati: II  TM distance: >3 FB  Neck ROM: full  No difficulty expected  Dental      Pulmonary - normal exam   (+) asthma,   Cardiovascular - normal exam    (+) hypertension,       Neuro/Psych  (+) numbness,     GI/Hepatic/Renal/Endo    (+)  GERD,  renal disease, diabetes mellitus,     Musculoskeletal     Abdominal    Substance History      OB/GYN          Other   (+) arthritis                     Anesthesia Plan    ASA 3     general     intravenous induction   Anesthetic plan, all risks, benefits, and alternatives have been provided, discussed and informed consent has been obtained with: patient.    Plan discussed with CRNA.

## 2019-10-15 NOTE — PROGRESS NOTES
Orthopedic Foot/Ankle Progress Note    Subjective     Post-Op:left 3rd toe amputation  Systemic or Specific Complaints: no complaints- I think he will need a longer course of IV antibiotics because the anterior ankle blistered area was worse- looking more like some skin necrosis with a ring of erythema.  Objective     Vital signs in last 24 hours:  Temp:  [97.6 °F (36.4 °C)] 97.6 °F (36.4 °C)  Heart Rate:  [60] 60  Resp:  [18] 18  BP: (146)/(68) 146/68    Neurovascular: no change dense neuropathy left foot               Wound: left foot dressing dry    Data Review  Lab Results (last 24 hours)     Procedure Component Value Units Date/Time    Basic Metabolic Panel [[id number]]  (Abnormal) Collected:  10/15/19 1219    Specimen:  Blood Updated:  10/15/19 1258     Glucose 196 mg/dL      BUN 30 mg/dL      Creatinine 1.52 mg/dL      Sodium 139 mmol/L      Potassium 4.8 mmol/L      Chloride 105 mmol/L      CO2 23.0 mmol/L      Calcium 9.9 mg/dL      eGFR Non African Amer 45 mL/min/1.73      BUN/Creatinine Ratio 19.7     Anion Gap 11.0 mmol/L     Narrative:       GFR Normal >60  Chronic Kidney Disease <60  Kidney Failure <15    CBC (No Diff) [[id number]]  (Abnormal) Collected:  10/15/19 1219    Specimen:  Blood Updated:  10/15/19 1253     WBC 7.66 10*3/mm3      RBC 4.35 10*6/mm3      Hemoglobin 11.5 g/dL      Hematocrit 37.3 %      MCV 85.7 fL      MCH 26.4 pg      MCHC 30.8 g/dL      RDW 14.1 %      RDW-SD 44.3 fl      MPV 10.3 fL      Platelets 321 10*3/mm3     POC Glucose Once [[id number]]  (Abnormal) Collected:  10/15/19 1220    Specimen:  Blood Updated:  10/15/19 1227     Glucose 184 mg/dL             Assessment/Plan     Status post- left 3rd toe amp, dressing on blister.  As above, will need IV abx again, the blistered area was worse.  High risk for BKA on left            Juju Weber MD    Date: 10/15/2019  Time: 2:52 PM

## 2019-10-15 NOTE — BRIEF OP NOTE
Orthopedics amputate left 3rd toe  Brief Op Note    Amol Augirre  10/15/2019    Pre-op Diagnosis:   Necrotic ulcer, exposed bone left 3rd toe, large anterior ankle wound    Post-op Diagnosis:  same       Post-Op Diagnosis Codes:     * Diabetic ulcer of toe of left foot associated with type 2 diabetes mellitus, limited to breakdown of skin (CMS/[facility]) [E11.621, L97.521]     * Cellulitis and abscess of toe of left foot [L03.032, L02.612]    Procedure(s):  amputate left 3rd toe    Surgeon(s):  Juju Weber MD    Anesthesia:  Monitored Anesthesia Care with [facility]    Staff:   Circulator: Mohamud Toussaint RN  Scrub Person: Hai Sargent RN  Assistant: Francoise Obrien PA-C    Estimated Blood Loss:5cc      Specimens: culture and permanent      Drains:  none    Complications:  None    Tourniquet:: none      Dressing:soft    Disposition:rr stable    Juju Weber MD     Date: 10/15/2019  Time: 2:10 PM

## 2019-10-15 NOTE — ANESTHESIA POSTPROCEDURE EVALUATION
Patient: Amol Aguirre    Procedure Summary     Date:  10/15/19 Room / Location:   [facility] /  [facility]    Anesthesia Start:  1343 Anesthesia Stop:      Procedure:  amputate left 3rd toe (Left Toes) Diagnosis:       Diabetic ulcer of toe of left foot associated with type 2 diabetes mellitus, limited to breakdown of skin (CMS/HCC)      Cellulitis and abscess of toe of left foot      (Diabetic ulcer of toe of left foot associated with type 2 diabetes mellitus, limited to breakdown of skin (CMS/HCC) [E11.621, L97.521])      (Cellulitis and abscess of toe of left foot [L03.032, L02.612])    Surgeon:  Juju Weber MD Provider:  Noble Prescott MD    Anesthesia Type:  general ASA Status:  3          Anesthesia Type: general  Last vitals  BP   146/68 (10/15/19 1232) 116/61   Temp   97.6 °F (36.4 °C) (10/15/19 1232) 97.5   Pulse   60 (10/15/19 1232) 54   Resp   18 (10/15/19 1232)  17   SpO2   97 % (10/15/19 1232) 96%     Post Anesthesia Care and Evaluation    Patient location during evaluation: PACU  Patient participation: complete - patient participated  Level of consciousness: awake and alert  Pain score: 0  Pain management: adequate  Airway patency: patent  Anesthetic complications: No anesthetic complications  PONV Status: none  Cardiovascular status: hemodynamically stable and acceptable  Respiratory status: nonlabored ventilation, acceptable and nasal cannula  Hydration status: acceptable

## 2019-10-15 NOTE — OP NOTE
Operative Report    10/15/19  2:12 PM    Preoperative diagnosis: necrotic ulcer left 3rd toe, exposed bone, anterior ankle wound    Postoperative diagnosis: Same    Anesthesia: MAC, local    Surgeon: Juju Weber M.D.    Assistant: andre MIN, present for the entire procedure including prepping, draping, retraction, closure, dressing.    Operative procedure: amputate left 3rd toe    Operative indications: This is an extremely pleasant 74-year-old gentleman with long-standing diabetes with severe neuropathy.  He also has peripheral vascular disease.  He is status post a right below-knee amputation.  I saw him in the office September 30.  He was walking with a prosthesis on his right below-knee amputation.  He had a tiny scab on the left third toe.  I advised him to stay out of his shoe on the left side until that healed.  His internist called me on October 9, noting that he had a large anterior ankle blister and the third toe was much worse.  I saw him in the office that day.  I admitted him to the hospital for IV antibiotics.  He had a large anterior blister on the ankle that I unroofed.  There was no signs of infection at the level of the ankle.  He had a much worse eschar on the third toe.  It had gone from being a tiny scab to now being a full-thickness necrotic wound.  I felt bone in the base.  He had cellulitis.  Dr. Garcia of infectious disease was consulted.  He was on IV antibiotics.  Dr. White felt that he had enough blood flow to heal a toe amputation.  We discussed the pros and cons, because the toe had gotten worse very quickly I recommended amputating it.  I felt the eschar was now a full-thickness area of necrosis.  The patient agreed.  He is here today for that amputation.  The blistered area on the front of the ankle is also somewhat worse.  It now has a leathery necrotic area.  There is no signs of infection at that area.  It was dressed at the end of the procedure.    Operative  procedure: The patient was taken to the operating room where he was given sedation.  Antibiotics were held until after we got a culture.  The left leg was prepped and draped in the usual sterile fashion.  I placed a digital block after the first timeout using 7 cc of half percent ropivacaine and 1% lidocaine.  The appropriate timeout was called for a second time.  I made a fishmouth shaped incision over the proximal phalanx of the third toe.  This is carried down through soft tissue sharply.  The nerves were placed on stretch transected sharply and allowed to retract proximally.  There was sluggish bleeding at all edges of the wound.  The toe was removed through the metatarsal phalangeal joint.  The incision was irrigated copiously with and back solution using pulsatile lavage.  It was closed loosely with nylon.  On the back table I opened the toe and took a deep culture.  The toe was sent for pathology and cultures for aerobic anaerobic, fungus and AFB.  The foot was dressed sterilely.  The anterior ankle wound was dressed sterilely.  He was awakened and transferred to recovery in stable condition.  Postop plan will be admission for IV antibiotics.    Estimated blood loss: Less than 5 cc    Specimens: Culture and permanent    Drains: None    Complications: None    Juju Weber MD  10/15/19  2:12 PM

## 2019-10-15 NOTE — PLAN OF CARE
Problem: Patient Care Overview  Goal: Plan of Care Review   10/15/19 1642   OTHER   Outcome Summary pt from pacu, pt is alert and oriented, left foot dressing is CDI, denies pain. LLE is elevated, VSS

## 2019-10-15 NOTE — H&P
Patient Name: Amol Aguirre  MRN: [id number]  : 1945  DOS: 10/15/2019    Attending: Juju Juarez MD    Primary Care Provider: Donte Briones MD      Chief complaint:  Left 3rd toe cellulitis    Subjective   Patient is a 74 y.o. male presented for scheduled surgery by Dr. Juarez. He anticipates a left 3rd toe amputation today.    He was admitted last week for 3rd toe cellulitis. He was seen by [facility], Dr. Garcia, for antibiotic management and was discharged on oral abx. He was seen by Dr. White, cardiothoracic sx, for vascular evaluation. It was felt the patient had adequate blood flow to heal postop. Dr. Juarez, orthopedics, plans to amputate the 3rd toe.  The ulcer to his left ankle is more red, swollen, with scattered blisters.     When seen preop he is doing ok. He denies wound pain, but reports limited movement of left great toe due to gout. He denies fevers, chills or night sweats. He denies nausea, shortness of breath or chest pain. No hx of DVT or PE.    (Above is noted, agree.  Mr. Aguirre is known to us from multiple admits last of which was last week as noted above.   He came today for scheduled surgery and underwent amputation of left third toe by Dr. Juarez under local anesthesia, tolerated surgery well.    When I saw him today after surgery in his room he is doing very well.  It was on the phone talking to his wife.  No complaints of postoperative pain, no nausea vomiting or shortness of breath.)wy       Allergies:  Allergies   Allergen Reactions   • Chlorhexidine Shortness Of Breath, Itching and Rash     Pt developed a rash, itching, and shortness of breath after receiving a CHG bath on 19.   • Latex Other (See Comments)     Rash, hives, and tongue swelling       Meds:  Medications Prior to Admission   Medication Sig Dispense Refill Last Dose   • amLODIPine (NORVASC) 10 MG tablet Take 10 mg by mouth Daily.  0 10/14/2019 at Unknown time   • aspirin 81 MG EC tablet  Take 81 mg by mouth Daily.   10/14/2019 at 0800   • budesonide-formoterol (SYMBICORT) 80-4.5 MCG/ACT inhaler Inhale 2 puffs 2 (Two) Times a Day.   Past Week at Unknown time   • BYSTOLIC 10 MG tablet TK 1/2 T PO BID  7 10/15/2019 at 0700   • cephalexin (KEFLEX) 500 MG capsule Take 1 capsule by mouth 4 (Four) Times a Day for 5 days. 20 capsule 0 10/14/2019 at Unknown time   • ciprofloxacin (CIPRO) 500 MG tablet Take 1 tablet by mouth 2 (Two) Times a Day for 5 days. 10 tablet 0 10/14/2019 at Unknown time   • CloNIDine (CATAPRES) 0.1 MG tablet Take 0.1 mg by mouth 2 (Two) Times a Day.   10/15/2019 at 0700   • docusate sodium 100 MG capsule Take 100 mg by mouth 2 (Two) Times a Day As Needed for Constipation. 60 each 0 Past Week at Unknown time   • hydrochlorothiazide (HYDRODIURIL) 25 MG tablet Take  by mouth Daily.  3 10/14/2019 at Unknown time   • Insulin Glargine (TOUJEO SOLOSTAR) 300 UNIT/ML solution pen-injector Inject  under the skin into the appropriate area as directed.   10/14/2019 at Unknown time   • losartan (COZAAR) 100 MG tablet Take 100 mg by mouth Daily.  3 10/15/2019 at 0700   • melatonin 5 MG tablet tablet Take 1 tablet by mouth At Night As Needed (sleep).   Past Week at Unknown time   • metaxalone (SKELAXIN) 800 MG tablet Take 1 tablet by mouth 3 (Three) Times a Day As Needed for Muscle Spasms. 30 tablet 0 Past Month at Unknown time   • metoclopramide (REGLAN) 10 MG tablet TK 1 T PO BEFORE MEALS AND QHS  8 10/15/2019 at Unknown time   • pantoprazole (PROTONIX) 40 MG EC tablet Take 40 mg by mouth Daily.  2 10/14/2019 at Unknown time   • vitamin B-12 (CYANOCOBALAMIN) 1000 MCG tablet Take 1,000 mcg by mouth Daily.   Past Week at Unknown time   • HYDROcodone-acetaminophen (NORCO) 7.5-325 MG per tablet Take 1-2 tablets by mouth Every 6 (Six) Hours As Needed for Moderate Pain  (as needed for post surgical pain). 45 tablet 0    • ondansetron (ZOFRAN) 4 MG tablet Take 1 tablet by mouth Every 6 (Six) Hours As  "Needed for Nausea or Vomiting. 30 tablet 0 Not Taking at Unknown time   • ondansetron (ZOFRAN) 4 MG tablet Take 1 tablet by mouth Every 6 (Six) Hours As Needed for Nausea or Vomiting. 30 tablet 0    • oxyCODONE-acetaminophen (PERCOCET) 5-325 MG per tablet Take 1-2 tablets by mouth Every 6 (Six) Hours As Needed for Other (as needed for post surgical pain). 45 tablet 0    • tolnaftate (TINACTIN) 1 % cream tolnaftate 1 % topical cream   Apply twice a day between the toes.   Obtain over-the-counter   Taking       History:   Past Medical History:   Diagnosis Date   • Acid reflux    • Asthma    • Diabetes (CMS/HCC)    • Hypertension      Past Surgical History:   Procedure Laterality Date   • AORTAGRAM N/A 2019    Procedure: AORTAGRAM WITH OR WITHOUT RUNOFFS;  Surgeon: Carrillo White MD;  Location:  [facility];  Service: Vascular   • BELOW KNEE AMPUTATION Right 2019    Procedure: BELOW KNEE AMPUTATION RIGHT;  Surgeon: Juju Weber MD;  Location:  [facility];  Service: Orthopedics   • CHOLECYSTECTOMY     • KNEE SURGERY Right     TKA     Family History   Problem Relation Age of Onset   • Cancer Mother    • Hypertension Mother    • Hypertension Father    • Heart attack Father      Social History     Tobacco Use   • Smoking status: Former Smoker     Types: Cigarettes     Start date:      Last attempt to quit:      Years since quittin.8   • Smokeless tobacco: Never Used   Substance Use Topics   • Alcohol use: No     Frequency: Never   • Drug use: No   He is  with no children. He is retired from [employer].    Review of Systems  Pertinent items are noted in HPI, all other systems reviewed and negative    Vital Signs  Visit Vitals  /68 (BP Location: Right arm, Patient Position: Lying)   Pulse 60   Temp 97.6 °F (36.4 °C) (Tympanic)   Resp 18   Ht 190.5 cm (75\")   Wt 112 kg (246 lb)   SpO2 97%   BMI 30.75 kg/m²       Physical Exam:    General Appearance:    Alert, cooperative, in no acute " distress   Head:    Normocephalic, without obvious abnormality, atraumatic   Eyes:            Lids and lashes normal, conjunctivae and sclerae normal, no   icterus, no pallor, corneas clear   Ears:    Ears appear intact with no abnormalities noted   Neck:   No adenopathy, supple, trachea midline, no thyromegaly   Lungs:     Clear to auscultation, respirations regular, even and                   unlabored    Heart:    Regular rhythm and normal rate, normal S1 and S2, no            murmur, no gallop   Abdomen:     Normal bowel sounds, no masses, no organomegaly, soft        non-tender, non-distended, no guarding, no rebound                 tenderness   Genitalia:    Deferred   Extremities:   Left ankle ulcer with redness, swelling and scattered blisters. 3rd toe with necrotic ulcer and redness. R BKA  (Postoperatively dressing over the left foot is clean dry and intact.  Distal exposed toes with normal cap refill.)wy   Pulses:   Pulses palpable and equal bilaterally   Skin:   No bleeding, bruising or rash   Neurologic:   Cranial nerves 2 - 12 grossly intact      I reviewed the patient's new clinical results.       Results from last 7 days   Lab Units 10/15/19  1219 10/10/19  0523 10/09/19  1447   WBC 10*3/mm3 7.66 7.01 8.91   HEMOGLOBIN g/dL 11.5* 11.2* 11.6*   HEMATOCRIT % 37.3* 35.9* 37.5   PLATELETS 10*3/mm3 321 280 282     Results from last 7 days   Lab Units 10/15/19  1219 10/10/19  0523 10/09/19  1447   SODIUM mmol/L 139 142 140   POTASSIUM mmol/L 4.8 4.2 4.6   CHLORIDE mmol/L 105 103 104   CO2 mmol/L 23.0 26.0 22.0   BUN mg/dL 30* 26* 29*   CREATININE mg/dL 1.52* 1.45* 1.43*   CALCIUM mg/dL 9.9 9.5 9.2   GLUCOSE mg/dL 196* 138* 107*     Lab Results   Component Value Date    HGBA1C 7.80 (H) 10/09/2019       Assessment and Plan:   Status post left third toe amputation.    Diabetic ulcer of toe of left foot associated with type 2 diabetes mellitus, limited to breakdown of skin (CMS/HCC)    Cellulitis and abscess  of toe of left foot    Type 2 diabetes mellitus with diabetic polyneuropathy, with long-term current use of insulin (CMS/[facility])    HTN (hypertension)    CKD (chronic kidney disease)    Anemia      Plan  1. PT/OT- NWB LLE  2. Pain control-prns   3. IS-encourage  4. DVT proph- mechs/Lovenox  5. Bowel regimen  6. Resume home medications as appropriate  7. Monitor post-op labs  8. Discharge planning     Consult [facility], Dr. Garcia  - abx recs     HTN  - Continue home norvasc, bystolic, catapres, cozaar  - Hold HCTZ  - Monitor BP   - Holding parameters for BP meds  - Labetalol PRN for SBP>170     DM  - hgb A1c on 10/9/19 7.8  - Levemir nightly  - Mealtime bolus with holding paramenters  - Accu-Check AC and HS with moderate dose SSI     CKD  - BMP in AM    Anemia  - CBC in AM      VIPUL Alvarez  10/15/19  2:26 PM     I have personally performed the evaluation on this patient. My history is consistent  with HPI obtained. My exam findings are listed above. I have personally reviewed and discussed the above formulated treatment plan with pt and [email or web address].

## 2019-10-15 NOTE — CONSULTS
Infectious Disease Consult  Amol Aguirre  1945  [id number]    Date of Consult: 10/9/2019  Admission Date: 10/15/2019    Requesting Provider: Juju Weber MD  Evaluating Physician: Easton Garcia MD    Chief Complaint: left toe infection    Reason for Consultation: diabetic foot infection    History of present illness:   Patient is a 74 y.o.  Yr old male with history of poorly controlled DM2, venous stasis disease, asthma, HTN.  I saw him earlier this year in June after he developed an ulceration on his right foot after fishing trip to [place].  He was treated initially with oral antibiotics and then was referred to Dr Weber due to mass on his right great toe.  Soon after that visit he presented to [facility] on 4/19with increasing redness and a new ulceration between his third and fourth toes.  MRI done 4/22 with possible early changes in the fourth metatarsal head of osteomyelitis.  Wound culture there grew staph epidermidis and enterococcus. Given daptomycin, linezolid and zosyn.  A1c at [facility] was 10.5      He was transferred to [facility] on 4/22 for further evaluation. Dr Weber contacted me to help manage antibiotics.    Wound culture grew Klebsiella and Morganella.  He had an aortogram which showed significant right lower extremity peripheral vascular disease however no sufficient targets for revascularization.  He was discharged on daptomycin and ertapenem and I followed him for the next few weeks in clinic.  Initially he seemed to improve however last week he was noted to have acute worsening of the ulceration in his foot which progressed to steve gangrene.  Dr. Weber evaluated him and recommended BKA which was performed 6/4/19. Treated with daptomycin and ertapenem for 3 days post-op then discharged off antibiotics.       10/9: I was asked to reevaluate by Dr Weber.  Had a routine follow-up visit he was noted to have an erythematous ulceration over the left  "third toe.  He is been admitted for further work-up.  Vascular studies planned.  He says he did not even know the ulcer existed until it was discovered in the clinic.  No fevers, chills.  No drainage from the wound.  It is nontender.  He is now independently ambulatory with a prosthesis on right BKA. Resting comfortably.    MRI was negative for osteomyelitis but giving exam findings of ulceration down to bone amputation was planned.  Given his stability he was discharged home on Cipro and Keflex but returned to the hospital on 10/15 for surgery.  10/15 underwent amputation of the left third toe at MTP joint.  Tolerated the procedure well and is currently recovering on the floor.  I was asked to reevaluate by Dr. Weber due to concerning ulceration on the anterior part of his lower leg and foot as well as toe findings.  Patient complains that this was a \"blister,\" which ruptured and drained clear fluid but no pus.      Review of Systems  Constitutional-- No Fever, chills or sweats.  Appetite ok, and no malaise.  Heent-- No new vision, hearing or throat complaints.  No epistaxis or oral sores.  Denies odynophagia or dysphagia.  No headache, photophobia or neck stiffness.  CV-- No chest pain, palpitation or syncope  Resp-- No SOB/cough/Hemoptysis  GI- No nausea, vomiting, or diarrhea.   -- No dysuria, hematuria, or flank pain.  Denies hesitancy, urgency or flank pain.  Lymph- no swollen lymph nodes in neck/axilla or groin.   Heme- No active bruising or bleeding  MS-- no swelling or pain in the bones or joints of arms/legs.  BKA well-healed on the right. Left foot pain well controlled  Neuro-- No acute focal weakness or numbness in the arms or legs.  Skin-- No rashes, lesions, ulcers. No skin breakdown      Past Medical History:   Diagnosis Date   • Acid reflux    • Asthma    • Diabetes (CMS/HCC)    • Hypertension        Past Surgical History:   Procedure Laterality Date   • AORTAGRAM N/A 4/30/2019    Procedure: " AORTAGRAM WITH OR WITHOUT RUNOFFS;  Surgeon: Carrillo White MD;  [facility];  Service: Vascular   • BELOW KNEE AMPUTATION Right 2019    Procedure: BELOW KNEE AMPUTATION RIGHT;  Surgeon: Juju Weber MD;  [facility];  Service: Orthopedics   • CHOLECYSTECTOMY     • KNEE SURGERY Right     TKA       Social History     Socioeconomic History   • Marital status:      Spouse name: Not on file   • Number of children: Not on file   • Years of education: Not on file   • Highest education level: Not on file   Tobacco Use   • Smoking status: Former Smoker     Types: Cigarettes     Start date:      Last attempt to quit:      Years since quittin.8   • Smokeless tobacco: Never Used   Substance and Sexual Activity   • Alcohol use: No     Frequency: Never   • Drug use: No   • Sexual activity: Defer       family history includes Cancer in his mother; Heart attack in his father; Hypertension in his father and mother.    Allergies   Allergen Reactions   • Chlorhexidine Shortness Of Breath, Itching and Rash     Pt developed a rash, itching, and shortness of breath after receiving a CHG bath on 19.   • Latex Other (See Comments)     Rash, hives, and tongue swelling       Antibiotics:  Anti-Infectives (From admission, onward)    Ordered     Dose/Rate Route Frequency Start Stop    10/15/19 1705  ertapenem (INVanz) 1 g/100 mL 0.9% NS VTB (mbp)     Ordering Provider:  Juju Weber MD    1 g  over 30 Minutes Intravenous Every 24 Hours Scheduled 10/16/19 0900 11/15/19 0859          Other Medications:  Current Facility-Administered Medications   Medication Dose Route Frequency Provider Last Rate Last Dose   • amLODIPine (NORVASC) tablet 10 mg  10 mg Oral Daily Erica Reyez, VIPUL       • bisacodyl (DULCOLAX) suppository 10 mg  10 mg Rectal Daily PRN Juju Weber MD       • budesonide-formoterol (SYMBICORT) 80-4.5 MCG/ACT inhaler 2 puff  2 puff Inhalation BID - RT Dereje  VIPUL Maldonado       • cloNIDine (CATAPRES) tablet 0.1 mg  0.1 mg Oral Q12H Erica Reyez APRN       • dextrose (D50W) 25 g/ 50mL Intravenous Solution 25 g  25 g Intravenous Q15 Min PRN Erica Reyez APRN       • dextrose (GLUTOSE) oral gel 15 g  15 g Oral Q15 Min PRN Erica Reyez APRN       • diphenhydrAMINE (BENADRYL) capsule 25 mg  25 mg Oral Nightly PRN Juju Weber MD        Or   • diphenhydrAMINE (BENADRYL) injection 25 mg  25 mg Intravenous Nightly PRN Juju Weber MD       • [START ON 10/16/2019] enoxaparin (LOVENOX) syringe 40 mg  40 mg Subcutaneous Daily Juju Weber MD       • [START ON 10/16/2019] ertapenem (INVanz) 1 g/100 mL 0.9% NS VTB (mbp)  1 g Intravenous Q24H Juju Weber MD       • fentaNYL citrate (PF) (SUBLIMAZE) injection 50 mcg  50 mcg Intravenous Q5 Min PRN Dmitry Gonzalez MD       • glucagon (human recombinant) (GLUCAGEN DIAGNOSTIC) injection 1 mg  1 mg Subcutaneous Q15 Min PRN Erica Reyez APRN       • HYDROcodone-acetaminophen (NORCO) 7.5-325 MG per tablet 1 tablet  1 tablet Oral Q4H PRN Juju Weber MD       • HYDROcodone-acetaminophen (NORCO) 7.5-325 MG per tablet 2 tablet  2 tablet Oral Q4H PRN Juju Weber MD       • HYDROmorphone (DILAUDID) injection 0.5 mg  0.5 mg Intravenous Q5 Min PRN Dmitry Gonzalez MD       • HYDROmorphone (DILAUDID) injection 1 mg  1 mg Intravenous Q2H PRN Juju Weber MD        And   • naloxone (NARCAN) injection 0.4 mg  0.4 mg Intravenous Q5 Min PRN Juju Weber MD       • insulin detemir (LEVEMIR) injection 38 Units  38 Units Subcutaneous Nightly Erica Reyez APRN       • insulin lispro (humaLOG) injection 0-9 Units  0-9 Units Subcutaneous 4x Daily With Meals & Nightly Erica Reyez APRN       • insulin lispro (humaLOG) injection 14 Units  14 Units Subcutaneous TID With Meals Erica Reyez APRN   14 Units at 10/15/19 1712   • labetalol (NORMODYNE,TRANDATE) injection 10 mg  10 mg Intravenous Q4H PRN  Erica Reyez APRN       • lactated ringers bolus 500 mL  500 mL Intravenous Once PRN Dmitry Gonzalez MD       • lactated ringers infusion  9 mL/hr Intravenous Continuous Dmitry Gonzalez MD 9 mL/hr at 10/15/19 1246     • [START ON 10/16/2019] losartan (COZAAR) tablet 100 mg  100 mg Oral Daily Erica Reyez APRN       • magnesium hydroxide (MILK OF MAGNESIA) suspension 2400 mg/10mL 10 mL  10 mL Oral Daily PRN Juju Weber MD       • melatonin tablet 5 mg  5 mg Oral Nightly PRN Erica Reyez APRN       • metaxalone (SKELAXIN) tablet 800 mg  800 mg Oral TID PRN Erica Reyez APRN       • metoclopramide (REGLAN) tablet 10 mg  10 mg Oral 4x Daily AC & at Bedtime Erica Reyez APRN   10 mg at 10/15/19 1711   • multivitamin (THERAGRAN) tablet 1 tablet  1 tablet Oral Daily Juju Weber MD       • [START ON 10/16/2019] nebivolol (BYSTOLIC) tablet 10 mg  10 mg Oral Q24H Erica Reyez APRN       • ondansetron (ZOFRAN) injection 4 mg  4 mg Intravenous Q6H PRN Erica Reyez APRN       • ondansetron (ZOFRAN) injection 4 mg  4 mg Intravenous Q6H PRN Juju Weber MD       • oxyCODONE-acetaminophen (PERCOCET) 5-325 MG per tablet 1 tablet  1 tablet Oral Q4H PRN Juju Weber MD       • oxyCODONE-acetaminophen (PERCOCET) 5-325 MG per tablet 2 tablet  2 tablet Oral Q4H PRN Juju Weber MD       • [START ON 10/16/2019] pantoprazole (PROTONIX) EC tablet 40 mg  40 mg Oral Q AM Erica Reyez APRN       • promethazine (PHENERGAN) injection 12.5 mg  12.5 mg Intramuscular Q4H PRN Juju Weber MD       • promethazine (PHENERGAN) injection 12.5 mg  12.5 mg Intravenous Q4H PRN Juju Weber MD       • sodium chloride 0.45 % with KCl 20 mEq/L infusion  50 mL/hr Intravenous Continuous Juju Weber MD 50 mL/hr at 10/15/19 1507 50 mL/hr at 10/15/19 1507   • sodium chloride 0.9 % bolus 500 mL  500 mL Intravenous TID PRN Erica Reyez APRN           Physical Exam:   Vital Signs   BP  "160/73   Pulse 58   Temp 97.6 °F (36.4 °C) (Oral)   Resp 18   Ht 190.5 cm (75\")   Wt 112 kg (246 lb)   SpO2 96%   BMI 30.75 kg/m²     GENERAL: Awake and alert, in no acute distress.   HEENT: Normocephalic, atraumatic.  PERRL. EOMI. No conjunctival injection. No icterus.    HEART: RRR; No murmur.  LUNGS: Clear to auscultation bilaterally without wheezing, rales, rhonchi. Normal respiratory effort. Nonlabored.  ABDOMEN: Soft, nontender, nondistended. Positive bowel sounds. No rebound or guarding.  EXT: BKA well-healed on the right.  Left foot bandaged postoperatively.  CDI, non-tender to palpation  : Without Mcdonald catheter.   MSK: FROM without joint effusions noted arms/legs.    SKIN: Warm and dry without cutaneous eruptions on Inspection/palpation.    NEURO: Oriented to PPT.  Left lower extremity neuropathy  PSYCHIATRIC: Normal insight and judgement. Cooperative with PE    Laboratory Data    Results from last 7 days   Lab Units 10/15/19  1219 10/10/19  0523 10/09/19  1447   WBC 10*3/mm3 7.66 7.01 8.91   HEMOGLOBIN g/dL 11.5* 11.2* 11.6*   HEMATOCRIT % 37.3* 35.9* 37.5   PLATELETS 10*3/mm3 321 280 282     Results from last 7 days   Lab Units 10/15/19  1219   SODIUM mmol/L 139   POTASSIUM mmol/L 4.8   CHLORIDE mmol/L 105   CO2 mmol/L 23.0   BUN mg/dL 30*   CREATININE mg/dL 1.52*   GLUCOSE mg/dL 196*   CALCIUM mg/dL 9.9     Estimated Creatinine Clearance: 55.8 mL/min (A) (by C-G formula based on SCr of 1.52 mg/dL (H)).      Results from last 7 days   Lab Units 10/09/19  1447   SED RATE mm/hr 19     Results from last 7 days   Lab Units 10/09/19  1447   CRP mg/dL 0.58*       Microbiology:     I reviewed prior culture data  4/23 wound culture grew Morganella and Klebsiella       10/9 blood cx negative    10/15 OR cultures pending    Radiology:  Mri Foot Left With & Without Contrast    Result Date: 10/14/2019  1. Findings consistent with gout of the first MTP joint, with tophus formation and minimal adjacent " cortical edema. 2. No evidence of osteomyelitis of the third digit elsewhere. 3. Mild diffuse edema and enhancement of the third digit soft tissues consistent with cellulitis.   D:  10/10/2019 E:  10/10/2019  This report was finalized on 10/14/2019 10:54 AM by DR. Navneet Patton MD.       I personally reviewed the above imaging studies.      Impression:   1. Acute left 3rd toe ulceration and cellulitis and clinical osteomyelitis: Down to level of bone on exam. Ulceration progressed quickly to full thickness. Complicated by known severe PVD. S/p amputation to MTP joint by Dr Weber on 10/15, cultures pending  2. Ulceration on anterior aspect of foot ankle: likely complication of known severe PVD vs from minor trauma exacerbated by poor wound healing from diabetes. High risk for deep infection  3. 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 Severe bilateral lower extremity arterial vascular disease,   5. Poorly controlled DM2 complicated by neuropathy   6. CKD stage 3: at baseline  7. Asthma    PLAN:    - f/u pending OR cultures      - Trend CBC, BMP, CK    - start ertapenem 1 gm daily  - start IV daptomycin pending culture results    - likely to need another prolonged course of IV antibiotics     Discussed with Dr Weber. I will follow. High risk for further limb loss.     Easton Garcia MD  10/15/2019

## 2019-10-15 NOTE — INTERVAL H&P NOTE
Pre-Op H&P (See Recent Office Note Attached for Full H&P)    Chief complaint: Left 3rd toe cellulitis    HPI:      Patient is a 74 y.o. male with history of DM and s/p right BKA June 2019, who was hospitalized 10/9/19-10/11/19 at [facility] for left 3rd toe cellulitis.  Seen by Dr. Garcia with ID and treated with IV Dapto/ertapenem and transitioned to oral keflex/cipro.  Blood cultures negative.  Wound cultures not obtained due to dry ulceration.  Pt seen by Dr. White and doppler performed with previous angiogram in April 2019 and sufficient blood flow for surgery.  Here today to undergo left 3rd toe amputation    Review of Systems:  General ROS:  no fever, chills, rashes, No change since last office visit  Cardiovascular ROS: no chest pain or dyspnea on exertion  Respiratory ROS: no cough, shortness of breath, or wheezing    Meds:    No current facility-administered medications on file prior to encounter.      Current Outpatient Medications on File Prior to Encounter   Medication Sig Dispense Refill   • amLODIPine (NORVASC) 10 MG tablet Take 10 mg by mouth Daily.  0   • aspirin 81 MG EC tablet Take 81 mg by mouth Daily.     • budesonide-formoterol (SYMBICORT) 80-4.5 MCG/ACT inhaler Inhale 2 puffs 2 (Two) Times a Day.     • BYSTOLIC 10 MG tablet TK 1/2 T PO BID  7   • cephalexin (KEFLEX) 500 MG capsule Take 1 capsule by mouth 4 (Four) Times a Day for 5 days. 20 capsule 0   • ciprofloxacin (CIPRO) 500 MG tablet Take 1 tablet by mouth 2 (Two) Times a Day for 5 days. 10 tablet 0   • CloNIDine (CATAPRES) 0.1 MG tablet Take 0.1 mg by mouth 2 (Two) Times a Day.     • docusate sodium 100 MG capsule Take 100 mg by mouth 2 (Two) Times a Day As Needed for Constipation. 60 each 0   • hydrochlorothiazide (HYDRODIURIL) 25 MG tablet Take  by mouth Daily.  3   • Insulin Glargine (TOUJEO SOLOSTAR) 300 UNIT/ML solution pen-injector Inject  under the skin into the appropriate area as directed.     • losartan (COZAAR) 100 MG tablet Take  "100 mg by mouth Daily.  3   • melatonin 5 MG tablet tablet Take 1 tablet by mouth At Night As Needed (sleep).     • metaxalone (SKELAXIN) 800 MG tablet Take 1 tablet by mouth 3 (Three) Times a Day As Needed for Muscle Spasms. 30 tablet 0   • metoclopramide (REGLAN) 10 MG tablet TK 1 T PO BEFORE MEALS AND QHS  8   • pantoprazole (PROTONIX) 40 MG EC tablet Take 40 mg by mouth Daily.  2   • vitamin B-12 (CYANOCOBALAMIN) 1000 MCG tablet Take 1,000 mcg by mouth Daily.     • ondansetron (ZOFRAN) 4 MG tablet Take 1 tablet by mouth Every 6 (Six) Hours As Needed for Nausea or Vomiting. 30 tablet 0   • tolnaftate (TINACTIN) 1 % cream tolnaftate 1 % topical cream   Apply twice a day between the toes.   Obtain over-the-counter         Vital Signs:  /68 (BP Location: Right arm, Patient Position: Lying)   Pulse 60   Temp 97.6 °F (36.4 °C) (Tympanic)   Resp 18   Ht 190.5 cm (75\")   Wt 112 kg (246 lb)   SpO2 97%   BMI 30.75 kg/m²     Physical Exam:    CV:  S1S2 regular rate and rhythm, no murmur               Resp:  Clear to auscultation; respirations regular, even and unlabored    Results Review:     Lab Results   Component Value Date    WBC 7.01 10/10/2019    HGB 11.2 (L) 10/10/2019    HCT 35.9 (L) 10/10/2019    MCV 85.3 10/10/2019     10/10/2019    NEUTROABS 6.24 10/09/2019    GLUCOSE 138 (H) 10/10/2019    BUN 26 (H) 10/10/2019    CREATININE 1.45 (H) 10/10/2019    EGFRIFNONA 47 (L) 10/10/2019     10/10/2019    K 4.2 10/10/2019     10/10/2019    CO2 26.0 10/10/2019    PHOS 4.4 10/10/2019    CALCIUM 9.5 10/10/2019    ALBUMIN 3.70 05/30/2019    AST 25 05/30/2019    ALT 31 05/30/2019    BILITOT 0.3 05/30/2019        I reviewed the patient's new clinical results.    Cancer Staging (if applicable)  Cancer Patient: __ yes _x_no __unknown; If yes, clinical stage T:__ N:__M:__, stage group or __N/A    Assessment/Plan:    Left 3rd toe ulceration and cellulitis - MRI does not show definite osteomyelitis.  " But the ulcer is necrotic and to my evaluation it goes right to the bone.  We talked about the options.  He would like to proceed with left 3rd toe amputation.  We discussed the risks including death, infection, neurovascular damage, higher amputation, wound healing problems, heart attacks, strokes, blood clots, etc.  Questions asked and answered in detail.    Preeti Morgan, VIPUL  10/15/2019   12:48 PM

## 2019-10-16 PROBLEM — S98.132A AMPUTATION OF TOE OF LEFT FOOT: Status: ACTIVE | Noted: 2019-10-16

## 2019-10-16 LAB
ANION GAP SERPL CALCULATED.3IONS-SCNC: 9 MMOL/L (ref 5–15)
BUN BLD-MCNC: 24 MG/DL (ref 8–23)
BUN/CREAT SERPL: 16.2 (ref 7–25)
CALCIUM SPEC-SCNC: 9.3 MG/DL (ref 8.6–10.5)
CHLORIDE SERPL-SCNC: 106 MMOL/L (ref 98–107)
CK SERPL-CCNC: 28 U/L (ref 20–200)
CO2 SERPL-SCNC: 24 MMOL/L (ref 22–29)
CREAT BLD-MCNC: 1.48 MG/DL (ref 0.76–1.27)
DEPRECATED RDW RBC AUTO: 44.4 FL (ref 37–54)
ERYTHROCYTE [DISTWIDTH] IN BLOOD BY AUTOMATED COUNT: 14.2 % (ref 12.3–15.4)
GFR SERPL CREATININE-BSD FRML MDRD: 46 ML/MIN/1.73
GLUCOSE BLD-MCNC: 113 MG/DL (ref 65–99)
GLUCOSE BLDC GLUCOMTR-MCNC: 107 MG/DL (ref 70–130)
GLUCOSE BLDC GLUCOMTR-MCNC: 126 MG/DL (ref 70–130)
GLUCOSE BLDC GLUCOMTR-MCNC: 139 MG/DL (ref 70–130)
GLUCOSE BLDC GLUCOMTR-MCNC: 214 MG/DL (ref 70–130)
HCT VFR BLD AUTO: 33.3 % (ref 37.5–51)
HGB BLD-MCNC: 10.4 G/DL (ref 13–17.7)
MCH RBC QN AUTO: 26.9 PG (ref 26.6–33)
MCHC RBC AUTO-ENTMCNC: 31.2 G/DL (ref 31.5–35.7)
MCV RBC AUTO: 86 FL (ref 79–97)
PLATELET # BLD AUTO: 267 10*3/MM3 (ref 140–450)
PMV BLD AUTO: 10.1 FL (ref 6–12)
POTASSIUM BLD-SCNC: 4.3 MMOL/L (ref 3.5–5.2)
RBC # BLD AUTO: 3.87 10*6/MM3 (ref 4.14–5.8)
SODIUM BLD-SCNC: 139 MMOL/L (ref 136–145)
WBC NRBC COR # BLD: 8.37 10*3/MM3 (ref 3.4–10.8)

## 2019-10-16 PROCEDURE — 94799 UNLISTED PULMONARY SVC/PX: CPT

## 2019-10-16 PROCEDURE — 97162 PT EVAL MOD COMPLEX 30 MIN: CPT

## 2019-10-16 PROCEDURE — 25010000002 ERTAPENEM PER 500 MG: Performed by: ORTHOPAEDIC SURGERY

## 2019-10-16 PROCEDURE — 82962 GLUCOSE BLOOD TEST: CPT

## 2019-10-16 PROCEDURE — C1751 CATH, INF, PER/CENT/MIDLINE: HCPCS

## 2019-10-16 PROCEDURE — 25010000002 ONDANSETRON PER 1 MG: Performed by: NURSE PRACTITIONER

## 2019-10-16 PROCEDURE — C1894 INTRO/SHEATH, NON-LASER: HCPCS

## 2019-10-16 PROCEDURE — 80048 BASIC METABOLIC PNL TOTAL CA: CPT | Performed by: NURSE PRACTITIONER

## 2019-10-16 PROCEDURE — 99024 POSTOP FOLLOW-UP VISIT: CPT | Performed by: ORTHOPAEDIC SURGERY

## 2019-10-16 PROCEDURE — 25010000002 ENOXAPARIN PER 10 MG: Performed by: ORTHOPAEDIC SURGERY

## 2019-10-16 PROCEDURE — 63710000001 INSULIN DETEMIR PER 5 UNITS: Performed by: NURSE PRACTITIONER

## 2019-10-16 PROCEDURE — 25010000002 DAPTOMYCIN PER 1 MG: Performed by: INTERNAL MEDICINE

## 2019-10-16 PROCEDURE — 82550 ASSAY OF CK (CPK): CPT | Performed by: INTERNAL MEDICINE

## 2019-10-16 PROCEDURE — 63710000001 INSULIN LISPRO (HUMAN) PER 5 UNITS: Performed by: NURSE PRACTITIONER

## 2019-10-16 PROCEDURE — 85027 COMPLETE CBC AUTOMATED: CPT | Performed by: NURSE PRACTITIONER

## 2019-10-16 RX ORDER — SODIUM CHLORIDE 0.9 % (FLUSH) 0.9 %
10 SYRINGE (ML) INJECTION AS NEEDED
Status: DISCONTINUED | OUTPATIENT
Start: 2019-10-16 | End: 2019-10-17 | Stop reason: HOSPADM

## 2019-10-16 RX ORDER — COLCHICINE 0.6 MG/1
0.6 TABLET ORAL DAILY
Status: DISCONTINUED | OUTPATIENT
Start: 2019-10-16 | End: 2019-10-17 | Stop reason: HOSPADM

## 2019-10-16 RX ORDER — COLCHICINE 0.6 MG/1
1.2 TABLET ORAL ONCE
Status: COMPLETED | OUTPATIENT
Start: 2019-10-16 | End: 2019-10-16

## 2019-10-16 RX ORDER — SODIUM CHLORIDE 0.9 % (FLUSH) 0.9 %
10 SYRINGE (ML) INJECTION EVERY 12 HOURS SCHEDULED
Status: DISCONTINUED | OUTPATIENT
Start: 2019-10-16 | End: 2019-10-17 | Stop reason: HOSPADM

## 2019-10-16 RX ADMIN — CLONIDINE HYDROCHLORIDE 0.1 MG: 0.1 TABLET ORAL at 08:26

## 2019-10-16 RX ADMIN — BUDESONIDE AND FORMOTEROL FUMARATE DIHYDRATE 2 PUFF: 80; 4.5 AEROSOL RESPIRATORY (INHALATION) at 09:06

## 2019-10-16 RX ADMIN — ERTAPENEM SODIUM 1 G: 1 INJECTION, POWDER, LYOPHILIZED, FOR SOLUTION INTRAMUSCULAR; INTRAVENOUS at 08:25

## 2019-10-16 RX ADMIN — COLCHICINE 1.2 MG: 0.6 TABLET, FILM COATED ORAL at 14:03

## 2019-10-16 RX ADMIN — INSULIN LISPRO 4 UNITS: 100 INJECTION, SOLUTION INTRAVENOUS; SUBCUTANEOUS at 11:48

## 2019-10-16 RX ADMIN — INSULIN DETEMIR 38 UNITS: 100 INJECTION, SOLUTION SUBCUTANEOUS at 21:11

## 2019-10-16 RX ADMIN — INSULIN LISPRO 14 UNITS: 100 INJECTION, SOLUTION INTRAVENOUS; SUBCUTANEOUS at 08:26

## 2019-10-16 RX ADMIN — CLONIDINE HYDROCHLORIDE 0.1 MG: 0.1 TABLET ORAL at 21:10

## 2019-10-16 RX ADMIN — AMLODIPINE BESYLATE 10 MG: 10 TABLET ORAL at 08:26

## 2019-10-16 RX ADMIN — INSULIN LISPRO 14 UNITS: 100 INJECTION, SOLUTION INTRAVENOUS; SUBCUTANEOUS at 17:12

## 2019-10-16 RX ADMIN — DAPTOMYCIN 550 MG: 500 INJECTION, POWDER, LYOPHILIZED, FOR SOLUTION INTRAVENOUS at 11:48

## 2019-10-16 RX ADMIN — COLCHICINE 0.6 MG: 0.6 TABLET, FILM COATED ORAL at 15:46

## 2019-10-16 RX ADMIN — PANTOPRAZOLE SODIUM 40 MG: 40 TABLET, DELAYED RELEASE ORAL at 06:27

## 2019-10-16 RX ADMIN — LOSARTAN POTASSIUM 100 MG: 50 TABLET ORAL at 08:26

## 2019-10-16 RX ADMIN — ENOXAPARIN SODIUM 40 MG: 40 INJECTION SUBCUTANEOUS at 08:25

## 2019-10-16 RX ADMIN — NEBIVOLOL HYDROCHLORIDE 10 MG: 10 TABLET ORAL at 08:26

## 2019-10-16 RX ADMIN — ONDANSETRON 4 MG: 2 INJECTION INTRAMUSCULAR; INTRAVENOUS at 18:02

## 2019-10-16 RX ADMIN — METOCLOPRAMIDE 10 MG: 10 TABLET ORAL at 08:26

## 2019-10-16 RX ADMIN — METOCLOPRAMIDE 10 MG: 10 TABLET ORAL at 17:12

## 2019-10-16 RX ADMIN — METOCLOPRAMIDE 10 MG: 10 TABLET ORAL at 11:48

## 2019-10-16 RX ADMIN — METOCLOPRAMIDE 10 MG: 10 TABLET ORAL at 06:27

## 2019-10-16 RX ADMIN — METOCLOPRAMIDE 10 MG: 10 TABLET ORAL at 21:07

## 2019-10-16 RX ADMIN — MELATONIN TAB 5 MG 5 MG: 5 TAB at 21:20

## 2019-10-16 RX ADMIN — BUDESONIDE AND FORMOTEROL FUMARATE DIHYDRATE 2 PUFF: 80; 4.5 AEROSOL RESPIRATORY (INHALATION) at 20:16

## 2019-10-16 RX ADMIN — INSULIN LISPRO 14 UNITS: 100 INJECTION, SOLUTION INTRAVENOUS; SUBCUTANEOUS at 11:48

## 2019-10-16 NOTE — PROGRESS NOTES
"IM progress note      Amol Aguirre  [id number]  1945     LOS: 1 day     Attending: Juju Weber MD    Primary Care Provider: Donte Briones MD      Chief Complaint/Reason for visit:   Left 3rd toe cellulitis    Subjective   Doing ok. Complains of gout pain. Denies f/c/n/v/sob/cp.  ( feels ok. Low appetite.)wy  Objective     Vital Signs  Visit Vitals  /61 (BP Location: Right arm)   Pulse 64   Temp 97.8 °F (36.6 °C) (Oral)   Resp 18   Ht 190.5 cm (75\")   Wt 112 kg (246 lb)   SpO2 95%   BMI 30.75 kg/m²     Temp (24hrs), Av.9 °F (36.6 °C), Min:97.5 °F (36.4 °C), Max:98.5 °F (36.9 °C)      Nutrition: PO    Respiratory: RA    Physical Therapy: Patient able to ambulate wtih heel wt on L leg using R BKA prosthesis and walker. Will continue to see for gt training    Physical Exam:     General Appearance:    Alert, cooperative, in no acute distress   Head:    Normocephalic, without obvious abnormality, atraumatic    Lungs:     Normal effort, symmetric chest rise, no crepitus, clear to      auscultation bilaterally             Heart:    Regular rhythm and normal rate, normal S1 and S2   Abdomen:     Normal bowel sounds, no masses, no organomegaly, soft        non-tender, non-distended, no guarding, no rebound                tenderness   Extremities:   dressing over the left foot is clean dry and intact.  Distal exposed toes with normal cap refill. R BKA   Pulses:   Pulses palpable and equal bilaterally   Skin:   No bleeding, bruising or rash   Neurologic:    Cranial nerves 2 - 12 grossly intact     Results Review:     I reviewed the patient's new clinical results.   Results from last 7 days   Lab Units 10/16/19  0429 10/15/19  1219 10/10/19  0523   WBC 10*3/mm3 8.37 7.66 7.01   HEMOGLOBIN g/dL 10.4* 11.5* 11.2*   HEMATOCRIT % 33.3* 37.3* 35.9*   PLATELETS 10*3/mm3 267 321 280     Results from last 7 days   Lab Units 10/16/19  0525 10/15/19  1219 10/10/19  05   SODIUM mmol/L 139 139 142 "   POTASSIUM mmol/L 4.3 4.8 4.2   CHLORIDE mmol/L 106 105 103   CO2 mmol/L 24.0 23.0 26.0   BUN mg/dL 24* 30* 26*   CREATININE mg/dL 1.48* 1.52* 1.45*   CALCIUM mg/dL 9.3 9.9 9.5   GLUCOSE mg/dL 113* 196* 138*     Results for ASAEL MONDRAGON (MRN [id number]) as of 10/16/2019 13:31   Ref. Range 10/15/2019 19:34 10/16/2019 04:29 10/16/2019 05:25 10/16/2019 08:00 10/16/2019 11:44   Glucose Latest Ref Range: 70 - 130 mg/dL 100  113 (H) 126 214 (H)   Results for ASAEL MONDRAGON (MRN [id number]) as of 10/16/2019 21:48   Ref. Range 10/16/2019 15:53 10/16/2019 20:31   Glucose Latest Ref Range: 70 - 130 mg/dL 139 (H) 107     I reviewed the patient's new imaging including images and reports.    All medications reviewed.     amLODIPine 10 mg Oral Daily   budesonide-formoterol 2 puff Inhalation BID - RT   cloNIDine 0.1 mg Oral Q12H   colchicine 1.2 mg Oral Once   And      colchicine 0.6 mg Oral Daily   DAPTOmycin 6 mg/kg (Adjusted) Intravenous Q24H   enoxaparin 40 mg Subcutaneous Daily   ertapenem 1 g Intravenous Q24H   insulin detemir 38 Units Subcutaneous Nightly   insulin lispro 0-9 Units Subcutaneous 4x Daily With Meals & Nightly   insulin lispro 14 Units Subcutaneous TID With Meals   losartan 100 mg Oral Daily   metoclopramide 10 mg Oral 4x Daily AC & at Bedtime   multivitamin 1 tablet Oral Daily   mupirocin  Topical Q24H   nebivolol 10 mg Oral Q24H   pantoprazole 40 mg Oral Q AM       Assessment/Plan     S/P Amputation of 3rd toe of left foot (CMS/HCC)    Diabetic ulcer of toe of left foot associated with type 2 diabetes mellitus, limited to breakdown of skin (CMS/HCC)    Cellulitis and abscess of toe of left foot    Type 2 diabetes mellitus with diabetic polyneuropathy, with long-term current use of insulin (CMS/HCC)    HTN (hypertension)    CKD (chronic kidney disease)    Anemia    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 PT/OT- weight bear on heel in postop shoe  2. Pain  control-prns   3. IS-encouraged  4. DVT proph- mechs/Lovenox  5. Bowel regimen  6. Monitor post-op labs  7. DC planning for home    Started on colchicine ( watch closely in light of CKD)wy    Consult [facility], Dr. Garcia  - continue invanz and dapto  - PICC     Dressing change tomorrow per Dr. Juarez    HTN  - Continue home norvasc, bystolic, catapres, cozaar  - Hold HCTZ  - Monitor BP   - Holding parameters for BP meds  - Labetalol PRN for SBP>170     DM  - hgb A1c on 10/9/19 7.8  - Levemir nightly  - Mealtime bolus with holding paramenters  - Accu-Check AC and HS with moderate dose SSI     CKD, stable    Anemia, stable      VIPUL Alvarez  10/16/19  1:30 PM     I have personally performed the evaluation on this patient. My history is consistent  with HPI obtained. My exam findings are listed above. I have personally reviewed and discussed the above formulated treatment plan with pt, wife, and .VIPUL.wy

## 2019-10-16 NOTE — PROGRESS NOTES
Orthopedic  Progress Note    Subjective     Post-Operative Day: 1 Day Post-Op-left 3rd toe amp    Systemic or Specific Complaints: no complaints  Objective     Vital signs in last 24 hours:  Temp:  [97.5 °F (36.4 °C)-98.5 °F (36.9 °C)] 98.5 °F (36.9 °C)  Heart Rate:  [52-71] 65  Resp:  [16-20] 16  BP: (116-197)/(61-94) 138/65    Neurovascular: no change in dense neuropathy left foot               Wound: left foot dressing dry    Data Review  Lab Results (last 24 hours)     Procedure Component Value Units Date/Time    Tissue / Bone Culture - Tissue, Toe, Left [[id number]] Collected:  10/15/19 1406    Specimen:  Tissue from Toe, Left Updated:  10/16/19 0842     Tissue Culture No growth     Gram Stain No WBCs or organisms seen    POC Glucose Once [[id number]]  (Normal) Collected:  10/16/19 0800    Specimen:  Blood Updated:  10/16/19 0802     Glucose 126 mg/dL     Basic Metabolic Panel [[id number]]  (Abnormal) Collected:  10/16/19 0525    Specimen:  Blood Updated:  10/16/19 0550     Glucose 113 mg/dL      BUN 24 mg/dL      Creatinine 1.48 mg/dL      Sodium 139 mmol/L      Potassium 4.3 mmol/L      Chloride 106 mmol/L      CO2 24.0 mmol/L      Calcium 9.3 mg/dL      eGFR Non African Amer 46 mL/min/1.73      BUN/Creatinine Ratio 16.2     Anion Gap 9.0 mmol/L     Narrative:       GFR Normal >60  Chronic Kidney Disease <60  Kidney Failure <15    CK [[id number]]  (Normal) Collected:  10/16/19 0525    Specimen:  Blood Updated:  10/16/19 0550     Creatine Kinase 28 U/L     CBC (No Diff) [[id number]]  (Abnormal) Collected:  10/16/19 0429    Specimen:  Blood Updated:  10/16/19 0530     WBC 8.37 10*3/mm3      RBC 3.87 10*6/mm3      Hemoglobin 10.4 g/dL      Hematocrit 33.3 %      MCV 86.0 fL      MCH 26.9 pg      MCHC 31.2 g/dL      RDW 14.2 %      RDW-SD 44.4 fl      MPV 10.1 fL      Platelets 267 10*3/mm3     POC Glucose Once [[id number]]  (Normal) Collected:  10/15/19 1934    Specimen:  Blood Updated:  10/15/19 1940     Glucose 100  mg/dL     POC Glucose Once [[id number]]  (Abnormal) Collected:  10/15/19 1634    Specimen:  Blood Updated:  10/15/19 1647     Glucose 144 mg/dL     Anaerobic Culture - Tissue, Toe, Left [[id number]] Collected:  10/15/19 1406    Specimen:  Tissue from Toe, Left Updated:  10/15/19 1530    Fungus Culture - Tissue, Toe, Left [[id number]] Collected:  10/15/19 1406    Specimen:  Tissue from Toe, Left Updated:  10/15/19 1530    AFB Culture - Tissue, Toe, Left [[id number]] Collected:  10/15/19 1406    Specimen:  Tissue from Toe, Left Updated:  10/15/19 1530    Tissue Pathology Exam [[id number]] Collected:  10/15/19 1411    Specimen:  Tissue from Toe, Left Updated:  10/15/19 1517    Basic Metabolic Panel [[id number]]  (Abnormal) Collected:  10/15/19 1219    Specimen:  Blood Updated:  10/15/19 1258     Glucose 196 mg/dL      BUN 30 mg/dL      Creatinine 1.52 mg/dL      Sodium 139 mmol/L      Potassium 4.8 mmol/L      Chloride 105 mmol/L      CO2 23.0 mmol/L      Calcium 9.9 mg/dL      eGFR Non African Amer 45 mL/min/1.73      BUN/Creatinine Ratio 19.7     Anion Gap 11.0 mmol/L     Narrative:       GFR Normal >60  Chronic Kidney Disease <60  Kidney Failure <15    CBC (No Diff) [[id number]]  (Abnormal) Collected:  10/15/19 1219    Specimen:  Blood Updated:  10/15/19 1253     WBC 7.66 10*3/mm3      RBC 4.35 10*6/mm3      Hemoglobin 11.5 g/dL      Hematocrit 37.3 %      MCV 85.7 fL      MCH 26.4 pg      MCHC 30.8 g/dL      RDW 14.1 %      RDW-SD 44.3 fl      MPV 10.3 fL      Platelets 321 10*3/mm3     POC Glucose Once [[id number]]  (Abnormal) Collected:  10/15/19 1220    Specimen:  Blood Updated:  10/15/19 1227     Glucose 184 mg/dL             Assessment/Plan     Status post- s/p left 3rd toe amp- the blister on anterior ankle was worse yesterday, will need abx and high risk for BKA, cultures pending, I will change dressing tomorrow           Juju Weber MD    Date: 10/16/2019  Time: 8:56 AM

## 2019-10-16 NOTE — PLAN OF CARE
Problem: Patient Care Overview  Goal: Plan of Care Review  Outcome: Ongoing (interventions implemented as appropriate)   10/16/19 0549   OTHER   Outcome Summary received report from previous shift. Pt AAO, VSS and in no apparent distress dressing/bandage applied to (L) foot is CDI. no drainage noted and no complaint of pain or discomfort. patient able to rest/sleep >6 hrs this shift. will continue to monitor   Coping/Psychosocial   Plan of Care Reviewed With patient   Plan of Care Review   Progress improving       Problem: Fall Risk (Adult)  Goal: Identify Related Risk Factors and Signs and Symptoms  Outcome: Ongoing (interventions implemented as appropriate)    Goal: Absence of Fall  Outcome: Outcome(s) achieved Date Met: 10/16/19

## 2019-10-16 NOTE — PROGRESS NOTES
"                  Clinical Nutrition     Reason for Visit:   Identified at risk by screening criteria, Nonhealing wound or pressure ulcer    Patient Name: Amol Aguirre  YOB: 1945  MRN: [id number]  Date of Encounter: 10/16/19 10:33 AM  Admission date: 10/15/2019     Comments: Patient seen by RD for diabetic ulcer 10/10. Discharged 10/12 to return for amputation L 3rd toe 10/15. Patient previously OK with drinking oral nutrition supplements for increase protein needs. Will re-order and follow PO intake/adequacy per protocol.    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  CCY    Reported/Observed/Food/Nutrition Related History:         Anthropometrics      Height: 190.5 cm (75\")  Last filed wt: Weight: 112 kg (246 lb)  Weight Method: Standing scale     BMI: n/a due to R BKA     Ideal Body Weight (IBW) (kg): 90.45  Admission wt: 246 lb  Method obtained: weight per charting 10/15     Labs reviewed            Results from last 7 days   Lab Units 10/10/19  0523 10/09/19  1447   GLUCOSE mg/dL 138* 107*   BUN mg/dL 26* 29*   CREATININE mg/dL 1.45* 1.43*   SODIUM mmol/L 142 140   CHLORIDE mmol/L 103 104   POTASSIUM mmol/L 4.2 4.6           Results from last 7 days   Lab Units 10/09/19  1447   CRP mg/dL 0.58*                 Results from last 7 days   Lab Units 10/10/19  0757 10/10/19  0747 10/09/19  2016 10/09/19  1649   GLUCOSE mg/dL 128 125 159* 235*            Lab Results   Lab Value Date/Time     HGBA1C 7.80 (H) 10/09/2019 1447     HGBA1C 9.80 (H) 06/05/2019 0454         Medications reviewed   Pertinent: daptomycin IV, invanz IV, insulin, reglan, protonix, MVI       Needs Assessment (10/16)         Height used 190.5 cm (75 in)   Weight used 246 lb (112 kg)   Adjusted  lbs " (82.8 kgs)      Estimated need Method/Equation used Result    Energy/Calorie need   ~2100 kcals/d 16 - 20 kcal/kg actBW 1792 - 2200                   Protein   ~118 g/day 1.0 g/kg actual BW  1.5 g/kg adjusted  g  124 g                 Current Nutrition Prescription      PO: Diet Regular; Cardiac, Consistent Carbohydrate  Intake: 75% x 3 meals        Nutrition Diagnosis      10/15  Problem Increased nutrient needs (protein)   Etiology Skin integrity   Signs/Symptoms S/p L third toe amputation, blister LLE        Nutrition Intervention   1.  Follow treatment progress, Care plan reviewed  2.  Supplement provided - Premier Protein Daily      Goal:   General: Nutrition support treatment  PO: Continue positive intake trend      Monitoring/Evaluation:   Per protocol, PO intake, Supplement intake, Pertinent labs, Skin status    Will Continue to follow per protocol    Jerri Patricia RDN, LD  Time Spent: 25 minutes

## 2019-10-16 NOTE — CONSULTS
Placed by Taryn Kent RN - confirmed with 3cg technology. RUE single lumen PICC with 46cm total and 0cm exposed.

## 2019-10-16 NOTE — CONSULTS
Infectious Disease Progress Note  Amol Aguirre  1945  [id number]    Date of Consult: 10/9/2019  Admission Date: 10/15/2019    Requesting Provider: Juju Weber MD  Evaluating Physician: Easton Garcia MD    Chief Complaint: left toe infection    Reason for Consultation: diabetic foot infection    History of present illness:   Patient is a 74 y.o.  Yr old male with history of poorly controlled DM2, venous stasis disease, asthma, HTN.  I saw him earlier this year in June after he developed an ulceration on his right foot after fishing trip to [place].  He was treated initially with oral antibiotics and then was referred to Dr Weber due to mass on his right great toe.  Soon after that visit he presented to [facility] on 4/19with increasing redness and a new ulceration between his third and fourth toes.  MRI done 4/22 with possible early changes in the fourth metatarsal head of osteomyelitis.  Wound culture there grew staph epidermidis and enterococcus. Given daptomycin, linezolid and zosyn.  A1c at [facility] was 10.5      He was transferred to [facility] 4/22 for further evaluation. Dr Weber contacted me to help manage antibiotics.    Wound culture grew Klebsiella and Morganella.  He had an aortogram which showed significant right lower extremity peripheral vascular disease however no sufficient targets for revascularization.  He was discharged on daptomycin and ertapenem and I followed him for the next few weeks in clinic.  Initially he seemed to improve however last week he was noted to have acute worsening of the ulceration in his foot which progressed to steve gangrene.  Dr. Weber evaluated him and recommended BKA which was performed 6/4/19. Treated with daptomycin and ertapenem for 3 days post-op then discharged off antibiotics.       10/9: I was asked to reevaluate by Dr Weber.  Had a routine follow-up visit he was noted to have an erythematous ulceration over  "the left third toe.  He is been admitted for further work-up.  Vascular studies planned.  He says he did not even know the ulcer existed until it was discovered in the clinic.  No fevers, chills.  No drainage from the wound.  It is nontender.  He is now independently ambulatory with a prosthesis on right BKA. Resting comfortably.    MRI was negative for osteomyelitis but giving exam findings of ulceration down to bone amputation was planned.  Given his stability he was discharged home on Cipro and Keflex but returned to the hospital on 10/15 for surgery.  10/15 underwent amputation of the left third toe at MTP joint.  Tolerated the procedure well and is currently recovering on the floor.  I was asked to reevaluate by Dr. Weber due to concerning ulceration on the anterior part of his lower leg and foot as well as toe findings.  Patient complains that this was a \"blister,\" which ruptured and drained clear fluid but no pus.    10/16: No acute events overnight.  Afebrile.  Tolerating current antibiotics well.  Foot pain is well controlled    ROS:  No fevers or chills. No n/v/d. No rash. No new ADR to Abx.       Allergies   Allergen Reactions   • Chlorhexidine Shortness Of Breath, Itching and Rash     Pt developed a rash, itching, and shortness of breath after receiving a CHG bath on 4/24/19.   • Latex Other (See Comments)     Rash, hives, and tongue swelling       Antibiotics:  Anti-Infectives (From admission, onward)    Ordered     Dose/Rate Route Frequency Start Stop    10/15/19 1757  DAPTOmycin (CUBICIN) 550 mg in sodium chloride 0.9 % 50 mL IVPB     Ordering Provider:  Easton Garcia MD    6 mg/kg × 95.5 kg (Adjusted)  100 mL/hr over 30 Minutes Intravenous Every 24 Hours 10/16/19 1200 10/30/19 1159    10/15/19 1705  ertapenem (INVanz) 1 g/100 mL 0.9% NS VTB (mbp)     Ordering Provider:  Juju Weber MD    1 g  over 30 Minutes Intravenous Every 24 Hours Scheduled 10/16/19 0900 11/15/19 0859          Other " Medications:  Current Facility-Administered Medications   Medication Dose Route Frequency Provider Last Rate Last Dose   • amLODIPine (NORVASC) tablet 10 mg  10 mg Oral Daily Erica Reyez APRN   10 mg at 10/16/19 0826   • bisacodyl (DULCOLAX) suppository 10 mg  10 mg Rectal Daily PRN Juju Weber MD       • budesonide-formoterol (SYMBICORT) 80-4.5 MCG/ACT inhaler 2 puff  2 puff Inhalation BID - RT Erica Reyez APRN   2 puff at 10/15/19 2019   • cloNIDine (CATAPRES) tablet 0.1 mg  0.1 mg Oral Q12H Erica Reyez APRN   0.1 mg at 10/16/19 0826   • DAPTOmycin (CUBICIN) 550 mg in sodium chloride 0.9 % 50 mL IVPB  6 mg/kg (Adjusted) Intravenous Q24H Easton Garcia MD       • dextrose (D50W) 25 g/ 50mL Intravenous Solution 25 g  25 g Intravenous Q15 Min PRN Erica Reyez APRN       • dextrose (GLUTOSE) oral gel 15 g  15 g Oral Q15 Min PRN Erica Reyez APRN       • diphenhydrAMINE (BENADRYL) capsule 25 mg  25 mg Oral Nightly PRN Juju Weber MD        Or   • diphenhydrAMINE (BENADRYL) injection 25 mg  25 mg Intravenous Nightly PRN Juju Weber MD       • enoxaparin (LOVENOX) syringe 40 mg  40 mg Subcutaneous Daily Juju Weber MD   40 mg at 10/16/19 0825   • ertapenem (INVanz) 1 g/100 mL 0.9% NS VTB (mbp)  1 g Intravenous Q24H Juju Weber MD   1 g at 10/16/19 0825   • fentaNYL citrate (PF) (SUBLIMAZE) injection 50 mcg  50 mcg Intravenous Q5 Min PRN Dmitry Gonzalez MD       • glucagon (human recombinant) (GLUCAGEN DIAGNOSTIC) injection 1 mg  1 mg Subcutaneous Q15 Min PRN Erica Reyez APRN       • HYDROcodone-acetaminophen (NORCO) 7.5-325 MG per tablet 1 tablet  1 tablet Oral Q4H PRN Juju Weber MD       • HYDROcodone-acetaminophen (NORCO) 7.5-325 MG per tablet 2 tablet  2 tablet Oral Q4H PRN Juju Wbeer MD       • HYDROmorphone (DILAUDID) injection 0.5 mg  0.5 mg Intravenous Q5 Min PRN Dmitry Gonzalez MD       • HYDROmorphone (DILAUDID) injection 1 mg  1 mg  Intravenous Q2H PRN Juju Weber MD        And   • naloxone (NARCAN) injection 0.4 mg  0.4 mg Intravenous Q5 Min PRN Juju Weber MD       • insulin detemir (LEVEMIR) injection 38 Units  38 Units Subcutaneous Nightly Erica Reyez APRN   38 Units at 10/15/19 2110   • insulin lispro (humaLOG) injection 0-9 Units  0-9 Units Subcutaneous 4x Daily With Meals & Nightly Erica Reyez APRN       • insulin lispro (humaLOG) injection 14 Units  14 Units Subcutaneous TID With Meals Erica Reyez APRN   14 Units at 10/16/19 0826   • labetalol (NORMODYNE,TRANDATE) injection 10 mg  10 mg Intravenous Q4H PRN Erica Reyez APRN       • lactated ringers bolus 500 mL  500 mL Intravenous Once PRN Dmitry Gonzalez MD       • lactated ringers infusion  9 mL/hr Intravenous Continuous Dmitry Gonzalez MD 9 mL/hr at 10/15/19 1246     • losartan (COZAAR) tablet 100 mg  100 mg Oral Daily Erica Reyez APRN   100 mg at 10/16/19 0826   • magnesium hydroxide (MILK OF MAGNESIA) suspension 2400 mg/10mL 10 mL  10 mL Oral Daily PRN Juju Weber MD       • melatonin tablet 5 mg  5 mg Oral Nightly PRN Erica Reyez APRN   5 mg at 10/15/19 2000   • metaxalone (SKELAXIN) tablet 800 mg  800 mg Oral TID PRN Erica Reyez APRN       • metoclopramide (REGLAN) tablet 10 mg  10 mg Oral 4x Daily AC & at Bedtime Erica Reyez APRN   10 mg at 10/16/19 0826   • multivitamin (THERAGRAN) tablet 1 tablet  1 tablet Oral Daily Juju Weber MD       • nebivolol (BYSTOLIC) tablet 10 mg  10 mg Oral Q24H Erica Reyez APRN   10 mg at 10/16/19 0826   • ondansetron (ZOFRAN) injection 4 mg  4 mg Intravenous Q6H PRN Erica Reyez APRN       • ondansetron (ZOFRAN) injection 4 mg  4 mg Intravenous Q6H PRN Juju Weber MD       • oxyCODONE-acetaminophen (PERCOCET) 5-325 MG per tablet 1 tablet  1 tablet Oral Q4H PRN Juju Weber MD       • oxyCODONE-acetaminophen (PERCOCET) 5-325 MG per tablet 2 tablet  2 tablet Oral  "Q4H PRN Juju Weber MD       • pantoprazole (PROTONIX) EC tablet 40 mg  40 mg Oral Q AM Erica Reyez APRN   40 mg at 10/16/19 0627   • promethazine (PHENERGAN) injection 12.5 mg  12.5 mg Intramuscular Q4H PRN Juju Weber MD       • promethazine (PHENERGAN) injection 12.5 mg  12.5 mg Intravenous Q4H PRN Juju Weber MD       • sodium chloride 0.45 % with KCl 20 mEq/L infusion  50 mL/hr Intravenous Continuous Juju Weber MD 50 mL/hr at 10/15/19 2010 50 mL/hr at 10/15/19 2010   • sodium chloride 0.9 % bolus 500 mL  500 mL Intravenous TID PRN Erica Reyez APRN           Physical Exam:   Vital Signs   /65 (BP Location: Right arm, Patient Position: Lying)   Pulse 65   Temp 98.5 °F (36.9 °C) (Oral)   Resp 16   Ht 190.5 cm (75\")   Wt 112 kg (246 lb)   SpO2 96%   BMI 30.75 kg/m²     GENERAL: Awake and alert, in no acute distress.   HEENT: Normocephalic, atraumatic.  PERRL. EOMI. No conjunctival injection. No icterus.    HEART: RRR; No murmur.  LUNGS: Clear to auscultation bilaterally without wheezing, rales, rhonchi. Normal respiratory effort.   ABDOMEN: Soft, nontender, nondistended.  EXT: BKA well-healed on the right.  Left foot bandaged postoperatively.  CDI, non-tender to palpation  : Without Mcdonald catheter.   MSK: FROM without joint effusions noted arms/legs.    SKIN: Warm and dry without cutaneous eruptions on Inspection/palpation.    NEURO: Oriented to PPT.  Left lower extremity neuropathy  PSYCHIATRIC: Normal insight and judgement. Cooperative with PE    Laboratory Data    Results from last 7 days   Lab Units 10/16/19  0429 10/15/19  1219 10/10/19  0523   WBC 10*3/mm3 8.37 7.66 7.01   HEMOGLOBIN g/dL 10.4* 11.5* 11.2*   HEMATOCRIT % 33.3* 37.3* 35.9*   PLATELETS 10*3/mm3 267 321 280     Results from last 7 days   Lab Units 10/16/19  0525   SODIUM mmol/L 139   POTASSIUM mmol/L 4.3   CHLORIDE mmol/L 106   CO2 mmol/L 24.0   BUN mg/dL 24*   CREATININE mg/dL 1.48*   GLUCOSE " mg/dL 113*   CALCIUM mg/dL 9.3     Estimated Creatinine Clearance: 57.4 mL/min (A) (by C-G formula based on SCr of 1.48 mg/dL (H)).      Results from last 7 days   Lab Units 10/09/19  1447   SED RATE mm/hr 19     Results from last 7 days   Lab Units 10/09/19  1447   CRP mg/dL 0.58*       Microbiology:     I reviewed prior culture data  4/23 wound culture grew Morganella and Klebsiella       10/9 blood cx negative    10/15 OR cultures pending, gram stain negative    Radiology:  Mri Foot Left With & Without Contrast    Result Date: 10/14/2019  1. 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 2. No evidence of osteomyelitis of the third digit elsewhere. 3. Mild diffuse edema and enhancement of the third digit soft tissues consistent with cellulitis.   D:  10/10/2019 E:  10/10/2019  This report was finalized on 10/14/2019 10:54 AM by DR. Navneet Patton MD.       I personally reviewed the above imaging studies.      Impression:   1. Acute left 3rd toe ulceration and cellulitis and clinical osteomyelitis: Down to level of bone on exam. Ulceration progressed quickly to full thickness. Complicated by known severe PVD. S/p amputation to MTP joint by Dr Weber on 10/15, intra-op cultures pending  2. Ulceration on anterior aspect of foot ankle: likely complication of known severe PVD vs from minor trauma exacerbated by poor wound healing from diabetes. High risk for deep infection  3. 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 Severe bilateral lower extremity arterial vascular disease,   5. Poorly controlled DM2 complicated by neuropathy   6. CKD stage 3: at baseline  7. Asthma    PLAN:    - f/u pending OR cultures      - Trend CBC, BMP, CK    - ertapenem 1 gm daily  - IV daptomycin pending culture results    - Discussed with Dr Weber. Regardless of toe culture results, he will need another prolonged course of IV antibiotics to try to salvage left foot.  Can be dispensed through [facility]  - PICC ordered     I will follow. High risk for further limb loss.     Easton Garcia MD  10/16/2019

## 2019-10-16 NOTE — PLAN OF CARE
Problem: Patient Care Overview  Goal: Plan of Care Review   10/16/19 1641   OTHER   Outcome Summary Pt is AxO, calm and cooperative with care. Left foot dressing is CDI, up to chair. Denies pain. VSS

## 2019-10-16 NOTE — THERAPY EVALUATION
Patient Name: Amol Aguirre  : 1945    MRN: [id number]                              Today's Date: 10/16/2019       Admit Date: 10/15/2019    Visit Dx:     ICD-10-CM ICD-9-CM   1. Diabetic ulcer of toe of left foot associated with type 2 diabetes mellitus, limited to breakdown of skin (CMS/[facility]) E11.621 250.80    L97.521 707.15   2. Cellulitis and abscess of toe of left foot L03.032 681.10    L02.612      Patient Active Problem List   Diagnosis   • Right foot pain   • Decreased pulses in feet   • Vascular calcification   • Uncontrolled type 2 diabetes mellitus with hyperglycemia (CMS/[facility])   • Type 2 diabetes mellitus with diabetic polyneuropathy, with long-term current use of insulin (CMS/[facility])   • Mass of lesser toe of right foot   • Blister (nonthermal), right foot, initial encounter   • Diabetic foot ulcers (CMS/[facility])   • Diabetic ulcer of toe of left foot associated with type 2 diabetes mellitus, limited to breakdown of skin (CMS/[facility])   • Gangrene (CMS/[facility])   • S/P BKA (below knee amputation), right (CMS/[facility])   • HTN (hypertension)   • CKD (chronic kidney disease)   • Leukocytosis, likely reactive   • Hyperkalemia   • Anemia   • Acute postoperative pain   • 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   • left 3rd toe cellulitis   • Cellulitis and abscess of toe of left foot     Past Medical History:   Diagnosis Date   • Acid reflux    • Asthma    • Diabetes (CMS/[facility])    • Hypertension      Past Surgical History:   Procedure Laterality Date   • AMPUTATION DIGIT Left 10/15/2019    Procedure: AMPUTATE LEFT THIRD TOE;  Surgeon: Juju Weber MD;  Location: [facility] OR;  Service: Orthopedics   • AORTAGRAM N/A 2019    Procedure: AORTAGRAM WITH OR WITHOUT RUNOFFS;  Surgeon: Carrillo White MD;  Location: [facility] HYBRID OR 15;  Service: Vascular   • BELOW KNEE AMPUTATION Right 2019    Procedure: BELOW KNEE AMPUTATION RIGHT;  Surgeon: Juju Weber MD;  Location: [facility] OR;  Service:  Orthopedics   • CHOLECYSTECTOMY     • KNEE SURGERY Right     TKA     General Information     [facility] Name 10/16/19 0958          PT Evaluation Time/Intention    Document Type  evaluation  -SC     Mode of Treatment  physical therapy  -[facility] Name 10/16/19 1000 10/16/19 0958       General Information    Patient Profile Reviewed?  -- Hx of R BKA  -SC  yes s/p L 3rd toe amp... Heel wt with post op shoe  Dr Juarez  -SC    Prior Level of Function  --  independent:;gait;transfer  -SC    Existing Precautions/Restrictions  --  fall;partial weight bearing heel wt  -[facility] Name 10/16/19 0958          Relationship/Environment    Lives With  spouse  -[facility] Name 10/16/19 0958          Resource/Environmental Concerns    Current Living Arrangements  home/apartment/condo  -[facility] Name 10/16/19 0958          Home Main Entrance    Number of Stairs, Main Entrance  none  -SC     Stair Railings, Main Entrance  none  -[facility] Name 10/16/19 0958          Cognitive Assessment/Intervention- PT/OT    Orientation Status (Cognition)  oriented x 4  -SC     Cognitive Assessment/Intervention Comment  alert following all commands  -[facility] Name 10/16/19 0958          Safety Issues, Functional Mobility    Impairments Affecting Function (Mobility)  balance  -SC       User Key  (r) = Recorded By, (t) = Taken By, (c) = Cosigned By    Initials Name Provider Type    SC Cnydy Fofana, PT Physical Therapist        Mobility     [facility] Name 10/16/19 1000          Bed Mobility Assessment/Treatment    Bed Mobility Assessment/Treatment  bed mobility (all) activities  -SC     [place] Level (Bed Mobility)  independent  -[facility] Name 10/16/19 1000          Transfer Assessment/Treatment    Comment (Transfers)  STS good technique with cues. Donned R prosthesis independently while sitting EOB  -[facility] Name 10/16/19 1000          Sit-Stand Transfer    Sit-Stand [place] (Transfers)  verbal cues;contact guard  -SC     Assistive Device  (Sit-Stand Transfers)  walker, front-wheeled  -SC     Row Name 10/16/19 1000          Gait/Stairs Assessment/Training    Gait/Stairs Assessment/Training  gait/ambulation independence  -SC     [place] Level (Gait)  verbal cues;contact guard  -SC     Pattern (Gait)  step-to  -SC     Deviations/Abnormal Patterns (Gait)  left sided deviations;stride length decreased  -SC     Number of Steps (Stairs)  40  -SC     Comment (Gait/Stairs)  Demonstration and cueing for heel wt only on L (post op shoe on). Cues to stay close to wallker on turns. Demonstrated good technique  -SC       User Key  (r) = Recorded By, (t) = Taken By, (c) = Cosigned By    Initials Name Provider Type    SC Cyndy Fofana, PT Physical Therapist        Obj/Interventions     Row Name 10/16/19 1004          General ROM    GENERAL ROM COMMENTS  wfl  -SC     Row Name 10/16/19 1004          MMT (Manual Muscle Testing)    General MMT Comments  L toes moving, L Tib anterior intact, L quads 4+/5, R LE grossly 5/5  -SC     Row Name 10/16/19 1004          Dynamic Standing Balance    Level of [place], Reaches Outside Midline (Standing, Dynamic Balance)  contact guard assist  -SC     Time Able to Maintain Position, Reaches Outside Midline (Standing, Dynamic Balance)  3 to 4 minutes  -SC     Assistive Device Utilized (Supported Standing, Dynamic Balance)  walker, rolling  -SC       User Key  (r) = Recorded By, (t) = Taken By, (c) = Cosigned By    Initials Name Provider Type    SC Cyndy Fofana, PT Physical Therapist        Goals/Plan     Row Name 10/16/19 1007          Gait Training Goal 1 (PT)    Activity/Assistive Device (Gait Training Goal 1, PT)  gait (walking locomotion)  -SC     [place] Level (Gait Training Goal 1, PT)  conditional independence  -SC     Distance (Gait Goal 1, PT)  75  -SC     Time Frame (Gait Training Goal 1, PT)  long term goal (LTG);10 days  -SC     Row Name 10/16/19 1007          Patient Education Goal (PT)    Activity  (Patient Education Goal, PT)  heel wt on L leg  -SC     [place]/Cues/Accuracy (Memory Goal 2, PT)  demonstrates adequately  -SC     Time Frame (Patient Education Goal, PT)  long term goal (LTG);10 days  -SC       User Key  (r) = Recorded By, (t) = Taken By, (c) = Cosigned By    Initials Name Provider Type    SC Cyndy Fofana, PT Physical Therapist        Clinical Impression     Row Name 10/16/19 1007          Pain Assessment    Additional Documentation  Pain Scale: FACES Pre/Post-Treatment (Group)  -SC     Row Name 10/16/19 1007          Pain Scale: Numbers Pre/Post-Treatment    Pain Location - Side  Left  -SC     Pain Location - Orientation  lower  -SC     Pain Location  extremity  -SC     Pain Intervention(s)  Repositioned  -SC     Row Name 10/16/19 1007          Pain Scale: FACES Pre/Post-Treatment    Pain: FACES Scale, Pretreatment  0-->no hurt  -SC     Pain: FACES Scale, Post-Treatment  0-->no hurt  -SC     Row Name 10/16/19 1007          Plan of Care Review    Plan of Care Reviewed With  patient  -SC     Row Name 10/16/19 1007          Physical Therapy Clinical Impression    Criteria for Skilled Interventions Met (PT Clinical Impression)  yes;treatment indicated  -SC     Rehab Potential (PT Clinical Summary)  good, to achieve stated therapy goals  -SC     Row Name 10/16/19 1007          Positioning and Restraints    Pre-Treatment Position  in bed  -SC     Post Treatment Position  bathroom  -SC     Bathroom  sitting;notified nsg;call light within reach  -SC       User Key  (r) = Recorded By, (t) = Taken By, (c) = Cosigned By    Initials Name Provider Type    SC Cyndy Fofana, PT Physical Therapist        Outcome Measures     Row Name 10/16/19 1011          How much help from another person do you currently need...    Turning from your back to your side while in flat bed without using bedrails?  4  -SC     Moving from lying on back to sitting on the side of a flat bed without bedrails?  4  -SC      Moving to and from a bed to a chair (including a wheelchair)?  3  -SC     Standing up from a chair using your arms (e.g., wheelchair, bedside chair)?  3  -SC     Climbing 3-5 steps with a railing?  2  -SC     To walk in hospital room?  3  -SC     AM-PAC 6 Clicks Score (PT)  19  -SC     Row Name 10/16/19 1011          Functional Assessment    Outcome Measure Options  AM-PAC 6 Clicks Basic Mobility (PT)  -SC       User Key  (r) = Recorded By, (t) = Taken By, (c) = Cosigned By    Initials Name Provider Type    SC Cyndy Fofana PT Physical Therapist        Physical Therapy Education     Title: PT OT SLP Therapies (Done)     Topic: Physical Therapy (Done)     Point: Mobility training (Done)     Learning Progress Summary           Patient RAHEEL Tam VU by SC at 10/16/2019 10:09 AM    Comment:  heel wt only                   Point: Home exercise program (Done)     Learning Progress Summary           Patient RAHEEL Tam VU by SC at 10/16/2019 10:09 AM    Comment:  heel wt only                   Point: Body mechanics (Done)     Learning Progress Summary           Patient RAHEEL Tam VU by SC at 10/16/2019 10:09 AM    Comment:  heel wt only                   Point: Precautions (Done)     Learning Progress Summary           Patient RAHEEL Tam VU by SC at 10/16/2019 10:09 AM    Comment:  heel wt only                               User Key     Initials Effective Dates Name Provider Type Discipline    SC 06/19/15 -  Cyndy Fofana PT Physical Therapist PT              PT Recommendation and Plan     Plan of Care Reviewed With: patient  Progress: improving  Outcome Summary: Patient able to ambulate wtih heel wt on L leg using R BKA prosthesis and walker. Will continue to see for gt training     Time Calculation:   PT Charges     Row Name 10/16/19 0935             Time Calculation    Start Time  0935  -SC      PT Received On  10/16/19  -SC      PT Goal Re-Cert Due Date  10/26/19  -SC        User Key  (r) = Recorded By, (t) = Taken  By, (c) = Cosigned By    Initials Name Provider Type    SC Cyndy Fofana, PT Physical Therapist        Therapy Charges for Today     Code Description Service Date Service Provider Modifiers Qty    [id number]  PT EVAL MOD COMPLEXITY 3 10/16/2019 Cyndy Fofana, PT GP 1    [id number]  PT THER SUPP EA 15 MIN 10/16/2019 Cyndy Fofana PT GP 2          PT G-Codes  Outcome Measure Options: AM-PAC 6 Clicks Basic Mobility (PT)  AM-PAC 6 Clicks Score (PT): 19    Cyndy Fofana, PT  10/16/2019

## 2019-10-16 NOTE — PROGRESS NOTES
Discharge Planning Assessment  [facility]     Patient Name: Amol Aguirre  MRN: [id number]  Today's Date: 10/16/2019    Admit Date: 10/15/2019    Discharge Needs Assessment     Row Name 10/16/19 1124       Living Environment    Lives With  spouse    Current Living Arrangements  home/apartment/condo    Primary Care Provided by  self    Provides Primary Care For  no one    Family Caregiver if Needed  spouse    Able to Return to Prior Arrangements  yes       Transition Planning    Patient/Family Anticipates Transition to  home    Transportation Anticipated  family or friend will provide       Discharge Needs Assessment    Equipment Currently Used at Home  wheelchair;walker, rolling;cane, quad;prosthesis        Discharge Plan     Row Name 10/16/19 1124       Plan    Plan  Home    Patient/Family in Agreement with Plan  yes    Plan Comments  Spoke with patient and wife at bedside. They live in a one story in [facility] [place] he was independent with ADL's and primarily used a cane for mobility assist, he also has a RW and WC at home. Per ID note patient will likely require home IV abx,. Patient has infused at home in the past, with his wife to assist, and received his weekly labs and PICC care at the [facility] office. [facility] home infusion to supply his IV abx. CM will arrange w/ [facility] pending final recs. May require dressing changes at MA, will defer to Dr. Juarez for orders. CM following and will make arrangments and referrals as appropriate.      Final Discharge Disposition Code  01 - home or self-care        Destination      No service coordination in this encounter.      Durable Medical Equipment      No service coordination in this encounter.      Dialysis/Infusion      No service coordination in this encounter.      Home Medical Care      No service coordination in this encounter.      Therapy      No service coordination in this encounter.      Community Resources      No service coordination in this encounter.         Expected Discharge Date and Time     Expected Discharge Date Expected Discharge Time    Oct 18, 2019         Demographic Summary     Row Name 10/16/19 1123       General Information    Arrived From  home    Reason for Consult  discharge planning        Functional Status     Row Name 10/16/19 1123       Functional Status    Usual Activity Tolerance  moderate    Current Activity Tolerance  moderate        Psychosocial    No documentation.       Abuse/Neglect    No documentation.       Legal    No documentation.       Substance Abuse    No documentation.       Patient Forms    No documentation.           Casandra Peters RN

## 2019-10-16 NOTE — PLAN OF CARE
Problem: Patient Care Overview  Goal: Plan of Care Review  Outcome: Ongoing (interventions implemented as appropriate)   10/16/19 1010   OTHER   Outcome Summary Patient able to ambulate with heel wt on L leg using R BKA prosthesis and walker. Will continue to see for gt training   Coping/Psychosocial   Plan of Care Reviewed With patient   Plan of Care Review   Progress improving

## 2019-10-17 VITALS
TEMPERATURE: 98 F | WEIGHT: 246 LBS | BODY MASS INDEX: 30.59 KG/M2 | HEART RATE: 64 BPM | HEIGHT: 75 IN | SYSTOLIC BLOOD PRESSURE: 146 MMHG | DIASTOLIC BLOOD PRESSURE: 64 MMHG | OXYGEN SATURATION: 94 % | RESPIRATION RATE: 16 BRPM

## 2019-10-17 LAB
ANION GAP SERPL CALCULATED.3IONS-SCNC: 9 MMOL/L (ref 5–15)
BACTERIA SPEC AEROBE CULT: NORMAL
BUN BLD-MCNC: 22 MG/DL (ref 8–23)
BUN/CREAT SERPL: 14.7 (ref 7–25)
CALCIUM SPEC-SCNC: 9.3 MG/DL (ref 8.6–10.5)
CHLORIDE SERPL-SCNC: 104 MMOL/L (ref 98–107)
CO2 SERPL-SCNC: 25 MMOL/L (ref 22–29)
CREAT BLD-MCNC: 1.5 MG/DL (ref 0.76–1.27)
CYTO UR: NORMAL
GFR SERPL CREATININE-BSD FRML MDRD: 46 ML/MIN/1.73
GLUCOSE BLD-MCNC: 152 MG/DL (ref 65–99)
GLUCOSE BLDC GLUCOMTR-MCNC: 141 MG/DL (ref 70–130)
GLUCOSE BLDC GLUCOMTR-MCNC: 169 MG/DL (ref 70–130)
GRAM STN SPEC: NORMAL
LAB AP CASE REPORT: NORMAL
LAB AP CLINICAL INFORMATION: NORMAL
PATH REPORT.FINAL DX SPEC: NORMAL
PATH REPORT.GROSS SPEC: NORMAL
POTASSIUM BLD-SCNC: 4.1 MMOL/L (ref 3.5–5.2)
SODIUM BLD-SCNC: 138 MMOL/L (ref 136–145)

## 2019-10-17 PROCEDURE — 25010000002 ERTAPENEM PER 500 MG: Performed by: ORTHOPAEDIC SURGERY

## 2019-10-17 PROCEDURE — 63710000001 INSULIN LISPRO (HUMAN) PER 5 UNITS: Performed by: NURSE PRACTITIONER

## 2019-10-17 PROCEDURE — 82962 GLUCOSE BLOOD TEST: CPT

## 2019-10-17 PROCEDURE — 25010000002 ENOXAPARIN PER 10 MG: Performed by: ORTHOPAEDIC SURGERY

## 2019-10-17 PROCEDURE — 97116 GAIT TRAINING THERAPY: CPT

## 2019-10-17 PROCEDURE — 94799 UNLISTED PULMONARY SVC/PX: CPT

## 2019-10-17 PROCEDURE — 99024 POSTOP FOLLOW-UP VISIT: CPT | Performed by: ORTHOPAEDIC SURGERY

## 2019-10-17 PROCEDURE — 25010000002 DAPTOMYCIN PER 1 MG: Performed by: INTERNAL MEDICINE

## 2019-10-17 PROCEDURE — 80048 BASIC METABOLIC PNL TOTAL CA: CPT | Performed by: NURSE PRACTITIONER

## 2019-10-17 PROCEDURE — 02HV33Z INSERTION OF INFUSION DEVICE INTO SUPERIOR VENA CAVA, PERCUTANEOUS APPROACH: ICD-10-PCS | Performed by: ORTHOPAEDIC SURGERY

## 2019-10-17 RX ORDER — COLCHICINE 0.6 MG/1
0.6 TABLET ORAL 2 TIMES DAILY
Qty: 28 TABLET | Refills: 0 | Status: SHIPPED | OUTPATIENT
Start: 2019-10-17 | End: 2019-10-31

## 2019-10-17 RX ADMIN — COLCHICINE 0.6 MG: 0.6 TABLET, FILM COATED ORAL at 08:34

## 2019-10-17 RX ADMIN — MULTIVITAMIN TABLET 1 TABLET: TABLET at 08:34

## 2019-10-17 RX ADMIN — NEBIVOLOL HYDROCHLORIDE 10 MG: 10 TABLET ORAL at 08:34

## 2019-10-17 RX ADMIN — INSULIN LISPRO 14 UNITS: 100 INJECTION, SOLUTION INTRAVENOUS; SUBCUTANEOUS at 11:55

## 2019-10-17 RX ADMIN — PANTOPRAZOLE SODIUM 40 MG: 40 TABLET, DELAYED RELEASE ORAL at 06:07

## 2019-10-17 RX ADMIN — DAPTOMYCIN 550 MG: 500 INJECTION, POWDER, LYOPHILIZED, FOR SOLUTION INTRAVENOUS at 11:55

## 2019-10-17 RX ADMIN — LOSARTAN POTASSIUM 100 MG: 50 TABLET ORAL at 08:34

## 2019-10-17 RX ADMIN — METOCLOPRAMIDE 10 MG: 10 TABLET ORAL at 08:34

## 2019-10-17 RX ADMIN — CLONIDINE HYDROCHLORIDE 0.1 MG: 0.1 TABLET ORAL at 08:34

## 2019-10-17 RX ADMIN — AMLODIPINE BESYLATE 10 MG: 10 TABLET ORAL at 08:34

## 2019-10-17 RX ADMIN — METOCLOPRAMIDE 10 MG: 10 TABLET ORAL at 11:54

## 2019-10-17 RX ADMIN — MUPIROCIN: 20 OINTMENT TOPICAL at 08:37

## 2019-10-17 RX ADMIN — ENOXAPARIN SODIUM 40 MG: 40 INJECTION SUBCUTANEOUS at 08:33

## 2019-10-17 RX ADMIN — BUDESONIDE AND FORMOTEROL FUMARATE DIHYDRATE 2 PUFF: 80; 4.5 AEROSOL RESPIRATORY (INHALATION) at 07:52

## 2019-10-17 RX ADMIN — ERTAPENEM SODIUM 1 G: 1 INJECTION, POWDER, LYOPHILIZED, FOR SOLUTION INTRAMUSCULAR; INTRAVENOUS at 08:37

## 2019-10-17 RX ADMIN — INSULIN LISPRO 14 UNITS: 100 INJECTION, SOLUTION INTRAVENOUS; SUBCUTANEOUS at 08:33

## 2019-10-17 RX ADMIN — INSULIN LISPRO 2 UNITS: 100 INJECTION, SOLUTION INTRAVENOUS; SUBCUTANEOUS at 11:55

## 2019-10-17 NOTE — PLAN OF CARE
Problem: Patient Care Overview  Goal: Plan of Care Review  Outcome: Ongoing (interventions implemented as appropriate)   10/17/19 4284   OTHER   Outcome Summary Patient able to mobilize with walker safely for short distances. Going home with spouse   Coping/Psychosocial   Plan of Care Reviewed With patient   Plan of Care Review   Progress improving

## 2019-10-17 NOTE — PLAN OF CARE
Problem: Patient Care Overview  Goal: Plan of Care Review  Outcome: Ongoing (interventions implemented as appropriate)   10/17/19 0500   OTHER   Outcome Summary Pt had uneventful night, slept between nsg care. Pt denies pain to LLE, dsg CDI. Pt alert, cooperative. VSS   Coping/Psychosocial   Plan of Care Reviewed With patient   Plan of Care Review   Progress improving       Problem: Skin and Soft Tissue Infection (Adult)  Goal: Signs and Symptoms of Listed Potential Problems Will be Absent, Minimized or Managed (Skin and Soft Tissue Infection)  Outcome: Ongoing (interventions implemented as appropriate)

## 2019-10-17 NOTE — THERAPY DISCHARGE NOTE
Patient Name: Amol Aguirre  : 1945    MRN: [id number]                              Today's Date: 10/17/2019       Admit Date: 10/15/2019    Visit Dx:     ICD-10-CM ICD-9-CM   1. Diabetic ulcer of toe of left foot associated with type 2 diabetes mellitus, limited to breakdown of skin (CMS/[facility]) E11.621 250.80    L97.521 707.15   2. Cellulitis and abscess of toe of left foot L03.032 681.10    L02.612      Patient Active Problem List   Diagnosis   • Right foot pain   • Decreased pulses in feet   • Vascular calcification   • Uncontrolled type 2 diabetes mellitus with hyperglycemia (CMS/[facility])   • Type 2 diabetes mellitus with diabetic polyneuropathy, with long-term current use of insulin (CMS/[facility])   • Mass of lesser toe of right foot   • Blister (nonthermal), right foot, initial encounter   • Diabetic foot ulcers (CMS/[facility])   • Diabetic ulcer of toe of left foot associated with type 2 diabetes mellitus, limited to breakdown of skin (CMS/[facility])   • Gangrene (CMS/[facility])   • S/P BKA (below knee amputation), right (CMS/[facility])   • HTN (hypertension)   • CKD (chronic kidney disease)   • Leukocytosis, likely reactive   • Hyperkalemia   • Anemia   • Acute postoperative pain   • 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   • left 3rd toe cellulitis   • Cellulitis and abscess of toe of left foot   • S/P Amputation of 3rd toe of left foot (CMS/[facility])     Past Medical History:   Diagnosis Date   • Acid reflux    • Asthma    • Diabetes (CMS/[facility])    • Hypertension      Past Surgical History:   Procedure Laterality Date   • AMPUTATION DIGIT Left 10/15/2019    Procedure: AMPUTATE LEFT THIRD TOE;  Surgeon: Juju Weber MD;  Location: [facility] OR;  Service: Orthopedics   • AORTAGRAM N/A 2019    Procedure: AORTAGRAM WITH OR WITHOUT RUNOFFS;  Surgeon: Carrillo White MD;  Location: [facility] HYBRID OR 15;  Service: Vascular   • BELOW KNEE AMPUTATION Right 2019    Procedure: BELOW KNEE AMPUTATION RIGHT;  Surgeon: Tia  Juju SNOW MD;  Location: [facility];  Service: Orthopedics   • CHOLECYSTECTOMY     • KNEE SURGERY Right     TKA     General Information     Row Name 10/17/19 8191          General Information    Patient Profile Reviewed?  yes  -SC     Existing Precautions/Restrictions  fall;partial weight bearing BKA R  -SC     Row Name 10/17/19 4264          Cognitive Assessment/Intervention- PT/OT    Orientation Status (Cognition)  oriented x 4  -SC     Cognitive Assessment/Intervention Comment  alert  -SC     Row Name 10/17/19 0703          Safety Issues, Functional Mobility    Impairments Affecting Function (Mobility)  balance  -SC       User Key  (r) = Recorded By, (t) = Taken By, (c) = Cosigned By    Initials Name Provider Type    SC Ct, Kalion A, PT Physical Therapist        Mobility     Row Name 10/17/19 1334          Bed Mobility Assessment/Treatment    Bed Mobility Assessment/Treatment  bed mobility (all) activities  -SC     [place] Level (Bed Mobility)  independent  -SC     Row Name 10/17/19 1337          Transfer Assessment/Treatment    Comment (Transfers)  STS from edge of bed with VC. Demonstrated slow transfer into standing . Cues for positioning of leg on standing.   -SC     Row Name 10/17/19 1335          Sit-Stand Transfer    Sit-Stand [place] (Transfers)  verbal cues;contact guard  -SC     Assistive Device (Sit-Stand Transfers)  walker, front-wheeled  -SC     Row Name 10/17/19 7451          Gait/Stairs Assessment/Training    37213 - Gait Training Minutes   16  -SC     Gait/Stairs Assessment/Training  gait/ambulation independence  -SC     Pattern (Gait)  step-to  -SC     Deviations/Abnormal Patterns (Gait)  left sided deviations;stride length decreased  -SC     Bilateral Gait Deviations  forward flexed posture  -SC     Number of Steps (Stairs)  75  -SC     Comment (Gait/Stairs)  vc cues for step to gait pattern with L heel wt. required time to get sequening correct. recommended smaller steps to assist  with balance. No LOB  -SC       User Key  (r) = Recorded By, (t) = Taken By, (c) = Cosigned By    Initials Name Provider Type    SC Ct, Shearon A, PT Physical Therapist        Obj/Interventions    No documentation.       Goals/Plan     Row Name 10/17/19 1343          Gait Training Goal 1 (PT)    [place] Level (Gait Training Goal 1, PT)  conditional independence  -SC     Distance (Gait Goal 1, PT)  75  -SC     Time Frame (Gait Training Goal 1, PT)  long term goal (LTG);10 days  -SC     Progress/Outcome (Gait Training Goal 1, PT)  goal partially met  -SC       User Key  (r) = Recorded By, (t) = Taken By, (c) = Cosigned By    Initials Name Provider Type    SC Ct, Shearon A, PT Physical Therapist        Clinical Impression     Row Name 10/17/19 1341          Pain Assessment    Additional Documentation  Pain Scale: FACES Pre/Post-Treatment (Group)  -SC     Row Name 10/17/19 1341          Pain Scale: Numbers Pre/Post-Treatment    Pain Location - Side  Left  -SC     Pain Location - Orientation  lower  -SC     Pain Location  extremity  -SC     Row Name 10/17/19 1341          Pain Scale: FACES Pre/Post-Treatment    Pain: FACES Scale, Pretreatment  0-->no hurt  -SC     Pain: FACES Scale, Post-Treatment  0-->no hurt  -SC     Row Name 10/17/19 1341          Plan of Care Review    Plan of Care Reviewed With  patient;spouse  -SC     Row Name 10/17/19 1341          Physical Therapy Clinical Impression    Rehab Potential (PT Clinical Summary)  good, to achieve stated therapy goals  -SC     Row Name 10/17/19 1341          Positioning and Restraints    Pre-Treatment Position  in bed  -SC     Post Treatment Position  bathroom  -SC     Bathroom  sitting;notified nsg;call light within reach  -SC       User Key  (r) = Recorded By, (t) = Taken By, (c) = Cosigned By    Initials Name Provider Type    SC Ct, Cyndy A, PT Physical Therapist        Outcome Measures     Row Name 10/17/19 1349          How much help from another  person do you currently need...    Turning from your back to your side while in flat bed without using bedrails?  4  -SC     Moving from lying on back to sitting on the side of a flat bed without bedrails?  4  -SC     Moving to and from a bed to a chair (including a wheelchair)?  3  -SC     Standing up from a chair using your arms (e.g., wheelchair, bedside chair)?  3  -SC     Climbing 3-5 steps with a railing?  2  -SC     To walk in hospital room?  3  -SC     AM-PAC 6 Clicks Score (PT)  19  -SC       User Key  (r) = Recorded By, (t) = Taken By, (c) = Cosigned By    Initials Name Provider Type    Cyndy Deluna PT Physical Therapist        Physical Therapy Education     Title: PT OT SLP Therapies (Done)     Topic: Physical Therapy (Done)     Point: Mobility training (Done)     Learning Progress Summary           Patient Eager, E,D, VU,DU by SC at 10/17/2019  1:42 PM    Comment:  reviewed safety with mobility    Eager, E, VU by SC at 10/16/2019 10:09 AM    Comment:  heel wt only   Family Eager, E,D, VU,DU by SC at 10/17/2019  1:42 PM    Comment:  reviewed safety with mobility                   Point: Home exercise program (Done)     Learning Progress Summary           Patient Eager, E,D, VU,DU by SC at 10/17/2019  1:42 PM    Comment:  reviewed safety with mobility    Eager, E, VU by SC at 10/16/2019 10:09 AM    Comment:  heel wt only   Family Eager, E,D, VU,DU by SC at 10/17/2019  1:42 PM    Comment:  reviewed safety with mobility                   Point: Body mechanics (Done)     Learning Progress Summary           Patient Eager, E,D, VU,DU by SC at 10/17/2019  1:42 PM    Comment:  reviewed safety with mobility    Eager, E, VU by SC at 10/16/2019 10:09 AM    Comment:  heel wt only   Family Eager, E,D, VU,DU by SC at 10/17/2019  1:42 PM    Comment:  reviewed safety with mobility                   Point: Precautions (Done)     Learning Progress Summary           Patient Eager, E,D, VU,DU by SC at 10/17/2019  1:42  PM    Comment:  reviewed safety with mobility    RAHEEL Tam VU by SC at 10/16/2019 10:09 AM    Comment:  heel wt only   Family RAHEEL Tam,SHANNON, ZBIGNIEW,ILYA by SC at 10/17/2019  1:42 PM    Comment:  reviewed safety with mobility                               User Key     Initials Effective Dates Name Provider Type [facility] 06/19/15 -  Cyndy Fofana, PT Physical Therapist PT              PT Recommendation and Plan     Outcome Summary/Treatment Plan (PT)  Anticipated Discharge Disposition (PT): home with assist  Plan of Care Reviewed With: patient  Progress: improving  Outcome Summary: Elsi able to mobilize with walker safely for short distances. Going home with spouse     Time Calculation:   PT Charges     Row Name 10/17/19 1336 10/17/19 1318          Time Calculation    Start Time  --  1318  -SC     PT Received On  --  10/17/19  -SC        Timed Charges    83292 - Gait Training Minutes   16  -SC  --       User Key  (r) = Recorded By, (t) = Taken By, (c) = Cosigned By    Initials Name Provider Type    SC Cyndy Fofana, PT Physical Therapist        Therapy Charges for Today     Code Description Service Date Service Provider Modifiers Qty    38015054887 HC PT EVAL MOD COMPLEXITY 3 10/16/2019 Cyndy Fofana, PT GP 1    09758085230 HC PT THER SUPP EA 15 MIN 10/16/2019 Cyndy Fofana, PT GP 2    36869622783 HC GAIT TRAINING EA 15 MIN 10/17/2019 Cyndy Fofana, PT GP 1          PT G-Codes  Outcome Measure Options: AM-PAC 6 Clicks Basic Mobility (PT)  AM-PAC 6 Clicks Score (PT): 19    PT Discharge Summary  Anticipated Discharge Disposition (PT): home with assist  Outcomes Achieved: Patient able to partially acheive established goals  Discharge Destination: Home with assist    Cyndy Fofana, PT  10/17/2019

## 2019-10-17 NOTE — PROGRESS NOTES
Infectious Disease Progress Note  Amol Aguirre  1945  [id number]    Date of Consult: 10/9/2019  Admission Date: 10/15/2019    Requesting Provider: Juju Weber MD  Evaluating Physician: Easton Garcia MD    Chief Complaint: left toe infection    Reason for Consultation: diabetic foot infection    History of present illness:   Patient is a 74 y.o.  Yr old male with history of poorly controlled DM2, venous stasis disease, asthma, HTN.  I saw him earlier this year in June after he developed an ulceration on his right foot after fishing trip to [place].  He was treated initially with oral antibiotics and then was referred to Dr Weber due to mass on his right great toe.  Soon after that visit he presented to [facility] on 4/19with increasing redness and a new ulceration between his third and fourth toes.  MRI done 4/22 with possible early changes in the fourth metatarsal head of osteomyelitis.  Wound culture there grew staph epidermidis and enterococcus. Given daptomycin, linezolid and zosyn.  A1c at [facility] was 10.5      He was transferred to [facility] on 4/22 for further evaluation. Dr Weber contacted me to help manage antibiotics.    Wound culture grew Klebsiella and Morganella.  He had an aortogram which showed significant right lower extremity peripheral vascular disease however no sufficient targets for revascularization.  He was discharged on daptomycin and ertapenem and I followed him for the next few weeks in clinic.  Initially he seemed to improve however last week he was noted to have acute worsening of the ulceration in his foot which progressed to steve gangrene.  Dr. Weber evaluated him and recommended BKA which was performed 6/4/19. Treated with daptomycin and ertapenem for 3 days post-op then discharged off antibiotics.       10/9: I was asked to reevaluate by Dr Weber.  Had a routine follow-up visit he was noted to have an erythematous ulceration over  "the left third toe.  He is been admitted for further work-up.  Vascular studies planned.  He says he did not even know the ulcer existed until it was discovered in the clinic.  No fevers, chills.  No drainage from the wound.  It is nontender.  He is now independently ambulatory with a prosthesis on right BKA. Resting comfortably.    MRI was negative for osteomyelitis but giving exam findings of ulceration down to bone amputation was planned.  Given his stability he was discharged home on Cipro and Keflex but returned to the hospital on 10/15 for surgery.  10/15 underwent amputation of the left third toe at MTP joint.  Tolerated the procedure well and is currently recovering on the floor.  I was asked to reevaluate by Dr. Weber due to concerning ulceration on the anterior part of his lower leg and foot as well as toe findings.  Patient complains that this was a \"blister,\" which ruptured and drained clear fluid but no pus.    10/16: No acute events overnight.  Afebrile.  Tolerating current antibiotics well.  Foot pain is well controlled    10/17: stable. Cultures negative. Tolerating antibiotics. No pain in foot. PICC placed. Hoping to d/c home today    ROS:  No fevers or chills. No n/v/d. No rash. No new ADR to Abx.       Allergies   Allergen Reactions   • Chlorhexidine Shortness Of Breath, Itching and Rash     Pt developed a rash, itching, and shortness of breath after receiving a CHG bath on 4/24/19.   • Latex Other (See Comments)     Rash, hives, and tongue swelling       Antibiotics:  Anti-Infectives (From admission, onward)    Ordered     Dose/Rate Route Frequency Start Stop    10/17/19 1016  ertapenem (INVanz) 1 g/100 mL 0.9% NS VTB (mbp)     Ordering Provider:  Erica Reyez APRN    1 g  over 30 Minutes Intravenous Every 24 Hours Scheduled 10/18/19 0000 11/15/19 8544    10/17/19 1016  DAPTOmycin 550 mg in sodium chloride 0.9 % 50 mL     Ordering Provider:  Erica Reyez APRN    6 mg/kg × 95.5 kg " (Adjusted)  100 mL/hr over 30 Minutes Intravenous Every 24 Hours 10/17/19 0000 10/30/19 2359    10/15/19 1757  DAPTOmycin (CUBICIN) 550 mg in sodium chloride 0.9 % 50 mL IVPB     Ordering Provider:  Easton Garcia MD    6 mg/kg × 95.5 kg (Adjusted)  100 mL/hr over 30 Minutes Intravenous Every 24 Hours 10/16/19 1200 10/30/19 1159    10/15/19 1705  ertapenem (INVanz) 1 g/100 mL 0.9% NS VTB (mbp)     Ordering Provider:  Juju Weber MD    1 g  over 30 Minutes Intravenous Every 24 Hours Scheduled 10/16/19 0900 11/15/19 0859          Other Medications:  Current Facility-Administered Medications   Medication Dose Route Frequency Provider Last Rate Last Dose   • amLODIPine (NORVASC) tablet 10 mg  10 mg Oral Daily Erica Reyez APRN   10 mg at 10/17/19 0834   • bisacodyl (DULCOLAX) suppository 10 mg  10 mg Rectal Daily PRN Juju Weber MD       • budesonide-formoterol (SYMBICORT) 80-4.5 MCG/ACT inhaler 2 puff  2 puff Inhalation BID - RT Erica Reyez APRN   2 puff at 10/17/19 0752   • cloNIDine (CATAPRES) tablet 0.1 mg  0.1 mg Oral Q12H Erica Reyez APRN   0.1 mg at 10/17/19 0834   • colchicine tablet 0.6 mg  0.6 mg Oral Daily Erica Reyez APRN   0.6 mg at 10/17/19 0834   • DAPTOmycin (CUBICIN) 550 mg in sodium chloride 0.9 % 50 mL IVPB  6 mg/kg (Adjusted) Intravenous Q24H Easton Garcia  mL/hr at 10/16/19 1148 550 mg at 10/16/19 1148   • dextrose (D50W) 25 g/ 50mL Intravenous Solution 25 g  25 g Intravenous Q15 Min PRN Erica Reyez APRN       • dextrose (GLUTOSE) oral gel 15 g  15 g Oral Q15 Min PRN Erica Reyez APRN       • diphenhydrAMINE (BENADRYL) capsule 25 mg  25 mg Oral Nightly PRN Juju Weber MD        Or   • diphenhydrAMINE (BENADRYL) injection 25 mg  25 mg Intravenous Nightly PRN Juju Weber MD       • enoxaparin (LOVENOX) syringe 40 mg  40 mg Subcutaneous Daily Juju Weber MD   40 mg at 10/17/19 0837   • ertapenem (INVanz) 1 g/100 mL 0.9% NS  VTB (mbp)  1 g Intravenous Q24H Juju Weber MD   1 g at 10/17/19 0837   • fentaNYL citrate (PF) (SUBLIMAZE) injection 50 mcg  50 mcg Intravenous Q5 Min PRN Dmitry Gonzalez MD       • glucagon (human recombinant) (GLUCAGEN DIAGNOSTIC) injection 1 mg  1 mg Subcutaneous Q15 Min PRN Erica Reyez, APRN       • HYDROcodone-acetaminophen (NORCO) 7.5-325 MG per tablet 1 tablet  1 tablet Oral Q4H PRN Juju Weber MD       • HYDROcodone-acetaminophen (NORCO) 7.5-325 MG per tablet 2 tablet  2 tablet Oral Q4H PRN Juju Weber MD       • HYDROmorphone (DILAUDID) injection 0.5 mg  0.5 mg Intravenous Q5 Min PRN Dmitry Gonzalez MD       • HYDROmorphone (DILAUDID) injection 1 mg  1 mg Intravenous Q2H PRN Juju Weber MD        And   • naloxone (NARCAN) injection 0.4 mg  0.4 mg Intravenous Q5 Min PRN Juju Weber MD       • insulin detemir (LEVEMIR) injection 38 Units  38 Units Subcutaneous Nightly Erica Reyez, APRN   38 Units at 10/16/19 2111   • insulin lispro (humaLOG) injection 0-9 Units  0-9 Units Subcutaneous 4x Daily With Meals & Nightly Erica Reyez, APRN   4 Units at 10/16/19 1148   • insulin lispro (humaLOG) injection 14 Units  14 Units Subcutaneous TID With Meals Erica Reyez, APRN   14 Units at 10/17/19 0833   • labetalol (NORMODYNE,TRANDATE) injection 10 mg  10 mg Intravenous Q4H PRN Erica Reyez, APRN       • lactated ringers bolus 500 mL  500 mL Intravenous Once PRN Dmitry Gonzalez MD       • lactated ringers infusion  9 mL/hr Intravenous Continuous Dmitry Gonzalez MD 9 mL/hr at 10/15/19 1246     • losartan (COZAAR) tablet 100 mg  100 mg Oral Daily Erica Reyez, APRN   100 mg at 10/17/19 0834   • magnesium hydroxide (MILK OF MAGNESIA) suspension 2400 mg/10mL 10 mL  10 mL Oral Daily PRN DeGnore, Juju T, MD       • melatonin tablet 5 mg  5 mg Oral Nightly PRN Erica Reyez APRN   5 mg at 10/16/19 2120   • metaxalone (SKELAXIN) tablet 800 mg  800 mg Oral TID PRN Dereje  "Erica, APROMAR       • metoclopramide (REGLAN) tablet 10 mg  10 mg Oral 4x Daily AC & at Bedtime Erica Reyez APRN   10 mg at 10/17/19 0834   • multivitamin (THERAGRAN) tablet 1 tablet  1 tablet Oral Daily Juju Weber MD   1 tablet at 10/17/19 0834   • mupirocin (BACTROBAN) 2 % ointment   Topical Q24H Juju Weber MD       • nebivolol (BYSTOLIC) tablet 10 mg  10 mg Oral Q24H Erica Reyez APRN   10 mg at 10/17/19 0834   • ondansetron (ZOFRAN) injection 4 mg  4 mg Intravenous Q6H PRN Erica Reyez APRN   4 mg at 10/16/19 1802   • ondansetron (ZOFRAN) injection 4 mg  4 mg Intravenous Q6H PRN Juju Weber MD       • oxyCODONE-acetaminophen (PERCOCET) 5-325 MG per tablet 1 tablet  1 tablet Oral Q4H PRN Juju Weber MD       • oxyCODONE-acetaminophen (PERCOCET) 5-325 MG per tablet 2 tablet  2 tablet Oral Q4H PRN Juju Weber MD       • pantoprazole (PROTONIX) EC tablet 40 mg  40 mg Oral Q AM Erica Reyez APRN   40 mg at 10/17/19 0607   • promethazine (PHENERGAN) injection 12.5 mg  12.5 mg Intramuscular Q4H PRN Juju Weber MD       • promethazine (PHENERGAN) injection 12.5 mg  12.5 mg Intravenous Q4H PRN Juju Weber MD       • sodium chloride 0.45 % with KCl 20 mEq/L infusion  50 mL/hr Intravenous Continuous Juju Weber MD 50 mL/hr at 10/15/19 2010 50 mL/hr at 10/15/19 2010   • sodium chloride 0.9 % bolus 500 mL  500 mL Intravenous TID PRN Erica Reyez APRN       • sodium chloride 0.9 % flush 10 mL  10 mL Intravenous Q12H Easton Garcia MD       • sodium chloride 0.9 % flush 10 mL  10 mL Intravenous PRN Easton Garcia MD           Physical Exam:   Vital Signs   /64 (BP Location: Left arm, Patient Position: Lying)   Pulse 75   Temp 98.1 °F (36.7 °C) (Oral)   Resp 16   Ht 190.5 cm (75\")   Wt 112 kg (246 lb)   SpO2 92%   BMI 30.75 kg/m²     GENERAL: Awake and alert, in no acute distress.   HEENT: Normocephalic, atraumatic.  PERRL. EOMI. No " conjunctival injection. No icterus.    HEART: RRR; No murmur.  LUNGS: Clear to auscultation bilaterally without wheezing, rales, rhonchi. Normal respiratory effort.   ABDOMEN: Soft, nontender, nondistended.  EXT: BKA well-healed on the right.  Left foot bandaged postoperatively.  CDI, non-tender to palpation  : Without Mcdonald catheter.   MSK: FROM without joint effusions noted arms/legs.    SKIN: Warm and dry without cutaneous eruptions on Inspection/palpation.    NEURO: Oriented to PPT.  Left lower extremity neuropathy  PSYCHIATRIC: Normal insight and judgement. Cooperative with [facility]    Laboratory Data    Results from last 7 days   Lab Units 10/16/19  0429 10/15/19  1219   WBC 10*3/mm3 8.37 7.66   HEMOGLOBIN g/dL 10.4* 11.5*   HEMATOCRIT % 33.3* 37.3*   PLATELETS 10*3/mm3 267 321     Results from last 7 days   Lab Units 10/17/19  0940   SODIUM mmol/L 138   POTASSIUM mmol/L 4.1   CHLORIDE mmol/L 104   CO2 mmol/L 25.0   BUN mg/dL 22   CREATININE mg/dL 1.50*   GLUCOSE mg/dL 152*   CALCIUM mg/dL 9.3     Estimated Creatinine Clearance: 56.6 mL/min (A) (by C-G formula based on SCr of 1.5 mg/dL (H)).                Microbiology:     I reviewed prior culture data  4/23 wound culture grew Morganella and Klebsiella       10/9 blood cx negative    10/15 OR bacterial cx negative, final anaerobic cx pending  Blood cx negative     Radiology:  Mri Foot Left With & Without Contrast    Result Date: 10/14/2019  1. 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 2. No evidence of osteomyelitis of the third digit elsewhere. 3. Mild diffuse edema and enhancement of the third digit soft tissues consistent with cellulitis.   D:  10/10/2019 E:  10/10/2019  This report was finalized on 10/14/2019 10:54 AM by DR. Navneet Patton MD.       I personally reviewed the above imaging studies.      Impression:   1. Acute left 3rd toe ulceration and cellulitis and clinical  osteomyelitis: Down to level of bone on exam. Ulceration progressed quickly to full thickness. Complicated by known severe PVD. S/p amputation to MTP joint by Dr Weber on 10/15, intra-op cultures negative to date.   2. Ulceration on anterior aspect of foot ankle: likely complication of known severe PVD vs from minor trauma exacerbated by poor wound healing from diabetes. High risk for deep infection. Will need aggressive wound care and IV antibiotics to prevent spread and compromise of extremity  3. 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 Severe bilateral lower extremity arterial vascular disease,   5. Poorly controlled DM2 complicated by neuropathy   6. CKD stage 3: at baseline  7. Asthma    PLAN:    - f/u pending OR cultures and pathology     - Trend CBC, BMP, CK    - ertapenem 1 gm daily  - IV daptomycin 500 mg daily     - PICC placed    - Discussed with Dr Weber. Regardless of toe culture results, he will need another prolonged course of IV antibiotics to try to salvage left foot. Can be dispensed through [facility]    Discharge Orders:  - IV daptomycin 500 mg daily  - IV ertapenem 1 gm daily  - antibiotics dispensed via [facility]  - Weekly sterile PICC dressing changes at [facility]  - Weekly labs CBC w/diff, BMP, LFTs, CK, ESR, CRP done at [facility] visits  - f/u with me next Thursday. Called office to make appointment    Ok with discharge from my standpoint when cleared by Dr Weber and Dr ALCANTARA.     I will follow. High risk for further limb loss if not treated aggressively.     Easton Garcia MD  10/17/2019

## 2019-10-17 NOTE — PROGRESS NOTES
Orthopedic  Progress Note    Subjective     Post-Operative Day: 2 Days Post-Op-amputate left 3rd toe    Systemic or Specific Complaints: no compalints  Objective     Vital signs in last 24 hours:  Temp:  [97.8 °F (36.6 °C)-98.3 °F (36.8 °C)] 98 °F (36.7 °C)  Heart Rate:  [64-75] 64  Resp:  [16-18] 16  BP: (131-151)/(63-79) 146/64    Neurovascular: left foot no change in neuropathy               Wound: dressing changed, toe healing well, left ankle wound no change- no erythema    Data Review  Lab Results (last 24 hours)     Procedure Component Value Units Date/Time    POC Glucose Once [[id number]]  (Abnormal) Collected:  10/17/19 1109    Specimen:  Blood Updated:  10/17/19 1111     Glucose 169 mg/dL     Basic Metabolic Panel [[id number]]  (Abnormal) Collected:  10/17/19 0940    Specimen:  Blood Updated:  10/17/19 1027     Glucose 152 mg/dL      BUN 22 mg/dL      Creatinine 1.50 mg/dL      Sodium 138 mmol/L      Potassium 4.1 mmol/L      Chloride 104 mmol/L      CO2 25.0 mmol/L      Calcium 9.3 mg/dL      eGFR Non African Amer 46 mL/min/1.73      BUN/Creatinine Ratio 14.7     Anion Gap 9.0 mmol/L     Narrative:       GFR Normal >60  Chronic Kidney Disease <60  Kidney Failure <15    POC Glucose Once [[id number]]  (Abnormal) Collected:  10/17/19 0748    Specimen:  Blood Updated:  10/17/19 0751     Glucose 141 mg/dL     Tissue / Bone Culture - Tissue, Toe, Left [[id number]] Collected:  10/15/19 1406    Specimen:  Tissue from Toe, Left Updated:  10/17/19 0713     Tissue Culture Rare Normal Skin Sonia     Gram Stain No WBCs or organisms seen    POC Glucose Once [[id number]]  (Normal) Collected:  10/16/19 2031    Specimen:  Blood Updated:  10/16/19 2035     Glucose 107 mg/dL     POC Glucose Once [[id number]]  (Abnormal) Collected:  10/16/19 1553    Specimen:  Blood Updated:  10/16/19 1555     Glucose 139 mg/dL             Assessment/Plan     Status post- left 3rd toe amp, ankle wound  -ok to go home  -the healing shoe   Unfortunately has a strap across the ankle that will make the wound worse, so he cannot use it.  He has a thong sandal at home, we will use this  -see me in 1 week  -his wife understands how to do the dressing changes once per day- Bactroban gauze and ace           Juju Weber MD    Date: 10/17/2019  Time: 2:44 PM

## 2019-10-17 NOTE — DISCHARGE SUMMARY
Patient Name: Amol Aguirre  MRN: [id number]  : 1945  DOS: 10/17/2019    Attending: Juju Juarez MD    Primary Care Provider: Donte Briones MD    Date of Admission:.10/15/2019 10:47 AM    Date of Discharge:  10/17/2019    Discharge Diagnosis:   S/P Amputation of 3rd toe of left foot (CMS/HCC)    Diabetic ulcer of toe of left foot associated with type 2 diabetes mellitus, limited to breakdown of skin (CMS/HCC)    Cellulitis and abscess of toe of left foot    Type 2 diabetes mellitus with diabetic polyneuropathy, with long-term current use of insulin (CMS/HCC)    HTN (hypertension)    CKD (chronic kidney disease)    Anemia    Idiopathic chronic gout of right foot with [facility] Course  Patient is a 74 y.o. male presented for amputation of 3rd left toe by Dr. Juarez. He tolerated surgery well and was admitted for further medical management.     He was admitted last week for 3rd toe cellulitis. He was seen by [facility], Dr. Garcia, for antibiotic management and was discharged on oral abx. He was seen by Dr. White, cardiothoracic sx, for vascular evaluation. It was felt the patient had adequate blood flow to heal postop. The ulcer to his left ankle is more red, swollen, with scattered blisters.      He was seen by Dr. Garcia, [facility], for antibiotic management. He received a PICC line and will be discharged on IV abx.    He did complain of gout pain in foot, he was given colchicine with good results.    Patient was provided pain medications as needed for pain control.  Adjustments were made to pain medications to optimize postop pain management. Risks and benefits of opiate medications discussed with patient.    He was seen by PT has progressed well over his stay.  He used an IS for atelectasis prophylaxis and Lovenox along with mechanicals for DVT prophylaxis.  Home medications were resumed as appropriate, and labs were monitored and remained fairly stable.     With the progress  "he has made, he is ready for DC home today.    Discussed with patient regarding plan and he shows understanding and agreement.        Procedures Performed  10/15/19 2:12 PM     Preoperative diagnosis: necrotic ulcer left 3rd toe, exposed bone, anterior ankle wound     Postoperative diagnosis: Same     Anesthesia: MAC, local     Surgeon: Juju Weber M.D.     Operative procedure: amputate left 3rd toe    Pertinent Test Results:    I reviewed the patient's new clinical results.   Results from last 7 days   Lab Units 10/16/19  0429 10/15/19  1219   WBC 10*3/mm3 8.37 7.66   HEMOGLOBIN g/dL 10.4* 11.5*   HEMATOCRIT % 33.3* 37.3*   PLATELETS 10*3/mm3 267 321     Results from last 7 days   Lab Units 10/17/19  0940 10/16/19  0525 10/15/19  1219   SODIUM mmol/L 138 139 139   POTASSIUM mmol/L 4.1 4.3 4.8   CHLORIDE mmol/L 104 106 105   CO2 mmol/L 25.0 24.0 23.0   BUN mg/dL 22 24* 30*   CREATININE mg/dL 1.50* 1.48* 1.52*   CALCIUM mg/dL 9.3 9.3 9.9   GLUCOSE mg/dL 152* 113* 196*     Results for ASAEL MONDRAGON (MRN [id number]) as of 10/19/2019 14:42   Ref. Range 10/16/2019 20:31 10/17/2019 07:48 10/17/2019 09:40 10/17/2019 11:09   Glucose Latest Ref Range: 70 - 130 mg/dL 107 141 (H) 152 (H) 169 (H)     I reviewed the patient's new imaging including images and reports.      Physical therapy:Patietn able to mobilize with walker safely for short distances. Going home with spouse    Discharge Assessment:    Vital Signs  Visit Vitals  /64 (BP Location: Left arm, Patient Position: Lying)   Pulse 64   Temp 98 °F (36.7 °C) (Oral)   Resp 16   Ht 190.5 cm (75\")   Wt 112 kg (246 lb)   SpO2 94%   BMI 30.75 kg/m²     Temp (24hrs), Av °F (36.7 °C), Min:97.8 °F (36.6 °C), Max:98.3 °F (36.8 °C)      General Appearance:    Alert, cooperative, in no acute distress   Lungs:     Clear to auscultation,respirations regular, even and                   unlabored    Heart:    Regular rhythm and normal rate, normal S1 and S2   Abdomen:  "    Normal bowel sounds, no masses, no organomegaly, soft        non-tender, non-distended, no guarding, no rebound                 tenderness   Extremities:   dressing over the left foot is clean dry and intact.  Distal exposed toes with normal cap refill. R BKA   Pulses:   Pulses palpable and equal bilaterally   Skin:   No bleeding, bruising or rash. PICC   Neurologic:   Cranial nerves 2 - 12 grossly intact, sensation intact       Discharge Disposition: Home    Discharge Medications     Discharge Medications      New Medications      Instructions Start Date   colchicine 0.6 MG tablet   0.6 mg, Oral, 2 Times Daily, Hold for diarrhea      DAPTOmycin 550 mg in sodium chloride 0.9 % 50 mL   6 mg/kg, Intravenous, Every 24 Hours, Per [facility]      ertapenem 1 gm/100ml solution IV  Commonly known as:  INVanz   1 g, Intravenous, Every 24 Hours Scheduled, Per [facility]   Start Date:  10/18/2019        Changes to Medications      Instructions Start Date   ondansetron 4 MG tablet  Commonly known as:  ZOFRAN  What changed:  Another medication with the same name was removed. Continue taking this medication, and follow the directions you see here.   4 mg, Oral, Every 6 Hours PRN         Continue These Medications      Instructions Start Date   amLODIPine 10 MG tablet  Commonly known as:  NORVASC   10 mg, Oral, Daily      aspirin 81 MG EC tablet   81 mg, Oral, Daily      budesonide-formoterol 80-4.5 MCG/ACT inhaler  Commonly known as:  SYMBICORT   2 puffs, Inhalation, 2 Times Daily - RT      BYSTOLIC 10 MG tablet  Generic drug:  nebivolol   TK 1/2 T PO BID      cloNIDine 0.1 MG tablet  Commonly known as:  CATAPRES   0.1 mg, Oral, 2 Times Daily      docusate sodium 100 MG capsule  Commonly known as:  COLACE   100 mg, Oral, 2 Times Daily PRN      hydroCHLOROthiazide 25 MG tablet  Commonly known as:  HYDRODIURIL   Oral, Daily      HYDROcodone-acetaminophen 7.5-325 MG per tablet  Commonly known as:  NORCO   1-2 tablets, Oral, Every 6 Hours  PRN      losartan 100 MG tablet  Commonly known as:  COZAAR   100 mg, Oral, Daily      melatonin 5 MG tablet tablet   5 mg, Oral, Nightly PRN      metaxalone 800 MG tablet  Commonly known as:  SKELAXIN   800 mg, Oral, 3 Times Daily PRN      metoclopramide 10 MG tablet  Commonly known as:  REGLAN   TK 1 T PO BEFORE MEALS AND QHS      oxyCODONE-acetaminophen 5-325 MG per tablet  Commonly known as:  PERCOCET   1-2 tablets, Oral, Every 6 Hours PRN      pantoprazole 40 MG EC tablet  Commonly known as:  PROTONIX   40 mg, Oral, Daily      tolnaftate 1 % cream  Commonly known as:  TINACTIN   tolnaftate 1 % topical cream   Apply twice a day between the toes.   Obtain over-the-counter      TOUJEO SOLOSTAR 300 UNIT/ML solution pen-injector injection  Generic drug:  Insulin Glargine   Subcutaneous      vitamin B-12 1000 MCG tablet  Commonly known as:  CYANOCOBALAMIN   1,000 mcg, Oral, Daily         Stop These Medications    cephalexin 500 MG capsule  Commonly known as:  KEFLEX     ciprofloxacin 500 MG tablet  Commonly known as:  CIPRO            Discharge Diet: consistent carb diet    Activity at Discharge: weight bear on heel in postop shoe    Follow-up Appointments  Dr. Juarez per her orders  Dr. Garcia per his orders    Discharge took over 30 min.    VIPUL Alvarez  10/17/19  2:43 PM

## 2019-10-18 ENCOUNTER — READMISSION MANAGEMENT (OUTPATIENT)
Dept: CALL CENTER | Facility: HOSPITAL | Age: 74
End: 2019-10-18

## 2019-10-18 NOTE — OUTREACH NOTE
Prep Survey      Responses   Facility patient discharged from?  [place]   Is patient eligible?  Yes   Discharge diagnosis  s/p amputation of 3rd toe of left foot   Does the patient have one of the following disease processes/diagnoses(primary or secondary)?  General Surgery   Does the patient have Home health ordered?  Yes   What is the Home health agency?   [facility] for IV ABO's   Is there a DME ordered?  No   Comments regarding appointments  Pt to schedule follow up with PCP   Prep survey completed?  Yes          Diana Santiago RN

## 2019-10-20 LAB — BACTERIA SPEC ANAEROBE CULT: NORMAL

## 2019-10-22 ENCOUNTER — READMISSION MANAGEMENT (OUTPATIENT)
Dept: CALL CENTER | Facility: HOSPITAL | Age: 74
End: 2019-10-22

## 2019-10-22 NOTE — OUTREACH NOTE
General Surgery Week 1 Survey      Responses   Facility patient discharged from?  [place]   Does the patient have one of the following disease processes/diagnoses(primary or secondary)?  General Surgery   Is there a successful TCM telephone encounter documented?  No   Week 1 attempt successful?  Yes   Call start time  1021   Call end time  1023   Discharge diagnosis  s/p amputation of 3rd toe of left foot   Is patient permission given to speak with other caregiver?  Yes   List who call center can speak with  wife- Janie   Person spoke with today (if not patient) and relationship  wife- Janie   Meds reviewed with patient/caregiver?  Yes   Is the patient having any side effects they believe may be caused by any medication additions or changes?  No   Does the patient have all medications related to this admission filled (includes all antibiotics, pain medications, etc.)  Yes   Is the patient taking all medications as directed (includes completed medication regime)?  Yes   Does the patient have a follow up appointment scheduled with their surgeon?  Yes   Has the patient kept scheduled appointments due by today?  Yes   What is the Home health agency?   [facility] for IV [facility]   Comments  wife is completing IV antibiotics   Did the patient receive a copy of their discharge instructions?  Yes   Nursing interventions  Reviewed instructions with patient   What is the patient's perception of their health status since discharge?  Improving   Nursing interventions  Nurse provided patient education   Is the patient /caregiver able to teach back basic post-op care?  Lifting as instructed by MD in discharge instructions, Keep incision areas clean,dry and protected   Is the patient/caregiver able to teach back the hierarchy of who to call/visit for symptoms/problems? PCP, Specialist, Home health nurse, Urgent Care, ED, 911  Yes   Additional teach back comments  Per wife, pt is doing well and following discharge  instructions closely.   Week 1 call completed?  Yes          Randi Morataya RN

## 2019-10-23 ENCOUNTER — OFFICE VISIT (OUTPATIENT)
Dept: ORTHOPEDIC SURGERY | Facility: CLINIC | Age: 74
End: 2019-10-23

## 2019-10-23 DIAGNOSIS — E11.622 DIABETIC ULCER OF LEFT ANKLE (HCC): Primary | ICD-10-CM

## 2019-10-23 DIAGNOSIS — L97.329 DIABETIC ULCER OF LEFT ANKLE (HCC): Primary | ICD-10-CM

## 2019-10-23 PROCEDURE — 99024 POSTOP FOLLOW-UP VISIT: CPT | Performed by: ORTHOPAEDIC SURGERY

## 2019-10-23 NOTE — PROGRESS NOTES
Post-op (1 week - S/P Amputate Left 3rd toe  10/15/19)      Amol Aguirre is 1 week status post amputate left 3rd toe, treat anterior ankle wound, 10/15/19. He reports no fever, chills.  He reports pain is well controlled.  They have been taking off meds now for DVT prophylaxis.  They have been weight bearing as tolerated.      Past Surgical History:   Procedure Laterality Date   • AMPUTATION DIGIT Left 10/15/2019    Procedure: AMPUTATE LEFT THIRD TOE;  Surgeon: Juju Weber MD;  Location: [facility] OR;  Service: Orthopedics   • AORTAGRAM N/A 4/30/2019    Procedure: AORTAGRAM WITH OR WITHOUT RUNOFFS;  Surgeon: Carrillo White MD;  Location: [facility] HYBRID OR 15;  Service: Vascular   • BELOW KNEE AMPUTATION Right 6/4/2019    Procedure: BELOW KNEE AMPUTATION RIGHT;  Surgeon: Juju Weber MD;  Location:  [facility] OR;  Service: Orthopedics   • CHOLECYSTECTOMY     • FOOT SURGERY Left 10/15/2019    Amputate 3rd toe- De[place]nore   • KNEE SURGERY Right     TKA       There were no vitals taken for this visit.        let 3rd toe incision is healing, no sign of infection, left anterior ankle wound is also healing, no sign of infection    none    Assessment and Plan:   1. Diabetic ulcer of left ankle (CMS/HCC)  The toe amputation site and ankle ulcer are healing, continue to keep pressure off both areas, keep clean and dry with dressing, see me in 2 weeks for possible suture removal

## 2019-10-24 ENCOUNTER — TRANSCRIBE ORDERS (OUTPATIENT)
Dept: LAB | Facility: HOSPITAL | Age: 74
End: 2019-10-24

## 2019-10-24 ENCOUNTER — LAB (OUTPATIENT)
Dept: LAB | Facility: HOSPITAL | Age: 74
End: 2019-10-24

## 2019-10-24 DIAGNOSIS — N18.30 CHRONIC KIDNEY DISEASE, STAGE III (MODERATE) (HCC): ICD-10-CM

## 2019-10-24 DIAGNOSIS — B95.7 CHRONIC GRANULOMATOUS INFECTION DUE MOSTLY TO STAPHYLOCOCCUS AUREUS: ICD-10-CM

## 2019-10-24 DIAGNOSIS — M86.171 ACUTE OSTEOMYELITIS OF RIGHT ANKLE OR FOOT (HCC): ICD-10-CM

## 2019-10-24 DIAGNOSIS — E11.51 TYPE II DIABETES MELLITUS WITH PERIPHERAL CIRCULATORY DISORDER (HCC): ICD-10-CM

## 2019-10-24 DIAGNOSIS — E11.621 DIABETIC FOOT ULCER WITH OSTEOMYELITIS (HCC): ICD-10-CM

## 2019-10-24 DIAGNOSIS — I10 ESSENTIAL HYPERTENSION, BENIGN: ICD-10-CM

## 2019-10-24 DIAGNOSIS — I87.2 PERIPHERAL VENOUS INSUFFICIENCY: ICD-10-CM

## 2019-10-24 DIAGNOSIS — L97.509 DIABETIC FOOT ULCER WITH OSTEOMYELITIS (HCC): ICD-10-CM

## 2019-10-24 DIAGNOSIS — A49.8 BACTERIAL INFECTION DUE TO KLEBSIELLA PNEUMONIAE: ICD-10-CM

## 2019-10-24 DIAGNOSIS — E66.09 EXOGENOUS OBESITY: ICD-10-CM

## 2019-10-24 DIAGNOSIS — M86.9 DIABETIC FOOT ULCER WITH OSTEOMYELITIS (HCC): ICD-10-CM

## 2019-10-24 DIAGNOSIS — E87.5 HYPERPOTASSEMIA: Primary | ICD-10-CM

## 2019-10-24 DIAGNOSIS — E87.5 HYPERPOTASSEMIA: ICD-10-CM

## 2019-10-24 DIAGNOSIS — B95.2 ENTEROCOCCUS FAECALIS INFECTION: ICD-10-CM

## 2019-10-24 DIAGNOSIS — E08.621 DIABETIC ULCER OF TOE OF RIGHT FOOT ASSOCIATED WITH DIABETES MELLITUS DUE TO UNDERLYING CONDITION, WITH BONE INVOLVEMENT WITHOUT EVIDENCE OF NECROSIS (HCC): ICD-10-CM

## 2019-10-24 DIAGNOSIS — E11.69 DIABETIC FOOT ULCER WITH OSTEOMYELITIS (HCC): ICD-10-CM

## 2019-10-24 DIAGNOSIS — L97.516 DIABETIC ULCER OF TOE OF RIGHT FOOT ASSOCIATED WITH DIABETES MELLITUS DUE TO UNDERLYING CONDITION, WITH BONE INVOLVEMENT WITHOUT EVIDENCE OF NECROSIS (HCC): ICD-10-CM

## 2019-10-24 LAB
ALBUMIN SERPL-MCNC: 4.4 G/DL (ref 3.5–5.2)
ALBUMIN/GLOB SERPL: 1.3 G/DL
ALP SERPL-CCNC: 126 U/L (ref 39–117)
ALT SERPL W P-5'-P-CCNC: 12 U/L (ref 1–41)
ANION GAP SERPL CALCULATED.3IONS-SCNC: 11 MMOL/L (ref 5–15)
AST SERPL-CCNC: 16 U/L (ref 1–40)
BASOPHILS # BLD AUTO: 0.09 10*3/MM3 (ref 0–0.2)
BASOPHILS NFR BLD AUTO: 0.9 % (ref 0–1.5)
BILIRUB SERPL-MCNC: 0.3 MG/DL (ref 0.2–1.2)
BUN BLD-MCNC: 37 MG/DL (ref 8–23)
BUN/CREAT SERPL: 18.2 (ref 7–25)
CALCIUM SPEC-SCNC: 10.2 MG/DL (ref 8.6–10.5)
CHLORIDE SERPL-SCNC: 103 MMOL/L (ref 98–107)
CK SERPL-CCNC: 43 U/L (ref 20–200)
CO2 SERPL-SCNC: 27 MMOL/L (ref 22–29)
CREAT BLD-MCNC: 2.03 MG/DL (ref 0.76–1.27)
CRP SERPL-MCNC: 0.66 MG/DL (ref 0–0.5)
DEPRECATED RDW RBC AUTO: 42.9 FL (ref 37–54)
EOSINOPHIL # BLD AUTO: 0.54 10*3/MM3 (ref 0–0.4)
EOSINOPHIL NFR BLD AUTO: 5.3 % (ref 0.3–6.2)
ERYTHROCYTE [DISTWIDTH] IN BLOOD BY AUTOMATED COUNT: 13.7 % (ref 12.3–15.4)
ERYTHROCYTE [SEDIMENTATION RATE] IN BLOOD: 39 MM/HR (ref 0–20)
GFR SERPL CREATININE-BSD FRML MDRD: 32 ML/MIN/1.73
GLOBULIN UR ELPH-MCNC: 3.4 GM/DL
GLUCOSE BLD-MCNC: 128 MG/DL (ref 65–99)
HCT VFR BLD AUTO: 38.5 % (ref 37.5–51)
HGB BLD-MCNC: 11.9 G/DL (ref 13–17.7)
IMM GRANULOCYTES # BLD AUTO: 0.04 10*3/MM3 (ref 0–0.05)
IMM GRANULOCYTES NFR BLD AUTO: 0.4 % (ref 0–0.5)
LYMPHOCYTES # BLD AUTO: 2.43 10*3/MM3 (ref 0.7–3.1)
LYMPHOCYTES NFR BLD AUTO: 24 % (ref 19.6–45.3)
MCH RBC QN AUTO: 26.4 PG (ref 26.6–33)
MCHC RBC AUTO-ENTMCNC: 30.9 G/DL (ref 31.5–35.7)
MCV RBC AUTO: 85.4 FL (ref 79–97)
MONOCYTES # BLD AUTO: 0.71 10*3/MM3 (ref 0.1–0.9)
MONOCYTES NFR BLD AUTO: 7 % (ref 5–12)
NEUTROPHILS # BLD AUTO: 6.33 10*3/MM3 (ref 1.7–7)
NEUTROPHILS NFR BLD AUTO: 62.4 % (ref 42.7–76)
NRBC BLD AUTO-RTO: 0 /100 WBC (ref 0–0.2)
PLATELET # BLD AUTO: 458 10*3/MM3 (ref 140–450)
PMV BLD AUTO: 9.7 FL (ref 6–12)
POTASSIUM BLD-SCNC: 5.2 MMOL/L (ref 3.5–5.2)
PROT SERPL-MCNC: 7.8 G/DL (ref 6–8.5)
RBC # BLD AUTO: 4.51 10*6/MM3 (ref 4.14–5.8)
SODIUM BLD-SCNC: 141 MMOL/L (ref 136–145)
WBC NRBC COR # BLD: 10.14 10*3/MM3 (ref 3.4–10.8)

## 2019-10-24 PROCEDURE — 36415 COLL VENOUS BLD VENIPUNCTURE: CPT

## 2019-10-24 PROCEDURE — 85025 COMPLETE CBC W/AUTO DIFF WBC: CPT

## 2019-10-24 PROCEDURE — 85652 RBC SED RATE AUTOMATED: CPT

## 2019-10-24 PROCEDURE — 86140 C-REACTIVE PROTEIN: CPT

## 2019-10-24 PROCEDURE — 82550 ASSAY OF CK (CPK): CPT | Performed by: INTERNAL MEDICINE

## 2019-10-24 PROCEDURE — 80053 COMPREHEN METABOLIC PANEL: CPT

## 2019-10-25 ENCOUNTER — TELEPHONE (OUTPATIENT)
Dept: ORTHOPEDIC SURGERY | Facility: CLINIC | Age: 74
End: 2019-10-25

## 2019-10-25 ENCOUNTER — TELEPHONE (OUTPATIENT)
Dept: PHYSICAL THERAPY | Facility: HOSPITAL | Age: 74
End: 2019-10-25

## 2019-10-25 ENCOUNTER — APPOINTMENT (OUTPATIENT)
Dept: PHYSICAL THERAPY | Facility: HOSPITAL | Age: 74
End: 2019-10-25

## 2019-10-25 NOTE — TELEPHONE ENCOUNTER
Called Dr. Juarez's office. Explained that the patient cancelled their evaluation because they weren't told by the MD that they needed a wound care appointment. Encouraged MD office staff to call the patient and encourage them to reschedule if the MD wants the patient to have wound care.

## 2019-10-25 NOTE — TELEPHONE ENCOUNTER
PATIENT WAS SEEN IN OFFICE ON 10/23/19, RECEIVED A PHONE CALL FROM WOUND CARE BUT SAID THAT EJ NEVER MENTIONED ANYTHING ABOUT WOUND CARE. WANTED TO MAKE SURE THAT HE NEEDS TO BE GOING TO WOUND CARE SINCE IT WASN'T MENTIONED. REFERRAL TO WOUND CARE WAS PUT IN ON 10/17/19.

## 2019-10-25 NOTE — TELEPHONE ENCOUNTER
I left message for the patient letting him know that I would ask Dr. Weber.    Dr. Weber please read below.      Brigette

## 2019-10-30 ENCOUNTER — READMISSION MANAGEMENT (OUTPATIENT)
Dept: CALL CENTER | Facility: HOSPITAL | Age: 74
End: 2019-10-30

## 2019-10-30 NOTE — OUTREACH NOTE
General Surgery Week 2 Survey      Responses   Facility patient discharged from?  [place]   Does the patient have one of the following disease processes/diagnoses(primary or secondary)?  General Surgery   Week 2 attempt successful?  No   Unsuccessful attempts  Attempt 1          Diana Walker RN

## 2019-10-31 ENCOUNTER — READMISSION MANAGEMENT (OUTPATIENT)
Dept: CALL CENTER | Facility: HOSPITAL | Age: 74
End: 2019-10-31

## 2019-10-31 ENCOUNTER — TRANSCRIBE ORDERS (OUTPATIENT)
Dept: LAB | Facility: HOSPITAL | Age: 74
End: 2019-10-31

## 2019-10-31 ENCOUNTER — LAB (OUTPATIENT)
Dept: LAB | Facility: HOSPITAL | Age: 74
End: 2019-10-31

## 2019-10-31 DIAGNOSIS — L97.509 DIABETIC FOOT ULCER WITH OSTEOMYELITIS (HCC): Primary | ICD-10-CM

## 2019-10-31 DIAGNOSIS — L97.321 NON-PRESSURE CHRONIC ULCER OF LEFT ANKLE, LIMITED TO BREAKDOWN OF SKIN (HCC): ICD-10-CM

## 2019-10-31 DIAGNOSIS — E11.69 DIABETIC FOOT ULCER WITH OSTEOMYELITIS (HCC): ICD-10-CM

## 2019-10-31 DIAGNOSIS — M86.9 DIABETIC FOOT ULCER WITH OSTEOMYELITIS (HCC): ICD-10-CM

## 2019-10-31 DIAGNOSIS — M86.172 ACUTE OSTEOMYELITIS OF LEFT ANKLE OR FOOT (HCC): ICD-10-CM

## 2019-10-31 DIAGNOSIS — M86.9 DIABETIC FOOT ULCER WITH OSTEOMYELITIS (HCC): Primary | ICD-10-CM

## 2019-10-31 DIAGNOSIS — E11.621 DIABETIC FOOT ULCER WITH OSTEOMYELITIS (HCC): ICD-10-CM

## 2019-10-31 DIAGNOSIS — L97.509 DIABETIC FOOT ULCER WITH OSTEOMYELITIS (HCC): ICD-10-CM

## 2019-10-31 DIAGNOSIS — E11.69 DIABETIC FOOT ULCER WITH OSTEOMYELITIS (HCC): Primary | ICD-10-CM

## 2019-10-31 DIAGNOSIS — E11.621 DIABETIC FOOT ULCER WITH OSTEOMYELITIS (HCC): Primary | ICD-10-CM

## 2019-10-31 LAB
ALBUMIN SERPL-MCNC: 4.1 G/DL (ref 3.5–5.2)
ALBUMIN/GLOB SERPL: 1.2 G/DL
ALP SERPL-CCNC: 116 U/L (ref 39–117)
ALT SERPL W P-5'-P-CCNC: 13 U/L (ref 1–41)
ANION GAP SERPL CALCULATED.3IONS-SCNC: 11 MMOL/L (ref 5–15)
AST SERPL-CCNC: 16 U/L (ref 1–40)
BASOPHILS # BLD AUTO: 0.06 10*3/MM3 (ref 0–0.2)
BASOPHILS NFR BLD AUTO: 0.7 % (ref 0–1.5)
BILIRUB SERPL-MCNC: 0.3 MG/DL (ref 0.2–1.2)
BUN BLD-MCNC: 29 MG/DL (ref 8–23)
BUN/CREAT SERPL: 18.6 (ref 7–25)
CALCIUM SPEC-SCNC: 10.1 MG/DL (ref 8.6–10.5)
CHLORIDE SERPL-SCNC: 107 MMOL/L (ref 98–107)
CK SERPL-CCNC: 37 U/L (ref 20–200)
CO2 SERPL-SCNC: 26 MMOL/L (ref 22–29)
CREAT BLD-MCNC: 1.56 MG/DL (ref 0.76–1.27)
CRP SERPL-MCNC: 0.32 MG/DL (ref 0–0.5)
DEPRECATED RDW RBC AUTO: 43.3 FL (ref 37–54)
EOSINOPHIL # BLD AUTO: 0.44 10*3/MM3 (ref 0–0.4)
EOSINOPHIL NFR BLD AUTO: 5.3 % (ref 0.3–6.2)
ERYTHROCYTE [DISTWIDTH] IN BLOOD BY AUTOMATED COUNT: 13.7 % (ref 12.3–15.4)
ERYTHROCYTE [SEDIMENTATION RATE] IN BLOOD: 31 MM/HR (ref 0–20)
GFR SERPL CREATININE-BSD FRML MDRD: 43 ML/MIN/1.73
GLOBULIN UR ELPH-MCNC: 3.4 GM/DL
GLUCOSE BLD-MCNC: 97 MG/DL (ref 65–99)
HCT VFR BLD AUTO: 38.8 % (ref 37.5–51)
HGB BLD-MCNC: 12 G/DL (ref 13–17.7)
IMM GRANULOCYTES # BLD AUTO: 0.02 10*3/MM3 (ref 0–0.05)
IMM GRANULOCYTES NFR BLD AUTO: 0.2 % (ref 0–0.5)
LYMPHOCYTES # BLD AUTO: 2.52 10*3/MM3 (ref 0.7–3.1)
LYMPHOCYTES NFR BLD AUTO: 30.1 % (ref 19.6–45.3)
MCH RBC QN AUTO: 26.6 PG (ref 26.6–33)
MCHC RBC AUTO-ENTMCNC: 30.9 G/DL (ref 31.5–35.7)
MCV RBC AUTO: 86 FL (ref 79–97)
MONOCYTES # BLD AUTO: 0.58 10*3/MM3 (ref 0.1–0.9)
MONOCYTES NFR BLD AUTO: 6.9 % (ref 5–12)
NEUTROPHILS # BLD AUTO: 4.76 10*3/MM3 (ref 1.7–7)
NEUTROPHILS NFR BLD AUTO: 56.8 % (ref 42.7–76)
NRBC BLD AUTO-RTO: 0 /100 WBC (ref 0–0.2)
PLATELET # BLD AUTO: 408 10*3/MM3 (ref 140–450)
PMV BLD AUTO: 9.8 FL (ref 6–12)
POTASSIUM BLD-SCNC: 4.9 MMOL/L (ref 3.5–5.2)
PROT SERPL-MCNC: 7.5 G/DL (ref 6–8.5)
RBC # BLD AUTO: 4.51 10*6/MM3 (ref 4.14–5.8)
SODIUM BLD-SCNC: 144 MMOL/L (ref 136–145)
WBC NRBC COR # BLD: 8.38 10*3/MM3 (ref 3.4–10.8)

## 2019-10-31 PROCEDURE — 80053 COMPREHEN METABOLIC PANEL: CPT

## 2019-10-31 PROCEDURE — 85025 COMPLETE CBC W/AUTO DIFF WBC: CPT

## 2019-10-31 PROCEDURE — 36415 COLL VENOUS BLD VENIPUNCTURE: CPT

## 2019-10-31 PROCEDURE — 85652 RBC SED RATE AUTOMATED: CPT

## 2019-10-31 PROCEDURE — 86140 C-REACTIVE PROTEIN: CPT

## 2019-10-31 PROCEDURE — 82550 ASSAY OF CK (CPK): CPT | Performed by: INTERNAL MEDICINE

## 2019-11-06 ENCOUNTER — OFFICE VISIT (OUTPATIENT)
Dept: ORTHOPEDIC SURGERY | Facility: CLINIC | Age: 74
End: 2019-11-06

## 2019-11-06 DIAGNOSIS — L89.90: ICD-10-CM

## 2019-11-06 DIAGNOSIS — S90.821A BLISTER (NONTHERMAL), RIGHT FOOT, INITIAL ENCOUNTER: Primary | ICD-10-CM

## 2019-11-06 DIAGNOSIS — L97.521 DIABETIC ULCER OF TOE OF LEFT FOOT ASSOCIATED WITH TYPE 2 DIABETES MELLITUS, LIMITED TO BREAKDOWN OF SKIN (HCC): ICD-10-CM

## 2019-11-06 DIAGNOSIS — E11.621 DIABETIC ULCER OF TOE OF LEFT FOOT ASSOCIATED WITH TYPE 2 DIABETES MELLITUS, LIMITED TO BREAKDOWN OF SKIN (HCC): ICD-10-CM

## 2019-11-06 PROCEDURE — 99024 POSTOP FOLLOW-UP VISIT: CPT | Performed by: ORTHOPAEDIC SURGERY

## 2019-11-06 PROCEDURE — 99212 OFFICE O/P EST SF 10 MIN: CPT | Performed by: ORTHOPAEDIC SURGERY

## 2019-11-06 NOTE — PROGRESS NOTES
Post-op (3 weeks status post Left Third Toe Amputation 10/15/2019)  He has a new problem, a tight dressing has caused some superficial pressure on the lateral aspect of the left foot, no signs of infection.  The anterior ankle wound is much improved.    Amol Aguirre is 3 weeks status post left third toe amputation, 10/15/2019.. He reports no fever, chills.  He reports pain is well controlled.  They have been taking aspirin for DVT prophylaxis.  They have been weight bearing as tolerated.      Past Surgical History:   Procedure Laterality Date   • AMPUTATION DIGIT Left 10/15/2019    Procedure: AMPUTATE LEFT THIRD TOE;  Surgeon: Juju Weber MD;  Location: [facility] OR;  Service: Orthopedics   • AORTAGRAM N/A 4/30/2019    Procedure: AORTAGRAM WITH OR WITHOUT RUNOFFS;  Surgeon: Carrillo White MD;  Location:  [facility] HYBRID OR 15;  Service: Vascular   • BELOW KNEE AMPUTATION Right 6/4/2019    Procedure: BELOW KNEE AMPUTATION RIGHT;  Surgeon: Juju Weber MD;  Location:  [facility] OR;  Service: Orthopedics   • CHOLECYSTECTOMY     • FOOT SURGERY Left 10/15/2019    Amputate 3rd toe- DeGnore   • KNEE SURGERY Right     TKA       There were no vitals taken for this visit.     Left toe amputation site is healing well, no signs of infection.  The anterior ankle wound where he had the blister is improving steadily.  He has a new pre-ulcerous pressure area on the lateral aspect of the foot all along the fifth metatarsal.  It does not have skin breakdown at present, but it definitely has a pressure insult, high risk for breakdown here.       none    Assessment and Plan:   1. Non-hospital acquired pressure injury of skin  The left foot is showing how exceptionally fragile that it is.  Sometime in the past week or so a dressing was tight enough to cause a superficial early pressure injury on the lateral aspect of the fifth metatarsal, there is no blister right now but it certainly at risk.  There is some venous congestion  here.  No signs of infection.  I want him to completely discontinue dressing and just use a clean white sock.  I reminded him to make sure he is not having any pressure from his shoe, we cannot use a postop shoe because it because the anterior ankle ulcer.  His foot is extremely at risk.  He is going to have to be very diligent to protect it.    2. Blister (nonthermal), right foot, initial encounter  Anterior ankle wound is improving steadily, no signs of infection    3. Diabetic ulcer of toe of left foot associated with type 2 diabetes mellitus, limited to breakdown of skin (CMS/HCC)  We remove the sutures from the toe amputation site, I will see him in 3 weeks or sooner if any worsening problem    4.  Right below-knee amputation is doing well

## 2019-11-07 ENCOUNTER — TRANSCRIBE ORDERS (OUTPATIENT)
Dept: LAB | Facility: HOSPITAL | Age: 74
End: 2019-11-07

## 2019-11-07 ENCOUNTER — LAB (OUTPATIENT)
Dept: LAB | Facility: HOSPITAL | Age: 74
End: 2019-11-07

## 2019-11-07 DIAGNOSIS — E11.621 DIABETIC FOOT ULCER WITH OSTEOMYELITIS (HCC): ICD-10-CM

## 2019-11-07 DIAGNOSIS — Z89.511 STATUS POST BILATERAL BELOW KNEE AMPUTATION (HCC): ICD-10-CM

## 2019-11-07 DIAGNOSIS — E66.09 EXOGENOUS OBESITY: ICD-10-CM

## 2019-11-07 DIAGNOSIS — L03.116 CELLULITIS OF LEFT FOOT: ICD-10-CM

## 2019-11-07 DIAGNOSIS — E11.69 DIABETIC FOOT ULCER WITH OSTEOMYELITIS (HCC): ICD-10-CM

## 2019-11-07 DIAGNOSIS — Z89.512 STATUS POST BILATERAL BELOW KNEE AMPUTATION (HCC): ICD-10-CM

## 2019-11-07 DIAGNOSIS — I87.2 PERIPHERAL VENOUS INSUFFICIENCY: ICD-10-CM

## 2019-11-07 DIAGNOSIS — E87.5 HYPERPOTASSEMIA: ICD-10-CM

## 2019-11-07 DIAGNOSIS — M86.172 ACUTE OSTEOMYELITIS OF LEFT ANKLE OR FOOT (HCC): ICD-10-CM

## 2019-11-07 DIAGNOSIS — L97.322 NON-PRESSURE CHRONIC ULCER OF LEFT ANKLE WITH FAT LAYER EXPOSED (HCC): ICD-10-CM

## 2019-11-07 DIAGNOSIS — E11.22 TYPE 2 DIABETES MELLITUS WITH ESRD (END-STAGE RENAL DISEASE) (HCC): ICD-10-CM

## 2019-11-07 DIAGNOSIS — N18.30 CHRONIC KIDNEY DISEASE, STAGE III (MODERATE) (HCC): ICD-10-CM

## 2019-11-07 DIAGNOSIS — M86.9 DIABETIC FOOT ULCER WITH OSTEOMYELITIS (HCC): ICD-10-CM

## 2019-11-07 DIAGNOSIS — Z89.422 ACQUIRED ABSENCE OF OTHER LEFT TOE(S) (HCC): Primary | ICD-10-CM

## 2019-11-07 DIAGNOSIS — N18.6 TYPE 2 DIABETES MELLITUS WITH ESRD (END-STAGE RENAL DISEASE) (HCC): ICD-10-CM

## 2019-11-07 DIAGNOSIS — L97.509 DIABETIC FOOT ULCER WITH OSTEOMYELITIS (HCC): ICD-10-CM

## 2019-11-07 DIAGNOSIS — L97.321 NON-PRESSURE CHRONIC ULCER OF LEFT ANKLE, LIMITED TO BREAKDOWN OF SKIN (HCC): ICD-10-CM

## 2019-11-07 DIAGNOSIS — Z89.422 ACQUIRED ABSENCE OF OTHER LEFT TOE(S) (HCC): ICD-10-CM

## 2019-11-07 LAB
ALBUMIN SERPL-MCNC: 4 G/DL (ref 3.5–5.2)
ALBUMIN/GLOB SERPL: 1.1 G/DL
ALP SERPL-CCNC: 116 U/L (ref 39–117)
ALT SERPL W P-5'-P-CCNC: 11 U/L (ref 1–41)
ANION GAP SERPL CALCULATED.3IONS-SCNC: 14 MMOL/L (ref 5–15)
AST SERPL-CCNC: 15 U/L (ref 1–40)
BASOPHILS # BLD AUTO: 0.06 10*3/MM3 (ref 0–0.2)
BASOPHILS NFR BLD AUTO: 0.8 % (ref 0–1.5)
BILIRUB SERPL-MCNC: 0.4 MG/DL (ref 0.2–1.2)
BUN BLD-MCNC: 24 MG/DL (ref 8–23)
BUN/CREAT SERPL: 13.3 (ref 7–25)
CALCIUM SPEC-SCNC: 9.8 MG/DL (ref 8.6–10.5)
CHLORIDE SERPL-SCNC: 104 MMOL/L (ref 98–107)
CK SERPL-CCNC: 41 U/L (ref 20–200)
CO2 SERPL-SCNC: 24 MMOL/L (ref 22–29)
CREAT BLD-MCNC: 1.8 MG/DL (ref 0.76–1.27)
CRP SERPL-MCNC: 0.34 MG/DL (ref 0–0.5)
DEPRECATED RDW RBC AUTO: 44.5 FL (ref 37–54)
EOSINOPHIL # BLD AUTO: 0.42 10*3/MM3 (ref 0–0.4)
EOSINOPHIL NFR BLD AUTO: 5.6 % (ref 0.3–6.2)
ERYTHROCYTE [DISTWIDTH] IN BLOOD BY AUTOMATED COUNT: 14 % (ref 12.3–15.4)
ERYTHROCYTE [SEDIMENTATION RATE] IN BLOOD: 26 MM/HR (ref 0–20)
GFR SERPL CREATININE-BSD FRML MDRD: 37 ML/MIN/1.73
GLOBULIN UR ELPH-MCNC: 3.6 GM/DL
GLUCOSE BLD-MCNC: 105 MG/DL (ref 65–99)
HCT VFR BLD AUTO: 38.2 % (ref 37.5–51)
HGB BLD-MCNC: 11.7 G/DL (ref 13–17.7)
IMM GRANULOCYTES # BLD AUTO: 0.02 10*3/MM3 (ref 0–0.05)
IMM GRANULOCYTES NFR BLD AUTO: 0.3 % (ref 0–0.5)
LYMPHOCYTES # BLD AUTO: 2.27 10*3/MM3 (ref 0.7–3.1)
LYMPHOCYTES NFR BLD AUTO: 30.4 % (ref 19.6–45.3)
MCH RBC QN AUTO: 26.6 PG (ref 26.6–33)
MCHC RBC AUTO-ENTMCNC: 30.6 G/DL (ref 31.5–35.7)
MCV RBC AUTO: 86.8 FL (ref 79–97)
MONOCYTES # BLD AUTO: 0.52 10*3/MM3 (ref 0.1–0.9)
MONOCYTES NFR BLD AUTO: 7 % (ref 5–12)
NEUTROPHILS # BLD AUTO: 4.17 10*3/MM3 (ref 1.7–7)
NEUTROPHILS NFR BLD AUTO: 55.9 % (ref 42.7–76)
NRBC BLD AUTO-RTO: 0 /100 WBC (ref 0–0.2)
PLATELET # BLD AUTO: 323 10*3/MM3 (ref 140–450)
PMV BLD AUTO: 10.1 FL (ref 6–12)
POTASSIUM BLD-SCNC: 4.5 MMOL/L (ref 3.5–5.2)
PROT SERPL-MCNC: 7.6 G/DL (ref 6–8.5)
RBC # BLD AUTO: 4.4 10*6/MM3 (ref 4.14–5.8)
SODIUM BLD-SCNC: 142 MMOL/L (ref 136–145)
WBC NRBC COR # BLD: 7.46 10*3/MM3 (ref 3.4–10.8)

## 2019-11-07 PROCEDURE — 85025 COMPLETE CBC W/AUTO DIFF WBC: CPT

## 2019-11-07 PROCEDURE — 36415 COLL VENOUS BLD VENIPUNCTURE: CPT

## 2019-11-07 PROCEDURE — 80053 COMPREHEN METABOLIC PANEL: CPT

## 2019-11-07 PROCEDURE — 82550 ASSAY OF CK (CPK): CPT | Performed by: INTERNAL MEDICINE

## 2019-11-07 PROCEDURE — 85652 RBC SED RATE AUTOMATED: CPT

## 2019-11-07 PROCEDURE — 86140 C-REACTIVE PROTEIN: CPT

## 2019-11-08 ENCOUNTER — READMISSION MANAGEMENT (OUTPATIENT)
Dept: CALL CENTER | Facility: HOSPITAL | Age: 74
End: 2019-11-08

## 2019-11-08 NOTE — OUTREACH NOTE
General Surgery Week 3 Survey      Responses   Facility patient discharged from?  [place]   Does the patient have one of the following disease processes/diagnoses(primary or secondary)?  General Surgery   Week 3 attempt successful?  No   Unsuccessful attempts  Attempt 1          Jennifer Mera RN

## 2019-11-11 ENCOUNTER — READMISSION MANAGEMENT (OUTPATIENT)
Dept: CALL CENTER | Facility: HOSPITAL | Age: 74
End: 2019-11-11

## 2019-11-11 NOTE — OUTREACH NOTE
General Surgery Week 3 Survey      Responses   Facility patient discharged from?  [place]   Does the patient have one of the following disease processes/diagnoses(primary or secondary)?  General Surgery   Week 3 attempt successful?  No   Unsuccessful attempts  Attempt 2          Kristyn Everett RN

## 2019-11-14 ENCOUNTER — LAB (OUTPATIENT)
Dept: LAB | Facility: HOSPITAL | Age: 74
End: 2019-11-14

## 2019-11-14 ENCOUNTER — TRANSCRIBE ORDERS (OUTPATIENT)
Dept: LAB | Facility: HOSPITAL | Age: 74
End: 2019-11-14

## 2019-11-14 DIAGNOSIS — M86.172 ACUTE OSTEOMYELITIS OF LEFT ANKLE OR FOOT (HCC): ICD-10-CM

## 2019-11-14 DIAGNOSIS — M86.172 ACUTE OSTEOMYELITIS OF LEFT ANKLE OR FOOT (HCC): Primary | ICD-10-CM

## 2019-11-14 LAB
ALBUMIN SERPL-MCNC: 4.2 G/DL (ref 3.5–5.2)
ALBUMIN/GLOB SERPL: 1.4 G/DL
ALP SERPL-CCNC: 108 U/L (ref 39–117)
ALT SERPL W P-5'-P-CCNC: 10 U/L (ref 1–41)
ANION GAP SERPL CALCULATED.3IONS-SCNC: 10 MMOL/L (ref 5–15)
AST SERPL-CCNC: 15 U/L (ref 1–40)
BASOPHILS # BLD AUTO: 0.05 10*3/MM3 (ref 0–0.2)
BASOPHILS NFR BLD AUTO: 0.7 % (ref 0–1.5)
BILIRUB SERPL-MCNC: 0.4 MG/DL (ref 0.2–1.2)
BUN BLD-MCNC: 30 MG/DL (ref 8–23)
BUN/CREAT SERPL: 17.1 (ref 7–25)
CALCIUM SPEC-SCNC: 9.9 MG/DL (ref 8.6–10.5)
CHLORIDE SERPL-SCNC: 103 MMOL/L (ref 98–107)
CK SERPL-CCNC: 38 U/L (ref 20–200)
CO2 SERPL-SCNC: 25 MMOL/L (ref 22–29)
CREAT BLD-MCNC: 1.75 MG/DL (ref 0.76–1.27)
CRP SERPL-MCNC: 0.38 MG/DL (ref 0–0.5)
DEPRECATED RDW RBC AUTO: 45.3 FL (ref 37–54)
EOSINOPHIL # BLD AUTO: 0.38 10*3/MM3 (ref 0–0.4)
EOSINOPHIL NFR BLD AUTO: 5.4 % (ref 0.3–6.2)
ERYTHROCYTE [DISTWIDTH] IN BLOOD BY AUTOMATED COUNT: 14 % (ref 12.3–15.4)
ERYTHROCYTE [SEDIMENTATION RATE] IN BLOOD: 27 MM/HR (ref 0–20)
GFR SERPL CREATININE-BSD FRML MDRD: 38 ML/MIN/1.73
GLOBULIN UR ELPH-MCNC: 3.1 GM/DL
GLUCOSE BLD-MCNC: 121 MG/DL (ref 65–99)
HCT VFR BLD AUTO: 37.7 % (ref 37.5–51)
HGB BLD-MCNC: 11.6 G/DL (ref 13–17.7)
IMM GRANULOCYTES # BLD AUTO: 0.01 10*3/MM3 (ref 0–0.05)
IMM GRANULOCYTES NFR BLD AUTO: 0.1 % (ref 0–0.5)
LYMPHOCYTES # BLD AUTO: 2.15 10*3/MM3 (ref 0.7–3.1)
LYMPHOCYTES NFR BLD AUTO: 30.6 % (ref 19.6–45.3)
MCH RBC QN AUTO: 26.8 PG (ref 26.6–33)
MCHC RBC AUTO-ENTMCNC: 30.8 G/DL (ref 31.5–35.7)
MCV RBC AUTO: 87.1 FL (ref 79–97)
MONOCYTES # BLD AUTO: 0.56 10*3/MM3 (ref 0.1–0.9)
MONOCYTES NFR BLD AUTO: 8 % (ref 5–12)
NEUTROPHILS # BLD AUTO: 3.88 10*3/MM3 (ref 1.7–7)
NEUTROPHILS NFR BLD AUTO: 55.2 % (ref 42.7–76)
NRBC BLD AUTO-RTO: 0 /100 WBC (ref 0–0.2)
PLATELET # BLD AUTO: 301 10*3/MM3 (ref 140–450)
PMV BLD AUTO: 10.4 FL (ref 6–12)
POTASSIUM BLD-SCNC: 5 MMOL/L (ref 3.5–5.2)
PROT SERPL-MCNC: 7.3 G/DL (ref 6–8.5)
RBC # BLD AUTO: 4.33 10*6/MM3 (ref 4.14–5.8)
SODIUM BLD-SCNC: 138 MMOL/L (ref 136–145)
WBC NRBC COR # BLD: 7.03 10*3/MM3 (ref 3.4–10.8)

## 2019-11-14 PROCEDURE — 80053 COMPREHEN METABOLIC PANEL: CPT

## 2019-11-14 PROCEDURE — 36415 COLL VENOUS BLD VENIPUNCTURE: CPT

## 2019-11-14 PROCEDURE — 85025 COMPLETE CBC W/AUTO DIFF WBC: CPT

## 2019-11-14 PROCEDURE — 86140 C-REACTIVE PROTEIN: CPT

## 2019-11-14 PROCEDURE — 85652 RBC SED RATE AUTOMATED: CPT

## 2019-11-14 PROCEDURE — 82550 ASSAY OF CK (CPK): CPT | Performed by: INTERNAL MEDICINE

## 2019-11-26 LAB
FUNGUS WND CULT: NORMAL
MYCOBACTERIUM SPEC CULT: NORMAL
NIGHT BLUE STAIN TISS: NORMAL

## 2019-11-27 ENCOUNTER — OFFICE VISIT (OUTPATIENT)
Dept: ORTHOPEDIC SURGERY | Facility: CLINIC | Age: 74
End: 2019-11-27

## 2019-11-27 DIAGNOSIS — E11.622 DIABETIC ULCER OF LEFT ANKLE (HCC): Primary | ICD-10-CM

## 2019-11-27 DIAGNOSIS — L97.329 DIABETIC ULCER OF LEFT ANKLE (HCC): Primary | ICD-10-CM

## 2019-11-27 DIAGNOSIS — Z89.511 S/P BKA (BELOW KNEE AMPUTATION), RIGHT (HCC): ICD-10-CM

## 2019-11-27 PROCEDURE — 99024 POSTOP FOLLOW-UP VISIT: CPT | Performed by: ORTHOPAEDIC SURGERY

## 2019-11-27 NOTE — PROGRESS NOTES
Follow-up (3 week f/u; 6 weeks status post Left Third Toe Amputation 10/15/2019)      Amol Aguirre is 6 weeks status post left 3rd toe amputation, 10/15/19. He reports no fever, chills.  He reports pain is well controlled.  They have been taking aspirin for DVT prophylaxis.  They have been weight bearing as tolerated.      Past Surgical History:   Procedure Laterality Date   • AMPUTATION DIGIT Left 10/15/2019    Procedure: AMPUTATE LEFT THIRD TOE;  Surgeon: Juju Weber MD;  Location:  [facility] OR;  Service: Orthopedics   • AORTAGRAM N/A 4/30/2019    Procedure: AORTAGRAM WITH OR WITHOUT RUNOFFS;  Surgeon: Carrillo White MD;  Location:  [facility] HYBRID OR 15;  Service: Vascular   • BELOW KNEE AMPUTATION Right 6/4/2019    Procedure: BELOW KNEE AMPUTATION RIGHT;  Surgeon: Juju Weber MD;  Location:  [facility] OR;  Service: Orthopedics   • CHOLECYSTECTOMY     • FOOT SURGERY Left 10/15/2019    Amputate 3rd toe- DeGnore   • KNEE SURGERY Right     TKA       There were no vitals taken for this visit.        left foot healing slowly at 3rd toe amputation site and anterior ankle blistered area, the lateral 5th metatarsal ulcer is healed, no sign of infection at any areas, right BKA is stable    none    Assessment and Plan:   1. Diabetic ulcer of left ankle (CMS/HCC)  Slowly healing anterior ankle ulcer- it occurred from a tight dressing, he is very high risk.  The lateral 5th ulcer is healed, the 3rd toe is not fully healed.  I removed loose callus and eschar.  No sign of infection.  We re-dressed with Bactroban cream, loose dressing, I will see him in 4 weeks or sooner if any problems.  (of note- his wife is in [facility] with CHF)    2. S/P BKA (below knee amputation), right (CMS/HCC)  Stable, very good gait

## 2019-12-03 ENCOUNTER — LAB (OUTPATIENT)
Dept: LAB | Facility: HOSPITAL | Age: 74
End: 2019-12-03

## 2019-12-03 ENCOUNTER — TRANSCRIBE ORDERS (OUTPATIENT)
Dept: LAB | Facility: HOSPITAL | Age: 74
End: 2019-12-03

## 2019-12-03 DIAGNOSIS — M86.172 ACUTE OSTEOMYELITIS OF LEFT ANKLE OR FOOT (HCC): Primary | ICD-10-CM

## 2019-12-03 DIAGNOSIS — M86.172 ACUTE OSTEOMYELITIS OF LEFT ANKLE OR FOOT (HCC): ICD-10-CM

## 2019-12-03 LAB
ALBUMIN SERPL-MCNC: 4.2 G/DL (ref 3.5–5.2)
ALBUMIN/GLOB SERPL: 1.3 G/DL
ALP SERPL-CCNC: 109 U/L (ref 39–117)
ALT SERPL W P-5'-P-CCNC: 12 U/L (ref 1–41)
ANION GAP SERPL CALCULATED.3IONS-SCNC: 10 MMOL/L (ref 5–15)
AST SERPL-CCNC: 14 U/L (ref 1–40)
BASOPHILS # BLD AUTO: 0.06 10*3/MM3 (ref 0–0.2)
BASOPHILS NFR BLD AUTO: 0.9 % (ref 0–1.5)
BILIRUB SERPL-MCNC: 0.3 MG/DL (ref 0.2–1.2)
BUN BLD-MCNC: 37 MG/DL (ref 8–23)
BUN/CREAT SERPL: 18.2 (ref 7–25)
CALCIUM SPEC-SCNC: 10 MG/DL (ref 8.6–10.5)
CHLORIDE SERPL-SCNC: 103 MMOL/L (ref 98–107)
CO2 SERPL-SCNC: 25 MMOL/L (ref 22–29)
CREAT BLD-MCNC: 2.03 MG/DL (ref 0.76–1.27)
CRP SERPL-MCNC: 0.45 MG/DL (ref 0–0.5)
DEPRECATED RDW RBC AUTO: 45.7 FL (ref 37–54)
EOSINOPHIL # BLD AUTO: 0.31 10*3/MM3 (ref 0–0.4)
EOSINOPHIL NFR BLD AUTO: 4.9 % (ref 0.3–6.2)
ERYTHROCYTE [DISTWIDTH] IN BLOOD BY AUTOMATED COUNT: 14.3 % (ref 12.3–15.4)
ERYTHROCYTE [SEDIMENTATION RATE] IN BLOOD: 33 MM/HR (ref 0–20)
GFR SERPL CREATININE-BSD FRML MDRD: 32 ML/MIN/1.73
GLOBULIN UR ELPH-MCNC: 3.3 GM/DL
GLUCOSE BLD-MCNC: 130 MG/DL (ref 65–99)
HCT VFR BLD AUTO: 37.9 % (ref 37.5–51)
HGB BLD-MCNC: 11.9 G/DL (ref 13–17.7)
IMM GRANULOCYTES # BLD AUTO: 0.01 10*3/MM3 (ref 0–0.05)
IMM GRANULOCYTES NFR BLD AUTO: 0.2 % (ref 0–0.5)
LYMPHOCYTES # BLD AUTO: 2.01 10*3/MM3 (ref 0.7–3.1)
LYMPHOCYTES NFR BLD AUTO: 31.6 % (ref 19.6–45.3)
MCH RBC QN AUTO: 27.7 PG (ref 26.6–33)
MCHC RBC AUTO-ENTMCNC: 31.4 G/DL (ref 31.5–35.7)
MCV RBC AUTO: 88.1 FL (ref 79–97)
MONOCYTES # BLD AUTO: 0.48 10*3/MM3 (ref 0.1–0.9)
MONOCYTES NFR BLD AUTO: 7.5 % (ref 5–12)
NEUTROPHILS # BLD AUTO: 3.5 10*3/MM3 (ref 1.7–7)
NEUTROPHILS NFR BLD AUTO: 54.9 % (ref 42.7–76)
NRBC BLD AUTO-RTO: 0 /100 WBC (ref 0–0.2)
PLATELET # BLD AUTO: 351 10*3/MM3 (ref 140–450)
PMV BLD AUTO: 9.7 FL (ref 6–12)
POTASSIUM BLD-SCNC: 5 MMOL/L (ref 3.5–5.2)
PROT SERPL-MCNC: 7.5 G/DL (ref 6–8.5)
RBC # BLD AUTO: 4.3 10*6/MM3 (ref 4.14–5.8)
SODIUM BLD-SCNC: 138 MMOL/L (ref 136–145)
WBC NRBC COR # BLD: 6.37 10*3/MM3 (ref 3.4–10.8)

## 2019-12-03 PROCEDURE — 80053 COMPREHEN METABOLIC PANEL: CPT

## 2019-12-03 PROCEDURE — 86140 C-REACTIVE PROTEIN: CPT

## 2019-12-03 PROCEDURE — 36415 COLL VENOUS BLD VENIPUNCTURE: CPT

## 2019-12-03 PROCEDURE — 85652 RBC SED RATE AUTOMATED: CPT

## 2019-12-03 PROCEDURE — 85025 COMPLETE CBC W/AUTO DIFF WBC: CPT

## 2019-12-17 ENCOUNTER — TRANSCRIBE ORDERS (OUTPATIENT)
Dept: LAB | Facility: HOSPITAL | Age: 74
End: 2019-12-17

## 2019-12-17 ENCOUNTER — LAB (OUTPATIENT)
Dept: LAB | Facility: HOSPITAL | Age: 74
End: 2019-12-17

## 2019-12-17 DIAGNOSIS — L03.116 CELLULITIS OF LEFT FOOT: ICD-10-CM

## 2019-12-17 DIAGNOSIS — L03.116 CELLULITIS OF LEFT FOOT: Primary | ICD-10-CM

## 2019-12-17 DIAGNOSIS — E11.621 DIABETIC FOOT ULCER WITH OSTEOMYELITIS (HCC): ICD-10-CM

## 2019-12-17 DIAGNOSIS — E11.69 DIABETIC FOOT ULCER WITH OSTEOMYELITIS (HCC): ICD-10-CM

## 2019-12-17 DIAGNOSIS — L97.509 DIABETIC FOOT ULCER WITH OSTEOMYELITIS (HCC): ICD-10-CM

## 2019-12-17 DIAGNOSIS — M86.9 DIABETIC FOOT ULCER WITH OSTEOMYELITIS (HCC): ICD-10-CM

## 2019-12-17 LAB
ALBUMIN SERPL-MCNC: 4.3 G/DL (ref 3.5–5.2)
ALBUMIN/GLOB SERPL: 1.3 G/DL
ALP SERPL-CCNC: 109 U/L (ref 39–117)
ALT SERPL W P-5'-P-CCNC: 14 U/L (ref 1–41)
ANION GAP SERPL CALCULATED.3IONS-SCNC: 12 MMOL/L (ref 5–15)
AST SERPL-CCNC: 19 U/L (ref 1–40)
BASOPHILS # BLD AUTO: 0.07 10*3/MM3 (ref 0–0.2)
BASOPHILS NFR BLD AUTO: 0.9 % (ref 0–1.5)
BILIRUB SERPL-MCNC: 0.2 MG/DL (ref 0.2–1.2)
BUN BLD-MCNC: 30 MG/DL (ref 8–23)
BUN/CREAT SERPL: 21.4 (ref 7–25)
CALCIUM SPEC-SCNC: 9.6 MG/DL (ref 8.6–10.5)
CHLORIDE SERPL-SCNC: 101 MMOL/L (ref 98–107)
CO2 SERPL-SCNC: 25 MMOL/L (ref 22–29)
CREAT BLD-MCNC: 1.4 MG/DL (ref 0.76–1.27)
CRP SERPL-MCNC: 0.69 MG/DL (ref 0–0.5)
DEPRECATED RDW RBC AUTO: 45.1 FL (ref 37–54)
EOSINOPHIL # BLD AUTO: 0.33 10*3/MM3 (ref 0–0.4)
EOSINOPHIL NFR BLD AUTO: 4 % (ref 0.3–6.2)
ERYTHROCYTE [DISTWIDTH] IN BLOOD BY AUTOMATED COUNT: 14.2 % (ref 12.3–15.4)
ERYTHROCYTE [SEDIMENTATION RATE] IN BLOOD: 51 MM/HR (ref 0–20)
GFR SERPL CREATININE-BSD FRML MDRD: 49 ML/MIN/1.73
GLOBULIN UR ELPH-MCNC: 3.3 GM/DL
GLUCOSE BLD-MCNC: 192 MG/DL (ref 65–99)
HCT VFR BLD AUTO: 37.1 % (ref 37.5–51)
HGB BLD-MCNC: 11.7 G/DL (ref 13–17.7)
IMM GRANULOCYTES # BLD AUTO: 0.02 10*3/MM3 (ref 0–0.05)
IMM GRANULOCYTES NFR BLD AUTO: 0.2 % (ref 0–0.5)
LYMPHOCYTES # BLD AUTO: 2.49 10*3/MM3 (ref 0.7–3.1)
LYMPHOCYTES NFR BLD AUTO: 30.4 % (ref 19.6–45.3)
MCH RBC QN AUTO: 27.3 PG (ref 26.6–33)
MCHC RBC AUTO-ENTMCNC: 31.5 G/DL (ref 31.5–35.7)
MCV RBC AUTO: 86.7 FL (ref 79–97)
MONOCYTES # BLD AUTO: 0.62 10*3/MM3 (ref 0.1–0.9)
MONOCYTES NFR BLD AUTO: 7.6 % (ref 5–12)
NEUTROPHILS # BLD AUTO: 4.67 10*3/MM3 (ref 1.7–7)
NEUTROPHILS NFR BLD AUTO: 56.9 % (ref 42.7–76)
NRBC BLD AUTO-RTO: 0 /100 WBC (ref 0–0.2)
PLATELET # BLD AUTO: 313 10*3/MM3 (ref 140–450)
PMV BLD AUTO: 9.9 FL (ref 6–12)
POTASSIUM BLD-SCNC: 4.3 MMOL/L (ref 3.5–5.2)
PROT SERPL-MCNC: 7.6 G/DL (ref 6–8.5)
RBC # BLD AUTO: 4.28 10*6/MM3 (ref 4.14–5.8)
SODIUM BLD-SCNC: 138 MMOL/L (ref 136–145)
WBC NRBC COR # BLD: 8.2 10*3/MM3 (ref 3.4–10.8)

## 2019-12-17 PROCEDURE — 85025 COMPLETE CBC W/AUTO DIFF WBC: CPT

## 2019-12-17 PROCEDURE — 85652 RBC SED RATE AUTOMATED: CPT

## 2019-12-17 PROCEDURE — 36415 COLL VENOUS BLD VENIPUNCTURE: CPT

## 2019-12-17 PROCEDURE — 80053 COMPREHEN METABOLIC PANEL: CPT

## 2019-12-17 PROCEDURE — 86140 C-REACTIVE PROTEIN: CPT

## 2019-12-30 ENCOUNTER — OFFICE VISIT (OUTPATIENT)
Dept: ORTHOPEDIC SURGERY | Facility: CLINIC | Age: 74
End: 2019-12-30

## 2019-12-30 VITALS — HEIGHT: 75 IN | WEIGHT: 249.78 LBS | HEART RATE: 68 BPM | OXYGEN SATURATION: 98 % | BODY MASS INDEX: 31.06 KG/M2

## 2019-12-30 DIAGNOSIS — E11.622 DIABETIC ULCER OF LEFT ANKLE (HCC): Primary | ICD-10-CM

## 2019-12-30 DIAGNOSIS — L97.521 DIABETIC ULCER OF TOE OF LEFT FOOT ASSOCIATED WITH TYPE 2 DIABETES MELLITUS, LIMITED TO BREAKDOWN OF SKIN (HCC): ICD-10-CM

## 2019-12-30 DIAGNOSIS — L97.329 DIABETIC ULCER OF LEFT ANKLE (HCC): Primary | ICD-10-CM

## 2019-12-30 DIAGNOSIS — Z89.511 S/P BKA (BELOW KNEE AMPUTATION), RIGHT (HCC): ICD-10-CM

## 2019-12-30 DIAGNOSIS — E11.621 DIABETIC ULCER OF TOE OF LEFT FOOT ASSOCIATED WITH TYPE 2 DIABETES MELLITUS, LIMITED TO BREAKDOWN OF SKIN (HCC): ICD-10-CM

## 2019-12-30 PROCEDURE — 99213 OFFICE O/P EST LOW 20 MIN: CPT | Performed by: ORTHOPAEDIC SURGERY

## 2019-12-30 PROCEDURE — 99024 POSTOP FOLLOW-UP VISIT: CPT | Performed by: ORTHOPAEDIC SURGERY

## 2019-12-30 RX ORDER — CEPHALEXIN 500 MG/1
500 CAPSULE ORAL
COMMUNITY
Start: 2019-12-17 | End: 2020-07-20 | Stop reason: HOSPADM

## 2019-12-30 RX ORDER — SULFAMETHOXAZOLE AND TRIMETHOPRIM 800; 160 MG/1; MG/1
1 TABLET ORAL 2 TIMES DAILY
COMMUNITY
Start: 2019-12-17 | End: 2020-06-01

## 2019-12-30 NOTE — PROGRESS NOTES
ESTABLISHED PATIENT    Patient: Amol Aguirre  : 1945    Primary Care Provider: Donte Briones MD    Requesting Provider: As above    Follow-up (4 week follow up -   Diabetic ulcer of left ankle )      History    Chief Complaint: ***    History of Present Illness: ***    Current Outpatient Medications on File Prior to Visit   Medication Sig Dispense Refill   • amLODIPine (NORVASC) 10 MG tablet Take 10 mg by mouth Daily.  0   • aspirin 81 MG EC tablet Take 81 mg by mouth Daily.     • budesonide-formoterol (SYMBICORT) 80-4.5 MCG/ACT inhaler Inhale 2 puffs 2 (Two) Times a Day.     • BYSTOLIC 10 MG tablet TK 1/2 T PO BID  7   • cephalexin (KEFLEX) 500 MG capsule TK 1 C PO Q 6 H     • CloNIDine (CATAPRES) 0.1 MG tablet Take 0.1 mg by mouth 2 (Two) Times a Day.     • docusate sodium 100 MG capsule Take 100 mg by mouth 2 (Two) Times a Day As Needed for Constipation. 60 each 0   • hydrochlorothiazide (HYDRODIURIL) 25 MG tablet Take  by mouth Daily.  3   • Insulin Glargine (TOUJEO SOLOSTAR) 300 UNIT/ML solution pen-injector Inject  under the skin into the appropriate area as directed.     • losartan (COZAAR) 100 MG tablet Take 100 mg by mouth Daily.  3   • melatonin 5 MG tablet tablet Take 1 tablet by mouth At Night As Needed (sleep).     • metaxalone (SKELAXIN) 800 MG tablet Take 1 tablet by mouth 3 (Three) Times a Day As Needed for Muscle Spasms. 30 tablet 0   • metoclopramide (REGLAN) 10 MG tablet TK 1 T PO BEFORE MEALS AND QHS  8   • ondansetron (ZOFRAN) 4 MG tablet Take 1 tablet by mouth Every 6 (Six) Hours As Needed for Nausea or Vomiting. 30 tablet 0   • pantoprazole (PROTONIX) 40 MG EC tablet Take 40 mg by mouth Daily.  2   • sulfamethoxazole-trimethoprim (BACTRIM DS,SEPTRA DS) 800-160 MG per tablet Take 1 tablet by mouth 2 (Two) Times a Day.     • tolnaftate (TINACTIN) 1 % cream tolnaftate 1 % topical cream   Apply twice a day between the toes.   Obtain over-the-counter     • vitamin B-12  (CYANOCOBALAMIN) 1000 MCG tablet Take 1,000 mcg by mouth Daily.       No current facility-administered medications on file prior to visit.       Allergies   Allergen Reactions   • Chlorhexidine Shortness Of Breath, Itching and Rash     Pt developed a rash, itching, and shortness of breath after receiving a CHG bath on 19.   • Latex Other (See Comments)     Rash, hives, and tongue swelling      Past Medical History:   Diagnosis Date   • Acid reflux    • Asthma    • Diabetes (CMS/HCC)    • Hypertension      Past Surgical History:   Procedure Laterality Date   • AMPUTATION DIGIT Left 10/15/2019    Procedure: AMPUTATE LEFT THIRD TOE;  Surgeon: Juju Weber MD;  Location: [facility] OR;  Service: Orthopedics   • AORTAGRAM N/A 2019    Procedure: AORTAGRAM WITH OR WITHOUT RUNOFFS;  Surgeon: Carrillo White MD;  Location: [facility] OR 15;  Service: Vascular   • BELOW KNEE AMPUTATION Right 2019    Procedure: BELOW KNEE AMPUTATION RIGHT;  Surgeon: Juju Weber MD;  Location: [facility] OR;  Service: Orthopedics   • CHOLECYSTECTOMY     • FOOT SURGERY Left 10/15/2019    Amputate 3rd toe- DeGnore   • KNEE SURGERY Right     TKA     Family History   Problem Relation Age of Onset   • Cancer Mother    • Hypertension Mother    • Hypertension Father    • Heart attack Father       Social History     Socioeconomic History   • Marital status:      Spouse name: Not on file   • Number of children: Not on file   • Years of education: Not on file   • Highest education level: Not on file   Tobacco Use   • Smoking status: Former Smoker     Types: Cigarettes     Start date:      Last attempt to quit:      Years since quittin.0   • Smokeless tobacco: Never Used   Substance and Sexual Activity   • Alcohol use: No     Frequency: Never   • Drug use: No   • Sexual activity: Defer        Review of Systems   Constitutional: Negative.    HENT: Negative.    Eyes: Negative.    Respiratory: Negative.    Cardiovascular:  "Negative.    Gastrointestinal: Negative.    Endocrine: Negative.    Genitourinary: Negative.    Musculoskeletal: Positive for joint swelling.   Skin: Negative.    Allergic/Immunologic: Negative.    Neurological: Negative.    Hematological: Negative.    Psychiatric/Behavioral: Negative.        The following portions of the patient's history were reviewed and updated as appropriate: {history reviewed:::\"allergies\",\"current medications\",\"past family history\",\"past medical history\",\"past social history\",\"past surgical history\",\"problem list\"}.    Physical Exam:   Pulse 68   Ht 190.5 cm (75\")   Wt 113 kg (249 lb 12.5 oz)   SpO2 98%   BMI 31.22 kg/m²   GENERAL: Body habitus: {body habitus:70794}    Lower extremity edema: Left: {ltdedema:19342::\"none\"}; Right: {ltdedema:68747::\"none\"}    Varicose veins:  Left: {ltdveins:91784::\"none\"}; Right: {ltdveins:91416::\"none\"}    Gait: {ltdgait:18460::\"normal\"}     Mental Status:  {mental status:87498}    Voice:  {Voice quality:63689}  HEENT: {Exam; heent:162426}  PULM:  {pulm:28432::\"Repiratory effort normal\"}  CV:  Dorsalis Pedis:  Right:  {1+-4-:31575}; Left:  {1+-4-:76236}    Posterior Tibial: Right:  {1+-4-:72838}; Left:  {1+-4-:39186}    Capillary Refill:  {refill:27540}  MSK:  Tibia:  Right:  {tibia exam:75250}; Left:  {tibia exam:49591}        Ankle:  Right: {foot/ankle exam:21610}; Left:  {foot/ankle exam:82385}        Foot:  Right:  {foot/ankle exam:54992}; Left:  {foot/ankle exam:87995}    NEURO Heel Walking:  Right:  ***; Left:  ***    Toe Walking:  Right:  {toe walkin}; Left:  {toe walkin}     Toenails: {toenails:94063::\"normal\"}    Warren-Jayme 5.07 monofilament test: {semmes-jayme:10205}    Sensation:  {Sensation:43358}    Medical Decision Making    Data Review:   {Data Review:96228}    Assessment/Plan/Diagnosis/Treatment Options:   There are no diagnoses linked to this encounter.  ***                  "

## 2019-12-30 NOTE — PROGRESS NOTES
"Follow-up (4 week follow up -   Diabetic ulcer of left ankle ) and Post-op (10 weeks status post Left Third Toe Amputation 10/15/2019)      Amol Aguirre is 10 weeks status post amputate left third toe, 10/15/2019. He reports no fever, chills.  He returns with a new problem on the left foot.  He was trimming an ingrown toenail by himself 3 weeks ago.  It developed erythema medially, and a distal gangrenous eschar.  He is still wearing his open back shoe.  He has not had any fever no chills.  He is still seeing Dr. Garcia at [facility], he is on 2 antibiotics.  His wife reports she tried to tell him not to work on the toenail.  He still has the anterior ankle ulcer from the tight bandage.  It is slowly decreasing in size.    Past Surgical History:   Procedure Laterality Date   • AMPUTATION DIGIT Left 10/15/2019    Procedure: AMPUTATE LEFT THIRD TOE;  Surgeon: Juju Weber MD;  [facility]  Service: Orthopedics   • AORTAGRAM N/A 4/30/2019    Procedure: AORTAGRAM WITH OR WITHOUT RUNOFFS;  Surgeon: Carrillo White MD;  [facility];  Service: Vascular   • BELOW KNEE AMPUTATION Right 6/4/2019    Procedure: BELOW KNEE AMPUTATION RIGHT;  Surgeon: Juju Weber MD;  [facility];  Service: Orthopedics   • CHOLECYSTECTOMY     • FOOT SURGERY Left 10/15/2019    Amputate 3rd toe- DeGnore   • KNEE SURGERY Right     TKA       Pulse 68   Ht 190.5 cm (75\")   Wt 113 kg (249 lb 12.5 oz)   SpO2 98%   BMI 31.22 kg/m²        Right below-knee amputation stump is stable and clean gait is very good with the prosthesis    Left foot still has an eschar on the third toe amputation site but no signs of infection.  The anterior ankle ulcer is now about 1 cm and slowly shrinking, no significant erythema.  He has a new ulcer and necrosis of the tip of the great toe, he has trimmed the medial aspect of the toenail and an angle.  There is no obvious granulation tissue, there is some mild " erythema diffusely in the toe.  No purulence.  No change in dense neuropathy, no pulses palpable    phone conversation with physician discussed with Dr. Garcia    Assessment and Plan:   1. Diabetic ulcer of left ankle (CMS/HCC)  The left ankle ulcer caused by the dressing is slowly decreasing in size, I once again emphasized to the patient and his wife how absolutely fragile this leg is.  It will not tolerate anything, certainly not trying to operate on his own toenail.  He is probably going to end up with a below-knee amputation.  I discussed this with Dr. Garcia on the phone, no urgent need for any type of surgery right now.  He needs to be absolutely out of the shoe.  He should be wearing just a clean sock.  That will take the pressure off the toe amputation site, the great toe in the ankle.    2. S/P BKA (below knee amputation), right (CMS/HCC)  This side is doing quite well, however he needs a new socket. He has had amatomical change in the residual limb, resulting in poor socket fit and function.  Multiple adjustments have been made to his current socket, which are no longer sufficient to help restore fit and function of the prosthesis.  He still displays the ability and potential to be a K3 Ambulator.      3. Diabetic ulcer of toe of left foot associated with type 2 diabetes mellitus, limited to breakdown of skin (CMS/HCC)  As above he has a new necrotic ulcer on the tip of the great toe, from trying to operate on the nail by himself.  The nail does not have any granulation tissue.  He reports that it was hurting.  I suspect it might not have been the nail that is the problem it might have simply been some necrosis.  In any event it is absolutely imperative that he stop doing this.  He is at very high risk to go on to a below-knee amputation on the left.  I will see him in 3 to 4 weeks nonweightbearing x-ray of the foot at that time

## 2020-01-02 ENCOUNTER — TRANSCRIBE ORDERS (OUTPATIENT)
Dept: LAB | Facility: HOSPITAL | Age: 75
End: 2020-01-02

## 2020-01-02 ENCOUNTER — LAB (OUTPATIENT)
Dept: LAB | Facility: HOSPITAL | Age: 75
End: 2020-01-02

## 2020-01-02 DIAGNOSIS — L03.116 CELLULITIS OF LEFT FOOT: ICD-10-CM

## 2020-01-02 DIAGNOSIS — Z89.422 ACQUIRED ABSENCE OF OTHER LEFT TOE(S) (HCC): Primary | ICD-10-CM

## 2020-01-02 DIAGNOSIS — Z89.422 ACQUIRED ABSENCE OF OTHER LEFT TOE(S) (HCC): ICD-10-CM

## 2020-01-02 LAB
ALBUMIN SERPL-MCNC: 4.4 G/DL (ref 3.5–5.2)
ALBUMIN/GLOB SERPL: 1.4 G/DL
ALP SERPL-CCNC: 98 U/L (ref 39–117)
ALT SERPL W P-5'-P-CCNC: 22 U/L (ref 1–41)
ANION GAP SERPL CALCULATED.3IONS-SCNC: 12 MMOL/L (ref 5–15)
AST SERPL-CCNC: 19 U/L (ref 1–40)
BASOPHILS # BLD AUTO: 0.06 10*3/MM3 (ref 0–0.2)
BASOPHILS NFR BLD AUTO: 0.6 % (ref 0–1.5)
BILIRUB SERPL-MCNC: 0.4 MG/DL (ref 0.2–1.2)
BUN BLD-MCNC: 36 MG/DL (ref 8–23)
BUN/CREAT SERPL: 19.6 (ref 7–25)
CALCIUM SPEC-SCNC: 9.9 MG/DL (ref 8.6–10.5)
CHLORIDE SERPL-SCNC: 102 MMOL/L (ref 98–107)
CO2 SERPL-SCNC: 21 MMOL/L (ref 22–29)
CREAT BLD-MCNC: 1.84 MG/DL (ref 0.76–1.27)
CRP SERPL-MCNC: 0.15 MG/DL (ref 0–0.5)
DEPRECATED RDW RBC AUTO: 47.8 FL (ref 37–54)
EOSINOPHIL # BLD AUTO: 0.03 10*3/MM3 (ref 0–0.4)
EOSINOPHIL NFR BLD AUTO: 0.3 % (ref 0.3–6.2)
ERYTHROCYTE [DISTWIDTH] IN BLOOD BY AUTOMATED COUNT: 14.6 % (ref 12.3–15.4)
ERYTHROCYTE [SEDIMENTATION RATE] IN BLOOD: 35 MM/HR (ref 0–20)
GFR SERPL CREATININE-BSD FRML MDRD: 36 ML/MIN/1.73
GLOBULIN UR ELPH-MCNC: 3.2 GM/DL
GLUCOSE BLD-MCNC: 269 MG/DL (ref 65–99)
HCT VFR BLD AUTO: 35.7 % (ref 37.5–51)
HGB BLD-MCNC: 11.3 G/DL (ref 13–17.7)
IMM GRANULOCYTES # BLD AUTO: 0.05 10*3/MM3 (ref 0–0.05)
IMM GRANULOCYTES NFR BLD AUTO: 0.5 % (ref 0–0.5)
LYMPHOCYTES # BLD AUTO: 1.12 10*3/MM3 (ref 0.7–3.1)
LYMPHOCYTES NFR BLD AUTO: 11 % (ref 19.6–45.3)
MCH RBC QN AUTO: 28.2 PG (ref 26.6–33)
MCHC RBC AUTO-ENTMCNC: 31.7 G/DL (ref 31.5–35.7)
MCV RBC AUTO: 89 FL (ref 79–97)
MONOCYTES # BLD AUTO: 0.52 10*3/MM3 (ref 0.1–0.9)
MONOCYTES NFR BLD AUTO: 5.1 % (ref 5–12)
NEUTROPHILS # BLD AUTO: 8.42 10*3/MM3 (ref 1.7–7)
NEUTROPHILS NFR BLD AUTO: 82.5 % (ref 42.7–76)
NRBC BLD AUTO-RTO: 0 /100 WBC (ref 0–0.2)
PLATELET # BLD AUTO: 305 10*3/MM3 (ref 140–450)
PMV BLD AUTO: 9.5 FL (ref 6–12)
POTASSIUM BLD-SCNC: 5.2 MMOL/L (ref 3.5–5.2)
PROT SERPL-MCNC: 7.6 G/DL (ref 6–8.5)
RBC # BLD AUTO: 4.01 10*6/MM3 (ref 4.14–5.8)
SODIUM BLD-SCNC: 135 MMOL/L (ref 136–145)
WBC NRBC COR # BLD: 10.2 10*3/MM3 (ref 3.4–10.8)

## 2020-01-02 PROCEDURE — 85025 COMPLETE CBC W/AUTO DIFF WBC: CPT

## 2020-01-02 PROCEDURE — 80053 COMPREHEN METABOLIC PANEL: CPT

## 2020-01-02 PROCEDURE — 36415 COLL VENOUS BLD VENIPUNCTURE: CPT | Performed by: INTERNAL MEDICINE

## 2020-01-02 PROCEDURE — 85652 RBC SED RATE AUTOMATED: CPT

## 2020-01-02 PROCEDURE — 86140 C-REACTIVE PROTEIN: CPT | Performed by: INTERNAL MEDICINE

## 2020-01-16 ENCOUNTER — TRANSCRIBE ORDERS (OUTPATIENT)
Dept: LAB | Facility: HOSPITAL | Age: 75
End: 2020-01-16

## 2020-01-16 ENCOUNTER — LAB (OUTPATIENT)
Dept: LAB | Facility: HOSPITAL | Age: 75
End: 2020-01-16

## 2020-01-16 DIAGNOSIS — E11.621 DIABETIC FOOT ULCER WITH OSTEOMYELITIS (HCC): Primary | ICD-10-CM

## 2020-01-16 DIAGNOSIS — E11.69 DIABETIC FOOT ULCER WITH OSTEOMYELITIS (HCC): ICD-10-CM

## 2020-01-16 DIAGNOSIS — L97.509 DIABETIC FOOT ULCER WITH OSTEOMYELITIS (HCC): ICD-10-CM

## 2020-01-16 DIAGNOSIS — E11.69 DIABETIC FOOT ULCER WITH OSTEOMYELITIS (HCC): Primary | ICD-10-CM

## 2020-01-16 DIAGNOSIS — M86.9 DIABETIC FOOT ULCER WITH OSTEOMYELITIS (HCC): Primary | ICD-10-CM

## 2020-01-16 DIAGNOSIS — L97.321 NON-PRESSURE CHRONIC ULCER OF LEFT ANKLE, LIMITED TO BREAKDOWN OF SKIN (HCC): Primary | ICD-10-CM

## 2020-01-16 DIAGNOSIS — L97.509 DIABETIC FOOT ULCER WITH OSTEOMYELITIS (HCC): Primary | ICD-10-CM

## 2020-01-16 DIAGNOSIS — E11.621 DIABETIC FOOT ULCER WITH OSTEOMYELITIS (HCC): ICD-10-CM

## 2020-01-16 DIAGNOSIS — M86.9 DIABETIC FOOT ULCER WITH OSTEOMYELITIS (HCC): ICD-10-CM

## 2020-01-16 LAB
ALBUMIN SERPL-MCNC: 4.2 G/DL (ref 3.5–5.2)
ALBUMIN/GLOB SERPL: 1.5 G/DL
ALP SERPL-CCNC: 91 U/L (ref 39–117)
ALT SERPL W P-5'-P-CCNC: 10 U/L (ref 1–41)
ANION GAP SERPL CALCULATED.3IONS-SCNC: 12 MMOL/L (ref 5–15)
AST SERPL-CCNC: 12 U/L (ref 1–40)
BASOPHILS # BLD AUTO: 0.06 10*3/MM3 (ref 0–0.2)
BASOPHILS NFR BLD AUTO: 0.8 % (ref 0–1.5)
BILIRUB SERPL-MCNC: 0.3 MG/DL (ref 0.2–1.2)
BUN BLD-MCNC: 41 MG/DL (ref 8–23)
BUN/CREAT SERPL: 18.1 (ref 7–25)
CALCIUM SPEC-SCNC: 10 MG/DL (ref 8.6–10.5)
CHLORIDE SERPL-SCNC: 105 MMOL/L (ref 98–107)
CO2 SERPL-SCNC: 20 MMOL/L (ref 22–29)
CREAT BLD-MCNC: 2.27 MG/DL (ref 0.76–1.27)
CRP SERPL-MCNC: 0.28 MG/DL (ref 0–0.5)
DEPRECATED RDW RBC AUTO: 47.4 FL (ref 37–54)
EOSINOPHIL # BLD AUTO: 0.31 10*3/MM3 (ref 0–0.4)
EOSINOPHIL NFR BLD AUTO: 4.3 % (ref 0.3–6.2)
ERYTHROCYTE [DISTWIDTH] IN BLOOD BY AUTOMATED COUNT: 14.6 % (ref 12.3–15.4)
ERYTHROCYTE [SEDIMENTATION RATE] IN BLOOD: 42 MM/HR (ref 0–20)
GFR SERPL CREATININE-BSD FRML MDRD: 28 ML/MIN/1.73
GLOBULIN UR ELPH-MCNC: 2.8 GM/DL
GLUCOSE BLD-MCNC: 295 MG/DL (ref 65–99)
HCT VFR BLD AUTO: 33.7 % (ref 37.5–51)
HGB BLD-MCNC: 10.7 G/DL (ref 13–17.7)
IMM GRANULOCYTES # BLD AUTO: 0.01 10*3/MM3 (ref 0–0.05)
IMM GRANULOCYTES NFR BLD AUTO: 0.1 % (ref 0–0.5)
LYMPHOCYTES # BLD AUTO: 1.65 10*3/MM3 (ref 0.7–3.1)
LYMPHOCYTES NFR BLD AUTO: 22.9 % (ref 19.6–45.3)
MCH RBC QN AUTO: 28.3 PG (ref 26.6–33)
MCHC RBC AUTO-ENTMCNC: 31.8 G/DL (ref 31.5–35.7)
MCV RBC AUTO: 89.2 FL (ref 79–97)
MONOCYTES # BLD AUTO: 0.53 10*3/MM3 (ref 0.1–0.9)
MONOCYTES NFR BLD AUTO: 7.4 % (ref 5–12)
NEUTROPHILS # BLD AUTO: 4.65 10*3/MM3 (ref 1.7–7)
NEUTROPHILS NFR BLD AUTO: 64.5 % (ref 42.7–76)
NRBC BLD AUTO-RTO: 0 /100 WBC (ref 0–0.2)
PLATELET # BLD AUTO: 289 10*3/MM3 (ref 140–450)
PMV BLD AUTO: 9.7 FL (ref 6–12)
POTASSIUM BLD-SCNC: 5.3 MMOL/L (ref 3.5–5.2)
PROT SERPL-MCNC: 7 G/DL (ref 6–8.5)
RBC # BLD AUTO: 3.78 10*6/MM3 (ref 4.14–5.8)
SODIUM BLD-SCNC: 137 MMOL/L (ref 136–145)
WBC NRBC COR # BLD: 7.21 10*3/MM3 (ref 3.4–10.8)

## 2020-01-16 PROCEDURE — 85652 RBC SED RATE AUTOMATED: CPT

## 2020-01-16 PROCEDURE — 86140 C-REACTIVE PROTEIN: CPT | Performed by: INTERNAL MEDICINE

## 2020-01-16 PROCEDURE — 36415 COLL VENOUS BLD VENIPUNCTURE: CPT | Performed by: INTERNAL MEDICINE

## 2020-01-16 PROCEDURE — 80053 COMPREHEN METABOLIC PANEL: CPT

## 2020-01-16 PROCEDURE — 85025 COMPLETE CBC W/AUTO DIFF WBC: CPT

## 2020-01-29 ENCOUNTER — TELEPHONE (OUTPATIENT)
Dept: ORTHOPEDIC SURGERY | Facility: CLINIC | Age: 75
End: 2020-01-29

## 2020-01-29 NOTE — TELEPHONE ENCOUNTER
[facility] CALLED ABOUT A LETTER WRITER THAT THEY HAD FAXED OVER YESTERDAY. THEY WANTED TO CONFIRM THAT DR. PAGAN RECEIVED THIS DUE TO THIS PATIENT HAVING AN APPOINTMENT ON Friday 1/31/2020. I DID NOT SEE IT IN HER BOX BUT DID ADVISE THAT I WOULD SEND A MESSAGE BACK TO SEE IF DR. PAGAN GOT IT YESTERDAY.   IF SHE DID NOT RECEIVE THIS, PLEASE CALL [phone].

## 2020-01-30 ENCOUNTER — LAB (OUTPATIENT)
Dept: LAB | Facility: HOSPITAL | Age: 75
End: 2020-01-30

## 2020-01-30 ENCOUNTER — TRANSCRIBE ORDERS (OUTPATIENT)
Dept: LAB | Facility: HOSPITAL | Age: 75
End: 2020-01-30

## 2020-01-30 DIAGNOSIS — L03.116 CELLULITIS OF LEFT FOOT: ICD-10-CM

## 2020-01-30 DIAGNOSIS — I96 GANGRENE (HCC): Primary | ICD-10-CM

## 2020-01-30 DIAGNOSIS — I96 GANGRENE (HCC): ICD-10-CM

## 2020-01-30 LAB
ALBUMIN SERPL-MCNC: 4.1 G/DL (ref 3.5–5.2)
ALBUMIN/GLOB SERPL: 1.4 G/DL
ALP SERPL-CCNC: 92 U/L (ref 39–117)
ALT SERPL W P-5'-P-CCNC: 7 U/L (ref 1–41)
ANION GAP SERPL CALCULATED.3IONS-SCNC: 13 MMOL/L (ref 5–15)
AST SERPL-CCNC: 11 U/L (ref 1–40)
BASOPHILS # BLD AUTO: 0.07 10*3/MM3 (ref 0–0.2)
BASOPHILS NFR BLD AUTO: 0.8 % (ref 0–1.5)
BILIRUB SERPL-MCNC: 0.3 MG/DL (ref 0.2–1.2)
BUN BLD-MCNC: 37 MG/DL (ref 8–23)
BUN/CREAT SERPL: 22.7 (ref 7–25)
CALCIUM SPEC-SCNC: 9.4 MG/DL (ref 8.6–10.5)
CHLORIDE SERPL-SCNC: 105 MMOL/L (ref 98–107)
CO2 SERPL-SCNC: 24 MMOL/L (ref 22–29)
CREAT BLD-MCNC: 1.63 MG/DL (ref 0.76–1.27)
CRP SERPL-MCNC: 0.4 MG/DL (ref 0–0.5)
DEPRECATED RDW RBC AUTO: 47.1 FL (ref 37–54)
EOSINOPHIL # BLD AUTO: 0.27 10*3/MM3 (ref 0–0.4)
EOSINOPHIL NFR BLD AUTO: 3.2 % (ref 0.3–6.2)
ERYTHROCYTE [DISTWIDTH] IN BLOOD BY AUTOMATED COUNT: 14.6 % (ref 12.3–15.4)
GFR SERPL CREATININE-BSD FRML MDRD: 41 ML/MIN/1.73
GLOBULIN UR ELPH-MCNC: 2.9 GM/DL
GLUCOSE BLD-MCNC: 105 MG/DL (ref 65–99)
HCT VFR BLD AUTO: 34 % (ref 37.5–51)
HGB BLD-MCNC: 10.8 G/DL (ref 13–17.7)
IMM GRANULOCYTES # BLD AUTO: 0.03 10*3/MM3 (ref 0–0.05)
IMM GRANULOCYTES NFR BLD AUTO: 0.4 % (ref 0–0.5)
LYMPHOCYTES # BLD AUTO: 2.3 10*3/MM3 (ref 0.7–3.1)
LYMPHOCYTES NFR BLD AUTO: 27 % (ref 19.6–45.3)
MCH RBC QN AUTO: 28.3 PG (ref 26.6–33)
MCHC RBC AUTO-ENTMCNC: 31.8 G/DL (ref 31.5–35.7)
MCV RBC AUTO: 89 FL (ref 79–97)
MONOCYTES # BLD AUTO: 0.59 10*3/MM3 (ref 0.1–0.9)
MONOCYTES NFR BLD AUTO: 6.9 % (ref 5–12)
NEUTROPHILS # BLD AUTO: 5.25 10*3/MM3 (ref 1.7–7)
NEUTROPHILS NFR BLD AUTO: 61.7 % (ref 42.7–76)
NRBC BLD AUTO-RTO: 0 /100 WBC (ref 0–0.2)
PLATELET # BLD AUTO: 280 10*3/MM3 (ref 140–450)
PMV BLD AUTO: 10.1 FL (ref 6–12)
POTASSIUM BLD-SCNC: 4.6 MMOL/L (ref 3.5–5.2)
PROT SERPL-MCNC: 7 G/DL (ref 6–8.5)
RBC # BLD AUTO: 3.82 10*6/MM3 (ref 4.14–5.8)
SODIUM BLD-SCNC: 142 MMOL/L (ref 136–145)
WBC NRBC COR # BLD: 8.51 10*3/MM3 (ref 3.4–10.8)

## 2020-01-30 PROCEDURE — 80053 COMPREHEN METABOLIC PANEL: CPT

## 2020-01-30 PROCEDURE — 36415 COLL VENOUS BLD VENIPUNCTURE: CPT

## 2020-01-30 PROCEDURE — 86140 C-REACTIVE PROTEIN: CPT

## 2020-01-30 PROCEDURE — 85025 COMPLETE CBC W/AUTO DIFF WBC: CPT

## 2020-01-31 ENCOUNTER — OFFICE VISIT (OUTPATIENT)
Dept: ORTHOPEDIC SURGERY | Facility: CLINIC | Age: 75
End: 2020-01-31

## 2020-01-31 VITALS — OXYGEN SATURATION: 94 % | WEIGHT: 249.12 LBS | HEART RATE: 82 BPM | HEIGHT: 75 IN | BODY MASS INDEX: 30.98 KG/M2

## 2020-01-31 DIAGNOSIS — L97.329 DIABETIC ULCER OF LEFT ANKLE (HCC): Primary | ICD-10-CM

## 2020-01-31 DIAGNOSIS — E11.622 DIABETIC ULCER OF LEFT ANKLE (HCC): Primary | ICD-10-CM

## 2020-01-31 PROCEDURE — 99213 OFFICE O/P EST LOW 20 MIN: CPT | Performed by: ORTHOPAEDIC SURGERY

## 2020-01-31 RX ORDER — DOXYCYCLINE 100 MG/1
CAPSULE ORAL
COMMUNITY
Start: 2020-01-16 | End: 2020-06-01

## 2020-01-31 NOTE — PROGRESS NOTES
"Follow-up (4 week f/u;  Diabetic ulcer of left ankle and Post-op (14 weeks status post Left Third Toe Amputation 10/15/2019)      Amol Aguirre is 3 months status post amputate left third toe, 10/15/2019. He reports no fever, chills.  Prior right below-knee amputation.  He is in a sock, he has an extremely fragile left foot.  He is slowly healing the ulcer on the tip of the great toe, anterior ankle ulcer, and medial first metatarsal head ulcer.    Past Surgical History:   Procedure Laterality Date   • AMPUTATION DIGIT Left 10/15/2019    Procedure: AMPUTATE LEFT THIRD TOE;  Surgeon: Juju Weber MD;  Location:  [facility] OR;  Service: Orthopedics   • AORTAGRAM N/A 4/30/2019    Procedure: AORTAGRAM WITH OR WITHOUT RUNOFFS;  Surgeon: Carrillo White MD;  Location:  [facility] OR 15;  Service: Vascular   • BELOW KNEE AMPUTATION Right 6/4/2019    Procedure: BELOW KNEE AMPUTATION RIGHT;  Surgeon: Juju Weber MD;  Location:  [facility] OR;  Service: Orthopedics   • CHOLECYSTECTOMY     • FOOT SURGERY Left 10/15/2019    Amputate 3rd toe- DeGnore   • KNEE SURGERY Right     TKA       Pulse 82   Ht 190.5 cm (75\")   Wt 113 kg (249 lb 1.9 oz)   SpO2 94%   BMI 31.14 kg/m²        Right below-knee amputation is healed and stable, he actually has a very excellent gait, left foot ulcers are slowly healing.                none    Assessment and Plan:   1. Diabetic ulcer of left ankle (CMS/HCC)  Slowly healing multiple diabetic ulcers on the left foot and ankle.  The one on the great toe and medial aspect of the first metatarsal head are the slowest.  The ankle ulcer is almost healed, the toe amputation site is almost healed.  His foot is incredibly fragile, he must protect it.  We talked about wearing a lambswool lined slipper, I think that would be okay especially if he cuts at the end of the slipper to allow the great toe to have no pressure.  He is at high risk for amputation.  He will return in 4 weeks to see Ms. " Willard while I am out of town, continue Bactroban, return sooner with any worsening problems  - XR Foot 2 View Left

## 2020-02-20 ENCOUNTER — TRANSCRIBE ORDERS (OUTPATIENT)
Dept: LAB | Facility: HOSPITAL | Age: 75
End: 2020-02-20

## 2020-02-20 ENCOUNTER — LAB (OUTPATIENT)
Dept: LAB | Facility: HOSPITAL | Age: 75
End: 2020-02-20

## 2020-02-20 DIAGNOSIS — L97.509 DIABETIC FOOT ULCER WITH OSTEOMYELITIS (HCC): Primary | ICD-10-CM

## 2020-02-20 DIAGNOSIS — M86.9 DIABETIC FOOT ULCER WITH OSTEOMYELITIS (HCC): Primary | ICD-10-CM

## 2020-02-20 DIAGNOSIS — E11.621 DIABETIC FOOT ULCER WITH OSTEOMYELITIS (HCC): Primary | ICD-10-CM

## 2020-02-20 DIAGNOSIS — L97.509 DIABETIC FOOT ULCER WITH OSTEOMYELITIS (HCC): ICD-10-CM

## 2020-02-20 DIAGNOSIS — L97.321 NON-PRESSURE CHRONIC ULCER OF LEFT ANKLE, LIMITED TO BREAKDOWN OF SKIN (HCC): ICD-10-CM

## 2020-02-20 DIAGNOSIS — E11.69 DIABETIC FOOT ULCER WITH OSTEOMYELITIS (HCC): Primary | ICD-10-CM

## 2020-02-20 DIAGNOSIS — E11.69 DIABETIC FOOT ULCER WITH OSTEOMYELITIS (HCC): ICD-10-CM

## 2020-02-20 DIAGNOSIS — M86.9 DIABETIC FOOT ULCER WITH OSTEOMYELITIS (HCC): ICD-10-CM

## 2020-02-20 DIAGNOSIS — E11.621 DIABETIC FOOT ULCER WITH OSTEOMYELITIS (HCC): ICD-10-CM

## 2020-02-20 LAB
ALBUMIN SERPL-MCNC: 3.9 G/DL (ref 3.5–5.2)
ALBUMIN/GLOB SERPL: 1.3 G/DL
ALP SERPL-CCNC: 87 U/L (ref 39–117)
ALT SERPL W P-5'-P-CCNC: 9 U/L (ref 1–41)
ANION GAP SERPL CALCULATED.3IONS-SCNC: 12 MMOL/L (ref 5–15)
AST SERPL-CCNC: 12 U/L (ref 1–40)
BASOPHILS # BLD AUTO: 0.06 10*3/MM3 (ref 0–0.2)
BASOPHILS NFR BLD AUTO: 0.9 % (ref 0–1.5)
BILIRUB SERPL-MCNC: 0.4 MG/DL (ref 0.2–1.2)
BUN BLD-MCNC: 30 MG/DL (ref 8–23)
BUN/CREAT SERPL: 22.1 (ref 7–25)
CALCIUM SPEC-SCNC: 9.3 MG/DL (ref 8.6–10.5)
CHLORIDE SERPL-SCNC: 104 MMOL/L (ref 98–107)
CO2 SERPL-SCNC: 22 MMOL/L (ref 22–29)
CREAT BLD-MCNC: 1.36 MG/DL (ref 0.76–1.27)
CRP SERPL-MCNC: 0.35 MG/DL (ref 0–0.5)
DEPRECATED RDW RBC AUTO: 45.6 FL (ref 37–54)
EOSINOPHIL # BLD AUTO: 0.28 10*3/MM3 (ref 0–0.4)
EOSINOPHIL NFR BLD AUTO: 4.2 % (ref 0.3–6.2)
ERYTHROCYTE [DISTWIDTH] IN BLOOD BY AUTOMATED COUNT: 13.9 % (ref 12.3–15.4)
GFR SERPL CREATININE-BSD FRML MDRD: 51 ML/MIN/1.73
GLOBULIN UR ELPH-MCNC: 3.1 GM/DL
GLUCOSE BLD-MCNC: 151 MG/DL (ref 65–99)
HCT VFR BLD AUTO: 35.4 % (ref 37.5–51)
HGB BLD-MCNC: 11.4 G/DL (ref 13–17.7)
IMM GRANULOCYTES # BLD AUTO: 0.03 10*3/MM3 (ref 0–0.05)
IMM GRANULOCYTES NFR BLD AUTO: 0.4 % (ref 0–0.5)
LYMPHOCYTES # BLD AUTO: 1.75 10*3/MM3 (ref 0.7–3.1)
LYMPHOCYTES NFR BLD AUTO: 26.1 % (ref 19.6–45.3)
MCH RBC QN AUTO: 28.9 PG (ref 26.6–33)
MCHC RBC AUTO-ENTMCNC: 32.2 G/DL (ref 31.5–35.7)
MCV RBC AUTO: 89.8 FL (ref 79–97)
MONOCYTES # BLD AUTO: 0.46 10*3/MM3 (ref 0.1–0.9)
MONOCYTES NFR BLD AUTO: 6.9 % (ref 5–12)
NEUTROPHILS # BLD AUTO: 4.12 10*3/MM3 (ref 1.7–7)
NEUTROPHILS NFR BLD AUTO: 61.5 % (ref 42.7–76)
NRBC BLD AUTO-RTO: 0 /100 WBC (ref 0–0.2)
PLATELET # BLD AUTO: 261 10*3/MM3 (ref 140–450)
PMV BLD AUTO: 10.4 FL (ref 6–12)
POTASSIUM BLD-SCNC: 4.5 MMOL/L (ref 3.5–5.2)
PROT SERPL-MCNC: 7 G/DL (ref 6–8.5)
RBC # BLD AUTO: 3.94 10*6/MM3 (ref 4.14–5.8)
SODIUM BLD-SCNC: 138 MMOL/L (ref 136–145)
WBC NRBC COR # BLD: 6.7 10*3/MM3 (ref 3.4–10.8)

## 2020-02-20 PROCEDURE — 86140 C-REACTIVE PROTEIN: CPT

## 2020-02-20 PROCEDURE — 85025 COMPLETE CBC W/AUTO DIFF WBC: CPT

## 2020-02-20 PROCEDURE — 36415 COLL VENOUS BLD VENIPUNCTURE: CPT

## 2020-02-20 PROCEDURE — 80053 COMPREHEN METABOLIC PANEL: CPT

## 2020-02-28 ENCOUNTER — OFFICE VISIT (OUTPATIENT)
Dept: ORTHOPEDIC SURGERY | Facility: CLINIC | Age: 75
End: 2020-02-28

## 2020-02-28 VITALS — HEIGHT: 75 IN | WEIGHT: 249.12 LBS | HEART RATE: 64 BPM | BODY MASS INDEX: 30.98 KG/M2 | OXYGEN SATURATION: 99 %

## 2020-02-28 DIAGNOSIS — L97.521 DIABETIC ULCER OF TOE OF LEFT FOOT ASSOCIATED WITH TYPE 2 DIABETES MELLITUS, LIMITED TO BREAKDOWN OF SKIN (HCC): ICD-10-CM

## 2020-02-28 DIAGNOSIS — E11.621 DIABETIC ULCER OF TOE OF LEFT FOOT ASSOCIATED WITH TYPE 2 DIABETES MELLITUS, LIMITED TO BREAKDOWN OF SKIN (HCC): ICD-10-CM

## 2020-02-28 DIAGNOSIS — L97.329 DIABETIC ULCER OF LEFT ANKLE (HCC): Primary | ICD-10-CM

## 2020-02-28 DIAGNOSIS — E11.622 DIABETIC ULCER OF LEFT ANKLE (HCC): Primary | ICD-10-CM

## 2020-02-28 PROCEDURE — 99212 OFFICE O/P EST SF 10 MIN: CPT | Performed by: PHYSICIAN ASSISTANT

## 2020-02-28 PROCEDURE — 97597 DBRDMT OPN WND 1ST 20 CM/<: CPT | Performed by: PHYSICIAN ASSISTANT

## 2020-03-19 ENCOUNTER — TRANSCRIBE ORDERS (OUTPATIENT)
Dept: LAB | Facility: HOSPITAL | Age: 75
End: 2020-03-19

## 2020-03-19 ENCOUNTER — LAB (OUTPATIENT)
Dept: LAB | Facility: HOSPITAL | Age: 75
End: 2020-03-19

## 2020-03-19 DIAGNOSIS — Z89.512 STATUS POST BILATERAL BELOW KNEE AMPUTATION (HCC): ICD-10-CM

## 2020-03-19 DIAGNOSIS — Z89.511 STATUS POST BILATERAL BELOW KNEE AMPUTATION (HCC): ICD-10-CM

## 2020-03-19 DIAGNOSIS — Z89.422 ACQUIRED ABSENCE OF OTHER LEFT TOE(S) (HCC): ICD-10-CM

## 2020-03-19 DIAGNOSIS — I96 GANGRENE (HCC): Primary | ICD-10-CM

## 2020-03-19 DIAGNOSIS — I96 GANGRENE (HCC): ICD-10-CM

## 2020-03-19 LAB
ALBUMIN SERPL-MCNC: 4.4 G/DL (ref 3.5–5.2)
ALBUMIN/GLOB SERPL: 1.4 G/DL
ALP SERPL-CCNC: 100 U/L (ref 39–117)
ALT SERPL W P-5'-P-CCNC: 9 U/L (ref 1–41)
ANION GAP SERPL CALCULATED.3IONS-SCNC: 11 MMOL/L (ref 5–15)
AST SERPL-CCNC: 14 U/L (ref 1–40)
BILIRUB SERPL-MCNC: 0.3 MG/DL (ref 0.2–1.2)
BUN BLD-MCNC: 29 MG/DL (ref 8–23)
BUN/CREAT SERPL: 16.7 (ref 7–25)
CALCIUM SPEC-SCNC: 10 MG/DL (ref 8.6–10.5)
CHLORIDE SERPL-SCNC: 104 MMOL/L (ref 98–107)
CO2 SERPL-SCNC: 24 MMOL/L (ref 22–29)
CREAT BLD-MCNC: 1.74 MG/DL (ref 0.76–1.27)
CRP SERPL-MCNC: 0.2 MG/DL (ref 0–0.5)
DEPRECATED RDW RBC AUTO: 44.2 FL (ref 37–54)
ERYTHROCYTE [DISTWIDTH] IN BLOOD BY AUTOMATED COUNT: 13.5 % (ref 12.3–15.4)
GFR SERPL CREATININE-BSD FRML MDRD: 38 ML/MIN/1.73
GLOBULIN UR ELPH-MCNC: 3.2 GM/DL
GLUCOSE BLD-MCNC: 127 MG/DL (ref 65–99)
HCT VFR BLD AUTO: 38.6 % (ref 37.5–51)
HGB BLD-MCNC: 12.4 G/DL (ref 13–17.7)
MCH RBC QN AUTO: 28.6 PG (ref 26.6–33)
MCHC RBC AUTO-ENTMCNC: 32.1 G/DL (ref 31.5–35.7)
MCV RBC AUTO: 88.9 FL (ref 79–97)
PLATELET # BLD AUTO: 356 10*3/MM3 (ref 140–450)
PMV BLD AUTO: 9.8 FL (ref 6–12)
POTASSIUM BLD-SCNC: 4.8 MMOL/L (ref 3.5–5.2)
PROT SERPL-MCNC: 7.6 G/DL (ref 6–8.5)
RBC # BLD AUTO: 4.34 10*6/MM3 (ref 4.14–5.8)
SODIUM BLD-SCNC: 139 MMOL/L (ref 136–145)
WBC NRBC COR # BLD: 9.63 10*3/MM3 (ref 3.4–10.8)

## 2020-03-19 PROCEDURE — 36415 COLL VENOUS BLD VENIPUNCTURE: CPT

## 2020-03-19 PROCEDURE — 85027 COMPLETE CBC AUTOMATED: CPT

## 2020-03-19 PROCEDURE — 80053 COMPREHEN METABOLIC PANEL: CPT

## 2020-03-19 PROCEDURE — 86140 C-REACTIVE PROTEIN: CPT

## 2020-04-22 ENCOUNTER — TELEPHONE (OUTPATIENT)
Dept: ORTHOPEDIC SURGERY | Facility: CLINIC | Age: 75
End: 2020-04-22

## 2020-04-22 NOTE — TELEPHONE ENCOUNTER
I have attached a picture of the side of Yoan’s big toe, typically where you would have a bunion. We have been putting bactriban on it, and the past couple of days it has this white, chalky substance in it. I am very concerned about the way it looks, and feel as if he should come to your office and have you look at it.    Thank you. Looking forward to hearing from you.        Janie Aguirre  [phone]    Patient picture is in media.  I showed this pic to Dr. Weber and she said he needs to remain out of a shoe, dressing and when things open back up we need to see him in the office.  I left message for the patient.   Brigette

## 2020-04-27 ENCOUNTER — TELEPHONE (OUTPATIENT)
Dept: ORTHOPEDIC SURGERY | Facility: CLINIC | Age: 75
End: 2020-04-27

## 2020-04-28 ENCOUNTER — LAB (OUTPATIENT)
Dept: LAB | Facility: HOSPITAL | Age: 75
End: 2020-04-28

## 2020-04-28 ENCOUNTER — OFFICE VISIT (OUTPATIENT)
Dept: ORTHOPEDIC SURGERY | Facility: CLINIC | Age: 75
End: 2020-04-28

## 2020-04-28 VITALS — WEIGHT: 249.12 LBS | BODY MASS INDEX: 30.98 KG/M2 | HEIGHT: 75 IN

## 2020-04-28 DIAGNOSIS — R09.89 DECREASED PULSES IN FEET: ICD-10-CM

## 2020-04-28 DIAGNOSIS — L97.329 DIABETIC ULCER OF LEFT ANKLE (HCC): Primary | ICD-10-CM

## 2020-04-28 DIAGNOSIS — Z89.511 S/P BKA (BELOW KNEE AMPUTATION), RIGHT (HCC): ICD-10-CM

## 2020-04-28 DIAGNOSIS — E11.622 DIABETIC ULCER OF LEFT ANKLE (HCC): Primary | ICD-10-CM

## 2020-04-28 DIAGNOSIS — L97.521 DIABETIC ULCER OF TOE OF LEFT FOOT ASSOCIATED WITH TYPE 2 DIABETES MELLITUS, LIMITED TO BREAKDOWN OF SKIN (HCC): ICD-10-CM

## 2020-04-28 DIAGNOSIS — E11.621 DIABETIC ULCER OF TOE OF LEFT FOOT ASSOCIATED WITH TYPE 2 DIABETES MELLITUS, LIMITED TO BREAKDOWN OF SKIN (HCC): ICD-10-CM

## 2020-04-28 DIAGNOSIS — M1A.0711 IDIOPATHIC CHRONIC GOUT OF RIGHT FOOT WITH TOPHUS: ICD-10-CM

## 2020-04-28 LAB
ANION GAP SERPL CALCULATED.3IONS-SCNC: 15.8 MMOL/L (ref 5–15)
BASOPHILS # BLD AUTO: 0.09 10*3/MM3 (ref 0–0.2)
BASOPHILS NFR BLD AUTO: 1 % (ref 0–1.5)
BUN BLD-MCNC: 29 MG/DL (ref 8–23)
BUN/CREAT SERPL: 17.4 (ref 7–25)
CALCIUM SPEC-SCNC: 9.4 MG/DL (ref 8.6–10.5)
CHLORIDE SERPL-SCNC: 105 MMOL/L (ref 98–107)
CO2 SERPL-SCNC: 19.2 MMOL/L (ref 22–29)
CREAT BLD-MCNC: 1.67 MG/DL (ref 0.76–1.27)
CRP SERPL-MCNC: 0.7 MG/DL (ref 0–0.5)
DEPRECATED RDW RBC AUTO: 40.7 FL (ref 37–54)
EOSINOPHIL # BLD AUTO: 0.41 10*3/MM3 (ref 0–0.4)
EOSINOPHIL NFR BLD AUTO: 4.3 % (ref 0.3–6.2)
ERYTHROCYTE [DISTWIDTH] IN BLOOD BY AUTOMATED COUNT: 12.9 % (ref 12.3–15.4)
ERYTHROCYTE [SEDIMENTATION RATE] IN BLOOD: 32 MM/HR (ref 0–20)
GFR SERPL CREATININE-BSD FRML MDRD: 40 ML/MIN/1.73
GLUCOSE BLD-MCNC: 144 MG/DL (ref 65–99)
HBA1C MFR BLD: 8.1 % (ref 4.8–5.6)
HCT VFR BLD AUTO: 35 % (ref 37.5–51)
HGB BLD-MCNC: 11.4 G/DL (ref 13–17.7)
IMM GRANULOCYTES # BLD AUTO: 0.03 10*3/MM3 (ref 0–0.05)
IMM GRANULOCYTES NFR BLD AUTO: 0.3 % (ref 0–0.5)
LYMPHOCYTES # BLD AUTO: 2.76 10*3/MM3 (ref 0.7–3.1)
LYMPHOCYTES NFR BLD AUTO: 29.2 % (ref 19.6–45.3)
MCH RBC QN AUTO: 28.1 PG (ref 26.6–33)
MCHC RBC AUTO-ENTMCNC: 32.6 G/DL (ref 31.5–35.7)
MCV RBC AUTO: 86.2 FL (ref 79–97)
MONOCYTES # BLD AUTO: 0.77 10*3/MM3 (ref 0.1–0.9)
MONOCYTES NFR BLD AUTO: 8.1 % (ref 5–12)
NEUTROPHILS # BLD AUTO: 5.39 10*3/MM3 (ref 1.7–7)
NEUTROPHILS NFR BLD AUTO: 57.1 % (ref 42.7–76)
NRBC BLD AUTO-RTO: 0.1 /100 WBC (ref 0–0.2)
PLATELET # BLD AUTO: 314 10*3/MM3 (ref 140–450)
PMV BLD AUTO: 9.9 FL (ref 6–12)
POTASSIUM BLD-SCNC: 4.7 MMOL/L (ref 3.5–5.2)
RBC # BLD AUTO: 4.06 10*6/MM3 (ref 4.14–5.8)
SODIUM BLD-SCNC: 140 MMOL/L (ref 136–145)
URATE SERPL-MCNC: 7.4 MG/DL (ref 3.4–7)
WBC NRBC COR # BLD: 9.45 10*3/MM3 (ref 3.4–10.8)

## 2020-04-28 PROCEDURE — 80048 BASIC METABOLIC PNL TOTAL CA: CPT

## 2020-04-28 PROCEDURE — 36415 COLL VENOUS BLD VENIPUNCTURE: CPT

## 2020-04-28 PROCEDURE — 84550 ASSAY OF BLOOD/URIC ACID: CPT

## 2020-04-28 PROCEDURE — 85025 COMPLETE CBC W/AUTO DIFF WBC: CPT

## 2020-04-28 PROCEDURE — 99214 OFFICE O/P EST MOD 30 MIN: CPT | Performed by: ORTHOPAEDIC SURGERY

## 2020-04-28 PROCEDURE — 85652 RBC SED RATE AUTOMATED: CPT

## 2020-04-28 PROCEDURE — 83036 HEMOGLOBIN GLYCOSYLATED A1C: CPT

## 2020-04-28 PROCEDURE — 86140 C-REACTIVE PROTEIN: CPT

## 2020-04-28 NOTE — PROGRESS NOTES
ESTABLISHED PATIENT    Patient: Amol Aguirre  : 1945    Primary Care Provider: Donte Briones MD    Requesting Provider: As above    Follow-up of the Left Foot (/Diabetic ulcer of toe of left foot associated with type 2 diabetes mellitus//)      History    Chief Complaint: new DM ulcer left foot    History of Present Illness: he returns with a new diabetic ulcer left foot over 1st metatarsal head.  He sent a picutre last week and there are gout crystals in the ulcer, faint erythema.  He started taking Bactrim and Doxy again.  No fever, no chills.  The ankle ulcer and the toe amputation site have healed.  He has not seen Dr Garcia in about a month.  He called yesterday with worsening of the ulcer, no fever, no chillls.  The ulcer is into the sub cut, but I do not feel bone in the base.  There is a ring of erythema 1-2cm around it.  Gout crystals present.  He also notes more swelling in his left leg and hands.  He has not had lab studies since last visit with Dr Garcia    Current Outpatient Medications on File Prior to Visit   Medication Sig Dispense Refill   • amLODIPine (NORVASC) 10 MG tablet Take 10 mg by mouth Daily.  0   • aspirin 81 MG EC tablet Take 81 mg by mouth Daily.     • budesonide-formoterol (SYMBICORT) 80-4.5 MCG/ACT inhaler Inhale 2 puffs 2 (Two) Times a Day.     • BYSTOLIC 10 MG tablet TK 1/2 T PO BID  7   • cephalexin (KEFLEX) 500 MG capsule TK 1 C PO Q 6 H     • CloNIDine (CATAPRES) 0.1 MG tablet Take 0.1 mg by mouth 2 (Two) Times a Day.     • docusate sodium 100 MG capsule Take 100 mg by mouth 2 (Two) Times a Day As Needed for Constipation. 60 each 0   • doxycycline (MONODOX) 100 MG capsule TK 1 C PO BID     • hydrochlorothiazide (HYDRODIURIL) 25 MG tablet Take  by mouth Daily.  3   • Insulin Glargine (TOUJEO SOLOSTAR) 300 UNIT/ML solution pen-injector Inject  under the skin into the appropriate area as directed.     • losartan (COZAAR) 100 MG tablet Take 100 mg by  mouth Daily.  3   • melatonin 5 MG tablet tablet Take 1 tablet by mouth At Night As Needed (sleep).     • metaxalone (SKELAXIN) 800 MG tablet Take 1 tablet by mouth 3 (Three) Times a Day As Needed for Muscle Spasms. 30 tablet 0   • metoclopramide (REGLAN) 10 MG tablet TK 1 T PO BEFORE MEALS AND QHS  8   • mupirocin (BACTROBAN) 2 % ointment REY EXT AA BID     • ondansetron (ZOFRAN) 4 MG tablet Take 1 tablet by mouth Every 6 (Six) Hours As Needed for Nausea or Vomiting. 30 tablet 0   • pantoprazole (PROTONIX) 40 MG EC tablet Take 40 mg by mouth Daily.  2   • sulfamethoxazole-trimethoprim (BACTRIM DS,SEPTRA DS) 800-160 MG per tablet Take 1 tablet by mouth 2 (Two) Times a Day.     • tolnaftate (TINACTIN) 1 % cream tolnaftate 1 % topical cream   Apply twice a day between the toes.   Obtain over-the-counter     • vitamin B-12 (CYANOCOBALAMIN) 1000 MCG tablet Take 1,000 mcg by mouth Daily.       No current facility-administered medications on file prior to visit.       Allergies   Allergen Reactions   • Chlorhexidine Shortness Of Breath, Itching and Rash     Pt developed a rash, itching, and shortness of breath after receiving a CHG bath on 4/24/19.   • Latex Other (See Comments)     Rash, hives, and tongue swelling      Past Medical History:   Diagnosis Date   • Acid reflux    • Asthma    • Diabetes (CMS/HCC)    • Hypertension      Past Surgical History:   Procedure Laterality Date   • AMPUTATION DIGIT Left 10/15/2019    Procedure: AMPUTATE LEFT THIRD TOE;  Surgeon: Juju Weber MD;  Location:  [facility] OR;  Service: Orthopedics   • AORTAGRAM N/A 4/30/2019    Procedure: AORTAGRAM WITH OR WITHOUT RUNOFFS;  Surgeon: Carrillo White MD;  Location:  [facility] OR 15;  Service: Vascular   • BELOW KNEE AMPUTATION Right 6/4/2019    Procedure: BELOW KNEE AMPUTATION RIGHT;  Surgeon: Juju Weber MD;  Location:  [facility] OR;  Service: Orthopedics   • CHOLECYSTECTOMY     • FOOT SURGERY Left 10/15/2019    Amputate 3rd toe- Tia  "  • KNEE SURGERY Right     TKA     Family History   Problem Relation Age of Onset   • Cancer Mother    • Hypertension Mother    • Hypertension Father    • Heart attack Father       Social History     Socioeconomic History   • Marital status:      Spouse name: Not on file   • Number of children: Not on file   • Years of education: Not on file   • Highest education level: Not on file   Tobacco Use   • Smoking status: Former Smoker     Types: Cigarettes     Start date:      Last attempt to quit:      Years since quittin.3   • Smokeless tobacco: Never Used   Substance and Sexual Activity   • Alcohol use: No     Frequency: Never   • Drug use: No   • Sexual activity: Defer        Review of Systems   Constitutional: Negative.    HENT: Negative.    Eyes: Negative.    Respiratory: Negative.    Cardiovascular: Negative.    Gastrointestinal: Negative.    Endocrine: Negative.    Genitourinary: Negative.    Musculoskeletal: Positive for arthralgias.   Skin: Negative.    Allergic/Immunologic: Negative.    Neurological: Negative.    Hematological: Negative.    Psychiatric/Behavioral: Negative.        The following portions of the patient's history were reviewed and updated as appropriate: allergies, current medications, past family history, past medical history, past social history, past surgical history and problem list.    Physical Exam:   Ht 190.5 cm (75\")   Wt 113 kg (249 lb 1.9 oz)   BMI 31.14 kg/m²   GENERAL: Body habitus: trunkal obesity    Lower extremity edema: Left: 1+ pitting; Right: none    Gait: gait very good with right BKA     Mental Status:  awake and alert; oriented to person, place, and time  MSK  Diabetic Foot Exam:   Left:  ulcer does not probe to bone, some gout crystals, no purulence, no fluctuance, the ankle ulcer and the toe amputation site are healed         NEURO Sensation:  absent     Medical Decision Making    Data Review:   phone conversation with physician x2,xray, order " labs    Assessment/Plan/Diagnosis/Treatment Options:   1. Diabetic ulcer of toe of left foot associated with type 2 diabetes mellitus, limited to breakdown of skin (CMS/HCC)  I do not feel bone in the ucler, but it is certainly into the deep tissues, not into the joint.  I am concerned about the erythema.  His foot is so fragile, that ultimately he is likely to lose it. I discussed this with Dr Garcia and Dr Briones.  I m not certain what to do about the crystal production- he is high risk for adding more meds with potential renal toxicity.  The increasing edema in hands and leg is also concerning.  I discussed this with Dr Briones.  Will order labs today.  I will see him in 2-3 weeks, sooner if anything is worse.  Continue Bactroban on the ulcer, and open shoe. Dr Briones is going to try a small amount of allopurinol, he will contact the patient.  On xray today there is not change in the erosions that have been present  In the proximal phalanx and metatarsal head compared to last year, no definite signs of osteomyelitis.  The erosions were present previously and are due to gout.  The only new change is a cloudy density consistent with a tophus medial to the 1st metatarsal head.  No change in dense arterial calcification.      2. Diabetic ulcer of left ankle (CMS/HCC)  healed    3. S/P BKA (below knee amputation), right (CMS/HCC)  healed    4. 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 Decreased pulses in feet  High risk

## 2020-05-05 ENCOUNTER — LAB (OUTPATIENT)
Dept: LAB | Facility: HOSPITAL | Age: 75
End: 2020-05-05

## 2020-05-05 ENCOUNTER — TRANSCRIBE ORDERS (OUTPATIENT)
Dept: LAB | Facility: HOSPITAL | Age: 75
End: 2020-05-05

## 2020-05-05 DIAGNOSIS — L03.032 ABSCESS OF FIFTH TOENAIL OF LEFT FOOT: ICD-10-CM

## 2020-05-05 DIAGNOSIS — L03.032 ABSCESS OF FIFTH TOENAIL OF LEFT FOOT: Primary | ICD-10-CM

## 2020-05-05 LAB
ALBUMIN SERPL-MCNC: 4.3 G/DL (ref 3.5–5.2)
ALBUMIN/GLOB SERPL: 1.1 G/DL
ALP SERPL-CCNC: 110 U/L (ref 39–117)
ALT SERPL W P-5'-P-CCNC: 21 U/L (ref 1–41)
ANION GAP SERPL CALCULATED.3IONS-SCNC: 15 MMOL/L (ref 5–15)
AST SERPL-CCNC: 22 U/L (ref 1–40)
BASOPHILS # BLD AUTO: 0.07 10*3/MM3 (ref 0–0.2)
BASOPHILS NFR BLD AUTO: 0.8 % (ref 0–1.5)
BILIRUB SERPL-MCNC: 0.6 MG/DL (ref 0.2–1.2)
BUN BLD-MCNC: 38 MG/DL (ref 8–23)
BUN/CREAT SERPL: 16.2 (ref 7–25)
CALCIUM SPEC-SCNC: 10 MG/DL (ref 8.6–10.5)
CHLORIDE SERPL-SCNC: 102 MMOL/L (ref 98–107)
CO2 SERPL-SCNC: 21 MMOL/L (ref 22–29)
CREAT BLD-MCNC: 2.34 MG/DL (ref 0.76–1.27)
CRP SERPL-MCNC: 2.17 MG/DL (ref 0–0.5)
DEPRECATED RDW RBC AUTO: 43.3 FL (ref 37–54)
EOSINOPHIL # BLD AUTO: 0.29 10*3/MM3 (ref 0–0.4)
EOSINOPHIL NFR BLD AUTO: 3.2 % (ref 0.3–6.2)
ERYTHROCYTE [DISTWIDTH] IN BLOOD BY AUTOMATED COUNT: 13.3 % (ref 12.3–15.4)
GFR SERPL CREATININE-BSD FRML MDRD: 27 ML/MIN/1.73
GLOBULIN UR ELPH-MCNC: 3.9 GM/DL
GLUCOSE BLD-MCNC: 146 MG/DL (ref 65–99)
HCT VFR BLD AUTO: 37.2 % (ref 37.5–51)
HGB BLD-MCNC: 12 G/DL (ref 13–17.7)
IMM GRANULOCYTES # BLD AUTO: 0.03 10*3/MM3 (ref 0–0.05)
IMM GRANULOCYTES NFR BLD AUTO: 0.3 % (ref 0–0.5)
LYMPHOCYTES # BLD AUTO: 1.93 10*3/MM3 (ref 0.7–3.1)
LYMPHOCYTES NFR BLD AUTO: 21.6 % (ref 19.6–45.3)
MCH RBC QN AUTO: 28.5 PG (ref 26.6–33)
MCHC RBC AUTO-ENTMCNC: 32.3 G/DL (ref 31.5–35.7)
MCV RBC AUTO: 88.4 FL (ref 79–97)
MONOCYTES # BLD AUTO: 0.64 10*3/MM3 (ref 0.1–0.9)
MONOCYTES NFR BLD AUTO: 7.2 % (ref 5–12)
NEUTROPHILS # BLD AUTO: 5.99 10*3/MM3 (ref 1.7–7)
NEUTROPHILS NFR BLD AUTO: 66.9 % (ref 42.7–76)
NRBC BLD AUTO-RTO: 0 /100 WBC (ref 0–0.2)
PLATELET # BLD AUTO: 277 10*3/MM3 (ref 140–450)
PMV BLD AUTO: 9.1 FL (ref 6–12)
POTASSIUM BLD-SCNC: 5.4 MMOL/L (ref 3.5–5.2)
PROT SERPL-MCNC: 8.2 G/DL (ref 6–8.5)
RBC # BLD AUTO: 4.21 10*6/MM3 (ref 4.14–5.8)
SODIUM BLD-SCNC: 138 MMOL/L (ref 136–145)
WBC NRBC COR # BLD: 8.95 10*3/MM3 (ref 3.4–10.8)

## 2020-05-05 PROCEDURE — 85025 COMPLETE CBC W/AUTO DIFF WBC: CPT

## 2020-05-05 PROCEDURE — 86140 C-REACTIVE PROTEIN: CPT

## 2020-05-05 PROCEDURE — 80053 COMPREHEN METABOLIC PANEL: CPT

## 2020-05-05 PROCEDURE — 36415 COLL VENOUS BLD VENIPUNCTURE: CPT

## 2020-05-11 ENCOUNTER — LAB REQUISITION (OUTPATIENT)
Dept: LAB | Facility: HOSPITAL | Age: 75
End: 2020-05-11

## 2020-05-11 DIAGNOSIS — R06.02 SHORTNESS OF BREATH: ICD-10-CM

## 2020-05-11 PROCEDURE — U0002 COVID-19 LAB TEST NON-CDC: HCPCS | Performed by: INTERNAL MEDICINE

## 2020-05-12 LAB
REF LAB TEST METHOD: NORMAL
SARS-COV-2 RNA RESP QL NAA+PROBE: NOT DETECTED

## 2020-05-15 ENCOUNTER — TELEPHONE (OUTPATIENT)
Dept: ORTHOPEDIC SURGERY | Facility: CLINIC | Age: 75
End: 2020-05-15

## 2020-05-15 NOTE — TELEPHONE ENCOUNTER
I call ID to see if the patient had seen Dr. Garcia.  The patient cancelled his appt for 5/7, but has one scheduled for 5/19.    Brigette

## 2020-05-18 ENCOUNTER — OFFICE VISIT (OUTPATIENT)
Dept: ORTHOPEDIC SURGERY | Facility: CLINIC | Age: 75
End: 2020-05-18

## 2020-05-18 VITALS — BODY MASS INDEX: 32.58 KG/M2 | WEIGHT: 262 LBS | HEIGHT: 75 IN | OXYGEN SATURATION: 99 % | HEART RATE: 71 BPM

## 2020-05-18 DIAGNOSIS — E11.42 TYPE 2 DIABETES MELLITUS WITH DIABETIC POLYNEUROPATHY, WITHOUT LONG-TERM CURRENT USE OF INSULIN (HCC): ICD-10-CM

## 2020-05-18 DIAGNOSIS — Z89.511 S/P BKA (BELOW KNEE AMPUTATION), RIGHT (HCC): ICD-10-CM

## 2020-05-18 DIAGNOSIS — R09.89 DECREASED PULSES IN FEET: ICD-10-CM

## 2020-05-18 DIAGNOSIS — E11.622 DIABETIC ULCER OF LEFT ANKLE (HCC): Primary | ICD-10-CM

## 2020-05-18 DIAGNOSIS — M1A.0711 IDIOPATHIC CHRONIC GOUT OF RIGHT FOOT WITH TOPHUS: ICD-10-CM

## 2020-05-18 DIAGNOSIS — L97.329 DIABETIC ULCER OF LEFT ANKLE (HCC): Primary | ICD-10-CM

## 2020-05-18 PROCEDURE — 99213 OFFICE O/P EST LOW 20 MIN: CPT | Performed by: ORTHOPAEDIC SURGERY

## 2020-05-18 NOTE — PROGRESS NOTES
ESTABLISHED PATIENT    Patient: Amol Aguirre  : 1945    Primary Care Provider: Donte Briones MD    Requesting Provider: As above    Follow-up (2.5 week Diabetic ulcer of toe of left foot associated with type 2 diabetes mellitus, limited to breakdown of skin)      History    Chief Complaint: Left diabetic foot ulcer with gouty tophus    History of Present Illness: He notes more gouty drainage from the left first metatarsal ulcer, it is much less swollen however, at last visit I spoke with Dr. Briones.  He has the patient starting to take allopurinol.  Hopefully this will help the tophus resolve and help the ulcer heal.    Current Outpatient Medications on File Prior to Visit   Medication Sig Dispense Refill   • amLODIPine (NORVASC) 10 MG tablet Take 10 mg by mouth Daily.  0   • aspirin 81 MG EC tablet Take 81 mg by mouth Daily.     • budesonide-formoterol (SYMBICORT) 80-4.5 MCG/ACT inhaler Inhale 2 puffs 2 (Two) Times a Day.     • BYSTOLIC 10 MG tablet TK 1/2 T PO BID  7   • cephalexin (KEFLEX) 500 MG capsule TK 1 C PO Q 6 H     • CloNIDine (CATAPRES) 0.1 MG tablet Take 0.1 mg by mouth 2 (Two) Times a Day.     • docusate sodium 100 MG capsule Take 100 mg by mouth 2 (Two) Times a Day As Needed for Constipation. 60 each 0   • doxycycline (MONODOX) 100 MG capsule TK 1 C PO BID     • hydrochlorothiazide (HYDRODIURIL) 25 MG tablet Take  by mouth Daily.  3   • Insulin Glargine (TOUJEO SOLOSTAR) 300 UNIT/ML solution pen-injector Inject  under the skin into the appropriate area as directed.     • losartan (COZAAR) 100 MG tablet Take 100 mg by mouth Daily.  3   • melatonin 5 MG tablet tablet Take 1 tablet by mouth At Night As Needed (sleep).     • metaxalone (SKELAXIN) 800 MG tablet Take 1 tablet by mouth 3 (Three) Times a Day As Needed for Muscle Spasms. 30 tablet 0   • metoclopramide (REGLAN) 10 MG tablet TK 1 T PO BEFORE MEALS AND QHS  8   • mupirocin (BACTROBAN) 2 % ointment REY EXT AA BID      • ondansetron (ZOFRAN) 4 MG tablet Take 1 tablet by mouth Every 6 (Six) Hours As Needed for Nausea or Vomiting. 30 tablet 0   • pantoprazole (PROTONIX) 40 MG EC tablet Take 40 mg by mouth Daily.  2   • sulfamethoxazole-trimethoprim (BACTRIM DS,SEPTRA DS) 800-160 MG per tablet Take 1 tablet by mouth 2 (Two) Times a Day.     • tolnaftate (TINACTIN) 1 % cream tolnaftate 1 % topical cream   Apply twice a day between the toes.   Obtain over-the-counter     • vitamin B-12 (CYANOCOBALAMIN) 1000 MCG tablet Take 1,000 mcg by mouth Daily.       No current facility-administered medications on file prior to visit.       Allergies   Allergen Reactions   • Chlorhexidine Shortness Of Breath, Itching and Rash     Pt developed a rash, itching, and shortness of breath after receiving a CHG bath on 4/24/19.   • Latex Other (See Comments)     Rash, hives, and tongue swelling      Past Medical History:   Diagnosis Date   • Acid reflux    • Asthma    • Diabetes (CMS/HCC)    • Hypertension      Past Surgical History:   Procedure Laterality Date   • AMPUTATION DIGIT Left 10/15/2019    Procedure: AMPUTATE LEFT THIRD TOE;  Surgeon: Juju Weber MD;  Location: [facility] OR;  Service: Orthopedics   • AORTAGRAM N/A 4/30/2019    Procedure: AORTAGRAM WITH OR WITHOUT RUNOFFS;  Surgeon: Carrillo White MD;  Location: [facility] HYBRID OR 15;  Service: Vascular   • BELOW KNEE AMPUTATION Right 6/4/2019    Procedure: BELOW KNEE AMPUTATION RIGHT;  Surgeon: Juju Weber MD;  Location: [facility] OR;  Service: Orthopedics   • CHOLECYSTECTOMY     • FOOT SURGERY Left 10/15/2019    Amputate 3rd toe- De[place]nore   • KNEE SURGERY Right     TKA     Family History   Problem Relation Age of Onset   • Cancer Mother    • Hypertension Mother    • Hypertension Father    • Heart attack Father       Social History     Socioeconomic History   • Marital status:      Spouse name: Not on file   • Number of children: Not on file   • Years of education: Not on file   •  "Highest education level: Not on file   Tobacco Use   • Smoking status: Former Smoker     Types: Cigarettes     Start date:      Last attempt to quit:      Years since quittin.4   • Smokeless tobacco: Never Used   Substance and Sexual Activity   • Alcohol use: No     Frequency: Never   • Drug use: No   • Sexual activity: Defer        Review of Systems   Constitutional: Negative.    HENT: Negative.    Eyes: Negative.    Respiratory: Negative.    Cardiovascular: Negative.    Gastrointestinal: Negative.    Endocrine: Negative.    Genitourinary: Negative.    Musculoskeletal: Positive for arthralgias.   Skin: Negative.    Allergic/Immunologic: Negative.    Neurological: Negative.    Hematological: Negative.    Psychiatric/Behavioral: Negative.        The following portions of the patient's history were reviewed and updated as appropriate: allergies, current medications, past family history, past medical history, past social history, past surgical history and problem list.    Physical Exam:   Pulse 71   Ht 190.5 cm (75\")   Wt 119 kg (262 lb)   SpO2 99%   BMI 32.75 kg/m²   GENERAL: Body habitus: trunkal obesity    Lower extremity edema: Left: none; Right: none    Gait: Gait with the prosthesis on the right is very good     Mental Status:  awake and alert; oriented to person, place, and time  MSK  Diabetic Foot Exam: Right below-knee amputation stable left:  Ulcer                    NEURO Sensation:  absent     Medical Decision Making    Data Review:   none    Assessment/Plan/Diagnosis/Treatment Options:   1. Diabetic ulcer of left foot (CMS/HCC)  Hopefully the allopurinol will help dissipate the tophus and that will help the ulcer to heal, I am reluctant to surgically debride this because of his significant vascular disease.  He is at high risk for below-knee amputation.  Continue with Bactroban cream, continue with the allopurinol, stay out of his shoe, and I will see him in 2 to 3 weeks for repeat exam, " nonweightbearing x-ray of the foot at that time.  He has an appointment with Dr. Garcia of infectious disease tomorrow.    2. S/P BKA (below knee amputation), right (CMS/HCC)  Stable on the right    3. Decreased pulses in feet  Severe vascular disease    4. Type 2 diabetes mellitus with diabetic polyneuropathy, without long-term current use of insulin (CMS/HCC)  Dense neuropathy    5. 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  As above

## 2020-06-01 ENCOUNTER — OFFICE VISIT (OUTPATIENT)
Dept: ORTHOPEDIC SURGERY | Facility: CLINIC | Age: 75
End: 2020-06-01

## 2020-06-01 VITALS — BODY MASS INDEX: 32.62 KG/M2 | HEIGHT: 75 IN | WEIGHT: 262.35 LBS

## 2020-06-01 DIAGNOSIS — L97.521 DIABETIC ULCER OF TOE OF LEFT FOOT ASSOCIATED WITH TYPE 2 DIABETES MELLITUS, LIMITED TO BREAKDOWN OF SKIN (HCC): ICD-10-CM

## 2020-06-01 DIAGNOSIS — E11.622 DIABETIC ULCER OF LEFT ANKLE (HCC): Primary | ICD-10-CM

## 2020-06-01 DIAGNOSIS — L97.329 DIABETIC ULCER OF LEFT ANKLE (HCC): Primary | ICD-10-CM

## 2020-06-01 DIAGNOSIS — E11.621 DIABETIC ULCER OF TOE OF LEFT FOOT ASSOCIATED WITH TYPE 2 DIABETES MELLITUS, LIMITED TO BREAKDOWN OF SKIN (HCC): ICD-10-CM

## 2020-06-01 PROCEDURE — 99212 OFFICE O/P EST SF 10 MIN: CPT | Performed by: ORTHOPAEDIC SURGERY

## 2020-06-01 RX ORDER — ALLOPURINOL 100 MG/1
100 TABLET ORAL 2 TIMES DAILY
COMMUNITY

## 2020-06-01 RX ORDER — LINEZOLID 600 MG/1
TABLET, FILM COATED ORAL
COMMUNITY
End: 2020-06-01

## 2020-06-01 RX ORDER — GABAPENTIN 400 MG/1
600 CAPSULE ORAL 4 TIMES DAILY
COMMUNITY
End: 2021-05-29 | Stop reason: HOSPADM

## 2020-06-01 RX ORDER — INSULIN ASPART 100 [IU]/ML
20 INJECTION, SOLUTION INTRAVENOUS; SUBCUTANEOUS
COMMUNITY
Start: 2020-05-16

## 2020-06-01 NOTE — PROGRESS NOTES
ESTABLISHED PATIENT    Patient: Amol Aguirre  : 1945    Primary Care Provider: Donte Briones MD    Requesting Provider: As above    Follow-up (2 week follow up; Diabetic ulcer of toe of left foot associated with type 2 diabetes mellitus, limited to breakdown of skin)      History    Chief Complaint: Left chronic diabetic foot ulcer with gout    History of Present Illness: He returns for follow-up of the ulcer over his left first metatarsal head with gouty tophus, he reports no fever or chills.  The right below-knee amputation is doing well.  He is not wearing a shoe at home which is what I have recommended.  We do not want to put any pressure on the left foot.  His foot is very fragile, he has significantly decreased blood flow without unreconstructable vascular disease.    Current Outpatient Medications on File Prior to Visit   Medication Sig Dispense Refill   • allopurinol (ZYLOPRIM) 100 MG tablet allopurinol 100 mg tablet   1/4 tab daily x 2 weeks, then 1/2 tab daily x 2 weeks, then 3/4 tab daily x 2 weeks, then 1 tab daily x 2 weeks, then 1 1/4 tab daily x 2 weeks, then 1 1/2 tab daily x 2 weeks, then 1 3/4 tab daily x 2 weeks, then 2 tabs daily     • amLODIPine (NORVASC) 10 MG tablet Take 10 mg by mouth Daily.  0   • aspirin 81 MG EC tablet Take 81 mg by mouth Daily.     • budesonide-formoterol (SYMBICORT) 80-4.5 MCG/ACT inhaler Inhale 2 puffs 2 (Two) Times a Day.     • BYSTOLIC 10 MG tablet TK 1/2 T PO BID  7   • cephalexin (KEFLEX) 500 MG capsule TK 1 C PO Q 6 H     • CloNIDine (CATAPRES) 0.1 MG tablet Take 0.1 mg by mouth 2 (Two) Times a Day.     • docusate sodium 100 MG capsule Take 100 mg by mouth 2 (Two) Times a Day As Needed for Constipation. 60 each 0   • gabapentin (NEURONTIN) 400 MG capsule gabapentin 400 mg capsule   TAKE ONE CAPSULE BY MOUTH FOUR TIMES DAILY     • hydrochlorothiazide (HYDRODIURIL) 25 MG tablet Take  by mouth Daily.  3   • Insulin Glargine (TOUJEO SOLOSTAR)  300 UNIT/ML solution pen-injector Inject  under the skin into the appropriate area as directed.     • losartan (COZAAR) 100 MG tablet Take 100 mg by mouth Daily.  3   • melatonin 5 MG tablet tablet Take 1 tablet by mouth At Night As Needed (sleep).     • metaxalone (SKELAXIN) 800 MG tablet Take 1 tablet by mouth 3 (Three) Times a Day As Needed for Muscle Spasms. 30 tablet 0   • metoclopramide (REGLAN) 10 MG tablet TK 1 T PO BEFORE MEALS AND QHS  8   • mupirocin (BACTROBAN) 2 % ointment REY EXT AA BID     • NOVOLOG FLEXPEN 100 UNIT/ML solution pen-injector sc pen INJECT 14 UNITS TID     • ondansetron (ZOFRAN) 4 MG tablet Take 1 tablet by mouth Every 6 (Six) Hours As Needed for Nausea or Vomiting. 30 tablet 0   • pantoprazole (PROTONIX) 40 MG EC tablet Take 40 mg by mouth Daily.  2   • tolnaftate (TINACTIN) 1 % cream tolnaftate 1 % topical cream   Apply twice a day between the toes.   Obtain over-the-counter     • vitamin B-12 (CYANOCOBALAMIN) 1000 MCG tablet Take 1,000 mcg by mouth Daily.     • [DISCONTINUED] Aspirin Buf,CaCarb-MgCarb-MgO, 81 MG tablet aspirin 81 mg tablet   Take 1 tablet every day by oral route.     • [DISCONTINUED] doxycycline (MONODOX) 100 MG capsule TK 1 C PO BID     • [DISCONTINUED] linezolid (ZYVOX) 600 MG tablet linezolid 600 mg tablet   bid     • [DISCONTINUED] sulfamethoxazole-trimethoprim (BACTRIM DS,SEPTRA DS) 800-160 MG per tablet Take 1 tablet by mouth 2 (Two) Times a Day.       No current facility-administered medications on file prior to visit.       Allergies   Allergen Reactions   • Chlorhexidine Shortness Of Breath, Itching and Rash     Pt developed a rash, itching, and shortness of breath after receiving a CHG bath on 4/24/19.   • Latex Other (See Comments)     Rash, hives, and tongue swelling      Past Medical History:   Diagnosis Date   • Acid reflux    • Asthma    • Diabetes (CMS/HCC)    • Hypertension      Past Surgical History:   Procedure Laterality Date   • AMPUTATION DIGIT  "Left 10/15/2019    Procedure: AMPUTATE LEFT THIRD TOE;  Surgeon: Juju Weber MD;  Location:  [facility] OR;  Service: Orthopedics   • AORTAGRAM N/A 2019    Procedure: AORTAGRAM WITH OR WITHOUT RUNOFFS;  Surgeon: Carrillo White MD;  Location:  [facility] HYBRID OR 15;  Service: Vascular   • BELOW KNEE AMPUTATION Right 2019    Procedure: BELOW KNEE AMPUTATION RIGHT;  Surgeon: Juju Weber MD;  Location:  [facility] OR;  Service: Orthopedics   • CHOLECYSTECTOMY     • FOOT SURGERY Left 10/15/2019    Amputate 3rd toe- De[place]nore   • KNEE SURGERY Right     TKA     Family History   Problem Relation Age of Onset   • Cancer Mother    • Hypertension Mother    • Hypertension Father    • Heart attack Father       Social History     Socioeconomic History   • Marital status:      Spouse name: Not on file   • Number of children: Not on file   • Years of education: Not on file   • Highest education level: Not on file   Tobacco Use   • Smoking status: Former Smoker     Types: Cigarettes     Start date:      Last attempt to quit:      Years since quittin.4   • Smokeless tobacco: Never Used   Substance and Sexual Activity   • Alcohol use: No     Frequency: Never   • Drug use: No   • Sexual activity: Defer        Review of Systems   Constitutional: Negative.    HENT: Negative.    Eyes: Negative.    Respiratory: Negative.    Cardiovascular: Negative.    Gastrointestinal: Negative.    Endocrine: Negative.    Genitourinary: Negative.    Musculoskeletal: Positive for arthralgias.   Skin: Negative.    Allergic/Immunologic: Negative.    Neurological: Negative.    Hematological: Negative.    Psychiatric/Behavioral: Negative.        The following portions of the patient's history were reviewed and updated as appropriate: allergies, current medications, past family history, past medical history, past social history, past surgical history and problem list.    Physical Exam:   Ht 190.5 cm (75\")   Wt 119 kg (262 lb 5.6 oz)   " BMI 32.79 kg/m²                NEURO Sensation:  absent         Medical Decision Making    Data Review:   ordered and reviewed x-rays today    Assessment/Plan/Diagnosis/Treatment Options:   1.  Diabetic ulcer of toe of left foot associated with type 2 diabetes mellitus, limited to breakdown of skin (CMS/HCC)  At present the ulcer is slowly decreasing in size, there is less gouty material coming out of it.  He remains high risk for below-knee amputation.  If he had better blood flow I would consider debriding the surgically closing it but in the face of very poor blood flow and the slow healing of the toe amputation I do not want to operate unless we absolutely have to.  He and his wife understand.  Continue to stay out of his shoe is much as possible.  And I will see him in 2 weeks.  - XR Foot 2 View Left

## 2020-06-17 ENCOUNTER — OFFICE VISIT (OUTPATIENT)
Dept: ORTHOPEDIC SURGERY | Facility: CLINIC | Age: 75
End: 2020-06-17

## 2020-06-17 VITALS — WEIGHT: 262.35 LBS | HEIGHT: 75 IN | BODY MASS INDEX: 32.62 KG/M2 | HEART RATE: 66 BPM | OXYGEN SATURATION: 98 %

## 2020-06-17 DIAGNOSIS — M1A.0711 IDIOPATHIC CHRONIC GOUT OF RIGHT FOOT WITH TOPHUS: ICD-10-CM

## 2020-06-17 DIAGNOSIS — L97.329 DIABETIC ULCER OF LEFT ANKLE (HCC): Primary | ICD-10-CM

## 2020-06-17 DIAGNOSIS — E11.622 DIABETIC ULCER OF LEFT ANKLE (HCC): Primary | ICD-10-CM

## 2020-06-17 DIAGNOSIS — R09.89 DECREASED PULSES IN FEET: ICD-10-CM

## 2020-06-17 PROCEDURE — 99212 OFFICE O/P EST SF 10 MIN: CPT | Performed by: ORTHOPAEDIC SURGERY

## 2020-06-17 NOTE — PROGRESS NOTES
ESTABLISHED PATIENT    Patient: Amol Aguirre  : 1945    Primary Care Provider: Donte Briones MD    Requesting Provider: As above    Follow-up of the Left Foot (2 week)      History    Chief Complaint: left foot ulcer    History of Present Illness: He returns for follow-up of the ulcer over his left first metatarsal head, he has little more erythema this week, he is seeing Dr. Garcia tomorrow.  Much less tophus drainage.    Current Outpatient Medications on File Prior to Visit   Medication Sig Dispense Refill   • allopurinol (ZYLOPRIM) 100 MG tablet allopurinol 100 mg tablet   1/4 tab daily x 2 weeks, then 1/2 tab daily x 2 weeks, then 3/4 tab daily x 2 weeks, then 1 tab daily x 2 weeks, then 1 1/4 tab daily x 2 weeks, then 1 1/2 tab daily x 2 weeks, then 1 3/4 tab daily x 2 weeks, then 2 tabs daily     • amLODIPine (NORVASC) 10 MG tablet Take 10 mg by mouth Daily.  0   • aspirin 81 MG EC tablet Take 81 mg by mouth Daily.     • budesonide-formoterol (SYMBICORT) 80-4.5 MCG/ACT inhaler Inhale 2 puffs 2 (Two) Times a Day.     • BYSTOLIC 10 MG tablet TK 1/2 T PO BID  7   • cephalexin (KEFLEX) 500 MG capsule TK 1 C PO Q 6 H     • CloNIDine (CATAPRES) 0.1 MG tablet Take 0.1 mg by mouth 2 (Two) Times a Day.     • docusate sodium 100 MG capsule Take 100 mg by mouth 2 (Two) Times a Day As Needed for Constipation. 60 each 0   • gabapentin (NEURONTIN) 400 MG capsule gabapentin 400 mg capsule   TAKE ONE CAPSULE BY MOUTH FOUR TIMES DAILY     • hydrochlorothiazide (HYDRODIURIL) 25 MG tablet Take  by mouth Daily.  3   • Insulin Glargine (TOUJEO SOLOSTAR) 300 UNIT/ML solution pen-injector Inject  under the skin into the appropriate area as directed.     • losartan (COZAAR) 100 MG tablet Take 100 mg by mouth Daily.  3   • melatonin 5 MG tablet tablet Take 1 tablet by mouth At Night As Needed (sleep).     • metaxalone (SKELAXIN) 800 MG tablet Take 1 tablet by mouth 3 (Three) Times a Day As Needed for  Muscle Spasms. 30 tablet 0   • metoclopramide (REGLAN) 10 MG tablet TK 1 T PO BEFORE MEALS AND QHS  8   • mupirocin (BACTROBAN) 2 % ointment REY EXT AA BID     • NOVOLOG FLEXPEN 100 UNIT/ML solution pen-injector sc pen INJECT 14 UNITS TID     • ondansetron (ZOFRAN) 4 MG tablet Take 1 tablet by mouth Every 6 (Six) Hours As Needed for Nausea or Vomiting. 30 tablet 0   • pantoprazole (PROTONIX) 40 MG EC tablet Take 40 mg by mouth Daily.  2   • tolnaftate (TINACTIN) 1 % cream tolnaftate 1 % topical cream   Apply twice a day between the toes.   Obtain over-the-counter     • vitamin B-12 (CYANOCOBALAMIN) 1000 MCG tablet Take 1,000 mcg by mouth Daily.       No current facility-administered medications on file prior to visit.       Allergies   Allergen Reactions   • Chlorhexidine Shortness Of Breath, Itching and Rash     Pt developed a rash, itching, and shortness of breath after receiving a CHG bath on 4/24/19.   • Latex Other (See Comments)     Rash, hives, and tongue swelling      Past Medical History:   Diagnosis Date   • Acid reflux    • Asthma    • Diabetes (CMS/HCC)    • Hypertension      Past Surgical History:   Procedure Laterality Date   • AMPUTATION DIGIT Left 10/15/2019    Procedure: AMPUTATE LEFT THIRD TOE;  Surgeon: Juju Weber MD;  [facility];  Service: Orthopedics   • AORTAGRAM N/A 4/30/2019    Procedure: AORTAGRAM WITH OR WITHOUT RUNOFFS;  Surgeon: Carrillo White MD;  [facility] HYBRID OR 15;  Service: Vascular   • BELOW KNEE AMPUTATION Right 6/4/2019    Procedure: BELOW KNEE AMPUTATION RIGHT;  Surgeon: Juju Weber MD;  [facility]: [facility];  Service: Orthopedics   • CHOLECYSTECTOMY     • FOOT SURGERY Left 10/15/2019    Amputate 3rd toe- DeGnore   • KNEE SURGERY Right     TKA     Family History   Problem Relation Age of Onset   • Cancer Mother    • Hypertension Mother    • Hypertension Father    • Heart attack Father       Social History     Socioeconomic History   • Marital status:  "     Spouse name: Not on file   • Number of children: Not on file   • Years of education: Not on file   • Highest education level: Not on file   Tobacco Use   • Smoking status: Former Smoker     Types: Cigarettes     Start date:      Last attempt to quit:      Years since quittin.4   • Smokeless tobacco: Never Used   Substance and Sexual Activity   • Alcohol use: No     Frequency: Never   • Drug use: No   • Sexual activity: Defer        Review of Systems   Constitutional: Negative.    HENT: Negative.    Eyes: Negative.    Respiratory: Negative.    Cardiovascular: Negative.    Gastrointestinal: Negative.    Endocrine: Negative.    Genitourinary: Negative.    Musculoskeletal: Positive for arthralgias.   Skin: Negative.    Allergic/Immunologic: Negative.    Neurological: Negative.    Hematological: Negative.    Psychiatric/Behavioral: Negative.        The following portions of the patient's history were reviewed and updated as appropriate: allergies, current medications, past family history, past medical history, past social history, past surgical history and problem list.    Physical Exam:   Pulse 66   Ht 190.5 cm (75\")   Wt 119 kg (262 lb 5.6 oz)   SpO2 98%   BMI 32.79 kg/m²   GENERAL: Body habitus: trunkal obesity        Gait: Very smooth gait with right BKA     Mental Status:  awake and alert; oriented to person, place, and time  MSK:  [place]:  Right:  Right BKA stable;         Foot:  ; Left:  Ulcer a little smaller, but slightly more erythema               NEURO Sensation:  absent    Medical Decision Making    Data Review:   none    Assessment/Plan/Diagnosis/Treatment Options:   1. Diabetic ulcer of left ankle (CMS/HCC)  The ulcer is slightly smaller, but faintly erythematous.  Much less gouty drainage.  He is seeing Dr. Garcia tomorrow.  Continue Bactroban dressing.  I will see him in 3 to 4 weeks, very high risk for below-knee amputation, but I do not think any local surgical " intervention would be successful.  We are trying to avoid that if possible.    2. Decreased pulses in feet  As above    3. 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

## 2020-06-18 ENCOUNTER — TRANSCRIBE ORDERS (OUTPATIENT)
Dept: LAB | Facility: HOSPITAL | Age: 75
End: 2020-06-18

## 2020-06-18 ENCOUNTER — LAB (OUTPATIENT)
Dept: LAB | Facility: HOSPITAL | Age: 75
End: 2020-06-18

## 2020-06-18 DIAGNOSIS — S00.02XS BLISTER OF FACE, NECK, AND SCALP EXCEPT EYE, INFECTED, SEQUELA: ICD-10-CM

## 2020-06-18 DIAGNOSIS — S10.92XS BLISTER OF FACE, NECK, AND SCALP EXCEPT EYE, INFECTED, SEQUELA: Primary | ICD-10-CM

## 2020-06-18 DIAGNOSIS — L97.529 ULCER OF LEFT FOOT, UNSPECIFIED ULCER STAGE (HCC): ICD-10-CM

## 2020-06-18 DIAGNOSIS — S00.82XS BLISTER OF FACE, NECK, AND SCALP EXCEPT EYE, INFECTED, SEQUELA: ICD-10-CM

## 2020-06-18 DIAGNOSIS — L08.9 BLISTER OF FACE, NECK, AND SCALP EXCEPT EYE, INFECTED, SEQUELA: Primary | ICD-10-CM

## 2020-06-18 DIAGNOSIS — S10.92XS BLISTER OF FACE, NECK, AND SCALP EXCEPT EYE, INFECTED, SEQUELA: ICD-10-CM

## 2020-06-18 DIAGNOSIS — S00.82XS BLISTER OF FACE, NECK, AND SCALP EXCEPT EYE, INFECTED, SEQUELA: Primary | ICD-10-CM

## 2020-06-18 DIAGNOSIS — S00.02XS BLISTER OF FACE, NECK, AND SCALP EXCEPT EYE, INFECTED, SEQUELA: Primary | ICD-10-CM

## 2020-06-18 DIAGNOSIS — L08.9 BLISTER OF FACE, NECK, AND SCALP EXCEPT EYE, INFECTED, SEQUELA: ICD-10-CM

## 2020-06-18 LAB
ALBUMIN SERPL-MCNC: 3.9 G/DL (ref 3.5–5.2)
ALBUMIN/GLOB SERPL: 1.4 G/DL
ALP SERPL-CCNC: 119 U/L (ref 39–117)
ALT SERPL W P-5'-P-CCNC: 9 U/L (ref 1–41)
ANION GAP SERPL CALCULATED.3IONS-SCNC: 13 MMOL/L (ref 5–15)
AST SERPL-CCNC: 13 U/L (ref 1–40)
BASOPHILS # BLD AUTO: 0.07 10*3/MM3 (ref 0–0.2)
BASOPHILS NFR BLD AUTO: 0.8 % (ref 0–1.5)
BILIRUB SERPL-MCNC: 0.3 MG/DL (ref 0.2–1.2)
BUN BLD-MCNC: 21 MG/DL (ref 8–23)
BUN/CREAT SERPL: 12.8 (ref 7–25)
CALCIUM SPEC-SCNC: 9 MG/DL (ref 8.6–10.5)
CHLORIDE SERPL-SCNC: 104 MMOL/L (ref 98–107)
CO2 SERPL-SCNC: 23 MMOL/L (ref 22–29)
CREAT BLD-MCNC: 1.64 MG/DL (ref 0.76–1.27)
CRP SERPL-MCNC: 2.66 MG/DL (ref 0–0.5)
DEPRECATED RDW RBC AUTO: 48.5 FL (ref 37–54)
EOSINOPHIL # BLD AUTO: 0.34 10*3/MM3 (ref 0–0.4)
EOSINOPHIL NFR BLD AUTO: 4.1 % (ref 0.3–6.2)
ERYTHROCYTE [DISTWIDTH] IN BLOOD BY AUTOMATED COUNT: 15.2 % (ref 12.3–15.4)
GFR SERPL CREATININE-BSD FRML MDRD: 41 ML/MIN/1.73
GLOBULIN UR ELPH-MCNC: 2.7 GM/DL
GLUCOSE BLD-MCNC: 233 MG/DL (ref 65–99)
HCT VFR BLD AUTO: 32 % (ref 37.5–51)
HGB BLD-MCNC: 9.9 G/DL (ref 13–17.7)
IMM GRANULOCYTES # BLD AUTO: 0.05 10*3/MM3 (ref 0–0.05)
IMM GRANULOCYTES NFR BLD AUTO: 0.6 % (ref 0–0.5)
LYMPHOCYTES # BLD AUTO: 2.01 10*3/MM3 (ref 0.7–3.1)
LYMPHOCYTES NFR BLD AUTO: 24.3 % (ref 19.6–45.3)
MCH RBC QN AUTO: 27.3 PG (ref 26.6–33)
MCHC RBC AUTO-ENTMCNC: 30.9 G/DL (ref 31.5–35.7)
MCV RBC AUTO: 88.4 FL (ref 79–97)
MONOCYTES # BLD AUTO: 0.63 10*3/MM3 (ref 0.1–0.9)
MONOCYTES NFR BLD AUTO: 7.6 % (ref 5–12)
NEUTROPHILS # BLD AUTO: 5.18 10*3/MM3 (ref 1.7–7)
NEUTROPHILS NFR BLD AUTO: 62.6 % (ref 42.7–76)
NRBC BLD AUTO-RTO: 0 /100 WBC (ref 0–0.2)
PLATELET # BLD AUTO: 299 10*3/MM3 (ref 140–450)
PMV BLD AUTO: 9.9 FL (ref 6–12)
POTASSIUM BLD-SCNC: 4.7 MMOL/L (ref 3.5–5.2)
PROT SERPL-MCNC: 6.6 G/DL (ref 6–8.5)
RBC # BLD AUTO: 3.62 10*6/MM3 (ref 4.14–5.8)
SODIUM BLD-SCNC: 140 MMOL/L (ref 136–145)
WBC NRBC COR # BLD: 8.28 10*3/MM3 (ref 3.4–10.8)

## 2020-06-18 PROCEDURE — 85025 COMPLETE CBC W/AUTO DIFF WBC: CPT

## 2020-06-18 PROCEDURE — 80053 COMPREHEN METABOLIC PANEL: CPT

## 2020-06-18 PROCEDURE — 36415 COLL VENOUS BLD VENIPUNCTURE: CPT

## 2020-06-18 PROCEDURE — 86140 C-REACTIVE PROTEIN: CPT

## 2020-07-14 ENCOUNTER — PREP FOR SURGERY (OUTPATIENT)
Dept: OTHER | Facility: HOSPITAL | Age: 75
End: 2020-07-14

## 2020-07-14 ENCOUNTER — LAB (OUTPATIENT)
Dept: LAB | Facility: HOSPITAL | Age: 75
End: 2020-07-14

## 2020-07-14 ENCOUNTER — TRANSCRIBE ORDERS (OUTPATIENT)
Dept: LAB | Facility: HOSPITAL | Age: 75
End: 2020-07-14

## 2020-07-14 DIAGNOSIS — M1A.0721 IDIOPATHIC CHRONIC GOUT, LEFT ANKLE AND FOOT, WITH TOPHUS (TOPHI): ICD-10-CM

## 2020-07-14 DIAGNOSIS — S00.02XA BLISTER OF FACE, NECK, AND SCALP EXCEPT EYE, INFECTED, INITIAL ENCOUNTER: ICD-10-CM

## 2020-07-14 DIAGNOSIS — L97.522 ULCER OF LEFT FOOT, WITH FAT LAYER EXPOSED (HCC): ICD-10-CM

## 2020-07-14 DIAGNOSIS — S00.82XA BLISTER OF FACE, NECK, AND SCALP EXCEPT EYE, INFECTED, INITIAL ENCOUNTER: ICD-10-CM

## 2020-07-14 DIAGNOSIS — Z11.59 SPECIAL SCREENING EXAMINATION FOR UNSPECIFIED VIRAL DISEASE: Primary | ICD-10-CM

## 2020-07-14 DIAGNOSIS — S00.02XA BLISTER OF FACE, NECK, AND SCALP EXCEPT EYE, INFECTED, INITIAL ENCOUNTER: Primary | ICD-10-CM

## 2020-07-14 DIAGNOSIS — H25.11 NUCLEAR SCLEROTIC CATARACT OF RIGHT EYE: Primary | ICD-10-CM

## 2020-07-14 DIAGNOSIS — L08.9 BLISTER OF FACE, NECK, AND SCALP EXCEPT EYE, INFECTED, INITIAL ENCOUNTER: Primary | ICD-10-CM

## 2020-07-14 DIAGNOSIS — S10.92XA BLISTER OF FACE, NECK, AND SCALP EXCEPT EYE, INFECTED, INITIAL ENCOUNTER: ICD-10-CM

## 2020-07-14 DIAGNOSIS — L97.529 ULCER OF LEFT FOOT, UNSPECIFIED ULCER STAGE (HCC): ICD-10-CM

## 2020-07-14 DIAGNOSIS — L08.9 BLISTER OF FACE, NECK, AND SCALP EXCEPT EYE, INFECTED, INITIAL ENCOUNTER: ICD-10-CM

## 2020-07-14 DIAGNOSIS — S00.82XA BLISTER OF FACE, NECK, AND SCALP EXCEPT EYE, INFECTED, INITIAL ENCOUNTER: Primary | ICD-10-CM

## 2020-07-14 DIAGNOSIS — S10.92XA BLISTER OF FACE, NECK, AND SCALP EXCEPT EYE, INFECTED, INITIAL ENCOUNTER: Primary | ICD-10-CM

## 2020-07-14 LAB
ALBUMIN SERPL-MCNC: 3.9 G/DL (ref 3.5–5.2)
ALBUMIN/GLOB SERPL: 1.3 G/DL
ALP SERPL-CCNC: 122 U/L (ref 39–117)
ALT SERPL W P-5'-P-CCNC: 11 U/L (ref 1–41)
ANION GAP SERPL CALCULATED.3IONS-SCNC: 11 MMOL/L (ref 5–15)
AST SERPL-CCNC: 19 U/L (ref 1–40)
BASOPHILS # BLD AUTO: 0.07 10*3/MM3 (ref 0–0.2)
BASOPHILS NFR BLD AUTO: 1 % (ref 0–1.5)
BILIRUB SERPL-MCNC: 0.4 MG/DL (ref 0–1.2)
BUN SERPL-MCNC: 30 MG/DL (ref 8–23)
BUN/CREAT SERPL: 17.1 (ref 7–25)
CALCIUM SPEC-SCNC: 10 MG/DL (ref 8.6–10.5)
CHLORIDE SERPL-SCNC: 102 MMOL/L (ref 98–107)
CO2 SERPL-SCNC: 21 MMOL/L (ref 22–29)
CREAT SERPL-MCNC: 1.75 MG/DL (ref 0.76–1.27)
CRP SERPL-MCNC: 0.65 MG/DL (ref 0–0.5)
DEPRECATED RDW RBC AUTO: 45.6 FL (ref 37–54)
EOSINOPHIL # BLD AUTO: 0.4 10*3/MM3 (ref 0–0.4)
EOSINOPHIL NFR BLD AUTO: 5.5 % (ref 0.3–6.2)
ERYTHROCYTE [DISTWIDTH] IN BLOOD BY AUTOMATED COUNT: 14.6 % (ref 12.3–15.4)
GFR SERPL CREATININE-BSD FRML MDRD: 38 ML/MIN/1.73
GLOBULIN UR ELPH-MCNC: 3.1 GM/DL
GLUCOSE SERPL-MCNC: 310 MG/DL (ref 65–99)
HCT VFR BLD AUTO: 38 % (ref 37.5–51)
HGB BLD-MCNC: 11.7 G/DL (ref 13–17.7)
IMM GRANULOCYTES # BLD AUTO: 0.02 10*3/MM3 (ref 0–0.05)
IMM GRANULOCYTES NFR BLD AUTO: 0.3 % (ref 0–0.5)
LYMPHOCYTES # BLD AUTO: 1.94 10*3/MM3 (ref 0.7–3.1)
LYMPHOCYTES NFR BLD AUTO: 26.6 % (ref 19.6–45.3)
MCH RBC QN AUTO: 26.5 PG (ref 26.6–33)
MCHC RBC AUTO-ENTMCNC: 30.8 G/DL (ref 31.5–35.7)
MCV RBC AUTO: 86.2 FL (ref 79–97)
MONOCYTES # BLD AUTO: 0.53 10*3/MM3 (ref 0.1–0.9)
MONOCYTES NFR BLD AUTO: 7.3 % (ref 5–12)
NEUTROPHILS NFR BLD AUTO: 4.32 10*3/MM3 (ref 1.7–7)
NEUTROPHILS NFR BLD AUTO: 59.3 % (ref 42.7–76)
NRBC BLD AUTO-RTO: 0 /100 WBC (ref 0–0.2)
PLATELET # BLD AUTO: 307 10*3/MM3 (ref 140–450)
PMV BLD AUTO: 10.1 FL (ref 6–12)
POTASSIUM SERPL-SCNC: 4.8 MMOL/L (ref 3.5–5.2)
PROT SERPL-MCNC: 7 G/DL (ref 6–8.5)
RBC # BLD AUTO: 4.41 10*6/MM3 (ref 4.14–5.8)
SODIUM SERPL-SCNC: 134 MMOL/L (ref 136–145)
WBC # BLD AUTO: 7.28 10*3/MM3 (ref 3.4–10.8)

## 2020-07-14 PROCEDURE — 85025 COMPLETE CBC W/AUTO DIFF WBC: CPT

## 2020-07-14 PROCEDURE — 80053 COMPREHEN METABOLIC PANEL: CPT

## 2020-07-14 PROCEDURE — 36415 COLL VENOUS BLD VENIPUNCTURE: CPT

## 2020-07-14 PROCEDURE — 86140 C-REACTIVE PROTEIN: CPT

## 2020-07-14 RX ORDER — CYCLOPENTOLATE HYDROCHLORIDE 20 MG/ML
1 SOLUTION/ DROPS OPHTHALMIC
Status: CANCELLED | OUTPATIENT
Start: 2020-07-14 | End: 2020-07-14

## 2020-07-14 RX ORDER — TETRACAINE HYDROCHLORIDE 5 MG/ML
1 SOLUTION OPHTHALMIC SEE ADMIN INSTRUCTIONS
Status: CANCELLED | OUTPATIENT
Start: 2020-07-14

## 2020-07-14 RX ORDER — PHENYLEPHRINE HYDROCHLORIDE 100 MG/ML
1 SOLUTION/ DROPS OPHTHALMIC
Status: CANCELLED | OUTPATIENT
Start: 2020-07-14 | End: 2020-07-14

## 2020-07-14 RX ORDER — SODIUM CHLORIDE 0.9 % (FLUSH) 0.9 %
1-10 SYRINGE (ML) INJECTION AS NEEDED
Status: CANCELLED | OUTPATIENT
Start: 2020-07-14

## 2020-07-14 RX ORDER — PREDNISOLONE ACETATE 10 MG/ML
1 SUSPENSION/ DROPS OPHTHALMIC SEE ADMIN INSTRUCTIONS
Status: CANCELLED | OUTPATIENT
Start: 2020-07-14

## 2020-07-14 RX ORDER — SODIUM CHLORIDE 0.9 % (FLUSH) 0.9 %
3 SYRINGE (ML) INJECTION EVERY 12 HOURS SCHEDULED
Status: CANCELLED | OUTPATIENT
Start: 2020-07-14

## 2020-07-15 PROBLEM — H25.11 NUCLEAR SCLEROTIC CATARACT OF RIGHT EYE: Status: ACTIVE | Noted: 2020-07-15

## 2020-07-16 ENCOUNTER — LAB (OUTPATIENT)
Dept: LAB | Facility: HOSPITAL | Age: 75
End: 2020-07-16

## 2020-07-16 DIAGNOSIS — Z11.59 SPECIAL SCREENING EXAMINATION FOR UNSPECIFIED VIRAL DISEASE: ICD-10-CM

## 2020-07-16 LAB
REF LAB TEST METHOD: NORMAL
SARS-COV-2 RNA RESP QL NAA+PROBE: NOT DETECTED

## 2020-07-16 PROCEDURE — C9803 HOPD COVID-19 SPEC COLLECT: HCPCS

## 2020-07-16 PROCEDURE — U0002 COVID-19 LAB TEST NON-CDC: HCPCS

## 2020-07-16 PROCEDURE — U0004 COV-19 TEST NON-CDC HGH THRU: HCPCS

## 2020-07-20 ENCOUNTER — ANESTHESIA EVENT (OUTPATIENT)
Dept: PERIOP | Facility: HOSPITAL | Age: 75
End: 2020-07-20

## 2020-07-20 ENCOUNTER — HOSPITAL ENCOUNTER (OUTPATIENT)
Facility: HOSPITAL | Age: 75
Setting detail: HOSPITAL OUTPATIENT SURGERY
Discharge: HOME | End: 2020-07-20
Attending: OPHTHALMOLOGY | Admitting: OPHTHALMOLOGY

## 2020-07-20 ENCOUNTER — ANESTHESIA (OUTPATIENT)
Dept: PERIOP | Facility: HOSPITAL | Age: 75
End: 2020-07-20

## 2020-07-20 VITALS
DIASTOLIC BLOOD PRESSURE: 71 MMHG | RESPIRATION RATE: 16 BRPM | HEART RATE: 72 BPM | TEMPERATURE: 97.7 F | SYSTOLIC BLOOD PRESSURE: 139 MMHG | OXYGEN SATURATION: 95 % | HEIGHT: 75 IN | WEIGHT: 255 LBS | BODY MASS INDEX: 31.71 KG/M2

## 2020-07-20 DIAGNOSIS — H25.11 NUCLEAR SCLEROTIC CATARACT OF RIGHT EYE: ICD-10-CM

## 2020-07-20 LAB — GLUCOSE BLDC GLUCOMTR-MCNC: 335 MG/DL (ref 70–130)

## 2020-07-20 PROCEDURE — V2632 POST CHMBR INTRAOCULAR LENS: HCPCS | Performed by: OPHTHALMOLOGY

## 2020-07-20 PROCEDURE — 82962 GLUCOSE BLOOD TEST: CPT

## 2020-07-20 PROCEDURE — 25010000002 PROPOFOL 10 MG/ML EMULSION: Performed by: NURSE ANESTHETIST, CERTIFIED REGISTERED

## 2020-07-20 DEVICE — LENS ACRYSOF IQ SA60WF W/ULTRASERT 6X13MM ACU0T0 21.5: Type: IMPLANTABLE DEVICE | Site: POSTERIOR CHAMBER | Status: FUNCTIONAL

## 2020-07-20 RX ORDER — PROPOFOL 10 MG/ML
VIAL (ML) INTRAVENOUS AS NEEDED
Status: DISCONTINUED | OUTPATIENT
Start: 2020-07-20 | End: 2020-07-20 | Stop reason: SURG

## 2020-07-20 RX ORDER — SODIUM CHLORIDE 0.9 % (FLUSH) 0.9 %
3 SYRINGE (ML) INJECTION EVERY 12 HOURS SCHEDULED
Status: DISCONTINUED | OUTPATIENT
Start: 2020-07-20 | End: 2020-07-20 | Stop reason: HOSPADM

## 2020-07-20 RX ORDER — BALANCED SALT SOLUTION 6.4; .75; .48; .3; 3.9; 1.7 MG/ML; MG/ML; MG/ML; MG/ML; MG/ML; MG/ML
SOLUTION OPHTHALMIC AS NEEDED
Status: DISCONTINUED | OUTPATIENT
Start: 2020-07-20 | End: 2020-07-20 | Stop reason: HOSPADM

## 2020-07-20 RX ORDER — SODIUM CHLORIDE 0.9 % (FLUSH) 0.9 %
1-10 SYRINGE (ML) INJECTION AS NEEDED
Status: DISCONTINUED | OUTPATIENT
Start: 2020-07-20 | End: 2020-07-20 | Stop reason: HOSPADM

## 2020-07-20 RX ORDER — PREDNISOLONE ACETATE 10 MG/ML
1 SUSPENSION/ DROPS OPHTHALMIC SEE ADMIN INSTRUCTIONS
Status: DISCONTINUED | OUTPATIENT
Start: 2020-07-20 | End: 2020-07-20 | Stop reason: HOSPADM

## 2020-07-20 RX ORDER — PHENYLEPHRINE HYDROCHLORIDE 100 MG/ML
1 SOLUTION/ DROPS OPHTHALMIC
Status: COMPLETED | OUTPATIENT
Start: 2020-07-20 | End: 2020-07-20

## 2020-07-20 RX ORDER — ACETAZOLAMIDE 500 MG/1
500 CAPSULE, EXTENDED RELEASE ORAL ONCE
Status: COMPLETED | OUTPATIENT
Start: 2020-07-20 | End: 2020-07-20

## 2020-07-20 RX ORDER — TETRACAINE HYDROCHLORIDE 5 MG/ML
1 SOLUTION OPHTHALMIC SEE ADMIN INSTRUCTIONS
Status: COMPLETED | OUTPATIENT
Start: 2020-07-20 | End: 2020-07-20

## 2020-07-20 RX ORDER — PREDNISOLONE ACETATE 10 MG/ML
SUSPENSION/ DROPS OPHTHALMIC AS NEEDED
Status: DISCONTINUED | OUTPATIENT
Start: 2020-07-20 | End: 2020-07-20 | Stop reason: HOSPADM

## 2020-07-20 RX ORDER — CYCLOPENTOLATE HYDROCHLORIDE 20 MG/ML
1 SOLUTION/ DROPS OPHTHALMIC
Status: COMPLETED | OUTPATIENT
Start: 2020-07-20 | End: 2020-07-20

## 2020-07-20 RX ORDER — KETAMINE HYDROCHLORIDE 50 MG/ML
INJECTION, SOLUTION, CONCENTRATE INTRAMUSCULAR; INTRAVENOUS AS NEEDED
Status: DISCONTINUED | OUTPATIENT
Start: 2020-07-20 | End: 2020-07-20 | Stop reason: SURG

## 2020-07-20 RX ORDER — SODIUM CHLORIDE, SODIUM LACTATE, POTASSIUM CHLORIDE, CALCIUM CHLORIDE 600; 310; 30; 20 MG/100ML; MG/100ML; MG/100ML; MG/100ML
1000 INJECTION, SOLUTION INTRAVENOUS CONTINUOUS
Status: DISCONTINUED | OUTPATIENT
Start: 2020-07-20 | End: 2020-07-20 | Stop reason: HOSPADM

## 2020-07-20 RX ORDER — TETRACAINE HYDROCHLORIDE 5 MG/ML
SOLUTION OPHTHALMIC AS NEEDED
Status: DISCONTINUED | OUTPATIENT
Start: 2020-07-20 | End: 2020-07-20 | Stop reason: HOSPADM

## 2020-07-20 RX ORDER — LIDOCAINE HYDROCHLORIDE 40 MG/ML
INJECTION, SOLUTION RETROBULBAR; TOPICAL AS NEEDED
Status: DISCONTINUED | OUTPATIENT
Start: 2020-07-20 | End: 2020-07-20 | Stop reason: HOSPADM

## 2020-07-20 RX ORDER — LIDOCAINE HYDROCHLORIDE 20 MG/ML
INJECTION, SOLUTION INTRAVENOUS AS NEEDED
Status: DISCONTINUED | OUTPATIENT
Start: 2020-07-20 | End: 2020-07-20 | Stop reason: SURG

## 2020-07-20 RX ORDER — PREDNISOLONE ACETATE 10 MG/ML
SUSPENSION/ DROPS OPHTHALMIC
Qty: 2 ML | Refills: 0
Start: 2020-07-20 | End: 2021-05-29 | Stop reason: HOSPADM

## 2020-07-20 RX ADMIN — PHENYLEPHRINE HYDROCHLORIDE 1 DROP: 100 SOLUTION/ DROPS OPHTHALMIC at 08:25

## 2020-07-20 RX ADMIN — CYCLOPENTOLATE HYDROCHLORIDE 1 DROP: 20 SOLUTION/ DROPS OPHTHALMIC at 08:15

## 2020-07-20 RX ADMIN — TETRACAINE HYDROCHLORIDE 1 DROP: 5 SOLUTION OPHTHALMIC at 08:13

## 2020-07-20 RX ADMIN — PHENYLEPHRINE HYDROCHLORIDE 1 DROP: 100 SOLUTION/ DROPS OPHTHALMIC at 08:15

## 2020-07-20 RX ADMIN — CYCLOPENTOLATE HYDROCHLORIDE 1 DROP: 20 SOLUTION/ DROPS OPHTHALMIC at 08:25

## 2020-07-20 RX ADMIN — TETRACAINE HYDROCHLORIDE 1 DROP: 5 SOLUTION OPHTHALMIC at 08:14

## 2020-07-20 RX ADMIN — PHENYLEPHRINE HYDROCHLORIDE 1 DROP: 100 SOLUTION/ DROPS OPHTHALMIC at 08:20

## 2020-07-20 RX ADMIN — ACETAZOLAMIDE 500 MG: 500 CAPSULE, EXTENDED RELEASE ORAL at 10:30

## 2020-07-20 RX ADMIN — SODIUM CHLORIDE, POTASSIUM CHLORIDE, SODIUM LACTATE AND CALCIUM CHLORIDE 1000 ML: 600; 310; 30; 20 INJECTION, SOLUTION INTRAVENOUS at 08:22

## 2020-07-20 RX ADMIN — PROPOFOL 100 MG: 10 INJECTION, EMULSION INTRAVENOUS at 09:58

## 2020-07-20 RX ADMIN — KETAMINE HYDROCHLORIDE 10 MG: 50 INJECTION, SOLUTION INTRAMUSCULAR; INTRAVENOUS at 09:58

## 2020-07-20 RX ADMIN — CYCLOPENTOLATE HYDROCHLORIDE 1 DROP: 20 SOLUTION/ DROPS OPHTHALMIC at 08:20

## 2020-07-20 RX ADMIN — LIDOCAINE HYDROCHLORIDE 100 MG: 20 INJECTION, SOLUTION INTRAVENOUS at 09:58

## 2020-07-20 NOTE — OP NOTE
OPERATIVE NOTE    Date of Procedure: 7/20/2020  Patient Name: Amol Aguirre  Patient MRN: [id number]  YOB: 1945     Preoperative Diagnosis: Right nuclear sclerotic cataract.     Postoperative Diagnosis: Right nuclear sclerotic cataract.     Procedure Performed: Phacoemulsification with implantation of a  foldable posterior chamber intraocular lens, Right eye.     Surgeon: Jez Mas MD     Anesthesia:  Monitored Anesthesia Care (MAC)      Brief History and Indication: The patient presents with a history of past progressive loss of vision.  Patient was diagnosed with cataract and requests removal for increased ability to read and see.     Operation Description: The patient was taken to the OR and prepped and draped in the usual sterile ophthalmic fashion. A lid speculum was placed in the eye.  A #75 blade was then used to make a stab incision two o’clock hours from the intended temporal clear cornea groove. The anterior chamber was then inflated with a Viscoelastic. A metal microkeratome blade was then used to enter the anterior chamber at the temporal clear cornea site. A three level tunnel incision was made. A curvilinear capsulorrhexis was then performed with a bent cystotome needle and capsulorrhexis forceps.  BSS on a 30 gauge bent cannula was used to hydro-dissect, and hydro-delineate the lens. Good fluid waves and hydro-delineation were noted. Phacoemulsification was then used to remove nuclear material without complications. The residual cortical and lenticular material was then removed with irrigation and aspiration. Viscoelastics were then used to inflate the bag in a soft shell technique. A PCIOL was injected into the bag. Post-implantation, there were no rents or tears in the bag and the lens was noted to be stable and centered. The residual Viscoelastic was then removed with irrigation and aspiration.  The wound was checked and found to be without leaks. Therefore a Polydex  ointment and one drop of a Prednisilone eye drop was placed in the eye.     Implant Information:   Implant Name Type Inv. Item Serial No.  Lot No. LRB No. Used   LENS ACRYSOF IQ SA60WF W/ULTRASERT [id number] 21.5 - [id number] - [id number] Implant LENS ACRYSOF IQ [id number] W/ULTRASERT [id number] 21.5 [id number] JIMMY NA Right 1       Complications: None    Discharge and Condition  The patient was transported to same day surgery in excellent condition and scheduled for follow-up tomorrow morning. The patient was given instructions on use of eye drops for the operative eye and was specifically instructed to call Dr. Mas at his office or home for any nausea, vomiting, headache, decreased visual acuity, or pain, or if the patient had any general concerns.    Jez Mas MD  7/20/2020

## 2020-07-20 NOTE — H&P
[facility]         History and Physical    Patient Name: Amol Aguirre  MRN: [id number]  : 1945  Gender: male     HPI: Patient complaint of PPLOV Right eye diagnosed with cataract. Patient requests PHACO PCIOL for Increase of VA/ADL.    History:    Past Medical History:   Diagnosis Date   • Acid reflux    • Asthma    • Diabetes (CMS/HCC)    • Hypertension        Past Surgical History:   Procedure Laterality Date   • AMPUTATION DIGIT Left 10/15/2019    Procedure: AMPUTATE LEFT THIRD TOE;  Surgeon: Juju Weber MD;  Location:  [facility] OR;  Service: Orthopedics   • AORTAGRAM N/A 2019    Procedure: AORTAGRAM WITH OR WITHOUT RUNOFFS;  Surgeon: Carrillo White MD;  Location:  [facility] HYBRID OR 15;  Service: Vascular   • BELOW KNEE AMPUTATION Right 2019    Procedure: BELOW KNEE AMPUTATION RIGHT;  Surgeon: Juju Weber MD;  Location:  [facility] OR;  Service: Orthopedics   • CHOLECYSTECTOMY     • FOOT SURGERY Left 10/15/2019    Amputate 3rd toe- DeGnore   • KNEE SURGERY Right     TKA       Social History     Socioeconomic History   • Marital status:      Spouse name: Not on file   • Number of children: Not on file   • Years of education: Not on file   • Highest education level: Not on file   Tobacco Use   • Smoking status: Former Smoker     Types: Cigarettes     Start date:      Last attempt to quit:      Years since quittin.5   • Smokeless tobacco: Never Used   Substance and Sexual Activity   • Alcohol use: No     Frequency: Never   • Drug use: No   • Sexual activity: Defer       Family History   Problem Relation Age of Onset   • Cancer Mother    • Hypertension Mother    • Hypertension Father    • Heart attack Father        Prior to Admission Medications:  Medications Prior to Admission   Medication Sig Dispense Refill Last Dose   • allopurinol (ZYLOPRIM) 100 MG tablet Take 100 mg by mouth Daily.   2020 at 0800   • amLODIPine (NORVASC) 10 MG  tablet Take 10 mg by mouth Daily.  0 7/19/2020 at 2100   • aspirin 81 MG EC tablet Take 81 mg by mouth Daily.   7/20/2020 at 0600   • budesonide-formoterol (SYMBICORT) 80-4.5 MCG/ACT inhaler Inhale 2 puffs 2 (Two) Times a Day.   Past Week at Unknown time   • CloNIDine (CATAPRES) 0.1 MG tablet Take 0.1 mg by mouth 2 (Two) Times a Day.   7/20/2020 at 0600   • docusate sodium 100 MG capsule Take 100 mg by mouth 2 (Two) Times a Day As Needed for Constipation. 60 each 0 Past Week at Unknown time   • gabapentin (NEURONTIN) 400 MG capsule Take 600 mg by mouth 4 (Four) Times a Day.   7/19/2020 at 2100   • hydrochlorothiazide (HYDRODIURIL) 25 MG tablet Take  by mouth Daily.  3 7/20/2020 at 0600   • Insulin Glargine (TOUJEO SOLOSTAR) 300 UNIT/ML solution pen-injector Inject 42 Units under the skin into the appropriate area as directed.   7/19/2020 at 2100   • losartan (COZAAR) 100 MG tablet Take 100 mg by mouth Daily.  3 7/19/2020 at 0800   • melatonin 5 MG tablet tablet Take 1 tablet by mouth At Night As Needed (sleep).   Past Month at Unknown time   • metoclopramide (REGLAN) 10 MG tablet Take 10 mg by mouth 4 (Four) Times a Day.  8 7/19/2020 at 2100   • mupirocin (BACTROBAN) 2 % ointment REY EXT AA BID   Past Month at Unknown time   • NOVOLOG FLEXPEN 100 UNIT/ML solution pen-injector sc pen 23 Units 3 (Three) Times a Day With Meals.   7/19/2020 at 1500   • pantoprazole (PROTONIX) 40 MG EC tablet Take 40 mg by mouth Daily.  2 7/19/2020 at 0800   • tolnaftate (TINACTIN) 1 % cream tolnaftate 1 % topical cream   Apply twice a day between the toes.   Obtain over-the-counter   Past Month at Unknown time   • vitamin B-12 (CYANOCOBALAMIN) 1000 MCG tablet Take 1,000 mcg by mouth Daily.   7/19/2020 at 0800   • cephalexin (KEFLEX) 500 MG capsule 500 mg.   Taking   • metaxalone (SKELAXIN) 800 MG tablet Take 1 tablet by mouth 3 (Three) Times a Day As Needed for Muscle Spasms. 30 tablet 0 More than a month at Unknown time   • ondansetron  (ZOFRAN) 4 MG tablet Take 1 tablet by mouth Every 6 (Six) Hours As Needed for Nausea or Vomiting. 30 tablet 0 Taking       Allergies:  Allergies   Allergen Reactions   • Chlorhexidine Shortness Of Breath, Itching and Rash     Pt developed a rash, itching, and shortness of breath after receiving a CHG bath on 4/24/19.   • Latex Other (See Comments)     Rash, hives, and tongue swelling        Vitals: Temp:  [97.7 °F (36.5 °C)] 97.7 °F (36.5 °C)  Heart Rate:  [80] 80  Resp:  [16] 16  BP: (151)/(65) 151/65    Review of Systems:   Within Normal Limits Abnormal   HEENT [x]    []     Cardiovascular [x]   []     Gastrointestinal [x]   []     Genitourinary [x]   []     Neurologic [x]   []     Pulmonary [x]   []       Physical Exam:   Within Normal Limits Abnormal   HEENT [x]    []     Heart [x]   []     Lungs [x]   []     Abdomen [x]   []     Extremities [x]   []       Impression: Right nuclear sclerotic cataract.     Plan: CATARACT PHACO EXTRACTION WITH INTRAOCULAR LENS IMPLANT RIGHT (Right)     Jez Mas MD  7/20/2020

## 2020-07-20 NOTE — DISCHARGE INSTRUCTIONS
Post Operative Cataract Instructions     Right Eye    1.  Wear eye shield at bedtime for 3 nights.  You may wear glasses or sunglasses during the day.  You must keep eye protected at all times.  2.  Try not to sleep on the side in which the eye was operated (1-2 weeks).  3.  No heavy lifting (at least 3 days).  No bending below the waist.  Keep your head above your heart.  4.  Try not to cough or sneeze excessively.  5.  Bring eye drops and instructions with you to post-op appointment.  6.  If eyes become stuck together after surgery you may soak with warm cloth.  7. Start Prednisilone (Pred-Forte) today as soon as you get home.   *Apply 1 drop to operative eye four times a day for 7 days and then twice daily until all medication is gone.    Complications from cataract surgery usually occur within the first couple of weeks.  Complications could include excessive redness, pain, decreased vision, or changes in vision.  If problems are treated early, there is a better chance of resolution.  Please contact Dr. Mas at one of the numbers listed below if you are experiencing any problems.  If a holiday, evenings, or weekends, do not hesitate to call if you are having a problem.      Dr. Ron Mas    [phone] CELL  [phone] CELL    [phone] CELL             [phone] CELL        If you are unable to reach any of the above doctors, please call [facility] at [phone]    IMPORTANT INFORMATION  If you have any questions or need any refills on your eye drops, please call the office at [phone].    No pushing, pulling, tugging,  heavy lifting, or strenuous activity.  No major decision making, driving, or drinking alcoholic beverages for 24 hours. ( due to the medications you have  received)  Always use good hand hygiene/washing techniques.  NO driving while taking pain medications.    * if you have an incision:  Check your incision area every  day for signs of infection.   Check for:  * more redness, swelling, or pain  *more fluid or blood  *warmth  *pus or bad smell    To assist you in voiding:  Drink plenty of fluids  Listen to running water while attempting to void.    If you are unable to urinate and you have an uncomfortable urge to void or it has been   6 hours since you were discharged, return to the Emergency Room

## 2020-07-20 NOTE — ANESTHESIA POSTPROCEDURE EVALUATION
Patient: Amol Aguirre    Procedure Summary     Date:  07/20/20 Room / Location:  [facility] OR 1 /  [facility]    Anesthesia Start:  0955 Anesthesia Stop:  1011    Procedure:  CATARACT PHACO EXTRACTION WITH INTRAOCULAR LENS IMPLANT RIGHT (Right Eye) Diagnosis:       Nuclear sclerotic cataract of right eye      (Nuclear sclerotic cataract of right eye [H25.11])    Surgeon:  Jez Mas MD Provider:  Herbert New CRNA    Anesthesia Type:  MAC ASA Status:  3          Anesthesia Type: MAC    Vitals  Vitals Value Taken Time   /71 7/20/2020 10:34 AM   Temp 97.7 °F (36.5 °C) 7/20/2020 10:15 AM   Pulse 72 7/20/2020 10:34 AM   Resp 16 7/20/2020 10:34 AM   SpO2 95 % 7/20/2020 10:34 AM           Post Anesthesia Care and Evaluation    Patient location during evaluation: bedside  Patient participation: complete - patient participated  Level of consciousness: awake and alert  Pain score: 0  Pain management: satisfactory to patient  Airway patency: patent  Anesthetic complications: No anesthetic complications  PONV Status: none  Cardiovascular status: acceptable and hemodynamically stable  Respiratory status: acceptable  Hydration status: acceptable

## 2020-07-20 NOTE — ANESTHESIA PREPROCEDURE EVALUATION
Anesthesia Evaluation     Patient summary reviewed and Nursing notes reviewed   NPO Solid Status: > 8 hours  NPO Liquid Status: > 8 hours           Airway   Mallampati: II  TM distance: >3 FB  Neck ROM: full  No difficulty expected  Dental - normal exam     Pulmonary - normal exam   (+) asthma,  Cardiovascular - normal exam  Exercise tolerance: good (4-7 METS)    (+) hypertension,       Neuro/Psych  (+) numbness,     GI/Hepatic/Renal/Endo    (+)  GERD,  renal disease CRI, diabetes mellitus,     Musculoskeletal     Abdominal  - normal exam    Bowel sounds: normal.   Substance History - negative use     OB/GYN negative ob/gyn ROS         Other   arthritis,                      Anesthesia Plan    ASA 3     MAC     intravenous induction     Anesthetic plan, all risks, benefits, and alternatives have been provided, discussed and informed consent has been obtained with: patient.    Plan discussed with attending.

## 2020-07-20 NOTE — NURSING NOTE
Notified Herbert r/t pt BG, pt states this is his normal and his doctor is adjusting meds to work with it, no new orders at this time.

## 2020-07-22 ENCOUNTER — OFFICE VISIT (OUTPATIENT)
Dept: ORTHOPEDIC SURGERY | Facility: CLINIC | Age: 75
End: 2020-07-22

## 2020-07-22 VITALS — HEIGHT: 75 IN | WEIGHT: 255.73 LBS | OXYGEN SATURATION: 99 % | HEART RATE: 76 BPM | BODY MASS INDEX: 31.8 KG/M2

## 2020-07-22 DIAGNOSIS — E11.621 DIABETIC ULCER OF TOE OF LEFT FOOT ASSOCIATED WITH TYPE 2 DIABETES MELLITUS, LIMITED TO BREAKDOWN OF SKIN (HCC): Primary | ICD-10-CM

## 2020-07-22 DIAGNOSIS — L97.521 DIABETIC ULCER OF TOE OF LEFT FOOT ASSOCIATED WITH TYPE 2 DIABETES MELLITUS, LIMITED TO BREAKDOWN OF SKIN (HCC): Primary | ICD-10-CM

## 2020-07-22 PROCEDURE — 99212 OFFICE O/P EST SF 10 MIN: CPT | Performed by: ORTHOPAEDIC SURGERY

## 2020-07-22 NOTE — PROGRESS NOTES
ESTABLISHED PATIENT    Patient: Amol Aguirre  : 1945    Primary Care Provider: Donte Briones MD    Requesting Provider: As above    Follow-up (5 week f/u; Diabetic ulcer of left ankle )      History    Chief Complaint: left foot ulcer    History of Present Illness: he returns with continued slow improvement, he is off antibiotics, Dr Garcia will see him in 2 months, he is using Bactroban and iodine on the left foot ulcer    Current Outpatient Medications on File Prior to Visit   Medication Sig Dispense Refill   • allopurinol (ZYLOPRIM) 100 MG tablet Take 100 mg by mouth Daily.     • amLODIPine (NORVASC) 10 MG tablet Take 10 mg by mouth Daily.  0   • aspirin 81 MG EC tablet Take 81 mg by mouth Daily.     • budesonide-formoterol (SYMBICORT) 80-4.5 MCG/ACT inhaler Inhale 2 puffs 2 (Two) Times a Day.     • CloNIDine (CATAPRES) 0.1 MG tablet Take 0.1 mg by mouth 2 (Two) Times a Day.     • docusate sodium 100 MG capsule Take 100 mg by mouth 2 (Two) Times a Day As Needed for Constipation. 60 each 0   • gabapentin (NEURONTIN) 400 MG capsule Take 600 mg by mouth 4 (Four) Times a Day.     • hydrochlorothiazide (HYDRODIURIL) 25 MG tablet Take  by mouth Daily.  3   • Insulin Glargine (TOUJEO SOLOSTAR) 300 UNIT/ML solution pen-injector Inject 42 Units under the skin into the appropriate area as directed.     • losartan (COZAAR) 100 MG tablet Take 100 mg by mouth Daily.  3   • melatonin 5 MG tablet tablet Take 1 tablet by mouth At Night As Needed (sleep).     • metaxalone (SKELAXIN) 800 MG tablet Take 1 tablet by mouth 3 (Three) Times a Day As Needed for Muscle Spasms. 30 tablet 0   • metoclopramide (REGLAN) 10 MG tablet Take 10 mg by mouth 4 (Four) Times a Day.  8   • mupirocin (BACTROBAN) 2 % ointment REY EXT AA BID     • NOVOLOG FLEXPEN 100 UNIT/ML solution pen-injector sc pen 23 Units 3 (Three) Times a Day With Meals.     • pantoprazole (PROTONIX) 40 MG EC tablet Take 40 mg by mouth Daily.  2   •  prednisoLONE acetate (Pred Forte) 1 % ophthalmic suspension Follow instructions on the After Visit Summary. 2 mL 0   • tolnaftate (TINACTIN) 1 % cream tolnaftate 1 % topical cream   Apply twice a day between the toes.   Obtain over-the-counter     • vitamin B-12 (CYANOCOBALAMIN) 1000 MCG tablet Take 1,000 mcg by mouth Daily.       No current facility-administered medications on file prior to visit.       Allergies   Allergen Reactions   • Chlorhexidine Shortness Of Breath, Itching and Rash     Pt developed a rash, itching, and shortness of breath after receiving a CHG bath on 4/24/19.   • Latex Other (See Comments)     Rash, hives, and tongue swelling      Past Medical History:   Diagnosis Date   • Acid reflux    • Asthma    • Diabetes (CMS/HCC)    • Hypertension      Past Surgical History:   Procedure Laterality Date   • AMPUTATION DIGIT Left 10/15/2019    Procedure: AMPUTATE LEFT THIRD TOE;  Surgeon: Juju Weber MD;  Location: [facility] OR;  Service: Orthopedics   • AORTAGRAM N/A 4/30/2019    Procedure: AORTAGRAM WITH OR WITHOUT RUNOFFS;  Surgeon: Carrillo White MD;  Location: [facility] HYBRID OR 15;  Service: Vascular   • BELOW KNEE AMPUTATION Right 6/4/2019    Procedure: BELOW KNEE AMPUTATION RIGHT;  Surgeon: Juju Weber MD;  Location: [facility] OR;  Service: Orthopedics   • CATARACT EXTRACTION W/ INTRAOCULAR LENS IMPLANT Right 7/20/2020    Procedure: CATARACT PHACO EXTRACTION WITH INTRAOCULAR LENS IMPLANT RIGHT;  Surgeon: Jez Mas MD;  Location: [facility] OR;  Service: Ophthalmology;  Laterality: Right;   • CHOLECYSTECTOMY     • FOOT SURGERY Left 10/15/2019    Amputate 3rd toe- De[place]nore   • KNEE SURGERY Right     TKA     Family History   Problem Relation Age of Onset   • Cancer Mother    • Hypertension Mother    • Hypertension Father    • Heart attack Father       Social History     Socioeconomic History   • Marital status:      Spouse name: Not on file   • Number of children: Not on file   •  "Years of education: Not on file   • Highest education level: Not on file   Tobacco Use   • Smoking status: Former Smoker     Types: Cigarettes     Start date:      Last attempt to quit:      Years since quittin.5   • Smokeless tobacco: Never Used   Substance and Sexual Activity   • Alcohol use: No     Frequency: Never   • Drug use: No   • Sexual activity: Defer        Review of Systems   Constitutional: Negative.    HENT: Negative.    Eyes: Negative.    Respiratory: Negative.    Cardiovascular: Negative.    Gastrointestinal: Negative.    Endocrine: Negative.    Genitourinary: Negative.    Musculoskeletal: Positive for arthralgias.   Skin: Negative.    Allergic/Immunologic: Negative.    Neurological: Negative.    Hematological: Negative.    Psychiatric/Behavioral: Negative.        The following portions of the patient's history were reviewed and updated as appropriate: allergies, current medications, past family history, past medical history, past social history, past surgical history and problem list.    Physical Exam:   Pulse 76   Ht 190.5 cm (75\")   Wt 116 kg (255 lb 11.7 oz)   SpO2 99%   BMI 31.96 kg/m²   GENERAL: Body habitus: trunkal obesity        Gait: smooth with right bka     Mental Status:  awake and alert; oriented to person, place, and time  MSK  Diabetic Foot Exam:  Left:  ulcer improving, no drainage today                       NEURO Sensation:  absent     Medical Decision Making    Data Review:   none    Assessment/Plan/Diagnosis/Treatment Options:   1. Diabetic ulcer of toe of left foot associated with type 2 diabetes mellitus, limited to breakdown of skin (CMS/HCC)  The ulcer is slowly improving.  Now that his gout is under much better control there is no significant leakage of gouty tophus material, much less swelling, no drainage.  Hopefully this will continue to heal with nonoperative treatment.  He is so very high risk for any type of operative treatment.  He is going to see  " Jose in 2 months, we will place his follow-up at the same time.  If anything gets worse he and his wife understand to call back and return immediately.  At next visit nonweightbearing 2 views of the foot with a marker on the ulcer

## 2020-07-24 ENCOUNTER — PREP FOR SURGERY (OUTPATIENT)
Dept: OTHER | Facility: HOSPITAL | Age: 75
End: 2020-07-24

## 2020-07-24 DIAGNOSIS — Z11.59 SPECIAL SCREENING EXAMINATION FOR UNSPECIFIED VIRAL DISEASE: Primary | ICD-10-CM

## 2020-07-24 DIAGNOSIS — H25.12 NUCLEAR SCLEROTIC CATARACT OF LEFT EYE: Primary | ICD-10-CM

## 2020-07-24 RX ORDER — SODIUM CHLORIDE 0.9 % (FLUSH) 0.9 %
1-10 SYRINGE (ML) INJECTION AS NEEDED
Status: CANCELLED | OUTPATIENT
Start: 2020-07-24

## 2020-07-24 RX ORDER — TETRACAINE HYDROCHLORIDE 5 MG/ML
1 SOLUTION OPHTHALMIC SEE ADMIN INSTRUCTIONS
Status: CANCELLED | OUTPATIENT
Start: 2020-07-24

## 2020-07-24 RX ORDER — PREDNISOLONE ACETATE 10 MG/ML
1 SUSPENSION/ DROPS OPHTHALMIC SEE ADMIN INSTRUCTIONS
Status: CANCELLED | OUTPATIENT
Start: 2020-07-24

## 2020-07-24 RX ORDER — CYCLOPENTOLATE HYDROCHLORIDE 20 MG/ML
1 SOLUTION/ DROPS OPHTHALMIC
Status: CANCELLED | OUTPATIENT
Start: 2020-07-24 | End: 2020-07-24

## 2020-07-24 RX ORDER — PHENYLEPHRINE HYDROCHLORIDE 100 MG/ML
1 SOLUTION/ DROPS OPHTHALMIC
Status: CANCELLED | OUTPATIENT
Start: 2020-07-24 | End: 2020-07-24

## 2020-07-24 RX ORDER — SODIUM CHLORIDE 0.9 % (FLUSH) 0.9 %
3 SYRINGE (ML) INJECTION EVERY 12 HOURS SCHEDULED
Status: CANCELLED | OUTPATIENT
Start: 2020-07-24

## 2020-07-27 PROBLEM — H25.12 NUCLEAR SCLEROTIC CATARACT OF LEFT EYE: Status: ACTIVE | Noted: 2020-07-27

## 2020-07-30 ENCOUNTER — LAB (OUTPATIENT)
Dept: LAB | Facility: HOSPITAL | Age: 75
End: 2020-07-30

## 2020-07-30 DIAGNOSIS — Z11.59 SPECIAL SCREENING EXAMINATION FOR UNSPECIFIED VIRAL DISEASE: ICD-10-CM

## 2020-07-30 PROCEDURE — U0002 COVID-19 LAB TEST NON-CDC: HCPCS

## 2020-07-30 PROCEDURE — U0004 COV-19 TEST NON-CDC HGH THRU: HCPCS

## 2020-07-30 PROCEDURE — C9803 HOPD COVID-19 SPEC COLLECT: HCPCS

## 2020-07-31 LAB
REF LAB TEST METHOD: NORMAL
SARS-COV-2 RNA RESP QL NAA+PROBE: NOT DETECTED

## 2020-08-03 ENCOUNTER — HOSPITAL ENCOUNTER (OUTPATIENT)
Facility: HOSPITAL | Age: 75
Setting detail: HOSPITAL OUTPATIENT SURGERY
Discharge: HOME | End: 2020-08-03
Attending: OPHTHALMOLOGY | Admitting: OPHTHALMOLOGY

## 2020-08-03 ENCOUNTER — ANESTHESIA EVENT (OUTPATIENT)
Dept: PERIOP | Facility: HOSPITAL | Age: 75
End: 2020-08-03

## 2020-08-03 ENCOUNTER — ANESTHESIA (OUTPATIENT)
Dept: PERIOP | Facility: HOSPITAL | Age: 75
End: 2020-08-03

## 2020-08-03 VITALS
TEMPERATURE: 97.1 F | WEIGHT: 257 LBS | DIASTOLIC BLOOD PRESSURE: 94 MMHG | HEIGHT: 75 IN | HEART RATE: 68 BPM | SYSTOLIC BLOOD PRESSURE: 168 MMHG | BODY MASS INDEX: 31.95 KG/M2 | OXYGEN SATURATION: 96 % | RESPIRATION RATE: 18 BRPM

## 2020-08-03 DIAGNOSIS — H25.12 NUCLEAR SCLEROTIC CATARACT OF LEFT EYE: ICD-10-CM

## 2020-08-03 LAB — GLUCOSE BLDC GLUCOMTR-MCNC: 256 MG/DL (ref 70–130)

## 2020-08-03 PROCEDURE — 82962 GLUCOSE BLOOD TEST: CPT

## 2020-08-03 PROCEDURE — 25010000002 PROPOFOL 200 MG/20ML EMULSION: Performed by: NURSE ANESTHETIST, CERTIFIED REGISTERED

## 2020-08-03 PROCEDURE — V2632 POST CHMBR INTRAOCULAR LENS: HCPCS | Performed by: OPHTHALMOLOGY

## 2020-08-03 DEVICE — LENS ACRYSOF IQ SA60WF W/ULTRASERT 6X13MM ACU0T0 21.5: Type: IMPLANTABLE DEVICE | Site: POSTERIOR CHAMBER | Status: FUNCTIONAL

## 2020-08-03 RX ORDER — BALANCED SALT SOLUTION 6.4; .75; .48; .3; 3.9; 1.7 MG/ML; MG/ML; MG/ML; MG/ML; MG/ML; MG/ML
SOLUTION OPHTHALMIC AS NEEDED
Status: DISCONTINUED | OUTPATIENT
Start: 2020-08-03 | End: 2020-08-03 | Stop reason: HOSPADM

## 2020-08-03 RX ORDER — SODIUM CHLORIDE, SODIUM LACTATE, POTASSIUM CHLORIDE, CALCIUM CHLORIDE 600; 310; 30; 20 MG/100ML; MG/100ML; MG/100ML; MG/100ML
1000 INJECTION, SOLUTION INTRAVENOUS CONTINUOUS
Status: DISCONTINUED | OUTPATIENT
Start: 2020-08-03 | End: 2020-08-03 | Stop reason: HOSPADM

## 2020-08-03 RX ORDER — PHENYLEPHRINE HYDROCHLORIDE 100 MG/ML
1 SOLUTION/ DROPS OPHTHALMIC
Status: COMPLETED | OUTPATIENT
Start: 2020-08-03 | End: 2020-08-03

## 2020-08-03 RX ORDER — PREDNISOLONE ACETATE 10 MG/ML
1 SUSPENSION/ DROPS OPHTHALMIC SEE ADMIN INSTRUCTIONS
Status: DISCONTINUED | OUTPATIENT
Start: 2020-08-03 | End: 2020-08-03 | Stop reason: HOSPADM

## 2020-08-03 RX ORDER — KETAMINE HCL IN NACL, ISO-OSM 100MG/10ML
SYRINGE (ML) INJECTION AS NEEDED
Status: DISCONTINUED | OUTPATIENT
Start: 2020-08-03 | End: 2020-08-03 | Stop reason: SURG

## 2020-08-03 RX ORDER — LIDOCAINE HYDROCHLORIDE 40 MG/ML
INJECTION, SOLUTION RETROBULBAR; TOPICAL AS NEEDED
Status: DISCONTINUED | OUTPATIENT
Start: 2020-08-03 | End: 2020-08-03 | Stop reason: HOSPADM

## 2020-08-03 RX ORDER — PREDNISOLONE ACETATE 10 MG/ML
SUSPENSION/ DROPS OPHTHALMIC AS NEEDED
Status: DISCONTINUED | OUTPATIENT
Start: 2020-08-03 | End: 2020-08-03 | Stop reason: HOSPADM

## 2020-08-03 RX ORDER — TETRACAINE HYDROCHLORIDE 5 MG/ML
1 SOLUTION OPHTHALMIC SEE ADMIN INSTRUCTIONS
Status: COMPLETED | OUTPATIENT
Start: 2020-08-03 | End: 2020-08-03

## 2020-08-03 RX ORDER — CYCLOPENTOLATE HYDROCHLORIDE 20 MG/ML
1 SOLUTION/ DROPS OPHTHALMIC
Status: COMPLETED | OUTPATIENT
Start: 2020-08-03 | End: 2020-08-03

## 2020-08-03 RX ORDER — ACETAZOLAMIDE 500 MG/1
500 CAPSULE, EXTENDED RELEASE ORAL ONCE
Status: COMPLETED | OUTPATIENT
Start: 2020-08-03 | End: 2020-08-03

## 2020-08-03 RX ORDER — SODIUM CHLORIDE 0.9 % (FLUSH) 0.9 %
3 SYRINGE (ML) INJECTION EVERY 12 HOURS SCHEDULED
Status: DISCONTINUED | OUTPATIENT
Start: 2020-08-03 | End: 2020-08-03 | Stop reason: HOSPADM

## 2020-08-03 RX ORDER — SODIUM CHLORIDE 0.9 % (FLUSH) 0.9 %
1-10 SYRINGE (ML) INJECTION AS NEEDED
Status: DISCONTINUED | OUTPATIENT
Start: 2020-08-03 | End: 2020-08-03 | Stop reason: HOSPADM

## 2020-08-03 RX ORDER — PROPOFOL 10 MG/ML
INJECTION, EMULSION INTRAVENOUS AS NEEDED
Status: DISCONTINUED | OUTPATIENT
Start: 2020-08-03 | End: 2020-08-03 | Stop reason: SURG

## 2020-08-03 RX ORDER — TETRACAINE HYDROCHLORIDE 5 MG/ML
SOLUTION OPHTHALMIC AS NEEDED
Status: DISCONTINUED | OUTPATIENT
Start: 2020-08-03 | End: 2020-08-03 | Stop reason: HOSPADM

## 2020-08-03 RX ADMIN — CYCLOPENTOLATE HYDROCHLORIDE 1 DROP: 20 SOLUTION/ DROPS OPHTHALMIC at 12:15

## 2020-08-03 RX ADMIN — PHENYLEPHRINE HYDROCHLORIDE 1 DROP: 100 SOLUTION/ DROPS OPHTHALMIC at 12:20

## 2020-08-03 RX ADMIN — ACETAZOLAMIDE 500 MG: 500 CAPSULE, EXTENDED RELEASE ORAL at 15:08

## 2020-08-03 RX ADMIN — TETRACAINE HYDROCHLORIDE 1 DROP: 5 SOLUTION OPHTHALMIC at 12:14

## 2020-08-03 RX ADMIN — SODIUM CHLORIDE, POTASSIUM CHLORIDE, SODIUM LACTATE AND CALCIUM CHLORIDE: 600; 310; 30; 20 INJECTION, SOLUTION INTRAVENOUS at 14:48

## 2020-08-03 RX ADMIN — PHENYLEPHRINE HYDROCHLORIDE 1 DROP: 100 SOLUTION/ DROPS OPHTHALMIC at 12:15

## 2020-08-03 RX ADMIN — PROPOFOL 50 MG: 10 INJECTION, EMULSION INTRAVENOUS at 14:48

## 2020-08-03 RX ADMIN — Medication 10 MG: at 14:51

## 2020-08-03 RX ADMIN — TETRACAINE HYDROCHLORIDE 1 DROP: 5 SOLUTION OPHTHALMIC at 12:13

## 2020-08-03 RX ADMIN — PHENYLEPHRINE HYDROCHLORIDE 1 DROP: 100 SOLUTION/ DROPS OPHTHALMIC at 12:25

## 2020-08-03 RX ADMIN — CYCLOPENTOLATE HYDROCHLORIDE 1 DROP: 20 SOLUTION/ DROPS OPHTHALMIC at 12:25

## 2020-08-03 RX ADMIN — CYCLOPENTOLATE HYDROCHLORIDE 1 DROP: 20 SOLUTION/ DROPS OPHTHALMIC at 12:20

## 2020-08-03 RX ADMIN — Medication 10 MG: at 14:48

## 2020-08-03 NOTE — OP NOTE
OPERATIVE NOTE    Date of Procedure: 8/3/2020  Patient Name: Amol Aguirre  Patient MRN: [id number]  YOB: 1945     Preoperative Diagnosis: Left nuclear sclerotic cataract.     Postoperative Diagnosis: Left nuclear sclerotic cataract.     Procedure Performed: Phacoemulsification with implantation of a  foldable posterior chamber intraocular lens, Left eye.     Surgeon: Jez Mas MD     Anesthesia:  Monitored Anesthesia Care (MAC)      Brief History and Indication: The patient presents with a history of past progressive loss of vision.  Patient was diagnosed with cataract and requests removal for increased ability to read and see.     Operation Description: The patient was taken to the OR and prepped and draped in the usual sterile ophthalmic fashion. A lid speculum was placed in the eye.  A #75 blade was then used to make a stab incision two o’clock hours from the intended temporal clear cornea groove. The anterior chamber was then inflated with a Viscoelastic. A metal microkeratome blade was then used to enter the anterior chamber at the temporal clear cornea site. A three level tunnel incision was made. A curvilinear capsulorrhexis was then performed with a bent cystotome needle and capsulorrhexis forceps.  BSS on a 30 gauge bent cannula was used to hydro-dissect, and hydro-delineate the lens. Good fluid waves and hydro-delineation were noted. Phacoemulsification was then used to remove nuclear material without complications. The residual cortical and lenticular material was then removed with irrigation and aspiration. Viscoelastics were then used to inflate the bag in a soft shell technique. A PCIOL was injected into the bag. Post-implantation, there were no rents or tears in the bag and the lens was noted to be stable and centered. The residual Viscoelastic was then removed with irrigation and aspiration.  The wound was checked and found to be without leaks. Therefore a Polydex  ointment and one drop of a Prednisilone eye drop was placed in the eye.     Implant Information:   Implant Name Type Inv. Item Serial No.  Lot No. LRB No. Used   LENS ACRYSOF IQ SA60WF W/ULTRASERT [id number] ACU0T0 21.5 - [id number] - [id number] Implant LENS ACRYSOF IQ SA60WF W/ULTRASERT 6X13MM ACU0T0 21.5 [id number] [id number]  Left 1       Complications: None    Discharge and Condition  The patient was transported to same day surgery in excellent condition and scheduled for follow-up tomorrow morning. The patient was given instructions on use of eye drops for the operative eye and was specifically instructed to call Dr. Mas at his office or home for any nausea, vomiting, headache, decreased visual acuity, or pain, or if the patient had any general concerns.    Jez Mas MD  8/3/2020

## 2020-08-03 NOTE — ANESTHESIA PREPROCEDURE EVALUATION
Anesthesia Evaluation     Patient summary reviewed and Nursing notes reviewed   NPO Solid Status: > 8 hours  NPO Liquid Status: > 8 hours           Airway   Mallampati: II  TM distance: >3 FB  Neck ROM: full  No difficulty expected  Dental - normal exam     Pulmonary - normal exam   (+) asthma,  Cardiovascular - normal exam  Exercise tolerance: good (4-7 METS)    (+) hypertension poorly controlled 2 medications or greater,       Neuro/Psych  (+) numbness,     GI/Hepatic/Renal/Endo    (+)  GERD,  renal disease CRI, diabetes mellitus using insulin,     Musculoskeletal     Abdominal  - normal exam    Bowel sounds: normal.   Substance History - negative use     OB/GYN negative ob/gyn ROS         Other   arthritis,                        Anesthesia Plan    ASA 3     MAC   (Pt advised that intravenous sedation will be used as primary anesthetic technique, along with topical anesthesia. Every effort will be made to make sure patient is comfortable.     The patient was told that they may experience recall for the procedure. Pt verbalized understanding and agrees to plan of care.)  intravenous induction     Anesthetic plan, all risks, benefits, and alternatives have been provided, discussed and informed consent has been obtained with: patient.    Plan discussed with attending.

## 2020-08-03 NOTE — DISCHARGE INSTRUCTIONS
Post Operative Cataract Instructions     Left Eye    1.  Wear eye shield at bedtime for 3 nights.  You may wear glasses or sunglasses during the day.  You must keep eye protected at all times.  2.  Try not to sleep on the side in which the eye was operated (1-2 weeks).  3.  No heavy lifting (at least 3 days).  No bending below the waist.  Keep your head above your heart.  4.  Try not to cough or sneeze excessively.  5.  Bring eye drops and instructions with you to post-op appointment.  6.  If eyes become stuck together after surgery you may soak with warm cloth.  7. Start Prednisilone (Pred-Forte) today as soon as you get home.   *Apply 1 drop to operative eye four times a day for 7 days and then twice daily until all medication is gone.    Complications from cataract surgery usually occur within the first couple of weeks.  Complications could include excessive redness, pain, decreased vision, or changes in vision.  If problems are treated early, there is a better chance of resolution.  Please contact Dr. Mas at one of the numbers listed below if you are experiencing any problems.  If a holiday, evenings, or weekends, do not hesitate to call if you are having a problem.      Dr. Ron Mas    [phone] CELL  [phone] CELL    [phone] CELL             [phone] CELL        If you are unable to reach any of the above doctors, please call [facility] at [phone]    IMPORTANT INFORMATION  If you have any questions or need any refills on your eye drops, please call the office at [phone].

## 2020-08-03 NOTE — ANESTHESIA POSTPROCEDURE EVALUATION
Patient: Amol Aguirre    Procedure Summary     Date:  08/03/20 Room / Location:  [facility] OR 1 /  [facility]    Anesthesia Start:  1448 Anesthesia Stop:  1459    Procedure:  CATARACT PHACO EXTRACTION WITH INTRAOCULAR LENS IMPLANT LEFT (Left Eye) Diagnosis:       Nuclear sclerotic cataract of left eye      (Nuclear sclerotic cataract of left eye [H25.12])    Surgeon:  Jez Mas MD Provider:  Teja Ashton CRNA    Anesthesia Type:  MAC ASA Status:  3          Anesthesia Type: MAC    Vitals  Vitals Value Taken Time   /94 8/3/2020  3:26 PM   Temp 97.1 °F (36.2 °C) 8/3/2020  2:56 PM   Pulse 68 8/3/2020  3:26 PM   Resp 18 8/3/2020  3:26 PM   SpO2 96 % 8/3/2020  3:26 PM           Post Anesthesia Care and Evaluation    Patient location during evaluation: PHASE II  Patient participation: complete - patient participated  Level of consciousness: sleepy but conscious  Pain management: adequate  Airway patency: patent  Anesthetic complications: No anesthetic complications  PONV Status: none  Cardiovascular status: acceptable  Respiratory status: acceptable  Hydration status: acceptable

## 2020-08-03 NOTE — H&P
[facility]         History and Physical    Patient Name: Amol Aguirre  MRN: [id number]  : 1945  Gender: male     HPI: Patient complaint of PPLOV Left eye diagnosed with cataract. Patient requests PHACO PCIOL for Increase of VA/ADL.    History:    Past Medical History:   Diagnosis Date   • Acid reflux    • Asthma    • Diabetes (CMS/[facility])    • Hypertension        Past Surgical History:   Procedure Laterality Date   • AMPUTATION DIGIT Left 10/15/2019    Procedure: AMPUTATE LEFT THIRD TOE;  Surgeon: Juju Weber MD;  Location:  [facility] OR;  Service: Orthopedics   • AORTAGRAM N/A 2019    Procedure: AORTAGRAM WITH OR WITHOUT RUNOFFS;  Surgeon: Carrillo White MD;  Location:  [facility] HYBRID OR 15;  Service: Vascular   • BELOW KNEE AMPUTATION Right 2019    Procedure: BELOW KNEE AMPUTATION RIGHT;  Surgeon: Juju Weber MD;  Location: [facility] OR;  Service: Orthopedics   • CATARACT EXTRACTION W/ INTRAOCULAR LENS IMPLANT Right 2020    Procedure: CATARACT PHACO EXTRACTION WITH INTRAOCULAR LENS IMPLANT RIGHT;  Surgeon: Jez Mas MD;  [facility];  Service: Ophthalmology;  Laterality: Right;   • CHOLECYSTECTOMY     • FOOT SURGERY Left 10/15/2019    Amputate 3rd toe- DeGnore   • KNEE SURGERY Right     TKA       Social History     Socioeconomic History   • Marital status:      Spouse name: Not on file   • Number of children: Not on file   • Years of education: Not on file   • Highest education level: Not on file   Tobacco Use   • Smoking status: Former Smoker     Types: Cigarettes     Start date:      Last attempt to quit: 1975     Years since quittin.6   • Smokeless tobacco: Never Used   Substance and Sexual Activity   • Alcohol use: No     Frequency: Never   • Drug use: No   • Sexual activity: Defer       Family History   Problem Relation Age of Onset   • Cancer Mother    • Hypertension Mother    • Hypertension Father    • Heart attack  Father        Prior to Admission Medications:  Medications Prior to Admission   Medication Sig Dispense Refill Last Dose   • allopurinol (ZYLOPRIM) 100 MG tablet Take 100 mg by mouth Daily.   8/2/2020 at 0800   • amLODIPine (NORVASC) 10 MG tablet Take 10 mg by mouth Daily.  0 8/3/2020 at 0700   • aspirin 81 MG EC tablet Take 81 mg by mouth Daily.   8/2/2020 at 0800   • budesonide-formoterol (SYMBICORT) 80-4.5 MCG/ACT inhaler Inhale 2 puffs 2 (Two) Times a Day.   Past Week at Unknown time   • CloNIDine (CATAPRES) 0.1 MG tablet Take 0.1 mg by mouth 2 (Two) Times a Day.   8/3/2020 at 0700   • gabapentin (NEURONTIN) 400 MG capsule Take 600 mg by mouth 4 (Four) Times a Day.   8/2/2020 at 2100   • hydrochlorothiazide (HYDRODIURIL) 25 MG tablet Take 25 mg by mouth Daily.  3 8/2/2020 at 0800   • Insulin Glargine (TOUJEO SOLOSTAR) 300 UNIT/ML solution pen-injector Inject 60 Units under the skin into the appropriate area as directed Every Night.   8/2/2020 at 2100   • losartan (COZAAR) 100 MG tablet Take 100 mg by mouth Daily.  3 8/3/2020 at 0700   • metoclopramide (REGLAN) 10 MG tablet Take 10 mg by mouth 4 (Four) Times a Day.  8 Past Week at Unknown time   • mupirocin (BACTROBAN) 2 % ointment Apply 1 application topically to the appropriate area as directed Daily.   Past Week at Unknown time   • NOVOLOG FLEXPEN 100 UNIT/ML solution pen-injector sc pen Inject 20 Units under the skin into the appropriate area as directed 4 (Four) Times a Day.   8/2/2020 at 1800   • pantoprazole (PROTONIX) 40 MG EC tablet Take 40 mg by mouth Daily.  2 8/3/2020 at 0800   • prednisoLONE acetate (Pred Forte) 1 % ophthalmic suspension Follow instructions on the After Visit Summary. (Patient taking differently: Administer 1 drop to the right eye 2 (two) times a day. Follow instructions on the After Visit Summary.) 2 mL 0 Past Week at Unknown time   • vitamin B-12 (CYANOCOBALAMIN) 1000 MCG tablet Take 1,000 mcg by mouth Daily.   Past Week at Unknown  time   • melatonin 5 MG tablet tablet Take 1 tablet by mouth At Night As Needed (sleep).   Taking       Allergies:  Allergies   Allergen Reactions   • Chlorhexidine Shortness Of Breath, Itching and Rash     Pt developed a rash, itching, and shortness of breath after receiving a CHG bath on 4/24/19.   • Latex Other (See Comments)     Rash, hives, and tongue swelling        Vitals: Temp:  [97.3 °F (36.3 °C)] 97.3 °F (36.3 °C)  Heart Rate:  [65] 65  Resp:  [18] 18  BP: (159)/(70) 159/70    Review of Systems:   Within Normal Limits Abnormal   HEENT [x]    []     Cardiovascular [x]   []     Gastrointestinal [x]   []     Genitourinary [x]   []     Neurologic [x]   []     Pulmonary [x]   []       Physical Exam:   Within Normal Limits Abnormal   HEENT [x]    []     Heart [x]   []     Lungs [x]   []     Abdomen [x]   []     Extremities [x]   []       Impression: Left nuclear sclerotic cataract.     Plan: CATARACT PHACO EXTRACTION WITH INTRAOCULAR LENS IMPLANT LEFT (Left)     Jez Mas MD  8/3/2020

## 2020-09-15 ENCOUNTER — LAB (OUTPATIENT)
Dept: LAB | Facility: HOSPITAL | Age: 75
End: 2020-09-15

## 2020-09-15 ENCOUNTER — TRANSCRIBE ORDERS (OUTPATIENT)
Dept: LAB | Facility: HOSPITAL | Age: 75
End: 2020-09-15

## 2020-09-15 DIAGNOSIS — L97.529 ULCER OF LEFT FOOT, UNSPECIFIED ULCER STAGE (HCC): ICD-10-CM

## 2020-09-15 DIAGNOSIS — L97.529 ULCER OF LEFT FOOT, UNSPECIFIED ULCER STAGE (HCC): Primary | ICD-10-CM

## 2020-09-15 LAB
ALBUMIN SERPL-MCNC: 3.9 G/DL (ref 3.5–5.2)
ALBUMIN/GLOB SERPL: 1.3 G/DL
ALP SERPL-CCNC: 113 U/L (ref 39–117)
ALT SERPL W P-5'-P-CCNC: 14 U/L (ref 1–41)
ANION GAP SERPL CALCULATED.3IONS-SCNC: 15 MMOL/L (ref 5–15)
AST SERPL-CCNC: 14 U/L (ref 1–40)
BILIRUB SERPL-MCNC: 0.2 MG/DL (ref 0–1.2)
BUN SERPL-MCNC: 47 MG/DL (ref 8–23)
BUN/CREAT SERPL: 18.6 (ref 7–25)
CALCIUM SPEC-SCNC: 9.2 MG/DL (ref 8.6–10.5)
CHLORIDE SERPL-SCNC: 104 MMOL/L (ref 98–107)
CO2 SERPL-SCNC: 20 MMOL/L (ref 22–29)
CREAT SERPL-MCNC: 2.53 MG/DL (ref 0.76–1.27)
CRP SERPL-MCNC: 1.13 MG/DL (ref 0–0.5)
DEPRECATED RDW RBC AUTO: 49.3 FL (ref 37–54)
ERYTHROCYTE [DISTWIDTH] IN BLOOD BY AUTOMATED COUNT: 15.7 % (ref 12.3–15.4)
GFR SERPL CREATININE-BSD FRML MDRD: 25 ML/MIN/1.73
GLOBULIN UR ELPH-MCNC: 3 GM/DL
GLUCOSE SERPL-MCNC: 159 MG/DL (ref 65–99)
HCT VFR BLD AUTO: 36.2 % (ref 37.5–51)
HGB BLD-MCNC: 11.4 G/DL (ref 13–17.7)
MCH RBC QN AUTO: 27.2 PG (ref 26.6–33)
MCHC RBC AUTO-ENTMCNC: 31.5 G/DL (ref 31.5–35.7)
MCV RBC AUTO: 86.4 FL (ref 79–97)
PLATELET # BLD AUTO: 296 10*3/MM3 (ref 140–450)
PMV BLD AUTO: 10.8 FL (ref 6–12)
POTASSIUM SERPL-SCNC: 5.3 MMOL/L (ref 3.5–5.2)
PROT SERPL-MCNC: 6.9 G/DL (ref 6–8.5)
RBC # BLD AUTO: 4.19 10*6/MM3 (ref 4.14–5.8)
SODIUM SERPL-SCNC: 139 MMOL/L (ref 136–145)
WBC # BLD AUTO: 10.18 10*3/MM3 (ref 3.4–10.8)

## 2020-09-15 PROCEDURE — 86140 C-REACTIVE PROTEIN: CPT

## 2020-09-15 PROCEDURE — 85027 COMPLETE CBC AUTOMATED: CPT

## 2020-09-15 PROCEDURE — 80053 COMPREHEN METABOLIC PANEL: CPT

## 2020-09-15 PROCEDURE — 36415 COLL VENOUS BLD VENIPUNCTURE: CPT

## 2020-09-23 ENCOUNTER — OFFICE VISIT (OUTPATIENT)
Dept: ORTHOPEDIC SURGERY | Facility: CLINIC | Age: 75
End: 2020-09-23

## 2020-09-23 VITALS — BODY MASS INDEX: 34.32 KG/M2 | HEART RATE: 61 BPM | OXYGEN SATURATION: 98 % | WEIGHT: 276 LBS | HEIGHT: 75 IN

## 2020-09-23 DIAGNOSIS — Z89.511 S/P BKA (BELOW KNEE AMPUTATION), RIGHT (HCC): ICD-10-CM

## 2020-09-23 DIAGNOSIS — E11.621 DIABETIC ULCER OF TOE OF LEFT FOOT ASSOCIATED WITH TYPE 2 DIABETES MELLITUS, LIMITED TO BREAKDOWN OF SKIN (HCC): Primary | ICD-10-CM

## 2020-09-23 DIAGNOSIS — L97.521 DIABETIC ULCER OF TOE OF LEFT FOOT ASSOCIATED WITH TYPE 2 DIABETES MELLITUS, LIMITED TO BREAKDOWN OF SKIN (HCC): Primary | ICD-10-CM

## 2020-09-23 PROCEDURE — 99213 OFFICE O/P EST LOW 20 MIN: CPT | Performed by: ORTHOPAEDIC SURGERY

## 2020-09-23 NOTE — PROGRESS NOTES
ESTABLISHED PATIENT    Patient: Amol Aguirre  : 1945    Primary Care Provider: Donte Briones MD    Requesting Provider: As above    Follow-up (2 months follow up for Diabetic ulcer of toe of left foot associated with type 2 diabetes mellitus)      History    Chief Complaint: Diabetic problems    History of Present Illness: He returns for follow-up of the chronic ulcer of the left great toe, and right below-knee amputation.  He has noted a small new scab on the lateral aspect of the left second toe, it appeared yesterday.  He is not having any drainage from the chronic ulcer of the first metatarsal medially on the left.  He is having a problem with the right below-knee amputation.  He is developed an anterior superficial ulcer as well as an inferior 1.  He was sleeping with his prosthetic sleeve on, and I advised him not to do this.  He keeps the skin too wet and puts too much pressure on it.  He was doing that so he could get up to go to the bathroom.  No fevers or chills, no other new problems    Current Outpatient Medications on File Prior to Visit   Medication Sig Dispense Refill   • allopurinol (ZYLOPRIM) 100 MG tablet Take 100 mg by mouth Daily.     • amLODIPine (NORVASC) 10 MG tablet Take 10 mg by mouth Daily.  0   • aspirin 81 MG EC tablet Take 81 mg by mouth Daily.     • budesonide-formoterol (SYMBICORT) 80-4.5 MCG/ACT inhaler Inhale 2 puffs 2 (Two) Times a Day.     • CloNIDine (CATAPRES) 0.1 MG tablet Take 0.1 mg by mouth 2 (Two) Times a Day.     • gabapentin (NEURONTIN) 400 MG capsule Take 600 mg by mouth 4 (Four) Times a Day.     • hydrochlorothiazide (HYDRODIURIL) 25 MG tablet Take 25 mg by mouth Daily.  3   • Insulin Glargine (TOUJEO SOLOSTAR) 300 UNIT/ML solution pen-injector Inject 60 Units under the skin into the appropriate area as directed Every Night.     • losartan (COZAAR) 100 MG tablet Take 100 mg by mouth Daily.  3   • melatonin 5 MG tablet tablet Take 1 tablet by  mouth At Night As Needed (sleep).     • metoclopramide (REGLAN) 10 MG tablet Take 10 mg by mouth 4 (Four) Times a Day.  8   • mupirocin (BACTROBAN) 2 % ointment Apply 1 application topically to the appropriate area as directed Daily.     • NOVOLOG FLEXPEN 100 UNIT/ML solution pen-injector sc pen Inject 20 Units under the skin into the appropriate area as directed 4 (Four) Times a Day.     • pantoprazole (PROTONIX) 40 MG EC tablet Take 40 mg by mouth Daily.  2   • prednisoLONE acetate (Pred Forte) 1 % ophthalmic suspension Follow instructions on the After Visit Summary. (Patient taking differently: Administer 1 drop to the right eye 2 (two) times a day. Follow instructions on the After Visit Summary.) 2 mL 0   • vitamin B-12 (CYANOCOBALAMIN) 1000 MCG tablet Take 1,000 mcg by mouth Daily.       No current facility-administered medications on file prior to visit.       Allergies   Allergen Reactions   • Chlorhexidine Shortness Of Breath, Itching and Rash     Pt developed a rash, itching, and shortness of breath after receiving a CHG bath on 4/24/19.   • Latex Other (See Comments)     Rash, hives, and tongue swelling      Past Medical History:   Diagnosis Date   • Acid reflux    • Asthma    • Diabetes (CMS/HCC)    • Hypertension      Past Surgical History:   Procedure Laterality Date   • AMPUTATION DIGIT Left 10/15/2019    Procedure: AMPUTATE LEFT THIRD TOE;  Surgeon: Juju Weber MD;  Location: [facility] OR;  Service: Orthopedics   • AORTAGRAM N/A 4/30/2019    Procedure: AORTAGRAM WITH OR WITHOUT RUNOFFS;  Surgeon: Carrillo White MD;  Location: [facility] HYBRID OR 15;  Service: Vascular   • BELOW KNEE AMPUTATION Right 6/4/2019    Procedure: BELOW KNEE AMPUTATION RIGHT;  Surgeon: Juju Weber MD;  Location:  [facility] OR;  Service: Orthopedics   • CATARACT EXTRACTION W/ INTRAOCULAR LENS IMPLANT Right 7/20/2020    Procedure: CATARACT PHACO EXTRACTION WITH INTRAOCULAR LENS IMPLANT RIGHT;  Surgeon: Jez Mas MD;   "Location: [facility];  Service: Ophthalmology;  Laterality: Right;   • CATARACT EXTRACTION W/ INTRAOCULAR LENS IMPLANT Left 8/3/2020    Procedure: CATARACT PHACO EXTRACTION WITH INTRAOCULAR LENS IMPLANT LEFT;  Surgeon: Jez Mas MD;  Location: [facility];  Service: Ophthalmology;  Laterality: Left;   • CHOLECYSTECTOMY     • FOOT SURGERY Left 10/15/2019    Amputate 3rd toe- DeGnore   • KNEE SURGERY Right     TKA     Family History   Problem Relation Age of Onset   • Cancer Mother    • Hypertension Mother    • Hypertension Father    • Heart attack Father       Social History     Socioeconomic History   • Marital status:      Spouse name: Not on file   • Number of children: Not on file   • Years of education: Not on file   • Highest education level: Not on file   Tobacco Use   • Smoking status: Former Smoker     Types: Cigarettes     Start date:      Quit date:      Years since quittin.7   • Smokeless tobacco: Never Used   Substance and Sexual Activity   • Alcohol use: No     Frequency: Never   • Drug use: No   • Sexual activity: Defer        Review of Systems   Constitutional: Negative.    HENT: Negative.    Eyes: Negative.    Respiratory: Negative.    Cardiovascular: Negative.    Gastrointestinal: Negative.    Endocrine: Negative.    Genitourinary: Negative.    Musculoskeletal: Positive for arthralgias.   Skin: Negative.    Allergic/Immunologic: Negative.    Neurological: Negative.    Hematological: Negative.    Psychiatric/Behavioral: Negative.        The following portions of the patient's history were reviewed and updated as appropriate: allergies, current medications, past family history, past medical history, past social history, past surgical history and problem list.    Physical Exam:   Pulse 61   Ht 190.5 cm (75\")   Wt 125 kg (276 lb)   SpO2 98%   BMI 34.50 kg/m²   GENERAL: Body habitus: trunkal obesity    Lower extremity edema: Left: trace; Right: trace    Gait: Gait shows " fairly good liza with his prosthesis     Mental Status:  awake and alert; oriented to person, place, and time  MS  Diabetic Foot Exam:  Left:  Small ulcer over medial first metatarsal head but no drainage, and superficial, small scab on lateral second toe no signs of infection, no change in dense neuropathy, no pulses, prior amputation site is healed, no Charcot     Right below-knee amputation stump has superficial abrasion on the anterior tibia and most inferior aspect of the stump, no signs of infection  NEURO Sensation:  absent     Medical Decision Making    Data Review:   ordered and reviewed x-rays today    Assessment/Plan/Diagnosis/Treatment Options:   1. Diabetic ulcer of toe of left foot associated with type 2 diabetes mellitus, limited to breakdown of skin (CMS/HCC)  Chronic left foot ulcer but no signs of infection.  New small abrasion on the second toe.  They should continue Bactroban on both of those and keep the pressure off of it.  Not certain the great toe ulcer is ever going to heal, were primarily suppressing it.  I will see him again in 3 months or sooner with any problems  - XR Foot 2 View Left    2. S/P BKA (below knee amputation), right (CMS/HCC)  New ulceration on the right below-knee amputation.  He needs to completely stay out of his prosthesis until this heals.  I advised him that he is going to end up with an above-knee amputation if he does not get it healed.  He should not wear his inner liner for the prosthesis at night, he keeps the skin too wet and will not allow this to heal.

## 2020-10-29 ENCOUNTER — TRANSCRIBE ORDERS (OUTPATIENT)
Dept: ADMINISTRATIVE | Facility: HOSPITAL | Age: 75
End: 2020-10-29

## 2020-10-29 DIAGNOSIS — N18.32 CHRONIC KIDNEY DISEASE (CKD) STAGE G3B/A1, MODERATELY DECREASED GLOMERULAR FILTRATION RATE (GFR) BETWEEN 30-44 ML/MIN/1.73 SQUARE METER AND ALBUMINURIA CREATININE RATIO LESS THAN 30 MG/G (CMS/H* (HCC): Primary | ICD-10-CM

## 2020-11-04 ENCOUNTER — HOSPITAL ENCOUNTER (OUTPATIENT)
Dept: ULTRASOUND IMAGING | Facility: HOSPITAL | Age: 75
Discharge: HOME | End: 2020-11-04
Admitting: INTERNAL MEDICINE

## 2020-11-04 DIAGNOSIS — N18.32 CHRONIC KIDNEY DISEASE (CKD) STAGE G3B/A1, MODERATELY DECREASED GLOMERULAR FILTRATION RATE (GFR) BETWEEN 30-44 ML/MIN/1.73 SQUARE METER AND ALBUMINURIA CREATININE RATIO LESS THAN 30 MG/G (CMS/H* (HCC): ICD-10-CM

## 2020-11-04 PROCEDURE — 76775 US EXAM ABDO BACK WALL LIM: CPT

## 2021-01-06 ENCOUNTER — APPOINTMENT (OUTPATIENT)
Dept: GENERAL RADIOLOGY | Facility: HOSPITAL | Age: 76
End: 2021-01-06

## 2021-01-06 ENCOUNTER — OFFICE VISIT (OUTPATIENT)
Dept: ORTHOPEDIC SURGERY | Facility: CLINIC | Age: 76
End: 2021-01-06

## 2021-01-06 ENCOUNTER — HOSPITAL ENCOUNTER (OUTPATIENT)
Facility: HOSPITAL | Age: 76
Setting detail: SURGERY ADMIT
End: 2021-01-06
Attending: ORTHOPAEDIC SURGERY | Admitting: ORTHOPAEDIC SURGERY

## 2021-01-06 ENCOUNTER — ANESTHESIA EVENT (OUTPATIENT)
Dept: PERIOP | Facility: HOSPITAL | Age: 76
End: 2021-01-06

## 2021-01-06 ENCOUNTER — HOSPITAL ENCOUNTER (INPATIENT)
Facility: HOSPITAL | Age: 76
LOS: 5 days | Discharge: HOME HEALTH | End: 2021-01-11
Attending: INTERNAL MEDICINE | Admitting: INTERNAL MEDICINE

## 2021-01-06 VITALS — WEIGHT: 267 LBS | BODY MASS INDEX: 33.2 KG/M2 | HEART RATE: 70 BPM | HEIGHT: 75 IN | OXYGEN SATURATION: 98 %

## 2021-01-06 DIAGNOSIS — Z89.511 S/P BKA (BELOW KNEE AMPUTATION), RIGHT (HCC): ICD-10-CM

## 2021-01-06 DIAGNOSIS — M79.672 LEFT FOOT PAIN: ICD-10-CM

## 2021-01-06 DIAGNOSIS — L97.421 DIABETIC ULCER OF LEFT MIDFOOT ASSOCIATED WITH TYPE 2 DIABETES MELLITUS, LIMITED TO BREAKDOWN OF SKIN (HCC): ICD-10-CM

## 2021-01-06 DIAGNOSIS — L02.612 CELLULITIS AND ABSCESS OF TOE OF LEFT FOOT: Primary | ICD-10-CM

## 2021-01-06 DIAGNOSIS — L03.032 CELLULITIS AND ABSCESS OF TOE OF LEFT FOOT: Primary | ICD-10-CM

## 2021-01-06 DIAGNOSIS — E11.621 DIABETIC ULCER OF LEFT MIDFOOT ASSOCIATED WITH TYPE 2 DIABETES MELLITUS, LIMITED TO BREAKDOWN OF SKIN (HCC): ICD-10-CM

## 2021-01-06 DIAGNOSIS — M79.672 LEFT FOOT PAIN: Primary | ICD-10-CM

## 2021-01-06 PROBLEM — L97.509 DIABETIC FOOT ULCER (HCC): Status: ACTIVE | Noted: 2021-01-06

## 2021-01-06 LAB
ABO GROUP BLD: NORMAL
ANION GAP SERPL CALCULATED.3IONS-SCNC: 15 MMOL/L (ref 5–15)
B PARAPERT DNA SPEC QL NAA+PROBE: NOT DETECTED
B PERT DNA SPEC QL NAA+PROBE: NOT DETECTED
BLD GP AB SCN SERPL QL: NEGATIVE
BUN SERPL-MCNC: 26 MG/DL (ref 8–23)
BUN/CREAT SERPL: 14.9 (ref 7–25)
C PNEUM DNA NPH QL NAA+NON-PROBE: NOT DETECTED
CALCIUM SPEC-SCNC: 10.2 MG/DL (ref 8.6–10.5)
CHLORIDE SERPL-SCNC: 99 MMOL/L (ref 98–107)
CO2 SERPL-SCNC: 24 MMOL/L (ref 22–29)
CREAT SERPL-MCNC: 1.74 MG/DL (ref 0.76–1.27)
CRP SERPL-MCNC: 4.8 MG/DL (ref 0–0.5)
DEPRECATED RDW RBC AUTO: 43.7 FL (ref 37–54)
ERYTHROCYTE [DISTWIDTH] IN BLOOD BY AUTOMATED COUNT: 13.8 % (ref 12.3–15.4)
ERYTHROCYTE [SEDIMENTATION RATE] IN BLOOD: 84 MM/HR (ref 0–20)
FLUAV H1 2009 PAND RNA NPH QL NAA+PROBE: NOT DETECTED
FLUAV H1 HA GENE NPH QL NAA+PROBE: NOT DETECTED
FLUAV H3 RNA NPH QL NAA+PROBE: NOT DETECTED
FLUAV SUBTYP SPEC NAA+PROBE: NOT DETECTED
FLUBV RNA ISLT QL NAA+PROBE: NOT DETECTED
GFR SERPL CREATININE-BSD FRML MDRD: 38 ML/MIN/1.73
GLUCOSE BLDC GLUCOMTR-MCNC: 168 MG/DL (ref 70–130)
GLUCOSE SERPL-MCNC: 183 MG/DL (ref 65–99)
HADV DNA SPEC NAA+PROBE: NOT DETECTED
HBA1C MFR BLD: 10.3 % (ref 4.8–5.6)
HCOV 229E RNA SPEC QL NAA+PROBE: NOT DETECTED
HCOV HKU1 RNA SPEC QL NAA+PROBE: NOT DETECTED
HCOV NL63 RNA SPEC QL NAA+PROBE: NOT DETECTED
HCOV OC43 RNA SPEC QL NAA+PROBE: NOT DETECTED
HCT VFR BLD AUTO: 37.6 % (ref 37.5–51)
HGB BLD-MCNC: 11.3 G/DL (ref 13–17.7)
HMPV RNA NPH QL NAA+NON-PROBE: NOT DETECTED
HPIV1 RNA SPEC QL NAA+PROBE: NOT DETECTED
HPIV2 RNA SPEC QL NAA+PROBE: NOT DETECTED
HPIV3 RNA NPH QL NAA+PROBE: NOT DETECTED
HPIV4 P GENE NPH QL NAA+PROBE: NOT DETECTED
M PNEUMO IGG SER IA-ACNC: NOT DETECTED
MCH RBC QN AUTO: 25.9 PG (ref 26.6–33)
MCHC RBC AUTO-ENTMCNC: 30.1 G/DL (ref 31.5–35.7)
MCV RBC AUTO: 86.2 FL (ref 79–97)
PLATELET # BLD AUTO: 437 10*3/MM3 (ref 140–450)
PMV BLD AUTO: 9.9 FL (ref 6–12)
POTASSIUM SERPL-SCNC: 4.8 MMOL/L (ref 3.5–5.2)
RBC # BLD AUTO: 4.36 10*6/MM3 (ref 4.14–5.8)
RH BLD: NEGATIVE
RHINOVIRUS RNA SPEC NAA+PROBE: NOT DETECTED
RSV RNA NPH QL NAA+NON-PROBE: NOT DETECTED
SARS-COV-2 RNA NPH QL NAA+NON-PROBE: NOT DETECTED
SODIUM SERPL-SCNC: 138 MMOL/L (ref 136–145)
T&S EXPIRATION DATE: NORMAL
WBC # BLD AUTO: 9.39 10*3/MM3 (ref 3.4–10.8)

## 2021-01-06 PROCEDURE — 86140 C-REACTIVE PROTEIN: CPT | Performed by: NURSE PRACTITIONER

## 2021-01-06 PROCEDURE — 85027 COMPLETE CBC AUTOMATED: CPT | Performed by: NURSE PRACTITIONER

## 2021-01-06 PROCEDURE — 93010 ELECTROCARDIOGRAM REPORT: CPT | Performed by: INTERNAL MEDICINE

## 2021-01-06 PROCEDURE — 0202U NFCT DS 22 TRGT SARS-COV-2: CPT | Performed by: NURSE PRACTITIONER

## 2021-01-06 PROCEDURE — 93005 ELECTROCARDIOGRAM TRACING: CPT | Performed by: NURSE PRACTITIONER

## 2021-01-06 PROCEDURE — 80048 BASIC METABOLIC PNL TOTAL CA: CPT | Performed by: NURSE PRACTITIONER

## 2021-01-06 PROCEDURE — 83036 HEMOGLOBIN GLYCOSYLATED A1C: CPT | Performed by: NURSE PRACTITIONER

## 2021-01-06 PROCEDURE — 25010000002 PIPERACILLIN SOD-TAZOBACTAM PER 1 G: Performed by: INTERNAL MEDICINE

## 2021-01-06 PROCEDURE — 86901 BLOOD TYPING SEROLOGIC RH(D): CPT | Performed by: INTERNAL MEDICINE

## 2021-01-06 PROCEDURE — 85652 RBC SED RATE AUTOMATED: CPT | Performed by: NURSE PRACTITIONER

## 2021-01-06 PROCEDURE — 86900 BLOOD TYPING SEROLOGIC ABO: CPT | Performed by: INTERNAL MEDICINE

## 2021-01-06 PROCEDURE — 25010000002 DAPTOMYCIN PER 1 MG: Performed by: INTERNAL MEDICINE

## 2021-01-06 PROCEDURE — 63710000001 INSULIN LISPRO (HUMAN) PER 5 UNITS: Performed by: INTERNAL MEDICINE

## 2021-01-06 PROCEDURE — 99215 OFFICE O/P EST HI 40 MIN: CPT | Performed by: ORTHOPAEDIC SURGERY

## 2021-01-06 PROCEDURE — 63710000001 INSULIN DETEMIR PER 5 UNITS: Performed by: NURSE PRACTITIONER

## 2021-01-06 PROCEDURE — 86850 RBC ANTIBODY SCREEN: CPT | Performed by: INTERNAL MEDICINE

## 2021-01-06 PROCEDURE — 82962 GLUCOSE BLOOD TEST: CPT

## 2021-01-06 PROCEDURE — 71045 X-RAY EXAM CHEST 1 VIEW: CPT

## 2021-01-06 PROCEDURE — 94640 AIRWAY INHALATION TREATMENT: CPT

## 2021-01-06 RX ORDER — ONDANSETRON 4 MG/1
4 TABLET, FILM COATED ORAL EVERY 6 HOURS PRN
Status: DISCONTINUED | OUTPATIENT
Start: 2021-01-06 | End: 2021-01-11 | Stop reason: HOSPADM

## 2021-01-06 RX ORDER — METOCLOPRAMIDE 10 MG/1
10 TABLET ORAL 4 TIMES DAILY
Status: DISCONTINUED | OUTPATIENT
Start: 2021-01-06 | End: 2021-01-11 | Stop reason: HOSPADM

## 2021-01-06 RX ORDER — CHOLECALCIFEROL (VITAMIN D3) 125 MCG
5 CAPSULE ORAL NIGHTLY PRN
Status: DISCONTINUED | OUTPATIENT
Start: 2021-01-06 | End: 2021-01-11 | Stop reason: HOSPADM

## 2021-01-06 RX ORDER — SODIUM CHLORIDE 9 MG/ML
100 INJECTION, SOLUTION INTRAVENOUS CONTINUOUS
Status: DISCONTINUED | OUTPATIENT
Start: 2021-01-06 | End: 2021-01-07 | Stop reason: SDUPTHER

## 2021-01-06 RX ORDER — FAMOTIDINE 20 MG/1
20 TABLET, FILM COATED ORAL ONCE
Status: CANCELLED | OUTPATIENT
Start: 2021-01-06 | End: 2021-01-06

## 2021-01-06 RX ORDER — CLONIDINE HYDROCHLORIDE 0.1 MG/1
0.1 TABLET ORAL EVERY 12 HOURS SCHEDULED
Status: DISCONTINUED | OUTPATIENT
Start: 2021-01-06 | End: 2021-01-10

## 2021-01-06 RX ORDER — FAMOTIDINE 10 MG/ML
20 INJECTION, SOLUTION INTRAVENOUS ONCE
Status: CANCELLED | OUTPATIENT
Start: 2021-01-06 | End: 2021-01-06

## 2021-01-06 RX ORDER — ACETAMINOPHEN 325 MG/1
650 TABLET ORAL EVERY 4 HOURS PRN
Status: DISCONTINUED | OUTPATIENT
Start: 2021-01-06 | End: 2021-01-11 | Stop reason: HOSPADM

## 2021-01-06 RX ORDER — SODIUM CHLORIDE 0.9 % (FLUSH) 0.9 %
10 SYRINGE (ML) INJECTION EVERY 12 HOURS SCHEDULED
Status: DISCONTINUED | OUTPATIENT
Start: 2021-01-06 | End: 2021-01-07 | Stop reason: SDUPTHER

## 2021-01-06 RX ORDER — LABETALOL HYDROCHLORIDE 5 MG/ML
10 INJECTION, SOLUTION INTRAVENOUS EVERY 4 HOURS PRN
Status: DISCONTINUED | OUTPATIENT
Start: 2021-01-06 | End: 2021-01-11 | Stop reason: HOSPADM

## 2021-01-06 RX ORDER — GABAPENTIN 300 MG/1
600 CAPSULE ORAL 4 TIMES DAILY
Status: DISCONTINUED | OUTPATIENT
Start: 2021-01-06 | End: 2021-01-11 | Stop reason: HOSPADM

## 2021-01-06 RX ORDER — BUDESONIDE AND FORMOTEROL FUMARATE DIHYDRATE 80; 4.5 UG/1; UG/1
2 AEROSOL RESPIRATORY (INHALATION)
Status: DISCONTINUED | OUTPATIENT
Start: 2021-01-06 | End: 2021-01-11 | Stop reason: HOSPADM

## 2021-01-06 RX ORDER — ONDANSETRON 2 MG/ML
4 INJECTION INTRAMUSCULAR; INTRAVENOUS EVERY 6 HOURS PRN
Status: DISCONTINUED | OUTPATIENT
Start: 2021-01-06 | End: 2021-01-11 | Stop reason: HOSPADM

## 2021-01-06 RX ORDER — SODIUM CHLORIDE 0.9 % (FLUSH) 0.9 %
10 SYRINGE (ML) INJECTION EVERY 12 HOURS SCHEDULED
Status: CANCELLED | OUTPATIENT
Start: 2021-01-06

## 2021-01-06 RX ORDER — PREDNISOLONE ACETATE 10 MG/ML
1 SUSPENSION/ DROPS OPHTHALMIC EVERY 12 HOURS SCHEDULED
Status: DISCONTINUED | OUTPATIENT
Start: 2021-01-06 | End: 2021-01-11 | Stop reason: HOSPADM

## 2021-01-06 RX ORDER — SODIUM CHLORIDE 0.9 % (FLUSH) 0.9 %
10 SYRINGE (ML) INJECTION AS NEEDED
Status: DISCONTINUED | OUTPATIENT
Start: 2021-01-06 | End: 2021-01-11 | Stop reason: HOSPADM

## 2021-01-06 RX ORDER — DEXTROSE MONOHYDRATE 25 G/50ML
25 INJECTION, SOLUTION INTRAVENOUS
Status: DISCONTINUED | OUTPATIENT
Start: 2021-01-06 | End: 2021-01-11 | Stop reason: HOSPADM

## 2021-01-06 RX ORDER — SODIUM CHLORIDE, SODIUM LACTATE, POTASSIUM CHLORIDE, CALCIUM CHLORIDE 600; 310; 30; 20 MG/100ML; MG/100ML; MG/100ML; MG/100ML
9 INJECTION, SOLUTION INTRAVENOUS CONTINUOUS
Status: CANCELLED | OUTPATIENT
Start: 2021-01-06

## 2021-01-06 RX ORDER — NICOTINE POLACRILEX 4 MG
15 LOZENGE BUCCAL
Status: DISCONTINUED | OUTPATIENT
Start: 2021-01-06 | End: 2021-01-11 | Stop reason: HOSPADM

## 2021-01-06 RX ORDER — AMLODIPINE BESYLATE 10 MG/1
10 TABLET ORAL DAILY
Status: DISCONTINUED | OUTPATIENT
Start: 2021-01-07 | End: 2021-01-11 | Stop reason: HOSPADM

## 2021-01-06 RX ORDER — SODIUM CHLORIDE 0.9 % (FLUSH) 0.9 %
10 SYRINGE (ML) INJECTION AS NEEDED
Status: CANCELLED | OUTPATIENT
Start: 2021-01-06

## 2021-01-06 RX ORDER — PANTOPRAZOLE SODIUM 40 MG/1
40 TABLET, DELAYED RELEASE ORAL
Status: DISCONTINUED | OUTPATIENT
Start: 2021-01-07 | End: 2021-01-11 | Stop reason: HOSPADM

## 2021-01-06 RX ORDER — LOSARTAN POTASSIUM 50 MG/1
100 TABLET ORAL DAILY
Status: DISCONTINUED | OUTPATIENT
Start: 2021-01-07 | End: 2021-01-11 | Stop reason: HOSPADM

## 2021-01-06 RX ORDER — ALLOPURINOL 100 MG/1
100 TABLET ORAL DAILY
Status: DISCONTINUED | OUTPATIENT
Start: 2021-01-07 | End: 2021-01-11 | Stop reason: HOSPADM

## 2021-01-06 RX ORDER — LIDOCAINE HYDROCHLORIDE 10 MG/ML
0.5 INJECTION, SOLUTION EPIDURAL; INFILTRATION; INTRACAUDAL; PERINEURAL ONCE AS NEEDED
Status: CANCELLED | OUTPATIENT
Start: 2021-01-06

## 2021-01-06 RX ADMIN — GABAPENTIN 600 MG: 300 CAPSULE ORAL at 19:20

## 2021-01-06 RX ADMIN — METOCLOPRAMIDE 10 MG: 10 TABLET ORAL at 19:20

## 2021-01-06 RX ADMIN — DAPTOMYCIN 600 MG: 500 INJECTION, POWDER, LYOPHILIZED, FOR SOLUTION INTRAVENOUS at 21:21

## 2021-01-06 RX ADMIN — TAZOBACTAM SODIUM AND PIPERACILLIN SODIUM 3.38 G: 375; 3 INJECTION, SOLUTION INTRAVENOUS at 21:22

## 2021-01-06 RX ADMIN — PREDNISOLONE ACETATE 1 DROP: 10 SUSPENSION/ DROPS OPHTHALMIC at 21:23

## 2021-01-06 RX ADMIN — INSULIN LISPRO 20 UNITS: 100 INJECTION, SOLUTION INTRAVENOUS; SUBCUTANEOUS at 19:21

## 2021-01-06 RX ADMIN — SODIUM CHLORIDE 100 ML/HR: 9 INJECTION, SOLUTION INTRAVENOUS at 19:23

## 2021-01-06 RX ADMIN — BUDESONIDE AND FORMOTEROL FUMARATE DIHYDRATE 2 PUFF: 80; 4.5 AEROSOL RESPIRATORY (INHALATION) at 19:59

## 2021-01-06 RX ADMIN — CLONIDINE HYDROCHLORIDE 0.1 MG: 0.1 TABLET ORAL at 21:23

## 2021-01-06 RX ADMIN — METOCLOPRAMIDE 10 MG: 10 TABLET ORAL at 21:23

## 2021-01-06 RX ADMIN — INSULIN DETEMIR 60 UNITS: 100 INJECTION, SOLUTION SUBCUTANEOUS at 21:26

## 2021-01-06 NOTE — H&P
Patient Name: Amol Aguirre  MRN: [id number]  : 1945  DOS: 2021    Attending: Pennie Foster MD    Primary Care Provider: Donte Briones MD      Chief complaint: Left midfoot diabetic ulcer    Subjective   Patient is a pleasant 75 y.o. male presented for direct admission from Dr. Weber's office.  He was seen today in follow-up for diabetic toe ulcer of the left foot.  He has a very large opening on the medial aspect of the first metatarsal head with copious amounts of gout crystals in the tissue end.    He is known to us from multiple previous admissions.  His most recent admission was 10/15/2019 for a third left toe amputation. He was seen by Dr. Garcia, [facility], for antibiotic management. He received a PICC line and was discharged on IV abx.    When seen in his room he is relaxing in his recliner. He denies pain. He denies fevers, chills or night sweats. He reports he had a flare-up of his gout and a large sore opened over this first joint of his great toe.    Allergies:  Allergies   Allergen Reactions   • Chlorhexidine Shortness Of Breath, Itching and Rash     Pt developed a rash, itching, and shortness of breath after receiving a CHG bath on 19.   • Latex Other (See Comments)     Rash, hives, and tongue swelling       Meds:  Medications Prior to Admission   Medication Sig Dispense Refill Last Dose   • allopurinol (ZYLOPRIM) 100 MG tablet Take 100 mg by mouth Daily.      • amLODIPine (NORVASC) 10 MG tablet Take 10 mg by mouth Daily.  0    • aspirin 81 MG EC tablet Take 81 mg by mouth Daily.      • budesonide-formoterol (SYMBICORT) 80-4.5 MCG/ACT inhaler Inhale 2 puffs 2 (Two) Times a Day.      • CloNIDine (CATAPRES) 0.1 MG tablet Take 0.1 mg by mouth 2 (Two) Times a Day.      • gabapentin (NEURONTIN) 400 MG capsule Take 600 mg by mouth 4 (Four) Times a Day.      • hydrochlorothiazide (HYDRODIURIL) 25 MG tablet Take 25 mg by mouth Daily.  3    • Insulin Glargine (TOUJEO  SOLOSTAR) 300 UNIT/ML solution pen-injector Inject 60 Units under the skin into the appropriate area as directed Every Night.      • losartan (COZAAR) 100 MG tablet Take 100 mg by mouth Daily.  3    • melatonin 5 MG tablet tablet Take 1 tablet by mouth At Night As Needed (sleep).      • metoclopramide (REGLAN) 10 MG tablet Take 10 mg by mouth 4 (Four) Times a Day.  8    • mupirocin (BACTROBAN) 2 % ointment Apply 1 application topically to the appropriate area as directed Daily.      • NOVOLOG FLEXPEN 100 UNIT/ML solution pen-injector sc pen Inject 20 Units under the skin into the appropriate area as directed 4 (Four) Times a Day.      • pantoprazole (PROTONIX) 40 MG EC tablet Take 40 mg by mouth Daily.  2    • prednisoLONE acetate (Pred Forte) 1 % ophthalmic suspension Follow instructions on the After Visit Summary. (Patient taking differently: Administer 1 drop to the right eye 2 (two) times a day. Follow instructions on the After Visit Summary.) 2 mL 0    • vitamin B-12 (CYANOCOBALAMIN) 1000 MCG tablet Take 1,000 mcg by mouth Daily.            History:   Past Medical History:   Diagnosis Date   • Acid reflux    • Asthma    • Diabetes (CMS/HCC)    • Hypertension      Past Surgical History:   Procedure Laterality Date   • AMPUTATION DIGIT Left 10/15/2019    Procedure: AMPUTATE LEFT THIRD TOE;  Surgeon: Juju Weber MD;  Location: [facility] OR;  Service: Orthopedics   • AORTAGRAM N/A 4/30/2019    Procedure: AORTAGRAM WITH OR WITHOUT RUNOFFS;  Surgeon: Carrillo White MD;  Location: [facility] HYBRID OR 15;  Service: Vascular   • BELOW KNEE AMPUTATION Right 6/4/2019    Procedure: BELOW KNEE AMPUTATION RIGHT;  Surgeon: Juju Weber MD;  Location: [facility] OR;  Service: Orthopedics   • CATARACT EXTRACTION W/ INTRAOCULAR LENS IMPLANT Right 7/20/2020    Procedure: CATARACT PHACO EXTRACTION WITH INTRAOCULAR LENS IMPLANT RIGHT;  Surgeon: Jez Mas MD;  Location: [facility] OR;  Service: Ophthalmology;  Laterality:  Right;   • CATARACT EXTRACTION W/ INTRAOCULAR LENS IMPLANT Left 8/3/2020    Procedure: CATARACT PHACO EXTRACTION WITH INTRAOCULAR LENS IMPLANT LEFT;  Surgeon: Jez Mas MD;  Location: [facility];  Service: Ophthalmology;  Laterality: Left;   • CHOLECYSTECTOMY     • FOOT SURGERY Left 10/15/2019    Amputate 3rd toe- DeGnore   • KNEE SURGERY Right     TKA     Family History   Problem Relation Age of Onset   • Cancer Mother    • Hypertension Mother    • Hypertension Father    • Heart attack Father      Social History     Tobacco Use   • Smoking status: Former Smoker     Types: Cigarettes     Start date:      Quit date:      Years since quittin.0   • Smokeless tobacco: Never Used   Substance Use Topics   • Alcohol use: No     Frequency: Never   • Drug use: No   He is  with no children. He is retired from [employer].    Review of Systems  Pertinent items are noted in HPI, all other systems reviewed and negative    Vital Signs  /79 (BP Location: Right arm, Patient Position: Sitting)   Pulse 70   Temp 97.6 °F (36.4 °C) (Oral)   Resp 20     Physical Exam:    General Appearance:    Alert, cooperative, in no acute distress   Head:    Normocephalic, without obvious abnormality, atraumatic   Eyes:            Lids and lashes normal, conjunctivae and sclerae normal, no   icterus, no pallor, corneas clear,    Ears:    Ears appear intact with no abnormalities noted   Throat:   No oral lesions, no thrush, oral mucosa moist   Neck:   No adenopathy, supple, trachea midline, no thyromegaly    Lungs:     Clear to auscultation,respirations regular, even and unlabored    Heart:    Regular rhythm and normal rate, normal S1 and S2, no murmur, no gallop   Abdomen:     Normal bowel sounds, no masses, no organomegaly, soft non-tender, non-distended, no guarding, no rebound  tenderness   Genitalia:    Deferred   Extremities:   Left foot with gauze wrapping, no drainage seen thru dressing. Some erythema  surrounding dressing. R BKA   Pulses:   Pulses palpable and equal bilaterally   Skin:   No bleeding, bruising or rash   Neurologic:   Cranial nerves 2 - 12 grossly intact.         I reviewed the patient's new clinical results.     Lab Results   Component Value Date    HGBA1C 8.10 (H) 04/28/2020     Left Xray 1/6/2021:  Study Result    AP, lateral, oblique left foot ordered for  increasing size diabetic ulcer, shows ulcer is visible as a shadow over the first MTPJ, no significant change in the erosions in that area, no change in the extensive vascular calcification, third toe amputation is unchanged, compared to 9/23/2020       Assessment and Plan:     Diabetic foot ulcers (CMS/HCC)    Type 2 diabetes mellitus with diabetic polyneuropathy, with long-term current use of insulin (CMS/HCC)    HTN (hypertension)    CKD (chronic kidney disease)    Left foot pain  osteomyelitis left foot      Plan  Direct admit from Dr. Weber's office.  Dr. Weber noted significant left foot ulceration with evidence of deep infection to the bone.  She is planning for an incision and drainage in the operating room tomorrow.  She has consulted Dr. Garcia, [facility], for antibiotic management.    NPO p MN    Preop  - CBC  - BMP  - EKG  - CXR  - sed rate  - crp    1. NWB LLE  2. Pain control-prns   3. IS-encourage  4. DVT proph- Mechs  5. Bowel regimen  6. Resume home medications as appropriate  7. Monitor post-op labs  8. DC planning for home    Consult Dr. Jose DEL VALLE  - abx recs  - follow postop cultures     HTN  - Continue home norvasc, catapres and cozaar  - Hold HCTZ  - Monitor BP   - Holding parameters for BP meds  - Labetalol PRN for SBP>170     DM  - hgb A1c on 4/28/20 8.1  - Levemir nightly  - Mealtime bolus with holding paramenters  - Accu-Check AC and HS with moderate dose SSI     CKD  - BMP in AM  - Avoid nephrotoxic agents      Erica Reyez, VIPUL  01/06/21  17:06 EST

## 2021-01-06 NOTE — CONSULTS
Infectious Disease Consult  Amol Aguirre  1945  [id number]    Date of Consult: 1/6/2021  Admission Date: 1/6/2021    Requesting Provider: Sobeida Weber MD  Evaluating Physician: Easton Garcia MD    Chief Complaint: foot infection    Reason for Consultation: foot infection    History of present illness:   Patient is a 75 y.o.  Yr old male with history of male with history of poorly controlled DM2, venous stasis disease, asthma, HTN. 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 In October 2019 he underwent amputation of left third toe and had another prolonged course of IV antibiotics and prolonged oral suppression with doxycycline.  Due to significant improvement and stabilityhe was taken off oral antibiotics a few months ago.    1/6/21: I was contacted by Dr. Weber who evaluated Mr. Aguirre in clinic today and found him to have significant left foot ulceration evidence of deep infection down to the bone.  He was admitted this evening and Dr Weber is planning I&D tomorrow. He says he feels well today but had a cough a few days ago and got tested for COVID and does not have the result yet.        Review of Systems  Constitutional-- No Fever, chills or sweats.  Appetite good, and no malaise.  Heent-- No new vision, hearing or throat complaints.  No epistaxis or oral sores.  Denies odynophagia or dysphagia.  No headache, photophobia or neck stiffness.  CV-- No chest pain, palpitation or syncope  Resp-- No SOB/cough/Hemoptysis  GI- No nausea, vomiting, or diarrhea.   -- No dysuria, hematuria, or flank pain.  Denies hesitancy, urgency or flank pain.  Lymph- no swollen lymph nodes in neck/axilla or groin.   Heme- No active bruising or bleeding  MS-- See HPI  Neuro-- No acute focal weakness or numbness in the arms or legs.  Skin-- No rashes, lesions, ulcers. No skin breakdown      Past Medical History:    Diagnosis Date   • Acid reflux    • Asthma    • Diabetes (CMS/[facility])    • Hypertension        Past Surgical History:   Procedure Laterality Date   • AMPUTATION DIGIT Left 10/15/2019    Procedure: AMPUTATE LEFT THIRD TOE;  Surgeon: Juju Weber MD;  [facility]  [facility];  Service: Orthopedics   • AORTAGRAM N/A 2019    Procedure: AORTAGRAM WITH OR WITHOUT RUNOFFS;  Surgeon: Carrillo White MD;  [facility];  Service: Vascular   • BELOW KNEE AMPUTATION Right 2019    Procedure: BELOW KNEE AMPUTATION RIGHT;  Surgeon: Juju Weber MD;  [facility]:  [facility];  Service: Orthopedics   • CATARACT EXTRACTION W/ INTRAOCULAR LENS IMPLANT Right 2020    Procedure: CATARACT PHACO EXTRACTION WITH INTRAOCULAR LENS IMPLANT RIGHT;  Surgeon: Jez Mas MD;  Location:  [facility];  Service: Ophthalmology;  Laterality: Right;   • CATARACT EXTRACTION W/ INTRAOCULAR LENS IMPLANT Left 8/3/2020    Procedure: CATARACT PHACO EXTRACTION WITH INTRAOCULAR LENS IMPLANT LEFT;  Surgeon: Jez Mas MD;  Location:  [facility];  Service: Ophthalmology;  Laterality: Left;   • CHOLECYSTECTOMY     • FOOT SURGERY Left 10/15/2019    Amputate 3rd toe- DeGnore   • KNEE SURGERY Right     TKA       Social History     Socioeconomic History   • Marital status:      Spouse name: Not on file   • Number of children: Not on file   • Years of education: Not on file   • Highest education level: Not on file   Tobacco Use   • Smoking status: Former Smoker     Types: Cigarettes     Start date:      Quit date:      Years since quittin.0   • Smokeless tobacco: Never Used   Substance and Sexual Activity   • Alcohol use: No     Frequency: Never   • Drug use: No   • Sexual activity: Defer       family history includes Cancer in his mother; Heart attack in his father; Hypertension in his father and mother.    Allergies   Allergen Reactions   • Chlorhexidine Shortness Of Breath, Itching and Rash     Pt  developed a rash, itching, and shortness of breath after receiving a CHG bath on 4/24/19.   • Latex Other (See Comments)     Rash, hives, and tongue swelling       Antibiotics:  Anti-Infectives (From admission, onward)    None          Other Medications:  No current facility-administered medications for this encounter.        Physical Exam:   Vital Signs   /79 (BP Location: Right arm, Patient Position: Sitting)   Pulse 70   Temp 97.6 °F (36.4 °C) (Oral)   Resp 20     GENERAL: Awake and alert, in no acute distress.   HEENT: Normocephalic, atraumatic.  PERRL. EOMI. No conjunctival injection.   HEART: RRR; No murmur.  LUNGS: Clear to auscultation bilaterally without wheezing, rales, rhonchi. Normal respiratory effort. Nonlabored.  ABDOMEN: Soft, nontender, nondistended. Positive bowel sounds. No rebound or guarding.  : Without Mcdonald catheter.  MSK: BKA on right  Left foot with ulceration and swelling at medial aspect of MTP joint  SKIN: Warm and dry without cutaneous eruptions on Inspection/palpation.    NEURO: Oriented to PPT. No focal deficits on motor/sensory exam at arms/legs.  PSYCHIATRIC: Normal insight and judgement. Cooperative with PE    Laboratory Data            CrCl cannot be calculated (Patient's most recent lab result is older than the maximum 30 days allowed.).                Microbiology:   Prior cultures reviewed.  Last positive culture was from the right foot in April 2019 with evidence of Klebsiella and Morganella    Radiology:  No radiology results for the last 7 days       Impression:   1. Left foot ulceration, soft tissue infection, acute osteomyelitis: probes to bone per Dr Weber. I&D planned 1/7  2. Significant peripheral vascular disease  3. History of poorly controlled type 2 diabetes capitated by neuropathy  4. History of right lower extremity below the knee amputation due to history of diabetic foot infection  5. CKD stage 3B  6. Asthma    PLAN:    - CBC, CMP, CRP, ESR. Check  CK  - wound culture ordered    - Daptomycin renally dosed  - Zosyn, renally dosed    Discussed with Dr Weber. Likely to need 6-8 weeks of IV antibiotics via tunneled line post-operatively. I will follow    Easton Garcia MD  1/6/2021

## 2021-01-06 NOTE — PROGRESS NOTES
ESTABLISHED PATIENT    Patient: Amol Aguirre  : 1945    Primary Care Provider: Donte Briones MD    Requesting Provider: As above    Follow-up (3 month follow up -Diabetic ulcer of toe of left foot associated with type 2 diabetes mellitus, limited to breakdown of skin )      History    Chief Complaint: Left diabetic foot ulcer    History of Present Illness: Mr. Aguirre returns with a much worse problem in his left foot.  He had a gout attack and has now a very large opening on the medial aspect of the first metatarsal head, there is copious amounts of gout crystals in the tissue ends open into the joint, and he started taking Keflex from a prescription he had previously.    Current Outpatient Medications on File Prior to Visit   Medication Sig Dispense Refill   • allopurinol (ZYLOPRIM) 100 MG tablet Take 100 mg by mouth Daily.     • amLODIPine (NORVASC) 10 MG tablet Take 10 mg by mouth Daily.  0   • aspirin 81 MG EC tablet Take 81 mg by mouth Daily.     • budesonide-formoterol (SYMBICORT) 80-4.5 MCG/ACT inhaler Inhale 2 puffs 2 (Two) Times a Day.     • CloNIDine (CATAPRES) 0.1 MG tablet Take 0.1 mg by mouth 2 (Two) Times a Day.     • gabapentin (NEURONTIN) 400 MG capsule Take 600 mg by mouth 4 (Four) Times a Day.     • hydrochlorothiazide (HYDRODIURIL) 25 MG tablet Take 25 mg by mouth Daily.  3   • Insulin Glargine (TOUJEO SOLOSTAR) 300 UNIT/ML solution pen-injector Inject 60 Units under the skin into the appropriate area as directed Every Night.     • losartan (COZAAR) 100 MG tablet Take 100 mg by mouth Daily.  3   • melatonin 5 MG tablet tablet Take 1 tablet by mouth At Night As Needed (sleep).     • metoclopramide (REGLAN) 10 MG tablet Take 10 mg by mouth 4 (Four) Times a Day.  8   • mupirocin (BACTROBAN) 2 % ointment Apply 1 application topically to the appropriate area as directed Daily.     • NOVOLOG FLEXPEN 100 UNIT/ML solution pen-injector sc pen Inject 20 Units under the skin into  the appropriate area as directed 4 (Four) Times a Day.     • pantoprazole (PROTONIX) 40 MG EC tablet Take 40 mg by mouth Daily.  2   • prednisoLONE acetate (Pred Forte) 1 % ophthalmic suspension Follow instructions on the After Visit Summary. (Patient taking differently: Administer 1 drop to the right eye 2 (two) times a day. Follow instructions on the After Visit Summary.) 2 mL 0   • vitamin B-12 (CYANOCOBALAMIN) 1000 MCG tablet Take 1,000 mcg by mouth Daily.       No current facility-administered medications on file prior to visit.       Allergies   Allergen Reactions   • Chlorhexidine Shortness Of Breath, Itching and Rash     Pt developed a rash, itching, and shortness of breath after receiving a CHG bath on 4/24/19.   • Latex Other (See Comments)     Rash, hives, and tongue swelling      Past Medical History:   Diagnosis Date   • Acid reflux    • Asthma    • Diabetes (CMS/[facility])    • Hypertension      Past Surgical History:   Procedure Laterality Date   • AMPUTATION DIGIT Left 10/15/2019    Procedure: AMPUTATE LEFT THIRD TOE;  Surgeon: Juju Weber MD;  [facility];  Service: Orthopedics   • AORTAGRAM N/A 4/30/2019    Procedure: AORTAGRAM WITH OR WITHOUT RUNOFFS;  Surgeon: Carrillo White MD;  [facility];  Service: Vascular   • BELOW KNEE AMPUTATION Right 6/4/2019    Procedure: BELOW KNEE AMPUTATION RIGHT;  Surgeon: Juju Weber MD;  [facility];  Service: Orthopedics   • CATARACT EXTRACTION W/ INTRAOCULAR LENS IMPLANT Right 7/20/2020    Procedure: CATARACT PHACO EXTRACTION WITH INTRAOCULAR LENS IMPLANT RIGHT;  Surgeon: Jez Mas MD;  Location:  [facility];  Service: Ophthalmology;  Laterality: Right;   • CATARACT EXTRACTION W/ INTRAOCULAR LENS IMPLANT Left 8/3/2020    Procedure: CATARACT PHACO EXTRACTION WITH INTRAOCULAR LENS IMPLANT LEFT;  Surgeon: Jez Mas MD;  Location:  [facility];  Service: Ophthalmology;  Laterality: Left;   • CHOLECYSTECTOMY    "  • FOOT SURGERY Left 10/15/2019    Amputate 3rd toe- DeGnore   • KNEE SURGERY Right     TKA     Family History   Problem Relation Age of Onset   • Cancer Mother    • Hypertension Mother    • Hypertension Father    • Heart attack Father       Social History     Socioeconomic History   • Marital status:      Spouse name: Not on file   • Number of children: Not on file   • Years of education: Not on file   • Highest education level: Not on file   Tobacco Use   • Smoking status: Former Smoker     Types: Cigarettes     Start date:      Quit date:      Years since quittin.0   • Smokeless tobacco: Never Used   Substance and Sexual Activity   • Alcohol use: No     Frequency: Never   • Drug use: No   • Sexual activity: Defer        Review of Systems   Constitutional: Negative.    HENT: Negative.    Eyes: Negative.    Respiratory: Negative.    Cardiovascular: Negative.    Gastrointestinal: Negative.    Endocrine: Negative.    Genitourinary: Negative.    Musculoskeletal: Positive for arthralgias.   Skin: Negative.    Allergic/Immunologic: Negative.    Neurological: Negative.    Hematological: Negative.    Psychiatric/Behavioral: Negative.        The following portions of the patient's history were reviewed and updated as appropriate: allergies, current medications, past family history, past medical history, past social history, past surgical history and problem list.    Physical Exam:   Pulse 70   Ht 190.5 cm (75\")   Wt 121 kg (267 lb)   SpO2 98%   BMI 33.37 kg/m²   GENERAL: Body habitus: overweight    Lower extremity edema: Left: none; Right: 1+ pitting    Gait: antalgic on the left     Mental Status:  awake and alert; oriented to person, place, and time  MSK    Right below-knee amputation stump is well-healed, prosthesis fits well        iabetic Foot Exam:   Left:  Large open wound with copious amounts of gouty crystals, open into the first MTPJ, surrounded by 2 to 3 cm of erythema, no other open " wounds          NEURO Sensation:  absent     Medical Decision Making    Data Review:   ordered and reviewed x-rays today and phone conversation with physician I spoke with Dr. Garcia, Dr. ALCANTARA, we will talk with anesthesia, reminder that he is allergic to chlorhexidine    Assessment/Plan/Diagnosis/Treatment Options:   1. Diabetic ulcer of left midfoot associated with type 2 diabetes mellitus, limited to breakdown of skin (CMS/HCC)  Much larger wound on the left foot, it is open into the metatarsal phalangeal joint.  Unfortunately by definition this is osteomyelitis.  We are going to need to proceed with amputation of the toe and the metatarsal.  I discussed this by phone with Dr. Garcia.  The patient is at high risk for more proximal amputation because of all the factors including poor blood supply, dense neuropathy, diabetes etc.  He was able to heal the toe amputation we did on this foot, so hopefully he will be able to heal the first ray amputation.  We will admit him from the office today because of the cellulitis.  Plan for surgery tomorrow.  We discussed the risks including but not limited to: death, infection, neurovascular damage, strokes, heart attacks, blood clots, chronic pain, deformity, stiffness, need for  further surgery,  amputation, etc.  Questions asked and answered in detail.          2. S/P BKA (below knee amputation), right (CMS/HCC)  Right below-knee amputation doing well, prosthesis fits well      - XR Foot 3+ View Left  - Case Request; Standing  - CBC and Differential; Future  - Basic metabolic panel; Future  - Hemoglobin A1c; Future  - ECG 12 Lead; Future  - Sedimentation rate; Future  - C-reactive protein; Future  - Case Request

## 2021-01-07 ENCOUNTER — ANESTHESIA (OUTPATIENT)
Dept: PERIOP | Facility: HOSPITAL | Age: 76
End: 2021-01-07

## 2021-01-07 LAB
ANION GAP SERPL CALCULATED.3IONS-SCNC: 11 MMOL/L (ref 5–15)
BUN SERPL-MCNC: 21 MG/DL (ref 8–23)
BUN/CREAT SERPL: 13.6 (ref 7–25)
CALCIUM SPEC-SCNC: 9.6 MG/DL (ref 8.6–10.5)
CHLORIDE SERPL-SCNC: 102 MMOL/L (ref 98–107)
CK SERPL-CCNC: 51 U/L (ref 20–200)
CO2 SERPL-SCNC: 25 MMOL/L (ref 22–29)
CREAT SERPL-MCNC: 1.54 MG/DL (ref 0.76–1.27)
DEPRECATED RDW RBC AUTO: 43.8 FL (ref 37–54)
ERYTHROCYTE [DISTWIDTH] IN BLOOD BY AUTOMATED COUNT: 13.6 % (ref 12.3–15.4)
GFR SERPL CREATININE-BSD FRML MDRD: 44 ML/MIN/1.73
GLUCOSE BLDC GLUCOMTR-MCNC: 140 MG/DL (ref 70–130)
GLUCOSE BLDC GLUCOMTR-MCNC: 168 MG/DL (ref 70–130)
GLUCOSE BLDC GLUCOMTR-MCNC: 174 MG/DL (ref 70–130)
GLUCOSE BLDC GLUCOMTR-MCNC: 187 MG/DL (ref 70–130)
GLUCOSE SERPL-MCNC: 165 MG/DL (ref 65–99)
HCT VFR BLD AUTO: 37.7 % (ref 37.5–51)
HGB BLD-MCNC: 11.6 G/DL (ref 13–17.7)
MCH RBC QN AUTO: 26.9 PG (ref 26.6–33)
MCHC RBC AUTO-ENTMCNC: 30.8 G/DL (ref 31.5–35.7)
MCV RBC AUTO: 87.5 FL (ref 79–97)
PLATELET # BLD AUTO: 415 10*3/MM3 (ref 140–450)
PMV BLD AUTO: 9.6 FL (ref 6–12)
POTASSIUM SERPL-SCNC: 4.6 MMOL/L (ref 3.5–5.2)
QT INTERVAL: 406 MS
QTC INTERVAL: 444 MS
RBC # BLD AUTO: 4.31 10*6/MM3 (ref 4.14–5.8)
SODIUM SERPL-SCNC: 138 MMOL/L (ref 136–145)
URATE SERPL-MCNC: 6.8 MG/DL (ref 3.4–7)
WBC # BLD AUTO: 7.66 10*3/MM3 (ref 3.4–10.8)

## 2021-01-07 PROCEDURE — 25010000002 PIPERACILLIN SOD-TAZOBACTAM PER 1 G: Performed by: INTERNAL MEDICINE

## 2021-01-07 PROCEDURE — 87116 MYCOBACTERIA CULTURE: CPT | Performed by: ORTHOPAEDIC SURGERY

## 2021-01-07 PROCEDURE — 25010000002 ONDANSETRON PER 1 MG: Performed by: NURSE ANESTHETIST, CERTIFIED REGISTERED

## 2021-01-07 PROCEDURE — 94799 UNLISTED PULMONARY SVC/PX: CPT

## 2021-01-07 PROCEDURE — 87206 SMEAR FLUORESCENT/ACID STAI: CPT | Performed by: ORTHOPAEDIC SURGERY

## 2021-01-07 PROCEDURE — 87070 CULTURE OTHR SPECIMN AEROBIC: CPT | Performed by: ORTHOPAEDIC SURGERY

## 2021-01-07 PROCEDURE — 82550 ASSAY OF CK (CPK): CPT | Performed by: INTERNAL MEDICINE

## 2021-01-07 PROCEDURE — 25010000002 FENTANYL CITRATE (PF) 100 MCG/2ML SOLUTION: Performed by: NURSE ANESTHETIST, CERTIFIED REGISTERED

## 2021-01-07 PROCEDURE — 80048 BASIC METABOLIC PNL TOTAL CA: CPT | Performed by: NURSE PRACTITIONER

## 2021-01-07 PROCEDURE — 88311 DECALCIFY TISSUE: CPT | Performed by: ORTHOPAEDIC SURGERY

## 2021-01-07 PROCEDURE — 28810 AMPUTATION TOE & METATARSAL: CPT | Performed by: ORTHOPAEDIC SURGERY

## 2021-01-07 PROCEDURE — 63710000001 INSULIN DETEMIR PER 5 UNITS: Performed by: ORTHOPAEDIC SURGERY

## 2021-01-07 PROCEDURE — 87075 CULTR BACTERIA EXCEPT BLOOD: CPT | Performed by: ORTHOPAEDIC SURGERY

## 2021-01-07 PROCEDURE — 87070 CULTURE OTHR SPECIMN AEROBIC: CPT | Performed by: INTERNAL MEDICINE

## 2021-01-07 PROCEDURE — 87102 FUNGUS ISOLATION CULTURE: CPT | Performed by: ORTHOPAEDIC SURGERY

## 2021-01-07 PROCEDURE — 82962 GLUCOSE BLOOD TEST: CPT

## 2021-01-07 PROCEDURE — 85027 COMPLETE CBC AUTOMATED: CPT | Performed by: NURSE PRACTITIONER

## 2021-01-07 PROCEDURE — 25010000002 PIPERACILLIN SOD-TAZOBACTAM PER 1 G: Performed by: ORTHOPAEDIC SURGERY

## 2021-01-07 PROCEDURE — 87176 TISSUE HOMOGENIZATION CULTR: CPT | Performed by: ORTHOPAEDIC SURGERY

## 2021-01-07 PROCEDURE — 0Y6Q0Z0 DETACHMENT AT LEFT 1ST TOE, COMPLETE, OPEN APPROACH: ICD-10-PCS | Performed by: ORTHOPAEDIC SURGERY

## 2021-01-07 PROCEDURE — 25010000002 HYDROMORPHONE 1 MG/ML SOLUTION: Performed by: ORTHOPAEDIC SURGERY

## 2021-01-07 PROCEDURE — 88305 TISSUE EXAM BY PATHOLOGIST: CPT | Performed by: ORTHOPAEDIC SURGERY

## 2021-01-07 PROCEDURE — 25010000002 PROPOFOL 10 MG/ML EMULSION: Performed by: NURSE ANESTHETIST, CERTIFIED REGISTERED

## 2021-01-07 PROCEDURE — 87015 SPECIMEN INFECT AGNT CONCNTJ: CPT | Performed by: ORTHOPAEDIC SURGERY

## 2021-01-07 PROCEDURE — 87205 SMEAR GRAM STAIN: CPT | Performed by: ORTHOPAEDIC SURGERY

## 2021-01-07 PROCEDURE — 84550 ASSAY OF BLOOD/URIC ACID: CPT | Performed by: ORTHOPAEDIC SURGERY

## 2021-01-07 PROCEDURE — 63710000001 INSULIN LISPRO (HUMAN) PER 5 UNITS: Performed by: ORTHOPAEDIC SURGERY

## 2021-01-07 PROCEDURE — 87205 SMEAR GRAM STAIN: CPT | Performed by: INTERNAL MEDICINE

## 2021-01-07 RX ORDER — DIPHENHYDRAMINE HYDROCHLORIDE 50 MG/ML
25 INJECTION INTRAMUSCULAR; INTRAVENOUS NIGHTLY PRN
Status: DISCONTINUED | OUTPATIENT
Start: 2021-01-07 | End: 2021-01-11 | Stop reason: HOSPADM

## 2021-01-07 RX ORDER — SODIUM CHLORIDE 450 MG/100ML
50 INJECTION, SOLUTION INTRAVENOUS CONTINUOUS
Status: DISCONTINUED | OUTPATIENT
Start: 2021-01-07 | End: 2021-01-11 | Stop reason: HOSPADM

## 2021-01-07 RX ORDER — FENTANYL CITRATE 50 UG/ML
50 INJECTION, SOLUTION INTRAMUSCULAR; INTRAVENOUS
Status: DISCONTINUED | OUTPATIENT
Start: 2021-01-07 | End: 2021-01-07 | Stop reason: HOSPADM

## 2021-01-07 RX ORDER — SODIUM CHLORIDE 9 MG/ML
INJECTION, SOLUTION INTRAVENOUS CONTINUOUS PRN
Status: DISCONTINUED | OUTPATIENT
Start: 2021-01-07 | End: 2021-01-07 | Stop reason: SURG

## 2021-01-07 RX ORDER — DIPHENHYDRAMINE HCL 25 MG
25 CAPSULE ORAL NIGHTLY PRN
Status: DISCONTINUED | OUTPATIENT
Start: 2021-01-07 | End: 2021-01-11 | Stop reason: HOSPADM

## 2021-01-07 RX ORDER — ONDANSETRON 2 MG/ML
INJECTION INTRAMUSCULAR; INTRAVENOUS AS NEEDED
Status: DISCONTINUED | OUTPATIENT
Start: 2021-01-07 | End: 2021-01-07 | Stop reason: SURG

## 2021-01-07 RX ORDER — PROMETHAZINE HYDROCHLORIDE 12.5 MG/1
12.5 TABLET ORAL EVERY 4 HOURS PRN
Status: DISCONTINUED | OUTPATIENT
Start: 2021-01-07 | End: 2021-01-11 | Stop reason: HOSPADM

## 2021-01-07 RX ORDER — DIPHENOXYLATE HYDROCHLORIDE AND ATROPINE SULFATE 2.5; .025 MG/1; MG/1
1 TABLET ORAL DAILY
Status: DISCONTINUED | OUTPATIENT
Start: 2021-01-08 | End: 2021-01-11 | Stop reason: HOSPADM

## 2021-01-07 RX ORDER — HYDROCODONE BITARTRATE AND ACETAMINOPHEN 7.5; 325 MG/1; MG/1
1 TABLET ORAL EVERY 4 HOURS PRN
Status: DISCONTINUED | OUTPATIENT
Start: 2021-01-07 | End: 2021-01-11 | Stop reason: HOSPADM

## 2021-01-07 RX ORDER — OXYCODONE HYDROCHLORIDE AND ACETAMINOPHEN 5; 325 MG/1; MG/1
2 TABLET ORAL EVERY 4 HOURS PRN
Status: DISCONTINUED | OUTPATIENT
Start: 2021-01-07 | End: 2021-01-11 | Stop reason: HOSPADM

## 2021-01-07 RX ORDER — OXYCODONE HYDROCHLORIDE AND ACETAMINOPHEN 5; 325 MG/1; MG/1
1 TABLET ORAL EVERY 4 HOURS PRN
Status: DISCONTINUED | OUTPATIENT
Start: 2021-01-07 | End: 2021-01-11 | Stop reason: HOSPADM

## 2021-01-07 RX ORDER — SODIUM CHLORIDE 0.9 % (FLUSH) 0.9 %
10 SYRINGE (ML) INJECTION EVERY 12 HOURS SCHEDULED
Status: DISCONTINUED | OUTPATIENT
Start: 2021-01-07 | End: 2021-01-07 | Stop reason: HOSPADM

## 2021-01-07 RX ORDER — NALOXONE HCL 0.4 MG/ML
0.4 VIAL (ML) INJECTION
Status: DISCONTINUED | OUTPATIENT
Start: 2021-01-07 | End: 2021-01-11 | Stop reason: HOSPADM

## 2021-01-07 RX ORDER — LIDOCAINE HYDROCHLORIDE 10 MG/ML
INJECTION, SOLUTION EPIDURAL; INFILTRATION; INTRACAUDAL; PERINEURAL AS NEEDED
Status: DISCONTINUED | OUTPATIENT
Start: 2021-01-07 | End: 2021-01-07 | Stop reason: SURG

## 2021-01-07 RX ORDER — PROPOFOL 10 MG/ML
VIAL (ML) INTRAVENOUS AS NEEDED
Status: DISCONTINUED | OUTPATIENT
Start: 2021-01-07 | End: 2021-01-07 | Stop reason: SURG

## 2021-01-07 RX ORDER — HYDROCODONE BITARTRATE AND ACETAMINOPHEN 7.5; 325 MG/1; MG/1
2 TABLET ORAL EVERY 4 HOURS PRN
Status: DISCONTINUED | OUTPATIENT
Start: 2021-01-07 | End: 2021-01-11 | Stop reason: HOSPADM

## 2021-01-07 RX ORDER — BISACODYL 10 MG
10 SUPPOSITORY, RECTAL RECTAL DAILY PRN
Status: DISCONTINUED | OUTPATIENT
Start: 2021-01-07 | End: 2021-01-11 | Stop reason: HOSPADM

## 2021-01-07 RX ORDER — FENTANYL CITRATE 50 UG/ML
INJECTION, SOLUTION INTRAMUSCULAR; INTRAVENOUS AS NEEDED
Status: DISCONTINUED | OUTPATIENT
Start: 2021-01-07 | End: 2021-01-07 | Stop reason: SURG

## 2021-01-07 RX ORDER — PROMETHAZINE HYDROCHLORIDE 12.5 MG/1
12.5 SUPPOSITORY RECTAL EVERY 4 HOURS PRN
Status: DISCONTINUED | OUTPATIENT
Start: 2021-01-07 | End: 2021-01-11 | Stop reason: HOSPADM

## 2021-01-07 RX ORDER — CHOLECALCIFEROL (VITAMIN D3) 125 MCG
10 CAPSULE ORAL NIGHTLY
Status: DISCONTINUED | OUTPATIENT
Start: 2021-01-07 | End: 2021-01-07 | Stop reason: SDUPTHER

## 2021-01-07 RX ORDER — SODIUM CHLORIDE 0.9 % (FLUSH) 0.9 %
10 SYRINGE (ML) INJECTION AS NEEDED
Status: DISCONTINUED | OUTPATIENT
Start: 2021-01-07 | End: 2021-01-07 | Stop reason: HOSPADM

## 2021-01-07 RX ORDER — DEXTROSE AND SODIUM CHLORIDE 5; .9 G/100ML; G/100ML
40 INJECTION, SOLUTION INTRAVENOUS CONTINUOUS
Status: DISCONTINUED | OUTPATIENT
Start: 2021-01-07 | End: 2021-01-11 | Stop reason: HOSPADM

## 2021-01-07 RX ORDER — ONDANSETRON 2 MG/ML
4 INJECTION INTRAMUSCULAR; INTRAVENOUS EVERY 6 HOURS PRN
Status: DISCONTINUED | OUTPATIENT
Start: 2021-01-07 | End: 2021-01-11 | Stop reason: HOSPADM

## 2021-01-07 RX ORDER — SODIUM CHLORIDE 9 MG/ML
9 INJECTION, SOLUTION INTRAVENOUS ONCE
Status: COMPLETED | OUTPATIENT
Start: 2021-01-07 | End: 2021-01-07

## 2021-01-07 RX ORDER — MAGNESIUM HYDROXIDE 1200 MG/15ML
LIQUID ORAL AS NEEDED
Status: DISCONTINUED | OUTPATIENT
Start: 2021-01-07 | End: 2021-01-07 | Stop reason: HOSPADM

## 2021-01-07 RX ORDER — LIDOCAINE HYDROCHLORIDE 10 MG/ML
0.5 INJECTION, SOLUTION EPIDURAL; INFILTRATION; INTRACAUDAL; PERINEURAL ONCE AS NEEDED
Status: DISCONTINUED | OUTPATIENT
Start: 2021-01-07 | End: 2021-01-07 | Stop reason: HOSPADM

## 2021-01-07 RX ADMIN — AMLODIPINE BESYLATE 10 MG: 10 TABLET ORAL at 08:35

## 2021-01-07 RX ADMIN — METOCLOPRAMIDE 10 MG: 10 TABLET ORAL at 08:35

## 2021-01-07 RX ADMIN — TAZOBACTAM SODIUM AND PIPERACILLIN SODIUM 3.38 G: 375; 3 INJECTION, SOLUTION INTRAVENOUS at 20:06

## 2021-01-07 RX ADMIN — INSULIN DETEMIR 60 UNITS: 100 INJECTION, SOLUTION SUBCUTANEOUS at 21:09

## 2021-01-07 RX ADMIN — HYDROMORPHONE HYDROCHLORIDE 1 MG: 1 INJECTION, SOLUTION INTRAMUSCULAR; INTRAVENOUS; SUBCUTANEOUS at 23:25

## 2021-01-07 RX ADMIN — LIDOCAINE HYDROCHLORIDE 50 MG: 10 INJECTION, SOLUTION EPIDURAL; INFILTRATION; INTRACAUDAL; PERINEURAL at 14:17

## 2021-01-07 RX ADMIN — TAZOBACTAM SODIUM AND PIPERACILLIN SODIUM 3.38 G: 375; 3 INJECTION, SOLUTION INTRAVENOUS at 08:34

## 2021-01-07 RX ADMIN — GABAPENTIN 600 MG: 300 CAPSULE ORAL at 08:35

## 2021-01-07 RX ADMIN — SODIUM CHLORIDE: 9 INJECTION, SOLUTION INTRAVENOUS at 14:09

## 2021-01-07 RX ADMIN — METOCLOPRAMIDE 10 MG: 10 TABLET ORAL at 17:58

## 2021-01-07 RX ADMIN — GABAPENTIN 600 MG: 300 CAPSULE ORAL at 17:57

## 2021-01-07 RX ADMIN — OXYCODONE AND ACETAMINOPHEN 1 TABLET: 5; 325 TABLET ORAL at 17:35

## 2021-01-07 RX ADMIN — Medication 5 MG: at 20:57

## 2021-01-07 RX ADMIN — FENTANYL CITRATE 50 MCG: 50 INJECTION, SOLUTION INTRAMUSCULAR; INTRAVENOUS at 14:17

## 2021-01-07 RX ADMIN — DEXTROSE AND SODIUM CHLORIDE 40 ML/HR: 5; 900 INJECTION, SOLUTION INTRAVENOUS at 08:37

## 2021-01-07 RX ADMIN — SODIUM CHLORIDE 50 ML/HR: 4.5 INJECTION, SOLUTION INTRAVENOUS at 17:53

## 2021-01-07 RX ADMIN — PREDNISOLONE ACETATE 1 DROP: 10 SUSPENSION/ DROPS OPHTHALMIC at 22:26

## 2021-01-07 RX ADMIN — OXYCODONE AND ACETAMINOPHEN 2 TABLET: 5; 325 TABLET ORAL at 20:57

## 2021-01-07 RX ADMIN — INSULIN LISPRO 20 UNITS: 100 INJECTION, SOLUTION INTRAVENOUS; SUBCUTANEOUS at 17:58

## 2021-01-07 RX ADMIN — PANTOPRAZOLE SODIUM 40 MG: 40 TABLET, DELAYED RELEASE ORAL at 08:35

## 2021-01-07 RX ADMIN — CLONIDINE HYDROCHLORIDE 0.1 MG: 0.1 TABLET ORAL at 19:56

## 2021-01-07 RX ADMIN — GABAPENTIN 600 MG: 300 CAPSULE ORAL at 19:56

## 2021-01-07 RX ADMIN — ONDANSETRON 4 MG: 2 INJECTION INTRAMUSCULAR; INTRAVENOUS at 14:26

## 2021-01-07 RX ADMIN — EPHEDRINE SULFATE 15 MG: 50 INJECTION INTRAMUSCULAR; INTRAVENOUS; SUBCUTANEOUS at 14:55

## 2021-01-07 RX ADMIN — BUDESONIDE AND FORMOTEROL FUMARATE DIHYDRATE 2 PUFF: 80; 4.5 AEROSOL RESPIRATORY (INHALATION) at 09:24

## 2021-01-07 RX ADMIN — METOCLOPRAMIDE 10 MG: 10 TABLET ORAL at 11:03

## 2021-01-07 RX ADMIN — PREDNISOLONE ACETATE 1 DROP: 10 SUSPENSION/ DROPS OPHTHALMIC at 08:35

## 2021-01-07 RX ADMIN — METOCLOPRAMIDE 10 MG: 10 TABLET ORAL at 19:56

## 2021-01-07 RX ADMIN — LOSARTAN POTASSIUM 100 MG: 50 TABLET, FILM COATED ORAL at 08:35

## 2021-01-07 RX ADMIN — HYDROMORPHONE HYDROCHLORIDE 1 MG: 1 INJECTION, SOLUTION INTRAMUSCULAR; INTRAVENOUS; SUBCUTANEOUS at 21:24

## 2021-01-07 RX ADMIN — BUDESONIDE AND FORMOTEROL FUMARATE DIHYDRATE 2 PUFF: 80; 4.5 AEROSOL RESPIRATORY (INHALATION) at 19:29

## 2021-01-07 RX ADMIN — CLONIDINE HYDROCHLORIDE 0.1 MG: 0.1 TABLET ORAL at 08:35

## 2021-01-07 RX ADMIN — TAZOBACTAM SODIUM AND PIPERACILLIN SODIUM 3.38 G: 375; 3 INJECTION, SOLUTION INTRAVENOUS at 02:35

## 2021-01-07 RX ADMIN — FENTANYL CITRATE 50 MCG: 50 INJECTION, SOLUTION INTRAMUSCULAR; INTRAVENOUS at 14:59

## 2021-01-07 RX ADMIN — GABAPENTIN 600 MG: 300 CAPSULE ORAL at 11:03

## 2021-01-07 RX ADMIN — PROPOFOL 200 MG: 10 INJECTION, EMULSION INTRAVENOUS at 14:17

## 2021-01-07 RX ADMIN — ALLOPURINOL 100 MG: 100 TABLET ORAL at 08:35

## 2021-01-07 RX ADMIN — SODIUM CHLORIDE 9 ML/HR: 9 INJECTION, SOLUTION INTRAVENOUS at 13:16

## 2021-01-07 RX ADMIN — HYDROMORPHONE HYDROCHLORIDE 1 MG: 1 INJECTION, SOLUTION INTRAMUSCULAR; INTRAVENOUS; SUBCUTANEOUS at 19:55

## 2021-01-07 NOTE — BRIEF OP NOTE
Orthopedics amputate left first toe and metatarsal  Brief Op Note    Amol Aguirre  1/7/2021    Pre-op Diagnosis:  Osteomyelitis and septic arthritis left first metatarsal and toe, with chronic gouty tophus and ulceration      Post-op Diagnosis:  Same       Post-Op Diagnosis Codes:     * Left foot pain [M79.672]    Procedure(s):  amputate left first toe and metatarsal    Surgeon(s):  Juju Weber MD    Circulator: Kady Frias RN; Demetra Figueroa RN  Scrub Person: Karen Tapia; Debra Nichole  Nursing Assistant: Nany Jain CNA           Anesthesia:  General    Staff:   Circulator: Kady Frias RN; Demetra Figueroa RN  Scrub Person: Karen Tapia; Debra Nichole  Nursing Assistant: Nany Jain CNA    Estimated Blood Loss: 10 cc      Specimens: Culture and permanent      Drains: Penrose    Complications:  None    Tourniquet:: 7 minutes    Dressing: Soft    Disposition: Recovery stable    Juju Weber MD     Date: 1/7/2021  Time: 15:00 EST

## 2021-01-07 NOTE — ANESTHESIA POSTPROCEDURE EVALUATION
Patient: Amol Aguirre    Procedure Summary     Date: 01/07/21 [facility]    Anesthesia Start: 1409 Anesthesia Stop: 1511    Procedure: amputate left first toe and metatarsal (Left Toe First) Diagnosis:       Left foot pain      (Left foot pain [M79.672])    Surgeon: Juju Weber MD Provider: Maxim Story Jr., MD    Anesthesia Type: general ASA Status: 3          Anesthesia Type: general    Vitals  No vitals data found for the desired time range.          Post Anesthesia Care and Evaluation    Patient location during evaluation: PACU  Patient participation: complete - patient participated  Level of consciousness: awake and alert  Pain management: adequate  Airway patency: patent  Anesthetic complications: No anesthetic complications  PONV Status: none  Cardiovascular status: hemodynamically stable and acceptable  Respiratory status: nonlabored ventilation, acceptable and nasal cannula  Hydration status: acceptable

## 2021-01-07 NOTE — OP NOTE
Operative Report    01/07/21  15:01 EST    Preoperative diagnosis: Chronic osteomyelitis and septic arthritis left first metatarsal and toe, with chronic gouty tophus and diabetic ulcer    Postoperative diagnosis: Same    Anesthesia: General with blocks for postop pain control    Surgeon: Juju Weber M.D.    Assistant: Anat MIN, present for the entire procedure including prepping, draping, retraction, closure, dressing.    Operative procedure: Left first metatarsal and toe amputation    Operative indications: This is a very pleasant 75-year-old gentleman with longstanding severe and poorly controlled diabetes.  I have done a right below-knee amputation in the past.  He has had a left third toe amputation.  He has osteomyelitis and exposed bone and septic arthritis of the left first metatarsal phalangeal joint, he has chronic diabetic ulcer and large gouty tophus he is followed by Dr. Garcia of infectious disease.  He was admitted yesterday and started on antibiotics.    Operative procedure: The patient was taken to the operating room where general anesthesia was induced without difficulty.   Antibiotics were already started.  The left leg was prepped and draped in the usual sterile fashion with a well-padded tourniquet on the thigh.  The appropriate timeout was called, the leg was elevated and tourniquet inflated to 350 mmHg, tourniquet time was 7 minutes.  I made a racquet shaped incision over the base of the proximal phalanx of the first toe.  This was carried proximally along the dorsal medial aspect of the first metatarsal.  On the medial surface I excised the chronic draining wound.  The wound went right into the first metatarsal phalangeal joint.  It extended all the way to the dorsum of the foot.  Deep cultures were obtained per routine.  The metatarsal was transected with an oscillating saw, care was taken to create a plantar beveled to avoid bony prominence.  The wound was irrigated copiously  with antibiotic solution using pulsatile lavage.  Tourniquet was released.  Any hemostasis was obtained were needed with electrocautery.  There was sluggish capillary oozing present at all of the edges of the wound.  The wound was carefully explored, no further necrotic debris was found.  It was closed with nylon over a Penrose drain.  The foot was dressed sterilely.  he was awakened, extubated and transferred recovery in stable condition.  Postop plan will be continuation of IV antibiotics and absolute nonweightbearing.    Estimated blood loss: 10 cc    Specimens: Cultures and permanent    Drains: Penrose    Complications: None    Juju Weber MD  01/07/21  15:01 EST

## 2021-01-07 NOTE — PLAN OF CARE
Goal Outcome Evaluation:  Plan of Care Reviewed With: patient  Progress: improving  Outcome Summary: Pt resting throughout the shift. No complaints of pain or discomfort. NPO since midnight.

## 2021-01-07 NOTE — ANESTHESIA PROCEDURE NOTES
Airway  Urgency: elective    Date/Time: 1/7/2021 2:18 PM  Airway not difficult    General Information and Staff    Patient location during procedure: OR  CRNA: Alyssa Higgins CRNA    Indications and Patient Condition  Indications for airway management: airway protection    Preoxygenated: yes  Mask difficulty assessment: 1 - vent by mask    Final Airway Details  Final airway type: supraglottic airway      Successful airway: I-gel  Size 4    Number of attempts at approach: 1  Assessment: lips, teeth, and gum same as pre-op    Additional Comments  LMA placed without difficulty, ventilation with assist, equal breath sounds and symmetric chest rise and fall

## 2021-01-07 NOTE — PROGRESS NOTES
Orthopedic Foot/Ankle Progress Note    Subjective     Post-Op:Day of Surgery  Procedure(s):  amputate left first toe and metatarsal  Laterality:Left      Systemic or Specific Complaints: sleepy, no pain  Objective     Vital signs in last 24 hours:  Temp:  [96.5 °F (35.8 °C)-97.7 °F (36.5 °C)] 96.7 °F (35.9 °C)  Heart Rate:  [] 71  Resp:  [18-20] 18  BP: (119-162)/(67-81) 119/70    Neurovascular: no change in dense neuropathy left foot               Wound: left foot dressing dry    Data Review  Lab Results (last 24 hours)     Procedure Component Value Units Date/Time    POC Glucose Once [[id number]]  (Abnormal) Collected: 01/07/21 1135    Specimen: Blood Updated: 01/07/21 1145     Glucose 174 mg/dL     Wound Culture - Wound, Foot, Left [[id number]] Collected: 01/07/21 0752    Specimen: Wound from Foot, Left Updated: 01/07/21 0943     Gram Stain No WBCs or organisms seen    POC Glucose Once [[id number]]  (Abnormal) Collected: 01/07/21 0751    Specimen: Blood Updated: 01/07/21 0754     Glucose 168 mg/dL     Basic Metabolic Panel [[id number]]  (Abnormal) Collected: 01/07/21 0615    Specimen: Blood Updated: 01/07/21 0741     Glucose 165 mg/dL      BUN 21 mg/dL      Creatinine 1.54 mg/dL      Sodium 138 mmol/L      Potassium 4.6 mmol/L      Chloride 102 mmol/L      CO2 25.0 mmol/L      Calcium 9.6 mg/dL      eGFR Non African Amer 44 mL/min/1.73      BUN/Creatinine Ratio 13.6     Anion Gap 11.0 mmol/L     Narrative:      GFR Normal >60  Chronic Kidney Disease <60  Kidney Failure <15      CK [[id number]]  (Normal) Collected: 01/07/21 0615    Specimen: Blood Updated: 01/07/21 0741     Creatine Kinase 51 U/L     Uric Acid [[id number]]  (Normal) Collected: 01/07/21 0615    Specimen: Blood Updated: 01/07/21 0741     Uric Acid 6.8 mg/dL      Comment: Falsely depressed results may occur on samples drawn from patients receiving N-Acetylcysteine (NAC) or Metamizole.       CBC (No Diff) [[id number]]  (Abnormal) Collected: 01/07/21  0615    Specimen: Blood Updated: 01/07/21 0655     WBC 7.66 10*3/mm3      RBC 4.31 10*6/mm3      Hemoglobin 11.6 g/dL      Hematocrit 37.7 %      MCV 87.5 fL      MCH 26.9 pg      MCHC 30.8 g/dL      RDW 13.6 %      RDW-SD 43.8 fl      MPV 9.6 fL      Platelets 415 10*3/mm3     POC Glucose Once [[id number]]  (Abnormal) Collected: 01/06/21 2106    Specimen: Blood Updated: 01/06/21 2108     Glucose 168 mg/dL     C-reactive Protein [[id number]]  (Abnormal) Collected: 01/06/21 1736    Specimen: Blood Updated: 01/06/21 1957     C-Reactive Protein 4.80 mg/dL     Basic Metabolic Panel [[id number]]  (Abnormal) Collected: 01/06/21 1736    Specimen: Blood Updated: 01/06/21 1955     Glucose 183 mg/dL      BUN 26 mg/dL      Creatinine 1.74 mg/dL      Sodium 138 mmol/L      Potassium 4.8 mmol/L      Comment: Slight hemolysis detected by analyzer. Results may be affected.        Chloride 99 mmol/L      CO2 24.0 mmol/L      Calcium 10.2 mg/dL      eGFR Non African Amer 38 mL/min/1.73      BUN/Creatinine Ratio 14.9     Anion Gap 15.0 mmol/L     Narrative:      GFR Normal >60  Chronic Kidney Disease <60  Kidney Failure <15      Hemoglobin A1c [[id number]]  (Abnormal) Collected: 01/06/21 1736    Specimen: Blood Updated: 01/06/21 1953     Hemoglobin A1C 10.30 %     Narrative:      Hemoglobin A1C Ranges:    Increased Risk for Diabetes  5.7% to 6.4%  Diabetes                     >= 6.5%  Diabetic Goal                < 7.0%    Sedimentation Rate [[id number]]  (Abnormal) Collected: 01/06/21 1736    Specimen: Blood Updated: 01/06/21 1938     Sed Rate 84 mm/hr     CBC (No Diff) [[id number]]  (Abnormal) Collected: 01/06/21 1736    Specimen: Blood Updated: 01/06/21 1934     WBC 9.39 10*3/mm3      RBC 4.36 10*6/mm3      Hemoglobin 11.3 g/dL      Hematocrit 37.6 %      MCV 86.2 fL      MCH 25.9 pg      MCHC 30.1 g/dL      RDW 13.8 %      RDW-SD 43.7 fl      MPV 9.9 fL      Platelets 437 10*3/mm3     COVID PRE-OP / PRE-PROCEDURE SCREENING ORDER (NO  ISOLATION) - Swab, Nasopharynx [[id number]]  (Normal) Collected: 01/06/21 1721    Specimen: Swab from Nasopharynx Updated: 01/06/21 1820    Narrative:      The following orders were created for panel order COVID PRE-OP / PRE-PROCEDURE SCREENING ORDER (NO ISOLATION) - Swab, Nasopharynx.  Procedure                               Abnormality         Status                     ---------                               -----------         ------                     Respiratory Panel PCR w/...[[id number]]  Normal              Final result                 Please view results for these tests on the individual orders.    Respiratory Panel PCR w/COVID-19(SARS-CoV-2) ERIC/JO/SHERYL/PAD/COR/MAD/FELICIA In-House, NP Swab in UTM/VTM, 3-4 HR TAT - Swab, Nasopharynx [[id number]]  (Normal) Collected: 01/06/21 1721    Specimen: Swab from Nasopharynx Updated: 01/06/21 1820     ADENOVIRUS, PCR Not Detected     Coronavirus 229E Not Detected     Coronavirus HKU1 Not Detected     Coronavirus NL63 Not Detected     Coronavirus OC43 Not Detected     COVID19 Not Detected     Human Metapneumovirus Not Detected     Human Rhinovirus/Enterovirus Not Detected     Influenza A PCR Not Detected     Influenza A H1 Not Detected     Influenza A H1 2009 PCR Not Detected     Influenza A H3 Not Detected     Influenza B PCR Not Detected     Parainfluenza Virus 1 Not Detected     Parainfluenza Virus 2 Not Detected     Parainfluenza Virus 3 Not Detected     Parainfluenza Virus 4 Not Detected     RSV, PCR Not Detected     Bordetella pertussis pcr Not Detected     Bordetella parapertussis PCR Not Detected     Chlamydophila pneumoniae PCR Not Detected     Mycoplasma pneumo by PCR Not Detected    Narrative:      Fact sheet for providers: https://docs.[employer]/wp-content/uploads/FKS1157-6931-HL9.1-EUA-Provider-Fact-Sheet-3.pdf    Fact sheet for patients: https://docs.[employer]/wp-content/uploads/QXH0671-2964-PW8.1-EUA-Patient-Fact-Sheet-1.pdf    Test performed by PCR.             Assessment/Plan     Status post- left 1st mt and toe amputation  -non-wt bear  -IV antibiotics  -high risk for higher amputation           Juju Weber MD    Date: 1/7/2021  Time: 15:14 EST

## 2021-01-07 NOTE — DISCHARGE INSTRUCTIONS
1. No weight on left foot  2. Elevate operated foot over heart  3. Change the dressing every 4 days, thin layer Bactroban cream (in chart) gauze and ace bandage, do not get the incision(s) wet.    4. Call the office if any problems: [phone]  5. Take the Zofran with the pain meds if you have nausea/vomiting  6. Take lovenox to help prevent blood clots

## 2021-01-07 NOTE — ANESTHESIA PREPROCEDURE EVALUATION
Anesthesia Evaluation     Patient summary reviewed and Nursing notes reviewed   no history of anesthetic complications:  NPO Solid Status: > 8 hours  NPO Liquid Status: > 2 hours           Airway   Mallampati: I  TM distance: >3 FB  Neck ROM: full  No difficulty expected  Dental    (+) edentulous    Pulmonary - normal exam    breath sounds clear to auscultation  (+) asthma,  (-) sleep apnea, not a smoker  Cardiovascular - normal exam  Exercise tolerance: good (4-7 METS)    ECG reviewed    (+) hypertension,   (-) dysrhythmias, angina, orthopnea, CARMONA      Neuro/Psych  (-) seizures, CVA  GI/Hepatic/Renal/Endo    (+)  GERD well controlled,  diabetes mellitus poorly controlled,     Musculoskeletal     Abdominal    Substance History      OB/GYN          Other   arthritis,                    Anesthesia Plan    ASA 3     general     intravenous induction     Anesthetic plan, all risks, benefits, and alternatives have been provided, discussed and informed consent has been obtained with: patient.    Plan discussed with CRNA.

## 2021-01-07 NOTE — PROGRESS NOTES
Discharge Planning Assessment  [facility]     Patient Name: Amol Aguirre  MRN: [id number]  Today's Date: 1/7/2021    Admit Date: 1/6/2021    Discharge Needs Assessment     Row Name 01/07/21 1259       Living Environment    Lives With  spouse    Name(s) of Who Lives With Patient  Janie Aguirre    Current Living Arrangements  home/apartment/condo    Primary Care Provided by  self    Provides Primary Care For  no one    Family Caregiver if Needed  spouse    Family Caregiver Names  Janie Aguirre    Quality of Family Relationships  helpful;involved;supportive    Able to Return to Prior Arrangements  yes       Resource/Environmental Concerns    Resource/Environmental Concerns  none       Transition Planning    Patient/Family Anticipates Transition to  home with family;home with help/services    Patient/Family Anticipated Services at Transition  none    Transportation Anticipated  family or friend will provide       Discharge Needs Assessment    Readmission Within the Last 30 Days  no previous admission in last 30 days    Equipment Currently Used at Home  prosthesis;walker, standard    Concerns to be Addressed  no discharge needs identified;denies needs/concerns at this time    Anticipated Changes Related to Illness  none    Equipment Needed After Discharge  none    Provided Post Acute Provider List?  N/A    N/A Provider List Comment  denies need    Discharge Coordination/Progress  Home with OP PT        Discharge Plan     Row Name 01/07/21 1301       Plan    Plan  HOme with OP PT    Patient/Family in Agreement with Plan  yes    Plan Comments  Spoke with patient via telephone regarding discharge planning.  Patient denies use or need for HH and reports that if he needs therapy he can go to OP in [place].  He reports that he has a Prosthesis and a wheelchair that he uses at night when he takes off his Prosthetic leg.  He indicates he has been to [facility] in the past but did not want to go to rehab.  He reprots he has  prescription coverage and his medications are affordable.  He lives with his wife in a duplex with a small step to access and denies concerns regarding home safety.  Patient going for surgery today for toe amputation.  CM will need PT/OT recs to determine best discharge plan.    Final Discharge Disposition Code  01 - home or self-care        Continued Care and Services - Admitted Since 1/6/2021    Coordination has not been started for this encounter.         Demographic Summary     Row Name 01/07/21 1255       General Information    Admission Type  inpatient    Arrived From  home    Referral Source  admission list    Reason for Consult  discharge planning    Preferred Language  English     Used During This Interaction  no    General Information Comments  Donte Briones MD       Contact Information    Permission Granted to Share Info With      Contact Information Obtained for      Contact Information Comments  Janie Aguirre, spouse  [phone]        Functional Status     Row Name 01/07/21 1258       Functional Status    Usual Activity Tolerance  good    Current Activity Tolerance  moderate       Functional Status, IADL    Medications  independent    Meal Preparation  independent;assistive equipment    Housekeeping  assistive equipment;independent    Laundry  assistive equipment;independent    Shopping  independent;assistive equipment       Employment/    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       Abuse/Neglect    No documentation.       Legal    No documentation.       Substance Abuse    No documentation.       Patient Forms    No documentation.           Ashleigh Merchant, KALEY

## 2021-01-08 LAB
GLUCOSE BLDC GLUCOMTR-MCNC: 114 MG/DL (ref 70–130)
GLUCOSE BLDC GLUCOMTR-MCNC: 118 MG/DL (ref 70–130)
GLUCOSE BLDC GLUCOMTR-MCNC: 122 MG/DL (ref 70–130)
GLUCOSE BLDC GLUCOMTR-MCNC: 174 MG/DL (ref 70–130)

## 2021-01-08 PROCEDURE — 25010000002 PIPERACILLIN SOD-TAZOBACTAM PER 1 G: Performed by: ORTHOPAEDIC SURGERY

## 2021-01-08 PROCEDURE — 63710000001 INSULIN LISPRO (HUMAN) PER 5 UNITS: Performed by: ORTHOPAEDIC SURGERY

## 2021-01-08 PROCEDURE — 25010000002 DAPTOMYCIN PER 1 MG: Performed by: INTERNAL MEDICINE

## 2021-01-08 PROCEDURE — 82962 GLUCOSE BLOOD TEST: CPT

## 2021-01-08 PROCEDURE — C1894 INTRO/SHEATH, NON-LASER: HCPCS

## 2021-01-08 PROCEDURE — 63710000001 INSULIN DETEMIR PER 5 UNITS: Performed by: ORTHOPAEDIC SURGERY

## 2021-01-08 PROCEDURE — 99024 POSTOP FOLLOW-UP VISIT: CPT | Performed by: ORTHOPAEDIC SURGERY

## 2021-01-08 PROCEDURE — 25010000002 ENOXAPARIN PER 10 MG: Performed by: ORTHOPAEDIC SURGERY

## 2021-01-08 PROCEDURE — 97162 PT EVAL MOD COMPLEX 30 MIN: CPT

## 2021-01-08 PROCEDURE — C1751 CATH, INF, PER/CENT/MIDLINE: HCPCS

## 2021-01-08 PROCEDURE — 94799 UNLISTED PULMONARY SVC/PX: CPT

## 2021-01-08 RX ORDER — SODIUM CHLORIDE 0.9 % (FLUSH) 0.9 %
10 SYRINGE (ML) INJECTION AS NEEDED
Status: DISCONTINUED | OUTPATIENT
Start: 2021-01-08 | End: 2021-01-11 | Stop reason: HOSPADM

## 2021-01-08 RX ORDER — SODIUM CHLORIDE 0.9 % (FLUSH) 0.9 %
10 SYRINGE (ML) INJECTION EVERY 12 HOURS SCHEDULED
Status: DISCONTINUED | OUTPATIENT
Start: 2021-01-08 | End: 2021-01-11 | Stop reason: HOSPADM

## 2021-01-08 RX ADMIN — OXYCODONE AND ACETAMINOPHEN 1 TABLET: 5; 325 TABLET ORAL at 21:23

## 2021-01-08 RX ADMIN — DAPTOMYCIN 600 MG: 500 INJECTION, POWDER, LYOPHILIZED, FOR SOLUTION INTRAVENOUS at 13:23

## 2021-01-08 RX ADMIN — BUDESONIDE AND FORMOTEROL FUMARATE DIHYDRATE 2 PUFF: 80; 4.5 AEROSOL RESPIRATORY (INHALATION) at 20:36

## 2021-01-08 RX ADMIN — INSULIN LISPRO 2 UNITS: 100 INJECTION, SOLUTION INTRAVENOUS; SUBCUTANEOUS at 12:02

## 2021-01-08 RX ADMIN — TAZOBACTAM SODIUM AND PIPERACILLIN SODIUM 3.38 G: 375; 3 INJECTION, SOLUTION INTRAVENOUS at 17:20

## 2021-01-08 RX ADMIN — ENOXAPARIN SODIUM 40 MG: 40 INJECTION SUBCUTANEOUS at 08:18

## 2021-01-08 RX ADMIN — MULTIVITAMIN TABLET 1 TABLET: TABLET at 08:17

## 2021-01-08 RX ADMIN — INSULIN LISPRO 20 UNITS: 100 INJECTION, SOLUTION INTRAVENOUS; SUBCUTANEOUS at 12:01

## 2021-01-08 RX ADMIN — BUDESONIDE AND FORMOTEROL FUMARATE DIHYDRATE 2 PUFF: 80; 4.5 AEROSOL RESPIRATORY (INHALATION) at 07:15

## 2021-01-08 RX ADMIN — INSULIN DETEMIR 60 UNITS: 100 INJECTION, SOLUTION SUBCUTANEOUS at 21:23

## 2021-01-08 RX ADMIN — GABAPENTIN 600 MG: 300 CAPSULE ORAL at 08:17

## 2021-01-08 RX ADMIN — INSULIN LISPRO 20 UNITS: 100 INJECTION, SOLUTION INTRAVENOUS; SUBCUTANEOUS at 08:17

## 2021-01-08 RX ADMIN — PREDNISOLONE ACETATE 1 DROP: 10 SUSPENSION/ DROPS OPHTHALMIC at 21:23

## 2021-01-08 RX ADMIN — METOCLOPRAMIDE 10 MG: 10 TABLET ORAL at 12:01

## 2021-01-08 RX ADMIN — GABAPENTIN 600 MG: 300 CAPSULE ORAL at 17:19

## 2021-01-08 RX ADMIN — LOSARTAN POTASSIUM 100 MG: 50 TABLET, FILM COATED ORAL at 08:17

## 2021-01-08 RX ADMIN — Medication 5 MG: at 21:23

## 2021-01-08 RX ADMIN — CLONIDINE HYDROCHLORIDE 0.1 MG: 0.1 TABLET ORAL at 08:18

## 2021-01-08 RX ADMIN — METOCLOPRAMIDE 10 MG: 10 TABLET ORAL at 21:22

## 2021-01-08 RX ADMIN — METOCLOPRAMIDE 10 MG: 10 TABLET ORAL at 08:17

## 2021-01-08 RX ADMIN — OXYCODONE AND ACETAMINOPHEN 2 TABLET: 5; 325 TABLET ORAL at 07:08

## 2021-01-08 RX ADMIN — GABAPENTIN 600 MG: 300 CAPSULE ORAL at 21:22

## 2021-01-08 RX ADMIN — PREDNISOLONE ACETATE 1 DROP: 10 SUSPENSION/ DROPS OPHTHALMIC at 08:23

## 2021-01-08 RX ADMIN — TAZOBACTAM SODIUM AND PIPERACILLIN SODIUM 3.38 G: 375; 3 INJECTION, SOLUTION INTRAVENOUS at 02:15

## 2021-01-08 RX ADMIN — CLONIDINE HYDROCHLORIDE 0.1 MG: 0.1 TABLET ORAL at 21:22

## 2021-01-08 RX ADMIN — GABAPENTIN 600 MG: 300 CAPSULE ORAL at 12:01

## 2021-01-08 RX ADMIN — AMLODIPINE BESYLATE 10 MG: 10 TABLET ORAL at 08:18

## 2021-01-08 RX ADMIN — PANTOPRAZOLE SODIUM 40 MG: 40 TABLET, DELAYED RELEASE ORAL at 08:17

## 2021-01-08 RX ADMIN — ALLOPURINOL 100 MG: 100 TABLET ORAL at 08:17

## 2021-01-08 RX ADMIN — TAZOBACTAM SODIUM AND PIPERACILLIN SODIUM 3.38 G: 375; 3 INJECTION, SOLUTION INTRAVENOUS at 08:16

## 2021-01-08 RX ADMIN — METOCLOPRAMIDE 10 MG: 10 TABLET ORAL at 17:19

## 2021-01-08 RX ADMIN — INSULIN LISPRO 20 UNITS: 100 INJECTION, SOLUTION INTRAVENOUS; SUBCUTANEOUS at 17:19

## 2021-01-08 NOTE — PROGRESS NOTES
"IM progress note      Amol Aguirre  [id number]  1945     LOS: 2 days     Attending: Pennie Foster MD    Primary Care Provider: Donte Briones MD      Chief Complaint/Reason for visit:  No chief complaint on file.      Subjective   2021: Mr. Aguirre underwent left first metatarsal and toe amputation.  Surgery was done by Dr. Juarez under general anesthesia and blocks.  Was tolerated well.    Seen in his room postoperatively, doing fairly well.  No complaints of pain, nausea, or vomiting.  No shortness of breath.  2021: Doing fairly well.  No complaints of pain.  No nausea, vomiting, or shortness of breath.  No pruritus no rash.  Receiving antibiotics per ID.  Objective        Visit Vitals  /70 (BP Location: Left arm, Patient Position: Lying)   Pulse 75   Temp 98 °F (36.7 °C) (Oral)   Resp 18   Ht 190.5 cm (75\")   Wt 121 kg (267 lb)   SpO2 94%   BMI 33.37 kg/m²     Temp (24hrs), Av.7 °F (36.5 °C), Min:96.7 °F (35.9 °C), Max:98.7 °F (37.1 °C)       Physical Therapy:  Progress: improving  Outcome Summary: PT eval completed POD #1 L 1st toe and metatarsal amputation.  Pt. very pleasant, w/ good motivation; however, demonstrates inability to maintain NWB LLE w/ transfers.  Patient completed bed mobility w/ CGA, donned RLE prosthesis sitting EOB and attempted STS transfer from EOB and recliner w/ max A.  Pt. able to stand fully upright, although placed nearly full weight through L heel despite VCs/tactile cues to maintain weight-bearing status.  Unable to safely take steps.  Pt. will benefit from skilled IPPT to improve pt's safety and independence w/ functional mobility.  PT recommends IRF at D/C; pt. currently declining in favor of returning home w/ assist and HHPT.  Physical Exam:     General Appearance:    Alert, cooperative, in no acute distress   Head:    Normocephalic, without obvious abnormality, atraumatic    Lungs:     Normal effort, symmetric chest rise,  clear to   "    auscultation bilaterally              Heart:    Regular rhythm and normal rate, normal S1 and S2 , 2/6 M   Abdomen:     Normal bowel sounds, no masses, no organomegaly, soft        non-tender, non-distended, no guarding, no rebound                tenderness   Extremities:  Dry and intact dressing on left foot.  Right BKA.   .    Pulses:   Pulses palpable and equal bilaterally   Skin:   No bleeding, bruising or rash          Results Review:     I reviewed the patient's new clinical results.   Results from last 7 days   Lab Units 01/07/21  0615 01/06/21  1736   WBC 10*3/mm3 7.66 9.39   HEMOGLOBIN g/dL 11.6* 11.3*   HEMATOCRIT % 37.7 37.6   PLATELETS 10*3/mm3 415 437     Results from last 7 days   Lab Units 01/07/21  0615 01/06/21  1736   SODIUM mmol/L 138 138   POTASSIUM mmol/L 4.6 4.8   CHLORIDE mmol/L 102 99   CO2 mmol/L 25.0 24.0   BUN mg/dL 21 26*   CREATININE mg/dL 1.54* 1.74*   CALCIUM mg/dL 9.6 10.2   GLUCOSE mg/dL 165* 183*   Results for GIANCARLO ASAEL CHENG (MRN [id number]) as of 1/8/2021 10:10   Ref. Range 1/7/2021 15:45 1/7/2021 20:16 1/8/2021 07:08   Glucose Latest Ref Range: 70 - 130 mg/dL 140 (H) 187 (H) 118   I reviewed the patient's new imaging including images and reports.    All medications reviewed.   allopurinol, 100 mg, Oral, Daily  amLODIPine, 10 mg, Oral, Daily  budesonide-formoterol, 2 puff, Inhalation, BID - RT  cloNIDine, 0.1 mg, Oral, Q12H  DAPTOmycin, 6 mg/kg (Adjusted), Intravenous, Q48H  enoxaparin, 40 mg, Subcutaneous, Daily  gabapentin, 600 mg, Oral, 4x Daily  insulin detemir, 60 Units, Subcutaneous, Nightly  insulin lispro, 0-9 Units, Subcutaneous, TID AC  insulin lispro, 20 Units, Subcutaneous, TID With Meals  losartan, 100 mg, Oral, Daily  metoclopramide, 10 mg, Oral, 4x Daily  multivitamin, 1 tablet, Oral, Daily  pantoprazole, 40 mg, Oral, QAM AC  piperacillin-tazobactam, 3.375 g, Intravenous, Q8H  prednisoLONE acetate, 1 drop, Right Eye, Q12H      acetaminophen, 650 mg, Q4H  PRN  bisacodyl, 10 mg, Daily PRN  dextrose, 25 g, Q15 Min PRN  dextrose, 15 g, Q15 Min PRN  diphenhydrAMINE, 25 mg, Nightly PRN    Or  diphenhydrAMINE, 25 mg, Nightly PRN  glucagon (human recombinant), 1 mg, Q15 Min PRN  HYDROcodone-acetaminophen, 1 tablet, Q4H PRN  HYDROcodone-acetaminophen, 2 tablet, Q4H PRN  HYDROmorphone, 1 mg, Q2H PRN    And  naloxone, 0.4 mg, Q5 Min PRN  labetalol, 10 mg, Q4H PRN  magnesium hydroxide, 10 mL, Daily PRN  melatonin, 5 mg, Nightly PRN  ondansetron, 4 mg, Q6H PRN    Or  ondansetron, 4 mg, Q6H PRN  ondansetron, 4 mg, Q6H PRN  oxyCODONE-acetaminophen, 1 tablet, Q4H PRN  oxyCODONE-acetaminophen, 2 tablet, Q4H PRN  promethazine, 12.5 mg, Q4H PRN    Or  promethazine, 12.5 mg, Q4H PRN  promethazine, 12.5 mg, Q4H PRN  sodium chloride, 500 mL, TID PRN  sodium chloride, 10 mL, PRN        Assessment/Plan   Status post left fifth metatarsal and toe amputation.  Due to ulcer, soft tissue infection, osteomyelitis.    Type 2 diabetes mellitus with diabetic polyneuropathy, with long-term current use of insulin (CMS/[facility])    Diabetic foot ulcers (CMS/[facility])    HTN (hypertension)    CKD (chronic kidney disease)    Left foot pain    Diabetic foot ulcer (CMS/HCC)         Plan  Postop antibiotics per [facility]/Dr. Garcia, antibiotics of Zosyn and Vanco to start with.  Follow cultures.  Monitor labs and blood glucose.    1. PT/OT.  Nonweightbearing left lower extremity.  2. Pain control-prns   3. IS-encouraged  4. DVT proph-mechanicals and subcutaneous Lovenox.  5. Bowel regimen  6. Monitor post-op labs  7. DC     - Hypertension:  Resume home medications as appropriate, formulary substitution when indicated.  Holding parameters.  Prn medications for elevated blood pressure.    -CKD: Monitor volume status, avoid nephrotoxic agents, follow renal function closely.    Pennie Foster MD  01/08/21  10:09 EST

## 2021-01-08 NOTE — PROGRESS NOTES
Orthopedic  Progress Note    Subjective     Post-Operative Day: 1 Day Post-Op  Procedure(s):  amputate left first toe and metatarsal  Laterality:Left      Systemic or Specific Complaints: pain controlled  Objective     Vital signs in last 24 hours:  Temp:  [97.5 °F (36.4 °C)-98.7 °F (37.1 °C)] 98 °F (36.7 °C)  Heart Rate:  [62-81] 79  Resp:  [16-20] 18  BP: (122-157)/(58-78) 133/63    Neurovascular: no change in dense neuropathy left foot               Wound: left foot dressing dry    Data Review  Lab Results (last 24 hours)     Procedure Component Value Units Date/Time    AFB Culture - Tissue, Toe, Left [[id number]] Collected: 01/07/21 1448    Specimen: Tissue from Toe, Left Updated: 01/08/21 1203     AFB Stain No acid fast bacilli seen on concentrated smear    AFB Culture - Tissue, Toe, Left [[id number]] Collected: 01/07/21 1449    Specimen: Tissue from Toe, Left Updated: 01/08/21 1203     AFB Stain No acid fast bacilli seen on concentrated smear    POC Glucose Once [[id number]]  (Abnormal) Collected: 01/08/21 1146    Specimen: Blood Updated: 01/08/21 1158     Glucose 174 mg/dL     Tissue Pathology Exam [[id number]] Collected: 01/07/21 1438    Specimen: Tissue from Toe, Left Updated: 01/08/21 0921    POC Glucose Once [[id number]]  (Normal) Collected: 01/08/21 0708    Specimen: Blood Updated: 01/08/21 0710     Glucose 118 mg/dL     Wound Culture - Wound, Foot, Left [[id number]] Collected: 01/07/21 0752    Specimen: Wound from Foot, Left Updated: 01/08/21 0653     Wound Culture No growth     Gram Stain No WBCs or organisms seen    Tissue / Bone Culture - Tissue, Toe, Left [[id number]] Collected: 01/07/21 1448    Specimen: Tissue from Toe, Left Updated: 01/08/21 0652     Tissue Culture No growth     Gram Stain No organisms seen      Rare (1+) WBCs per low power field    Tissue / Bone Culture - Tissue, Toe, Left [[id number]] Collected: 01/07/21 1449    Specimen: Tissue from Toe, Left Updated: 01/08/21 0652     Tissue Culture  No growth     Gram Stain No organisms seen      Rare (1+) WBCs per low power field    POC Glucose Once [[id number]]  (Abnormal) Collected: 01/07/21 2016    Specimen: Blood Updated: 01/07/21 2018     Glucose 187 mg/dL     Fungus Culture - Tissue, Toe, Left [[id number]] Collected: 01/07/21 1448    Specimen: Tissue from Toe, Left Updated: 01/07/21 1726    Anaerobic Culture - Tissue, Toe, Left [[id number]] Collected: 01/07/21 1448    Specimen: Tissue from Toe, Left Updated: 01/07/21 1726    POC Glucose Once [[id number]]  (Abnormal) Collected: 01/07/21 1545    Specimen: Blood Updated: 01/07/21 1546     Glucose 140 mg/dL     Fungus Culture - Tissue, Toe, Left [[id number]] Collected: 01/07/21 1449    Specimen: Tissue from Toe, Left Updated: 01/07/21 1534    Anaerobic Culture - Tissue, Toe, Left [[id number]] Collected: 01/07/21 1449    Specimen: Tissue from Toe, Left Updated: 01/07/21 1534        Microbiology Results (last 10 days)     Procedure Component Value - Date/Time    Tissue / Bone Culture - Tissue, Toe, Left [[id number]] Collected: 01/07/21 1449    Lab Status: Preliminary result Specimen: Tissue from Toe, Left Updated: 01/08/21 0652     Tissue Culture No growth     Gram Stain No organisms seen      Rare (1+) WBCs per low power field    AFB Culture - Tissue, Toe, Left [[id number]] Collected: 01/07/21 1449    Lab Status: Preliminary result Specimen: Tissue from Toe, Left Updated: 01/08/21 1203     AFB Stain No acid fast bacilli seen on concentrated smear    Tissue / Bone Culture - Tissue, Toe, Left [[id number]] Collected: 01/07/21 1448    Lab Status: Preliminary result Specimen: Tissue from Toe, Left Updated: 01/08/21 0652     Tissue Culture No growth     Gram Stain No organisms seen      Rare (1+) WBCs per low power field    AFB Culture - Tissue, Toe, Left [[id number]] Collected: 01/07/21 1448    Lab Status: Preliminary result Specimen: Tissue from Toe, Left Updated: 01/08/21 1203     AFB Stain No acid fast bacilli seen on  concentrated smear    Wound Culture - Wound, Foot, Left [[id number]] Collected: 01/07/21 0752    Lab Status: Preliminary result Specimen: Wound from Foot, Left Updated: 01/08/21 0653     Wound Culture No growth     Gram Stain No WBCs or organisms seen    COVID PRE-OP / PRE-PROCEDURE SCREENING ORDER (NO ISOLATION) - Swab, Nasopharynx [[id number]]  (Normal) Collected: 01/06/21 1721    Lab Status: Final result Specimen: Swab from Nasopharynx Updated: 01/06/21 1820    Narrative:      The following orders were created for panel order COVID PRE-OP / PRE-PROCEDURE SCREENING ORDER (NO ISOLATION) - Swab, Nasopharynx.  Procedure                               Abnormality         Status                     ---------                               -----------         ------                     Respiratory Panel PCR w/...[[id number]]  Normal              Final result                 Please view results for these tests on the individual orders.    Respiratory Panel PCR w/COVID-19(SARS-CoV-2) ERIC/JO/SHERYL/PAD/COR/MAD/FELICIA In-House, NP Swab in UTM/VTM, 3-4 HR TAT - Swab, Nasopharynx [[id number]]  (Normal) Collected: 01/06/21 1721    Lab Status: Final result Specimen: Swab from Nasopharynx Updated: 01/06/21 1820     ADENOVIRUS, PCR Not Detected     Coronavirus 229E Not Detected     Coronavirus HKU1 Not Detected     Coronavirus NL63 Not Detected     Coronavirus OC43 Not Detected     COVID19 Not Detected     Human Metapneumovirus Not Detected     Human Rhinovirus/Enterovirus Not Detected     Influenza A PCR Not Detected     Influenza A H1 Not Detected     Influenza A H1 2009 PCR Not Detected     Influenza A H3 Not Detected     Influenza B PCR Not Detected     Parainfluenza Virus 1 Not Detected     Parainfluenza Virus 2 Not Detected     Parainfluenza Virus 3 Not Detected     Parainfluenza Virus 4 Not Detected     RSV, PCR Not Detected     Bordetella pertussis pcr Not Detected     Bordetella parapertussis PCR Not Detected     Chlamydophila  pneumoniae PCR Not Detected     Mycoplasma pneumo by PCR Not Detected    Narrative:      Fact sheet for providers: https://docs.[employer]/wp-content/uploads/GWB3300-3182-CR0.1-EUA-Provider-Fact-Sheet-3.pdf    Fact sheet for patients: https://docs.[employer]/wp-content/uploads/BKA1669-1192-GC8.1-EUA-Patient-Fact-Sheet-1.pdf    Test performed by PCR.              Assessment/Plan     Status post- left 1st ray amp  -I will change dressing tomorrow  -home when antibiotics finalized  -high risk for higher amputation  -needs to improve glucose control at home         Juju Weber MD  Date: 1/8/2021  Time: 14:10 EST

## 2021-01-08 NOTE — PROGRESS NOTES
Continued Stay Note  [facility]     Patient Name: Amol Aguirre  MRN: [id number]  Today's Date: 1/8/2021    Admit Date: 1/6/2021    Discharge Plan     Row Name 01/08/21 1650       Plan    Plan  TBD    Patient/Family in Agreement with Plan  yes    Plan Comments  Followed up with Mr. Aguirre at the bedside for discharge planning.  Mr. Aguirre will need skilled PT due to right BKA , NWB status of left LE and will likely require IV abx infusions.  PT has evaluated the patient and recommendation is for inpatient rehab.  Discussed rehab with the patient and he declined.  He wants to go home with  [facility].  He is still pending final IV abx orders.  Mr. Aguirre said that he is to be discharged on Monday.  CM will follow up with the patient and review a safe DC plan.    Final Discharge Disposition Code  06 - home with home health care        Discharge Codes    No documentation.       Expected Discharge Date and Time     Expected Discharge Date Expected Discharge Time    Norbert 10, 2021             Ellen Mccabe RN

## 2021-01-08 NOTE — PROGRESS NOTES
"IM progress note      Amol Aguirre  [id number]  1945     LOS: 1 day     Attending: Pennie Foster MD    Primary Care Provider: Donte Briones MD      Chief Complaint/Reason for visit:  No chief complaint on file.      Subjective   Mr. Aguirre underwent left first metatarsal and toe amputation.  Surgery was done by Dr. Juarez under general anesthesia and blocks.  Was tolerated well.    Seen in his room postoperatively, doing fairly well.  No complaints of pain, nausea, or vomiting.  No shortness of breath.    Objective        Visit Vitals  /71 (BP Location: Left arm, Patient Position: Lying)   Pulse 81   Temp 98.7 °F (37.1 °C) (Oral)   Resp 18   Ht 190.5 cm (75\")   Wt 121 kg (267 lb)   SpO2 93%   BMI 33.37 kg/m²     Temp (24hrs), Av.3 °F (36.3 °C), Min:96.5 °F (35.8 °C), Max:98.7 °F (37.1 °C)      Intake/Output:    Intake/Output Summary (Last 24 hours) at 2021  Last data filed at 2021 1920  Gross per 24 hour   Intake 1000 ml   Output 110 ml   Net 890 ml        Physical Therapy:    Physical Exam:     General Appearance:    Alert, cooperative, in no acute distress   Head:    Normocephalic, without obvious abnormality, atraumatic    Lungs:     Normal effort, symmetric chest rise,  clear to      auscultation bilaterally              Heart:    Regular rhythm and normal rate, normal S1 and S2    Abdomen:     Normal bowel sounds, no masses, no organomegaly, soft        non-tender, non-distended, no guarding, no rebound                tenderness   Extremities:  Dry and intact dressing on left foot.  Right BKA.   .    Pulses:   Pulses palpable and equal bilaterally   Skin:   No bleeding, bruising or rash          Results Review:     I reviewed the patient's new clinical results.   Results from last 7 days   Lab Units 21  0615 21  1736   WBC 10*3/mm3 7.66 9.39   HEMOGLOBIN g/dL 11.6* 11.3*   HEMATOCRIT % 37.7 37.6   PLATELETS 10*3/mm3 415 437     Results from last " 7 days   Lab Units 01/07/21  0615 01/06/21  1736   SODIUM mmol/L 138 138   POTASSIUM mmol/L 4.6 4.8   CHLORIDE mmol/L 102 99   CO2 mmol/L 25.0 24.0   BUN mg/dL 21 26*   CREATININE mg/dL 1.54* 1.74*   CALCIUM mg/dL 9.6 10.2   GLUCOSE mg/dL 165* 183*     I reviewed the patient's new imaging including images and reports.    All medications reviewed.   allopurinol, 100 mg, Oral, Daily  amLODIPine, 10 mg, Oral, Daily  budesonide-formoterol, 2 puff, Inhalation, BID - RT  cloNIDine, 0.1 mg, Oral, Q12H  DAPTOmycin, 6 mg/kg (Adjusted), Intravenous, Q48H  [START ON 1/8/2021] enoxaparin, 40 mg, Subcutaneous, Daily  gabapentin, 600 mg, Oral, 4x Daily  insulin detemir, 60 Units, Subcutaneous, Nightly  insulin lispro, 0-9 Units, Subcutaneous, TID AC  insulin lispro, 20 Units, Subcutaneous, TID With Meals  losartan, 100 mg, Oral, Daily  metoclopramide, 10 mg, Oral, 4x Daily  [START ON 1/8/2021] multivitamin, 1 tablet, Oral, Daily  pantoprazole, 40 mg, Oral, QAM AC  piperacillin-tazobactam, 3.375 g, Intravenous, Q8H  prednisoLONE acetate, 1 drop, Right Eye, Q12H      acetaminophen, 650 mg, Q4H PRN  bisacodyl, 10 mg, Daily PRN  dextrose, 25 g, Q15 Min PRN  dextrose, 15 g, Q15 Min PRN  diphenhydrAMINE, 25 mg, Nightly PRN    Or  diphenhydrAMINE, 25 mg, Nightly PRN  glucagon (human recombinant), 1 mg, Q15 Min PRN  HYDROcodone-acetaminophen, 1 tablet, Q4H PRN  HYDROcodone-acetaminophen, 2 tablet, Q4H PRN  HYDROmorphone, 1 mg, Q2H PRN    And  naloxone, 0.4 mg, Q5 Min PRN  labetalol, 10 mg, Q4H PRN  magnesium hydroxide, 10 mL, Daily PRN  melatonin, 5 mg, Nightly PRN  ondansetron, 4 mg, Q6H PRN    Or  ondansetron, 4 mg, Q6H PRN  ondansetron, 4 mg, Q6H PRN  oxyCODONE-acetaminophen, 1 tablet, Q4H PRN  oxyCODONE-acetaminophen, 2 tablet, Q4H PRN  promethazine, 12.5 mg, Q4H PRN    Or  promethazine, 12.5 mg, Q4H PRN  promethazine, 12.5 mg, Q4H PRN  sodium chloride, 500 mL, TID PRN  sodium chloride, 10 mL, PRN        Assessment/Plan   Status post  left fifth metatarsal and toe amputation.  Due to ulcer, soft tissue infection, osteomyelitis.    Type 2 diabetes mellitus with diabetic polyneuropathy, with long-term current use of insulin (CMS/HCC)    Diabetic foot ulcers (CMS/HCC)    HTN (hypertension)    CKD (chronic kidney disease)    Left foot pain    Diabetic foot ulcer (CMS/HCC)         Plan  Postop antibiotics per [facility]/Dr. Garcia, biotics of Zosyn and Vanco to start with.  Follow cultures.  Monitor labs and blood glucose.    1. PT/OT.  Nonweightbearing left lower extremity.  2. Pain control-prns   3. IS-encouraged  4. DVT proph-mechanicals and subcutaneous Lovenox.  5. Bowel regimen  6. Monitor post-op labs  7. DC     - Hypertension:  Resume home medications as appropriate, formulary substitution when indicated.  Holding parameters.  Prn medications for elevated blood pressure.    -CKD: Monitor volume status, avoid nephrotoxic agents, follow renal function closely.        Pennie Foster MD  01/07/21  20:45 EST

## 2021-01-08 NOTE — THERAPY DISCHARGE NOTE
Patient Name: Amol Aguirre  : 1945    MRN: [id number]                              Today's Date: 2021       Admit Date: 2021    Visit Dx:     ICD-10-CM ICD-9-CM   1. Left foot pain  M79.672 729.5     Patient Active Problem List   Diagnosis   • Right foot pain   • Decreased pulses in feet   • Vascular calcification   • Uncontrolled type 2 diabetes mellitus with hyperglycemia (CMS/[facility])   • Type 2 diabetes mellitus with diabetic polyneuropathy, with long-term current use of insulin (CMS/[facility])   • Mass of lesser toe of right foot   • Blister (nonthermal), right foot, initial encounter   • Diabetic foot ulcers (CMS/[facility])   • Diabetic ulcer of toe of left foot associated with type 2 diabetes mellitus, limited to breakdown of skin (CMS/[facility])   • Gangrene (CMS/[facility])   • S/P BKA (below knee amputation), right (CMS/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)   • Diabetic ulcer of left ankle (CMS/[facility])   • Non-hospital acquired pressure injury of skin   • Nuclear sclerotic cataract of right eye   • Nuclear sclerotic cataract of left eye   • Left foot pain   • Diabetic foot ulcer (CMS/[facility])     Past Medical History:   Diagnosis Date   • Acid reflux    • Asthma    • Diabetes (CMS/[facility])    • Hypertension      Past Surgical History:   Procedure Laterality Date   • AMPUTATION DIGIT Left 10/15/2019    Procedure: AMPUTATE LEFT THIRD TOE;  Surgeon: Juju Weber MD;  Location:  [facility];  Service: Orthopedics   • AMPUTATION DIGIT Left 2021    Procedure: amputate left first toe and metatarsal;  Surgeon: Juju Weber MD;  Location:  [facility];  Service: Orthopedics;  Laterality: Left;   • AORTAGRAM N/A 2019    Procedure:  AORTAGRAM WITH OR WITHOUT RUNOFFS;  Surgeon: Carrillo White MD;  Location:  [facility];  Service: Vascular   • BELOW KNEE AMPUTATION Right 6/4/2019    Procedure: BELOW KNEE AMPUTATION RIGHT;  Surgeon: Juju Weber MD;  Location:  [facility];  Service: Orthopedics   • CATARACT EXTRACTION W/ INTRAOCULAR LENS IMPLANT Right 7/20/2020    Procedure: CATARACT PHACO EXTRACTION WITH INTRAOCULAR LENS IMPLANT RIGHT;  Surgeon: Jez Mas MD;  Location:  [facility];  Service: Ophthalmology;  Laterality: Right;   • CATARACT EXTRACTION W/ INTRAOCULAR LENS IMPLANT Left 8/3/2020    Procedure: CATARACT PHACO EXTRACTION WITH INTRAOCULAR LENS IMPLANT LEFT;  Surgeon: Jez Mas MD;  Location:  [facility];  Service: Ophthalmology;  Laterality: Left;   • CHOLECYSTECTOMY     • FOOT SURGERY Left 10/15/2019    Amputate 3rd toe- [place]   • KNEE SURGERY Right     TKA     General Information     Row Name 01/08/21 1440          Physical Therapy Time and Intention    Document Type  evaluation  -MB     Mode of Treatment  physical therapy  -MB     Row Name 01/08/21 1440          General Information    Patient Profile Reviewed  yes POD #1 L 1st toe and metatarsal amputation  -MB     Prior Level of Function  independent:;all household mobility;gait;transfer;w/c or scooter;bed mobility;ADL's  -MB     Existing Precautions/Restrictions  (S) fall;non-weight bearing;left;oxygen therapy device and L/min remote R BKA w/ prosthesis, NWB LLE  -MB     Barriers to Rehab  medically complex;previous functional deficit  -MB     Row Name 01/08/21 1440          Living Environment    Lives With  spouse  -MB     Row Name 01/08/21 1440          Home Main Entrance    Number of Stairs, Main Entrance  one  -MB     Row Name 01/08/21 1440          Stairs Within Home, Primary    Number of Stairs, Within Home, Primary  none  -MB     Row Name 01/08/21 1440          Cognition    Orientation Status (Cognition)  oriented x 4  -MB     Row Name 01/08/21  1440          Safety Issues, Functional Mobility    Safety Issues Affecting Function (Mobility)  insight into deficits/self-awareness;unable to maintain weight-bearing restrictions;safety precaution awareness;safety precautions follow-through/compliance Pt. unable to maintain NWB LLE w/ transfers.  -MB     Impairments Affecting Function (Mobility)  balance;strength  -MB       User Key  (r) = Recorded By, (t) = Taken By, (c) = Cosigned By    Initials Name Provider Type    MB Dena Keith, PT Physical Therapist        Mobility     Row Name 01/08/21 1440          Bed Mobility    Bed Mobility  supine-sit  -MB     Supine-Sit Gates (Bed Mobility)  contact guard;verbal cues  -MB     Assistive Device (Bed Mobility)  bed rails;head of bed elevated  -MB     Comment (Bed Mobility)  Pt. advanced to EOB w/ CGA to raise trunk/shoulders and VCs for sequencing.  Pt. donned R prosthesis sitting EOB, although has to stand to lock prosthesis in place; pt unable to maintain NWB LLE w/ transfer or in static standing.  -MB     Row Name 01/08/21 1440          Transfers    Comment (Transfers)  STS from EOB/recliner w/ VCs for set up, safe hand placement, sequencing, and maintaining NWB LLE througout.  Therapist foot placed underneath pt's LLE; pt. placing at least 75% of weight through L heel.  -MB     Row Name 01/08/21 1440          Bed-Chair Transfer    Bed-Chair Gates (Transfers)  maximum assist (25% patient effort);verbal cues  -MB     Assistive Device (Bed-Chair Transfers)  walker, front-wheeled  -MB     Row Name 01/08/21 1440          Sit-Stand Transfer    Sit-Stand Gates (Transfers)  maximum assist (25% patient effort);verbal cues  -MB     Assistive Device (Sit-Stand Transfers)  walker, front-wheeled  -MB     Row Name 01/08/21 1440          Gait/Stairs (Locomotion)    Gates Level (Gait)  unable to assess  -MB     Comment (Gait/Stairs)  Not safe to attempt d/t patient u/a to maintain NWB LLE.  -MB   "   [facility] Name 01/08/21 1440          Mobility    Extremity Weight-bearing Status  left lower extremity  -MB     Left Lower Extremity (Weight-bearing Status)  (S) non weight-bearing (NWB) \"Absolute\" NWB LLE per Dr. Weber.  -MB       User Key  (r) = Recorded By, (t) = Taken By, (c) = Cosigned By    Initials Name Provider Type    MB Dena Keith, PT Physical Therapist        Obj/Interventions     [facility] Name 01/08/21 1440          Range of Motion Comprehensive    General Range of Motion  no range of motion deficits identified  -[facility] Name 01/08/21 1440          Strength Comprehensive (MMT)    Comment, General Manual Muscle Testing (MMT) Assessment  LLE grossly 4+/5, L ankle DF 4-/5, R hip flex 4+/5.  BUEs WFL for purposes of eval.  -[facility] Name 01/08/21 1440          Motor Skills    Motor Skills  therapeutic exercise  -MB     Therapeutic Exercise  hip;knee;ankle  -MB     Row Name 01/08/21 1440          Hip (Therapeutic Exercise)    Hip (Therapeutic Exercise)  isometric exercises  -MB     Hip Isometrics (Therapeutic Exercise)  bilateral;gluteal sets;10 repetitions  -[facility] Name 01/08/21 1440          Knee (Therapeutic Exercise)    Knee (Therapeutic Exercise)  strengthening exercise;isometric exercises  -MB     Knee Isometrics (Therapeutic Exercise)  bilateral;quad sets;10 repetitions  -MB     Knee Strengthening (Therapeutic Exercise)  left;LAQ (long arc quad);10 repetitions  -[facility] Name 01/08/21 1440          Ankle (Therapeutic Exercise)    Ankle (Therapeutic Exercise)  AROM (active range of motion)  -MB     Ankle AROM (Therapeutic Exercise)  left;dorsiflexion;plantarflexion;10 repetitions  -[facility] Name 01/08/21 1440          Balance    Balance Assessment  sitting static balance;sitting dynamic balance;standing static balance;standing dynamic balance  -MB     Static Sitting Balance  WFL;unsupported;sitting, edge of bed  -MB     Dynamic Sitting Balance  WFL;unsupported;sitting, edge of bed  -MB  "    Static Standing Balance  severe impairment;supported d/t NWB LLE, prosthesis RLE  -MB     Dynamic Standing Balance  severe impairment;supported  -MB     Row Name 01/08/21 1440          Sensory Assessment (Somatosensory)    Left LE Sensory Assessment  light touch awareness;impaired  -MB       User Key  (r) = Recorded By, (t) = Taken By, (c) = Cosigned By    Initials Name Provider Type    Dena Kearns M, PT Physical Therapist        Goals/Plan     Row Name 01/08/21 1440          Bed Mobility Goal 1 (PT)    Activity/Assistive Device (Bed Mobility Goal 1, PT)  bed mobility activities, all  -MB     [place] Level/Cues Needed (Bed Mobility Goal 1, PT)  independent  -MB     Time Frame (Bed Mobility Goal 1, PT)  10 days  -MB     Progress/Outcomes (Bed Mobility Goal 1, PT)  goal ongoing  -MB     Row Name 01/08/21 1440          Transfer Goal 1 (PT)    Activity/Assistive Device (Transfer Goal 1, PT)  (S) bed-to-chair/chair-to-bed;sliding board NWB LLE  -MB     [place] Level/Cues Needed (Transfer Goal 1, PT)  modified independence  -MB     Time Frame (Transfer Goal 1, PT)  10 days  -MB     Progress/Outcome (Transfer Goal 1, PT)  goal ongoing  -MB     Row Name 01/08/21 1440          Gait Training Goal 1 (PT)    Activity/Assistive Device (Gait Training Goal 1, PT)  other (see comments) w/c propulsion and navigation  -MB     [place] Level (Gait Training Goal 1, PT)  independent  -MB     Distance (Gait Training Goal 1, PT)  150  -MB     Time Frame (Gait Training Goal 1, PT)  10 days  -MB     Progress/Outcome (Gait Training Goal 1, PT)  goal ongoing  -MB     Row Name 01/08/21 1440          Stairs Goal 1 (PT)    Activity/Assistive Device (Stairs Goal 1, PT)  ascending stairs;descending stairs;other (see comments) manual w/c  -MB     [place] Level/Cues Needed (Stairs Goal 1, PT)  minimum assist (75% or more patient effort)  -MB     Number of Stairs (Stairs Goal 1, PT)  1  -MB     Time Frame (Stairs Goal  1, PT)  2 weeks  -MB     Progress/Outcome (Stairs Goal 1, PT)  goal ongoing  -MB     Row Name 01/08/21 1440          Patient Education Goal (PT)    Activity (Patient Education Goal, PT)  HEP, maintaining NWB LLE  -MB     [place]/Cues/Accuracy (Memory Goal 2, PT)  demonstrates adequately  -MB     Time Frame (Patient Education Goal, PT)  1 week  -MB     Progress/Outcome (Patient Education Goal, PT)  goal ongoing  -MB       User Key  (r) = Recorded By, (t) = Taken By, (c) = Cosigned By    Initials Name Provider Type    Dena Kearns, PT Physical Therapist        Clinical Impression     Row Name 01/08/21 1440          Pain    Additional Documentation  Pain Scale: Numbers Pre/Post-Treatment (Group)  -MB     Row Name 01/08/21 1440          Pain Scale: Numbers Pre/Post-Treatment    Pretreatment Pain Rating  0/10 - no pain  -MB     Posttreatment Pain Rating  0/10 - no pain  -MB     Row Name 01/08/21 1440          Plan of Care Review    Plan of Care Reviewed With  patient;spouse  -MB     Progress  improving  -MB     Outcome Summary  PT eval completed POD #1 L 1st toe and metatarsal amputation.  Pt. very pleasant, w/ good motivation; however, demonstrates inability to maintain NWB LLE w/ transfers.  Patient completed bed mobility w/ CGA, donned RLE prosthesis sitting EOB and attempted STS transfer from EOB and recliner w/ max A.  Pt. able to stand fully upright, although placed nearly full weight through L heel despite VCs/tactile cues to maintain weight-bearing status.  Unable to safely take steps.  Pt. will benefit from skilled IPPT to improve pt's safety and independence w/ functional mobility.  PT recommends IRF at D/C; pt. currently declining in favor of returning home w/ assist and HHPT.  -MB     Row Name 01/08/21 1440          Therapy Assessment/Plan (PT)    Patient/Family Therapy Goals Statement (PT)  Return to PLOF and home.  -MB     Rehab Potential (PT)  good, to achieve stated therapy goals  -MB      Criteria for Skilled Interventions Met (PT)  yes;meets criteria;skilled treatment is necessary  -MB     Row Name 01/08/21 1440          Vital Signs    Pre Systolic BP Rehab  139  -MB     Pre Treatment Diastolic BP  70  -MB     Pre SpO2 (%)  95  -MB     O2 Delivery Pre Treatment  room air  -MB     Intra SpO2 (%)  94  -MB     O2 Delivery Intra Treatment  room air  -MB     Post SpO2 (%)  94  -MB     O2 Delivery Post Treatment  room air  -MB     Pre Patient Position  Supine  -MB     Intra Patient Position  Standing  -MB     Post Patient Position  Sitting  -MB     Row Name 01/08/21 1440          Positioning and Restraints    Pre-Treatment Position  in bed  -MB     Post Treatment Position  chair  -MB     In Chair  notified nsg;reclined;call light within reach;encouraged to call for assist;exit alarm on;with family/caregiver;waffle cushion;legs elevated;with brace R prosthesis donned  -MB       User Key  (r) = Recorded By, (t) = Taken By, (c) = Cosigned By    Initials Name Provider Type    Dena Kearns, PT Physical Therapist        Outcome Measures     Row Name 01/08/21 1440          How much help from another person do you currently need...    Turning from your back to your side while in flat bed without using bedrails?  3  -MB     Moving from lying on back to sitting on the side of a flat bed without bedrails?  3  -MB     Moving to and from a bed to a chair (including a wheelchair)?  2  -MB     Standing up from a chair using your arms (e.g., wheelchair, bedside chair)?  2  -MB     Climbing 3-5 steps with a railing?  1  -MB     To walk in hospital room?  1  -MB     AM-PAC 6 Clicks Score (PT)  12  -MB     Row Name 01/08/21 1440          Functional Assessment    Outcome Measure Options  AM-PAC 6 Clicks Basic Mobility (PT)  -MB       User Key  (r) = Recorded By, (t) = Taken By, (c) = Cosigned By    Initials Name Provider Type    Dena Kearns, PT Physical Therapist        Physical Therapy Education                  Title: PT OT SLP Therapies (Done)     Topic: Physical Therapy (Done)     Point: Mobility training (Done)     Learning Progress Summary           Patient Acceptance, E,D, VU,NR by MB at 1/8/2021 1607    Comment: Provided educ. re: NWB LLE, transfer safety/mechanics, gait safety/mechanics, home safety, D/C recommendations, POC progression, fall precautions   Family Acceptance, E,D, VU,NR by MB at 1/8/2021 1607    Comment: Provided educ. re: NWB LLE, transfer safety/mechanics, gait safety/mechanics, home safety, D/C recommendations, POC progression, fall precautions                   Point: Home exercise program (Done)     Learning Progress Summary           Patient Acceptance, E,D, VU,NR by MB at 1/8/2021 1607    Comment: Provided educ. re: NWB LLE, transfer safety/mechanics, gait safety/mechanics, home safety, D/C recommendations, POC progression, fall precautions   Family Acceptance, E,D, VU,NR by MB at 1/8/2021 1607    Comment: Provided educ. re: NWB LLE, transfer safety/mechanics, gait safety/mechanics, home safety, D/C recommendations, POC progression, fall precautions                   Point: Body mechanics (Done)     Learning Progress Summary           Patient Acceptance, E,D, VU,NR by MB at 1/8/2021 1607    Comment: Provided educ. re: NWB LLE, transfer safety/mechanics, gait safety/mechanics, home safety, D/C recommendations, POC progression, fall precautions   Family Acceptance, E,D, VU,NR by MB at 1/8/2021 1607    Comment: Provided educ. re: NWB LLE, transfer safety/mechanics, gait safety/mechanics, home safety, D/C recommendations, POC progression, fall precautions                   Point: Precautions (Done)     Learning Progress Summary           Patient Acceptance, E,D, VU,NR by MB at 1/8/2021 1607    Comment: Provided educ. re: NWB LLE, transfer safety/mechanics, gait safety/mechanics, home safety, D/C recommendations, POC progression, fall precautions   Family Acceptance, E,D, VU,NR by MB at  1/8/2021 1607    Comment: Provided educ. re: NWB LLE, transfer safety/mechanics, gait safety/mechanics, home safety, D/C recommendations, POC progression, fall precautions                               User Key     Initials Effective Dates Name Provider Type Discipline    MB 03/14/16 -  Dena Keith, PT Physical Therapist PT              PT Recommendation and Plan  Planned Therapy Interventions (PT): balance training, bed mobility training, gait training, home exercise program, patient/family education, postural re-education, stair training, strengthening, transfer training, wheelchair management/propulsion training  Plan of Care Reviewed With: patient, spouse  Progress: improving  Outcome Summary: PT eval completed POD #1 L 1st toe and metatarsal amputation.  Pt. very pleasant, w/ good motivation; however, demonstrates inability to maintain NWB LLE w/ transfers.  Patient completed bed mobility w/ CGA, donned RLE prosthesis sitting EOB and attempted STS transfer from EOB and recliner w/ max A.  Pt. able to stand fully upright, although placed nearly full weight through L heel despite VCs/tactile cues to maintain weight-bearing status.  Unable to safely take steps.  Pt. will benefit from skilled IPPT to improve pt's safety and independence w/ functional mobility.  PT recommends IRF at D/C; pt. currently declining in favor of returning home w/ assist and HHPT.     Time Calculation:   PT Charges     Row Name 01/08/21 1609             Time Calculation    Start Time  1440  -MB      PT Received On  01/08/21  -MB      PT Goal Re-Cert Due Date  01/18/21  -MB        User Key  (r) = Recorded By, (t) = Taken By, (c) = Cosigned By    Initials Name Provider Type    Dena Kearns, PT Physical Therapist        Therapy Charges for Today     Code Description Service Date Service Provider Modifiers Qty    25812203589 HC PT EVAL MOD COMPLEXITY 4 1/8/2021 Dena Keith, PT GP 1    [id number] HC PT THER SUPP EA 15  MIN 1/8/2021 Dena Keith, PT GP 2          PT G-Codes  Outcome Measure Options: AM-PAC 6 Clicks Basic Mobility (PT)  AM-PAC 6 Clicks Score (PT): 12    PT Discharge Summary  Anticipated Discharge Disposition (PT): inpatient rehabilitation facility    Dena Keith, PT  1/8/2021

## 2021-01-08 NOTE — CONSULTS
Infectious Disease Progress Note  Amol Aguirre  1945  [id number]    Date of Consult: 1/6/2021  Admission Date: 1/6/2021    Requesting Provider: Sobeida Weber MD  Evaluating Physician: Easton Garcia MD    Chief Complaint: foot infection    Reason for Consultation: foot infection    History of present illness:   Patient is a 75 y.o.  Yr old male with history of male with history of poorly controlled DM2, venous stasis disease, asthma, HTN. 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 In October 2019 he underwent amputation of left third toe and had another prolonged course of IV antibiotics and prolonged oral suppression with doxycycline.  Due to significant improvement and stabilityhe was taken off oral antibiotics a few months ago.    1/6/21: I was contacted by Dr. Weber who evaluated Mr. Aguirre in clinic today and found him to have significant left foot ulceration evidence of deep infection down to the bone.  He was admitted this evening and Dr Weber is planning I&D tomorrow. He says he feels well today but had a cough a few days ago and got tested for COVID and does not have the result yet.     1/7/21: Left first metatarsal and toe amputation by Dr. Weber.  The wound extended all the way to the first MTP joint.  Cultures pending       1/8/2021: Resting comfortably.  Tolerating current IV antibiotics via peripheral IV.  Foot dressed.  He is currently nonweightbearing.  Appetite okay. Room air.     ROS:  No fevers or chills. No n/v/d. No rash. No new ADR to Abx.       Allergies   Allergen Reactions   • Chlorhexidine Shortness Of Breath, Itching and Rash     Pt developed a rash, itching, and shortness of breath after receiving a CHG bath on 4/24/19.   • Latex Other (See Comments)     Rash, hives, and tongue swelling       Antibiotics:  Anti-Infectives (From admission, onward)    Ordered      Dose/Rate Route Frequency Start Stop    01/06/21 1801  piperacillin-tazobactam (ZOSYN) 3.375 g in iso-osmotic dextrose 50 ml (premix)     Ordering Provider: Juju Weber MD    3.375 g  over 4 Hours Intravenous Every 8 Hours 01/07/21 0200 01/21/21 0159    01/06/21 1801  DAPTOmycin (CUBICIN) 600 mg in sodium chloride 0.9 % 50 mL IVPB     Ordering Provider: Juju Weber MD    6 mg/kg × 99.1 kg (Adjusted)  100 mL/hr over 30 Minutes Intravenous Every 48 Hours 01/06/21 2000 01/20/21 1959 01/06/21 1801  piperacillin-tazobactam (ZOSYN) 3.375 g in iso-osmotic dextrose 50 ml (premix)     Ordering Provider: Easton Garcia MD    3.375 g  over 30 Minutes Intravenous Once 01/06/21 1900 01/06/21 2152          Other Medications:  Current Facility-Administered Medications   Medication Dose Route Frequency Provider Last Rate Last Admin   • acetaminophen (TYLENOL) tablet 650 mg  650 mg Oral Q4H PRN Juju Weber MD       • allopurinol (ZYLOPRIM) tablet 100 mg  100 mg Oral Daily Juju Weber MD   100 mg at 01/08/21 0817   • amLODIPine (NORVASC) tablet 10 mg  10 mg Oral Daily Juju Weber MD   10 mg at 01/08/21 0818   • bisacodyl (DULCOLAX) suppository 10 mg  10 mg Rectal Daily PRN Juju Weber MD       • budesonide-formoterol (SYMBICORT) 80-4.5 MCG/ACT inhaler 2 puff  2 puff Inhalation BID - RT Juju Weber MD   2 puff at 01/08/21 0715   • cloNIDine (CATAPRES) tablet 0.1 mg  0.1 mg Oral Q12H Juju Weber MD   0.1 mg at 01/08/21 0818   • DAPTOmycin (CUBICIN) 600 mg in sodium chloride 0.9 % 50 mL IVPB  6 mg/kg (Adjusted) Intravenous Q48H Juju Weber  mL/hr at 01/06/21 2121 600 mg at 01/06/21 2121   • dextrose (D50W) 25 g/ 50mL Intravenous Solution 25 g  25 g Intravenous Q15 Min PRN Juju Weber MD       • dextrose (GLUTOSE) oral gel 15 g  15 g Oral Q15 Min PRN Juju Weber MD       • dextrose 5 % and sodium chloride 0.9 % infusion  40 mL/hr Intravenous Continuous Juju Weber,  MD 40 mL/hr at 01/07/21 0837 40 mL/hr at 01/07/21 0837   • diphenhydrAMINE (BENADRYL) capsule 25 mg  25 mg Oral Nightly PRN Juju Weber MD        Or   • diphenhydrAMINE (BENADRYL) injection 25 mg  25 mg Intravenous Nightly PRN Juju Weber MD       • enoxaparin (LOVENOX) syringe 40 mg  40 mg Subcutaneous Daily Juju Weber MD   40 mg at 01/08/21 0818   • gabapentin (NEURONTIN) capsule 600 mg  600 mg Oral 4x Daily Juju Weber MD   600 mg at 01/08/21 0817   • glucagon (human recombinant) (GLUCAGEN DIAGNOSTIC) injection 1 mg  1 mg Subcutaneous Q15 Min PRN Juju Weber MD       • HYDROcodone-acetaminophen (NORCO) 7.5-325 MG per tablet 1 tablet  1 tablet Oral Q4H PRN Juju Weber MD       • HYDROcodone-acetaminophen (NORCO) 7.5-325 MG per tablet 2 tablet  2 tablet Oral Q4H PRN Juju Weber MD       • HYDROmorphone (DILAUDID) injection 1 mg  1 mg Intravenous Q2H PRN Juju Weber MD   1 mg at 01/07/21 2325    And   • naloxone (NARCAN) injection 0.4 mg  0.4 mg Intravenous Q5 Min PRN Juju Weber MD       • insulin detemir (LEVEMIR) injection 60 Units  60 Units Subcutaneous Nightly Juju Weber MD   60 Units at 01/07/21 2109   • insulin lispro (humaLOG, ADMELOG) injection 0-9 Units  0-9 Units Subcutaneous TID AC Juju Weber MD       • insulin lispro (humaLOG, ADMELOG) injection 20 Units  20 Units Subcutaneous TID With Meals Juju Weber MD   20 Units at 01/08/21 0817   • labetalol (NORMODYNE,TRANDATE) injection 10 mg  10 mg Intravenous Q4H PRN Juju Weber MD       • losartan (COZAAR) tablet 100 mg  100 mg Oral Daily Juju Weber MD   100 mg at 01/08/21 0817   • magnesium hydroxide (MILK OF MAGNESIA) suspension 2400 mg/10mL 10 mL  10 mL Oral Daily PRN Juju Weber MD       • melatonin tablet 5 mg  5 mg Oral Nightly PRN Juju Weber MD   5 mg at 01/07/21 2057   • metoclopramide (REGLAN) tablet 10 mg  10 mg Oral 4x Daily Juju Weber MD   10 mg at 01/08/21  "0817   • multivitamin (THERAGRAN) tablet 1 tablet  1 tablet Oral Daily Juju Weber MD   1 tablet at 01/08/21 0817   • ondansetron (ZOFRAN) tablet 4 mg  4 mg Oral Q6H PRN Juju Weber MD        Or   • ondansetron (ZOFRAN) injection 4 mg  4 mg Intravenous Q6H PRN Juju Weber MD       • ondansetron (ZOFRAN) injection 4 mg  4 mg Intravenous Q6H PRN Juju Weber MD       • oxyCODONE-acetaminophen (PERCOCET) 5-325 MG per tablet 1 tablet  1 tablet Oral Q4H PRN Juju Weber MD   1 tablet at 01/07/21 1735   • oxyCODONE-acetaminophen (PERCOCET) 5-325 MG per tablet 2 tablet  2 tablet Oral Q4H PRN Juju Weber MD   2 tablet at 01/08/21 0708   • pantoprazole (PROTONIX) EC tablet 40 mg  40 mg Oral QAM AC Juju Weber MD   40 mg at 01/08/21 0817   • piperacillin-tazobactam (ZOSYN) 3.375 g in iso-osmotic dextrose 50 ml (premix)  3.375 g Intravenous Q8H Juju Weber MD   3.375 g at 01/08/21 0816   • prednisoLONE acetate (PRED FORTE) 1 % ophthalmic suspension 1 drop  1 drop Right Eye Q12H Juju Weber MD   1 drop at 01/08/21 0823   • promethazine (PHENERGAN) tablet 12.5 mg  12.5 mg Oral Q4H PRN Juju Weber MD        Or   • promethazine (PHENERGAN) suppository 12.5 mg  12.5 mg Rectal Q4H PRN Juju Weber MD       • promethazine (PHENERGAN) tablet 12.5 mg  12.5 mg Oral Q4H PRN Juju Weber MD       • sodium chloride 0.45 % infusion  50 mL/hr Intravenous Continuous Juju Weber MD 50 mL/hr at 01/07/21 1753 50 mL/hr at 01/07/21 1753   • sodium chloride 0.9 % bolus 500 mL  500 mL Intravenous TID PRN Juju Weber MD       • sodium chloride 0.9 % flush 10 mL  10 mL Intravenous PRN Juju Weber MD           Physical Exam:   Vital Signs   /63 (BP Location: Left arm, Patient Position: Lying)   Pulse 79   Temp 98 °F (36.7 °C) (Oral)   Resp 18   Ht 190.5 cm (75\")   Wt 121 kg (267 lb)   SpO2 94%   BMI 33.37 kg/m²     GENERAL: Awake and alert, in no acute distress. "   HEENT: Normocephalic, atraumatic.  EOMI. No conjunctival injection.   HEART: RRR; No murmur.  LUNGS: Clear to auscultation bilaterally without wheezing, rales, rhonchi. Normal respiratory effort. Nonlabored.  ABDOMEN: Soft, nontender, nondistended. Positive bowel sounds  : Without Mcdonald catheter.  MSK: BKA on right, well healed  Left foot bandaged post-operatively, CDI  SKIN: Warm and dry without cutaneous eruptions on Inspection/palpation.    NEURO: Oriented to PPT. No focal deficits on motor/sensory exam at arms/legs.  PSYCHIATRIC: Normal insight and judgement. Cooperative with PE  PIV    Laboratory Data    Results from last 7 days   Lab Units 01/07/21  0615 01/06/21  1736   WBC 10*3/mm3 7.66 9.39   HEMOGLOBIN g/dL 11.6* 11.3*   HEMATOCRIT % 37.7 37.6   PLATELETS 10*3/mm3 415 437     Results from last 7 days   Lab Units 01/07/21  0615   SODIUM mmol/L 138   POTASSIUM mmol/L 4.6   CHLORIDE mmol/L 102   CO2 mmol/L 25.0   BUN mg/dL 21   CREATININE mg/dL 1.54*   GLUCOSE mg/dL 165*   CALCIUM mg/dL 9.6     Estimated Creatinine Clearance: 56.3 mL/min (A) (by C-G formula based on SCr of 1.54 mg/dL (H)).      Results from last 7 days   Lab Units 01/06/21  1736   SED RATE mm/hr 84*     Results from last 7 days   Lab Units 01/06/21  1736   CRP mg/dL 4.80*       Microbiology:   Prior cultures reviewed.  Last positive culture was from the right foot in April 2019 with evidence of Klebsiella and Morganella    Radiology:  Xr Chest 1 View    Result Date: 1/7/2021  Chronic changes seen with the lung fields with no evidence of acute parenchymal disease.  DICTATED:   01/06/2021 EDITED/ls :   01/06/2021    This report was finalized on 1/7/2021 3:58 PM by Dr. Yuki Aponte MD.           Impression:   1. Left foot ulceration, soft tissue infection, acute osteomyelitis: wound overlying first MTP joint probes to bone per Dr Weber. S/p first toe and first metatarsal amputation by Dr. Weber 1/7/2021. Operative cultures  pending  2. Significant peripheral vascular disease  3. History of poorly controlled type 2 diabetes capitated by neuropathy  4. History of right lower extremity below the knee amputation due to history of diabetic foot infection  5. CKD stage 3A: improved  6. Asthma    PLAN:    - CBC, CMP, CK  - wound culture pending  - Operative cultures and pathology pending    Continue current IV antibiotics for now pending further culture data:  - Daptomycin, increase to q24hr dosing due to improved GFR  - IV Zosyn   - PICC ordered.     Discussed with Dr Weber.  Due to his history of recurrent infections, significant peripheral vascular disease and prior amputation on the right we need to be very aggressive with his treatment, in order to save the left foot.  Place PICC line for IV antibiotics with planned duration at least 6 to 8 weeks.  Final plan to be determined based on pending culture data.  Possibly daptomycin and ertapenem if no Pseudomonas identified on cultures.  He will very likely need placement due to weightbearing restrictions and to assist with wound care and antibiotics.      Complex MDM. High risk for further limb loss if not treated aggressively.  I will re-evaluate on Monday. Call if any acute new concerns over the weekend.     Easton Garcia MD  1/8/2021

## 2021-01-08 NOTE — PLAN OF CARE
Goal Outcome Evaluation:  Plan of Care Reviewed With: patient, spouse  Progress: improving  Outcome Summary: PT kayla completed POD #1 L 1st toe and metatarsal amputation.  Pt. very pleasant, w/ good motivation; however, demonstrates inability to maintain NWB LLE w/ transfers.  Patient completed bed mobility w/ CGA, donned RLE prosthesis sitting EOB and attempted STS transfer from EOB and recliner w/ max A.  Pt. able to stand fully upright, although placed nearly full weight through L heel despite VCs/tactile cues to maintain weight-bearing status.  Unable to safely take steps.  Pt. will benefit from skilled IPPT to improve pt's safety and independence w/ functional mobility.  PT recommends IRF at D/C; pt. currently declining in favor of returning home w/ assist and HHPT.

## 2021-01-08 NOTE — PROGRESS NOTES
"                  Clinical Nutrition     Reason for Visit:   Nonhealing wound or pressure ulcer, Need for education    Patient Name: Amol Aguirre  YOB: 1945  MRN: [id number]  Date of Encounter: 01/08/21 14:21 EST  Admission date: 1/6/2021    Nutrition Assessment   Assessment     Admission diagnosis  Diabetic ulcer L midfoot  Osteomyelitis    Additional diagnosis/conditions/procedures this admission  (1/7) s/p L 1st toe/metatarsal amputation    PMH/procedures:  PMH: He  has a past medical history of Acid reflux, Asthma, Diabetes (CMS/[facility]), and Hypertension.   PSxH: He  has a past surgical history that includes Knee surgery (Right); Cholecystectomy; Aortagram (N/A, 4/30/2019); Leg amputation, lower tibia/fibula (Right, 6/4/2019); Finger amputation (Left, 10/15/2019); Foot surgery (Left, 10/15/2019); Cataract extraction w/ intraocular lens implant (Right, 7/20/2020); Cataract extraction w/ intraocular lens implant (Left, 8/3/2020); and Finger amputation (Left, 1/7/2021).       Reported/Observed/Food/Nutrition Related History:      Patient familiar to RD from previous admissions. He reports he thought he was doing better with monitoring blood sugars. He takes insulin regularly. Usually checks his blood sugars daily. He has been keeping routine/consistency with meals. Reviewed some of patients choices which fit within recommended carbohydrate range. He states he does not drink sugar sweetened beverages during the day, mostly drinks water. Unsure why his A1c is so high this time. Reviewed basics of consistent carbohydrate rationale, encouraged protein at each meal/snack. Patient reports drinking protein shakes. He likes the Vanilla Premier Protein. Willing to drink those this admission      Anthropometrics     Height: 190.5 cm (75\")  Last filed wt: Weight: 121 kg (267 lb) (01/07/21 1607)    BMI: n/a    Ideal Body Weight (IBW) (kg): 90.45  Admission wt: 267 lb  Method obtained: weight per charting " 1/6 no method listed     Labs reviewed     Results from last 7 days   Lab Units 01/07/21  0615 01/06/21  1736   GLUCOSE mg/dL 165* 183*   BUN mg/dL 21 26*   CREATININE mg/dL 1.54* 1.74*   SODIUM mmol/L 138 138   CHLORIDE mmol/L 102 99   POTASSIUM mmol/L 4.6 4.8     Results from last 7 days   Lab Units 01/06/21  1736   CRP mg/dL 4.80*       Results from last 7 days   Lab Units 01/08/21  1146 01/08/21  0708 01/07/21 2016 01/07/21  1545 01/07/21  1135 01/07/21  0751   GLUCOSE mg/dL 174* 118 187* 140* 174* 168*     Lab Results   Lab Value Date/Time    HGBA1C 10.30 (H) 01/06/2021 1736    HGBA1C 8.10 (H) 04/28/2020 1108         Medications reviewed   Pertinent: catapres, gabapentin, insulin, reglan, MVI, protonix, zosyn IV      Intake/Output 24 hrs (7:00AM - 6:59 AM)     Intake & Output (last day)       01/07 0701 - 01/08 0700 01/08 0701 - 01/09 0700    I.V. (mL/kg) 1000 (8.3)     Total Intake(mL/kg) 1000 (8.3)     Urine (mL/kg/hr) 300 (0.1) 400 (0.5)    Blood 10     Total Output 310 400    Net +690 -400                Current Nutrition Prescription     PO: Diet Regular; Consistent Carbohydrate, Cardiac  Intake: insuff data      Nutrition Diagnosis     1/8  Problem Altered nutrition related laboratory values   Etiology DM2, ?diet/lifestyle choices   Signs/Symptoms HgbA1c = 10.3%       Nutrition Intervention   1.  Follow treatment progress, Care plan reviewed  2.  Advise alternate selection, Interview for preferences   3. Supplement provided - Vanilla Premier Protein x2/day  4. Reviewed DM nutrition education highlights. Patient familiar to RD from previous admissions and previously administered nutrition education.    Goal:   General: Nutrition support treatment  PO: Meet estimated needs  Long term goals: HgbA1c < 7.00%      Monitoring/Evaluation:   Per protocol, PO intake, Supplement intake, Pertinent labs, Weight, Skin status    Will Continue to follow per protocol    Jerri Patricia RDN, LD  Time Spent: 30 minutes

## 2021-01-09 LAB
ANION GAP SERPL CALCULATED.3IONS-SCNC: 10 MMOL/L (ref 5–15)
BUN SERPL-MCNC: 18 MG/DL (ref 8–23)
BUN/CREAT SERPL: 9.4 (ref 7–25)
CALCIUM SPEC-SCNC: 9.1 MG/DL (ref 8.6–10.5)
CHLORIDE SERPL-SCNC: 104 MMOL/L (ref 98–107)
CO2 SERPL-SCNC: 23 MMOL/L (ref 22–29)
CREAT SERPL-MCNC: 1.92 MG/DL (ref 0.76–1.27)
DEPRECATED RDW RBC AUTO: 45.7 FL (ref 37–54)
ERYTHROCYTE [DISTWIDTH] IN BLOOD BY AUTOMATED COUNT: 14 % (ref 12.3–15.4)
GFR SERPL CREATININE-BSD FRML MDRD: 34 ML/MIN/1.73
GLUCOSE BLDC GLUCOMTR-MCNC: 128 MG/DL (ref 70–130)
GLUCOSE BLDC GLUCOMTR-MCNC: 168 MG/DL (ref 70–130)
GLUCOSE BLDC GLUCOMTR-MCNC: 182 MG/DL (ref 70–130)
GLUCOSE BLDC GLUCOMTR-MCNC: 201 MG/DL (ref 70–130)
GLUCOSE SERPL-MCNC: 135 MG/DL (ref 65–99)
HCT VFR BLD AUTO: 34.8 % (ref 37.5–51)
HGB BLD-MCNC: 10.1 G/DL (ref 13–17.7)
MCH RBC QN AUTO: 25.9 PG (ref 26.6–33)
MCHC RBC AUTO-ENTMCNC: 29 G/DL (ref 31.5–35.7)
MCV RBC AUTO: 89.2 FL (ref 79–97)
PLATELET # BLD AUTO: 382 10*3/MM3 (ref 140–450)
PMV BLD AUTO: 9.4 FL (ref 6–12)
POTASSIUM SERPL-SCNC: 4.6 MMOL/L (ref 3.5–5.2)
RBC # BLD AUTO: 3.9 10*6/MM3 (ref 4.14–5.8)
SODIUM SERPL-SCNC: 137 MMOL/L (ref 136–145)
WBC # BLD AUTO: 7.27 10*3/MM3 (ref 3.4–10.8)

## 2021-01-09 PROCEDURE — 63710000001 INSULIN LISPRO (HUMAN) PER 5 UNITS: Performed by: ORTHOPAEDIC SURGERY

## 2021-01-09 PROCEDURE — 25010000002 DAPTOMYCIN PER 1 MG: Performed by: INTERNAL MEDICINE

## 2021-01-09 PROCEDURE — 99024 POSTOP FOLLOW-UP VISIT: CPT | Performed by: ORTHOPAEDIC SURGERY

## 2021-01-09 PROCEDURE — 94799 UNLISTED PULMONARY SVC/PX: CPT

## 2021-01-09 PROCEDURE — 85027 COMPLETE CBC AUTOMATED: CPT | Performed by: INTERNAL MEDICINE

## 2021-01-09 PROCEDURE — 82962 GLUCOSE BLOOD TEST: CPT

## 2021-01-09 PROCEDURE — 63710000001 INSULIN DETEMIR PER 5 UNITS: Performed by: ORTHOPAEDIC SURGERY

## 2021-01-09 PROCEDURE — 80048 BASIC METABOLIC PNL TOTAL CA: CPT | Performed by: INTERNAL MEDICINE

## 2021-01-09 PROCEDURE — 25010000002 ENOXAPARIN PER 10 MG: Performed by: ORTHOPAEDIC SURGERY

## 2021-01-09 PROCEDURE — 25010000002 PIPERACILLIN SOD-TAZOBACTAM PER 1 G: Performed by: ORTHOPAEDIC SURGERY

## 2021-01-09 RX ORDER — NYSTATIN 100000 U/G
CREAM TOPICAL EVERY 12 HOURS SCHEDULED
Status: DISCONTINUED | OUTPATIENT
Start: 2021-01-09 | End: 2021-01-11 | Stop reason: HOSPADM

## 2021-01-09 RX ORDER — NYSTATIN 100000 U/G
CREAM TOPICAL 3 TIMES DAILY
Status: DISCONTINUED | OUTPATIENT
Start: 2021-01-09 | End: 2021-01-09

## 2021-01-09 RX ADMIN — INSULIN LISPRO 2 UNITS: 100 INJECTION, SOLUTION INTRAVENOUS; SUBCUTANEOUS at 16:44

## 2021-01-09 RX ADMIN — MUPIROCIN: 20 OINTMENT TOPICAL at 13:46

## 2021-01-09 RX ADMIN — Medication 5 MG: at 21:06

## 2021-01-09 RX ADMIN — NYSTATIN 1 APPLICATION: 100000 CREAM TOPICAL at 20:07

## 2021-01-09 RX ADMIN — AMLODIPINE BESYLATE 10 MG: 10 TABLET ORAL at 08:00

## 2021-01-09 RX ADMIN — INSULIN LISPRO 20 UNITS: 100 INJECTION, SOLUTION INTRAVENOUS; SUBCUTANEOUS at 11:41

## 2021-01-09 RX ADMIN — INSULIN LISPRO 4 UNITS: 100 INJECTION, SOLUTION INTRAVENOUS; SUBCUTANEOUS at 11:40

## 2021-01-09 RX ADMIN — TAZOBACTAM SODIUM AND PIPERACILLIN SODIUM 3.38 G: 375; 3 INJECTION, SOLUTION INTRAVENOUS at 17:03

## 2021-01-09 RX ADMIN — TAZOBACTAM SODIUM AND PIPERACILLIN SODIUM 3.38 G: 375; 3 INJECTION, SOLUTION INTRAVENOUS at 02:07

## 2021-01-09 RX ADMIN — PREDNISOLONE ACETATE 1 DROP: 10 SUSPENSION/ DROPS OPHTHALMIC at 20:07

## 2021-01-09 RX ADMIN — INSULIN LISPRO 20 UNITS: 100 INJECTION, SOLUTION INTRAVENOUS; SUBCUTANEOUS at 16:45

## 2021-01-09 RX ADMIN — GABAPENTIN 600 MG: 300 CAPSULE ORAL at 08:00

## 2021-01-09 RX ADMIN — CLONIDINE HYDROCHLORIDE 0.1 MG: 0.1 TABLET ORAL at 08:00

## 2021-01-09 RX ADMIN — ALLOPURINOL 100 MG: 100 TABLET ORAL at 08:00

## 2021-01-09 RX ADMIN — METOCLOPRAMIDE 10 MG: 10 TABLET ORAL at 11:40

## 2021-01-09 RX ADMIN — METOCLOPRAMIDE 10 MG: 10 TABLET ORAL at 08:00

## 2021-01-09 RX ADMIN — BUDESONIDE AND FORMOTEROL FUMARATE DIHYDRATE 2 PUFF: 80; 4.5 AEROSOL RESPIRATORY (INHALATION) at 07:41

## 2021-01-09 RX ADMIN — GABAPENTIN 600 MG: 300 CAPSULE ORAL at 17:03

## 2021-01-09 RX ADMIN — SODIUM CHLORIDE, PRESERVATIVE FREE 10 ML: 5 INJECTION INTRAVENOUS at 10:40

## 2021-01-09 RX ADMIN — PANTOPRAZOLE SODIUM 40 MG: 40 TABLET, DELAYED RELEASE ORAL at 08:00

## 2021-01-09 RX ADMIN — DAPTOMYCIN 600 MG: 500 INJECTION, POWDER, LYOPHILIZED, FOR SOLUTION INTRAVENOUS at 15:06

## 2021-01-09 RX ADMIN — LOSARTAN POTASSIUM 100 MG: 50 TABLET, FILM COATED ORAL at 08:00

## 2021-01-09 RX ADMIN — TAZOBACTAM SODIUM AND PIPERACILLIN SODIUM 3.38 G: 375; 3 INJECTION, SOLUTION INTRAVENOUS at 10:40

## 2021-01-09 RX ADMIN — GABAPENTIN 600 MG: 300 CAPSULE ORAL at 20:07

## 2021-01-09 RX ADMIN — METOCLOPRAMIDE 10 MG: 10 TABLET ORAL at 20:07

## 2021-01-09 RX ADMIN — MULTIVITAMIN TABLET 1 TABLET: TABLET at 08:00

## 2021-01-09 RX ADMIN — BUDESONIDE AND FORMOTEROL FUMARATE DIHYDRATE 2 PUFF: 80; 4.5 AEROSOL RESPIRATORY (INHALATION) at 21:56

## 2021-01-09 RX ADMIN — GABAPENTIN 600 MG: 300 CAPSULE ORAL at 11:40

## 2021-01-09 RX ADMIN — METOCLOPRAMIDE 10 MG: 10 TABLET ORAL at 17:03

## 2021-01-09 RX ADMIN — ENOXAPARIN SODIUM 40 MG: 40 INJECTION SUBCUTANEOUS at 07:59

## 2021-01-09 RX ADMIN — HYDROCODONE BITARTRATE AND ACETAMINOPHEN 1 TABLET: 7.5; 325 TABLET ORAL at 17:03

## 2021-01-09 RX ADMIN — HYDROCODONE BITARTRATE AND ACETAMINOPHEN 1 TABLET: 7.5; 325 TABLET ORAL at 08:00

## 2021-01-09 RX ADMIN — PREDNISOLONE ACETATE 1 DROP: 10 SUSPENSION/ DROPS OPHTHALMIC at 07:59

## 2021-01-09 RX ADMIN — INSULIN DETEMIR 60 UNITS: 100 INJECTION, SOLUTION SUBCUTANEOUS at 21:06

## 2021-01-09 RX ADMIN — CLONIDINE HYDROCHLORIDE 0.1 MG: 0.1 TABLET ORAL at 20:07

## 2021-01-09 NOTE — PLAN OF CARE
Goal Outcome Evaluation:  Plan of Care Reviewed With: patient, spouse  Progress: improving   Pt alert and oriented; voiding well; PICC in place-CHG done; iv abx given; tolerating PO well. Groin area rash-Dr. ALCANTARA notified-nystatin cream ordered. Dressing changed by Dr. Juarez-left foot elevated, cdi.

## 2021-01-09 NOTE — PROGRESS NOTES
Orthopedic  Progress Note    Subjective     Post-Operative Day: 2 Days Post-Op  Procedure(s):  amputate left first toe and metatarsal  Laterality:Left      Systemic or Specific Complaints: no complaints  Objective     Vital signs in last 24 hours:  Temp:  [97.7 °F (36.5 °C)-98.8 °F (37.1 °C)] 98.2 °F (36.8 °C)  Heart Rate:  [71-88] 71  Resp:  [16-18] 16  BP: (128-164)/(61-95) 164/80    Neurovascular: left foot dense neuropathy               Wound: dressing changed left foot, looks suprisingly good- no necrosis, no fluctuance, mild normal swelling, no erythema, penrose removed    Data Review  Lab Results (last 24 hours)     Procedure Component Value Units Date/Time    POC Glucose Once [[id number]]  (Abnormal) Collected: 01/09/21 1121    Specimen: Blood Updated: 01/09/21 1123     Glucose 201 mg/dL     Wound Culture - Wound, Foot, Left [[id number]] Collected: 01/07/21 0752    Specimen: Wound from Foot, Left Updated: 01/09/21 0925     Wound Culture No growth at 2 days     Gram Stain No WBCs or organisms seen    Tissue / Bone Culture - Tissue, Toe, Left [[id number]] Collected: 01/07/21 1449    Specimen: Tissue from Toe, Left Updated: 01/09/21 0916     Tissue Culture No growth at 2 days     Gram Stain No organisms seen      Rare (1+) WBCs per low power field    Tissue / Bone Culture - Tissue, Toe, Left [[id number]] Collected: 01/07/21 1448    Specimen: Tissue from Toe, Left Updated: 01/09/21 0916     Tissue Culture No growth at 2 days     Gram Stain No organisms seen      Rare (1+) WBCs per low power field    Basic Metabolic Panel [[id number]]  (Abnormal) Collected: 01/09/21 0659    Specimen: Blood Updated: 01/09/21 0820     Glucose 135 mg/dL      BUN 18 mg/dL      Creatinine 1.92 mg/dL      Sodium 137 mmol/L      Potassium 4.6 mmol/L      Comment: Slight hemolysis detected by analyzer. Results may be affected.        Chloride 104 mmol/L      CO2 23.0 mmol/L      Calcium 9.1 mg/dL      eGFR Non African Amer 34 mL/min/1.73       BUN/Creatinine Ratio 9.4     Anion Gap 10.0 mmol/L     Narrative:      GFR Normal >60  Chronic Kidney Disease <60  Kidney Failure <15      POC Glucose Once [[id number]]  (Normal) Collected: 01/09/21 0740    Specimen: Blood Updated: 01/09/21 0750     Glucose 128 mg/dL     CBC (No Diff) [[id number]]  (Abnormal) Collected: 01/09/21 0659    Specimen: Blood Updated: 01/09/21 0742     WBC 7.27 10*3/mm3      RBC 3.90 10*6/mm3      Hemoglobin 10.1 g/dL      Hematocrit 34.8 %      MCV 89.2 fL      MCH 25.9 pg      MCHC 29.0 g/dL      RDW 14.0 %      RDW-SD 45.7 fl      MPV 9.4 fL      Platelets 382 10*3/mm3     POC Glucose Once [[id number]]  (Normal) Collected: 01/08/21 2048    Specimen: Blood Updated: 01/08/21 2050     Glucose 114 mg/dL     POC Glucose Once [[id number]]  (Normal) Collected: 01/08/21 1624    Specimen: Blood Updated: 01/08/21 1639     Glucose 122 mg/dL         Microbiology Results (last 10 days)     Procedure Component Value - Date/Time    Tissue / Bone Culture - Tissue, Toe, Left [[id number]] Collected: 01/07/21 1449    Lab Status: Preliminary result Specimen: Tissue from Toe, Left Updated: 01/09/21 0916     Tissue Culture No growth at 2 days     Gram Stain No organisms seen      Rare (1+) WBCs per low power field    AFB Culture - Tissue, Toe, Left [[id number]] Collected: 01/07/21 1449    Lab Status: Preliminary result Specimen: Tissue from Toe, Left Updated: 01/08/21 1203     AFB Stain No acid fast bacilli seen on concentrated smear    Tissue / Bone Culture - Tissue, Toe, Left [[id number]] Collected: 01/07/21 1448    Lab Status: Preliminary result Specimen: Tissue from Toe, Left Updated: 01/09/21 0916     Tissue Culture No growth at 2 days     Gram Stain No organisms seen      Rare (1+) WBCs per low power field    AFB Culture - Tissue, Toe, Left [[id number]] Collected: 01/07/21 1448    Lab Status: Preliminary result Specimen: Tissue from Toe, Left Updated: 01/08/21 1203     AFB Stain No acid fast bacilli seen on  concentrated smear    Wound Culture - Wound, Foot, Left [[id number]] Collected: 01/07/21 0752    Lab Status: Preliminary result Specimen: Wound from Foot, Left Updated: 01/09/21 0925     Wound Culture No growth at 2 days     Gram Stain No WBCs or organisms seen    COVID PRE-OP / PRE-PROCEDURE SCREENING ORDER (NO ISOLATION) - Swab, Nasopharynx [[id number]]  (Normal) Collected: 01/06/21 1721    Lab Status: Final result Specimen: Swab from Nasopharynx Updated: 01/06/21 1820    Narrative:      The following orders were created for panel order COVID PRE-OP / PRE-PROCEDURE SCREENING ORDER (NO ISOLATION) - Swab, Nasopharynx.  Procedure                               Abnormality         Status                     ---------                               -----------         ------                     Respiratory Panel PCR w/...[[id number]]  Normal              Final result                 Please view results for these tests on the individual orders.    Respiratory Panel PCR w/COVID-19(SARS-CoV-2) ERIC/JO/SHERYL/PAD/COR/MAD/FELICIA In-House, NP Swab in UTM/VTM, 3-4 HR TAT - Swab, Nasopharynx [[id number]]  (Normal) Collected: 01/06/21 1721    Lab Status: Final result Specimen: Swab from Nasopharynx Updated: 01/06/21 1820     ADENOVIRUS, PCR Not Detected     Coronavirus 229E Not Detected     Coronavirus HKU1 Not Detected     Coronavirus NL63 Not Detected     Coronavirus OC43 Not Detected     COVID19 Not Detected     Human Metapneumovirus Not Detected     Human Rhinovirus/Enterovirus Not Detected     Influenza A PCR Not Detected     Influenza A H1 Not Detected     Influenza A H1 2009 PCR Not Detected     Influenza A H3 Not Detected     Influenza B PCR Not Detected     Parainfluenza Virus 1 Not Detected     Parainfluenza Virus 2 Not Detected     Parainfluenza Virus 3 Not Detected     Parainfluenza Virus 4 Not Detected     RSV, PCR Not Detected     Bordetella pertussis pcr Not Detected     Bordetella parapertussis PCR Not Detected      Chlamydophila pneumoniae PCR Not Detected     Mycoplasma pneumo by PCR Not Detected    Narrative:      Fact sheet for providers: https://docs.[employer]/wp-content/uploads/AJC6696-9340-JL7.1-EUA-Provider-Fact-Sheet-3.pdf    Fact sheet for patients: https://docs.[employer]/wp-content/uploads/ZUD2751-4768-MF1.1-EUA-Patient-Fact-Sheet-1.pdf    Test performed by PCR.              Assessment/Plan     Status post- left 1st ray amp  -foot looks suprisingly good, I am cautiously optimistic it may heal.  Significant risk factors include poor arterial flow, poor glucose control, etc.  Must be absolutely non-wt bearing.  IV antibiotics  -cultures pending, no growth as yet but antibiotic modified.  He was taking keflex when he came in and had iv antibiotics pre op for the significant cellulitis  -no sign of new/acute gout attack, fairly extensive tophus in soft tissues and bone in the ulcer at the time of surgery, uric acid 6.8     - Dr Leblanc will see him tomorrow.  From my standpoint he may go home when antibiotics are arranged and when he is safe to be non-wt bearing at home    Juju Weber MD  Date: 1/9/2021  Time: 12:55 EST

## 2021-01-09 NOTE — PLAN OF CARE
Goal Outcome Evaluation:  Plan of Care Reviewed With: patient  Progress: no change    PT HAS SLEPT. REQUESTED PAIN MED TIMES ONE. RECEIVED MELATONIN FOR SLEEP. PLACED ON 2L PER NC WHILE SLEEPING. LEFT FOOT WITH ACE WRAP C,D,I. VOIDING WITHOUT DIFFICULTY. CONTINUED TO REMIND PT OF NWB ON LLE.

## 2021-01-09 NOTE — PROGRESS NOTES
"IM progress note      Amol Aguirre  [id number]  1945     LOS: 3 days     Attending: Pennie Foster MD    Primary Care Provider: Donte Briones MD      Chief Complaint/Reason for visit:  No chief complaint on file.      Subjective   2021: Mr. Aguirre underwent left first metatarsal and toe amputation.  Surgery was done by Dr. Juarez under general anesthesia and blocks.  Was tolerated well.    Seen in his room postoperatively, doing fairly well.  No complaints of pain, nausea, or vomiting.  No shortness of breath.  2021: Doing fairly well.  No complaints of pain.  No nausea, vomiting, or shortness of breath.  No pruritus no rash.  Receiving antibiotics per ID.  2021: Continues to do well, no complaints of pain.  No nausea, vomiting, or shortness of breath.  He will only consider discharge home.    Objective        Visit Vitals  /81 (BP Location: Left arm, Patient Position: Lying)   Pulse 71   Temp 98 °F (36.7 °C) (Oral)   Resp 16   Ht 190.5 cm (75\")   Wt 121 kg (267 lb)   SpO2 93%   BMI 33.37 kg/m²     Temp (24hrs), Av.2 °F (36.8 °C), Min:97.7 °F (36.5 °C), Max:98.8 °F (37.1 °C)       Physical Therapy:  Progress: improving  Outcome Summary: PT eval completed POD #1 L 1st toe and metatarsal amputation.  Pt. very pleasant, w/ good motivation; however, demonstrates inability to maintain NWB LLE w/ transfers.  Patient completed bed mobility w/ CGA, donned RLE prosthesis sitting EOB and attempted STS transfer from EOB and recliner w/ max A.  Pt. able to stand fully upright, although placed nearly full weight through L heel despite VCs/tactile cues to maintain weight-bearing status.  Unable to safely take steps.  Pt. will benefit from skilled IPPT to improve pt's safety and independence w/ functional mobility.  PT recommends IRF at D/C; pt. currently declining in favor of returning home w/ assist and HHPT.  Physical Exam:     General Appearance:    Alert, cooperative, in no " acute distress   Head:    Normocephalic, without obvious abnormality, atraumatic    Lungs:     Normal effort, symmetric chest rise,  clear to      auscultation bilaterally              Heart:    Regular rhythm and normal rate, normal S1 and S2 , 2/6 M   Abdomen:     Normal bowel sounds, no masses, no organomegaly, soft        non-tender, non-distended, no guarding, no rebound                tenderness   Extremities:  Dry and intact dressing on left foot.  Right BKA.   .    Pulses:   Pulses palpable and equal bilaterally   Skin:   No bleeding, bruising or rash          Results Review:     I reviewed the patient's new clinical results.   Results from last 7 days   Lab Units 01/09/21  0659 01/07/21  0615 01/06/21  1736   WBC 10*3/mm3 7.27 7.66 9.39   HEMOGLOBIN g/dL 10.1* 11.6* 11.3*   HEMATOCRIT % 34.8* 37.7 37.6   PLATELETS 10*3/mm3 382 415 437     Results from last 7 days   Lab Units 01/09/21  0659 01/07/21  0615 01/06/21  1736   SODIUM mmol/L 137 138 138   POTASSIUM mmol/L 4.6 4.6 4.8   CHLORIDE mmol/L 104 102 99   CO2 mmol/L 23.0 25.0 24.0   BUN mg/dL 18 21 26*   CREATININE mg/dL 1.92* 1.54* 1.74*   CALCIUM mg/dL 9.1 9.6 10.2   GLUCOSE mg/dL 135* 165* 183*      Results for ASAEL MONDRAGON (MRN [id number]) as of 1/9/2021 11:23   Ref. Range 1/8/2021 11:46 1/8/2021 16:24 1/8/2021 20:48 1/9/2021 06:59 1/9/2021 07:40   Glucose Latest Ref Range: 70 - 130 mg/dL 174 (H) 122 114 135 (H) 128     Collected Updated Procedure Result Status    01/07/2021 1449 01/07/2021 1534 Anaerobic Culture - Tissue, Toe, Left [[id number]]   Tissue from Toe, Left    In process Component Value   No component results              01/07/2021 1449 01/07/2021 1534 Fungus Culture - Tissue, Toe, Left [[id number]]   Tissue from Toe, Left    In process Component Value   No component results              01/07/2021 1449 01/09/2021 0916 Tissue / Bone Culture - Tissue, Toe, Left [[id number]]   Tissue from Toe, Left    Preliminary result Component  Value   Tissue Culture No growth at 2 days P   Gram Stain No organisms seen P    Rare (1+) WBCs per low power field P              01/07/2021 1449 01/08/2021 1203 AFB Culture - Tissue, Toe, Left [[id number]]   Tissue from Toe, Left    Preliminary result Component Value   AFB Stain No acid fast bacilli seen on concentrated smear P              01/07/2021 1448 01/07/2021 1726 Anaerobic Culture - Tissue, Toe, Left [[id number]]   Tissue from Toe, Left    In process Component Value   No component results              01/07/2021 1448 01/07/2021 1726 Fungus Culture - Tissue, Toe, Left [[id number]]   Tissue from Toe, Left    In process Component Value   No component results              01/07/2021 1448 01/09/2021 0916 Tissue / Bone Culture - Tissue, Toe, Left [[id number]]   Tissue from Toe, Left    Preliminary result Component Value   Tissue Culture No growth at 2 days P   Gram Stain No organisms seen P    Rare (1+) WBCs per low power field P              01/07/2021 1448 01/08/2021 1203 AFB Culture - Tissue, Toe, Left [[id number]]   Tissue from Toe, Left    Preliminary result Component Value   AFB Stain No acid fast bacilli seen on concentrated smear P              01/07/2021 0752 01/09/2021 0925 Wound Culture - Wound, Foot, Left [[id number]]   Wound from Foot, Left    Preliminary result Component Value   Wound Culture No growth at 2 days P   Gram Stain No WBCs or organisms seen P          All medications reviewed.   allopurinol, 100 mg, Oral, Daily  amLODIPine, 10 mg, Oral, Daily  budesonide-formoterol, 2 puff, Inhalation, BID - RT  cloNIDine, 0.1 mg, Oral, Q12H  DAPTOmycin, 6 mg/kg (Adjusted), Intravenous, Q24H  enoxaparin, 40 mg, Subcutaneous, Daily  gabapentin, 600 mg, Oral, 4x Daily  insulin detemir, 60 Units, Subcutaneous, Nightly  insulin lispro, 0-9 Units, Subcutaneous, TID AC  insulin lispro, 20 Units, Subcutaneous, TID With Meals  losartan, 100 mg, Oral, Daily  metoclopramide, 10 mg, Oral, 4x Daily  multivitamin, 1  tablet, Oral, Daily  mupirocin, , Topical, Q24H  pantoprazole, 40 mg, Oral, QAM AC  piperacillin-tazobactam, 3.375 g, Intravenous, Q8H  prednisoLONE acetate, 1 drop, Right Eye, Q12H  sodium chloride, 10 mL, Intravenous, Q12H      acetaminophen, 650 mg, Q4H PRN  bisacodyl, 10 mg, Daily PRN  dextrose, 25 g, Q15 Min PRN  dextrose, 15 g, Q15 Min PRN  diphenhydrAMINE, 25 mg, Nightly PRN    Or  diphenhydrAMINE, 25 mg, Nightly PRN  glucagon (human recombinant), 1 mg, Q15 Min PRN  HYDROcodone-acetaminophen, 1 tablet, Q4H PRN  HYDROcodone-acetaminophen, 2 tablet, Q4H PRN  HYDROmorphone, 1 mg, Q2H PRN    And  naloxone, 0.4 mg, Q5 Min PRN  labetalol, 10 mg, Q4H PRN  magnesium hydroxide, 10 mL, Daily PRN  melatonin, 5 mg, Nightly PRN  ondansetron, 4 mg, Q6H PRN    Or  ondansetron, 4 mg, Q6H PRN  ondansetron, 4 mg, Q6H PRN  oxyCODONE-acetaminophen, 1 tablet, Q4H PRN  oxyCODONE-acetaminophen, 2 tablet, Q4H PRN  promethazine, 12.5 mg, Q4H PRN    Or  promethazine, 12.5 mg, Q4H PRN  promethazine, 12.5 mg, Q4H PRN  sodium chloride, 500 mL, TID PRN  sodium chloride, 10 mL, PRN  sodium chloride, 10 mL, PRN        Assessment/Plan   Status post left fifth metatarsal and toe amputation.  Due to ulcer, soft tissue infection, osteomyelitis.    Type 2 diabetes mellitus with diabetic polyneuropathy, with long-term current use of insulin (CMS/HCC)    Diabetic foot ulcers (CMS/HCC)    HTN (hypertension)    CKD (chronic kidney disease)    Left foot pain    Diabetic foot ulcer (CMS/HCC)         Plan  Postop antibiotics per [facility]/Dr. Garcia, antibiotics of Zosyn and Vanco to start with.  Now on daptomycin and Zosyn, 1/9/2021 follow cultures.  Monitor labs and blood glucose.    1. PT/OT.  Nonweightbearing left lower extremity.  2. Pain control-prns   3. IS-encouraged  4. DVT proph-mechanicals and subcutaneous Lovenox.  5. Bowel regimen  6. Monitor post-op labs      - Hypertension:  Resume home medications as appropriate, formulary substitution  when indicated.  Holding parameters.  Prn medications for elevated blood pressure.    -CKD: Monitor volume status, avoid nephrotoxic agents, follow renal function closely.    Discharge planning: Patient is willing only to consider discharge home, does not want to go to inpatient rehab.  We will continue PT and OT in the hospital and try to make his home discharge as safe as possible.  Antibiotics at discharge will be per Dr. Garcia.    Pennie Foster MD  01/09/21  11:22 EST

## 2021-01-10 LAB
BACTERIA SPEC AEROBE CULT: NORMAL
GLUCOSE BLDC GLUCOMTR-MCNC: 185 MG/DL (ref 70–130)
GLUCOSE BLDC GLUCOMTR-MCNC: 199 MG/DL (ref 70–130)
GLUCOSE BLDC GLUCOMTR-MCNC: 201 MG/DL (ref 70–130)
GLUCOSE BLDC GLUCOMTR-MCNC: 212 MG/DL (ref 70–130)
GRAM STN SPEC: NORMAL

## 2021-01-10 PROCEDURE — 63710000001 INSULIN LISPRO (HUMAN) PER 5 UNITS: Performed by: ORTHOPAEDIC SURGERY

## 2021-01-10 PROCEDURE — 97110 THERAPEUTIC EXERCISES: CPT | Performed by: PHYSICAL THERAPIST

## 2021-01-10 PROCEDURE — 25010000002 ONDANSETRON PER 1 MG: Performed by: ORTHOPAEDIC SURGERY

## 2021-01-10 PROCEDURE — 97530 THERAPEUTIC ACTIVITIES: CPT | Performed by: PHYSICAL THERAPIST

## 2021-01-10 PROCEDURE — 94799 UNLISTED PULMONARY SVC/PX: CPT

## 2021-01-10 PROCEDURE — 25010000002 DAPTOMYCIN PER 1 MG: Performed by: INTERNAL MEDICINE

## 2021-01-10 PROCEDURE — 82962 GLUCOSE BLOOD TEST: CPT

## 2021-01-10 PROCEDURE — 63710000001 INSULIN DETEMIR PER 5 UNITS: Performed by: ORTHOPAEDIC SURGERY

## 2021-01-10 PROCEDURE — 25010000002 PIPERACILLIN SOD-TAZOBACTAM PER 1 G: Performed by: ORTHOPAEDIC SURGERY

## 2021-01-10 PROCEDURE — 25010000002 ENOXAPARIN PER 10 MG: Performed by: ORTHOPAEDIC SURGERY

## 2021-01-10 RX ORDER — CLONIDINE HYDROCHLORIDE 0.1 MG/1
0.1 TABLET ORAL 3 TIMES DAILY
Status: DISCONTINUED | OUTPATIENT
Start: 2021-01-10 | End: 2021-01-11 | Stop reason: HOSPADM

## 2021-01-10 RX ORDER — POLYETHYLENE GLYCOL 3350 17 G/17G
17 POWDER, FOR SOLUTION ORAL DAILY
Status: DISCONTINUED | OUTPATIENT
Start: 2021-01-10 | End: 2021-01-11 | Stop reason: HOSPADM

## 2021-01-10 RX ADMIN — PREDNISOLONE ACETATE 1 DROP: 10 SUSPENSION/ DROPS OPHTHALMIC at 08:15

## 2021-01-10 RX ADMIN — INSULIN DETEMIR 60 UNITS: 100 INJECTION, SOLUTION SUBCUTANEOUS at 21:03

## 2021-01-10 RX ADMIN — DAPTOMYCIN 600 MG: 500 INJECTION, POWDER, LYOPHILIZED, FOR SOLUTION INTRAVENOUS at 14:17

## 2021-01-10 RX ADMIN — METOCLOPRAMIDE 10 MG: 10 TABLET ORAL at 17:07

## 2021-01-10 RX ADMIN — METOCLOPRAMIDE 10 MG: 10 TABLET ORAL at 21:02

## 2021-01-10 RX ADMIN — PANTOPRAZOLE SODIUM 40 MG: 40 TABLET, DELAYED RELEASE ORAL at 08:14

## 2021-01-10 RX ADMIN — INSULIN LISPRO 20 UNITS: 100 INJECTION, SOLUTION INTRAVENOUS; SUBCUTANEOUS at 12:38

## 2021-01-10 RX ADMIN — TAZOBACTAM SODIUM AND PIPERACILLIN SODIUM 3.38 G: 375; 3 INJECTION, SOLUTION INTRAVENOUS at 09:43

## 2021-01-10 RX ADMIN — ACETAMINOPHEN 650 MG: 325 TABLET, FILM COATED ORAL at 17:07

## 2021-01-10 RX ADMIN — CLONIDINE HYDROCHLORIDE 0.1 MG: 0.1 TABLET ORAL at 17:07

## 2021-01-10 RX ADMIN — HYDROCODONE BITARTRATE AND ACETAMINOPHEN 1 TABLET: 7.5; 325 TABLET ORAL at 23:13

## 2021-01-10 RX ADMIN — INSULIN LISPRO 20 UNITS: 100 INJECTION, SOLUTION INTRAVENOUS; SUBCUTANEOUS at 08:14

## 2021-01-10 RX ADMIN — LOSARTAN POTASSIUM 100 MG: 50 TABLET, FILM COATED ORAL at 08:14

## 2021-01-10 RX ADMIN — ACETAMINOPHEN 650 MG: 325 TABLET, FILM COATED ORAL at 12:41

## 2021-01-10 RX ADMIN — POLYETHYLENE GLYCOL 3350 17 G: 17 POWDER, FOR SOLUTION ORAL at 12:37

## 2021-01-10 RX ADMIN — INSULIN LISPRO 20 UNITS: 100 INJECTION, SOLUTION INTRAVENOUS; SUBCUTANEOUS at 17:06

## 2021-01-10 RX ADMIN — INSULIN LISPRO 4 UNITS: 100 INJECTION, SOLUTION INTRAVENOUS; SUBCUTANEOUS at 17:08

## 2021-01-10 RX ADMIN — GABAPENTIN 600 MG: 300 CAPSULE ORAL at 08:14

## 2021-01-10 RX ADMIN — AMLODIPINE BESYLATE 10 MG: 10 TABLET ORAL at 08:13

## 2021-01-10 RX ADMIN — GABAPENTIN 600 MG: 300 CAPSULE ORAL at 17:07

## 2021-01-10 RX ADMIN — GABAPENTIN 600 MG: 300 CAPSULE ORAL at 12:37

## 2021-01-10 RX ADMIN — CLONIDINE HYDROCHLORIDE 0.1 MG: 0.1 TABLET ORAL at 21:02

## 2021-01-10 RX ADMIN — INSULIN LISPRO 2 UNITS: 100 INJECTION, SOLUTION INTRAVENOUS; SUBCUTANEOUS at 12:37

## 2021-01-10 RX ADMIN — METOCLOPRAMIDE 10 MG: 10 TABLET ORAL at 12:37

## 2021-01-10 RX ADMIN — PREDNISOLONE ACETATE 1 DROP: 10 SUSPENSION/ DROPS OPHTHALMIC at 21:03

## 2021-01-10 RX ADMIN — GABAPENTIN 600 MG: 300 CAPSULE ORAL at 21:02

## 2021-01-10 RX ADMIN — ALLOPURINOL 100 MG: 100 TABLET ORAL at 08:14

## 2021-01-10 RX ADMIN — TAZOBACTAM SODIUM AND PIPERACILLIN SODIUM 3.38 G: 375; 3 INJECTION, SOLUTION INTRAVENOUS at 17:07

## 2021-01-10 RX ADMIN — TAZOBACTAM SODIUM AND PIPERACILLIN SODIUM 3.38 G: 375; 3 INJECTION, SOLUTION INTRAVENOUS at 01:57

## 2021-01-10 RX ADMIN — CLONIDINE HYDROCHLORIDE 0.1 MG: 0.1 TABLET ORAL at 08:14

## 2021-01-10 RX ADMIN — METOCLOPRAMIDE 10 MG: 10 TABLET ORAL at 08:14

## 2021-01-10 RX ADMIN — ONDANSETRON 4 MG: 2 INJECTION INTRAMUSCULAR; INTRAVENOUS at 11:16

## 2021-01-10 RX ADMIN — ENOXAPARIN SODIUM 40 MG: 40 INJECTION SUBCUTANEOUS at 08:14

## 2021-01-10 RX ADMIN — MULTIVITAMIN TABLET 1 TABLET: TABLET at 08:14

## 2021-01-10 RX ADMIN — NYSTATIN: 100000 CREAM TOPICAL at 21:02

## 2021-01-10 RX ADMIN — BUDESONIDE AND FORMOTEROL FUMARATE DIHYDRATE 2 PUFF: 80; 4.5 AEROSOL RESPIRATORY (INHALATION) at 10:06

## 2021-01-10 RX ADMIN — INSULIN LISPRO 2 UNITS: 100 INJECTION, SOLUTION INTRAVENOUS; SUBCUTANEOUS at 08:13

## 2021-01-10 NOTE — PLAN OF CARE
Goal Outcome Evaluation:  Plan of Care Reviewed With: patient  Progress: no change      PT HAS SLEPT. RECEIVED MELATONIN FOR SLEEP. NO PAIN MEDS REQUIRED. PLACED O 2L PER NC WHEN SLEEPING. PT MOVES FREELY ABOUT THE BED. VOIDING WITHOUT DIFFICULTY.

## 2021-01-10 NOTE — PROGRESS NOTES
Orthopedic Daily Progress Note      CC: None    Pain controlled  General: no fevers, chills  Abdomen: No nausea, vomiting, or diarrhea    No other complaints    Physical Exam:  I have reviewed the vital signs.  Temp:  [97.7 °F (36.5 °C)-98.8 °F (37.1 °C)] 97.7 °F (36.5 °C)  Heart Rate:  [71-94] 79  Resp:  [16-18] 18  BP: (148-184)/(65-80) 158/76    Objective  General Appearance:    Alert, cooperative, no distress  Extremities: No clubbing, cyanosis, or edema to lower extremities  Skin: Dressing Clean/dry/intact      Results Review:    I have reviewed the labs, radiology results and diagnostic studies:    Results from last 7 days   Lab Units 01/09/21  0659   WBC 10*3/mm3 7.27   HEMOGLOBIN g/dL 10.1*   PLATELETS 10*3/mm3 382     Results from last 7 days   Lab Units 01/09/21  0659   SODIUM mmol/L 137   POTASSIUM mmol/L 4.6   CO2 mmol/L 23.0   CREATININE mg/dL 1.92*   GLUCOSE mg/dL 135*       I have reviewed the medications.    Assessment/Problem List  Status post left first ray amputation    Plan  Dressing changes per Dr. Weber  Home on antibiotics finalized  Glucose control      Jamey Leblanc MD  01/10/21  10:58 EST

## 2021-01-10 NOTE — PROGRESS NOTES
"IM progress note      Amol Aguirre  [id number]  1945     LOS: 4 days     Attending: Pennie Foster MD    Primary Care Provider: Donte Briones MD      Chief Complaint/Reason for visit:  No chief complaint on file.      Subjective   2021: Mr. Aguirre underwent left first metatarsal and toe amputation.  Surgery was done by Dr. Juarez under general anesthesia and blocks.  Was tolerated well.    Seen in his room postoperatively, doing fairly well.  No complaints of pain, nausea, or vomiting.  No shortness of breath.  2021: Doing fairly well.  No complaints of pain.  No nausea, vomiting, or shortness of breath.  No pruritus no rash.  Receiving antibiotics per ID.  2021: Continues to do well, no complaints of pain.  No nausea, vomiting, or shortness of breath.  He will only consider discharge home.  1/10/2021: Not feeling too well today.  Mild nausea.  No complaints of pain, no shortness of breath.  No BM for last 2 days according to patient.  Objective        Visit Vitals  BP (!) 184/75 (BP Location: Left arm, Patient Position: Lying)   Pulse 78   Temp 97.7 °F (36.5 °C) (Oral)   Resp 16   Ht 190.5 cm (75\")   Wt 121 kg (267 lb)   SpO2 91%   BMI 33.37 kg/m²     Temp (24hrs), Av.3 °F (36.8 °C), Min:97.7 °F (36.5 °C), Max:98.8 °F (37.1 °C)       Physical Therapy:  Progress: improving  Outcome Summary: PT eval completed POD #1 L 1st toe and metatarsal amputation.  Pt. very pleasant, w/ good motivation; however, demonstrates inability to maintain NWB LLE w/ transfers.  Patient completed bed mobility w/ CGA, donned RLE prosthesis sitting EOB and attempted STS transfer from EOB and recliner w/ max A.  Pt. able to stand fully upright, although placed nearly full weight through L heel despite VCs/tactile cues to maintain weight-bearing status.  Unable to safely take steps.  Pt. will benefit from skilled IPPT to improve pt's safety and independence w/ functional mobility.  PT recommends " IRF at D/C; pt. currently declining in favor of returning home w/ assist and HHPT.  Physical Exam:     General Appearance:    Alert, cooperative, in no acute distress   Head:    Normocephalic, without obvious abnormality, atraumatic    Lungs:     Normal effort, symmetric chest rise,  clear to      auscultation bilaterally              Heart:    Regular rhythm and normal rate, normal S1 and S2 , 2/6 M   Abdomen:     Normal bowel sounds, no masses, no organomegaly, soft        non-tender, non-distended, no guarding, no rebound                tenderness   Extremities:  Dry and intact dressing on left foot.  Right BKA.   .    Pulses:   Pulses palpable and equal bilaterally   Skin:   No bleeding, bruising or rash          Results Review:     I reviewed the patient's new clinical results.   Results from last 7 days   Lab Units 01/09/21  0659 01/07/21  0615 01/06/21  1736   WBC 10*3/mm3 7.27 7.66 9.39   HEMOGLOBIN g/dL 10.1* 11.6* 11.3*   HEMATOCRIT % 34.8* 37.7 37.6   PLATELETS 10*3/mm3 382 415 437     Results from last 7 days   Lab Units 01/09/21  0659 01/07/21  0615 01/06/21  1736   SODIUM mmol/L 137 138 138   POTASSIUM mmol/L 4.6 4.6 4.8   CHLORIDE mmol/L 104 102 99   CO2 mmol/L 23.0 25.0 24.0   BUN mg/dL 18 21 26*   CREATININE mg/dL 1.92* 1.54* 1.74*   CALCIUM mg/dL 9.1 9.6 10.2   GLUCOSE mg/dL 135* 165* 183*    Results for MONDRAGON ASAEL CHENG (MRN [id number]) as of 1/10/2021 16:09   Ref. Range 1/9/2021 20:51 1/10/2021 07:57 1/10/2021 11:42 1/10/2021 15:44   Glucose Latest Ref Range: 70 - 130 mg/dL 168 (H) 185 (H) 199 (H) 201 (H)          Collected Updated Procedure Result Status    01/07/2021 1449 01/07/2021 1534 Anaerobic Culture - Tissue, Toe, Left [[id number]]   Tissue from Toe, Left    In process Component Value   No component results              01/07/2021 1449 01/07/2021 1534 Fungus Culture - Tissue, Toe, Left [[id number]]   Tissue from Toe, Left    In process Component Value   No component results               01/07/2021 1449 01/09/2021 0916 Tissue / Bone Culture - Tissue, Toe, Left [[id number]]   Tissue from Toe, Left    Preliminary result Component Value   Tissue Culture No growth at 2 days P   Gram Stain No organisms seen P    Rare (1+) WBCs per low power field P              01/07/2021 1449 01/08/2021 1203 AFB Culture - Tissue, Toe, Left [[id number]]   Tissue from Toe, Left    Preliminary result Component Value   AFB Stain No acid fast bacilli seen on concentrated smear P              01/07/2021 1448 01/07/2021 1726 Anaerobic Culture - Tissue, Toe, Left [[id number]]   Tissue from Toe, Left    In process Component Value   No component results              01/07/2021 1448 01/07/2021 1726 Fungus Culture - Tissue, Toe, Left [[id number]]   Tissue from Toe, Left    In process Component Value   No component results              01/07/2021 1448 01/09/2021 0916 Tissue / Bone Culture - Tissue, Toe, Left [[id number]]   Tissue from Toe, Left    Preliminary result Component Value   Tissue Culture No growth at 2 days P   Gram Stain No organisms seen P    Rare (1+) WBCs per low power field P              01/07/2021 1448 01/08/2021 1203 AFB Culture - Tissue, Toe, Left [[id number]]   Tissue from Toe, Left    Preliminary result Component Value   AFB Stain No acid fast bacilli seen on concentrated smear P              01/07/2021 0752 01/09/2021 0925 Wound Culture - Wound, Foot, Left [[id number]]   Wound from Foot, Left    Preliminary result Component Value   Wound Culture No growth at 2 days P   Gram Stain No WBCs or organisms seen P          All medications reviewed.   allopurinol, 100 mg, Oral, Daily  amLODIPine, 10 mg, Oral, Daily  budesonide-formoterol, 2 puff, Inhalation, BID - RT  cloNIDine, 0.1 mg, Oral, TID  DAPTOmycin, 6 mg/kg (Adjusted), Intravenous, Q24H  enoxaparin, 40 mg, Subcutaneous, Daily  gabapentin, 600 mg, Oral, 4x Daily  insulin detemir, 60 Units, Subcutaneous, Nightly  insulin lispro, 0-9 Units, Subcutaneous, TID  AC  insulin lispro, 20 Units, Subcutaneous, TID With Meals  losartan, 100 mg, Oral, Daily  metoclopramide, 10 mg, Oral, 4x Daily  multivitamin, 1 tablet, Oral, Daily  mupirocin, , Topical, Q24H  nystatin, , Topical, Q12H  pantoprazole, 40 mg, Oral, QAM AC  piperacillin-tazobactam, 3.375 g, Intravenous, Q8H  polyethylene glycol, 17 g, Oral, Daily  prednisoLONE acetate, 1 drop, Right Eye, Q12H  sodium chloride, 10 mL, Intravenous, Q12H      acetaminophen, 650 mg, Q4H PRN  bisacodyl, 10 mg, Daily PRN  dextrose, 25 g, Q15 Min PRN  dextrose, 15 g, Q15 Min PRN  diphenhydrAMINE, 25 mg, Nightly PRN    Or  diphenhydrAMINE, 25 mg, Nightly PRN  glucagon (human recombinant), 1 mg, Q15 Min PRN  HYDROcodone-acetaminophen, 1 tablet, Q4H PRN  HYDROcodone-acetaminophen, 2 tablet, Q4H PRN  HYDROmorphone, 1 mg, Q2H PRN    And  naloxone, 0.4 mg, Q5 Min PRN  labetalol, 10 mg, Q4H PRN  magnesium hydroxide, 10 mL, Daily PRN  melatonin, 5 mg, Nightly PRN  ondansetron, 4 mg, Q6H PRN    Or  ondansetron, 4 mg, Q6H PRN  ondansetron, 4 mg, Q6H PRN  oxyCODONE-acetaminophen, 1 tablet, Q4H PRN  oxyCODONE-acetaminophen, 2 tablet, Q4H PRN  promethazine, 12.5 mg, Q4H PRN    Or  promethazine, 12.5 mg, Q4H PRN  promethazine, 12.5 mg, Q4H PRN  sodium chloride, 500 mL, TID PRN  sodium chloride, 10 mL, PRN  sodium chloride, 10 mL, PRN        Assessment/Plan   Status post left fifth metatarsal and toe amputation.  Due to ulcer, soft tissue infection, osteomyelitis.    Type 2 diabetes mellitus with diabetic polyneuropathy, with long-term current use of insulin (CMS/HCC)    Diabetic foot ulcers (CMS/HCC)    HTN (hypertension)    CKD (chronic kidney disease)    Left foot pain    Diabetic foot ulcer (CMS/HCC)         Plan  Postop antibiotics per [facility]/Dr. Garcia, antibiotics of Zosyn and Vanco to start with.  Now on daptomycin and Zosyn, 1/9/2021 follow cultures.  Monitor labs and blood glucose.    1. PT/OT.  Nonweightbearing left lower extremity.  2. Pain  control-prns   3. IS-encouraged  4. DVT proph-mechanicals and subcutaneous Lovenox.  5. Bowel regimen  6. Monitor post-op labs      - Hypertension: Blood pressure elevation noted.  Resume home medications as appropriate, formulary substitution when indicated.  Holding parameters.  Increased frequency of clonidine.  1/10/2021  Prn medications for elevated blood pressure.    -CKD: Monitor volume status, avoid nephrotoxic agents, follow renal function closely.    Discharge planning: Patient is willing only to consider discharge home, does not want to go to inpatient rehab.  We will continue PT and OT in the hospital and try to make his home discharge as safe as possible.  Antibiotics at discharge will be per Dr. Garcia.    Pennie Foster MD  01/10/21  09:34 EST

## 2021-01-10 NOTE — PLAN OF CARE
Problem: Adult Inpatient Plan of Care  Goal: Plan of Care Review  Recent Flowsheet Documentation  Taken 1/10/2021 1415 by Kati Neumann PT  Progress: no change  Plan of Care Reviewed With: patient  Outcome Summary: Pt able to perform bed mobility with min assist. Pt able to don prosthesis on R LE sitting EOB. Pt required mod assist x 2 for first STS but unable to maintain NWB on L LE. Educated patient on NWB status again but patient still unable to maintain with second STS and bed to chair transfer. Pt required mod assist x 2 with bed to chair transfer. If patient is unable to maintain, may try sliding board transfer at next session. Pt is requesting to go home, discussed with patient in order to do so he must maintain NWB on L LE with transfers.

## 2021-01-10 NOTE — THERAPY TREATMENT NOTE
Patient Name: Amol Aguirre  : 1945    MRN: [id number]                              Today's Date: 1/10/2021       Admit Date: 2021    Visit Dx:     ICD-10-CM ICD-9-CM   1. Left foot pain  M79.672 729.5     Patient Active Problem List   Diagnosis   • Right foot pain   • Decreased pulses in feet   • Vascular calcification   • Uncontrolled type 2 diabetes mellitus with hyperglycemia (CMS/[facility])   • Type 2 diabetes mellitus with diabetic polyneuropathy, with long-term current use of insulin (CMS/[facility])   • Mass of lesser toe of right foot   • Blister (nonthermal), right foot, initial encounter   • Diabetic foot ulcers (CMS/[facility])   • Diabetic ulcer of toe of left foot associated with type 2 diabetes mellitus, limited to breakdown of skin (CMS/[facility])   • Gangrene (CMS/[facility])   • S/P BKA (below knee amputation), right (CMS/[facility])   • HTN (hypertension)   • CKD (chronic kidney disease)   • Leukocytosis, likely reactive   • Hyperkalemia   • Anemia   • Acute postoperative pain   • 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   • left 3rd toe cellulitis   • Cellulitis and abscess of toe of left foot   • S/P Amputation of 3rd toe of left foot (CMS/[facility])   • Diabetic ulcer of left ankle (CMS/[facility])   • Non-hospital acquired pressure injury of skin   • Nuclear sclerotic cataract of right eye   • Nuclear sclerotic cataract of left eye   • Left foot pain   • Diabetic foot ulcer (CMS/[facility])     Past Medical History:   Diagnosis Date   • Acid reflux    • Asthma    • Diabetes (CMS/[facility])    • Hypertension      Past Surgical History:   Procedure Laterality Date   • AMPUTATION DIGIT Left 10/15/2019    Procedure: AMPUTATE LEFT THIRD TOE;  Surgeon: Juju Weber MD;  Location:  [facility] OR;  Service: Orthopedics   • AMPUTATION DIGIT Left 2021    Procedure: amputate left first toe and metatarsal;  Surgeon: Juju Weber MD;  Location:  [facility] OR;  Service: Orthopedics;  Laterality: Left;   • AORTAGRAM N/A 2019     Procedure: AORTAGRAM WITH OR WITHOUT RUNOFFS;  Surgeon: Carrillo White MD;  Location:  [facility];  Service: Vascular   • BELOW KNEE AMPUTATION Right 6/4/2019    Procedure: BELOW KNEE AMPUTATION RIGHT;  Surgeon: Juju Weber MD;  Location: [facility]placed by [facility];  Service: Orthopedics   • CATARACT EXTRACTION W/ INTRAOCULAR LENS IMPLANT Right 7/20/2020    Procedure: CATARACT PHACO EXTRACTION WITH INTRAOCULAR LENS IMPLANT RIGHT;  Surgeon: Jez Mas MD;  Location: [facility] OR;  Service: Ophthalmology;  Laterality: Right;   • CATARACT EXTRACTION W/ INTRAOCULAR LENS IMPLANT Left 8/3/2020    Procedure: CATARACT PHACO EXTRACTION WITH INTRAOCULAR LENS IMPLANT LEFT;  Surgeon: Jez Mas MD;  Location: [facility] OR;  Service: Ophthalmology;  Laterality: Left;   • CHOLECYSTECTOMY     • FOOT SURGERY Left 10/15/2019    Amputate 3rd toe- [place]   • KNEE SURGERY Right     TKA     General Information     Row Name 01/10/21 1415          Physical Therapy Time and Intention    Document Type  therapy note (daily note)  -CS     Mode of Treatment  physical therapy  -CS     Row Name 01/10/21 1415          General Information    Patient Profile Reviewed  yes  -CS     Existing Precautions/Restrictions  fall;left;non-weight bearing;other (see comments) R BKA with prosthesis  -CS     Row Name 01/10/21 1415          Cognition    Orientation Status (Cognition)  oriented x 4  -CS     Row Name 01/10/21 1415          Safety Issues, Functional Mobility    Safety Issues Affecting Function (Mobility)  safety precautions follow-through/compliance;safety precaution awareness;unable to maintain weight-bearing restrictions;insight into deficits/self-awareness;positioning of assistive device;ability to follow commands;awareness of need for assistance;problem-solving;at risk behavior observed;judgment  -CS     Impairments Affecting Function (Mobility)  strength;balance;coordination  -CS       User Key  (r) = Recorded By, (t) = Taken By, (c)  = Cosigned By    Initials Name Provider Type    CS Kati Neumann, PT Physical Therapist        Mobility     Row Name 01/10/21 1538          Bed Mobility    Bed Mobility  supine-sit  -CS     Supine-Sit Story (Bed Mobility)  verbal cues;minimum assist (75% patient effort)  -CS     Assistive Device (Bed Mobility)  bed rails;head of bed elevated  -CS     Comment (Bed Mobility)  VC's to move LE's to EOB and push trunk off of bed. Pt required min assist to lift trunk off  of bed. Pt able to don prosthesis sitting EOB. Pt required to  order to click prosthesis into place.  -CS     Row Name 01/10/21 1538          Transfers    Comment (Transfers)  Performed 2 STS, the first to click in his prosthesis. Pt able to maintain NWB on L LE, educated patient on importance of not placing weight through L LE and educated on MD's orders of NWB. Performed second stand and bed to chair transfer and patient again unable to maintain NWB on L LE.  -     Row Name 01/10/21 1538          Bed-Chair Transfer    Bed-Chair Story (Transfers)  verbal cues;moderate assist (50% patient effort);2 person assist  -CS     Assistive Device (Bed-Chair Transfers)  other (see comments) BUE support  -CS     Row Name 01/10/21 1538          Sit-Stand Transfer    Sit-Stand Story (Transfers)  moderate assist (50% patient effort);2 person assist  -CS     Assistive Device (Sit-Stand Transfers)  other (see comments) BUE support  -     Row Name 01/10/21 1538          Gait/Stairs (Locomotion)    Story Level (Gait)  unable to assess  -CS     Comment (Gait/Stairs)  Pt unable to maintain NWB on LLE with standing and transfer.  -CS     Row Name 01/10/21 1538          Mobility    Extremity Weight-bearing Status  left lower extremity  -CS     Left Lower Extremity (Weight-bearing Status)  (S) non weight-bearing (NWB)  -CS       User Key  (r) = Recorded By, (t) = Taken By, (c) = Cosigned By    Initials Name Provider Type    CS  Kati Neumann PT Physical Therapist        Obj/Interventions     Row Name 01/10/21 1415          Hip (Therapeutic Exercise)    Hip (Therapeutic Exercise)  isometric exercises;strengthening exercise  -     Hip Isometrics (Therapeutic Exercise)  bilateral;gluteal sets;10 repetitions  -     Hip Strengthening (Therapeutic Exercise)  bilateral;aBduction;10 repetitions  -     Row Name 01/10/21 1415          Knee (Therapeutic Exercise)    Knee (Therapeutic Exercise)  isometric exercises  -     Knee Isometrics (Therapeutic Exercise)  bilateral;quad sets;10 repetitions  -     Knee Strengthening (Therapeutic Exercise)  bilateral;heel slides;SLR (straight leg raise);LAQ (long arc quad);10 repetitions  -     Row Name 01/10/21 1415          Ankle (Therapeutic Exercise)    Ankle AROM (Therapeutic Exercise)  left;dorsiflexion;plantarflexion;10 repetitions  -     Row Name 01/10/21 1415          Balance    Balance Assessment  sitting static balance;standing static balance;standing dynamic balance  -     Static Sitting Balance  WFL;unsupported;sitting, edge of bed  -     Static Standing Balance  severe impairment;supported;standing Unable to maintain NWB on L LE  -     Dynamic Standing Balance  severe impairment;supported;standing Unable to maintain NWB on L LE  -       User Key  (r) = Recorded By, (t) = Taken By, (c) = Cosigned By    Initials Name Provider Type    Kati Garcia PT Physical Therapist        Goals/Plan     Row Name 01/10/21 1415          Stairs Goal 1 (PT)    Progress/Outcome (Stairs Goal 1, PT)  goal no longer appropriate Pt's family got a ramp for the stairs into patient's house  -       User Key  (r) = Recorded By, (t) = Taken By, (c) = Cosigned By    Initials Name Provider Type    Kati Garcia PT Physical Therapist        Clinical Impression     Row Name 01/10/21 1415          Pain    Additional Documentation  Pain Scale: Numbers Pre/Post-Treatment (Group)  -      Row Name 01/10/21 1415          Pain Scale: Numbers Pre/Post-Treatment    Pretreatment Pain Rating  0/10 - no pain  -CS     Posttreatment Pain Rating  0/10 - no pain  -CS     Row Name 01/10/21 1415          Plan of Care Review    Plan of Care Reviewed With  patient  -CS     Progress  no change  -CS     Outcome Summary  Pt able to perform bed mobility with min assist. Pt able to don prosthesis on R LE sitting EOB. Pt required mod assist x 2 for first STS but unable to maintain NWB on L LE. Educated patient on NWB status again but patient still unable to maintain with second STS and bed to chair transfer. Pt required mod assist x 2 with bed to chair transfer. Pt is requesting to go home, discussed with patient in order to do so he must maintain NWB on L LE with transfers.  -CS     Row Name 01/10/21 1415          Therapy Assessment/Plan (PT)    Rehab Potential (PT)  good, to achieve stated therapy goals  -CS     Criteria for Skilled Interventions Met (PT)  yes;meets criteria;skilled treatment is necessary  -CS     Row Name 01/10/21 1415          Positioning and Restraints    Pre-Treatment Position  in bed  -CS     Post Treatment Position  chair  -CS     In Chair  notified nsg;reclined;encouraged to call for assist;exit alarm on;with family/caregiver;legs elevated;LLE elevated;on mechanical lift sling R prosthesis donned  -CS       User Key  (r) = Recorded By, (t) = Taken By, (c) = Cosigned By    Initials Name Provider Type    CS Kati Neumann, PT Physical Therapist        Outcome Measures     Row Name 01/10/21 1415          How much help from another person do you currently need...    Turning from your back to your side while in flat bed without using bedrails?  3  -CS     Moving from lying on back to sitting on the side of a flat bed without bedrails?  3  -CS     Moving to and from a bed to a chair (including a wheelchair)?  2  -CS     Standing up from a chair using your arms (e.g., wheelchair, bedside  chair)?  2  -CS     Climbing 3-5 steps with a railing?  1  -CS     To walk in hospital room?  1  -CS     AM-PAC 6 Clicks Score (PT)  12  -CS     Row Name 01/10/21 1415          Functional Assessment    Outcome Measure Options  AM-PAC 6 Clicks Basic Mobility (PT)  -CS       User Key  (r) = Recorded By, (t) = Taken By, (c) = Cosigned By    Initials Name Provider Type    Kati Garcia, PT Physical Therapist        Physical Therapy Education                 Title: PT OT SLP Therapies (Done)     Topic: Physical Therapy (Done)     Point: Mobility training (Done)     Learning Progress Summary           Patient Acceptance, D,E, VU,NR by CS at 1/10/2021 1415    Comment: Educated patient on safe hand placement with transfers, bed mobility sequencing, bed to chair transfer, NWB on L LE, and ther ex.    Acceptance, E,D, VU,NR by MB at 1/8/2021 1607    Comment: Provided educ. re: NWB LLE, transfer safety/mechanics, gait safety/mechanics, home safety, D/C recommendations, POC progression, fall precautions   Family Acceptance, E,D, VU,NR by MB at 1/8/2021 1607    Comment: Provided educ. re: NWB LLE, transfer safety/mechanics, gait safety/mechanics, home safety, D/C recommendations, POC progression, fall precautions                   Point: Home exercise program (Done)     Learning Progress Summary           Patient Acceptance, D,E, VU,NR by CS at 1/10/2021 1415    Comment: Educated patient on safe hand placement with transfers, bed mobility sequencing, bed to chair transfer, NWB on L LE, and ther ex.    Acceptance, E,D, VU,NR by MB at 1/8/2021 1607    Comment: Provided educ. re: NWB LLE, transfer safety/mechanics, gait safety/mechanics, home safety, D/C recommendations, POC progression, fall precautions   Family Acceptance, E,D, VU,NR by MB at 1/8/2021 1607    Comment: Provided educ. re: NWB LLE, transfer safety/mechanics, gait safety/mechanics, home safety, D/C recommendations, POC progression, fall precautions                    Point: Body mechanics (Done)     Learning Progress Summary           Patient Acceptance, D,E, VU,NR by CS at 1/10/2021 1415    Comment: Educated patient on safe hand placement with transfers, bed mobility sequencing, bed to chair transfer, NWB on L LE, and ther ex.    Acceptance, E,D, VU,NR by MB at 1/8/2021 1607    Comment: Provided educ. re: NWB LLE, transfer safety/mechanics, gait safety/mechanics, home safety, D/C recommendations, POC progression, fall precautions   Family Acceptance, E,D, VU,NR by MB at 1/8/2021 1607    Comment: Provided educ. re: NWB LLE, transfer safety/mechanics, gait safety/mechanics, home safety, D/C recommendations, POC progression, fall precautions                   Point: Precautions (Done)     Learning Progress Summary           Patient Acceptance, D,E, VU,NR by CS at 1/10/2021 1415    Comment: Educated patient on safe hand placement with transfers, bed mobility sequencing, bed to chair transfer, NWB on L LE, and ther ex.    Acceptance, E,D, VU,NR by MB at 1/8/2021 1607    Comment: Provided educ. re: NWB LLE, transfer safety/mechanics, gait safety/mechanics, home safety, D/C recommendations, POC progression, fall precautions   Family Acceptance, E,D, VU,NR by MB at 1/8/2021 1607    Comment: Provided educ. re: NWB LLE, transfer safety/mechanics, gait safety/mechanics, home safety, D/C recommendations, POC progression, fall precautions                               User Key     Initials Effective Dates Name Provider Type Discipline    MB 03/14/16 -  Dena Keith, PT Physical Therapist PT     03/26/19 -  Kati Neumann PT Physical Therapist PT              PT Recommendation and Plan  Planned Therapy Interventions (PT): balance training, bed mobility training, gait training, home exercise program, neuromuscular re-education, patient/family education, ROM (range of motion), strengthening, transfer training, wheelchair management/propulsion training  Plan of Care  Reviewed With: patient  Progress: no change  Outcome Summary: Pt able to perform bed mobility with min assist. Pt able to don prosthesis on R LE sitting EOB. Pt required mod assist x 2 for first STS but unable to maintain NWB on L LE. Educated patient on NWB status again but patient still unable to maintain with second STS and bed to chair transfer. Pt required mod assist x 2 with bed to chair transfer. Pt is requesting to go home, discussed with patient in order to do so he must maintain NWB on L LE with transfers.     Time Calculation:   PT Charges     Row Name 01/10/21 1415             Time Calculation    Start Time  1415  -CS      PT Received On  01/10/21  -         Time Calculation- PT    Total Timed Code Minutes- PT  23 minute(s)  -CS         Timed Charges    15155 - PT Therapeutic Exercise Minutes  8  -CS      73743 - PT Therapeutic Activity Minutes  15  -CS        User Key  (r) = Recorded By, (t) = Taken By, (c) = Cosigned By    Initials Name Provider Type    CS Kati Neumann, CRISELDA Physical Therapist        Therapy Charges for Today     Code Description Service Date Service Provider Modifiers Qty    00266947076 HC PT THER PROC EA 15 MIN 1/10/2021 Kati Neumann, PT GP 1    87845785627 HC PT THERAPEUTIC ACT EA 15 MIN 1/10/2021 Kati Neumann, PT GP 1    [id number] HC PT THER SUPP EA 15 MIN 1/10/2021 Kati Neumann, PT GP 2          PT G-Codes  Outcome Measure Options: AM-PAC 6 Clicks Basic Mobility (PT)  AM-PAC 6 Clicks Score (PT): 12    Kati Neumann PT  1/10/2021

## 2021-01-11 VITALS
DIASTOLIC BLOOD PRESSURE: 82 MMHG | RESPIRATION RATE: 18 BRPM | HEART RATE: 93 BPM | OXYGEN SATURATION: 91 % | SYSTOLIC BLOOD PRESSURE: 172 MMHG | HEIGHT: 75 IN | WEIGHT: 267 LBS | TEMPERATURE: 97.8 F | BODY MASS INDEX: 33.2 KG/M2

## 2021-01-11 LAB
ANION GAP SERPL CALCULATED.3IONS-SCNC: 12 MMOL/L (ref 5–15)
BUN SERPL-MCNC: 29 MG/DL (ref 8–23)
BUN/CREAT SERPL: 15.4 (ref 7–25)
CALCIUM SPEC-SCNC: 10.4 MG/DL (ref 8.6–10.5)
CHLORIDE SERPL-SCNC: 101 MMOL/L (ref 98–107)
CO2 SERPL-SCNC: 25 MMOL/L (ref 22–29)
CREAT SERPL-MCNC: 1.88 MG/DL (ref 0.76–1.27)
DEPRECATED RDW RBC AUTO: 44 FL (ref 37–54)
ERYTHROCYTE [DISTWIDTH] IN BLOOD BY AUTOMATED COUNT: 13.8 % (ref 12.3–15.4)
GFR SERPL CREATININE-BSD FRML MDRD: 35 ML/MIN/1.73
GLUCOSE BLDC GLUCOMTR-MCNC: 191 MG/DL (ref 70–130)
GLUCOSE BLDC GLUCOMTR-MCNC: 193 MG/DL (ref 70–130)
GLUCOSE SERPL-MCNC: 196 MG/DL (ref 65–99)
HCT VFR BLD AUTO: 37.1 % (ref 37.5–51)
HGB BLD-MCNC: 11.4 G/DL (ref 13–17.7)
MCH RBC QN AUTO: 27.1 PG (ref 26.6–33)
MCHC RBC AUTO-ENTMCNC: 30.7 G/DL (ref 31.5–35.7)
MCV RBC AUTO: 88.3 FL (ref 79–97)
PLATELET # BLD AUTO: 454 10*3/MM3 (ref 140–450)
PMV BLD AUTO: 9.5 FL (ref 6–12)
POTASSIUM SERPL-SCNC: 5.4 MMOL/L (ref 3.5–5.2)
RBC # BLD AUTO: 4.2 10*6/MM3 (ref 4.14–5.8)
SODIUM SERPL-SCNC: 138 MMOL/L (ref 136–145)
WBC # BLD AUTO: 10.76 10*3/MM3 (ref 3.4–10.8)

## 2021-01-11 PROCEDURE — 94799 UNLISTED PULMONARY SVC/PX: CPT

## 2021-01-11 PROCEDURE — 63710000001 INSULIN LISPRO (HUMAN) PER 5 UNITS: Performed by: ORTHOPAEDIC SURGERY

## 2021-01-11 PROCEDURE — 25010000002 DAPTOMYCIN PER 1 MG: Performed by: INTERNAL MEDICINE

## 2021-01-11 PROCEDURE — 25010000002 PIPERACILLIN SOD-TAZOBACTAM PER 1 G: Performed by: ORTHOPAEDIC SURGERY

## 2021-01-11 PROCEDURE — 85027 COMPLETE CBC AUTOMATED: CPT | Performed by: INTERNAL MEDICINE

## 2021-01-11 PROCEDURE — 25010000002 ENOXAPARIN PER 10 MG: Performed by: ORTHOPAEDIC SURGERY

## 2021-01-11 PROCEDURE — 80048 BASIC METABOLIC PNL TOTAL CA: CPT | Performed by: INTERNAL MEDICINE

## 2021-01-11 PROCEDURE — 25010000002 ERTAPENEM PER 500 MG: Performed by: INTERNAL MEDICINE

## 2021-01-11 PROCEDURE — 82962 GLUCOSE BLOOD TEST: CPT

## 2021-01-11 RX ORDER — OXYCODONE HYDROCHLORIDE AND ACETAMINOPHEN 5; 325 MG/1; MG/1
1 TABLET ORAL EVERY 4 HOURS PRN
Qty: 15 TABLET | Refills: 0 | Status: SHIPPED | OUTPATIENT
Start: 2021-01-11 | End: 2021-01-17

## 2021-01-11 RX ORDER — POLYETHYLENE GLYCOL 3350 17 G/17G
17 POWDER, FOR SOLUTION ORAL DAILY
Qty: 238 G | Refills: 0 | Status: SHIPPED | OUTPATIENT
Start: 2021-01-12 | End: 2021-05-29 | Stop reason: HOSPADM

## 2021-01-11 RX ADMIN — INSULIN LISPRO 20 UNITS: 100 INJECTION, SOLUTION INTRAVENOUS; SUBCUTANEOUS at 09:29

## 2021-01-11 RX ADMIN — PREDNISOLONE ACETATE 1 DROP: 10 SUSPENSION/ DROPS OPHTHALMIC at 09:30

## 2021-01-11 RX ADMIN — INSULIN LISPRO 2 UNITS: 100 INJECTION, SOLUTION INTRAVENOUS; SUBCUTANEOUS at 12:17

## 2021-01-11 RX ADMIN — PANTOPRAZOLE SODIUM 40 MG: 40 TABLET, DELAYED RELEASE ORAL at 09:27

## 2021-01-11 RX ADMIN — METOCLOPRAMIDE 10 MG: 10 TABLET ORAL at 09:27

## 2021-01-11 RX ADMIN — MULTIVITAMIN TABLET 1 TABLET: TABLET at 09:28

## 2021-01-11 RX ADMIN — ALLOPURINOL 100 MG: 100 TABLET ORAL at 09:28

## 2021-01-11 RX ADMIN — SODIUM CHLORIDE, PRESERVATIVE FREE 10 ML: 5 INJECTION INTRAVENOUS at 09:30

## 2021-01-11 RX ADMIN — AMLODIPINE BESYLATE 10 MG: 10 TABLET ORAL at 09:29

## 2021-01-11 RX ADMIN — INSULIN LISPRO 2 UNITS: 100 INJECTION, SOLUTION INTRAVENOUS; SUBCUTANEOUS at 09:28

## 2021-01-11 RX ADMIN — MUPIROCIN: 20 OINTMENT TOPICAL at 09:30

## 2021-01-11 RX ADMIN — POLYETHYLENE GLYCOL 3350 17 G: 17 POWDER, FOR SOLUTION ORAL at 09:29

## 2021-01-11 RX ADMIN — METOCLOPRAMIDE 10 MG: 10 TABLET ORAL at 12:17

## 2021-01-11 RX ADMIN — TAZOBACTAM SODIUM AND PIPERACILLIN SODIUM 3.38 G: 375; 3 INJECTION, SOLUTION INTRAVENOUS at 02:41

## 2021-01-11 RX ADMIN — INSULIN LISPRO 20 UNITS: 100 INJECTION, SOLUTION INTRAVENOUS; SUBCUTANEOUS at 12:17

## 2021-01-11 RX ADMIN — ERTAPENEM SODIUM 1 G: 1 INJECTION, POWDER, LYOPHILIZED, FOR SOLUTION INTRAMUSCULAR; INTRAVENOUS at 12:16

## 2021-01-11 RX ADMIN — LOSARTAN POTASSIUM 100 MG: 50 TABLET, FILM COATED ORAL at 09:29

## 2021-01-11 RX ADMIN — ENOXAPARIN SODIUM 40 MG: 40 INJECTION SUBCUTANEOUS at 09:28

## 2021-01-11 RX ADMIN — GABAPENTIN 600 MG: 300 CAPSULE ORAL at 12:16

## 2021-01-11 RX ADMIN — GABAPENTIN 600 MG: 300 CAPSULE ORAL at 09:27

## 2021-01-11 RX ADMIN — CLONIDINE HYDROCHLORIDE 0.1 MG: 0.1 TABLET ORAL at 09:28

## 2021-01-11 RX ADMIN — BUDESONIDE AND FORMOTEROL FUMARATE DIHYDRATE 2 PUFF: 80; 4.5 AEROSOL RESPIRATORY (INHALATION) at 08:47

## 2021-01-11 RX ADMIN — TAZOBACTAM SODIUM AND PIPERACILLIN SODIUM 3.38 G: 375; 3 INJECTION, SOLUTION INTRAVENOUS at 09:27

## 2021-01-11 RX ADMIN — NYSTATIN: 100000 CREAM TOPICAL at 09:30

## 2021-01-11 RX ADMIN — DAPTOMYCIN 600 MG: 500 INJECTION, POWDER, LYOPHILIZED, FOR SOLUTION INTRAVENOUS at 12:59

## 2021-01-11 NOTE — TELEPHONE ENCOUNTER
Detail Level: Simple
Dr. Weber,    We received a picture today of his foot. I imported into the chart under media. Please see message below and look at picture. Please advise thank you!  Yelena      Will you please see that Dr. Weber sees this picture of my ’s foot? She looked at some last week. The place looks better, but the hole is getting deeper. And, I am worried about what the white, chalky substance is. Bone?     I hope someone from her office will call us, [phone], and let us know what she thinks. He had a below the knee amputation a year ago, then lost the third toe on his other foot just a few months later, and now this. Needless to say we are worried.    Thank you.    Janie Aguirre    
He is coming in tomorrow at 9:30.  Yelena  
He needs to come in today or in the morning (I can see him in the morning after my surgery)  
Quality 110: Preventive Care And Screening: Influenza Immunization: Influenza Immunization Administered during Influenza season
Detail Level: Detailed
Patient Specific Counseling (Will Not Stick From Patient To Patient): She is very dry. I suggested she try and moisturize 3-4 times a day. Once a day she will be using Triamcinoline followed with a moisturizer.
Quality 317: Preventative Care And Screening: Screening For High Blood Pressure And Follow-Up Documented: Pre-hypertensive or hypertensive blood pressure reading documented, and the indicated follow-up is documented

## 2021-01-11 NOTE — PLAN OF CARE
Dorsi/planter strong in LLE, PT c/o slight numbness with moderate pain. Toes wiggle slightly with rapid cap refill. Stand pivot with one person assist. UOP ADQ. VSS. Will cont to mx. Call light in reach.

## 2021-01-11 NOTE — PROGRESS NOTES
Infectious Disease Progress Note  Amol Aguirre  1945  [id number]    Date of Consult: 1/6/2021  Admission Date: 1/6/2021    Requesting Provider: Sobeida Weber MD  Evaluating Physician: Easton Garcia MD    Chief Complaint: foot infection    Reason for Consultation: foot infection    History of present illness:   Patient is a 75 y.o.  Yr old male with history of male with history of poorly controlled DM2, venous stasis disease, asthma, HTN. 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 In October 2019 he underwent amputation of left third toe and had another prolonged course of IV antibiotics and prolonged oral suppression with doxycycline.  Due to significant improvement and stability he was taken off oral antibiotics a few months ago.    1/6/21: I was contacted by Dr. Weber who evaluated Mr. Aguirre in clinic today and found him to have significant left foot ulceration evidence of deep infection down to the bone.  He was admitted this evening and Dr Weber is planning I&D tomorrow. He says he feels well today but had a cough a few days ago and got tested for COVID and does not have the result yet.     1/7/21: Left first metatarsal and toe amputation by Dr. Weber.  The wound extended all the way to the first MTP joint.  Cultures pending       1/8/2021: Resting comfortably.  Tolerating current IV antibiotics via peripheral IV.  Foot dressed.  He is currently nonweightbearing.  Appetite okay. Room air.     1/11/2021: Stable over the weekend.  Foot improving per Dr. Weber.  Tolerating IV antibiotics.  PICC line placed.  Afebrile.  Appetite okay.  Per PT he is unable to transfer without bearing weight on the left foot    ROS:  No fevers or chills. No n/v/d. No rash. No new ADR to Abx.       Allergies   Allergen Reactions   • Chlorhexidine Shortness Of Breath, Itching and Rash     Pt developed a rash,  itching, and shortness of breath after receiving a CHG bath on 4/24/19.   • Latex Other (See Comments)     Rash, hives, and tongue swelling       Antibiotics:  Anti-Infectives (From admission, onward)    Ordered     Dose/Rate Route Frequency Start Stop    01/08/21 1200  DAPTOmycin (CUBICIN) 600 mg in sodium chloride 0.9 % 50 mL IVPB     Ordering Provider: Easton Garcia MD    6 mg/kg × 99.1 kg (Adjusted)  100 mL/hr over 30 Minutes Intravenous Every 24 Hours 01/08/21 1300 01/22/21 1259    01/06/21 1801  piperacillin-tazobactam (ZOSYN) 3.375 g in iso-osmotic dextrose 50 ml (premix)     Ordering Provider: Juju Juarez MD    3.375 g  over 4 Hours Intravenous Every 8 Hours 01/07/21 0200 01/21/21 0159    01/06/21 1801  piperacillin-tazobactam (ZOSYN) 3.375 g in iso-osmotic dextrose 50 ml (premix)     Ordering Provider: Easton Garcia MD    3.375 g  over 30 Minutes Intravenous Once 01/06/21 1900 01/06/21 2152          Other Medications:  Current Facility-Administered Medications   Medication Dose Route Frequency Provider Last Rate Last Admin   • acetaminophen (TYLENOL) tablet 650 mg  650 mg Oral Q4H PRN Juju Juarez MD   650 mg at 01/10/21 1707   • allopurinol (ZYLOPRIM) tablet 100 mg  100 mg Oral Daily Juju Juarez MD   100 mg at 01/11/21 0928   • amLODIPine (NORVASC) tablet 10 mg  10 mg Oral Daily Juju Juarez MD   10 mg at 01/11/21 0929   • bisacodyl (DULCOLAX) suppository 10 mg  10 mg Rectal Daily PRN Juju Juarez MD       • budesonide-formoterol (SYMBICORT) 80-4.5 MCG/ACT inhaler 2 puff  2 puff Inhalation BID - RT Juju Juarez MD   2 puff at 01/11/21 0847   • cloNIDine (CATAPRES) tablet 0.1 mg  0.1 mg Oral TID Pennie Foster MD   0.1 mg at 01/11/21 0928   • DAPTOmycin (CUBICIN) 600 mg in sodium chloride 0.9 % 50 mL IVPB  6 mg/kg (Adjusted) Intravenous Q24H Easton Garcia  mL/hr at 01/10/21 1417 600 mg at 01/10/21 1417   • dextrose (D50W) 25 g/ 50mL Intravenous  Solution 25 g  25 g Intravenous Q15 Min PRN Juju Juarez MD       • dextrose (GLUTOSE) oral gel 15 g  15 g Oral Q15 Min PRN Juju Juarez MD       • dextrose 5 % and sodium chloride 0.9 % infusion  40 mL/hr Intravenous Continuous Juju Juarez MD 40 mL/hr at 01/07/21 0837 40 mL/hr at 01/07/21 0837   • diphenhydrAMINE (BENADRYL) capsule 25 mg  25 mg Oral Nightly PRN Juju Juarez MD        Or   • diphenhydrAMINE (BENADRYL) injection 25 mg  25 mg Intravenous Nightly PRN Juju Juarez MD       • enoxaparin (LOVENOX) syringe 40 mg  40 mg Subcutaneous Daily Juju Juarez MD   40 mg at 01/11/21 0928   • gabapentin (NEURONTIN) capsule 600 mg  600 mg Oral 4x Daily Juju Juarez MD   600 mg at 01/11/21 0927   • glucagon (human recombinant) (GLUCAGEN DIAGNOSTIC) injection 1 mg  1 mg Subcutaneous Q15 Min PRN Juju Juarez MD       • HYDROcodone-acetaminophen (NORCO) 7.5-325 MG per tablet 1 tablet  1 tablet Oral Q4H PRN Juju Juarez MD   1 tablet at 01/10/21 2313   • HYDROcodone-acetaminophen (NORCO) 7.5-325 MG per tablet 2 tablet  2 tablet Oral Q4H PRN Juju Juarez MD       • HYDROmorphone (DILAUDID) injection 1 mg  1 mg Intravenous Q2H PRN Juju Juarez MD   1 mg at 01/07/21 2325    And   • naloxone (NARCAN) injection 0.4 mg  0.4 mg Intravenous Q5 Min PRN Juju Juarez MD       • insulin detemir (LEVEMIR) injection 60 Units  60 Units Subcutaneous Nightly Juju Juarez MD   60 Units at 01/10/21 2103   • insulin lispro (humaLOG, ADMELOG) injection 0-9 Units  0-9 Units Subcutaneous TID AC Juju Juarez MD   2 Units at 01/11/21 0928   • insulin lispro (humaLOG, ADMELOG) injection 20 Units  20 Units Subcutaneous TID With Meals Juju Juarez MD   20 Units at 01/11/21 0929   • labetalol (NORMODYNE,TRANDATE) injection 10 mg  10 mg Intravenous Q4H PRN Juju Juarez MD       • losartan (COZAAR) tablet 100 mg  100 mg Oral Daily Juju Juarez MD   100 mg at 01/11/21 0929   • magnesium  hydroxide (MILK OF MAGNESIA) suspension 2400 mg/10mL 10 mL  10 mL Oral Daily PRN Juju Juarez MD       • melatonin tablet 5 mg  5 mg Oral Nightly PRN Juju Juarez MD   5 mg at 01/09/21 2106   • metoclopramide (REGLAN) tablet 10 mg  10 mg Oral 4x Daily Juju Juarez MD   10 mg at 01/11/21 0927   • multivitamin (THERAGRAN) tablet 1 tablet  1 tablet Oral Daily Juju Juarez MD   1 tablet at 01/11/21 0928   • mupirocin (BACTROBAN) 2 % ointment   Topical Q24H Juju Juarez MD   Given at 01/11/21 0930   • nystatin (MYCOSTATIN) 556061 UNIT/GM cream   Topical Q12H Pennie Foster MD   Given at 01/11/21 0930   • ondansetron (ZOFRAN) tablet 4 mg  4 mg Oral Q6H PRN Juju Juarez MD        Or   • ondansetron (ZOFRAN) injection 4 mg  4 mg Intravenous Q6H PRN Juju Juarez MD       • ondansetron (ZOFRAN) injection 4 mg  4 mg Intravenous Q6H PRN Juju Juarez MD   4 mg at 01/10/21 1116   • oxyCODONE-acetaminophen (PERCOCET) 5-325 MG per tablet 1 tablet  1 tablet Oral Q4H PRN Juju Juarez MD   1 tablet at 01/08/21 2123   • oxyCODONE-acetaminophen (PERCOCET) 5-325 MG per tablet 2 tablet  2 tablet Oral Q4H PRN Juju Juarez MD   2 tablet at 01/08/21 0708   • pantoprazole (PROTONIX) EC tablet 40 mg  40 mg Oral QAM AC Juju Juarez MD   40 mg at 01/11/21 0927   • piperacillin-tazobactam (ZOSYN) 3.375 g in iso-osmotic dextrose 50 ml (premix)  3.375 g Intravenous Q8H Juju Juarez MD   3.375 g at 01/11/21 0927   • polyethylene glycol (MIRALAX) packet 17 g  17 g Oral Daily Pennie Foster MD   17 g at 01/11/21 0929   • prednisoLONE acetate (PRED FORTE) 1 % ophthalmic suspension 1 drop  1 drop Right Eye Q12H Juju Juarez MD   1 drop at 01/11/21 0930   • promethazine (PHENERGAN) tablet 12.5 mg  12.5 mg Oral Q4H PRN Juju Juarez MD        Or   • promethazine (PHENERGAN) suppository 12.5 mg  12.5 mg Rectal Q4H PRN Juju Juarez MD       • promethazine (PHENERGAN) tablet 12.5 mg  12.5  "mg Oral Q4H PRN Juju Juarez MD       • sodium chloride 0.45 % infusion  50 mL/hr Intravenous Continuous Juju Juarez MD 50 mL/hr at 01/07/21 1753 50 mL/hr at 01/07/21 1753   • sodium chloride 0.9 % bolus 500 mL  500 mL Intravenous TID PRN Juju Juarez MD       • sodium chloride 0.9 % flush 10 mL  10 mL Intravenous PRN Juju Juarez MD       • sodium chloride 0.9 % flush 10 mL  10 mL Intravenous Q12H Easton Garcia MD   10 mL at 01/11/21 0930   • sodium chloride 0.9 % flush 10 mL  10 mL Intravenous PRN Easton Garcia MD           Physical Exam:   Vital Signs   /75   Pulse 93   Temp 97.9 °F (36.6 °C) (Oral)   Resp 16   Ht 190.5 cm (75\")   Wt 121 kg (267 lb)   SpO2 91%   BMI 33.37 kg/m²     GENERAL: Awake and alert, in no acute distress.   HEENT: Normocephalic, atraumatic.  EOMI. No conjunctival injection.   HEART: RRR; No murmur.  LUNGS: Clear to auscultation bilaterally without wheezing, rales, rhonchi. Normal respiratory effort. Nonlabored.  ABDOMEN: Soft, nontender, nondistended. Positive bowel sounds  : Without Mcdonald catheter.  MSK: BKA on right, well healed  Left foot bandaged post-operatively, CDI  SKIN: Warm and dry without cutaneous eruptions on Inspection/palpation.    NEURO: Oriented to PPT. No focal deficits on motor/sensory exam at arms/legs.  PSYCHIATRIC: Normal insight and judgement. Cooperative with [facility]    Laboratory Data    Results from last 7 days   Lab Units 01/11/21  0831 01/09/21  0659 01/07/21  0615   WBC 10*3/mm3 10.76 7.27 7.66   HEMOGLOBIN g/dL 11.4* 10.1* 11.6*   HEMATOCRIT % 37.1* 34.8* 37.7   PLATELETS 10*3/mm3 454* 382 415     Results from last 7 days   Lab Units 01/11/21  0835   SODIUM mmol/L 138   POTASSIUM mmol/L 5.4*   CHLORIDE mmol/L 101   CO2 mmol/L 25.0   BUN mg/dL 29*   CREATININE mg/dL 1.88*   GLUCOSE mg/dL 196*   CALCIUM mg/dL 10.4     Estimated Creatinine Clearance: 46.1 mL/min (A) (by C-G formula based on SCr of 1.88 mg/dL (H)).    "   Results from last 7 days   Lab Units 01/06/21  1736   SED RATE mm/hr 84*     Results from last 7 days   Lab Units 01/06/21  1736   CRP mg/dL 4.80*       Microbiology:   Prior cultures reviewed.  Last positive culture was from the right foot in April 2019 with evidence of Klebsiella and Morganella    Radiology:  Xr Chest 1 View    Result Date: 1/7/2021  Chronic changes seen with the lung fields with no evidence of acute parenchymal disease.  DICTATED:   01/06/2021 EDITED/ls :   01/06/2021    This report was finalized on 1/7/2021 3:58 PM by Dr. Yuki Aponte MD.           Impression:   1. Left foot ulceration, soft tissue infection, acute osteomyelitis: wound overlying first MTP joint probes to bone per Dr Weber. S/p first toe and first metatarsal amputation by Dr. Weber 1/7/2021. Operative cultures negative to date but antibiotic modified. Path pending  2. Significant peripheral vascular disease  3. History of poorly controlled type 2 diabetes capitated by neuropathy  4. History of right lower extremity below the knee amputation due to history of diabetic foot infection  5. CKD stage 3A: improved  6. Asthma  7. Deconditioning: Per physical therapy he is unable to maintain nonweightbearing restrictions on the left lower extremity at this time    PLAN:    - CBC, CMP, CK  - Operative cultures negative to date. Pathology pending    - Daptomycin 600 mg q24hr   - stop zosyn  - start ertapenem 1 gm daily  - PICC placed    Discussed with Dr Weber previously and  today.   Due to his history of recurrent infections, significant peripheral vascular disease and prior amputation on the right we need to be very aggressive with his treatment in order to save the left foot.  Placed PICC line for IV antibiotics with planned duration at least 6 to 8 weeks.      Long discussion with patient today.  In order to save his left foot he needs a prolonged course of IV antibiotics, optimize nutrition, good wound care  and needs to be compliant with nonweightbearing status on the left lower extremity.  According to physical therapy assessment yesterday he is still unable to maintain nonweightbearing precautions on the left lower extremity.  He would be best served in inpatient rehabilitation facility such as [facility] which he has been to before or even another skilled nursing facility in order to give him the best chance to save the left foot.  At this time patient is refusing placement.  We discussed that, in my opinion, discharge home will increase his risk for requiring below the knee amputation in the future.  He indicated he understood.    Discharge Orders:  - IV Daptomycin 600 mg daily  - IV ertapenem 1 gm daily  - Both of the above for at least 6 weeks through Feb 18  - Weekly sterile PICC dressing changes via home health  - Weekly labs CBC w/diff, CMP, CK, CRP faxed to [facility] at [phone] attn: Dr Garcia  - Follow up with Dr Garcia 1 week after discharge    Complex MDM. High risk for further limb loss if not treated aggressively. I will follow    Easton Garcia MD  1/11/2021

## 2021-01-11 NOTE — PLAN OF CARE
Patients only complaint of pain was of headache.  Treated with Tylenol and strong coffee.  LLE was elevated throughout the shift and dressing remained CDI.  Patient was unable to remain compliant with WB status.  PT and staff reiterated the importance of NWB.  Patient wants to dc home in the am.  BG remained elevated.  Treated with basal and sliding scale insulin.  Will continue to monitor

## 2021-01-11 NOTE — DISCHARGE SUMMARY
Patient Name: Amol Aguirre  MRN: [id number]  : 1945  DOS: 2021    Attending: Pennie Foster MD    Primary Care Provider: Donte Briones MD    Date of Admission:.2021  3:42 PM    Date of Discharge:  2021    Discharge Diagnosis:  Left foot ulceration, soft tissue infection, acute osteomyelitis    S/p first toe and first metatarsal amputation by Dr. Weber 2021.    Significant peripheral vascular disease     Type 2 diabetes mellitus with diabetic polyneuropathy, with long-term current use of insulin     Diabetic foot ulcers     HTN (hypertension)    CKD (chronic kidney disease)    Left foot pain    Diabetic foot ulcer     Prior right BKA.    S/p PICC placement.    Hospital Course  At admit:  Patient is a pleasant 75 y.o. male presented for direct admission from Dr. Weber's office.  He was seen today in follow-up for diabetic toe ulcer of the left foot.  He has a very large opening on the medial aspect of the first metatarsal head with copious amounts of gout crystals in the tissue end.     He is known to us from multiple previous admissions.  His most recent admission was 10/15/2019 for a third left toe amputation. He was seen by Dr. Garcia, [facility], for antibiotic management. He received a PICC line and was discharged on IV abx.     When seen in his room he is relaxing in his recliner. He denies pain. He denies fevers, chills or night sweats. He reports he had a flare-up of his gout and a large sore opened over this first joint of his great toe.    After admit:    Patient underwent amputation of left first toe and metatarsal, tolerated surgery well.  He was placed on IV antibiotics and wound was monitored throughout his stay by Dr. Garcia with [facility].  Prolonged course of antibiotics is planned to considering patient's osteomyelitis and severe peripheral vascular disease that is not amenable to intervention.    His blood glucose was monitored on an insulin  regimen.  His labs were monitored during his stay.  PICC line was placed and expectation of outpatient IV antibiotics.  He was seen by physical therapy and Occupational Therapy.  They recommended that patient go to inpatient rehab in light of his prior right BKA.  Patient has declined and he was adamant on home discharge.  He did progress with therapy during his stay and home health is arranged for home.    Procedures Performed  Procedure(s):  amputate left first toe and metatarsal       Pertinent Test Results:    I reviewed the patient's new clinical results.   Results from last 7 days   Lab Units 01/11/21  0831 01/09/21  0659 01/07/21  0615   WBC 10*3/mm3 10.76 7.27 7.66   HEMOGLOBIN g/dL 11.4* 10.1* 11.6*   HEMATOCRIT % 37.1* 34.8* 37.7   PLATELETS 10*3/mm3 454* 382 415     Results from last 7 days   Lab Units 01/11/21  0835 01/09/21  0659 01/07/21  0615   SODIUM mmol/L 138 137 138   POTASSIUM mmol/L 5.4* 4.6 4.6   CHLORIDE mmol/L 101 104 102   CO2 mmol/L 25.0 23.0 25.0   BUN mg/dL 29* 18 21   CREATININE mg/dL 1.88* 1.92* 1.54*   CALCIUM mg/dL 10.4 9.1 9.6   GLUCOSE mg/dL 196* 135* 165*     I reviewed the patient's new imaging including images and reports.          Physical therapy  Outcome Summary: Pt able to perform bed mobility with min assist. Pt able to don prosthesis on R LE sitting EOB. Pt required mod assist x 2 for first STS but unable to maintain NWB on L LE. Educated patient on NWB status again but patient still unable to maintain with second STS and bed to chair transfer. Pt required mod assist x 2 with bed to chair transfer. If patient is unable to maintain, may try sliding board transfer at next session. Pt is requesting to go home, discussed with patient in order to do so he must maintain NWB on L LE with transfers.                Discharge Assessment:       Visit Vitals  /82 (BP Location: Left arm, Patient Position: Lying)   Pulse 93   Temp 97.8 °F (36.6 °C) (Axillary)   Resp 18   Ht 190.5  "cm (75\")   Wt 121 kg (267 lb)   SpO2 91%   BMI 33.37 kg/m²     Temp (24hrs), Av.1 °F (36.7 °C), Min:97.8 °F (36.6 °C), Max:98.5 °F (36.9 °C)      General Appearance:    Alert, cooperative, in no acute distress   Lungs:     Clear to auscultation,respirations regular, even and                   unlabored    Heart:    Regular rhythm and normal rate, normal S1 and S2    Abdomen:     Normal bowel sounds, no masses, no organomegaly, soft        non-tender, non-distended, no guarding, no rebound                 tenderness   Extremities:  Clean dry and intact dressing on left foot.   Pulses:   Pulses palpable and equal bilaterally   Skin:   No bleeding, bruising or rash            Discharge Disposition: Home          Discharge Medications      New Medications      Instructions Start Date   DAPTOmycin 600 mg in sodium chloride 0.9 % 50 mL   6 mg/kg (600 mg), Intravenous, Every 24 Hours   Start Date: 2021     enoxaparin 30 MG/0.3ML solution syringe  Commonly known as: Lovenox   30 mg, Subcutaneous, Every 12 Hours Scheduled      ertapenem 1 gm/100ml solution IV  Commonly known as: INVanz   1 g, Intravenous, Every 24 Hours   Start Date: 2021     oxyCODONE-acetaminophen 5-325 MG per tablet  Commonly known as: PERCOCET   1 tablet, Oral, Every 4 Hours PRN      polyethylene glycol 17 g packet  Commonly known as: MIRALAX   17 g, Oral, Daily   Start Date: 2021        Changes to Medications      Instructions Start Date   prednisoLONE acetate 1 % ophthalmic suspension  Commonly known as: Pred Forte  What changed:   · how much to take  · how to take this  · when to take this   Follow instructions on the After Visit Summary.         Continue These Medications      Instructions Start Date   allopurinol 100 MG tablet  Commonly known as: ZYLOPRIM   100 mg, Oral, Daily      amLODIPine 10 MG tablet  Commonly known as: NORVASC   10 mg, Oral, Daily      aspirin 81 MG EC tablet   81 mg, Oral, Daily    "   budesonide-formoterol 80-4.5 MCG/ACT inhaler  Commonly known as: SYMBICORT   2 puffs, Inhalation, 2 Times Daily - RT      cloNIDine 0.1 MG tablet  Commonly known as: CATAPRES   0.1 mg, Oral, 2 Times Daily      gabapentin 400 MG capsule  Commonly known as: NEURONTIN   600 mg, Oral, 4 Times Daily      hydroCHLOROthiazide 25 MG tablet  Commonly known as: HYDRODIURIL   25 mg, Oral, Daily      losartan 100 MG tablet  Commonly known as: COZAAR   100 mg, Oral, Daily      melatonin 5 MG tablet tablet   5 mg, Oral, Nightly PRN      metoclopramide 10 MG tablet  Commonly known as: REGLAN   10 mg, Oral, 4 Times Daily      NovoLOG FlexPen 100 UNIT/ML solution pen-injector sc pen  Generic drug: insulin aspart   20 Units, Subcutaneous, 4 Times Daily      pantoprazole 40 MG EC tablet  Commonly known as: PROTONIX   40 mg, Oral, Daily      Toujeo SoloStar 300 UNIT/ML solution pen-injector injection  Generic drug: Insulin Glargine   60 Units, Subcutaneous, Nightly      vitamin B-12 1000 MCG tablet  Commonly known as: CYANOCOBALAMIN   1,000 mcg, Oral, Daily             Discharge Diet: Diabetic diet    Activity at Discharge: Nonweightbearing left foot    Follow-up Appointments  Dr. Juarez per her orders and Dr. Easton Garcia per his orders.    Discharge took over 30 min       Pennie Foster MD  01/11/21  14:52 EST

## 2021-01-11 NOTE — PROGRESS NOTES
Discharge Planning Assessment  [facility]     Patient Name: Amol Aguirre  MRN: [id number]  Today's Date: 1/11/2021    Admit Date: 1/6/2021    Discharge Needs Assessment    No documentation.       Discharge Plan     Row Name 01/11/21 1128       Plan    Plan  Home with IV abx provided by [facility] and [facility] for PICC care and labs    Provided Post Acute Provider List?  Yes    Post Acute Provider List  Home Health    Provided Post Acute Provider Quality & Resource List?  Yes    Post Acute Provider Quality and Resource List  Home Health    Delivered To  Patient    Method of Delivery  In person    Patient/Family in Agreement with Plan  yes    Plan Comments  Followed up with Mr. Aguirre at the bedside for discharge planning.  Mr. Aguirre was evaluated by PT and rehab continues to be recommended in order for him to maintain NWB stats on his LLE.  Discussed rehab again with the patient and he continues to refuse IPR.  Mr. Aguirre states that he has infused IV abx at home before and he is agreeable to infusing again.    Referred Mr. Aguirre to [facility] with [facility].  Copay for both IV abx is $10 per week and the patient is agreeable to copay.  [facility] will be teaching and delivering the medicaton and supplies prior to the patient's discharge.   The patient was agreeable to [facility] for dressing changes, PICC care and lab draws at home.  Referral givent to Rene with [facility].  Mr. Aguirre states that his wife will be assisting him at home and she will be transporting him home when discharged.  Follow up appointment with Dr. Garcia has been scheduled and noted in the AVS.  Reviewed POC with Jody at [facility].  CM will continue to follow.    Final Discharge Disposition Code  06 - home with home health care        Continued Care and Services - Admitted Since 1/6/2021     Dialysis/Infusion Coordination complete    Service Provider Request Status Selected Services Address  Phone Fax Patient Preferred    [facility]   Selected Infusion and IV Therapy [address], [facility] [address] [phone] --          Home Medical Care Coordination complete    Service Provider Request Status Selected Services Address Phone Fax Patient Preferred    [facility]   [facility] [address] [id number] [phone] --              Expected Discharge Date and Time     Expected Discharge Date Expected Discharge Time    Jan 12, 2021         Demographic Summary    No documentation.       Functional Status    No documentation.       Psychosocial    No documentation.       Abuse/Neglect    No documentation.       Legal    No documentation.       Substance Abuse    No documentation.       Patient Forms    No documentation.           Ellen Mccabe RN

## 2021-01-12 ENCOUNTER — READMISSION MANAGEMENT (OUTPATIENT)
Dept: CALL CENTER | Facility: HOSPITAL | Age: 76
End: 2021-01-12

## 2021-01-12 LAB
BACTERIA SPEC ANAEROBE CULT: NORMAL
BACTERIA SPEC ANAEROBE CULT: NORMAL

## 2021-01-12 NOTE — OUTREACH NOTE
Prep Survey      Responses   [facility] facility patient discharged from?  [place]   Is LACE score < 7 ?  No   Emergency Room discharge w/ pulse ox?  No   Eligibility  Readm Mgmt   Discharge diagnosis  amputate left first toe and metatarsal,  Cellulitis and abscess of left foot   Does the patient have one of the following disease processes/diagnoses(primary or secondary)?  General Surgery   Does the patient have Home health ordered?  Yes   What is the Home health agency?   [facility]   Is there a DME ordered?  No   Prep survey completed?  Yes          Kasey Salinas RN

## 2021-01-13 LAB
CYTO UR: NORMAL
LAB AP CASE REPORT: NORMAL
LAB AP CLINICAL INFORMATION: NORMAL
PATH REPORT.FINAL DX SPEC: NORMAL
PATH REPORT.GROSS SPEC: NORMAL

## 2021-01-14 ENCOUNTER — READMISSION MANAGEMENT (OUTPATIENT)
Dept: CALL CENTER | Facility: HOSPITAL | Age: 76
End: 2021-01-14

## 2021-01-14 NOTE — OUTREACH NOTE
General Surgery Week 1 Survey      Responses   [facility] patient discharged from?  [place]   Does the patient have one of the following disease processes/diagnoses(primary or secondary)?  General Surgery   Week 1 attempt successful?  No   Unsuccessful attempts  Attempt 1          Trisha Longoria RN

## 2021-01-15 ENCOUNTER — READMISSION MANAGEMENT (OUTPATIENT)
Dept: CALL CENTER | Facility: HOSPITAL | Age: 76
End: 2021-01-15

## 2021-01-15 NOTE — OUTREACH NOTE
General Surgery Week 1 Survey      Responses   [facility] patient discharged from?  [place]   Does the patient have one of the following disease processes/diagnoses(primary or secondary)?  General Surgery   Week 1 attempt successful?  Yes   Call start time  1502   Call end time  1504   Discharge diagnosis  amputate left first toe and metatarsal,  Cellulitis and abscess of left foot   Person spoke with today (if not patient) and relationship  Janie-spouse    Meds reviewed with patient/caregiver?  Yes   Is the patient having any side effects they believe may be caused by any medication additions or changes?  No   Does the patient have all medications related to this admission filled (includes all antibiotics, pain medications, etc.)  Yes   Is the patient taking all medications as directed (includes completed medication regime)?  Yes   Does the patient have a follow up appointment scheduled with their surgeon?  Yes [1/20/21]   Has the patient kept scheduled appointments due by today?  N/A   Comments  ID appt is on 1/19/21   What is the Home health agency?   [facility]   Has home health visited the patient within 72 hours of discharge?  Unsure   Psychosocial issues?  No   Did the patient receive a copy of their discharge instructions?  Yes   Nursing interventions  Reviewed instructions with patient   What is the patient's perception of their health status since discharge?  Improving   Nursing interventions  Nurse provided patient education   Is the patient /caregiver able to teach back basic post-op care?  Keep incision areas clean,dry and protected   Is the patient/caregiver able to teach back signs and symptoms of incisional infection?  Fever, Pus or odor from incision   Is the patient/caregiver able to teach back steps to recovery at home?  Rest and rebuild strength, gradually increase activity, Set small, achievable goals for return to baseline health, Eat a well-balance diet   If the  patient is a current smoker, are they able to teach back resources for cessation?  Not a smoker   Is the patient/caregiver able to teach back the hierarchy of who to call/visit for symptoms/problems? PCP, Specialist, Home health nurse, Urgent Care, ED, 911  Yes   Week 1 call completed?  Yes          Margaux Castellanos RN

## 2021-01-18 ENCOUNTER — LAB REQUISITION (OUTPATIENT)
Dept: LAB | Facility: HOSPITAL | Age: 76
End: 2021-01-18

## 2021-01-18 DIAGNOSIS — M86.672 OTHER CHRONIC OSTEOMYELITIS, LEFT ANKLE AND FOOT (HCC): ICD-10-CM

## 2021-01-18 LAB
ALBUMIN SERPL-MCNC: 3.7 G/DL (ref 3.5–5.2)
ALBUMIN/GLOB SERPL: 0.9 G/DL
ALP SERPL-CCNC: 156 U/L (ref 39–117)
ALT SERPL W P-5'-P-CCNC: 36 U/L (ref 1–41)
ANION GAP SERPL CALCULATED.3IONS-SCNC: 13.7 MMOL/L (ref 5–15)
AST SERPL-CCNC: 34 U/L (ref 1–40)
BASOPHILS # BLD AUTO: 0.09 10*3/MM3 (ref 0–0.2)
BASOPHILS NFR BLD AUTO: 1 % (ref 0–1.5)
BILIRUB SERPL-MCNC: <0.2 MG/DL (ref 0–1.2)
BUN SERPL-MCNC: 42 MG/DL (ref 8–23)
BUN/CREAT SERPL: 20.7 (ref 7–25)
CALCIUM SPEC-SCNC: 9.5 MG/DL (ref 8.6–10.5)
CHLORIDE SERPL-SCNC: 103 MMOL/L (ref 98–107)
CK SERPL-CCNC: 28 U/L (ref 20–200)
CO2 SERPL-SCNC: 22.3 MMOL/L (ref 22–29)
CREAT SERPL-MCNC: 2.03 MG/DL (ref 0.76–1.27)
CRP SERPL-MCNC: 1.64 MG/DL (ref 0–0.5)
DEPRECATED RDW RBC AUTO: 45 FL (ref 37–54)
EOSINOPHIL # BLD AUTO: 0.33 10*3/MM3 (ref 0–0.4)
EOSINOPHIL NFR BLD AUTO: 3.7 % (ref 0.3–6.2)
ERYTHROCYTE [DISTWIDTH] IN BLOOD BY AUTOMATED COUNT: 14.1 % (ref 12.3–15.4)
GFR SERPL CREATININE-BSD FRML MDRD: 32 ML/MIN/1.73
GLOBULIN UR ELPH-MCNC: 4 GM/DL
GLUCOSE SERPL-MCNC: 94 MG/DL (ref 65–99)
HCT VFR BLD AUTO: 37 % (ref 37.5–51)
HGB BLD-MCNC: 11.3 G/DL (ref 13–17.7)
IMM GRANULOCYTES # BLD AUTO: 0.03 10*3/MM3 (ref 0–0.05)
IMM GRANULOCYTES NFR BLD AUTO: 0.3 % (ref 0–0.5)
LYMPHOCYTES # BLD AUTO: 2.4 10*3/MM3 (ref 0.7–3.1)
LYMPHOCYTES NFR BLD AUTO: 26.8 % (ref 19.6–45.3)
MCH RBC QN AUTO: 26.7 PG (ref 26.6–33)
MCHC RBC AUTO-ENTMCNC: 30.5 G/DL (ref 31.5–35.7)
MCV RBC AUTO: 87.3 FL (ref 79–97)
MONOCYTES # BLD AUTO: 0.57 10*3/MM3 (ref 0.1–0.9)
MONOCYTES NFR BLD AUTO: 6.4 % (ref 5–12)
NEUTROPHILS NFR BLD AUTO: 5.52 10*3/MM3 (ref 1.7–7)
NEUTROPHILS NFR BLD AUTO: 61.8 % (ref 42.7–76)
NRBC BLD AUTO-RTO: 0 /100 WBC (ref 0–0.2)
PLATELET # BLD AUTO: 484 10*3/MM3 (ref 140–450)
PMV BLD AUTO: 10.1 FL (ref 6–12)
POTASSIUM SERPL-SCNC: 5 MMOL/L (ref 3.5–5.2)
PROT SERPL-MCNC: 7.7 G/DL (ref 6–8.5)
RBC # BLD AUTO: 4.24 10*6/MM3 (ref 4.14–5.8)
SODIUM SERPL-SCNC: 139 MMOL/L (ref 136–145)
WBC # BLD AUTO: 8.94 10*3/MM3 (ref 3.4–10.8)

## 2021-01-18 PROCEDURE — 85025 COMPLETE CBC W/AUTO DIFF WBC: CPT | Performed by: INTERNAL MEDICINE

## 2021-01-18 PROCEDURE — 82550 ASSAY OF CK (CPK): CPT | Performed by: INTERNAL MEDICINE

## 2021-01-18 PROCEDURE — 86140 C-REACTIVE PROTEIN: CPT | Performed by: INTERNAL MEDICINE

## 2021-01-18 PROCEDURE — 80053 COMPREHEN METABOLIC PANEL: CPT | Performed by: INTERNAL MEDICINE

## 2021-01-20 ENCOUNTER — OFFICE VISIT (OUTPATIENT)
Dept: ORTHOPEDIC SURGERY | Facility: CLINIC | Age: 76
End: 2021-01-20

## 2021-01-20 DIAGNOSIS — Z89.412 HISTORY OF PARTIAL RAY AMPUTATION OF FIRST TOE OF LEFT FOOT (HCC): Primary | ICD-10-CM

## 2021-01-20 PROCEDURE — 99024 POSTOP FOLLOW-UP VISIT: CPT | Performed by: ORTHOPAEDIC SURGERY

## 2021-01-20 RX ORDER — OLMESARTAN MEDOXOMIL 40 MG/1
40 TABLET ORAL DAILY
COMMUNITY

## 2021-01-20 NOTE — PROGRESS NOTES
Post-op (2 weeks s/p amputation left first toe and metatarsal; DOS 01.07.2021)      Amol Aguirre is 2 weeks status post amputate left 1st toe and metatarsal, 1/7/21. He reports no fever, chills.  He reports pain is well controlled.  They have been taking aspirin for DVT prophylaxis.  They have been non weight bearing. He reports that he has been trying very hard to use his sliding board and stay non-wt bearing     Past Surgical History:   Procedure Laterality Date   • AMPUTATION DIGIT Left 10/15/2019    Procedure: AMPUTATE LEFT THIRD TOE;  Surgeon: Juju Weber MD;  [facility];  Service: Orthopedics   • AMPUTATION DIGIT Left 1/7/2021    Procedure: amputate left first toe and metatarsal;  Surgeon: Juju Weber MD;  [facility];  Service: Orthopedics;  Laterality: Left;   • AORTAGRAM N/A 4/30/2019    Procedure: AORTAGRAM WITH OR WITHOUT RUNOFFS;  Surgeon: Carrillo White MD;  [facility]  [facility] HYBRID OR 15;  Service: Vascular   • BELOW KNEE AMPUTATION Right 6/4/2019    Procedure: BELOW KNEE AMPUTATION RIGHT;  Surgeon: Juju Weber MD;  [facility]:  [facility];  Service: Orthopedics   • CATARACT EXTRACTION W/ INTRAOCULAR LENS IMPLANT Right 7/20/2020    Procedure: CATARACT PHACO EXTRACTION WITH INTRAOCULAR LENS IMPLANT RIGHT;  Surgeon: Jez Mas MD;  Location:  [facility];  Service: Ophthalmology;  Laterality: Right;   • CATARACT EXTRACTION W/ INTRAOCULAR LENS IMPLANT Left 8/3/2020    Procedure: CATARACT PHACO EXTRACTION WITH INTRAOCULAR LENS IMPLANT LEFT;  Surgeon: Jez Mas MD;  Location:  [facility] OR;  Service: Ophthalmology;  Laterality: Left;   • CHOLECYSTECTOMY     • FOOT SURGERY Left 10/15/2019    Amputate 3rd toe- DeGnore   • KNEE SURGERY Right     TKA       There were no vitals taken for this visit.       Left foot 1st toe and metatarsal amputation- the edges of the incision have a thin rim of necrosis but there is no open areas, only mild dependent rubor, mild  "swelling, no purulence    phone conversation with physician    Assessment and Plan:   1. 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  He MUST be non-wt bearing, high risk for higher amputation.  I will see him in 2 weeks, continue Bactroban dressing every 3-5 days          Juju Weber MD  "

## 2021-01-25 ENCOUNTER — LAB REQUISITION (OUTPATIENT)
Dept: LAB | Facility: HOSPITAL | Age: 76
End: 2021-01-25

## 2021-01-25 ENCOUNTER — READMISSION MANAGEMENT (OUTPATIENT)
Dept: CALL CENTER | Facility: HOSPITAL | Age: 76
End: 2021-01-25

## 2021-01-25 DIAGNOSIS — M79.672 LEFT FOOT PAIN: Primary | ICD-10-CM

## 2021-01-25 DIAGNOSIS — Z47.81 ENCOUNTER FOR ORTHOPEDIC AFTERCARE FOLLOWING SURGICAL AMPUTATION: ICD-10-CM

## 2021-01-25 LAB
ALBUMIN SERPL-MCNC: 3.7 G/DL (ref 3.5–5.2)
ALBUMIN/GLOB SERPL: 0.9 G/DL
ALP SERPL-CCNC: 146 U/L (ref 39–117)
ALT SERPL W P-5'-P-CCNC: 21 U/L (ref 1–41)
ANION GAP SERPL CALCULATED.3IONS-SCNC: 12.1 MMOL/L (ref 5–15)
AST SERPL-CCNC: 23 U/L (ref 1–40)
BASOPHILS # BLD AUTO: 0.05 10*3/MM3 (ref 0–0.2)
BASOPHILS NFR BLD AUTO: 0.9 % (ref 0–1.5)
BILIRUB SERPL-MCNC: 0.2 MG/DL (ref 0–1.2)
BUN SERPL-MCNC: 45 MG/DL (ref 8–23)
BUN/CREAT SERPL: 24.2 (ref 7–25)
CALCIUM SPEC-SCNC: 9.8 MG/DL (ref 8.6–10.5)
CHLORIDE SERPL-SCNC: 103 MMOL/L (ref 98–107)
CK SERPL-CCNC: 38 U/L (ref 20–200)
CO2 SERPL-SCNC: 20.9 MMOL/L (ref 22–29)
CREAT SERPL-MCNC: 1.86 MG/DL (ref 0.76–1.27)
CRP SERPL-MCNC: 1.24 MG/DL (ref 0–0.5)
DEPRECATED RDW RBC AUTO: 45.9 FL (ref 37–54)
EOSINOPHIL # BLD AUTO: 0.21 10*3/MM3 (ref 0–0.4)
EOSINOPHIL NFR BLD AUTO: 3.6 % (ref 0.3–6.2)
ERYTHROCYTE [DISTWIDTH] IN BLOOD BY AUTOMATED COUNT: 14 % (ref 12.3–15.4)
GFR SERPL CREATININE-BSD FRML MDRD: 35 ML/MIN/1.73
GLOBULIN UR ELPH-MCNC: 4.1 GM/DL
GLUCOSE SERPL-MCNC: 176 MG/DL (ref 65–99)
HBA1C MFR BLD: 9.8 % (ref 4.8–5.6)
HCT VFR BLD AUTO: 24 % (ref 37.5–51)
HGB BLD-MCNC: 7.3 G/DL (ref 13–17.7)
IMM GRANULOCYTES # BLD AUTO: 0.02 10*3/MM3 (ref 0–0.05)
IMM GRANULOCYTES NFR BLD AUTO: 0.3 % (ref 0–0.5)
LYMPHOCYTES # BLD AUTO: 1.48 10*3/MM3 (ref 0.7–3.1)
LYMPHOCYTES NFR BLD AUTO: 25.4 % (ref 19.6–45.3)
MCH RBC QN AUTO: 27.2 PG (ref 26.6–33)
MCHC RBC AUTO-ENTMCNC: 30.4 G/DL (ref 31.5–35.7)
MCV RBC AUTO: 89.6 FL (ref 79–97)
MONOCYTES # BLD AUTO: 0.34 10*3/MM3 (ref 0.1–0.9)
MONOCYTES NFR BLD AUTO: 5.8 % (ref 5–12)
NEUTROPHILS NFR BLD AUTO: 3.73 10*3/MM3 (ref 1.7–7)
NEUTROPHILS NFR BLD AUTO: 64 % (ref 42.7–76)
NRBC BLD AUTO-RTO: 0 /100 WBC (ref 0–0.2)
PLATELET # BLD AUTO: 262 10*3/MM3 (ref 140–450)
PMV BLD AUTO: 9.9 FL (ref 6–12)
POTASSIUM SERPL-SCNC: 5.6 MMOL/L (ref 3.5–5.2)
PROT SERPL-MCNC: 7.8 G/DL (ref 6–8.5)
RBC # BLD AUTO: 2.68 10*6/MM3 (ref 4.14–5.8)
SODIUM SERPL-SCNC: 136 MMOL/L (ref 136–145)
WBC # BLD AUTO: 5.83 10*3/MM3 (ref 3.4–10.8)

## 2021-01-25 PROCEDURE — 84550 ASSAY OF BLOOD/URIC ACID: CPT | Performed by: INTERNAL MEDICINE

## 2021-01-25 PROCEDURE — 82550 ASSAY OF CK (CPK): CPT | Performed by: INTERNAL MEDICINE

## 2021-01-25 PROCEDURE — 83036 HEMOGLOBIN GLYCOSYLATED A1C: CPT | Performed by: INTERNAL MEDICINE

## 2021-01-25 PROCEDURE — 80053 COMPREHEN METABOLIC PANEL: CPT | Performed by: INTERNAL MEDICINE

## 2021-01-25 PROCEDURE — 86140 C-REACTIVE PROTEIN: CPT | Performed by: INTERNAL MEDICINE

## 2021-01-25 PROCEDURE — 85025 COMPLETE CBC W/AUTO DIFF WBC: CPT | Performed by: INTERNAL MEDICINE

## 2021-01-27 LAB — URATE SERPL-MCNC: 7.7 MG/DL (ref 3.4–7)

## 2021-02-01 ENCOUNTER — LAB REQUISITION (OUTPATIENT)
Dept: LAB | Facility: HOSPITAL | Age: 76
End: 2021-02-01

## 2021-02-01 DIAGNOSIS — Z47.81 ENCOUNTER FOR ORTHOPEDIC AFTERCARE FOLLOWING SURGICAL AMPUTATION: ICD-10-CM

## 2021-02-01 LAB
ALBUMIN SERPL-MCNC: 3.6 G/DL (ref 3.5–5.2)
ALBUMIN/GLOB SERPL: 1.1 G/DL
ALP SERPL-CCNC: 135 U/L (ref 39–117)
ALT SERPL W P-5'-P-CCNC: 15 U/L (ref 1–41)
ANION GAP SERPL CALCULATED.3IONS-SCNC: 10 MMOL/L (ref 5–15)
AST SERPL-CCNC: 16 U/L (ref 1–40)
BASOPHILS # BLD AUTO: 0.04 10*3/MM3 (ref 0–0.2)
BASOPHILS NFR BLD AUTO: 0.6 % (ref 0–1.5)
BILIRUB SERPL-MCNC: 0.2 MG/DL (ref 0–1.2)
BUN SERPL-MCNC: 42 MG/DL (ref 8–23)
BUN/CREAT SERPL: 21 (ref 7–25)
CALCIUM SPEC-SCNC: 9.5 MG/DL (ref 8.6–10.5)
CHLORIDE SERPL-SCNC: 104 MMOL/L (ref 98–107)
CK SERPL-CCNC: 36 U/L (ref 20–200)
CO2 SERPL-SCNC: 21 MMOL/L (ref 22–29)
CREAT SERPL-MCNC: 2 MG/DL (ref 0.76–1.27)
CRP SERPL-MCNC: 0.97 MG/DL (ref 0–0.5)
DEPRECATED RDW RBC AUTO: 44.5 FL (ref 37–54)
EOSINOPHIL # BLD AUTO: 0.31 10*3/MM3 (ref 0–0.4)
EOSINOPHIL NFR BLD AUTO: 4.4 % (ref 0.3–6.2)
ERYTHROCYTE [DISTWIDTH] IN BLOOD BY AUTOMATED COUNT: 13.9 % (ref 12.3–15.4)
GFR SERPL CREATININE-BSD FRML MDRD: 32 ML/MIN/1.73
GLOBULIN UR ELPH-MCNC: 3.4 GM/DL
GLUCOSE SERPL-MCNC: 143 MG/DL (ref 65–99)
HCT VFR BLD AUTO: 35.4 % (ref 37.5–51)
HGB BLD-MCNC: 10.9 G/DL (ref 13–17.7)
IMM GRANULOCYTES # BLD AUTO: 0.02 10*3/MM3 (ref 0–0.05)
IMM GRANULOCYTES NFR BLD AUTO: 0.3 % (ref 0–0.5)
LYMPHOCYTES # BLD AUTO: 1.8 10*3/MM3 (ref 0.7–3.1)
LYMPHOCYTES NFR BLD AUTO: 25.8 % (ref 19.6–45.3)
MCH RBC QN AUTO: 27 PG (ref 26.6–33)
MCHC RBC AUTO-ENTMCNC: 30.8 G/DL (ref 31.5–35.7)
MCV RBC AUTO: 87.6 FL (ref 79–97)
MONOCYTES # BLD AUTO: 0.44 10*3/MM3 (ref 0.1–0.9)
MONOCYTES NFR BLD AUTO: 6.3 % (ref 5–12)
NEUTROPHILS NFR BLD AUTO: 4.36 10*3/MM3 (ref 1.7–7)
NEUTROPHILS NFR BLD AUTO: 62.6 % (ref 42.7–76)
NRBC BLD AUTO-RTO: 0 /100 WBC (ref 0–0.2)
PLATELET # BLD AUTO: 318 10*3/MM3 (ref 140–450)
PMV BLD AUTO: 10.2 FL (ref 6–12)
POTASSIUM SERPL-SCNC: 5.5 MMOL/L (ref 3.5–5.2)
PROT SERPL-MCNC: 7 G/DL (ref 6–8.5)
RBC # BLD AUTO: 4.04 10*6/MM3 (ref 4.14–5.8)
SODIUM SERPL-SCNC: 135 MMOL/L (ref 136–145)
WBC # BLD AUTO: 6.97 10*3/MM3 (ref 3.4–10.8)

## 2021-02-01 PROCEDURE — 82550 ASSAY OF CK (CPK): CPT | Performed by: INTERNAL MEDICINE

## 2021-02-01 PROCEDURE — 86140 C-REACTIVE PROTEIN: CPT | Performed by: INTERNAL MEDICINE

## 2021-02-01 PROCEDURE — 85025 COMPLETE CBC W/AUTO DIFF WBC: CPT | Performed by: INTERNAL MEDICINE

## 2021-02-01 PROCEDURE — 80053 COMPREHEN METABOLIC PANEL: CPT | Performed by: INTERNAL MEDICINE

## 2021-02-03 ENCOUNTER — OFFICE VISIT (OUTPATIENT)
Dept: ORTHOPEDIC SURGERY | Facility: CLINIC | Age: 76
End: 2021-02-03

## 2021-02-03 DIAGNOSIS — Z89.412 HISTORY OF PARTIAL RAY AMPUTATION OF FIRST TOE OF LEFT FOOT (HCC): Primary | ICD-10-CM

## 2021-02-03 DIAGNOSIS — Z89.511 S/P BKA (BELOW KNEE AMPUTATION), RIGHT (HCC): ICD-10-CM

## 2021-02-03 DIAGNOSIS — R09.89 DECREASED PULSES IN FEET: ICD-10-CM

## 2021-02-03 PROCEDURE — 99024 POSTOP FOLLOW-UP VISIT: CPT | Performed by: ORTHOPAEDIC SURGERY

## 2021-02-03 NOTE — PROGRESS NOTES
Post-op Follow-up (2 week f/u; 4 weeks s/p amputation left first toe and metatarsal 1/7/21)      Amol Aguirre is 4 weeks status post left first toe and metatarsal amputation, 1/7/2021. He reports no fever, chills.  He reports pain is well controlled.  They have been taking aspirin for DVT prophylaxis.  They have been non weight bearing.      Past Surgical History:   Procedure Laterality Date   • AMPUTATION DIGIT Left 10/15/2019    Procedure: AMPUTATE LEFT THIRD TOE;  Surgeon: Juju Weber MD;  [facility];  Service: Orthopedics   • AMPUTATION DIGIT Left 1/7/2021    Procedure: amputate left first toe and metatarsal;  Surgeon: Juju Weber MD;  [facility];  Service: Orthopedics;  Laterality: Left;   • AORTAGRAM N/A 4/30/2019    Procedure: AORTAGRAM WITH OR WITHOUT RUNOFFS;  Surgeon: Carrillo White MD;  Location: [facility] HYBRID OR 15;  Service: Vascular   • BELOW KNEE AMPUTATION Right 6/4/2019    Procedure: BELOW KNEE AMPUTATION RIGHT;  Surgeon: Juju Weber MD;  [facility]  [facility];  Service: Orthopedics   • CATARACT EXTRACTION W/ INTRAOCULAR LENS IMPLANT Right 7/20/2020    Procedure: CATARACT PHACO EXTRACTION WITH INTRAOCULAR LENS IMPLANT RIGHT;  Surgeon: Jez Mas MD;  Location:  [facility];  Service: Ophthalmology;  Laterality: Right;   • CATARACT EXTRACTION W/ INTRAOCULAR LENS IMPLANT Left 8/3/2020    Procedure: CATARACT PHACO EXTRACTION WITH INTRAOCULAR LENS IMPLANT LEFT;  Surgeon: Jez Mas MD;  Location: [facility] OR;  Service: Ophthalmology;  Laterality: Left;   • CHOLECYSTECTOMY     • FOOT SURGERY Left 10/15/2019    Amputate 3rd toe- DeGnore   • KNEE SURGERY Right     TKA       There were no vitals taken for this visit.       Left amputation site is healing proximally, no change in the eschar on the distal aspect, it still densely adherent, I removed some of the proximal sutures, there is no significant erythema, no purulence    phone conversation with  physician    Assessment and Plan:   1. 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  I removed some of the proximal sutures.  He needs to stay nonweightbearing, continue Bactroban dressing change.  Overall I think there is still reasonable possibility for this to heal although he is at high risk for BKA.  He has dense neuropathy, poor glucose control, and vascular disease.  His daughter is with him, and he has been very compliant and I think that gives him a better chance.  I discussed this by phone with Dr. Garcia.  I will see him in 10 days nonweightbearing x-ray of the foot at that time    2. S/P BKA (below knee amputation), right (CMS/HCC)  Stable    3. Decreased pulses in feet  Significant vascular disease no change          Juju Weber MD

## 2021-02-08 ENCOUNTER — LAB REQUISITION (OUTPATIENT)
Dept: LAB | Facility: HOSPITAL | Age: 76
End: 2021-02-08

## 2021-02-08 DIAGNOSIS — Z47.81 ENCOUNTER FOR ORTHOPEDIC AFTERCARE FOLLOWING SURGICAL AMPUTATION: ICD-10-CM

## 2021-02-08 LAB
ALBUMIN SERPL-MCNC: 3.7 G/DL (ref 3.5–5.2)
ALBUMIN/GLOB SERPL: 1.2 G/DL
ALP SERPL-CCNC: 171 U/L (ref 39–117)
ALT SERPL W P-5'-P-CCNC: 23 U/L (ref 1–41)
ANION GAP SERPL CALCULATED.3IONS-SCNC: 9.2 MMOL/L (ref 5–15)
AST SERPL-CCNC: 23 U/L (ref 1–40)
BASOPHILS # BLD AUTO: 0.07 10*3/MM3 (ref 0–0.2)
BASOPHILS NFR BLD AUTO: 1 % (ref 0–1.5)
BILIRUB SERPL-MCNC: 0.2 MG/DL (ref 0–1.2)
BUN SERPL-MCNC: 39 MG/DL (ref 8–23)
BUN/CREAT SERPL: 18.3 (ref 7–25)
CALCIUM SPEC-SCNC: 9.5 MG/DL (ref 8.6–10.5)
CHLORIDE SERPL-SCNC: 106 MMOL/L (ref 98–107)
CK SERPL-CCNC: 38 U/L (ref 20–200)
CO2 SERPL-SCNC: 22.8 MMOL/L (ref 22–29)
CREAT SERPL-MCNC: 2.13 MG/DL (ref 0.76–1.27)
CRP SERPL-MCNC: 1.27 MG/DL (ref 0–0.5)
DEPRECATED RDW RBC AUTO: 45.4 FL (ref 37–54)
EOSINOPHIL # BLD AUTO: 0.4 10*3/MM3 (ref 0–0.4)
EOSINOPHIL NFR BLD AUTO: 5.8 % (ref 0.3–6.2)
ERYTHROCYTE [DISTWIDTH] IN BLOOD BY AUTOMATED COUNT: 14.2 % (ref 12.3–15.4)
GFR SERPL CREATININE-BSD FRML MDRD: 30 ML/MIN/1.73
GLOBULIN UR ELPH-MCNC: 3.1 GM/DL
GLUCOSE SERPL-MCNC: 190 MG/DL (ref 65–99)
HCT VFR BLD AUTO: 36 % (ref 37.5–51)
HGB BLD-MCNC: 11.2 G/DL (ref 13–17.7)
IMM GRANULOCYTES # BLD AUTO: 0.02 10*3/MM3 (ref 0–0.05)
IMM GRANULOCYTES NFR BLD AUTO: 0.3 % (ref 0–0.5)
LYMPHOCYTES # BLD AUTO: 1.98 10*3/MM3 (ref 0.7–3.1)
LYMPHOCYTES NFR BLD AUTO: 28.7 % (ref 19.6–45.3)
MCH RBC QN AUTO: 27 PG (ref 26.6–33)
MCHC RBC AUTO-ENTMCNC: 31.1 G/DL (ref 31.5–35.7)
MCV RBC AUTO: 86.7 FL (ref 79–97)
MONOCYTES # BLD AUTO: 0.44 10*3/MM3 (ref 0.1–0.9)
MONOCYTES NFR BLD AUTO: 6.4 % (ref 5–12)
NEUTROPHILS NFR BLD AUTO: 3.99 10*3/MM3 (ref 1.7–7)
NEUTROPHILS NFR BLD AUTO: 57.8 % (ref 42.7–76)
NRBC BLD AUTO-RTO: 0 /100 WBC (ref 0–0.2)
PLATELET # BLD AUTO: 341 10*3/MM3 (ref 140–450)
PMV BLD AUTO: 10.3 FL (ref 6–12)
POTASSIUM SERPL-SCNC: 5.1 MMOL/L (ref 3.5–5.2)
PROT SERPL-MCNC: 6.8 G/DL (ref 6–8.5)
RBC # BLD AUTO: 4.15 10*6/MM3 (ref 4.14–5.8)
SODIUM SERPL-SCNC: 138 MMOL/L (ref 136–145)
WBC # BLD AUTO: 6.9 10*3/MM3 (ref 3.4–10.8)

## 2021-02-08 PROCEDURE — 86140 C-REACTIVE PROTEIN: CPT | Performed by: INTERNAL MEDICINE

## 2021-02-08 PROCEDURE — 82550 ASSAY OF CK (CPK): CPT | Performed by: INTERNAL MEDICINE

## 2021-02-08 PROCEDURE — 80053 COMPREHEN METABOLIC PANEL: CPT | Performed by: INTERNAL MEDICINE

## 2021-02-08 PROCEDURE — 85025 COMPLETE CBC W/AUTO DIFF WBC: CPT | Performed by: INTERNAL MEDICINE

## 2021-02-12 ENCOUNTER — OFFICE VISIT (OUTPATIENT)
Dept: ORTHOPEDIC SURGERY | Facility: CLINIC | Age: 76
End: 2021-02-12

## 2021-02-12 DIAGNOSIS — Z89.412 HISTORY OF PARTIAL RAY AMPUTATION OF FIRST TOE OF LEFT FOOT (HCC): Primary | ICD-10-CM

## 2021-02-12 PROCEDURE — 99024 POSTOP FOLLOW-UP VISIT: CPT | Performed by: ORTHOPAEDIC SURGERY

## 2021-02-12 NOTE — PROGRESS NOTES
Post-op (10 day recheck - s/p amputation left first toe and metatarsal 1/7/21)      Amol Aguirre is 5 weeks status post amputate left first toe and metatarsal, 1/7/2021. He reports no fever, chills.  He reports pain is well controlled.  They have been taking aspirin for DVT prophylaxis.  They have been non weight bearing.      Past Surgical History:   Procedure Laterality Date   • AMPUTATION DIGIT Left 10/15/2019    Procedure: AMPUTATE LEFT THIRD TOE;  Surgeon: Juju Weber MD;  [facility];  Service: Orthopedics   • AMPUTATION DIGIT Left 1/7/2021    Procedure: amputate left first toe and metatarsal;  Surgeon: Juju Weber MD;  [facility]  [facility];  Service: Orthopedics;  Laterality: Left;   • AORTAGRAM N/A 4/30/2019    Procedure: AORTAGRAM WITH OR WITHOUT RUNOFFS;  Surgeon: Carrillo White MD;  Location: [facility] HYBRID OR 15;  Service: Vascular   • BELOW KNEE AMPUTATION Right 6/4/2019    Procedure: BELOW KNEE AMPUTATION RIGHT;  Surgeon: Juju Weber MD;  [facility];  Service: Orthopedics   • CATARACT EXTRACTION W/ INTRAOCULAR LENS IMPLANT Right 7/20/2020    Procedure: CATARACT PHACO EXTRACTION WITH INTRAOCULAR LENS IMPLANT RIGHT;  Surgeon: Jez Mas MD;  Location: [facility] OR;  Service: Ophthalmology;  Laterality: Right;   • CATARACT EXTRACTION W/ INTRAOCULAR LENS IMPLANT Left 8/3/2020    Procedure: CATARACT PHACO EXTRACTION WITH INTRAOCULAR LENS IMPLANT LEFT;  Surgeon: Jez Mas MD;  Location: [facility] OR;  Service: Ophthalmology;  Laterality: Left;   • CHOLECYSTECTOMY     • FOOT SURGERY Left 10/15/2019    Amputate 3rd toe- DeGnore   • KNEE SURGERY Right     TKA       There were no vitals taken for this visit.       Left foot there is eschar on the distal aspect of the incision, but no erythema no drainage, I removed some of the loose edges, it is slowly shrinking    ordered and reviewed x-rays today    Assessment and Plan:   1. History of partial ray amputation  of first toe of left foot (CMS/HCC)  He does have some necrosis of the documentation, but I think he still has reasonable chance to heal this underneath.  Instead of the Bactroban I want him to use Betadine on the eschar once a day, and a dry dressing.  Continue absolute nonweightbearing.  I will see him again in 2 weeks.  Radiographs do not show any obvious bone infection today.  - XR Foot 2 View Left          Juju Weber MD

## 2021-02-15 ENCOUNTER — LAB REQUISITION (OUTPATIENT)
Dept: LAB | Facility: HOSPITAL | Age: 76
End: 2021-02-15

## 2021-02-15 DIAGNOSIS — Z47.81 ENCOUNTER FOR ORTHOPEDIC AFTERCARE FOLLOWING SURGICAL AMPUTATION: ICD-10-CM

## 2021-02-15 DIAGNOSIS — Z89.412 ACQUIRED ABSENCE OF LEFT GREAT TOE (HCC): ICD-10-CM

## 2021-02-15 LAB
ALBUMIN SERPL-MCNC: 3.7 G/DL (ref 3.5–5.2)
ALBUMIN/GLOB SERPL: 1.4 G/DL
ALP SERPL-CCNC: 140 U/L (ref 39–117)
ALT SERPL W P-5'-P-CCNC: 19 U/L (ref 1–41)
ANION GAP SERPL CALCULATED.3IONS-SCNC: 8.7 MMOL/L (ref 5–15)
AST SERPL-CCNC: 19 U/L (ref 1–40)
BASOPHILS # BLD AUTO: 0.06 10*3/MM3 (ref 0–0.2)
BASOPHILS NFR BLD AUTO: 0.9 % (ref 0–1.5)
BILIRUB SERPL-MCNC: 0.3 MG/DL (ref 0–1.2)
BUN SERPL-MCNC: 29 MG/DL (ref 8–23)
BUN/CREAT SERPL: 13.8 (ref 7–25)
CALCIUM SPEC-SCNC: 9.3 MG/DL (ref 8.6–10.5)
CHLORIDE SERPL-SCNC: 106 MMOL/L (ref 98–107)
CO2 SERPL-SCNC: 25.3 MMOL/L (ref 22–29)
CREAT SERPL-MCNC: 2.1 MG/DL (ref 0.76–1.27)
DEPRECATED RDW RBC AUTO: 45 FL (ref 37–54)
EOSINOPHIL # BLD AUTO: 0.42 10*3/MM3 (ref 0–0.4)
EOSINOPHIL NFR BLD AUTO: 6 % (ref 0.3–6.2)
ERYTHROCYTE [DISTWIDTH] IN BLOOD BY AUTOMATED COUNT: 14.3 % (ref 12.3–15.4)
GFR SERPL CREATININE-BSD FRML MDRD: 31 ML/MIN/1.73
GLOBULIN UR ELPH-MCNC: 2.7 GM/DL
GLUCOSE SERPL-MCNC: 161 MG/DL (ref 65–99)
HCT VFR BLD AUTO: 35.3 % (ref 37.5–51)
HGB BLD-MCNC: 10.8 G/DL (ref 13–17.7)
IMM GRANULOCYTES # BLD AUTO: 0.02 10*3/MM3 (ref 0–0.05)
IMM GRANULOCYTES NFR BLD AUTO: 0.3 % (ref 0–0.5)
LYMPHOCYTES # BLD AUTO: 1.78 10*3/MM3 (ref 0.7–3.1)
LYMPHOCYTES NFR BLD AUTO: 25.4 % (ref 19.6–45.3)
MCH RBC QN AUTO: 26.6 PG (ref 26.6–33)
MCHC RBC AUTO-ENTMCNC: 30.6 G/DL (ref 31.5–35.7)
MCV RBC AUTO: 86.9 FL (ref 79–97)
MONOCYTES # BLD AUTO: 0.44 10*3/MM3 (ref 0.1–0.9)
MONOCYTES NFR BLD AUTO: 6.3 % (ref 5–12)
NEUTROPHILS NFR BLD AUTO: 4.29 10*3/MM3 (ref 1.7–7)
NEUTROPHILS NFR BLD AUTO: 61.1 % (ref 42.7–76)
NRBC BLD AUTO-RTO: 0 /100 WBC (ref 0–0.2)
PLATELET # BLD AUTO: 280 10*3/MM3 (ref 140–450)
PMV BLD AUTO: 10.1 FL (ref 6–12)
POTASSIUM SERPL-SCNC: 5 MMOL/L (ref 3.5–5.2)
PROT SERPL-MCNC: 6.4 G/DL (ref 6–8.5)
RBC # BLD AUTO: 4.06 10*6/MM3 (ref 4.14–5.8)
SODIUM SERPL-SCNC: 140 MMOL/L (ref 136–145)
WBC # BLD AUTO: 7.01 10*3/MM3 (ref 3.4–10.8)

## 2021-02-15 PROCEDURE — 80053 COMPREHEN METABOLIC PANEL: CPT | Performed by: OTOLARYNGOLOGY

## 2021-02-15 PROCEDURE — 85025 COMPLETE CBC W/AUTO DIFF WBC: CPT | Performed by: OTOLARYNGOLOGY

## 2021-02-18 LAB
FUNGUS WND CULT: NORMAL
FUNGUS WND CULT: NORMAL
MYCOBACTERIUM SPEC CULT: NORMAL
MYCOBACTERIUM SPEC CULT: NORMAL
NIGHT BLUE STAIN TISS: NORMAL
NIGHT BLUE STAIN TISS: NORMAL

## 2021-02-24 ENCOUNTER — OFFICE VISIT (OUTPATIENT)
Dept: ORTHOPEDIC SURGERY | Facility: CLINIC | Age: 76
End: 2021-02-24

## 2021-02-24 DIAGNOSIS — Z89.412 HISTORY OF PARTIAL RAY AMPUTATION OF FIRST TOE OF LEFT FOOT (HCC): Primary | ICD-10-CM

## 2021-02-24 PROCEDURE — 99024 POSTOP FOLLOW-UP VISIT: CPT | Performed by: ORTHOPAEDIC SURGERY

## 2021-02-24 NOTE — PROGRESS NOTES
Post-op Follow-up (12 day f/u, 7 week s/p amputation left first toe and metatarsal 1/7/21)      Amol Aguirre is 7 weeks status post left first toe and metatarsal amputation, 1/7/2021. He reports no fever, chills.  He reports pain is well controlled.  They have been taking aspirin for DVT prophylaxis.  They have been non weight bearing.      Past Surgical History:   Procedure Laterality Date   • AMPUTATION DIGIT Left 10/15/2019    Procedure: AMPUTATE LEFT THIRD TOE;  Surgeon: Juju Weber MD;  [facility];  Service: Orthopedics   • AMPUTATION DIGIT Left 1/7/2021    Procedure: amputate left first toe and metatarsal;  Surgeon: Juju Weber MD;  [facility];  Service: Orthopedics;  Laterality: Left;   • AORTAGRAM N/A 4/30/2019    Procedure: AORTAGRAM WITH OR WITHOUT RUNOFFS;  Surgeon: Carrillo White MD;  [facility]:  [facility] HYBRID OR 15;  Service: Vascular   • BELOW KNEE AMPUTATION Right 6/4/2019    Procedure: BELOW KNEE AMPUTATION RIGHT;  Surgeon: Juju Weber MD;  [facility];  Service: Orthopedics   • CATARACT EXTRACTION W/ INTRAOCULAR LENS IMPLANT Right 7/20/2020    Procedure: CATARACT PHACO EXTRACTION WITH INTRAOCULAR LENS IMPLANT RIGHT;  Surgeon: Jez Mas MD;  Location:  [facility];  Service: Ophthalmology;  Laterality: Right;   • CATARACT EXTRACTION W/ INTRAOCULAR LENS IMPLANT Left 8/3/2020    Procedure: CATARACT PHACO EXTRACTION WITH INTRAOCULAR LENS IMPLANT LEFT;  Surgeon: Jez Mas MD;  Location: [facility] OR;  Service: Ophthalmology;  Laterality: Left;   • CHOLECYSTECTOMY     • FOOT SURGERY Left 10/15/2019    Amputate 3rd toe- DeGnore   • KNEE SURGERY Right     TKA       There were no vitals taken for this visit.     The necrotic eschar is shrinking, no sign of infection, I debrided any edges that were loose, left foot      Assessment and Plan:   1. 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  I think this has a reasonable chance to heal,  No sign of infection on xray, continue betadine, non-wt bearing, see me in 3 weeks            Juju Weber MD  Answers for HPI/ROS submitted by the patient on 2/22/2021   What is the primary reason for your visit?: Physical

## 2021-03-04 ENCOUNTER — LAB (OUTPATIENT)
Dept: LAB | Facility: HOSPITAL | Age: 76
End: 2021-03-04

## 2021-03-04 ENCOUNTER — TRANSCRIBE ORDERS (OUTPATIENT)
Dept: LAB | Facility: HOSPITAL | Age: 76
End: 2021-03-04

## 2021-03-04 DIAGNOSIS — M86.172 ACUTE OSTEOMYELITIS OF LEFT ANKLE OR FOOT (HCC): ICD-10-CM

## 2021-03-04 DIAGNOSIS — M86.172 ACUTE OSTEOMYELITIS OF LEFT ANKLE OR FOOT (HCC): Primary | ICD-10-CM

## 2021-03-04 LAB
ALBUMIN SERPL-MCNC: 4.1 G/DL (ref 3.5–5.2)
ALBUMIN/GLOB SERPL: 1.3 G/DL
ALP SERPL-CCNC: 139 U/L (ref 39–117)
ALT SERPL W P-5'-P-CCNC: 18 U/L (ref 1–41)
ANION GAP SERPL CALCULATED.3IONS-SCNC: 9 MMOL/L (ref 5–15)
AST SERPL-CCNC: 17 U/L (ref 1–40)
BASOPHILS # BLD AUTO: 0.05 10*3/MM3 (ref 0–0.2)
BASOPHILS NFR BLD AUTO: 0.6 % (ref 0–1.5)
BILIRUB SERPL-MCNC: 0.3 MG/DL (ref 0–1.2)
BUN SERPL-MCNC: 36 MG/DL (ref 8–23)
BUN/CREAT SERPL: 20.1 (ref 7–25)
CALCIUM SPEC-SCNC: 9.6 MG/DL (ref 8.6–10.5)
CHLORIDE SERPL-SCNC: 106 MMOL/L (ref 98–107)
CO2 SERPL-SCNC: 24 MMOL/L (ref 22–29)
CREAT SERPL-MCNC: 1.79 MG/DL (ref 0.76–1.27)
CRP SERPL-MCNC: 1.24 MG/DL (ref 0–0.5)
DEPRECATED RDW RBC AUTO: 46.5 FL (ref 37–54)
EOSINOPHIL # BLD AUTO: 0.26 10*3/MM3 (ref 0–0.4)
EOSINOPHIL NFR BLD AUTO: 3.3 % (ref 0.3–6.2)
ERYTHROCYTE [DISTWIDTH] IN BLOOD BY AUTOMATED COUNT: 15 % (ref 12.3–15.4)
GFR SERPL CREATININE-BSD FRML MDRD: 37 ML/MIN/1.73
GLOBULIN UR ELPH-MCNC: 3.1 GM/DL
GLUCOSE SERPL-MCNC: 286 MG/DL (ref 65–99)
HCT VFR BLD AUTO: 36.9 % (ref 37.5–51)
HGB BLD-MCNC: 11.8 G/DL (ref 13–17.7)
IMM GRANULOCYTES # BLD AUTO: 0.04 10*3/MM3 (ref 0–0.05)
IMM GRANULOCYTES NFR BLD AUTO: 0.5 % (ref 0–0.5)
LYMPHOCYTES # BLD AUTO: 1.91 10*3/MM3 (ref 0.7–3.1)
LYMPHOCYTES NFR BLD AUTO: 24.2 % (ref 19.6–45.3)
MCH RBC QN AUTO: 27.5 PG (ref 26.6–33)
MCHC RBC AUTO-ENTMCNC: 32 G/DL (ref 31.5–35.7)
MCV RBC AUTO: 86 FL (ref 79–97)
MONOCYTES # BLD AUTO: 0.39 10*3/MM3 (ref 0.1–0.9)
MONOCYTES NFR BLD AUTO: 4.9 % (ref 5–12)
NEUTROPHILS NFR BLD AUTO: 5.24 10*3/MM3 (ref 1.7–7)
NEUTROPHILS NFR BLD AUTO: 66.5 % (ref 42.7–76)
NRBC BLD AUTO-RTO: 0 /100 WBC (ref 0–0.2)
PLATELET # BLD AUTO: 307 10*3/MM3 (ref 140–450)
PMV BLD AUTO: 10 FL (ref 6–12)
POTASSIUM SERPL-SCNC: 4.7 MMOL/L (ref 3.5–5.2)
PROT SERPL-MCNC: 7.2 G/DL (ref 6–8.5)
RBC # BLD AUTO: 4.29 10*6/MM3 (ref 4.14–5.8)
SODIUM SERPL-SCNC: 139 MMOL/L (ref 136–145)
WBC # BLD AUTO: 7.89 10*3/MM3 (ref 3.4–10.8)

## 2021-03-04 PROCEDURE — 36415 COLL VENOUS BLD VENIPUNCTURE: CPT | Performed by: INTERNAL MEDICINE

## 2021-03-04 PROCEDURE — 86140 C-REACTIVE PROTEIN: CPT | Performed by: INTERNAL MEDICINE

## 2021-03-04 PROCEDURE — 85025 COMPLETE CBC W/AUTO DIFF WBC: CPT

## 2021-03-04 PROCEDURE — 80053 COMPREHEN METABOLIC PANEL: CPT | Performed by: INTERNAL MEDICINE

## 2021-03-26 ENCOUNTER — OFFICE VISIT (OUTPATIENT)
Dept: ORTHOPEDIC SURGERY | Facility: CLINIC | Age: 76
End: 2021-03-26

## 2021-03-26 DIAGNOSIS — Z89.412 HISTORY OF PARTIAL RAY AMPUTATION OF FIRST TOE OF LEFT FOOT (HCC): Primary | ICD-10-CM

## 2021-03-26 PROCEDURE — 99024 POSTOP FOLLOW-UP VISIT: CPT | Performed by: ORTHOPAEDIC SURGERY

## 2021-03-26 RX ORDER — DOXYCYCLINE 100 MG/1
100 CAPSULE ORAL 2 TIMES DAILY
COMMUNITY
Start: 2021-03-18 | End: 2021-08-27

## 2021-03-26 RX ORDER — CEFUROXIME AXETIL 500 MG/1
500 TABLET ORAL 2 TIMES DAILY
COMMUNITY
Start: 2021-03-18 | End: 2021-08-27

## 2021-03-26 RX ORDER — BUMETANIDE 1 MG/1
1 TABLET ORAL DAILY
COMMUNITY
Start: 2021-03-19

## 2021-03-26 RX ORDER — MAGNESIUM OXIDE 400 MG/1
TABLET ORAL
COMMUNITY
End: 2021-08-27

## 2021-03-26 RX ORDER — APIXABAN 5 MG/1
5 TABLET, FILM COATED ORAL 2 TIMES DAILY
COMMUNITY
Start: 2021-03-19

## 2021-03-26 RX ORDER — GABAPENTIN 600 MG/1
600 TABLET ORAL 2 TIMES DAILY
COMMUNITY
Start: 2021-03-18

## 2021-03-26 RX ORDER — EMPAGLIFLOZIN 25 MG/1
1 TABLET, FILM COATED ORAL DAILY
COMMUNITY
Start: 2021-02-25

## 2021-03-26 NOTE — PROGRESS NOTES
Post-op (1 month f/u--2.5 months s/p amputation left first toe and metatarsal 1/7/21))      Amol Aguirre is 10 weeks status post amputate left great toe and metatarsal, 1/7/2021. He reports no fever, chills.  We are using Betadine on the large eschar, its healing slowly underneath.  Since I last saw him he has been diagnosed with atrial fibrillation and congestive heart failure.  He is having difficulty sleeping at night.  A referral has been sent for him to see Dr. Foreman, but they have not yet heard back from the office.  I spoke with Dr. Foreman by phone today, he will see the patient on Monday    Past Surgical History:   Procedure Laterality Date   • AMPUTATION DIGIT Left 10/15/2019    Procedure: AMPUTATE LEFT THIRD TOE;  Surgeon: Juju Weber MD;  [facility];  Service: Orthopedics   • AMPUTATION DIGIT Left 1/7/2021    Procedure: amputate left first toe and metatarsal;  Surgeon: Juju Weber MD;  [facility]:  [facility];  Service: Orthopedics;  Laterality: Left;   • AORTAGRAM N/A 4/30/2019    Procedure: AORTAGRAM WITH OR WITHOUT RUNOFFS;  Surgeon: Carrillo White MD;  Location: [facility] HYBRID OR 15;  Service: Vascular   • BELOW KNEE AMPUTATION Right 6/4/2019    Procedure: BELOW KNEE AMPUTATION RIGHT;  Surgeon: Juju Weber MD;  [facility]:  [facility];  Service: Orthopedics   • CATARACT EXTRACTION W/ INTRAOCULAR LENS IMPLANT Right 7/20/2020    Procedure: CATARACT PHACO EXTRACTION WITH INTRAOCULAR LENS IMPLANT RIGHT;  Surgeon: Jez Mas MD;  Location: [facility] OR;  Service: Ophthalmology;  Laterality: Right;   • CATARACT EXTRACTION W/ INTRAOCULAR LENS IMPLANT Left 8/3/2020    Procedure: CATARACT PHACO EXTRACTION WITH INTRAOCULAR LENS IMPLANT LEFT;  Surgeon: Jez Mas MD;  Location: [facility] OR;  Service: Ophthalmology;  Laterality: Left;   • CHOLECYSTECTOMY     • FOOT SURGERY Left 10/15/2019    Amputate 3rd toe- DeGnore   • KNEE SURGERY Right     TKA       There were no  vitals taken for this visit.     Left foot the eschar over the toe amputation site is healing slowly but reasonably, I removed any loose edges.  No signs of infection.    phone conversation with physician    Assessment and Plan:   1. 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  As above he has developed A. fib and congestive heart failure.  He will see Dr. Foreman on Monday.  I will see him in 2 to 3 weeks          Juju Weber MD

## 2021-04-01 ENCOUNTER — TELEPHONE (OUTPATIENT)
Dept: ORTHOPEDIC SURGERY | Facility: CLINIC | Age: 76
End: 2021-04-01

## 2021-04-01 NOTE — TELEPHONE ENCOUNTER
PATIENT HAS BIG CLEAR BLISTER ON THE OUTSIDE OF THE FOOT. BLISTER IS NOT PAINFUL AND ITS ON HIS LEFT FOOT.WIFE IS CONCERNED.

## 2021-04-08 ENCOUNTER — LAB (OUTPATIENT)
Dept: LAB | Facility: HOSPITAL | Age: 76
End: 2021-04-08

## 2021-04-08 ENCOUNTER — TRANSCRIBE ORDERS (OUTPATIENT)
Dept: LAB | Facility: HOSPITAL | Age: 76
End: 2021-04-08

## 2021-04-08 DIAGNOSIS — M86.172 ACUTE OSTEOMYELITIS OF LEFT ANKLE OR FOOT (HCC): ICD-10-CM

## 2021-04-08 DIAGNOSIS — L03.116 CELLULITIS OF LEFT FOOT: ICD-10-CM

## 2021-04-08 DIAGNOSIS — I87.2 PERIPHERAL VENOUS INSUFFICIENCY: ICD-10-CM

## 2021-04-08 DIAGNOSIS — L97.522 ULCER OF LEFT FOOT, WITH FAT LAYER EXPOSED (HCC): Primary | ICD-10-CM

## 2021-04-08 DIAGNOSIS — I12.9 PARENCHYMAL RENAL HYPERTENSION, STAGE 1 THROUGH STAGE 4 OR UNSPECIFIED CHRONIC KIDNEY DISEASE: ICD-10-CM

## 2021-04-08 LAB
ALBUMIN SERPL-MCNC: 4.1 G/DL (ref 3.5–5.2)
ALBUMIN/GLOB SERPL: 1.2 G/DL
ALP SERPL-CCNC: 128 U/L (ref 39–117)
ALT SERPL W P-5'-P-CCNC: 23 U/L (ref 1–41)
ANION GAP SERPL CALCULATED.3IONS-SCNC: 9 MMOL/L (ref 5–15)
AST SERPL-CCNC: 25 U/L (ref 1–40)
BASOPHILS # BLD AUTO: 0.06 10*3/MM3 (ref 0–0.2)
BASOPHILS NFR BLD AUTO: 0.8 % (ref 0–1.5)
BILIRUB SERPL-MCNC: 0.4 MG/DL (ref 0–1.2)
BUN SERPL-MCNC: 34 MG/DL (ref 8–23)
BUN/CREAT SERPL: 16.7 (ref 7–25)
CALCIUM SPEC-SCNC: 9.8 MG/DL (ref 8.6–10.5)
CHLORIDE SERPL-SCNC: 101 MMOL/L (ref 98–107)
CO2 SERPL-SCNC: 27 MMOL/L (ref 22–29)
CREAT SERPL-MCNC: 2.04 MG/DL (ref 0.76–1.27)
CRP SERPL-MCNC: 1.56 MG/DL (ref 0–0.5)
DEPRECATED RDW RBC AUTO: 50.7 FL (ref 37–54)
EOSINOPHIL # BLD AUTO: 0.22 10*3/MM3 (ref 0–0.4)
EOSINOPHIL NFR BLD AUTO: 2.9 % (ref 0.3–6.2)
ERYTHROCYTE [DISTWIDTH] IN BLOOD BY AUTOMATED COUNT: 16.3 % (ref 12.3–15.4)
GFR SERPL CREATININE-BSD FRML MDRD: 32 ML/MIN/1.73
GLOBULIN UR ELPH-MCNC: 3.3 GM/DL
GLUCOSE SERPL-MCNC: 204 MG/DL (ref 65–99)
HCT VFR BLD AUTO: 37.8 % (ref 37.5–51)
HGB BLD-MCNC: 11.6 G/DL (ref 13–17.7)
IMM GRANULOCYTES # BLD AUTO: 0.04 10*3/MM3 (ref 0–0.05)
IMM GRANULOCYTES NFR BLD AUTO: 0.5 % (ref 0–0.5)
LYMPHOCYTES # BLD AUTO: 1.95 10*3/MM3 (ref 0.7–3.1)
LYMPHOCYTES NFR BLD AUTO: 25.5 % (ref 19.6–45.3)
MCH RBC QN AUTO: 26.3 PG (ref 26.6–33)
MCHC RBC AUTO-ENTMCNC: 30.7 G/DL (ref 31.5–35.7)
MCV RBC AUTO: 85.7 FL (ref 79–97)
MONOCYTES # BLD AUTO: 0.59 10*3/MM3 (ref 0.1–0.9)
MONOCYTES NFR BLD AUTO: 7.7 % (ref 5–12)
NEUTROPHILS NFR BLD AUTO: 4.79 10*3/MM3 (ref 1.7–7)
NEUTROPHILS NFR BLD AUTO: 62.6 % (ref 42.7–76)
NRBC BLD AUTO-RTO: 0 /100 WBC (ref 0–0.2)
PLATELET # BLD AUTO: 343 10*3/MM3 (ref 140–450)
PMV BLD AUTO: 10 FL (ref 6–12)
POTASSIUM SERPL-SCNC: 5.2 MMOL/L (ref 3.5–5.2)
PROT SERPL-MCNC: 7.4 G/DL (ref 6–8.5)
RBC # BLD AUTO: 4.41 10*6/MM3 (ref 4.14–5.8)
SODIUM SERPL-SCNC: 137 MMOL/L (ref 136–145)
WBC # BLD AUTO: 7.65 10*3/MM3 (ref 3.4–10.8)

## 2021-04-08 PROCEDURE — 85025 COMPLETE CBC W/AUTO DIFF WBC: CPT | Performed by: INTERNAL MEDICINE

## 2021-04-08 PROCEDURE — 86140 C-REACTIVE PROTEIN: CPT | Performed by: INTERNAL MEDICINE

## 2021-04-08 PROCEDURE — 80053 COMPREHEN METABOLIC PANEL: CPT | Performed by: INTERNAL MEDICINE

## 2021-04-08 PROCEDURE — 36415 COLL VENOUS BLD VENIPUNCTURE: CPT | Performed by: INTERNAL MEDICINE

## 2021-04-15 ENCOUNTER — TRANSCRIBE ORDERS (OUTPATIENT)
Dept: LAB | Facility: HOSPITAL | Age: 76
End: 2021-04-15

## 2021-04-15 DIAGNOSIS — M10.9 GOUT, UNSPECIFIED CAUSE, UNSPECIFIED CHRONICITY, UNSPECIFIED SITE: Primary | ICD-10-CM

## 2021-04-19 ENCOUNTER — OFFICE VISIT (OUTPATIENT)
Dept: ORTHOPEDIC SURGERY | Facility: CLINIC | Age: 76
End: 2021-04-19

## 2021-04-19 VITALS
DIASTOLIC BLOOD PRESSURE: 62 MMHG | SYSTOLIC BLOOD PRESSURE: 149 MMHG | HEIGHT: 75 IN | BODY MASS INDEX: 32.33 KG/M2 | WEIGHT: 260 LBS | HEART RATE: 127 BPM

## 2021-04-19 DIAGNOSIS — Z89.412 HISTORY OF PARTIAL RAY AMPUTATION OF FIRST TOE OF LEFT FOOT (HCC): Primary | ICD-10-CM

## 2021-04-19 DIAGNOSIS — Z89.511 S/P BKA (BELOW KNEE AMPUTATION), RIGHT (HCC): ICD-10-CM

## 2021-04-19 PROCEDURE — 99213 OFFICE O/P EST LOW 20 MIN: CPT | Performed by: ORTHOPAEDIC SURGERY

## 2021-04-19 NOTE — PROGRESS NOTES
ESTABLISHED PATIENT    Patient: Amol Aguirre  : 1945    Primary Care Provider: Donte Briones MD    Requesting Provider: As above    Follow-up (3.5 week follow up; 3 months s/p amputation left first toe and metatarsal 21)      History    Chief Complaint: Follow-up left foot great toe amputation right below-knee amputation    History of Present Illness: He returns for follow-up of amputation of his left first ray, it still healing slowly under the eschar over the distal extent of the amputation site.  He also has an eschar that is healing on the lateral aspect of the foot.  No signs of infection.  We are having him put Betadine on these daily.    Current Outpatient Medications on File Prior to Visit   Medication Sig Dispense Refill   • allopurinol (ZYLOPRIM) 100 MG tablet Take 100 mg by mouth Daily.     • amLODIPine (NORVASC) 10 MG tablet Take 10 mg by mouth Daily.  0   • budesonide-formoterol (SYMBICORT) 80-4.5 MCG/ACT inhaler Inhale 2 puffs 2 (Two) Times a Day.     • bumetanide (BUMEX) 1 MG tablet      • cefuroxime (CEFTIN) 500 MG tablet Take 500 mg by mouth 2 (Two) Times a Day.     • CloNIDine (CATAPRES) 0.1 MG tablet Take 0.1 mg by mouth 2 (Two) Times a Day.     • doxycycline (MONODOX) 100 MG capsule Take 100 mg by mouth 2 (Two) Times a Day.     • Eliquis 5 MG tablet tablet Take 5 mg by mouth 2 (Two) Times a Day.     • gabapentin (NEURONTIN) 400 MG capsule Take 600 mg by mouth 4 (Four) Times a Day.     • gabapentin (NEURONTIN) 600 MG tablet Take 600 mg by mouth 4 (Four) Times a Day.     • hydrochlorothiazide (HYDRODIURIL) 25 MG tablet Take 25 mg by mouth Daily.  3   • Insulin Glargine (TOUJEO SOLOSTAR) 300 UNIT/ML solution pen-injector Inject 60 Units under the skin into the appropriate area as directed Every Night.     • Jardiance 25 MG tablet Take 1 tablet by mouth Daily.     • losartan (COZAAR) 100 MG tablet Take 100 mg by mouth Daily.  3   • magnesium oxide (MAG-OX) 400 MG tablet  magnesium oxide 400 mg (241.3 mg magnesium) tablet   TAKE ONE TABLET BY MOUTH DAILY     • melatonin 5 MG tablet tablet Take 1 tablet by mouth At Night As Needed (sleep).     • metoclopramide (REGLAN) 10 MG tablet Take 10 mg by mouth 4 (Four) Times a Day.  8   • NOVOLOG FLEXPEN 100 UNIT/ML solution pen-injector sc pen Inject 20 Units under the skin into the appropriate area as directed 4 (Four) Times a Day.     • olmesartan (BENICAR) 40 MG tablet olmesartan 40 mg tablet   TAKE 1 TABLET BY MOUTH EVERY DAY     • pantoprazole (PROTONIX) 40 MG EC tablet Take 40 mg by mouth Daily.  2   • polyethylene glycol (MIRALAX) 17 GM/SCOOP powder Dissolve 17g (one capful) in 8oz. water and drink by mouth Daily. 238 g 0   • prednisoLONE acetate (Pred Forte) 1 % ophthalmic suspension Follow instructions on the After Visit Summary. (Patient taking differently: Administer 1 drop to the right eye 2 (two) times a day. Follow instructions on the After Visit Summary.) 2 mL 0   • vitamin B-12 (CYANOCOBALAMIN) 1000 MCG tablet Take 1,000 mcg by mouth Daily.       No current facility-administered medications on file prior to visit.      Allergies   Allergen Reactions   • Chlorhexidine Shortness Of Breath, Itching and Rash     Pt developed a rash, itching, and shortness of breath after receiving a CHG bath on 4/24/19.   • Latex Other (See Comments)     Rash, hives, and tongue swelling      Past Medical History:   Diagnosis Date   • Acid reflux    • Asthma    • Atrial fibrillation/flutter (CMS/[facility])    • CHF (congestive heart failure) (CMS/[facility])    • Diabetes (CMS/[facility])    • Hypertension      Past Surgical History:   Procedure Laterality Date   • AMPUTATION DIGIT Left 10/15/2019    Procedure: AMPUTATE LEFT THIRD TOE;  Surgeon: Juju Weber MD;  [facility]:  [facility] OR;  Service: Orthopedics   • AMPUTATION DIGIT Left 1/7/2021    Procedure: amputate left first toe and metatarsal;  Surgeon: Juju Weber MD;  Location: [facility] OR;  Service:  Orthopedics;  Laterality: Left;   • AORTAGRAM N/A 2019    Procedure: AORTAGRAM WITH OR WITHOUT RUNOFFS;  Surgeon: Carrillo White MD;  Location: [facility] HYBRID OR 15;  Service: Vascular   • BELOW KNEE AMPUTATION Right 2019    Procedure: BELOW KNEE AMPUTATION RIGHT;  Surgeon: Juju Weber MD;  Location: [facility] OR;  Service: Orthopedics   • CATARACT EXTRACTION W/ INTRAOCULAR LENS IMPLANT Right 2020    Procedure: CATARACT PHACO EXTRACTION WITH INTRAOCULAR LENS IMPLANT RIGHT;  Surgeon: Jez Mas MD;  Location: [facility] OR;  Service: Ophthalmology;  Laterality: Right;   • CATARACT EXTRACTION W/ INTRAOCULAR LENS IMPLANT Left 8/3/2020    Procedure: CATARACT PHACO EXTRACTION WITH INTRAOCULAR LENS IMPLANT LEFT;  Surgeon: Jez Mas MD;  Location: [facility] OR;  Service: Ophthalmology;  Laterality: Left;   • CHOLECYSTECTOMY     • FOOT SURGERY Left 10/15/2019    Amputate 3rd toe- DeGnore   • KNEE SURGERY Right     TKA     Family History   Problem Relation Age of Onset   • Cancer Mother    • Hypertension Mother    • Hypertension Father    • Heart attack Father       Social History     Socioeconomic History   • Marital status:      Spouse name: Not on file   • Number of children: Not on file   • Years of education: Not on file   • Highest education level: Not on file   Tobacco Use   • Smoking status: Former Smoker     Types: Cigarettes     Start date:      Quit date:      Years since quittin.3   • Smokeless tobacco: Never Used   Substance and Sexual Activity   • Alcohol use: No   • Drug use: No   • Sexual activity: Defer        Review of Systems   Constitutional: Negative.    HENT: Negative.    Eyes: Negative.    Respiratory: Negative.    Cardiovascular: Negative.    Gastrointestinal: Negative.    Endocrine: Negative.    Genitourinary: Negative.    Musculoskeletal: Positive for arthralgias.   Skin: Negative.    Allergic/Immunologic: Negative.    Neurological: Negative.   "  Hematological: Negative.    Psychiatric/Behavioral: Negative.        The following portions of the patient's history were reviewed and updated as appropriate: allergies, current medications, past family history, past medical history, past social history, past surgical history and problem list.    Physical Exam:   /62   Pulse (!) 127   Ht 190.5 cm (75\")   Wt 118 kg (260 lb)   BMI 32.50 kg/m²   GENERAL: Body habitus: trunkal obesity    Lower extremity edema: Left: none; Right: none    Gait: in wheelchair     Mental Status:  awake and alert; oriented to person, place, and time  MSK:  [place]:  Right: Below-knee amputation is stable        Ankle:   Left:  non tender        Foot: Left:  Eschar on the distal stump of the first ray amputation site, eschar over lateral fifth metatarsal head, I debrided any areas that were loose.  There is granulation tissue underneath it.  There is no new necrotic tissue, no signs of infection.    NEURO Sensation:  absent    Medical Decision Making    Data Review:   none    Assessment/Plan/Diagnosis/Treatment Options:   1. 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  No signs of infection.  Continue to use Betadine and nonweightbearing.  I will see him again in 3 to 4 weeks.  Using sterile technique I debrided the eschars on the right foot, I removed all loose tissue and left the tissue that was densely adherent    2. S/P BKA (below knee amputation), right (CMS/HCC)  Stable        Juju Weber MD                      "

## 2021-05-17 ENCOUNTER — TRANSCRIBE ORDERS (OUTPATIENT)
Dept: ADMINISTRATIVE | Facility: HOSPITAL | Age: 76
End: 2021-05-17

## 2021-05-17 ENCOUNTER — OFFICE VISIT (OUTPATIENT)
Dept: ORTHOPEDIC SURGERY | Facility: CLINIC | Age: 76
End: 2021-05-17

## 2021-05-17 VITALS
WEIGHT: 260 LBS | SYSTOLIC BLOOD PRESSURE: 119 MMHG | HEIGHT: 75 IN | DIASTOLIC BLOOD PRESSURE: 70 MMHG | BODY MASS INDEX: 32.33 KG/M2

## 2021-05-17 DIAGNOSIS — I49.5 SICK SINUS SYNDROME (HCC): Primary | ICD-10-CM

## 2021-05-17 DIAGNOSIS — Z89.412 HISTORY OF PARTIAL RAY AMPUTATION OF FIRST TOE OF LEFT FOOT (HCC): Primary | ICD-10-CM

## 2021-05-17 PROCEDURE — 99212 OFFICE O/P EST SF 10 MIN: CPT | Performed by: ORTHOPAEDIC SURGERY

## 2021-05-17 NOTE — PROGRESS NOTES
ESTABLISHED PATIENT    Patient: Amol Aguirre  : 1945    Primary Care Provider: Donte Briones MD    Requesting Provider: As above    Follow-up (4 week follow up; 3 months s/p amputation left first toe and metatarsal 21  and S/P Right BKA)      History    Chief Complaint: Follow-up slowly healing left great toe amputation    History of Present Illness: Left great toe amputation site is healing slowly.  There is now just a thin eschar which is a big improvement over the last visit.  He is going to have a pacemaker placed in the near future    Current Outpatient Medications on File Prior to Visit   Medication Sig Dispense Refill   • allopurinol (ZYLOPRIM) 100 MG tablet Take 100 mg by mouth Daily.     • amLODIPine (NORVASC) 10 MG tablet Take 10 mg by mouth Daily.  0   • budesonide-formoterol (SYMBICORT) 80-4.5 MCG/ACT inhaler Inhale 2 puffs 2 (Two) Times a Day.     • bumetanide (BUMEX) 1 MG tablet      • cefuroxime (CEFTIN) 500 MG tablet Take 500 mg by mouth 2 (Two) Times a Day.     • CloNIDine (CATAPRES) 0.1 MG tablet Take 0.1 mg by mouth 2 (Two) Times a Day.     • doxycycline (MONODOX) 100 MG capsule Take 100 mg by mouth 2 (Two) Times a Day.     • Eliquis 5 MG tablet tablet Take 5 mg by mouth 2 (Two) Times a Day.     • gabapentin (NEURONTIN) 400 MG capsule Take 600 mg by mouth 4 (Four) Times a Day.     • gabapentin (NEURONTIN) 600 MG tablet Take 600 mg by mouth 4 (Four) Times a Day.     • hydrochlorothiazide (HYDRODIURIL) 25 MG tablet Take 25 mg by mouth Daily.  3   • Insulin Glargine (TOUJEO SOLOSTAR) 300 UNIT/ML solution pen-injector Inject 60 Units under the skin into the appropriate area as directed Every Night.     • Jardiance 25 MG tablet Take 1 tablet by mouth Daily.     • losartan (COZAAR) 100 MG tablet Take 100 mg by mouth Daily.  3   • magnesium oxide (MAG-OX) 400 MG tablet magnesium oxide 400 mg (241.3 mg magnesium) tablet   TAKE ONE TABLET BY MOUTH DAILY     • melatonin 5 MG  tablet tablet Take 1 tablet by mouth At Night As Needed (sleep).     • metoclopramide (REGLAN) 10 MG tablet Take 10 mg by mouth 4 (Four) Times a Day.  8   • NOVOLOG FLEXPEN 100 UNIT/ML solution pen-injector sc pen Inject 20 Units under the skin into the appropriate area as directed 4 (Four) Times a Day.     • olmesartan (BENICAR) 40 MG tablet olmesartan 40 mg tablet   TAKE 1 TABLET BY MOUTH EVERY DAY     • pantoprazole (PROTONIX) 40 MG EC tablet Take 40 mg by mouth Daily.  2   • polyethylene glycol (MIRALAX) 17 GM/SCOOP powder Dissolve 17g (one capful) in 8oz. water and drink by mouth Daily. 238 g 0   • prednisoLONE acetate (Pred Forte) 1 % ophthalmic suspension Follow instructions on the After Visit Summary. (Patient taking differently: Administer 1 drop to the right eye 2 (two) times a day. Follow instructions on the After Visit Summary.) 2 mL 0   • vitamin B-12 (CYANOCOBALAMIN) 1000 MCG tablet Take 1,000 mcg by mouth Daily.       No current facility-administered medications on file prior to visit.      Allergies   Allergen Reactions   • Chlorhexidine Shortness Of Breath, Itching and Rash     Pt developed a rash, itching, and shortness of breath after receiving a CHG bath on 4/24/19.   • Latex Other (See Comments)     Rash, hives, and tongue swelling      Past Medical History:   Diagnosis Date   • Acid reflux    • Asthma    • Atrial fibrillation/flutter (CMS/HCC)    • CHF (congestive heart failure) (CMS/HCC)    • Diabetes (CMS/[facility])    • Hypertension      Past Surgical History:   Procedure Laterality Date   • AMPUTATION DIGIT Left 10/15/2019    Procedure: AMPUTATE LEFT THIRD TOE;  Surgeon: Juju Weber MD;  Location:  [facility] OR;  Service: Orthopedics   • AMPUTATION DIGIT Left 1/7/2021    Procedure: amputate left first toe and metatarsal;  Surgeon: Juju eWber MD;  Location:  [facility] OR;  Service: Orthopedics;  Laterality: Left;   • AORTAGRAM N/A 4/30/2019    Procedure: AORTAGRAM WITH OR WITHOUT RUNOFFS;   Surgeon: Carrillo White MD;  Location:  [facility];  Service: Vascular   • BELOW KNEE AMPUTATION Right 2019    Procedure: BELOW KNEE AMPUTATION RIGHT;  Surgeon: Juju Weber MD;  Location:  [facility] OR;  Service: Orthopedics   • CATARACT EXTRACTION W/ INTRAOCULAR LENS IMPLANT Right 2020    Procedure: CATARACT PHACO EXTRACTION WITH INTRAOCULAR LENS IMPLANT RIGHT;  Surgeon: Jez Mas MD;  Location:  [facility] OR;  Service: Ophthalmology;  Laterality: Right;   • CATARACT EXTRACTION W/ INTRAOCULAR LENS IMPLANT Left 8/3/2020    Procedure: CATARACT PHACO EXTRACTION WITH INTRAOCULAR LENS IMPLANT LEFT;  Surgeon: Jez Mas MD;  Location:  [facility];  Service: Ophthalmology;  Laterality: Left;   • CHOLECYSTECTOMY     • FOOT SURGERY Left 10/15/2019    Amputate 3rd toe- De[place]nore   • KNEE SURGERY Right     TKA     Family History   Problem Relation Age of Onset   • Cancer Mother    • Hypertension Mother    • Hypertension Father    • Heart attack Father       Social History     Socioeconomic History   • Marital status:      Spouse name: Not on file   • Number of children: Not on file   • Years of education: Not on file   • Highest education level: Not on file   Tobacco Use   • Smoking status: Former Smoker     Types: Cigarettes     Start date:      Quit date:      Years since quittin.4   • Smokeless tobacco: Never Used   Substance and Sexual Activity   • Alcohol use: No   • Drug use: No   • Sexual activity: Defer        Review of Systems   Constitutional: Negative.    HENT: Negative.    Eyes: Negative.    Respiratory: Negative.    Cardiovascular: Negative.    Gastrointestinal: Negative.    Endocrine: Negative.    Genitourinary: Negative.    Musculoskeletal: Positive for arthralgias.   Skin: Negative.    Allergic/Immunologic: Negative.    Neurological: Negative.    Hematological: Negative.    Psychiatric/Behavioral: Negative.        The following portions of the patient's  "history were reviewed and updated as appropriate: allergies, current medications, past family history, past medical history, past social history, past surgical history and problem list.    Physical Exam:   /70   Ht 190.4 cm (74.96\")   Wt 118 kg (260 lb)   BMI 32.53 kg/m²   The eschar on the left great toe amputation site is getting more narrow.  There is no signs of infection.  No significant swelling.  Right BKA is stable    Medical Decision Making    Data Review:   none    Assessment/Plan/Diagnosis/Treatment Options:   1. History of partial ray amputation of first toe of left foot (CMS/HCC)  Healing slowly but a big improvement over last visit.  I trimmed some of the edges of the eschar.  Keep it dry.  Keep Betadine on it.  I will see him in 4 weeks for repeat exam        Juju Weber MD                      "

## 2021-05-28 ENCOUNTER — HOSPITAL ENCOUNTER (OUTPATIENT)
Facility: HOSPITAL | Age: 76
Discharge: HOME | End: 2021-05-29
Attending: INTERNAL MEDICINE | Admitting: INTERNAL MEDICINE

## 2021-05-28 ENCOUNTER — APPOINTMENT (OUTPATIENT)
Dept: GENERAL RADIOLOGY | Facility: HOSPITAL | Age: 76
End: 2021-05-28

## 2021-05-28 DIAGNOSIS — I49.5 SICK SINUS SYNDROME (HCC): ICD-10-CM

## 2021-05-28 LAB
ANION GAP SERPL CALCULATED.3IONS-SCNC: 13 MMOL/L (ref 5–15)
BUN SERPL-MCNC: 39 MG/DL (ref 8–23)
BUN/CREAT SERPL: 18.5 (ref 7–25)
CALCIUM SPEC-SCNC: 9.6 MG/DL (ref 8.6–10.5)
CHLORIDE SERPL-SCNC: 104 MMOL/L (ref 98–107)
CO2 SERPL-SCNC: 24 MMOL/L (ref 22–29)
CREAT SERPL-MCNC: 2.11 MG/DL (ref 0.76–1.27)
DEPRECATED RDW RBC AUTO: 52 FL (ref 37–54)
ERYTHROCYTE [DISTWIDTH] IN BLOOD BY AUTOMATED COUNT: 16.3 % (ref 12.3–15.4)
FLUAV RNA RESP QL NAA+PROBE: NOT DETECTED
FLUBV RNA RESP QL NAA+PROBE: NOT DETECTED
GFR SERPL CREATININE-BSD FRML MDRD: 31 ML/MIN/1.73
GLUCOSE BLDC GLUCOMTR-MCNC: 115 MG/DL (ref 70–130)
GLUCOSE BLDC GLUCOMTR-MCNC: 128 MG/DL (ref 70–130)
GLUCOSE BLDC GLUCOMTR-MCNC: 139 MG/DL (ref 70–130)
GLUCOSE BLDC GLUCOMTR-MCNC: 166 MG/DL (ref 70–130)
GLUCOSE BLDC GLUCOMTR-MCNC: 260 MG/DL (ref 70–130)
GLUCOSE BLDC GLUCOMTR-MCNC: 74 MG/DL (ref 70–130)
GLUCOSE SERPL-MCNC: 120 MG/DL (ref 65–99)
HCT VFR BLD AUTO: 36.4 % (ref 37.5–51)
HGB BLD-MCNC: 11.3 G/DL (ref 13–17.7)
MCH RBC QN AUTO: 27.1 PG (ref 26.6–33)
MCHC RBC AUTO-ENTMCNC: 31 G/DL (ref 31.5–35.7)
MCV RBC AUTO: 87.3 FL (ref 79–97)
PLATELET # BLD AUTO: 298 10*3/MM3 (ref 140–450)
PMV BLD AUTO: 10.1 FL (ref 6–12)
POTASSIUM SERPL-SCNC: 4.6 MMOL/L (ref 3.5–5.2)
RBC # BLD AUTO: 4.17 10*6/MM3 (ref 4.14–5.8)
SARS-COV-2 RNA RESP QL NAA+PROBE: NOT DETECTED
SODIUM SERPL-SCNC: 141 MMOL/L (ref 136–145)
WBC # BLD AUTO: 7.15 10*3/MM3 (ref 3.4–10.8)

## 2021-05-28 PROCEDURE — 63710000001 HYDROCODONE-ACETAMINOPHEN 5-325 MG TABLET: Performed by: NURSE PRACTITIONER

## 2021-05-28 PROCEDURE — 94760 N-INVAS EAR/PLS OXIMETRY 1: CPT

## 2021-05-28 PROCEDURE — A9270 NON-COVERED ITEM OR SERVICE: HCPCS | Performed by: NURSE PRACTITIONER

## 2021-05-28 PROCEDURE — 63710000001 DICLOFENAC SODIUM 1 % GEL 100 G TUBE: Performed by: NURSE PRACTITIONER

## 2021-05-28 PROCEDURE — C1898 LEAD, PMKR, OTHER THAN TRANS: HCPCS | Performed by: INTERNAL MEDICINE

## 2021-05-28 PROCEDURE — 80048 BASIC METABOLIC PNL TOTAL CA: CPT | Performed by: INTERNAL MEDICINE

## 2021-05-28 PROCEDURE — 25010000002 CEFAZOLIN PER 500 MG: Performed by: INTERNAL MEDICINE

## 2021-05-28 PROCEDURE — A9270 NON-COVERED ITEM OR SERVICE: HCPCS | Performed by: INTERNAL MEDICINE

## 2021-05-28 PROCEDURE — 25010000002 MIDAZOLAM PER 1 MG: Performed by: INTERNAL MEDICINE

## 2021-05-28 PROCEDURE — 63710000001 INSULIN LISPRO (HUMAN) PER 5 UNITS: Performed by: INTERNAL MEDICINE

## 2021-05-28 PROCEDURE — 71045 X-RAY EXAM CHEST 1 VIEW: CPT

## 2021-05-28 PROCEDURE — 63710000001 CLONIDINE 0.1 MG TABLET: Performed by: INTERNAL MEDICINE

## 2021-05-28 PROCEDURE — 85027 COMPLETE CBC AUTOMATED: CPT | Performed by: INTERNAL MEDICINE

## 2021-05-28 PROCEDURE — 33208 INSRT HEART PM ATRIAL & VENT: CPT | Performed by: INTERNAL MEDICINE

## 2021-05-28 PROCEDURE — 82962 GLUCOSE BLOOD TEST: CPT

## 2021-05-28 PROCEDURE — 63710000001 NEBIVOLOL 5 MG TABLET: Performed by: INTERNAL MEDICINE

## 2021-05-28 PROCEDURE — 63710000001 MELATONIN 5 MG TABLET: Performed by: INTERNAL MEDICINE

## 2021-05-28 PROCEDURE — 94799 UNLISTED PULMONARY SVC/PX: CPT

## 2021-05-28 PROCEDURE — 63710000001 AMLODIPINE 10 MG TABLET: Performed by: INTERNAL MEDICINE

## 2021-05-28 PROCEDURE — 87636 SARSCOV2 & INF A&B AMP PRB: CPT | Performed by: INTERNAL MEDICINE

## 2021-05-28 PROCEDURE — 25010000003 CEFAZOLIN IN DEXTROSE 2-4 GM/100ML-% SOLUTION: Performed by: PHYSICIAN ASSISTANT

## 2021-05-28 PROCEDURE — C9803 HOPD COVID-19 SPEC COLLECT: HCPCS

## 2021-05-28 PROCEDURE — C1892 INTRO/SHEATH,FIXED,PEEL-AWAY: HCPCS | Performed by: INTERNAL MEDICINE

## 2021-05-28 PROCEDURE — 63710000001 BUDESONIDE-FORMOTEROL 80-4.5 MCG/ACT AEROSOL 6.9 G INHALER: Performed by: INTERNAL MEDICINE

## 2021-05-28 PROCEDURE — 25010000002 FENTANYL CITRATE (PF) 50 MCG/ML SOLUTION: Performed by: INTERNAL MEDICINE

## 2021-05-28 PROCEDURE — 63710000001 METOCLOPRAMIDE 10 MG TABLET: Performed by: INTERNAL MEDICINE

## 2021-05-28 PROCEDURE — C1785 PMKR, DUAL, RATE-RESP: HCPCS | Performed by: INTERNAL MEDICINE

## 2021-05-28 DEVICE — GEN PM ASSURITY MRI DR RF PM2272: Type: IMPLANTABLE DEVICE | Status: FUNCTIONAL

## 2021-05-28 DEVICE — LD PM TENDRIL STS 6F52CM 2088TC52: Type: IMPLANTABLE DEVICE | Status: FUNCTIONAL

## 2021-05-28 DEVICE — LD PM TENDRIL STS 6F58CM 2088TC58: Type: IMPLANTABLE DEVICE | Status: FUNCTIONAL

## 2021-05-28 RX ORDER — LIDOCAINE HYDROCHLORIDE 10 MG/ML
INJECTION, SOLUTION EPIDURAL; INFILTRATION; INTRACAUDAL; PERINEURAL AS NEEDED
Status: DISCONTINUED | OUTPATIENT
Start: 2021-05-28 | End: 2021-05-28 | Stop reason: HOSPADM

## 2021-05-28 RX ORDER — CEFAZOLIN SODIUM IN 0.9 % NACL 3 G/100 ML
3 INTRAVENOUS SOLUTION, PIGGYBACK (ML) INTRAVENOUS EVERY 8 HOURS
Status: COMPLETED | OUTPATIENT
Start: 2021-05-28 | End: 2021-05-29

## 2021-05-28 RX ORDER — LOSARTAN POTASSIUM 50 MG/1
100 TABLET ORAL DAILY
Status: DISCONTINUED | OUTPATIENT
Start: 2021-05-29 | End: 2021-05-29 | Stop reason: HOSPADM

## 2021-05-28 RX ORDER — PANTOPRAZOLE SODIUM 40 MG/1
40 TABLET, DELAYED RELEASE ORAL DAILY
Status: DISCONTINUED | OUTPATIENT
Start: 2021-05-29 | End: 2021-05-29 | Stop reason: HOSPADM

## 2021-05-28 RX ORDER — CITALOPRAM 10 MG/1
10 TABLET ORAL DAILY
COMMUNITY
End: 2021-08-27

## 2021-05-28 RX ORDER — ALLOPURINOL 100 MG/1
100 TABLET ORAL DAILY
Status: DISCONTINUED | OUTPATIENT
Start: 2021-05-29 | End: 2021-05-29 | Stop reason: HOSPADM

## 2021-05-28 RX ORDER — DEXTROSE MONOHYDRATE 25 G/50ML
25 INJECTION, SOLUTION INTRAVENOUS
Status: DISCONTINUED | OUTPATIENT
Start: 2021-05-28 | End: 2021-05-29 | Stop reason: HOSPADM

## 2021-05-28 RX ORDER — NEBIVOLOL 10 MG/1
5 TABLET ORAL 2 TIMES DAILY
COMMUNITY

## 2021-05-28 RX ORDER — NEBIVOLOL 5 MG/1
10 TABLET ORAL DAILY
Status: DISCONTINUED | OUTPATIENT
Start: 2021-05-29 | End: 2021-05-28

## 2021-05-28 RX ORDER — BUDESONIDE AND FORMOTEROL FUMARATE DIHYDRATE 80; 4.5 UG/1; UG/1
2 AEROSOL RESPIRATORY (INHALATION)
Status: DISCONTINUED | OUTPATIENT
Start: 2021-05-28 | End: 2021-05-29 | Stop reason: HOSPADM

## 2021-05-28 RX ORDER — AMLODIPINE BESYLATE 10 MG/1
10 TABLET ORAL DAILY
Status: DISCONTINUED | OUTPATIENT
Start: 2021-05-29 | End: 2021-05-28

## 2021-05-28 RX ORDER — METOCLOPRAMIDE 10 MG/1
10 TABLET ORAL 4 TIMES DAILY
Status: DISCONTINUED | OUTPATIENT
Start: 2021-05-28 | End: 2021-05-29 | Stop reason: HOSPADM

## 2021-05-28 RX ORDER — LANOLIN ALCOHOL/MO/W.PET/CERES
1000 CREAM (GRAM) TOPICAL DAILY
Status: DISCONTINUED | OUTPATIENT
Start: 2021-05-29 | End: 2021-05-29 | Stop reason: HOSPADM

## 2021-05-28 RX ORDER — HYDROCODONE BITARTRATE AND ACETAMINOPHEN 5; 325 MG/1; MG/1
1 TABLET ORAL EVERY 6 HOURS PRN
Status: DISCONTINUED | OUTPATIENT
Start: 2021-05-28 | End: 2021-05-29 | Stop reason: HOSPADM

## 2021-05-28 RX ORDER — AMLODIPINE BESYLATE 10 MG/1
10 TABLET ORAL NIGHTLY
Status: DISCONTINUED | OUTPATIENT
Start: 2021-05-28 | End: 2021-05-29 | Stop reason: HOSPADM

## 2021-05-28 RX ORDER — NEBIVOLOL 5 MG/1
5 TABLET ORAL 2 TIMES DAILY
Status: DISCONTINUED | OUTPATIENT
Start: 2021-05-28 | End: 2021-05-29 | Stop reason: HOSPADM

## 2021-05-28 RX ORDER — CEFAZOLIN SODIUM 2 G/100ML
2 INJECTION, SOLUTION INTRAVENOUS ONCE
Status: COMPLETED | OUTPATIENT
Start: 2021-05-28 | End: 2021-05-28

## 2021-05-28 RX ORDER — SODIUM CHLORIDE 9 MG/ML
250 INJECTION, SOLUTION INTRAVENOUS CONTINUOUS
Status: ACTIVE | OUTPATIENT
Start: 2021-05-28 | End: 2021-05-28

## 2021-05-28 RX ORDER — CLONIDINE HYDROCHLORIDE 0.1 MG/1
0.1 TABLET ORAL EVERY 12 HOURS SCHEDULED
Status: DISCONTINUED | OUTPATIENT
Start: 2021-05-28 | End: 2021-05-29 | Stop reason: HOSPADM

## 2021-05-28 RX ORDER — ACETAMINOPHEN 325 MG/1
650 TABLET ORAL EVERY 6 HOURS PRN
Status: DISCONTINUED | OUTPATIENT
Start: 2021-05-28 | End: 2021-05-29 | Stop reason: HOSPADM

## 2021-05-28 RX ORDER — MIDAZOLAM HYDROCHLORIDE 1 MG/ML
INJECTION INTRAMUSCULAR; INTRAVENOUS AS NEEDED
Status: DISCONTINUED | OUTPATIENT
Start: 2021-05-28 | End: 2021-05-28 | Stop reason: HOSPADM

## 2021-05-28 RX ORDER — CITALOPRAM 10 MG/1
10 TABLET ORAL DAILY
Status: DISCONTINUED | OUTPATIENT
Start: 2021-05-29 | End: 2021-05-29 | Stop reason: HOSPADM

## 2021-05-28 RX ORDER — CHOLECALCIFEROL (VITAMIN D3) 125 MCG
5 CAPSULE ORAL NIGHTLY PRN
Status: DISCONTINUED | OUTPATIENT
Start: 2021-05-28 | End: 2021-05-29 | Stop reason: HOSPADM

## 2021-05-28 RX ORDER — FENTANYL CITRATE 50 UG/ML
INJECTION, SOLUTION INTRAMUSCULAR; INTRAVENOUS AS NEEDED
Status: DISCONTINUED | OUTPATIENT
Start: 2021-05-28 | End: 2021-05-28 | Stop reason: HOSPADM

## 2021-05-28 RX ORDER — NICOTINE POLACRILEX 4 MG
15 LOZENGE BUCCAL
Status: DISCONTINUED | OUTPATIENT
Start: 2021-05-28 | End: 2021-05-29 | Stop reason: HOSPADM

## 2021-05-28 RX ADMIN — CEFAZOLIN SODIUM 2 G: 10 INJECTION, POWDER, FOR SOLUTION INTRAVENOUS at 10:38

## 2021-05-28 RX ADMIN — METOCLOPRAMIDE 10 MG: 10 TABLET ORAL at 21:08

## 2021-05-28 RX ADMIN — HYDROCODONE BITARTRATE AND ACETAMINOPHEN 1 TABLET: 5; 325 TABLET ORAL at 23:17

## 2021-05-28 RX ADMIN — AMLODIPINE BESYLATE 10 MG: 10 TABLET ORAL at 22:33

## 2021-05-28 RX ADMIN — Medication 5 MG: at 21:14

## 2021-05-28 RX ADMIN — BUDESONIDE AND FORMOTEROL FUMARATE DIHYDRATE 2 PUFF: 80; 4.5 AEROSOL RESPIRATORY (INHALATION) at 20:52

## 2021-05-28 RX ADMIN — DEXTROSE MONOHYDRATE 25 G: 500 INJECTION PARENTERAL at 09:20

## 2021-05-28 RX ADMIN — INSULIN LISPRO 2 UNITS: 100 INJECTION, SOLUTION INTRAVENOUS; SUBCUTANEOUS at 17:20

## 2021-05-28 RX ADMIN — DICLOFENAC 2 G: 10 GEL TOPICAL at 23:18

## 2021-05-28 RX ADMIN — METOCLOPRAMIDE 10 MG: 10 TABLET ORAL at 17:06

## 2021-05-28 RX ADMIN — CLONIDINE HYDROCHLORIDE 0.1 MG: 0.1 TABLET ORAL at 21:08

## 2021-05-28 RX ADMIN — CEFAZOLIN 3 G: 10 INJECTION, POWDER, FOR SOLUTION INTRAVENOUS at 17:06

## 2021-05-28 RX ADMIN — NEBIVOLOL HYDROCHLORIDE 5 MG: 5 TABLET ORAL at 22:33

## 2021-05-29 VITALS
OXYGEN SATURATION: 92 % | WEIGHT: 272.49 LBS | SYSTOLIC BLOOD PRESSURE: 139 MMHG | BODY MASS INDEX: 33.88 KG/M2 | HEIGHT: 75 IN | RESPIRATION RATE: 16 BRPM | TEMPERATURE: 97.8 F | HEART RATE: 72 BPM | DIASTOLIC BLOOD PRESSURE: 66 MMHG

## 2021-05-29 LAB — GLUCOSE BLDC GLUCOMTR-MCNC: 236 MG/DL (ref 70–130)

## 2021-05-29 PROCEDURE — A9270 NON-COVERED ITEM OR SERVICE: HCPCS | Performed by: INTERNAL MEDICINE

## 2021-05-29 PROCEDURE — 63710000001 PANTOPRAZOLE 40 MG TABLET DELAYED-RELEASE: Performed by: INTERNAL MEDICINE

## 2021-05-29 PROCEDURE — 25010000003 CEFAZOLIN PER 500 MG: Performed by: INTERNAL MEDICINE

## 2021-05-29 PROCEDURE — 63710000001 METOCLOPRAMIDE 10 MG TABLET: Performed by: INTERNAL MEDICINE

## 2021-05-29 PROCEDURE — 82962 GLUCOSE BLOOD TEST: CPT

## 2021-05-29 PROCEDURE — 25010000002 CEFAZOLIN PER 500 MG: Performed by: INTERNAL MEDICINE

## 2021-05-29 PROCEDURE — 63710000001 VITAMIN B-12 1000 MCG TABLET: Performed by: INTERNAL MEDICINE

## 2021-05-29 PROCEDURE — 63710000001 NEBIVOLOL 5 MG TABLET: Performed by: INTERNAL MEDICINE

## 2021-05-29 PROCEDURE — 63710000001 LOSARTAN 50 MG TABLET: Performed by: INTERNAL MEDICINE

## 2021-05-29 PROCEDURE — 63710000001 ALLOPURINOL 100 MG TABLET: Performed by: INTERNAL MEDICINE

## 2021-05-29 PROCEDURE — 99024 POSTOP FOLLOW-UP VISIT: CPT | Performed by: NURSE PRACTITIONER

## 2021-05-29 PROCEDURE — 63710000001 INSULIN LISPRO (HUMAN) PER 5 UNITS: Performed by: INTERNAL MEDICINE

## 2021-05-29 PROCEDURE — 94640 AIRWAY INHALATION TREATMENT: CPT

## 2021-05-29 PROCEDURE — 63710000001 CLONIDINE 0.1 MG TABLET: Performed by: INTERNAL MEDICINE

## 2021-05-29 PROCEDURE — 63710000001 CITALOPRAM 10 MG TABLET: Performed by: INTERNAL MEDICINE

## 2021-05-29 RX ADMIN — CEFAZOLIN 3 G: 10 INJECTION, POWDER, FOR SOLUTION INTRAVENOUS at 04:02

## 2021-05-29 RX ADMIN — NEBIVOLOL HYDROCHLORIDE 5 MG: 5 TABLET ORAL at 08:17

## 2021-05-29 RX ADMIN — LOSARTAN POTASSIUM 100 MG: 50 TABLET, FILM COATED ORAL at 08:17

## 2021-05-29 RX ADMIN — CITALOPRAM HYDROBROMIDE 10 MG: 10 TABLET ORAL at 08:17

## 2021-05-29 RX ADMIN — ALLOPURINOL 100 MG: 100 TABLET ORAL at 08:17

## 2021-05-29 RX ADMIN — METOCLOPRAMIDE 10 MG: 10 TABLET ORAL at 08:16

## 2021-05-29 RX ADMIN — BUDESONIDE AND FORMOTEROL FUMARATE DIHYDRATE 2 PUFF: 80; 4.5 AEROSOL RESPIRATORY (INHALATION) at 11:26

## 2021-05-29 RX ADMIN — CLONIDINE HYDROCHLORIDE 0.1 MG: 0.1 TABLET ORAL at 08:16

## 2021-05-29 RX ADMIN — Medication 1000 MCG: at 08:16

## 2021-05-29 RX ADMIN — INSULIN LISPRO 3 UNITS: 100 INJECTION, SOLUTION INTRAVENOUS; SUBCUTANEOUS at 08:15

## 2021-05-29 RX ADMIN — PANTOPRAZOLE SODIUM 40 MG: 40 TABLET, DELAYED RELEASE ORAL at 08:17

## 2021-06-21 ENCOUNTER — OFFICE VISIT (OUTPATIENT)
Dept: ORTHOPEDIC SURGERY | Facility: CLINIC | Age: 76
End: 2021-06-21

## 2021-06-21 VITALS
DIASTOLIC BLOOD PRESSURE: 65 MMHG | HEIGHT: 75 IN | HEART RATE: 105 BPM | SYSTOLIC BLOOD PRESSURE: 138 MMHG | BODY MASS INDEX: 33.99 KG/M2 | WEIGHT: 273.37 LBS

## 2021-06-21 DIAGNOSIS — Z89.412 HISTORY OF PARTIAL RAY AMPUTATION OF FIRST TOE OF LEFT FOOT (HCC): Primary | ICD-10-CM

## 2021-06-21 DIAGNOSIS — Z89.511 S/P BKA (BELOW KNEE AMPUTATION), RIGHT (HCC): ICD-10-CM

## 2021-06-21 PROCEDURE — 99213 OFFICE O/P EST LOW 20 MIN: CPT | Performed by: ORTHOPAEDIC SURGERY

## 2021-06-21 NOTE — PROGRESS NOTES
ESTABLISHED PATIENT    Patient: Amol Aguirre  : 1945    Primary Care Provider: Donte Briones MD    Requesting Provider: As above    Follow-up (4 week follow up; 5 months s/p amputation left first toe and metatarsal 21  and S/P Right BKA)      History    Chief Complaint: 5 months post op    History of Present Illness: he is now 5 months post amputation left 1st toe and metatarsal, 2021- it is finally healed    Current Outpatient Medications on File Prior to Visit   Medication Sig Dispense Refill   • allopurinol (ZYLOPRIM) 100 MG tablet Take 100 mg by mouth 2 (Two) Times a Day.     • amLODIPine (NORVASC) 10 MG tablet Take 10 mg by mouth Every Night.  0   • Apoaequorin (PREVAGEN PO) Take  by mouth.     • budesonide-formoterol (SYMBICORT) 80-4.5 MCG/ACT inhaler Inhale 2 puffs 2 (Two) Times a Day.     • bumetanide (BUMEX) 1 MG tablet      • cefuroxime (CEFTIN) 500 MG tablet Take 500 mg by mouth 2 (Two) Times a Day.     • citalopram (CeleXA) 10 MG tablet Take 10 mg by mouth Daily.     • CloNIDine (CATAPRES) 0.1 MG tablet Take 0.1 mg by mouth 2 (Two) Times a Day.     • doxycycline (MONODOX) 100 MG capsule Take 100 mg by mouth 2 (Two) Times a Day.     • Eliquis 5 MG tablet tablet Take 5 mg by mouth 2 (Two) Times a Day.     • gabapentin (NEURONTIN) 600 MG tablet Take 600 mg by mouth 4 (Four) Times a Day.     • Insulin Glargine (TOUJEO SOLOSTAR) 300 UNIT/ML solution pen-injector Inject 60 Units under the skin into the appropriate area as directed Every Night.     • Jardiance 25 MG tablet Take 1 tablet by mouth Daily.     • magnesium oxide (MAG-OX) 400 MG tablet magnesium oxide 400 mg (241.3 mg magnesium) tablet   TAKE ONE TABLET BY MOUTH DAILY     • melatonin 5 MG tablet tablet Take 1 tablet by mouth At Night As Needed (sleep).     • metoclopramide (REGLAN) 10 MG tablet Take 10 mg by mouth 4 (Four) Times a Day.  8   • nebivolol (BYSTOLIC) 10 MG tablet Take 5 mg by mouth 2 (two) times a day.      • NOVOLOG FLEXPEN 100 UNIT/ML solution pen-injector sc pen Inject 20 Units under the skin into the appropriate area as directed 4 (Four) Times a Day.     • olmesartan (BENICAR) 40 MG tablet olmesartan 40 mg tablet   TAKE 1 TABLET BY MOUTH EVERY DAY     • pantoprazole (PROTONIX) 40 MG EC tablet Take 40 mg by mouth Daily.  2   • vitamin B-12 (CYANOCOBALAMIN) 1000 MCG tablet Take 1,000 mcg by mouth Daily.       No current facility-administered medications on file prior to visit.      Allergies   Allergen Reactions   • Chlorhexidine Shortness Of Breath, Itching and Rash     Pt developed a rash, itching, and shortness of breath after receiving a CHG bath on 4/24/19.   • Latex Other (See Comments)     Rash, hives, and tongue swelling      Past Medical History:   Diagnosis Date   • Acid reflux    • Asthma    • Atrial fibrillation/flutter (CMS/[facility])    • CHF (congestive heart failure) (CMS/[facility])    • Diabetes (CMS/[facility])    • Hypertension      Past Surgical History:   Procedure Laterality Date   • AMPUTATION DIGIT Left 10/15/2019    Procedure: AMPUTATE LEFT THIRD TOE;  Surgeon: Juju Weber MD;  Location:  [facility] OR;  Service: Orthopedics   • AMPUTATION DIGIT Left 1/7/2021    Procedure: amputate left first toe and metatarsal;  Surgeon: Juju Weber MD;  Location:  [facility] OR;  Service: Orthopedics;  Laterality: Left;   • AORTAGRAM N/A 4/30/2019    Procedure: AORTAGRAM WITH OR WITHOUT RUNOFFS;  Surgeon: Carrillo White MD;  Location:  [facility];  Service: Vascular   • BELOW KNEE AMPUTATION Right 6/4/2019    Procedure: BELOW KNEE AMPUTATION RIGHT;  Surgeon: Juju Weber MD;  Location:  [facility] OR;  Service: Orthopedics   • CARDIAC ELECTROPHYSIOLOGY PROCEDURE N/A 5/28/2021    Procedure: Device Implant;  Surgeon: Amol Foreman MD;  Location:  [facility] CATH INVASIVE LOCATION;  Service: Cardiovascular;  Laterality: N/A;   • CATARACT EXTRACTION W/ INTRAOCULAR LENS IMPLANT Right 7/20/2020    Procedure: CATARACT  "PHACO EXTRACTION WITH INTRAOCULAR LENS IMPLANT RIGHT;  Surgeon: Jez Mas MD;  Location: [facility] OR;  Service: Ophthalmology;  Laterality: Right;   • CATARACT EXTRACTION W/ INTRAOCULAR LENS IMPLANT Left 8/3/2020    Procedure: CATARACT PHACO EXTRACTION WITH INTRAOCULAR LENS IMPLANT LEFT;  Surgeon: Jez Mas MD;  Location: [facility] OR;  Service: Ophthalmology;  Laterality: Left;   • CHOLECYSTECTOMY     • FOOT SURGERY Left 10/15/2019    Amputate 3rd toe- [place]   • KNEE SURGERY Right     TKA     Family History   Problem Relation Age of Onset   • Cancer Mother    • Hypertension Mother    • Hypertension Father    • Heart attack Father       Social History     Socioeconomic History   • Marital status:      Spouse name: Not on file   • Number of children: Not on file   • Years of education: Not on file   • Highest education level: Not on file   Tobacco Use   • Smoking status: Former Smoker     Types: Cigarettes     Start date:      Quit date:      Years since quittin.5   • Smokeless tobacco: Never Used   Substance and Sexual Activity   • Alcohol use: No   • Drug use: No   • Sexual activity: Defer        Review of Systems   Constitutional: Negative.    HENT: Negative.    Eyes: Negative.    Respiratory: Negative.    Cardiovascular: Negative.    Gastrointestinal: Negative.    Endocrine: Negative.    Genitourinary: Negative.    Musculoskeletal: Positive for arthralgias.   Skin: Negative.    Allergic/Immunologic: Negative.    Neurological: Negative.    Hematological: Negative.    Psychiatric/Behavioral: Negative.        The following portions of the patient's history were reviewed and updated as appropriate: allergies, current medications, past family history, past medical history, past social history, past surgical history and problem list.    Physical Exam:   /65   Pulse 105   Ht 190.5 cm (75\")   Wt 124 kg (273 lb 5.9 oz)   BMI 34.17 kg/m²   GENERAL: Body habitus: trunkal " obesity    Right BKA is stable    Left foot 1st toe and metatarsal amputation is finally healeed, no sign of infection, no change in dense neuropathy, pulses not palpable, 2nd toe is crossing the incision area, no calluses, no ulcers,no pre-ulcerous lesions  Medical Decision Making    Data Review:   none    Assessment/Plan/Diagnosis/Treatment Options:   1. History of partial ray amputation of first toe of left foot (CMS/HCC)  He is finally healed,  He may walk short distances in his padded house shoe- this foot is EXTREMELY fragile, and he cannot wear a solid shoe nor orthotic, he must be very vigilant (and his wife) to keep the foot.  I will see him in 3 months, sooner if  Any problems    2. S/P BKA (below knee amputation), right (CMS/HCC)  stable        Juju Weber MD

## 2021-06-29 ENCOUNTER — LAB (OUTPATIENT)
Dept: LAB | Facility: HOSPITAL | Age: 76
End: 2021-06-29

## 2021-06-29 ENCOUNTER — TRANSCRIBE ORDERS (OUTPATIENT)
Dept: LAB | Facility: HOSPITAL | Age: 76
End: 2021-06-29

## 2021-06-29 DIAGNOSIS — L97.509 DIABETIC FOOT ULCER WITH OSTEOMYELITIS (HCC): ICD-10-CM

## 2021-06-29 DIAGNOSIS — M86.9 DIABETIC FOOT ULCER WITH OSTEOMYELITIS (HCC): Primary | ICD-10-CM

## 2021-06-29 DIAGNOSIS — L97.509 DIABETIC FOOT ULCER WITH OSTEOMYELITIS (HCC): Primary | ICD-10-CM

## 2021-06-29 DIAGNOSIS — E11.621 DIABETIC FOOT ULCER WITH OSTEOMYELITIS (HCC): ICD-10-CM

## 2021-06-29 DIAGNOSIS — M86.9 DIABETIC FOOT ULCER WITH OSTEOMYELITIS (HCC): ICD-10-CM

## 2021-06-29 DIAGNOSIS — E11.621 DIABETIC FOOT ULCER WITH OSTEOMYELITIS (HCC): Primary | ICD-10-CM

## 2021-06-29 DIAGNOSIS — E11.69 DIABETIC FOOT ULCER WITH OSTEOMYELITIS (HCC): ICD-10-CM

## 2021-06-29 DIAGNOSIS — E11.69 DIABETIC FOOT ULCER WITH OSTEOMYELITIS (HCC): Primary | ICD-10-CM

## 2021-06-29 LAB
ALBUMIN SERPL-MCNC: 4 G/DL (ref 3.5–5.2)
ALBUMIN/GLOB SERPL: 1.3 G/DL
ALP SERPL-CCNC: 125 U/L (ref 39–117)
ALT SERPL W P-5'-P-CCNC: 18 U/L (ref 1–41)
ANION GAP SERPL CALCULATED.3IONS-SCNC: 10 MMOL/L (ref 5–15)
AST SERPL-CCNC: 22 U/L (ref 1–40)
BILIRUB SERPL-MCNC: 0.3 MG/DL (ref 0–1.2)
BUN SERPL-MCNC: 38 MG/DL (ref 8–23)
BUN/CREAT SERPL: 21.6 (ref 7–25)
CALCIUM SPEC-SCNC: 9.7 MG/DL (ref 8.6–10.5)
CHLORIDE SERPL-SCNC: 102 MMOL/L (ref 98–107)
CO2 SERPL-SCNC: 25 MMOL/L (ref 22–29)
CREAT SERPL-MCNC: 1.76 MG/DL (ref 0.76–1.27)
CRP SERPL-MCNC: 1.05 MG/DL (ref 0–0.5)
DEPRECATED RDW RBC AUTO: 50.4 FL (ref 37–54)
ERYTHROCYTE [DISTWIDTH] IN BLOOD BY AUTOMATED COUNT: 15.7 % (ref 12.3–15.4)
GFR SERPL CREATININE-BSD FRML MDRD: 38 ML/MIN/1.73
GLOBULIN UR ELPH-MCNC: 3.2 GM/DL
GLUCOSE SERPL-MCNC: 122 MG/DL (ref 65–99)
HCT VFR BLD AUTO: 38.5 % (ref 37.5–51)
HGB BLD-MCNC: 11.9 G/DL (ref 13–17.7)
MCH RBC QN AUTO: 27 PG (ref 26.6–33)
MCHC RBC AUTO-ENTMCNC: 30.9 G/DL (ref 31.5–35.7)
MCV RBC AUTO: 87.5 FL (ref 79–97)
PLATELET # BLD AUTO: 307 10*3/MM3 (ref 140–450)
PMV BLD AUTO: 10.4 FL (ref 6–12)
POTASSIUM SERPL-SCNC: 5.3 MMOL/L (ref 3.5–5.2)
PROT SERPL-MCNC: 7.2 G/DL (ref 6–8.5)
RBC # BLD AUTO: 4.4 10*6/MM3 (ref 4.14–5.8)
SODIUM SERPL-SCNC: 137 MMOL/L (ref 136–145)
WBC # BLD AUTO: 8.66 10*3/MM3 (ref 3.4–10.8)

## 2021-06-29 PROCEDURE — 80053 COMPREHEN METABOLIC PANEL: CPT

## 2021-06-29 PROCEDURE — 85027 COMPLETE CBC AUTOMATED: CPT

## 2021-06-29 PROCEDURE — 86140 C-REACTIVE PROTEIN: CPT

## 2021-06-29 PROCEDURE — 36415 COLL VENOUS BLD VENIPUNCTURE: CPT

## 2021-07-09 ENCOUNTER — OFFICE VISIT (OUTPATIENT)
Dept: ORTHOPEDIC SURGERY | Facility: CLINIC | Age: 76
End: 2021-07-09

## 2021-07-09 VITALS
HEIGHT: 75 IN | BODY MASS INDEX: 33.99 KG/M2 | HEART RATE: 69 BPM | SYSTOLIC BLOOD PRESSURE: 133 MMHG | DIASTOLIC BLOOD PRESSURE: 54 MMHG | WEIGHT: 273.37 LBS

## 2021-07-09 DIAGNOSIS — E11.621 DIABETIC ULCER OF LEFT MIDFOOT ASSOCIATED WITH TYPE 2 DIABETES MELLITUS, LIMITED TO BREAKDOWN OF SKIN (HCC): Primary | ICD-10-CM

## 2021-07-09 DIAGNOSIS — L97.421 DIABETIC ULCER OF LEFT MIDFOOT ASSOCIATED WITH TYPE 2 DIABETES MELLITUS, LIMITED TO BREAKDOWN OF SKIN (HCC): Primary | ICD-10-CM

## 2021-07-09 PROCEDURE — 97597 DBRDMT OPN WND 1ST 20 CM/<: CPT | Performed by: ORTHOPAEDIC SURGERY

## 2021-07-09 PROCEDURE — 99213 OFFICE O/P EST LOW 20 MIN: CPT | Performed by: ORTHOPAEDIC SURGERY

## 2021-07-09 NOTE — PROGRESS NOTES
ESTABLISHED PATIENT    Patient: Amol Aguirre  : 1945    Primary Care Provider: Donte Briones MD    Requesting Provider: As above    Follow-up of the Left Foot (2 week f/u,History of partial ray amputation of first toe of left foot )      History    Chief Complaint: New diabetic ulcer    History of Present Illness: Mr. Aguirre returns with a 2-week history of a new blood blister under the left foot third metatarsal head, no fever no chills, there is a faint amount of erythema just dorsal to this, he is still on antibiotics per Dr. Garcia.    Current Outpatient Medications on File Prior to Visit   Medication Sig Dispense Refill   • allopurinol (ZYLOPRIM) 100 MG tablet Take 100 mg by mouth 2 (Two) Times a Day.     • amLODIPine (NORVASC) 10 MG tablet Take 10 mg by mouth Every Night.  0   • Apoaequorin (PREVAGEN PO) Take  by mouth.     • budesonide-formoterol (SYMBICORT) 80-4.5 MCG/ACT inhaler Inhale 2 puffs 2 (Two) Times a Day.     • bumetanide (BUMEX) 1 MG tablet      • cefuroxime (CEFTIN) 500 MG tablet Take 500 mg by mouth 2 (Two) Times a Day.     • citalopram (CeleXA) 10 MG tablet Take 10 mg by mouth Daily.     • CloNIDine (CATAPRES) 0.1 MG tablet Take 0.1 mg by mouth 2 (Two) Times a Day.     • doxycycline (MONODOX) 100 MG capsule Take 100 mg by mouth 2 (Two) Times a Day.     • Eliquis 5 MG tablet tablet Take 5 mg by mouth 2 (Two) Times a Day.     • gabapentin (NEURONTIN) 600 MG tablet Take 600 mg by mouth 4 (Four) Times a Day.     • Insulin Glargine (TOUJEO SOLOSTAR) 300 UNIT/ML solution pen-injector Inject 60 Units under the skin into the appropriate area as directed Every Night.     • Jardiance 25 MG tablet Take 1 tablet by mouth Daily.     • magnesium oxide (MAG-OX) 400 MG tablet magnesium oxide 400 mg (241.3 mg magnesium) tablet   TAKE ONE TABLET BY MOUTH DAILY     • melatonin 5 MG tablet tablet Take 1 tablet by mouth At Night As Needed (sleep).     • metoclopramide (REGLAN) 10 MG  tablet Take 10 mg by mouth 4 (Four) Times a Day.  8   • nebivolol (BYSTOLIC) 10 MG tablet Take 5 mg by mouth 2 (two) times a day.     • NOVOLOG FLEXPEN 100 UNIT/ML solution pen-injector sc pen Inject 20 Units under the skin into the appropriate area as directed 4 (Four) Times a Day.     • olmesartan (BENICAR) 40 MG tablet olmesartan 40 mg tablet   TAKE 1 TABLET BY MOUTH EVERY DAY     • pantoprazole (PROTONIX) 40 MG EC tablet Take 40 mg by mouth Daily.  2   • vitamin B-12 (CYANOCOBALAMIN) 1000 MCG tablet Take 1,000 mcg by mouth Daily.       No current facility-administered medications on file prior to visit.      Allergies   Allergen Reactions   • Chlorhexidine Shortness Of Breath, Itching and Rash     Pt developed a rash, itching, and shortness of breath after receiving a CHG bath on 4/24/19.   • Latex Other (See Comments)     Rash, hives, and tongue swelling      Past Medical History:   Diagnosis Date   • Acid reflux    • Asthma    • Atrial fibrillation/flutter (CMS/[facility])    • CHF (congestive heart failure) (CMS/[facility])    • Diabetes (CMS/[facility])    • Hypertension      Past Surgical History:   Procedure Laterality Date   • AMPUTATION DIGIT Left 10/15/2019    Procedure: AMPUTATE LEFT THIRD TOE;  Surgeon: Juju Weber MD;  Location:  [facility] OR;  Service: Orthopedics   • AMPUTATION DIGIT Left 1/7/2021    Procedure: amputate left first toe and metatarsal;  Surgeon: Juju Weber MD;  Location:  [facility] OR;  Service: Orthopedics;  Laterality: Left;   • AORTAGRAM N/A 4/30/2019    Procedure: AORTAGRAM WITH OR WITHOUT RUNOFFS;  Surgeon: Carrillo White MD;  Location:  [facility];  Service: Vascular   • BELOW KNEE AMPUTATION Right 6/4/2019    Procedure: BELOW KNEE AMPUTATION RIGHT;  Surgeon: Juju Weber MD;  Location:  [facility] OR;  Service: Orthopedics   • CARDIAC ELECTROPHYSIOLOGY PROCEDURE N/A 5/28/2021    Procedure: Device Implant;  Surgeon: Amol Foreman MD;  Location:  [facility] CATH INVASIVE LOCATION;   Service: Cardiovascular;  Laterality: N/A;   • CATARACT EXTRACTION W/ INTRAOCULAR LENS IMPLANT Right 2020    Procedure: CATARACT PHACO EXTRACTION WITH INTRAOCULAR LENS IMPLANT RIGHT;  Surgeon: Jez Mas MD;  Location: [facility];  Service: Ophthalmology;  Laterality: Right;   • CATARACT EXTRACTION W/ INTRAOCULAR LENS IMPLANT Left 8/3/2020    Procedure: CATARACT PHACO EXTRACTION WITH INTRAOCULAR LENS IMPLANT LEFT;  Surgeon: Jez Mas MD;  Location: [facility];  Service: Ophthalmology;  Laterality: Left;   • CHOLECYSTECTOMY     • FOOT SURGERY Left 10/15/2019    Amputate 3rd toe- [place]nore   • KNEE SURGERY Right     TKA     Family History   Problem Relation Age of Onset   • Cancer Mother    • Hypertension Mother    • Hypertension Father    • Heart attack Father       Social History     Socioeconomic History   • Marital status:      Spouse name: Not on file   • Number of children: Not on file   • Years of education: Not on file   • Highest education level: Not on file   Tobacco Use   • Smoking status: Former Smoker     Types: Cigarettes     Start date:      Quit date:      Years since quittin.5   • Smokeless tobacco: Never Used   Substance and Sexual Activity   • Alcohol use: No   • Drug use: No   • Sexual activity: Defer        Review of Systems   Constitutional: Negative.    HENT: Negative.    Eyes: Negative.    Respiratory: Negative.    Cardiovascular: Negative.    Gastrointestinal: Negative.    Endocrine: Negative.    Genitourinary: Negative.    Musculoskeletal: Positive for arthralgias.   Skin: Negative.    Allergic/Immunologic: Negative.    Neurological: Negative.    Hematological: Negative.    Psychiatric/Behavioral: Negative.        The following portions of the patient's history were reviewed and updated as appropriate: allergies, current medications, past family history, past medical history, past social history, past surgical history and problem list.    Physical  "Exam:   /54   Pulse 69   Ht 190.5 cm (75\")   Wt 124 kg (273 lb 5.9 oz)   BMI 34.17 kg/m²   MSK: Right BKA is stable    Left foot new superficial ulcer under blood blister that I debrided.  There is some faint local erythema but no purulence, no fluctuance        Medical Decision Making    Data Review:   none    Assessment/Plan/Diagnosis/Treatment Options:   1. Diabetic ulcer of left midfoot associated with type 2 diabetes mellitus, limited to breakdown of skin (CMS/HCC)  New superficial ulcer.  No definite infection.  No drainage he is already on antibiotics per Dr. Garcia.  He must go back to being nonweightbearing.  Use Bactroban on this daily.  He would like to space out his follow-up so I will see him in 3 weeks.  Sooner if anything gets worse.  He and his wife understand.    Using a scalpel and pickups, I used sterile technique to extensively debride the ulcer and callus      Juju Weber MD                      "

## 2021-07-26 ENCOUNTER — OFFICE VISIT (OUTPATIENT)
Dept: ORTHOPEDIC SURGERY | Facility: CLINIC | Age: 76
End: 2021-07-26

## 2021-07-26 VITALS
DIASTOLIC BLOOD PRESSURE: 71 MMHG | WEIGHT: 273.37 LBS | HEART RATE: 65 BPM | BODY MASS INDEX: 33.99 KG/M2 | HEIGHT: 75 IN | SYSTOLIC BLOOD PRESSURE: 136 MMHG

## 2021-07-26 DIAGNOSIS — E11.621 DIABETIC ULCER OF LEFT MIDFOOT ASSOCIATED WITH TYPE 2 DIABETES MELLITUS, LIMITED TO BREAKDOWN OF SKIN (HCC): Primary | ICD-10-CM

## 2021-07-26 DIAGNOSIS — L97.421 DIABETIC ULCER OF LEFT MIDFOOT ASSOCIATED WITH TYPE 2 DIABETES MELLITUS, LIMITED TO BREAKDOWN OF SKIN (HCC): Primary | ICD-10-CM

## 2021-07-26 PROCEDURE — 99212 OFFICE O/P EST SF 10 MIN: CPT | Performed by: ORTHOPAEDIC SURGERY

## 2021-07-26 NOTE — PROGRESS NOTES
ESTABLISHED PATIENT    Patient: Amol Aguirre  : 1945    Primary Care Provider: Donte Briones MD    Requesting Provider: As above    Follow-up of the Left Foot (2 week f/u Diabetic ulcer of left midfoot associated with type 2 diabetes mellitus, limited to breakdown of skin )      History    Chief Complaint: Left foot ulcer    History of Present Illness: He returns with left foot ulcer fully healed he is walking in his soft fleece lined slipper, right below-knee amputation is stable    Current Outpatient Medications on File Prior to Visit   Medication Sig Dispense Refill   • allopurinol (ZYLOPRIM) 100 MG tablet Take 100 mg by mouth 2 (Two) Times a Day.     • amLODIPine (NORVASC) 10 MG tablet Take 10 mg by mouth Every Night.  0   • Apoaequorin (PREVAGEN PO) Take  by mouth.     • budesonide-formoterol (SYMBICORT) 80-4.5 MCG/ACT inhaler Inhale 2 puffs 2 (Two) Times a Day.     • bumetanide (BUMEX) 1 MG tablet      • cefuroxime (CEFTIN) 500 MG tablet Take 500 mg by mouth 2 (Two) Times a Day.     • citalopram (CeleXA) 10 MG tablet Take 10 mg by mouth Daily.     • CloNIDine (CATAPRES) 0.1 MG tablet Take 0.1 mg by mouth 2 (Two) Times a Day.     • doxycycline (MONODOX) 100 MG capsule Take 100 mg by mouth 2 (Two) Times a Day.     • Eliquis 5 MG tablet tablet Take 5 mg by mouth 2 (Two) Times a Day.     • gabapentin (NEURONTIN) 600 MG tablet Take 600 mg by mouth 4 (Four) Times a Day.     • Insulin Glargine (TOUJEO SOLOSTAR) 300 UNIT/ML solution pen-injector Inject 60 Units under the skin into the appropriate area as directed Every Night.     • Jardiance 25 MG tablet Take 1 tablet by mouth Daily.     • magnesium oxide (MAG-OX) 400 MG tablet magnesium oxide 400 mg (241.3 mg magnesium) tablet   TAKE ONE TABLET BY MOUTH DAILY     • melatonin 5 MG tablet tablet Take 1 tablet by mouth At Night As Needed (sleep).     • metoclopramide (REGLAN) 10 MG tablet Take 10 mg by mouth 4 (Four) Times a Day.  8   •  nebivolol (BYSTOLIC) 10 MG tablet Take 5 mg by mouth 2 (two) times a day.     • NOVOLOG FLEXPEN 100 UNIT/ML solution pen-injector sc pen Inject 20 Units under the skin into the appropriate area as directed 4 (Four) Times a Day.     • olmesartan (BENICAR) 40 MG tablet olmesartan 40 mg tablet   TAKE 1 TABLET BY MOUTH EVERY DAY     • pantoprazole (PROTONIX) 40 MG EC tablet Take 40 mg by mouth Daily.  2   • vitamin B-12 (CYANOCOBALAMIN) 1000 MCG tablet Take 1,000 mcg by mouth Daily.       No current facility-administered medications on file prior to visit.      Allergies   Allergen Reactions   • Chlorhexidine Shortness Of Breath, Itching and Rash     Pt developed a rash, itching, and shortness of breath after receiving a CHG bath on 4/24/19.   • Latex Other (See Comments)     Rash, hives, and tongue swelling      Past Medical History:   Diagnosis Date   • Acid reflux    • Asthma    • Atrial fibrillation/flutter (CMS/[facility])    • CHF (congestive heart failure) (CMS/[facility])    • Diabetes (CMS/[facility])    • Hypertension      Past Surgical History:   Procedure Laterality Date   • AMPUTATION DIGIT Left 10/15/2019    Procedure: AMPUTATE LEFT THIRD TOE;  Surgeon: Juju Weber MD;  Location:  [facility] OR;  Service: Orthopedics   • AMPUTATION DIGIT Left 1/7/2021    Procedure: amputate left first toe and metatarsal;  Surgeon: Juju Weber MD;  Location:  [facility] OR;  Service: Orthopedics;  Laterality: Left;   • AORTAGRAM N/A 4/30/2019    Procedure: AORTAGRAM WITH OR WITHOUT RUNOFFS;  Surgeon: Carrillo White MD;  Location:  [facility] HYBRID OR 15;  Service: Vascular   • BELOW KNEE AMPUTATION Right 6/4/2019    Procedure: BELOW KNEE AMPUTATION RIGHT;  Surgeon: Juju Weber MD;  Location:  [facility] OR;  Service: Orthopedics   • CARDIAC ELECTROPHYSIOLOGY PROCEDURE N/A 5/28/2021    Procedure: Device Implant;  Surgeon: Amol Foreman MD;  Location:  [facility] CATH INVASIVE LOCATION;  Service: Cardiovascular;  Laterality: N/A;   • CATARACT  "EXTRACTION W/ INTRAOCULAR LENS IMPLANT Right 2020    Procedure: CATARACT PHACO EXTRACTION WITH INTRAOCULAR LENS IMPLANT RIGHT;  Surgeon: Jez Mas MD;  Location: [facility];  Service: Ophthalmology;  Laterality: Right;   • CATARACT EXTRACTION W/ INTRAOCULAR LENS IMPLANT Left 8/3/2020    Procedure: CATARACT PHACO EXTRACTION WITH INTRAOCULAR LENS IMPLANT LEFT;  Surgeon: Jez Mas MD;  Location: [facility];  Service: Ophthalmology;  Laterality: Left;   • CHOLECYSTECTOMY     • FOOT SURGERY Left 10/15/2019    Amputate 3rd toe- De[place]nore   • KNEE SURGERY Right     TKA     Family History   Problem Relation Age of Onset   • Cancer Mother    • Hypertension Mother    • Hypertension Father    • Heart attack Father       Social History     Socioeconomic History   • Marital status:      Spouse name: Not on file   • Number of children: Not on file   • Years of education: Not on file   • Highest education level: Not on file   Tobacco Use   • Smoking status: Former Smoker     Types: Cigarettes     Start date:      Quit date:      Years since quittin.5   • Smokeless tobacco: Never Used   Substance and Sexual Activity   • Alcohol use: No   • Drug use: No   • Sexual activity: Defer        Review of Systems   Constitutional: Negative.    HENT: Negative.    Eyes: Negative.    Respiratory: Negative.    Cardiovascular: Negative.    Gastrointestinal: Negative.    Endocrine: Negative.    Genitourinary: Negative.    Musculoskeletal: Positive for arthralgias.   Skin: Negative.    Allergic/Immunologic: Negative.    Neurological: Negative.    Hematological: Negative.    Psychiatric/Behavioral: Negative.        The following portions of the patient's history were reviewed and updated as appropriate: allergies, current medications, past family history, past medical history, past social history, past surgical history and problem list.    Physical Exam:   /71   Pulse 65   Ht 190.6 cm (75.03\")   Wt " 124 kg (273 lb 5.9 oz)   BMI 34.14 kg/m²   Left foot ulcer is healed, no preulcerous lesion    Medical Decision Making    Data Review:   none    Assessment/Plan/Diagnosis/Treatment Options:   1. Diabetic ulcer of left midfoot associated with type 2 diabetes mellitus, limited to breakdown of skin (CMS/HCC)  The ulcer is healed.  He understands how to protect his foot.  Right below-knee amputation is stable.  I will see him in 3 months or sooner if he has any problems        Juju Weber MD

## 2021-08-10 ENCOUNTER — TELEPHONE (OUTPATIENT)
Dept: ORTHOPEDIC SURGERY | Facility: CLINIC | Age: 76
End: 2021-08-10

## 2021-08-10 NOTE — TELEPHONE ENCOUNTER
Caller: NATAN MONDRAGON    Relationship to patient: WIFE    Best call back number: [phone]    Patient is needing:  PATIENT IS HAVING ISSUES WITH SEEPAGE ON THE BOTTOM OF HIS FOOT.  THERE WAS SOME TRIMMING ON A BLOOD BLISTER AND THERE IS SOME SEEPAGE CAUSING CONCERN.

## 2021-08-10 NOTE — TELEPHONE ENCOUNTER
Please get a secondary number to contact patient.  The number goes directly to voicemail.  I have left multiple voicemails.

## 2021-08-11 ENCOUNTER — OFFICE VISIT (OUTPATIENT)
Dept: ORTHOPEDIC SURGERY | Facility: CLINIC | Age: 76
End: 2021-08-11

## 2021-08-11 VITALS
DIASTOLIC BLOOD PRESSURE: 73 MMHG | HEART RATE: 65 BPM | WEIGHT: 273.37 LBS | HEIGHT: 75 IN | BODY MASS INDEX: 33.99 KG/M2 | SYSTOLIC BLOOD PRESSURE: 143 MMHG

## 2021-08-11 DIAGNOSIS — L97.421 DIABETIC ULCER OF LEFT MIDFOOT ASSOCIATED WITH TYPE 2 DIABETES MELLITUS, LIMITED TO BREAKDOWN OF SKIN (HCC): Primary | ICD-10-CM

## 2021-08-11 DIAGNOSIS — E11.621 DIABETIC ULCER OF LEFT MIDFOOT ASSOCIATED WITH TYPE 2 DIABETES MELLITUS, LIMITED TO BREAKDOWN OF SKIN (HCC): Primary | ICD-10-CM

## 2021-08-11 PROCEDURE — 99213 OFFICE O/P EST LOW 20 MIN: CPT | Performed by: ORTHOPAEDIC SURGERY

## 2021-08-11 RX ORDER — LANOLIN ALCOHOL/MO/W.PET/CERES
400 CREAM (GRAM) TOPICAL DAILY
COMMUNITY
Start: 2021-07-23

## 2021-08-11 RX ORDER — LINEZOLID 600 MG/1
600 TABLET, FILM COATED ORAL 2 TIMES DAILY
Qty: 40 TABLET | Refills: 1 | Status: SHIPPED | OUTPATIENT
Start: 2021-08-11 | End: 2021-08-27

## 2021-08-11 NOTE — PROGRESS NOTES
ESTABLISHED PATIENT    Patient: Amol Aguirre  : 1945    Primary Care Provider: Donte Briones MD    Requesting Provider: As above    Follow-up (2 week recheck - Diabetic ulcer of left midfoot associated with type 2 diabetes mellitus, limited to breakdown of skin (CMS/HCC)   )      History    Chief Complaint: Left foot ulcer    History of Present Illness: He returns unexpectedly with another ulcer on the left foot.  Its been present for a day or 2, it got a little more red today, it has been oozing some clear fluid.  No fever no chills.  His wife is with him today.    Current Outpatient Medications on File Prior to Visit   Medication Sig Dispense Refill   • allopurinol (ZYLOPRIM) 100 MG tablet Take 100 mg by mouth 2 (Two) Times a Day.     • amLODIPine (NORVASC) 10 MG tablet Take 10 mg by mouth Every Night.  0   • Apoaequorin (PREVAGEN PO) Take  by mouth.     • budesonide-formoterol (SYMBICORT) 80-4.5 MCG/ACT inhaler Inhale 2 puffs 2 (Two) Times a Day.     • bumetanide (BUMEX) 1 MG tablet      • cefuroxime (CEFTIN) 500 MG tablet Take 500 mg by mouth 2 (Two) Times a Day.     • citalopram (CeleXA) 10 MG tablet Take 10 mg by mouth Daily.     • CloNIDine (CATAPRES) 0.1 MG tablet Take 0.1 mg by mouth 2 (Two) Times a Day.     • doxycycline (MONODOX) 100 MG capsule Take 100 mg by mouth 2 (Two) Times a Day.     • Eliquis 5 MG tablet tablet Take 5 mg by mouth 2 (Two) Times a Day.     • gabapentin (NEURONTIN) 600 MG tablet Take 600 mg by mouth 4 (Four) Times a Day.     • Insulin Glargine (TOUJEO SOLOSTAR) 300 UNIT/ML solution pen-injector Inject 60 Units under the skin into the appropriate area as directed Every Night.     • Jardiance 25 MG tablet Take 1 tablet by mouth Daily.     • magnesium oxide (MAG-OX) 400 MG tablet magnesium oxide 400 mg (241.3 mg magnesium) tablet   TAKE ONE TABLET BY MOUTH DAILY     • Magnesium Oxide 400 (240 Mg) MG tablet Take 1 tablet by mouth Daily.     • melatonin 5 MG  tablet tablet Take 1 tablet by mouth At Night As Needed (sleep).     • metoclopramide (REGLAN) 10 MG tablet Take 10 mg by mouth 4 (Four) Times a Day.  8   • nebivolol (BYSTOLIC) 10 MG tablet Take 5 mg by mouth 2 (two) times a day.     • NOVOLOG FLEXPEN 100 UNIT/ML solution pen-injector sc pen Inject 20 Units under the skin into the appropriate area as directed 4 (Four) Times a Day.     • olmesartan (BENICAR) 40 MG tablet olmesartan 40 mg tablet   TAKE 1 TABLET BY MOUTH EVERY DAY     • pantoprazole (PROTONIX) 40 MG EC tablet Take 40 mg by mouth Daily.  2   • vitamin B-12 (CYANOCOBALAMIN) 1000 MCG tablet Take 1,000 mcg by mouth Daily.       No current facility-administered medications on file prior to visit.      Allergies   Allergen Reactions   • Chlorhexidine Shortness Of Breath, Itching and Rash     Pt developed a rash, itching, and shortness of breath after receiving a CHG bath on 4/24/19.   • Latex Other (See Comments)     Rash, hives, and tongue swelling      Past Medical History:   Diagnosis Date   • Acid reflux    • Asthma    • Atrial fibrillation/flutter (CMS/HCC)    • CHF (congestive heart failure) (CMS/HCC)    • Diabetes (CMS/HCC)    • Hypertension      Past Surgical History:   Procedure Laterality Date   • AMPUTATION DIGIT Left 10/15/2019    Procedure: AMPUTATE LEFT THIRD TOE;  Surgeon: Juju Weber MD;  Location: [facility];  Service: Orthopedics   • AMPUTATION DIGIT Left 1/7/2021    Procedure: amputate left first toe and metatarsal;  Surgeon: Juju Weber MD;  Location:  [facility] OR;  Service: Orthopedics;  Laterality: Left;   • AORTAGRAM N/A 4/30/2019    Procedure: AORTAGRAM WITH OR WITHOUT RUNOFFS;  Surgeon: Carrillo White MD;  Location: [facility] HYBRID OR 15;  Service: Vascular   • BELOW KNEE AMPUTATION Right 6/4/2019    Procedure: BELOW KNEE AMPUTATION RIGHT;  Surgeon: Juju Weber MD;  Location:  [facility] OR;  Service: Orthopedics   • CARDIAC ELECTROPHYSIOLOGY PROCEDURE N/A 5/28/2021     Procedure: Device Implant;  Surgeon: Amol Foreman MD;  Location:  [facility] CATH INVASIVE LOCATION;  Service: Cardiovascular;  Laterality: N/A;   • CATARACT EXTRACTION W/ INTRAOCULAR LENS IMPLANT Right 2020    Procedure: CATARACT PHACO EXTRACTION WITH INTRAOCULAR LENS IMPLANT RIGHT;  Surgeon: Jez Mas MD;  Location: [facility] OR;  Service: Ophthalmology;  Laterality: Right;   • CATARACT EXTRACTION W/ INTRAOCULAR LENS IMPLANT Left 8/3/2020    Procedure: CATARACT PHACO EXTRACTION WITH INTRAOCULAR LENS IMPLANT LEFT;  Surgeon: Jez Mas MD;  Location: [facility] OR;  Service: Ophthalmology;  Laterality: Left;   • CHOLECYSTECTOMY     • FOOT SURGERY Left 10/15/2019    Amputate 3rd toe- De[place]nore   • KNEE SURGERY Right     TKA     Family History   Problem Relation Age of Onset   • Cancer Mother    • Hypertension Mother    • Hypertension Father    • Heart attack Father       Social History     Socioeconomic History   • Marital status:      Spouse name: Not on file   • Number of children: Not on file   • Years of education: Not on file   • Highest education level: Not on file   Tobacco Use   • Smoking status: Former Smoker     Types: Cigarettes     Start date:      Quit date:      Years since quittin.6   • Smokeless tobacco: Never Used   Substance and Sexual Activity   • Alcohol use: No   • Drug use: No   • Sexual activity: Defer        Review of Systems   Constitutional: Negative.    HENT: Negative.    Eyes: Negative.    Respiratory: Negative.    Cardiovascular: Negative.    Gastrointestinal: Negative.    Endocrine: Negative.    Genitourinary: Negative.    Musculoskeletal: Positive for arthralgias.   Skin: Negative.    Allergic/Immunologic: Negative.    Neurological: Negative.    Hematological: Negative.    Psychiatric/Behavioral: Negative.        The following portions of the patient's history were reviewed and updated as appropriate: allergies, current medications, past family  "history, past medical history, past social history, past surgical history and problem list.    Physical Exam:   /73   Pulse 65   Ht 190.6 cm (75.04\")   Wt 124 kg (273 lb 5.9 oz)   BMI 34.13 kg/m²         Medical Decision Making    Data Review:   phone conversation with physician    Assessment/Plan/Diagnosis/Treatment Options:   1. Diabetic ulcer of left midfoot associated with type 2 diabetes mellitus, limited to breakdown of skin (CMS/HCC)  I carefully probed the ulcer it is superficial, there is some erythema distal however and I discussed this by phone with Dr. Garcia.  Were going to stop his doxycycline, continue the cefotetan, and and Zyvox.  He is to hold his Celexa for now.  Dr. Garcia will see him on Saturday in the infectious disease office.  He must go back to being absolutely nonweightbearing.  Bactroban dressing every day.  I will see him again in 2 to 3 weeks with a nonweightbearing x-ray of the foot with a marker on the ulcer unfortunately has been a very short time that the foot stayed healed, I am concerned he may go onto a below-knee amputation.  He has such poor blood supply he might even end up with an above-knee amputation.                            "

## 2021-08-11 NOTE — TELEPHONE ENCOUNTER
They called back this morning, LTD trimmed and it is now worse there is a sore under there and it is seeping it is bloody. Dr. Weber said to bring him in. They are coming in at 11:30 today.  Yelena

## 2021-08-12 ENCOUNTER — TRANSCRIBE ORDERS (OUTPATIENT)
Dept: LAB | Facility: HOSPITAL | Age: 76
End: 2021-08-12

## 2021-08-12 ENCOUNTER — LAB (OUTPATIENT)
Dept: LAB | Facility: HOSPITAL | Age: 76
End: 2021-08-12

## 2021-08-12 DIAGNOSIS — E11.621 TYPE 2 DIABETES MELLITUS WITH LEFT DIABETIC FOOT ULCER (HCC): ICD-10-CM

## 2021-08-12 DIAGNOSIS — L97.529 TYPE 2 DIABETES MELLITUS WITH LEFT DIABETIC FOOT ULCER (HCC): Primary | ICD-10-CM

## 2021-08-12 DIAGNOSIS — E11.621 TYPE 2 DIABETES MELLITUS WITH LEFT DIABETIC FOOT ULCER (HCC): Primary | ICD-10-CM

## 2021-08-12 DIAGNOSIS — L97.529 TYPE 2 DIABETES MELLITUS WITH LEFT DIABETIC FOOT ULCER (HCC): ICD-10-CM

## 2021-08-12 LAB
DEPRECATED RDW RBC AUTO: 44.1 FL (ref 37–54)
ERYTHROCYTE [DISTWIDTH] IN BLOOD BY AUTOMATED COUNT: 14.7 % (ref 12.3–15.4)
HCT VFR BLD AUTO: 34.6 % (ref 37.5–51)
HGB BLD-MCNC: 11.1 G/DL (ref 13–17.7)
MCH RBC QN AUTO: 26.9 PG (ref 26.6–33)
MCHC RBC AUTO-ENTMCNC: 32.1 G/DL (ref 31.5–35.7)
MCV RBC AUTO: 83.8 FL (ref 79–97)
PLATELET # BLD AUTO: 293 10*3/MM3 (ref 140–450)
PMV BLD AUTO: 10.7 FL (ref 6–12)
RBC # BLD AUTO: 4.13 10*6/MM3 (ref 4.14–5.8)
WBC # BLD AUTO: 7.27 10*3/MM3 (ref 3.4–10.8)

## 2021-08-12 PROCEDURE — 85027 COMPLETE CBC AUTOMATED: CPT

## 2021-08-12 PROCEDURE — 80053 COMPREHEN METABOLIC PANEL: CPT

## 2021-08-12 PROCEDURE — 36415 COLL VENOUS BLD VENIPUNCTURE: CPT

## 2021-08-12 PROCEDURE — 86140 C-REACTIVE PROTEIN: CPT

## 2021-08-13 LAB
ALBUMIN SERPL-MCNC: 4 G/DL (ref 3.5–5.2)
ALBUMIN/GLOB SERPL: 1.7 G/DL
ALP SERPL-CCNC: 105 U/L (ref 39–117)
ALT SERPL W P-5'-P-CCNC: 20 U/L (ref 1–41)
ANION GAP SERPL CALCULATED.3IONS-SCNC: 12.8 MMOL/L (ref 5–15)
AST SERPL-CCNC: 19 U/L (ref 1–40)
BILIRUB SERPL-MCNC: 0.2 MG/DL (ref 0–1.2)
BUN SERPL-MCNC: 43 MG/DL (ref 8–23)
BUN/CREAT SERPL: 16.8 (ref 7–25)
CALCIUM SPEC-SCNC: 9.1 MG/DL (ref 8.6–10.5)
CHLORIDE SERPL-SCNC: 104 MMOL/L (ref 98–107)
CO2 SERPL-SCNC: 21.2 MMOL/L (ref 22–29)
CREAT SERPL-MCNC: 2.56 MG/DL (ref 0.76–1.27)
CRP SERPL-MCNC: 1.21 MG/DL (ref 0–0.5)
GFR SERPL CREATININE-BSD FRML MDRD: 24 ML/MIN/1.73
GLOBULIN UR ELPH-MCNC: 2.3 GM/DL
GLUCOSE SERPL-MCNC: 214 MG/DL (ref 65–99)
POTASSIUM SERPL-SCNC: 5.1 MMOL/L (ref 3.5–5.2)
PROT SERPL-MCNC: 6.3 G/DL (ref 6–8.5)
SODIUM SERPL-SCNC: 138 MMOL/L (ref 136–145)

## 2021-08-25 ENCOUNTER — OFFICE VISIT (OUTPATIENT)
Dept: ORTHOPEDIC SURGERY | Facility: CLINIC | Age: 76
End: 2021-08-25

## 2021-08-25 VITALS
SYSTOLIC BLOOD PRESSURE: 134 MMHG | BODY MASS INDEX: 33.99 KG/M2 | HEIGHT: 75 IN | HEART RATE: 66 BPM | WEIGHT: 273.37 LBS | DIASTOLIC BLOOD PRESSURE: 70 MMHG

## 2021-08-25 DIAGNOSIS — L97.421 DIABETIC ULCER OF LEFT MIDFOOT ASSOCIATED WITH TYPE 2 DIABETES MELLITUS, LIMITED TO BREAKDOWN OF SKIN (HCC): Primary | ICD-10-CM

## 2021-08-25 DIAGNOSIS — E11.621 DIABETIC ULCER OF LEFT MIDFOOT ASSOCIATED WITH TYPE 2 DIABETES MELLITUS, LIMITED TO BREAKDOWN OF SKIN (HCC): Primary | ICD-10-CM

## 2021-08-25 PROCEDURE — 99214 OFFICE O/P EST MOD 30 MIN: CPT | Performed by: ORTHOPAEDIC SURGERY

## 2021-08-25 NOTE — PROGRESS NOTES
ESTABLISHED PATIENT    Patient: Amol Aguirre  : 1945    Primary Care Provider: Donte Briones MD    Requesting Provider: As above    Follow-up (2 week follow up - Diabetic ulcer of left midfoot associated with type 2 diabetes mellitus, limited to breakdown of skin)      History    Chief Complaint: Left foot diabetic ulcer    History of Present Illness: He returns for follow-up of the new diabetic ulcer on the left foot.  Its worse.  There is no significant erythema but it is deeper.  It is gone from being very superficial to being into the fatty tissue.  There is no bone exposed.    Current Outpatient Medications on File Prior to Visit   Medication Sig Dispense Refill   • allopurinol (ZYLOPRIM) 100 MG tablet Take 100 mg by mouth 2 (Two) Times a Day.     • amLODIPine (NORVASC) 10 MG tablet Take 10 mg by mouth Every Night.  0   • Apoaequorin (PREVAGEN PO) Take  by mouth.     • budesonide-formoterol (SYMBICORT) 80-4.5 MCG/ACT inhaler Inhale 2 puffs 2 (Two) Times a Day.     • bumetanide (BUMEX) 1 MG tablet      • cefuroxime (CEFTIN) 500 MG tablet Take 500 mg by mouth 2 (Two) Times a Day.     • citalopram (CeleXA) 10 MG tablet Take 10 mg by mouth Daily.     • CloNIDine (CATAPRES) 0.1 MG tablet Take 0.1 mg by mouth 2 (Two) Times a Day.     • doxycycline (MONODOX) 100 MG capsule Take 100 mg by mouth 2 (Two) Times a Day.     • Eliquis 5 MG tablet tablet Take 5 mg by mouth 2 (Two) Times a Day.     • gabapentin (NEURONTIN) 600 MG tablet Take 600 mg by mouth 4 (Four) Times a Day.     • Insulin Glargine (TOUJEO SOLOSTAR) 300 UNIT/ML solution pen-injector Inject 60 Units under the skin into the appropriate area as directed Every Night.     • Jardiance 25 MG tablet Take 1 tablet by mouth Daily.     • linezolid (Zyvox) 600 MG tablet Take 1 tablet by mouth 2 (Two) Times a Day. 40 tablet 1   • magnesium oxide (MAG-OX) 400 MG tablet magnesium oxide 400 mg (241.3 mg magnesium) tablet   TAKE ONE TABLET BY  MOUTH DAILY     • Magnesium Oxide 400 (240 Mg) MG tablet Take 1 tablet by mouth Daily.     • melatonin 5 MG tablet tablet Take 1 tablet by mouth At Night As Needed (sleep).     • metoclopramide (REGLAN) 10 MG tablet Take 10 mg by mouth 4 (Four) Times a Day.  8   • mupirocin (Bactroban) 2 % ointment Apply  topically to the appropriate area as directed Daily. 30 g 5   • nebivolol (BYSTOLIC) 10 MG tablet Take 5 mg by mouth 2 (two) times a day.     • NOVOLOG FLEXPEN 100 UNIT/ML solution pen-injector sc pen Inject 20 Units under the skin into the appropriate area as directed 4 (Four) Times a Day.     • olmesartan (BENICAR) 40 MG tablet olmesartan 40 mg tablet   TAKE 1 TABLET BY MOUTH EVERY DAY     • pantoprazole (PROTONIX) 40 MG EC tablet Take 40 mg by mouth Daily.  2   • vitamin B-12 (CYANOCOBALAMIN) 1000 MCG tablet Take 1,000 mcg by mouth Daily.       No current facility-administered medications on file prior to visit.      Allergies   Allergen Reactions   • Chlorhexidine Shortness Of Breath, Itching and Rash     Pt developed a rash, itching, and shortness of breath after receiving a CHG bath on 4/24/19.   • Latex Other (See Comments)     Rash, hives, and tongue swelling      Past Medical History:   Diagnosis Date   • Acid reflux    • Asthma    • Atrial fibrillation/flutter (CMS/[facility])    • CHF (congestive heart failure) (CMS/[facility])    • Diabetes (CMS/[facility])    • Hypertension      Past Surgical History:   Procedure Laterality Date   • AMPUTATION DIGIT Left 10/15/2019    Procedure: AMPUTATE LEFT THIRD TOE;  Surgeon: Juju Weber MD;  Location: [facility] OR;  Service: Orthopedics   • AMPUTATION DIGIT Left 1/7/2021    Procedure: amputate left first toe and metatarsal;  Surgeon: Juju Weber MD;  Location:  [facility] OR;  Service: Orthopedics;  Laterality: Left;   • AORTAGRAM N/A 4/30/2019    Procedure: AORTAGRAM WITH OR WITHOUT RUNOFFS;  Surgeon: Carrillo White MD;  Location: [facility] HYBRID OR 15;  Service: Vascular   • BELOW  KNEE AMPUTATION Right 2019    Procedure: BELOW KNEE AMPUTATION RIGHT;  Surgeon: Juju Weber MD;  Location:  [facility] OR;  Service: Orthopedics   • CARDIAC ELECTROPHYSIOLOGY PROCEDURE N/A 2021    Procedure: Device Implant;  Surgeon: Amol Foreman MD;  Location:  [facility] CATH INVASIVE LOCATION;  Service: Cardiovascular;  Laterality: N/A;   • CATARACT EXTRACTION W/ INTRAOCULAR LENS IMPLANT Right 2020    Procedure: CATARACT PHACO EXTRACTION WITH INTRAOCULAR LENS IMPLANT RIGHT;  Surgeon: Jez Mas MD;  Location: [facility] OR;  Service: Ophthalmology;  Laterality: Right;   • CATARACT EXTRACTION W/ INTRAOCULAR LENS IMPLANT Left 8/3/2020    Procedure: CATARACT PHACO EXTRACTION WITH INTRAOCULAR LENS IMPLANT LEFT;  Surgeon: Jez Mas MD;  Location: [facility] OR;  Service: Ophthalmology;  Laterality: Left;   • CHOLECYSTECTOMY     • FOOT SURGERY Left 10/15/2019    Amputate 3rd toe- DeGnore   • KNEE SURGERY Right     TKA     Family History   Problem Relation Age of Onset   • Cancer Mother    • Hypertension Mother    • Hypertension Father    • Heart attack Father       Social History     Socioeconomic History   • Marital status:      Spouse name: Not on file   • Number of children: Not on file   • Years of education: Not on file   • Highest education level: Not on file   Tobacco Use   • Smoking status: Former Smoker     Types: Cigarettes     Start date:      Quit date:      Years since quittin.6   • Smokeless tobacco: Never Used   Substance and Sexual Activity   • Alcohol use: No   • Drug use: No   • Sexual activity: Defer        Review of Systems   Constitutional: Negative.    HENT: Negative.    Eyes: Negative.    Respiratory: Negative.    Cardiovascular: Negative.    Gastrointestinal: Negative.    Endocrine: Negative.    Genitourinary: Negative.    Musculoskeletal: Positive for arthralgias.   Skin: Negative.    Allergic/Immunologic: Negative.    Neurological: Negative.   "  Hematological: Negative.    Psychiatric/Behavioral: Negative.        The following portions of the patient's history were reviewed and updated as appropriate: allergies, current medications, past family history, past medical history, past social history, past surgical history and problem list.    Physical Exam:   Ht 190.6 cm (75.04\")   Wt 124 kg (273 lb 5.9 oz)   BMI 34.13 kg/m²   GENERAL: Body habitus: trunkal obesity    Right BKA is stable, left foot ulcer is worse, its 1 cm in diameter, it is into the fatty tissue but not to the bone, no signs of infection, no change in dense neuropathy    Medical Decision Making    Data Review:   ordered and reviewed x-rays today    Assessment/Plan/Diagnosis/Treatment Options:   1. Diabetic ulcer of left midfoot associated with type 2 diabetes mellitus, limited to breakdown of skin (CMS/HCC)  Nonweightbearing x-ray with a marker on the ulcer shows the ulcer is under the second metatarsal head, the second MTPJ is dorsally dislocated.  No definite signs of infection, there is no change in the vascular calcification.  We talked about the options.  I am afraid this ulcer is going to go down to bone and then we will be forced to amputate the toe and metatarsal.  I explained everything to the patient and his wife.  If he had come back in and the ulcer were healed we could talk about electively removing the toe and bone to decrease the risk of ulceration.  Because the ulcer is actually worse I think we should go ahead and remove the toe and the metatarsal head.  The reason I say this is I think the ulcer is going to continue to get worse and I would rather do this electively rather than urgently.  Obviously there is a risk in removing the toe and excising the ulcer and closing it.  But there is also a risk if we treat this conservatively.  We went over the pros and cons of both options.  With either option he remains at high risk for below-knee amputation because of the underlying " severe vascular disease that is unreconstructable.  He and his wife and have thought about it.  He would like to proceed with surgery.  We will do this as an outpatient next week, I will give him preop antibiotic in the OR, and then oral antibiotics.  We discussed the risks including but not limited to: death, infection, neurovascular damage, strokes, heart attacks, blood clots, chronic pain, deformity, stiffness, need for  further surgery,  amputation, etc.  Questions asked and answered in detail.        - XR Foot 2 View Left

## 2021-08-27 ENCOUNTER — PRE-ADMISSION TESTING (OUTPATIENT)
Dept: PREADMISSION TESTING | Facility: HOSPITAL | Age: 76
End: 2021-08-27

## 2021-08-27 ENCOUNTER — TELEPHONE (OUTPATIENT)
Dept: ORTHOPEDIC SURGERY | Facility: CLINIC | Age: 76
End: 2021-08-27

## 2021-08-27 VITALS — BODY MASS INDEX: 33.82 KG/M2 | WEIGHT: 272 LBS | HEIGHT: 75 IN

## 2021-08-27 DIAGNOSIS — L97.421 DIABETIC ULCER OF LEFT MIDFOOT ASSOCIATED WITH TYPE 2 DIABETES MELLITUS, LIMITED TO BREAKDOWN OF SKIN (HCC): ICD-10-CM

## 2021-08-27 DIAGNOSIS — E11.621 DIABETIC ULCER OF LEFT MIDFOOT ASSOCIATED WITH TYPE 2 DIABETES MELLITUS, LIMITED TO BREAKDOWN OF SKIN (HCC): ICD-10-CM

## 2021-08-27 LAB
ANION GAP SERPL CALCULATED.3IONS-SCNC: 14 MMOL/L (ref 5–15)
BASOPHILS # BLD AUTO: 0.06 10*3/MM3 (ref 0–0.2)
BASOPHILS NFR BLD AUTO: 0.9 % (ref 0–1.5)
BUN SERPL-MCNC: 48 MG/DL (ref 8–23)
BUN/CREAT SERPL: 20.2 (ref 7–25)
CALCIUM SPEC-SCNC: 9.4 MG/DL (ref 8.6–10.5)
CHLORIDE SERPL-SCNC: 101 MMOL/L (ref 98–107)
CO2 SERPL-SCNC: 21 MMOL/L (ref 22–29)
CREAT SERPL-MCNC: 2.38 MG/DL (ref 0.76–1.27)
DEPRECATED RDW RBC AUTO: 50.5 FL (ref 37–54)
EOSINOPHIL # BLD AUTO: 0.33 10*3/MM3 (ref 0–0.4)
EOSINOPHIL NFR BLD AUTO: 4.7 % (ref 0.3–6.2)
ERYTHROCYTE [DISTWIDTH] IN BLOOD BY AUTOMATED COUNT: 15.6 % (ref 12.3–15.4)
GFR SERPL CREATININE-BSD FRML MDRD: 27 ML/MIN/1.73
GLUCOSE SERPL-MCNC: 196 MG/DL (ref 65–99)
HBA1C MFR BLD: 9.2 % (ref 4.8–5.6)
HCT VFR BLD AUTO: 34.9 % (ref 37.5–51)
HGB BLD-MCNC: 10.9 G/DL (ref 13–17.7)
IMM GRANULOCYTES # BLD AUTO: 0.01 10*3/MM3 (ref 0–0.05)
IMM GRANULOCYTES NFR BLD AUTO: 0.1 % (ref 0–0.5)
LYMPHOCYTES # BLD AUTO: 1.66 10*3/MM3 (ref 0.7–3.1)
LYMPHOCYTES NFR BLD AUTO: 23.8 % (ref 19.6–45.3)
MCH RBC QN AUTO: 27.3 PG (ref 26.6–33)
MCHC RBC AUTO-ENTMCNC: 31.2 G/DL (ref 31.5–35.7)
MCV RBC AUTO: 87.5 FL (ref 79–97)
MONOCYTES # BLD AUTO: 0.59 10*3/MM3 (ref 0.1–0.9)
MONOCYTES NFR BLD AUTO: 8.5 % (ref 5–12)
NEUTROPHILS NFR BLD AUTO: 4.32 10*3/MM3 (ref 1.7–7)
NEUTROPHILS NFR BLD AUTO: 62 % (ref 42.7–76)
NRBC BLD AUTO-RTO: 0 /100 WBC (ref 0–0.2)
PLATELET # BLD AUTO: 179 10*3/MM3 (ref 140–450)
PMV BLD AUTO: 9.8 FL (ref 6–12)
POTASSIUM SERPL-SCNC: 6.1 MMOL/L (ref 3.5–5.2)
RBC # BLD AUTO: 3.99 10*6/MM3 (ref 4.14–5.8)
SODIUM SERPL-SCNC: 136 MMOL/L (ref 136–145)
WBC # BLD AUTO: 6.97 10*3/MM3 (ref 3.4–10.8)

## 2021-08-27 PROCEDURE — 80048 BASIC METABOLIC PNL TOTAL CA: CPT

## 2021-08-27 PROCEDURE — 85025 COMPLETE CBC W/AUTO DIFF WBC: CPT

## 2021-08-27 PROCEDURE — 36415 COLL VENOUS BLD VENIPUNCTURE: CPT

## 2021-08-27 PROCEDURE — 83036 HEMOGLOBIN GLYCOSYLATED A1C: CPT

## 2021-08-27 RX ORDER — FLUCONAZOLE 100 MG/1
100 TABLET ORAL DAILY
Status: ON HOLD | COMMUNITY
End: 2022-08-24

## 2021-08-27 RX ORDER — INSULIN GLARGINE 100 [IU]/ML
68 INJECTION, SOLUTION SUBCUTANEOUS DAILY
COMMUNITY

## 2021-08-27 NOTE — PAT
An arrival time for procedure was not given during PAT visit. If patient had any questions or concerns about their arrival time, they were instructed to contact their surgeon/physician.  Additionally, if the patient referred to an arrival time that was acquired from their my chart account, patient was encouraged to verify that time with their surgeon/physician.  NO arrival times given in Pre Admission Testing Department.    Patient to apply PURPLE ANTIBACTERIAL wipes  to surgical area (as instructed) the night before procedure and the AM of procedure. Wipes provided. ALLERGY TO CHLORHEXIDINE.    Per Anesthesia Request, patient instructed not to take their ACE/ARB medications on the AM of surgery.    CARDIAC CLEARANCE & ELIQUIS STATEMENT (8/26/21), PPM INTERROGATION (7/7/21), CAROTID DUPLEX (4/5/21), ECHO (4/5/21), STRESS TEST (4/5/21), EKG (5/17/21) & DR. PANDYA PN FROM 7/7/21 ON CHART.  PT AND WIFE INSTRUCTED AND VERBALIZED UNDERSTANDING TO STOP ELIQUIS 3 DAYS PRIOR TO SURGERY.

## 2021-08-29 ENCOUNTER — APPOINTMENT (OUTPATIENT)
Dept: PREADMISSION TESTING | Facility: HOSPITAL | Age: 76
End: 2021-08-29

## 2021-08-29 LAB — SARS-COV-2 RNA PNL SPEC NAA+PROBE: NOT DETECTED

## 2021-08-29 PROCEDURE — C9803 HOPD COVID-19 SPEC COLLECT: HCPCS

## 2021-08-29 PROCEDURE — U0005 INFEC AGEN DETEC AMPLI PROBE: HCPCS

## 2021-08-29 PROCEDURE — U0004 COV-19 TEST NON-CDC HGH THRU: HCPCS

## 2021-08-30 ENCOUNTER — ANESTHESIA EVENT (OUTPATIENT)
Dept: PERIOP | Facility: HOSPITAL | Age: 76
End: 2021-08-30

## 2021-08-30 RX ORDER — SODIUM CHLORIDE, SODIUM LACTATE, POTASSIUM CHLORIDE, CALCIUM CHLORIDE 600; 310; 30; 20 MG/100ML; MG/100ML; MG/100ML; MG/100ML
9 INJECTION, SOLUTION INTRAVENOUS CONTINUOUS
Status: CANCELLED | OUTPATIENT
Start: 2021-08-30

## 2021-08-30 RX ORDER — FAMOTIDINE 10 MG/ML
20 INJECTION, SOLUTION INTRAVENOUS ONCE
Status: CANCELLED | OUTPATIENT
Start: 2021-08-30 | End: 2021-08-30

## 2021-08-31 ENCOUNTER — APPOINTMENT (OUTPATIENT)
Dept: PREADMISSION TESTING | Facility: HOSPITAL | Age: 76
End: 2021-08-31

## 2021-08-31 ENCOUNTER — HOSPITAL ENCOUNTER (OUTPATIENT)
Facility: HOSPITAL | Age: 76
Setting detail: HOSPITAL OUTPATIENT SURGERY
Discharge: HOME | End: 2021-08-31
Attending: ORTHOPAEDIC SURGERY | Admitting: ORTHOPAEDIC SURGERY

## 2021-08-31 ENCOUNTER — ANESTHESIA EVENT CONVERTED (OUTPATIENT)
Dept: ANESTHESIOLOGY | Facility: HOSPITAL | Age: 76
End: 2021-08-31

## 2021-08-31 ENCOUNTER — ANESTHESIA (OUTPATIENT)
Dept: PERIOP | Facility: HOSPITAL | Age: 76
End: 2021-08-31

## 2021-08-31 VITALS
WEIGHT: 272 LBS | SYSTOLIC BLOOD PRESSURE: 151 MMHG | BODY MASS INDEX: 33.82 KG/M2 | TEMPERATURE: 96.9 F | RESPIRATION RATE: 16 BRPM | DIASTOLIC BLOOD PRESSURE: 75 MMHG | HEIGHT: 75 IN | HEART RATE: 62 BPM | OXYGEN SATURATION: 95 %

## 2021-08-31 DIAGNOSIS — L97.521 DIABETIC ULCER OF TOE OF LEFT FOOT ASSOCIATED WITH TYPE 2 DIABETES MELLITUS, LIMITED TO BREAKDOWN OF SKIN (HCC): Primary | ICD-10-CM

## 2021-08-31 DIAGNOSIS — E11.621 DIABETIC ULCER OF LEFT MIDFOOT ASSOCIATED WITH TYPE 2 DIABETES MELLITUS, LIMITED TO BREAKDOWN OF SKIN (HCC): ICD-10-CM

## 2021-08-31 DIAGNOSIS — L97.421 DIABETIC ULCER OF LEFT MIDFOOT ASSOCIATED WITH TYPE 2 DIABETES MELLITUS, LIMITED TO BREAKDOWN OF SKIN (HCC): ICD-10-CM

## 2021-08-31 DIAGNOSIS — E11.621 DIABETIC ULCER OF TOE OF LEFT FOOT ASSOCIATED WITH TYPE 2 DIABETES MELLITUS, LIMITED TO BREAKDOWN OF SKIN (HCC): Primary | ICD-10-CM

## 2021-08-31 LAB
GLUCOSE BLDC GLUCOMTR-MCNC: 146 MG/DL (ref 70–130)
GLUCOSE BLDC GLUCOMTR-MCNC: 203 MG/DL (ref 70–130)
POTASSIUM SERPL-SCNC: 4.7 MMOL/L (ref 3.5–5.2)

## 2021-08-31 PROCEDURE — 25010000002 FENTANYL CITRATE (PF) 50 MCG/ML SOLUTION: Performed by: NURSE ANESTHETIST, CERTIFIED REGISTERED

## 2021-08-31 PROCEDURE — 87176 TISSUE HOMOGENIZATION CULTR: CPT | Performed by: ORTHOPAEDIC SURGERY

## 2021-08-31 PROCEDURE — 28810 AMPUTATION TOE & METATARSAL: CPT | Performed by: ORTHOPAEDIC SURGERY

## 2021-08-31 PROCEDURE — 87070 CULTURE OTHR SPECIMN AEROBIC: CPT | Performed by: ORTHOPAEDIC SURGERY

## 2021-08-31 PROCEDURE — 25010000002 ONDANSETRON PER 1 MG: Performed by: NURSE ANESTHETIST, CERTIFIED REGISTERED

## 2021-08-31 PROCEDURE — 82962 GLUCOSE BLOOD TEST: CPT

## 2021-08-31 PROCEDURE — 25010000002 DAPTOMYCIN PER 1 MG: Performed by: ORTHOPAEDIC SURGERY

## 2021-08-31 PROCEDURE — 87102 FUNGUS ISOLATION CULTURE: CPT | Performed by: ORTHOPAEDIC SURGERY

## 2021-08-31 PROCEDURE — 25010000002 DEXAMETHASONE SODIUM PHOSPHATE 10 MG/ML SOLUTION: Performed by: NURSE ANESTHETIST, CERTIFIED REGISTERED

## 2021-08-31 PROCEDURE — 87205 SMEAR GRAM STAIN: CPT | Performed by: ORTHOPAEDIC SURGERY

## 2021-08-31 PROCEDURE — 25010000002 PROPOFOL 10 MG/ML EMULSION: Performed by: NURSE ANESTHETIST, CERTIFIED REGISTERED

## 2021-08-31 PROCEDURE — 87075 CULTR BACTERIA EXCEPT BLOOD: CPT | Performed by: ORTHOPAEDIC SURGERY

## 2021-08-31 PROCEDURE — 84132 ASSAY OF SERUM POTASSIUM: CPT | Performed by: ORTHOPAEDIC SURGERY

## 2021-08-31 PROCEDURE — 87116 MYCOBACTERIA CULTURE: CPT | Performed by: ORTHOPAEDIC SURGERY

## 2021-08-31 PROCEDURE — 88305 TISSUE EXAM BY PATHOLOGIST: CPT | Performed by: ORTHOPAEDIC SURGERY

## 2021-08-31 PROCEDURE — 87206 SMEAR FLUORESCENT/ACID STAI: CPT | Performed by: ORTHOPAEDIC SURGERY

## 2021-08-31 PROCEDURE — 88311 DECALCIFY TISSUE: CPT | Performed by: ORTHOPAEDIC SURGERY

## 2021-08-31 RX ORDER — FENTANYL CITRATE 50 UG/ML
50 INJECTION, SOLUTION INTRAMUSCULAR; INTRAVENOUS
Status: DISCONTINUED | OUTPATIENT
Start: 2021-08-31 | End: 2021-08-31 | Stop reason: HOSPADM

## 2021-08-31 RX ORDER — ONDANSETRON 2 MG/ML
INJECTION INTRAMUSCULAR; INTRAVENOUS AS NEEDED
Status: DISCONTINUED | OUTPATIENT
Start: 2021-08-31 | End: 2021-08-31 | Stop reason: SURG

## 2021-08-31 RX ORDER — SODIUM CHLORIDE 9 MG/ML
9 INJECTION, SOLUTION INTRAVENOUS CONTINUOUS
Status: DISCONTINUED | OUTPATIENT
Start: 2021-08-31 | End: 2021-08-31 | Stop reason: HOSPADM

## 2021-08-31 RX ORDER — DOXYCYCLINE HYCLATE 100 MG/1
100 CAPSULE ORAL 2 TIMES DAILY
Qty: 30 CAPSULE | Refills: 1 | Status: SHIPPED | OUTPATIENT
Start: 2021-08-31 | End: 2021-09-29

## 2021-08-31 RX ORDER — PROPOFOL 10 MG/ML
VIAL (ML) INTRAVENOUS AS NEEDED
Status: DISCONTINUED | OUTPATIENT
Start: 2021-08-31 | End: 2021-08-31 | Stop reason: SURG

## 2021-08-31 RX ORDER — HYDROMORPHONE HYDROCHLORIDE 1 MG/ML
0.5 INJECTION, SOLUTION INTRAMUSCULAR; INTRAVENOUS; SUBCUTANEOUS
Status: DISCONTINUED | OUTPATIENT
Start: 2021-08-31 | End: 2021-08-31 | Stop reason: HOSPADM

## 2021-08-31 RX ORDER — ONDANSETRON 4 MG/1
4 TABLET, FILM COATED ORAL EVERY 6 HOURS PRN
Qty: 30 TABLET | Refills: 0 | Status: ON HOLD | OUTPATIENT
Start: 2021-08-31 | End: 2022-08-24

## 2021-08-31 RX ORDER — ONDANSETRON 2 MG/ML
4 INJECTION INTRAMUSCULAR; INTRAVENOUS ONCE AS NEEDED
Status: DISCONTINUED | OUTPATIENT
Start: 2021-08-31 | End: 2021-08-31 | Stop reason: HOSPADM

## 2021-08-31 RX ORDER — POLYMYXIN B 500000 [USP'U]/1
INJECTION, POWDER, LYOPHILIZED, FOR SOLUTION INTRAMUSCULAR; INTRATHECAL; INTRAVENOUS; OPHTHALMIC AS NEEDED
Status: DISCONTINUED | OUTPATIENT
Start: 2021-08-31 | End: 2021-08-31 | Stop reason: HOSPADM

## 2021-08-31 RX ORDER — SODIUM CHLORIDE 0.9 % (FLUSH) 0.9 %
10 SYRINGE (ML) INJECTION AS NEEDED
Status: DISCONTINUED | OUTPATIENT
Start: 2021-08-31 | End: 2021-08-31 | Stop reason: HOSPADM

## 2021-08-31 RX ORDER — DEXAMETHASONE SODIUM PHOSPHATE 10 MG/ML
INJECTION, SOLUTION INTRAMUSCULAR; INTRAVENOUS AS NEEDED
Status: DISCONTINUED | OUTPATIENT
Start: 2021-08-31 | End: 2021-08-31 | Stop reason: SURG

## 2021-08-31 RX ORDER — LIDOCAINE HYDROCHLORIDE 10 MG/ML
0.5 INJECTION, SOLUTION EPIDURAL; INFILTRATION; INTRACAUDAL; PERINEURAL ONCE AS NEEDED
Status: COMPLETED | OUTPATIENT
Start: 2021-08-31 | End: 2021-08-31

## 2021-08-31 RX ORDER — MAGNESIUM HYDROXIDE 1200 MG/15ML
LIQUID ORAL AS NEEDED
Status: DISCONTINUED | OUTPATIENT
Start: 2021-08-31 | End: 2021-08-31 | Stop reason: HOSPADM

## 2021-08-31 RX ORDER — HYDROCODONE BITARTRATE AND ACETAMINOPHEN 7.5; 325 MG/1; MG/1
1-2 TABLET ORAL EVERY 6 HOURS PRN
Qty: 45 TABLET | Refills: 0 | Status: SHIPPED | OUTPATIENT
Start: 2021-08-31 | End: 2021-09-27

## 2021-08-31 RX ORDER — FENTANYL CITRATE 50 UG/ML
INJECTION, SOLUTION INTRAMUSCULAR; INTRAVENOUS AS NEEDED
Status: DISCONTINUED | OUTPATIENT
Start: 2021-08-31 | End: 2021-08-31 | Stop reason: SURG

## 2021-08-31 RX ORDER — EPHEDRINE SULFATE 50 MG/ML
INJECTION, SOLUTION INTRAVENOUS AS NEEDED
Status: DISCONTINUED | OUTPATIENT
Start: 2021-08-31 | End: 2021-08-31 | Stop reason: SURG

## 2021-08-31 RX ORDER — LIDOCAINE HYDROCHLORIDE 10 MG/ML
INJECTION, SOLUTION EPIDURAL; INFILTRATION; INTRACAUDAL; PERINEURAL AS NEEDED
Status: DISCONTINUED | OUTPATIENT
Start: 2021-08-31 | End: 2021-08-31 | Stop reason: SURG

## 2021-08-31 RX ORDER — MIDAZOLAM HYDROCHLORIDE 1 MG/ML
0.5 INJECTION INTRAMUSCULAR; INTRAVENOUS
Status: DISCONTINUED | OUTPATIENT
Start: 2021-08-31 | End: 2021-08-31 | Stop reason: HOSPADM

## 2021-08-31 RX ORDER — SODIUM CHLORIDE 0.9 % (FLUSH) 0.9 %
10 SYRINGE (ML) INJECTION EVERY 12 HOURS SCHEDULED
Status: DISCONTINUED | OUTPATIENT
Start: 2021-08-31 | End: 2021-08-31 | Stop reason: HOSPADM

## 2021-08-31 RX ORDER — FAMOTIDINE 20 MG/1
20 TABLET, FILM COATED ORAL ONCE
Status: COMPLETED | OUTPATIENT
Start: 2021-08-31 | End: 2021-08-31

## 2021-08-31 RX ADMIN — ONDANSETRON 4 MG: 2 INJECTION INTRAMUSCULAR; INTRAVENOUS at 07:59

## 2021-08-31 RX ADMIN — LIDOCAINE HYDROCHLORIDE 0.5 ML: 10 INJECTION, SOLUTION EPIDURAL; INFILTRATION; INTRACAUDAL; PERINEURAL at 06:32

## 2021-08-31 RX ADMIN — EPHEDRINE SULFATE 10 MG: 50 INJECTION INTRAVENOUS at 08:09

## 2021-08-31 RX ADMIN — SODIUM CHLORIDE 9 ML/HR: 9 INJECTION, SOLUTION INTRAVENOUS at 06:32

## 2021-08-31 RX ADMIN — DEXAMETHASONE SODIUM PHOSPHATE 4 MG: 10 INJECTION, SOLUTION INTRAMUSCULAR; INTRAVENOUS at 07:59

## 2021-08-31 RX ADMIN — FAMOTIDINE 20 MG: 20 TABLET ORAL at 06:51

## 2021-08-31 RX ADMIN — DAPTOMYCIN 600 MG: 500 INJECTION, POWDER, LYOPHILIZED, FOR SOLUTION INTRAVENOUS at 07:59

## 2021-08-31 RX ADMIN — LIDOCAINE HYDROCHLORIDE 50 MG: 10 INJECTION, SOLUTION EPIDURAL; INFILTRATION; INTRACAUDAL; PERINEURAL at 07:59

## 2021-08-31 RX ADMIN — FENTANYL CITRATE 100 MCG: 50 INJECTION, SOLUTION INTRAMUSCULAR; INTRAVENOUS at 07:59

## 2021-08-31 RX ADMIN — PROPOFOL 150 MG: 10 INJECTION, EMULSION INTRAVENOUS at 07:59

## 2021-08-31 NOTE — ANESTHESIA PROCEDURE NOTES
Peripheral Block      Patient reassessed immediately prior to procedure    Patient location during procedure: pre-op  Reason for block: at surgeon's request and post-op pain management  Preanesthetic Checklist  Completed: patient identified, IV checked, site marked, risks and benefits discussed, surgical consent, monitors and equipment checked, pre-op evaluation and timeout performed  Prep:  Sterile barriers:cap, gloves, mask and sterile barriers  Prep: ChloraPrep  Patient monitoring: blood pressure monitoring, continuous pulse oximetry and EKG  Procedure  Sedation:yes  Performed under: local infiltration  Guidance:ultrasound guided  Images:still images obtained, printed/placed on chart    Block Type:popliteal  Injection Technique:catheter  Needle Type:echogenic  Needle Gauge:18 G  Resistance on Injection: none  Catheter Size:20 G          Post Assessment  Injection Assessment: negative aspiration for heme, no paresthesia on injection and incremental injection  Patient Tolerance:comfortable throughout block  Complications:no  Additional Notes  Procedure:                                                         The pt was placed in  lateral position.  The Insertion site was  prepped and Draped in sterile fashion.  The pt was anesthetized with  IV Sedation( see meds).  Skin and cutaneous tissue was infiltrated and anesthetized with 1% Lidocaine 3 mls via a 25g needle.  A BBraun 4 inch 18g echogenic needle was then  inserted approximately 3 cm proximal to the popliteal fossa at the lateral mid biceps femoris and advanced In-plane with Ultrasound guidance.  Normal Saline PSF was utilized for hydrodissection of tissue.  The popliteal artery was visualized and the common peroneal and tibial bifurcation was located.  LA injection spread was visualized, injection was incremental 1-5ml, injection pressure was normal or little, no intraneural injection, no vascular injection.  .  A BBraun 20g wire stylet catheter was placed  via the needle with ultrasound visualization and confirmation with NS fluid bolus. The labeled catheter was then secured at insertion site with exofin tissue adhesive, benzoin, steristrips  and a CHG transparent dressing was placed over. Thank you

## 2021-08-31 NOTE — ANESTHESIA PROCEDURE NOTES
Airway  Urgency: elective    Date/Time: 8/31/2021 7:59 AM  Airway not difficult    General Information and Staff    Patient location during procedure: OR  CRNA: Phil Parrish CRNA    Indications and Patient Condition  Indications for airway management: airway protection    Preoxygenated: yes  Mask difficulty assessment: 0 - not attempted    Final Airway Details  Final airway type: supraglottic airway      Successful airway: I-gel  Size 4    Number of attempts at approach: 1  Assessment: lips, teeth, and gum same as pre-op    Additional Comments  LMA placed without difficulty, ventilation with assist, equal breath sounds and symmetric chest rise and fall

## 2021-08-31 NOTE — ANESTHESIA PREPROCEDURE EVALUATION
Anesthesia Evaluation     Patient summary reviewed and Nursing notes reviewed                Airway   Mallampati: II  TM distance: >3 FB  Neck ROM: full  No difficulty expected  Dental - normal exam     Pulmonary - normal exam   (+) asthma,  Cardiovascular - normal exam    (+) pacemaker pacemaker interrogated 1-3 months ago, hypertension, dysrhythmias (eliquis tx; stopped 3 days ago) Paroxysmal Atrial Fib, DVT (s/p right BKA, left toe amp),     ROS comment:   Acceptable cardiac risk per cardiology    Stress/echo 4/21: normal EF, mild MR/TR; negative for ischemia     Neuro/Psych- negative ROS  GI/Hepatic/Renal/Endo    (+) obesity,  GERD,  renal disease CRI, diabetes mellitus,     Musculoskeletal     Abdominal  - normal exam    Bowel sounds: normal.   Substance History - negative use     OB/GYN negative ob/gyn ROS         Other   arthritis,                    Anesthesia Plan    ASA 4     general     intravenous induction     Anesthetic plan, all risks, benefits, and alternatives have been provided, discussed and informed consent has been obtained with: patient.    Plan discussed with CRNA.

## 2021-08-31 NOTE — ADDENDUM NOTE
Addendum  created 08/31/21 0846 by Thierno Joy CRNA    Delete clinical note, Intraprocedure Blocks edited, LDA deleted via procedure documentation, Order Canceled from Note

## 2021-08-31 NOTE — ANESTHESIA POSTPROCEDURE EVALUATION
Patient: Amol Aguirre    Procedure Summary     Date: 08/31/21 [facility] / [facility]    Anesthesia Start: 0754 Anesthesia Stop: 0843    Procedure: amputate left 2nd toe and metatarsal, close diabetic ulcer (Left Fingers) Diagnosis:       Diabetic ulcer of left midfoot associated with type 2 diabetes mellitus, limited to breakdown of skin (CMS/HCC)      (Diabetic ulcer of left midfoot associated with type 2 diabetes mellitus, limited to breakdown of skin (CMS/HCC) [E11.621, L97.421])    Surgeons: Juju Juarez MD Provider: Anthony Tabor MD    Anesthesia Type: general ASA Status: 4          Anesthesia Type: general    Vitals  Vitals Value Taken Time   BP     Temp 97 °F (36.1 °C) 08/31/21 0840   Pulse 63 08/31/21 0840   Resp 18 08/31/21 0840   SpO2 94 % 08/31/21 0841   Vitals shown include unvalidated device data.        Post Anesthesia Care and Evaluation    Patient location during evaluation: PACU  Patient participation: complete - patient participated  Level of consciousness: awake and alert  Pain management: adequate  Airway patency: patent  Anesthetic complications: No anesthetic complications  PONV Status: none  Cardiovascular status: hemodynamically stable and acceptable  Respiratory status: nonlabored ventilation, acceptable and nasal cannula  Hydration status: acceptable

## 2021-09-03 LAB
BACTERIA SPEC AEROBE CULT: NORMAL
GRAM STN SPEC: NORMAL
GRAM STN SPEC: NORMAL

## 2021-09-05 LAB — BACTERIA SPEC ANAEROBE CULT: NORMAL

## 2021-09-10 ENCOUNTER — OFFICE VISIT (OUTPATIENT)
Dept: ORTHOPEDIC SURGERY | Facility: CLINIC | Age: 76
End: 2021-09-10

## 2021-09-10 VITALS — TEMPERATURE: 97.1 F

## 2021-09-10 DIAGNOSIS — Z89.422 HISTORY OF PARTIAL RAY AMPUTATION OF SECOND TOE OF LEFT FOOT (HCC): Primary | ICD-10-CM

## 2021-09-10 PROCEDURE — 99024 POSTOP FOLLOW-UP VISIT: CPT | Performed by: ORTHOPAEDIC SURGERY

## 2021-09-10 NOTE — PROGRESS NOTES
Follow-up (1 week status post for amputate left 2nd toe and metatarsal, close diabetic ulcer 8-31-21)      Amol Aguirre is 10 days status post amputate left 2nd toe and metatarsal, 8/31/2021. He reports no fever, chills.  He reports pain is well controlled.  They have been taking Eliquis for DVT prophylaxis.  They have been non weight bearing.      Past Surgical History:   Procedure Laterality Date   • AMPUTATION DIGIT Left 10/15/2019    Procedure: AMPUTATE LEFT THIRD TOE;  Surgeon: Juju Juarez MD;  [facility];  Service: Orthopedics   • AMPUTATION DIGIT Left 1/7/2021    Procedure: amputate left first toe and metatarsal;  Surgeon: Juju Juarez MD;  [facility];  Service: Orthopedics;  Laterality: Left;   • AMPUTATION DIGIT Left 8/31/2021    Procedure: amputate left 2nd toe and metatarsal, close diabetic ulcer;  Surgeon: Juju Juarez MD;  [facility];  Service: Orthopedics;  Laterality: Left;   • AORTAGRAM N/A 4/30/2019    Procedure: AORTAGRAM WITH OR WITHOUT RUNOFFS;  Surgeon: Carrillo White MD;  [facility];  Service: Vascular   • BELOW KNEE AMPUTATION Right 6/4/2019    Procedure: BELOW KNEE AMPUTATION RIGHT;  Surgeon: Juju Juarez MD;  [facility];  Service: Orthopedics   • CARDIAC CATHETERIZATION      NO INTERVENTION   • CARDIAC ELECTROPHYSIOLOGY PROCEDURE N/A 5/28/2021    Procedure: Device Implant;  Surgeon: Amol Foreman MD;  [facility];  Service: Cardiovascular;  Laterality: N/A;   • CATARACT EXTRACTION W/ INTRAOCULAR LENS IMPLANT Right 7/20/2020    Procedure: CATARACT PHACO EXTRACTION WITH INTRAOCULAR LENS IMPLANT RIGHT;  Surgeon: Jez Mas MD;  Location:  [facility];  Service: Ophthalmology;  Laterality: Right;   • CATARACT EXTRACTION W/ INTRAOCULAR LENS IMPLANT Left 8/3/2020    Procedure: CATARACT PHACO EXTRACTION WITH INTRAOCULAR LENS IMPLANT LEFT;  Surgeon: Jez Mas MD;   Location: [facility];  Service: Ophthalmology;  Laterality: Left;   • CHOLECYSTECTOMY     • COLONOSCOPY     • FOOT SURGERY Left 10/15/2019    Amputate 3rd toe- Tia   • KNEE SURGERY Right     TKA       Temp 97.1 °F (36.2 °C)         Good alignment, no erythema, no drainage, no sign of infection, normal post op swelling left foot, healing well, no necrosis- there is a 1cm clear blister over 5th metatarsal laterally- his wife notes the dressing she applied might have been too tight, no sign of necrosis    none    Assessment and Plan:   1. History of partial ray amputation of second toe of left foot (CMS/HCC)  So far this looks good, the blister is clean, we removed every other suture, continue non-wt bearing, return 2 weeks, non-wt bearing xray          Juju Weber MD

## 2021-09-27 ENCOUNTER — OFFICE VISIT (OUTPATIENT)
Dept: ORTHOPEDIC SURGERY | Facility: CLINIC | Age: 76
End: 2021-09-27

## 2021-09-27 DIAGNOSIS — Z89.422 HISTORY OF PARTIAL RAY AMPUTATION OF SECOND TOE OF LEFT FOOT (HCC): Primary | ICD-10-CM

## 2021-09-27 PROCEDURE — 99024 POSTOP FOLLOW-UP VISIT: CPT | Performed by: ORTHOPAEDIC SURGERY

## 2021-09-27 RX ORDER — LEVOFLOXACIN 250 MG/1
TABLET ORAL
COMMUNITY
End: 2021-10-27

## 2021-09-27 RX ORDER — LINEZOLID 600 MG/1
TABLET, FILM COATED ORAL
COMMUNITY
End: 2021-09-27

## 2021-09-27 NOTE — PROGRESS NOTES
Post-op (2 week follow up; 4 weeks status post for amputate left 2nd toe and metatarsal, close diabetic ulcer 8-31-21)      Amol Aguirre is 4 weeks status post amputate left second toe and metatarsal, 8/31/2021. He reports no fever, chills.  He reports pain is well controlled.  They have been taking aspirin for DVT prophylaxis.  They have been non weight bearing.      Past Surgical History:   Procedure Laterality Date   • AMPUTATION DIGIT Left 10/15/2019    Procedure: AMPUTATE LEFT THIRD TOE;  Surgeon: Juju Juarez MD;  [facility];  Service: Orthopedics   • AMPUTATION DIGIT Left 1/7/2021    Procedure: amputate left first toe and metatarsal;  Surgeon: Juju Juarez MD;  [facility];  Service: Orthopedics;  Laterality: Left;   • AMPUTATION DIGIT Left 8/31/2021    Procedure: amputate left 2nd toe and metatarsal, close diabetic ulcer;  Surgeon: Juju uJarez MD;  [facility];  Service: Orthopedics;  Laterality: Left;   • AORTAGRAM N/A 4/30/2019    Procedure: AORTAGRAM WITH OR WITHOUT RUNOFFS;  Surgeon: Carrillo White MD;  [facility];  Service: Vascular   • BELOW KNEE AMPUTATION Right 6/4/2019    Procedure: BELOW KNEE AMPUTATION RIGHT;  Surgeon: Juju Juarez MD;  [facility];  Service: Orthopedics   • CARDIAC CATHETERIZATION      NO INTERVENTION   • CARDIAC ELECTROPHYSIOLOGY PROCEDURE N/A 5/28/2021    Procedure: Device Implant;  Surgeon: Amol Foreman MD;  [facility];  Service: Cardiovascular;  Laterality: N/A;   • CATARACT EXTRACTION W/ INTRAOCULAR LENS IMPLANT Right 7/20/2020    Procedure: CATARACT PHACO EXTRACTION WITH INTRAOCULAR LENS IMPLANT RIGHT;  Surgeon: Jez Mas MD;  Location:  [facility];  Service: Ophthalmology;  Laterality: Right;   • CATARACT EXTRACTION W/ INTRAOCULAR LENS IMPLANT Left 8/3/2020    Procedure: CATARACT PHACO EXTRACTION WITH INTRAOCULAR LENS IMPLANT LEFT;  Surgeon: gT  Jez Benoit MD;  Location: [facility];  Service: Ophthalmology;  Laterality: Left;   • CHOLECYSTECTOMY     • COLONOSCOPY     • FOOT SURGERY Left 10/15/2019    Amputate 3rd toe- DeGnore   • KNEE SURGERY Right     TKA       There were no vitals taken for this visit.        Good alignment, no erythema, no drainage, no sign of infection, normal post op swelling left foot is healing well    ordered and reviewed x-rays today    Assessment and Plan:   1. History of partial ray amputation of second toe of left foot (CMS/HCC)  Much better healing of this incision than the previous great toe amputation.  Is making good progress we remove the remaining sutures.  There is only a few small scabs.  I will see him again in 3 to 4 weeks to probably allow him to begin weightbearing  - XR Foot 2 View Left          Juju Weber MD

## 2021-09-29 RX ORDER — DOXYCYCLINE HYCLATE 100 MG/1
CAPSULE ORAL
Qty: 30 CAPSULE | Refills: 1 | Status: ON HOLD | OUTPATIENT
Start: 2021-09-29 | End: 2022-08-24

## 2021-10-12 LAB
FUNGUS WND CULT: NORMAL
MYCOBACTERIUM SPEC CULT: NORMAL
NIGHT BLUE STAIN TISS: NORMAL

## 2021-10-27 ENCOUNTER — OFFICE VISIT (OUTPATIENT)
Dept: ORTHOPEDIC SURGERY | Facility: CLINIC | Age: 76
End: 2021-10-27

## 2021-10-27 DIAGNOSIS — Z89.422 HISTORY OF PARTIAL RAY AMPUTATION OF SECOND TOE OF LEFT FOOT (HCC): Primary | ICD-10-CM

## 2021-10-27 PROCEDURE — 99024 POSTOP FOLLOW-UP VISIT: CPT | Performed by: ORTHOPAEDIC SURGERY

## 2021-10-27 RX ORDER — EPINEPHRINE 0.3 MG/.3ML
INJECTION SUBCUTANEOUS
COMMUNITY

## 2021-10-27 RX ORDER — CITALOPRAM 10 MG/1
10 TABLET ORAL DAILY
COMMUNITY
Start: 2021-10-19

## 2021-10-27 RX ORDER — ACETAMINOPHEN 325 MG/1
2 TABLET ORAL
COMMUNITY

## 2021-10-27 NOTE — PROGRESS NOTES
Post-op Follow-up (4 week follow up - 8 weeks status post for amputate left 2nd toe and metatarsal, close diabetic ulcer 8-31-21)      Amol Aguirre is 8 weeks status post amputate left 2nd toe and metatarsal, 8/31/2021. He reports no fever, chills.  He reports pain is well controlled.  They have been taking Eliquis for DVT prophylaxis.  They have been non weight bearing.      Past Surgical History:   Procedure Laterality Date   • AMPUTATION DIGIT Left 10/15/2019    Procedure: AMPUTATE LEFT THIRD TOE;  Surgeon: Juju Juarez MD;  [facility];  Service: Orthopedics   • AMPUTATION DIGIT Left 1/7/2021    Procedure: amputate left first toe and metatarsal;  Surgeon: Juju Juarez MD;  [facility];  Service: Orthopedics;  Laterality: Left;   • AMPUTATION DIGIT Left 8/31/2021    Procedure: amputate left 2nd toe and metatarsal, close diabetic ulcer;  Surgeon: Juju Juarez MD;  [facility];  Service: Orthopedics;  Laterality: Left;   • AORTAGRAM N/A 4/30/2019    Procedure: AORTAGRAM WITH OR WITHOUT RUNOFFS;  Surgeon: Carrillo White MD;  [facility];  Service: Vascular   • BELOW KNEE AMPUTATION Right 6/4/2019    Procedure: BELOW KNEE AMPUTATION RIGHT;  Surgeon: Juju Juarez MD;  [facility];  Service: Orthopedics   • CARDIAC CATHETERIZATION      NO INTERVENTION   • CARDIAC ELECTROPHYSIOLOGY PROCEDURE N/A 5/28/2021    Procedure: Device Implant;  Surgeon: Amol Foreman MD;  [facility];  Service: Cardiovascular;  Laterality: N/A;   • CATARACT EXTRACTION W/ INTRAOCULAR LENS IMPLANT Right 7/20/2020    Procedure: CATARACT PHACO EXTRACTION WITH INTRAOCULAR LENS IMPLANT RIGHT;  Surgeon: Jze Mas MD;  Location:  [facility];  Service: Ophthalmology;  Laterality: Right;   • CATARACT EXTRACTION W/ INTRAOCULAR LENS IMPLANT Left 8/3/2020    Procedure: CATARACT PHACO EXTRACTION WITH INTRAOCULAR LENS IMPLANT LEFT;  Surgeon:  Jez Mas MD;  Location: [facility] [place];  Service: Ophthalmology;  Laterality: Left;   • CHOLECYSTECTOMY     • COLONOSCOPY     • FOOT SURGERY Left 10/15/2019    Amputate 3rd toe- DeGnore   • KNEE SURGERY Right     TKA       There were no vitals taken for this visit.       no erythema, no drainage, no sign of infection, normal post op swelling left foot is finally healed, small callus under the second and fifth metatarsal head, small scab on the third toe, no signs of infection    none    Assessment and Plan:   1. History of partial ray amputation of second toe of left foot (HCC)  The left foot is finally healed.  We talked about good foot care.  His foot is extremely fragile.  He should only wear the slipper with fur lining.  The foot cannot tolerate a shoe nor custom insert.  They need to work on the calluses every day and moisturize.  He may walk short distances.  I will see him in 3 months or sooner if he has any problems          Juju Weber MD

## 2022-01-31 ENCOUNTER — OFFICE VISIT (OUTPATIENT)
Dept: ORTHOPEDIC SURGERY | Facility: CLINIC | Age: 77
End: 2022-01-31

## 2022-01-31 VITALS
HEIGHT: 75 IN | BODY MASS INDEX: 32.35 KG/M2 | DIASTOLIC BLOOD PRESSURE: 60 MMHG | WEIGHT: 260.2 LBS | SYSTOLIC BLOOD PRESSURE: 121 MMHG

## 2022-01-31 DIAGNOSIS — Z89.511 S/P BKA (BELOW KNEE AMPUTATION), RIGHT: Primary | ICD-10-CM

## 2022-01-31 PROCEDURE — 99213 OFFICE O/P EST LOW 20 MIN: CPT | Performed by: ORTHOPAEDIC SURGERY

## 2022-01-31 RX ORDER — PEN NEEDLE, DIABETIC 31 GX5/16"
NEEDLE, DISPOSABLE MISCELLANEOUS
COMMUNITY
Start: 2022-01-23

## 2022-01-31 RX ORDER — MAGNESIUM OXIDE 400 MG/1
TABLET ORAL
COMMUNITY

## 2022-01-31 RX ORDER — ACETAMINOPHEN,DIPHENHYDRAMINE HCL 500; 25 MG/1; MG/1
2 TABLET, FILM COATED ORAL
COMMUNITY

## 2022-01-31 RX ORDER — CALCIUM CITRATE/VITAMIN D3 200MG-6.25
TABLET ORAL
COMMUNITY
Start: 2021-11-08

## 2022-01-31 NOTE — PROGRESS NOTES
ESTABLISHED PATIENT    Patient: Amol Aguirre  : 1945    Primary Care Provider: Donte Briones MD    Requesting Provider: As above    Follow-up (3 month follow up - 5 months status post for amputate left 2nd toe and metatarsal, close diabetic ulcer 21)      History    Chief Complaint: Diabetic foot check    History of Present Illness: He returns for follow-up of right below-knee amputation and left partial foot amputation.  He finally is completely healed.    Current Outpatient Medications on File Prior to Visit   Medication Sig Dispense Refill   • acetaminophen (TYLENOL) 325 MG tablet Take 2 tablets by mouth.     • allopurinol (ZYLOPRIM) 100 MG tablet Take 100 mg by mouth 2 (Two) Times a Day.     • amLODIPine (NORVASC) 10 MG tablet Take 10 mg by mouth Every Night.  0   • Apoaequorin (PREVAGEN PO) Take 1 capsule by mouth Daily.     • budesonide-formoterol (SYMBICORT) 80-4.5 MCG/ACT inhaler Inhale 2 puffs 2 (Two) Times a Day.     • bumetanide (BUMEX) 1 MG tablet Take 1 mg by mouth Daily.     • citalopram (CeleXA) 10 MG tablet Take 10 mg by mouth Daily.     • CloNIDine (CATAPRES) 0.1 MG tablet Take 0.1 mg by mouth 2 (Two) Times a Day.     • Comfort EZ Pen Needles 31G X 5 MM misc      • diphenhydrAMINE-acetaminophen (TYLENOL PM)  MG tablet per tablet Take 2 tablets by mouth.     • doxycycline (VIBRAMYCIN) 100 MG capsule TAKE 1 CAPSULE BY MOUTH TWICE DAILY 30 capsule 1   • Eliquis 5 MG tablet tablet Take 5 mg by mouth 2 (Two) Times a Day.     • EPINEPHrine (EPIPEN) 0.3 MG/0.3ML solution auto-injector injection Inject  into the appropriate muscle as directed by prescriber.     • fluconazole (DIFLUCAN) 100 MG tablet Take 100 mg by mouth Daily. EOD. STARTED ON 21.     • gabapentin (NEURONTIN) 600 MG tablet Take 600 mg by mouth 2 (Two) Times a Day.     • Insulin Glargine (BASAGLAR KWIKPEN) 100 UNIT/ML injection pen Inject 68 Units under the skin into the appropriate area as directed  Daily.     • Jardiance 25 MG tablet Take 1 tablet by mouth Daily.     • magnesium oxide (MAG-OX) 400 MG tablet magnesium oxide 400 mg (241.3 mg magnesium) tablet     • Magnesium Oxide 400 (240 Mg) MG tablet Take 400 mg by mouth Daily.     • metoclopramide (REGLAN) 10 MG tablet Take 10 mg by mouth 2 (two) times a day.  8   • mupirocin (Bactroban) 2 % ointment Apply  topically to the appropriate area as directed Daily. 30 g 5   • nebivolol (BYSTOLIC) 10 MG tablet Take 5 mg by mouth 2 (two) times a day.     • NOVOLOG FLEXPEN 100 UNIT/ML solution pen-injector sc pen Inject 20 Units under the skin into the appropriate area as directed 3 (Three) Times a Day With Meals.     • olmesartan (BENICAR) 40 MG tablet Take 40 mg by mouth Daily.     • ondansetron (Zofran) 4 MG tablet Take 1 tablet by mouth Every 6 (Six) Hours As Needed for Nausea or Vomiting. 30 tablet 0   • pantoprazole (PROTONIX) 40 MG EC tablet Take 40 mg by mouth Daily.  2   • True Metrix Blood Glucose Test test strip TEST BLOOD SUGAR THREE TIMES DAILY AND AS NEEDED     • vitamin B-12 (CYANOCOBALAMIN) 1000 MCG tablet Take 1,000 mcg by mouth Daily.       No current facility-administered medications on file prior to visit.      Allergies   Allergen Reactions   • Chlorhexidine Shortness Of Breath, Itching, Rash and Anaphylaxis     Pt developed a rash, itching, and shortness of breath after receiving a CHG bath on 4/24/19.  Pt developed a rash, itching, and shortness of breath after receiving a CHG bath on 4/24/19.   • Latex Other (See Comments) and Swelling     Rash, hives, and tongue swelling  Rash, hives, and tongue swelling      Past Medical History:   Diagnosis Date   • Acid reflux    • Arthritis    • Asthma    • Atrial fibrillation/flutter    • CHF (congestive heart failure) ([facility])    • Diabetes (HCC)    • Hypertension      Past Surgical History:   Procedure Laterality Date   • AMPUTATION DIGIT Left 10/15/2019    Procedure: AMPUTATE LEFT THIRD TOE;  Surgeon:  Juju Juarez MD;  [facility];  Service: Orthopedics   • AMPUTATION DIGIT Left 1/7/2021    Procedure: amputate left first toe and metatarsal;  Surgeon: Juju Juarez MD;  [facility];  Service: Orthopedics;  Laterality: Left;   • AMPUTATION DIGIT Left 8/31/2021    Procedure: amputate left 2nd toe and metatarsal, close diabetic ulcer;  Surgeon: Juju Juarez MD;  [facility];  Service: Orthopedics;  Laterality: Left;   • AORTAGRAM N/A 4/30/2019    Procedure: AORTAGRAM WITH OR WITHOUT RUNOFFS;  Surgeon: Carrillo White MD;  Location: [facility] OR [facility];  Service: Vascular   • BELOW KNEE AMPUTATION Right 6/4/2019    Procedure: BELOW KNEE AMPUTATION RIGHT;  Surgeon: Juju Juarez MD;  [facility];  Service: Orthopedics   • CARDIAC CATHETERIZATION      NO INTERVENTION   • CARDIAC ELECTROPHYSIOLOGY PROCEDURE N/A 5/28/2021    Procedure: Device Implant;  Surgeon: Amol Foreman MD;  Location: [facility] CATH INVASIVE LOCATION;  Service: Cardiovascular;  Laterality: N/A;   • CATARACT EXTRACTION W/ INTRAOCULAR LENS IMPLANT Right 7/20/2020    Procedure: CATARACT PHACO EXTRACTION WITH INTRAOCULAR LENS IMPLANT RIGHT;  Surgeon: Jez Mas MD;  Location: [facility] OR;  Service: Ophthalmology;  Laterality: Right;   • CATARACT EXTRACTION W/ INTRAOCULAR LENS IMPLANT Left 8/3/2020    Procedure: CATARACT PHACO EXTRACTION WITH INTRAOCULAR LENS IMPLANT LEFT;  Surgeon: Jez Mas MD;  Location: [facility] OR;  Service: Ophthalmology;  Laterality: Left;   • CHOLECYSTECTOMY     • COLONOSCOPY     • FOOT SURGERY Left 10/15/2019    Amputate 3rd toe- DeGnore   • KNEE SURGERY Right     TKA     Family History   Problem Relation Age of Onset   • Cancer Mother    • Hypertension Mother    • Hypertension Father    • Heart attack Father       Social History     Socioeconomic History   • Marital status:    Tobacco Use   • Smoking status: Former Smoker     Types: Cigarettes     Start  "date:      Quit date:      Years since quittin.1   • Smokeless tobacco: Never Used   Vaping Use   • Vaping Use: Never used   Substance and Sexual Activity   • Alcohol use: No   • Drug use: No   • Sexual activity: Defer        Review of Systems   Constitutional: Negative.    HENT: Negative.    Eyes: Negative.    Respiratory: Negative.    Cardiovascular: Negative.    Gastrointestinal: Negative.    Endocrine: Negative.    Genitourinary: Negative.    Musculoskeletal: Positive for arthralgias.   Skin: Negative.    Allergic/Immunologic: Negative.    Neurological: Negative.    Hematological: Negative.    Psychiatric/Behavioral: Negative.        The following portions of the patient's history were reviewed and updated as appropriate: allergies, current medications, past family history, past medical history, past social history, past surgical history and problem list.    Physical Exam:   /60   Ht 190.5 cm (75\")   Wt 118 kg (260 lb 3.2 oz)   BMI 32.52 kg/m²   GENERAL: Body habitus: trunkal obesity    Lower extremity edema: Left: trace; Right: none    Gait: Gait is very reasonable with the right below-knee amputation     Mental Status:  awake and alert; oriented to person, place, and time  MSK:  Left foot has completely healed finally.  There is a small callus under the third metatarsal but it is not preulcerous.  We talked about how to work on it every day.  Right BKA is doing well.    Medical Decision Making    Data Review:   none    Assessment/Plan/Diagnosis/Treatment Options:   1. S/P BKA (below knee amputation), right (HCC)  He finally is completely healed.  His gait is quite good with the right BKA.  The left partial foot is doing well.  He understands how to take care of his foot.  His daughter was with him.  We talked about callus care.  I will be happy to see him anytime        Juju Weber MD                      "

## 2022-02-18 NOTE — TELEPHONE ENCOUNTER
"     [facility] Addiction Medicine    A/P                                                    ASSESSMENT/PLAN  Diagnoses and all orders for this visit:    Opioid use disorder, severe, dependence (H)  Denies any use of opiates. Reports mild cravings or thoughts. Sometimes he takes two doses instead of three. Continue 4mg TID, prescription sent.   -     buprenorphine HCl-naloxone HCl (SUBOXONE) 4-1 MG per film; Place 1 Film under the tongue 3 times daily Okay for early refill  Acute metabolic encephalopathy  Mentation continues to improve. Continues to report periods of increased anxiety related to his limitations. Anxious for PT to start. Continue gabapentin. Refill sent.   -     gabapentin (NEURONTIN) 300 MG capsule; Take 3 capsules (900 mg) by mouth 3 times daily  Recurrent Major Depression disorder, severe:   Started on lexapro last week.. he plans to start taking 10mg tomorrow. He did not get scheduled with transitions clinic. States he was maybe a little \"crabby\" when they called or they woke him up so he did not want to schedule with them. He states he will get scheduled. Message routed to RN at [facility] to help him get scheduled.   -Continue lexapro.   Orders Placed This Encounter   Medications     gabapentin (NEURONTIN) 300 MG capsule     Sig: Take 3 capsules (900 mg) by mouth 3 times daily     Dispense:  270 capsule     Refill:  0     buprenorphine HCl-naloxone HCl (SUBOXONE) 4-1 MG per film     Sig: Place 1 Film under the tongue 3 times daily Okay for early refill     Dispense:  21 Film     Refill:  0     NADEAN: [id number]       Problem list updated Feb 18, 2022   No problems updated.        PDMP Review       Value Time User    State PDMP site checked  Yes 2/18/2022  1:39 PM Sherry Alvarado CNP            RTC  Return in about 1 week (around 2/25/2022) for Follow up, with me, in person.      Counseled the patient on the importance of having a recovery program in addition to medication to " I left them another message to let them know that they do not need formal home health for dressing changes.  I told them to have the HH call me if they need anything furthur.    Brigette   manage recovery.  Components include avoiding isolating, having willingness to change, avoiding triggers and managing cravings. Encouraged having some type of sober network and practicing honesty with trusted support person(s). Encouraged other services such as counseling, 12 step or other self-help organizations.      Opioid warning reviewed.  Risk of overdose following a period of abstinence due to decrease tolerance was discussed including risk of death.  Strongly recommended abstain from alcohol, benzodiazepines, THC, opioids and other drugs of abuse.  Increased risk of return to opioid use after use of these substances discussed.  Increased risk of overdose/death with use of other substances particularly benzodiazepines/alcohol reviewed.        SUBJECTIVE                                                    Jared North is a 45 year old male who presents to clinic today for follow up    Visit performed Virtual, via video    Video-Visit Details    Type of service:  Video Visit    Video Start Time: 12:58 PM  Video End Time: 1:16 PM    Originating Location (pt. Location): Home    Distant Location (provider location):  [place] [facility]     Platform used for Video Visit: Homer  Completed wrap up on telephone 1:16pm-1:19pm  Recent HPI Details: 2/11/22  Jared North is a 45 year old male with a history of recent Covid 19 injection, ARDS, Acute respiratory failure with hypoxia, MDD, Chronic back pain, and opioid use disorder who presents for initial visit for management referred by DR Peterson for Opioid Use Disorder (OUD) and Alcohol Use Disorder (AUD).  DR Peterson provided the initial addiction medicine consult during his hospitalization. He was hospitalized from 12/31/21-1/24/22 for Covid 19 infection with complications. He required tracheostomy and ventilator support, and GT tube placement which were discontinued prior to his discharge to home. He is living with his ex-wife and their 2  "children. He was living in sober living for 2 years before his hospitalization. He has been has been abstinent from opiates and alcohol use since April 2020. He does endorse using a couple of his mother-in-laws ativan in the past 2 weeks for his \"mental health. He has also used delta 8 once this past week. He reports feeling anxious and overwhelmed by his health status and needs something to help calm him down. He does not have a psychiatrist.      He reports being on methadone maintenance for 10-15 years at [facility]. He states he was able to abstain from heroin for awhile but alcohol consumption increased significantly . He was drinking 1 box of wine daily and using 1/2-1 gram heroin until he went to inpatient treatment in December of 2019. He had a relapse while inpatient and was transferred to [facility] in [place] and was living there until his recent hospitalization. He has not drank or using heroin since April 2020.        He is experiencing cravings for alcohol at times, but no cravings for opiates. He is taking 12 mg Suboxone daily. He is also struggling with worsening depression and anxiety since his hospitalization, is frustrated about his physical limitations. He becomes easily SOB and has weakness to bilateral upper and lower extremities. He is waiting for home care  PT/OT to start next week.  He would like a referral for individual therapy and psychiatry.  He has had multiple trials of selective serotonin reuptake inhibitor for his depression and anxiety and states he has no found any that work. He does not think he has tried Lexapro, but is not sure. He is taking Zyprexa 5mg BID     Opioid dependence, continuous (H)  -     Drugs of Abuse 1+ Panel, Urine ([facility]); Standing  -     Buprenorphine Urine, Qualitative; Standing  -     Ethyl Glucuronide Urine; Standing  -     Fentanyl and Metabolite Quantitative, Urine; Standing  -     Drugs of Abuse 1+ Panel, Urine ([facility])  -     Buprenorphine " Urine, Qualitative  -     Ethyl Glucuronide Urine  -     Fentanyl and Metabolite Quantitative, Urine  -     naloxone (NARCAN) 4 MG/0.1ML nasal spray; Spray 1 spray (4 mg) into one nostril alternating nostrils 2 times daily as needed for opioid reversal every 2-3 minutes until assistance arrives     Opioid use disorder, severe, dependence (H)  He required Oxycodone 20 mg every 4 hours during his hospitalization, but was successfully weaned off and suboxone induction completed while inpatient. He is stable on 12 mg suboxone. Continue suboxone 4 mg TID.   -     buprenorphine HCl-naloxone HCl (SUBOXONE) 4-1 MG per film; Place 1 Film under the tongue 3 times daily Okay for early refill     Severe episode of recurrent major depressive disorder, without psychotic features (H)  He is struggling with with increased anxiety and depression and has found the need to self medicate with Delta 8 and ativan x 2 during the past 2 weeks. He is not currenlty on an selective serotonin reuptake inhibitor stating nothing has worked in the [ast. Currently taking zyprexa 5 mg BID. Would like to try escitalopram. Referral placed for psychiatry and individual therapy to support his recovery.   -     Adult Mental Health  Referral; Future  -     Adult Mental Health  Referral; Future  -     escitalopram (LEXAPRO) 10 MG tablet; Take 1 tablet (10 mg) by mouth daily Take 1/2 tablet daily for 1 week, then take 10 mg tablet daily.     Alcohol dependence in remission (H)     Chewing tobacco nicotine dependence without complication  Restarted using chewing tobacco would like NRT. Orders placed.   -     nicotine polacrilex (NICORETTE) 4 MG gum; Place 1 each (4 mg) inside cheek every hour as needed for smoking cessation           TODAY'S VISIT  HPI Feb 18, 2022   Jared North is a 45 year old male with a history of recent Covid 19 injection, ARDS, Acute respiratory failure with hypoxia, MDD, Chronic back pain, and opioid use  disorder who is being seen today for a follow up. He is taking suboxone 12 mg daily.   -Is feeling pretty good. Waiting for Home PT to start.   -Everything is going well at home with his ex-wife and children  -Denies substance use.   -Mild cravings. Passive thoughts to use.   -He had difficulty  Getting prescription filled, but did he had some left over suboxone from before because he sometimes only takes 2 does versus 3 doses.   -Plans to attend an online meeting, missed this weeks meetings.   -Connecting with peers.  - Triggers: Denies, no close calls.   -Mood: anxious, started on lexapro last week. Taking 5 mg daily,plans to start 10 mg tomorrow.  Encouraged him to schedule with provider at [facility].  -Sleep: sleeping well.   Side effects: denies       OBJECTIVE                                                    PHYSICAL EXAM:  Wt 88 kg (194 lb)   BMI 27.06 kg/m      GENERAL: healthy, alert and no distress  EYES: Eyes grossly normal to inspection, PERRL and conjunctivae and sclerae normal  RESP: No respiratory distress  MENTAL STATUS EXAM  Appearance/Behavior: No appearant distress  Speech: Normal  Mood/Affect: normal affect  Insight: Adequate    LAB  No results found for any visits on 02/18/22.      HISTORY                                                    Problem list reviewed & adjusted, as indicated.  Patient Active Problem List   Diagnosis     Chronic back pain     Essential hypertension     Elevated LFTs     Acute on chronic respiratory failure with hypoxia (H)     Pneumonia due to 2019 novel coronavirus     Atrial fibrillation with rapid ventricular response (H)     Acute respiratory distress syndrome (ARDS) due to COVID-19 virus (H)     Major depressive disorder, recurrent episode, severe (H)     Opioid use disorder, severe, dependence (H)     Polysubstance abuse (H)     ROSSY (obstructive sleep apnea)     Mild intermittent asthma     Acute metabolic encephalopathy     Ventilator associated  pneumonia (H)     Oropharyngeal dysphagia     Alcohol dependence (H)         MEDICATION LIST (prior to visit)  acetaminophen (TYLENOL) 32 mg/mL liquid, 650 mg by Oral or Feeding Tube route every 4 hours as needed for fever or mild pain   acetaminophen (TYLENOL) 500 MG tablet, Take 1-2 tablets (500-1,000 mg) by mouth every 6 hours as needed for mild pain  albuterol (PROAIR HFA;PROVENTIL HFA;VENTOLIN HFA) 90 mcg/actuation inhaler, Inhale 2 puffs into the lungs every 4 hours as needed   diclofenac (VOLTAREN) 1 % topical gel, Apply 2 g topically 4 times daily  escitalopram (LEXAPRO) 10 MG tablet, Take 1 tablet (10 mg) by mouth daily Take 1/2 tablet daily for 1 week, then take 10 mg tablet daily.  hydrOXYzine (ATARAX) 10 MG tablet,   lisinopril (ZESTRIL) 10 MG tablet, Take 1 tablet by mouth daily   metoprolol tartrate (LOPRESSOR) 50 MG tablet, Take 1 tablet (50 mg) by mouth 2 times daily  Multiple Vitamin (TAB-A-TORIN) TABS, Take 1 tablet by mouth daily   multivitamin w/minerals (CERTAVITE/ANTIOXIDANTS) tablet, Take 1 tablet by mouth daily  naloxone (NARCAN) 4 MG/0.1ML nasal spray, Spray 1 spray (4 mg) into one nostril alternating nostrils 2 times daily as needed for opioid reversal every 2-3 minutes until assistance arrives  nicotine polacrilex (NICORETTE) 4 MG gum, Place 1 each (4 mg) inside cheek every hour as needed for smoking cessation  OLANZapine (ZYPREXA) 10 MG tablet, Take 1 tablet (10 mg) by mouth At Bedtime    No current facility-administered medications on file prior to visit.      MEDICATION LIST (after visit)  Current Outpatient Medications   Medication     acetaminophen (TYLENOL) 32 mg/mL liquid     acetaminophen (TYLENOL) 500 MG tablet     albuterol (PROAIR HFA;PROVENTIL HFA;VENTOLIN HFA) 90 mcg/actuation inhaler     buprenorphine HCl-naloxone HCl (SUBOXONE) 4-1 MG per film     diclofenac (VOLTAREN) 1 % topical gel     escitalopram (LEXAPRO) 10 MG tablet     gabapentin (NEURONTIN) 300 MG capsule      hydrOXYzine (ATARAX) 10 MG tablet     lisinopril (ZESTRIL) 10 MG tablet     metoprolol tartrate (LOPRESSOR) 50 MG tablet     Multiple Vitamin (TAB-A-TORIN) TABS     multivitamin w/minerals (CERTAVITE/ANTIOXIDANTS) tablet     naloxone (NARCAN) 4 MG/0.1ML nasal spray     nicotine polacrilex (NICORETTE) 4 MG gum     OLANZapine (ZYPREXA) 10 MG tablet     No current facility-administered medications for this visit.         No Known Allergies    Additional MDM Details:  none    Sherry Alvarado [facility]  [facility]  [phone]        Answers for HPI/ROS submitted by the patient on 2/18/2022  DONALD 7 TOTAL SCORE: 14

## 2022-03-14 NOTE — OUTREACH NOTE
General Surgery Week 2 Survey      Responses   [facility] patient discharged from?  [place]   Does the patient have one of the following disease processes/diagnoses(primary or secondary)?  General Surgery   Week 2 attempt successful?  Yes   Call start time  1514   Call end time  1515   Discharge diagnosis  amputate left first toe and metatarsal,  Cellulitis and abscess of left foot   Person spoke with today (if not patient) and relationship  Janie-spouse    Meds reviewed with patient/caregiver?  Yes   Is the patient taking all medications as directed (includes completed medication regime)?  Yes   Has the patient kept scheduled appointments due by today?  Yes   What is the Home health agency?   [facility]   What is the patient's perception of their health status since discharge?  Improving   Nursing interventions  Nurse provided patient education   Is the patient /caregiver able to teach back basic post-op care?  Keep incision areas clean,dry and protected   Is the patient/caregiver able to teach back signs and symptoms of incisional infection?  Pus or odor from incision   Week 2 call completed?  Yes   Revoked  No further contact(revokes)-requires comment   Is the patient interested in additional calls from an ambulatory ?  NOTE:  applies to high risk patients requiring additional follow-up.  No   Graduated/Revoked comments  Pt is doing well. He's followed up with appts and has home health coming in.           Margaux Castellanos RN   actual/standing

## 2022-06-27 ENCOUNTER — OFFICE VISIT (OUTPATIENT)
Dept: ORTHOPEDIC SURGERY | Facility: CLINIC | Age: 77
End: 2022-06-27

## 2022-06-27 VITALS
BODY MASS INDEX: 33 KG/M2 | HEIGHT: 75 IN | SYSTOLIC BLOOD PRESSURE: 122 MMHG | DIASTOLIC BLOOD PRESSURE: 79 MMHG | WEIGHT: 265.4 LBS

## 2022-06-27 DIAGNOSIS — Z89.511 S/P BKA (BELOW KNEE AMPUTATION), RIGHT: Primary | ICD-10-CM

## 2022-06-27 DIAGNOSIS — Z89.422 HISTORY OF PARTIAL RAY AMPUTATION OF SECOND TOE OF LEFT FOOT: ICD-10-CM

## 2022-06-27 PROCEDURE — 99213 OFFICE O/P EST LOW 20 MIN: CPT | Performed by: ORTHOPAEDIC SURGERY

## 2022-06-27 NOTE — PROGRESS NOTES
ESTABLISHED PATIENT    Patient: Amol Aguirre  : 1945    Primary Care Provider: Donte Briones MD    Requesting Provider: As above    Follow-up (Left Foot f/u-cont'd callus plantar foot-new redness medial left heel,hx amputate left first toe and metatarsal, close diabetic ulcer 21)      History    Chief Complaint: Follow-up diabetic problems    History of Present Illness: Mr. Gonzalez returns for follow-up of his right below-knee amputation and left partial foot amputation.  He has extremely severe vascular disease, very fragile left foot, but he actually has maintained healing now for quite a while.  His right below-knee amputation is healed.    Current Outpatient Medications on File Prior to Visit   Medication Sig Dispense Refill   • acetaminophen (TYLENOL) 325 MG tablet Take 2 tablets by mouth.     • allopurinol (ZYLOPRIM) 100 MG tablet Take 100 mg by mouth 2 (Two) Times a Day.     • amLODIPine (NORVASC) 10 MG tablet Take 10 mg by mouth Every Night.  0   • Apoaequorin (PREVAGEN PO) Take 1 capsule by mouth Daily.     • budesonide-formoterol (SYMBICORT) 80-4.5 MCG/ACT inhaler Inhale 2 puffs 2 (Two) Times a Day.     • bumetanide (BUMEX) 1 MG tablet Take 1 mg by mouth Daily.     • citalopram (CeleXA) 10 MG tablet Take 10 mg by mouth Daily.     • CloNIDine (CATAPRES) 0.1 MG tablet Take 0.1 mg by mouth 2 (Two) Times a Day.     • Comfort EZ Pen Needles 31G X 5 MM misc      • diphenhydrAMINE-acetaminophen (TYLENOL PM)  MG tablet per tablet Take 2 tablets by mouth.     • doxycycline (VIBRAMYCIN) 100 MG capsule TAKE 1 CAPSULE BY MOUTH TWICE DAILY 30 capsule 1   • Eliquis 5 MG tablet tablet Take 5 mg by mouth 2 (Two) Times a Day.     • EPINEPHrine (EPIPEN) 0.3 MG/0.3ML solution auto-injector injection Inject  into the appropriate muscle as directed by prescriber.     • fluconazole (DIFLUCAN) 100 MG tablet Take 100 mg by mouth Daily. EOD. STARTED ON 21.     • gabapentin (NEURONTIN) 600  MG tablet Take 600 mg by mouth 2 (Two) Times a Day.     • Insulin Glargine (BASAGLAR KWIKPEN) 100 UNIT/ML injection pen Inject 68 Units under the skin into the appropriate area as directed Daily.     • Jardiance 25 MG tablet Take 1 tablet by mouth Daily.     • magnesium oxide (MAG-OX) 400 MG tablet magnesium oxide 400 mg (241.3 mg magnesium) tablet     • Magnesium Oxide 400 (240 Mg) MG tablet Take 400 mg by mouth Daily.     • metoclopramide (REGLAN) 10 MG tablet Take 10 mg by mouth 2 (two) times a day.  8   • mupirocin (Bactroban) 2 % ointment Apply  topically to the appropriate area as directed Daily. 30 g 5   • nebivolol (BYSTOLIC) 10 MG tablet Take 5 mg by mouth 2 (two) times a day.     • NOVOLOG FLEXPEN 100 UNIT/ML solution pen-injector sc pen Inject 20 Units under the skin into the appropriate area as directed 3 (Three) Times a Day With Meals.     • olmesartan (BENICAR) 40 MG tablet Take 40 mg by mouth Daily.     • ondansetron (Zofran) 4 MG tablet Take 1 tablet by mouth Every 6 (Six) Hours As Needed for Nausea or Vomiting. 30 tablet 0   • pantoprazole (PROTONIX) 40 MG EC tablet Take 40 mg by mouth Daily.  2   • True Metrix Blood Glucose Test test strip TEST BLOOD SUGAR THREE TIMES DAILY AND AS NEEDED     • vitamin B-12 (CYANOCOBALAMIN) 1000 MCG tablet Take 1,000 mcg by mouth Daily.       No current facility-administered medications on file prior to visit.      Allergies   Allergen Reactions   • Chlorhexidine Shortness Of Breath, Itching, Rash and Anaphylaxis     Pt developed a rash, itching, and shortness of breath after receiving a CHG bath on 4/24/19.  Pt developed a rash, itching, and shortness of breath after receiving a CHG bath on 4/24/19.   • Latex Other (See Comments) and Swelling     Rash, hives, and tongue swelling  Rash, hives, and tongue swelling      Past Medical History:   Diagnosis Date   • Acid reflux    • Arthritis    • Arthritis of back 2015   • Asthma    • Atrial fibrillation/flutter    • CHF  (congestive heart failure) ([facility])    • CTS (carpal tunnel syndrome) 2018   • Diabetes ([facility])    • Fracture of ankle 2008   • Hypertension      Past Surgical History:   Procedure Laterality Date   • AMPUTATION DIGIT Left 10/15/2019    Procedure: AMPUTATE LEFT THIRD TOE;  Surgeon: Juju Juarez MD;  Location:  [facility] OR;  Service: Orthopedics   • AMPUTATION DIGIT Left 01/07/2021    Procedure: amputate left first toe and metatarsal;  Surgeon: Juju Juarez MD;  Location:  [facility] OR;  Service: Orthopedics;  Laterality: Left;   • AMPUTATION DIGIT Left 08/31/2021    Procedure: amputate left 2nd toe and metatarsal, close diabetic ulcer;  Surgeon: Juju Juarez MD;  Location:  [facility] OR;  Service: Orthopedics;  Laterality: Left;   • AORTAGRAM N/A 04/30/2019    Procedure: AORTAGRAM WITH OR WITHOUT RUNOFFS;  Surgeon: Carrillo White MD;  Location:  [facility] HYBRID OR 15;  Service: Vascular   • BELOW KNEE AMPUTATION Right 06/04/2019    Procedure: BELOW KNEE AMPUTATION RIGHT;  Surgeon: Juju Juarez MD;  Location:  [facility] OR;  Service: Orthopedics   • CARDIAC CATHETERIZATION      NO INTERVENTION   • CARDIAC ELECTROPHYSIOLOGY PROCEDURE N/A 05/28/2021    Procedure: Device Implant;  Surgeon: Amol Foreman MD;  Location:  [facility] CATH INVASIVE LOCATION;  Service: Cardiovascular;  Laterality: N/A;   • CATARACT EXTRACTION W/ INTRAOCULAR LENS IMPLANT Right 07/20/2020    Procedure: CATARACT PHACO EXTRACTION WITH INTRAOCULAR LENS IMPLANT RIGHT;  Surgeon: Jez Mas MD;  Location: [facility] OR;  Service: Ophthalmology;  Laterality: Right;   • CATARACT EXTRACTION W/ INTRAOCULAR LENS IMPLANT Left 08/03/2020    Procedure: CATARACT PHACO EXTRACTION WITH INTRAOCULAR LENS IMPLANT LEFT;  Surgeon: Jez Mas MD;  Location: [facility] OR;  Service: Ophthalmology;  Laterality: Left;   • CHOLECYSTECTOMY     • COLONOSCOPY     • FOOT SURGERY Left 10/15/2019    Amputate 3rd toe- [place]   • JOINT REPLACEMENT     • KNEE SURGERY  "Right     TKA     Family History   Problem Relation Age of Onset   • Cancer Mother    • Hypertension Mother    • Hypertension Father    • Heart attack Father       Social History     Socioeconomic History   • Marital status:    Tobacco Use   • Smoking status: Former Smoker     Types: Cigarettes, Cigarettes     Start date: 1965     Quit date: 1975     Years since quittin.5   • Smokeless tobacco: Never Used   Vaping Use   • Vaping Use: Never used   Substance and Sexual Activity   • Alcohol use: No   • Drug use: No   • Sexual activity: Not Currently     Partners: Female     Birth control/protection: Abstinence        Review of Systems   Constitutional: Negative.    HENT: Negative.    Eyes: Negative.    Respiratory: Negative.    Cardiovascular: Negative.    Gastrointestinal: Negative.    Endocrine: Negative.    Genitourinary: Negative.    Musculoskeletal: Positive for arthralgias.   Skin: Negative.    Allergic/Immunologic: Negative.    Neurological: Negative.    Hematological: Negative.    Psychiatric/Behavioral: Negative.        The following portions of the patient's history were reviewed and updated as appropriate: allergies, current medications, past family history, past medical history, past social history, past surgical history and problem list.    Physical Exam:   /79   Ht 190.5 cm (75\")   Wt 120 kg (265 lb 6.4 oz)   BMI 33.17 kg/m²   Right below-knee amputation stump is clean and solidly healed.  Left foot prior incisions are healed.  There is a small callus under the third metatarsal head but it is not preulcerative's.  There are no preulcerative lesions.  No signs of new Charcot.  No change in the dense neuropathy and lack of sensation.  He has a small area on the medial heel pad that has more dependent rubor than the rest of the foot, but it blanches, it is warm, its not an ulcer its not preulcerative's, I think it is probably an area that either has a little slower capillary " blood flow or more venous congestion, but it does not have an ominous appearance.  I recommend watching it.    Medical Decision Making    Data Review:   none    Assessment/Plan/Diagnosis/Treatment Options:   1. S/P BKA (below knee amputation), right (HCC)  Right side is doing well    2. 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  I recommend that he just watch it.  Continue to take good care of the very fragile left foot.  I will see him in 6 months.        Juju Weber MD

## 2022-08-19 ENCOUNTER — TRANSCRIBE ORDERS (OUTPATIENT)
Dept: ADMINISTRATIVE | Facility: HOSPITAL | Age: 77
End: 2022-08-19

## 2022-08-19 DIAGNOSIS — I48.21 ATRIAL FIBRILLATION, PERMANENT: Primary | ICD-10-CM

## 2022-08-24 ENCOUNTER — HOSPITAL ENCOUNTER (OUTPATIENT)
Dept: CARDIOLOGY | Facility: HOSPITAL | Age: 77
Discharge: HOME | End: 2022-08-24

## 2022-08-24 ENCOUNTER — HOSPITAL ENCOUNTER (OUTPATIENT)
Facility: HOSPITAL | Age: 77
Setting detail: HOSPITAL OUTPATIENT SURGERY
Discharge: HOME | End: 2022-08-24
Attending: INTERNAL MEDICINE | Admitting: INTERNAL MEDICINE

## 2022-08-24 VITALS
HEIGHT: 75 IN | TEMPERATURE: 97.6 F | SYSTOLIC BLOOD PRESSURE: 112 MMHG | HEART RATE: 70 BPM | WEIGHT: 226.4 LBS | BODY MASS INDEX: 28.15 KG/M2 | OXYGEN SATURATION: 96 % | DIASTOLIC BLOOD PRESSURE: 54 MMHG

## 2022-08-24 DIAGNOSIS — I48.21 ATRIAL FIBRILLATION, PERMANENT: ICD-10-CM

## 2022-08-24 DIAGNOSIS — I48.91 ATRIAL FIBRILLATION, UNSPECIFIED TYPE: ICD-10-CM

## 2022-08-24 LAB
ANION GAP SERPL CALCULATED.3IONS-SCNC: 11 MMOL/L (ref 5–15)
BUN SERPL-MCNC: 48 MG/DL (ref 8–23)
BUN/CREAT SERPL: 21.4 (ref 7–25)
CALCIUM SPEC-SCNC: 9.6 MG/DL (ref 8.6–10.5)
CHLORIDE SERPL-SCNC: 106 MMOL/L (ref 98–107)
CO2 SERPL-SCNC: 22 MMOL/L (ref 22–29)
CREAT SERPL-MCNC: 2.24 MG/DL (ref 0.76–1.27)
DEPRECATED RDW RBC AUTO: 51.6 FL (ref 37–54)
EGFRCR SERPLBLD CKD-EPI 2021: 29.1 ML/MIN/1.73
ERYTHROCYTE [DISTWIDTH] IN BLOOD BY AUTOMATED COUNT: 16.8 % (ref 12.3–15.4)
GLUCOSE SERPL-MCNC: 90 MG/DL (ref 65–99)
HCT VFR BLD AUTO: 35.6 % (ref 37.5–51)
HGB BLD-MCNC: 11 G/DL (ref 13–17.7)
MCH RBC QN AUTO: 26.4 PG (ref 26.6–33)
MCHC RBC AUTO-ENTMCNC: 30.9 G/DL (ref 31.5–35.7)
MCV RBC AUTO: 85.4 FL (ref 79–97)
PLATELET # BLD AUTO: 290 10*3/MM3 (ref 140–450)
PMV BLD AUTO: 10.5 FL (ref 6–12)
POTASSIUM SERPL-SCNC: 5.4 MMOL/L (ref 3.5–5.2)
RBC # BLD AUTO: 4.17 10*6/MM3 (ref 4.14–5.8)
SODIUM SERPL-SCNC: 139 MMOL/L (ref 136–145)
WBC NRBC COR # BLD: 9.76 10*3/MM3 (ref 3.4–10.8)

## 2022-08-24 PROCEDURE — 93005 ELECTROCARDIOGRAM TRACING: CPT | Performed by: INTERNAL MEDICINE

## 2022-08-24 PROCEDURE — 80048 BASIC METABOLIC PNL TOTAL CA: CPT | Performed by: INTERNAL MEDICINE

## 2022-08-24 PROCEDURE — 25010000002 MIDAZOLAM PER 1 MG: Performed by: INTERNAL MEDICINE

## 2022-08-24 PROCEDURE — 25010000002 FENTANYL CITRATE (PF) 50 MCG/ML SOLUTION: Performed by: INTERNAL MEDICINE

## 2022-08-24 PROCEDURE — 36415 COLL VENOUS BLD VENIPUNCTURE: CPT

## 2022-08-24 PROCEDURE — 85027 COMPLETE CBC AUTOMATED: CPT | Performed by: INTERNAL MEDICINE

## 2022-08-24 PROCEDURE — 92960 CARDIOVERSION ELECTRIC EXT: CPT

## 2022-08-24 RX ORDER — SODIUM ZIRCONIUM CYCLOSILICATE 10 G/10G
10 POWDER, FOR SUSPENSION ORAL
COMMUNITY

## 2022-08-24 RX ORDER — FLUMAZENIL 0.1 MG/ML
INJECTION INTRAVENOUS
Status: DISCONTINUED
Start: 2022-08-24 | End: 2022-08-24 | Stop reason: WASHOUT

## 2022-08-24 RX ORDER — FLUMAZENIL 0.1 MG/ML
0.5 INJECTION INTRAVENOUS ONCE AS NEEDED
Status: DISCONTINUED | OUTPATIENT
Start: 2022-08-24 | End: 2022-08-25 | Stop reason: HOSPADM

## 2022-08-24 RX ORDER — MIDAZOLAM HYDROCHLORIDE 1 MG/ML
2-10 INJECTION INTRAMUSCULAR; INTRAVENOUS ONCE AS NEEDED
Status: DISCONTINUED | OUTPATIENT
Start: 2022-08-24 | End: 2022-08-24

## 2022-08-24 RX ORDER — FENTANYL CITRATE 50 UG/ML
50-100 INJECTION, SOLUTION INTRAMUSCULAR; INTRAVENOUS ONCE AS NEEDED
Status: DISCONTINUED | OUTPATIENT
Start: 2022-08-24 | End: 2022-08-25 | Stop reason: HOSPADM

## 2022-08-24 RX ORDER — PROMETHAZINE HYDROCHLORIDE 25 MG/1
25 TABLET ORAL EVERY 6 HOURS PRN
COMMUNITY

## 2022-08-24 RX ORDER — FLUMAZENIL 0.1 MG/ML
0.5 INJECTION INTRAVENOUS ONCE AS NEEDED
Status: DISCONTINUED | OUTPATIENT
Start: 2022-08-24 | End: 2022-08-24

## 2022-08-24 RX ORDER — MIDAZOLAM HYDROCHLORIDE 1 MG/ML
INJECTION INTRAMUSCULAR; INTRAVENOUS
Status: DISCONTINUED
Start: 2022-08-24 | End: 2022-08-24 | Stop reason: HOSPADM

## 2022-08-24 RX ORDER — NALOXONE HCL 0.4 MG/ML
0.4 VIAL (ML) INJECTION ONCE AS NEEDED
Status: DISCONTINUED | OUTPATIENT
Start: 2022-08-24 | End: 2022-08-25 | Stop reason: HOSPADM

## 2022-08-24 RX ORDER — FENTANYL CITRATE 50 UG/ML
50-200 INJECTION, SOLUTION INTRAMUSCULAR; INTRAVENOUS ONCE AS NEEDED
Status: DISCONTINUED | OUTPATIENT
Start: 2022-08-24 | End: 2022-08-24

## 2022-08-24 RX ORDER — FENTANYL CITRATE 50 UG/ML
INJECTION, SOLUTION INTRAMUSCULAR; INTRAVENOUS
Status: DISCONTINUED
Start: 2022-08-24 | End: 2022-08-24 | Stop reason: WASHOUT

## 2022-08-24 RX ORDER — MIDAZOLAM HYDROCHLORIDE 1 MG/ML
INJECTION INTRAMUSCULAR; INTRAVENOUS
Status: COMPLETED | OUTPATIENT
Start: 2022-08-24 | End: 2022-08-24

## 2022-08-24 RX ORDER — NALOXONE HCL 0.4 MG/ML
0.4 VIAL (ML) INJECTION ONCE AS NEEDED
Status: DISCONTINUED | OUTPATIENT
Start: 2022-08-24 | End: 2022-08-24

## 2022-08-24 RX ORDER — NALOXONE HYDROCHLORIDE 0.4 MG/ML
INJECTION, SOLUTION INTRAMUSCULAR; INTRAVENOUS; SUBCUTANEOUS
Status: DISCONTINUED
Start: 2022-08-24 | End: 2022-08-24 | Stop reason: WASHOUT

## 2022-08-24 RX ORDER — FENTANYL CITRATE 50 UG/ML
INJECTION, SOLUTION INTRAMUSCULAR; INTRAVENOUS
Status: DISCONTINUED
Start: 2022-08-24 | End: 2022-08-24 | Stop reason: HOSPADM

## 2022-08-24 RX ORDER — FENTANYL CITRATE 50 UG/ML
INJECTION, SOLUTION INTRAMUSCULAR; INTRAVENOUS
Status: COMPLETED | OUTPATIENT
Start: 2022-08-24 | End: 2022-08-24

## 2022-08-24 RX ORDER — FLECAINIDE ACETATE 50 MG/1
50 TABLET ORAL EVERY 12 HOURS
COMMUNITY

## 2022-08-24 RX ORDER — MIDAZOLAM HYDROCHLORIDE 1 MG/ML
2-8 INJECTION INTRAMUSCULAR; INTRAVENOUS ONCE AS NEEDED
Status: DISCONTINUED | OUTPATIENT
Start: 2022-08-24 | End: 2022-08-25 | Stop reason: HOSPADM

## 2022-08-24 RX ADMIN — FENTANYL CITRATE 50 MCG: 50 INJECTION, SOLUTION INTRAMUSCULAR; INTRAVENOUS at 14:15

## 2022-08-24 RX ADMIN — MIDAZOLAM 2 MG: 1 INJECTION INTRAMUSCULAR; INTRAVENOUS at 14:19

## 2022-08-24 RX ADMIN — MIDAZOLAM 2 MG: 1 INJECTION INTRAMUSCULAR; INTRAVENOUS at 14:15

## 2022-08-24 NOTE — H&P
[facility] - History & Physical     Amol Aguirre  1945  2508/1      08/24/22      Identification:  Amol Aguirre is a 77 y.o. male.      PCP:  Donte Briones MD        Chief Complaint:   Atrial fibrillation, permanent (HCC)      Allergies:  is allergic to chlorhexidine and latex.    Medications Prior to Admission   Medication Sig Dispense Refill Last Dose   • acetaminophen (TYLENOL) 325 MG tablet Take 2 tablets by mouth.      • allopurinol (ZYLOPRIM) 100 MG tablet Take 100 mg by mouth 2 (Two) Times a Day.      • amLODIPine (NORVASC) 10 MG tablet Take 10 mg by mouth Every Night.  0    • Apoaequorin (PREVAGEN PO) Take 1 capsule by mouth Daily.      • budesonide-formoterol (SYMBICORT) 80-4.5 MCG/ACT inhaler Inhale 2 puffs 2 (Two) Times a Day.      • bumetanide (BUMEX) 1 MG tablet Take 1 mg by mouth Daily.      • citalopram (CeleXA) 10 MG tablet Take 10 mg by mouth Daily.      • CloNIDine (CATAPRES) 0.1 MG tablet Take 0.1 mg by mouth 2 (Two) Times a Day.      • Comfort EZ Pen Needles 31G X 5 MM misc       • diphenhydrAMINE-acetaminophen (TYLENOL PM)  MG tablet per tablet Take 2 tablets by mouth.      • doxycycline (VIBRAMYCIN) 100 MG capsule TAKE 1 CAPSULE BY MOUTH TWICE DAILY 30 capsule 1    • Eliquis 5 MG tablet tablet Take 5 mg by mouth 2 (Two) Times a Day.      • EPINEPHrine (EPIPEN) 0.3 MG/0.3ML solution auto-injector injection Inject  into the appropriate muscle as directed by prescriber.      • fluconazole (DIFLUCAN) 100 MG tablet Take 100 mg by mouth Daily. EOD. STARTED ON 8/26/21.      • gabapentin (NEURONTIN) 600 MG tablet Take 600 mg by mouth 2 (Two) Times a Day.      • Insulin Glargine (BASAGLAR KWIKPEN) 100 UNIT/ML injection pen Inject 68 Units under the skin into the appropriate area as directed Daily.      • Jardiance 25 MG tablet Take 1 tablet by mouth Daily.      • magnesium oxide (MAG-OX) 400 MG tablet magnesium oxide 400 mg (241.3 mg magnesium)  tablet      • Magnesium Oxide 400 (240 Mg) MG tablet Take 400 mg by mouth Daily.      • metoclopramide (REGLAN) 10 MG tablet Take 10 mg by mouth 2 (two) times a day.  8    • mupirocin (Bactroban) 2 % ointment Apply  topically to the appropriate area as directed Daily. 30 g 5    • nebivolol (BYSTOLIC) 10 MG tablet Take 5 mg by mouth 2 (two) times a day.      • NOVOLOG FLEXPEN 100 UNIT/ML solution pen-injector sc pen Inject 20 Units under the skin into the appropriate area as directed 3 (Three) Times a Day With Meals.      • olmesartan (BENICAR) 40 MG tablet Take 40 mg by mouth Daily.      • ondansetron (Zofran) 4 MG tablet Take 1 tablet by mouth Every 6 (Six) Hours As Needed for Nausea or Vomiting. 30 tablet 0    • pantoprazole (PROTONIX) 40 MG EC tablet Take 40 mg by mouth Daily.  2    • True Metrix Blood Glucose Test test strip TEST BLOOD SUGAR THREE TIMES DAILY AND AS NEEDED      • vitamin B-12 (CYANOCOBALAMIN) 1000 MCG tablet Take 1,000 mcg by mouth Daily.          No current facility-administered medications for this encounter.      HPI:  Amol Aguirre is a 78 yo CM with PMHx of HTN, HLP, IDMMII, Persistent AFib/SSS with pacemaker, former tobacco abuse, Gout, CKDz.  He was recently in the office - reports palpitations, dizziness, increased SOA.  Echo shows normal EF, dilated LA, trace MR.  [employer] Device interrogation shows 99% mode switching (AF) since 2022.  He was started on Flecainide and returned in a month - still in Afib by device interrogation.  He presents today to undergo ECV with Dr. Foreman.  He is on Eliquis for stroke prevention and has had uninterrupted therapy.          Social History     Socioeconomic History   • Marital status:    Tobacco Use   • Smoking status: Former Smoker     Types: Cigarettes, Cigarettes     Start date: 1965     Quit date: 1975     Years since quittin.6   • Smokeless tobacco: Never Used   Vaping Use   • Vaping Use: Never used   Substance  and Sexual Activity   • Alcohol use: No   • Drug use: No   • Sexual activity: Not Currently     Partners: Female     Birth control/protection: Abstinence     Family History   Problem Relation Age of Onset   • Cancer Mother    • Hypertension Mother    • Hypertension Father    • Heart attack Father      Past Surgical History:   Procedure Laterality Date   • AMPUTATION DIGIT Left 10/15/2019    Procedure: AMPUTATE LEFT THIRD TOE;  Surgeon: Juju Juarez MD;  Location:  [facility];  Service: Orthopedics   • AMPUTATION DIGIT Left 01/07/2021    Procedure: amputate left first toe and metatarsal;  Surgeon: Juju Juarez MD;  [facility]:  [facility];  Service: Orthopedics;  Laterality: Left;   • AMPUTATION DIGIT Left 08/31/2021    Procedure: amputate left 2nd toe and metatarsal, close diabetic ulcer;  Surgeon: Juju Juarez MD;  Location:  [facility];  Service: Orthopedics;  Laterality: Left;   • AORTAGRAM N/A 04/30/2019    Procedure: AORTAGRAM WITH OR WITHOUT RUNOFFS;  Surgeon: Carrillo White MD;  Location:  [facility] HYBRID [facility] 15;  Service: Vascular   • BELOW KNEE AMPUTATION Right 06/04/2019    Procedure: BELOW KNEE AMPUTATION RIGHT;  Surgeon: Juju Juarez MD;  Location:  [facility];  Service: Orthopedics   • CARDIAC CATHETERIZATION      NO INTERVENTION   • CARDIAC ELECTROPHYSIOLOGY PROCEDURE N/A 05/28/2021    Procedure: Device Implant;  Surgeon: Amol Foreman MD;  [facility]:  [facility];  Service: Cardiovascular;  Laterality: N/A;   • CATARACT EXTRACTION W/ INTRAOCULAR LENS IMPLANT Right 07/20/2020    Procedure: CATARACT PHACO EXTRACTION WITH INTRAOCULAR LENS IMPLANT RIGHT;  Surgeon: Jez Mas MD;  Location:  [facility];  Service: Ophthalmology;  Laterality: Right;   • CATARACT EXTRACTION W/ INTRAOCULAR LENS IMPLANT Left 08/03/2020    Procedure: CATARACT PHACO EXTRACTION WITH INTRAOCULAR LENS IMPLANT LEFT;  Surgeon: Jez Mas MD;  Location:  [facility];  Service: Ophthalmology;   Laterality: Left;   • CHOLECYSTECTOMY     • COLONOSCOPY     • FOOT SURGERY Left 10/15/2019    Amputate 3rd toe- DeGnore   • JOINT REPLACEMENT     • KNEE SURGERY Right     TKA       Review of Systems:  Pertinent positives are listed above and in physical exam.  All others have been reviewed and are negative.     Objective:   Vitals:   vitals were not taken for this visit.  No intake or output data in the 24 hours ending 08/24/22 1236  Vitals reviewed in room.      Physical Exam:  General Appearance:    Alert, cooperative, in no acute distress   Head:    Normocephalic, without obvious abnormality, atraumatic   Eyes:            Lids and lashes normal, conjunctivae and sclerae normal, no   icterus, no pallor, corneas clear, PERRLA   Ears:    Ears appear intact with no abnormalities noted   Throat:   No oral lesions, no thrush, oral mucosa moist   Neck:   No adenopathy, supple, trachea midline, no thyromegaly, no carotid bruit, no JVD   Back:     No kyphosis present, no scoliosis present, no skin lesions,   erythema or scars, no tenderness to percussion or                   palpation,  range of motion normal   Lungs:     Clear to auscultation,respirations regular, even and               unlabored    Heart:    Regular rhythm and normal rate, normal S1 and S2, no        murmur, no gallop, no rub, no click       Abdomen:     Normal bowel sounds, no masses, no organomegaly, soft     non-tender, non-distended, no guarding, no rebound               tenderness       Extremities:   No cyanosis, no redness, no edema.  Prosthesis RLE.   Pulses:   Pulses palpable and equal bilaterally   Skin:   No bleeding, bruising or rash   Lymph nodes:   No palpable adenopathy   Neurologic:   Cranial nerves 2 - 12 grossly intact, sensation intact, DTR    present and equal bilaterally          Results Review:  I personally reviewed the patient's clinical results.              Invalid input(s): LABALBU, PROT                         Radiology:  Imaging Results (Last 72 Hours)     ** No results found for the last 72 hours. **          Tele:  V paced    Assessment and Plan:    1.  Persistent Atrial Fibrillation   -  76 yo CM with history of AFib on Flecainide now since 7/26/2022 and on Eliquis for stroke prevention.  He presents with recent increase in dyspnea, dizziness, palpitations.  [facility] pacemaker interrogation confirms 99% mode switching since July 26th.   -  He presents today to undergo ECV.  NOAC has been uninterrupted. He ate breakfast at 0430 today and has been NPO since.   -  Risks and benefits reviewed and patient is willing to proceed.  Further recommendations based on outcomes.      I discussed the patients findings and my recommendations with the patient, any present family members, and the nursing staff.  Amol Foreman MD saw and examined patient, verified hx and PE, read all radiographic studies, reviewed labs and micro data, and formulated dx, plan for treatment and all medical decision making.      Gayle Farah PA-C for Amol Foreman MD  08/24/22 12:36 EDT

## 2022-08-25 LAB
MAXIMAL PREDICTED HEART RATE: 141 BPM
QT INTERVAL: 534 MS
QTC INTERVAL: 534 MS
STRESS TARGET HR: 120 BPM

## 2022-12-08 ENCOUNTER — TELEPHONE (OUTPATIENT)
Dept: ORTHOPEDIC SURGERY | Facility: CLINIC | Age: 77
End: 2022-12-08

## 2022-12-08 NOTE — TELEPHONE ENCOUNTER
Caller: FRANK BELLAMY    Relationship to patient: PATIENT GRANDDAUGHTER - PATIENT VERIFIED 3 IDENTIFIERS AND GAVE ME PERMISSION TO SPEAK TO GRANDDAUGHTER    Best call back number: [phone]    Patient is needing: GRANDDAUGHTER STATES THAT PATIENT PATIENT HAD SX ON HIS RIGHT LEG 3 YEARS AGO AND SHE STATES THE NOW IT LOOKS LIKE HIS MUSCLE IS HANGING THERE AND THE BONE IS STARTING TO COME OUT OF HIS LEG. SHE STATED DR PGAAN DID THE SURGERY 3 YEARS AGO.    PLEASE CALL. THANK YOU    ATTEMPTED TO WARM TRANSFER

## 2022-12-08 NOTE — TELEPHONE ENCOUNTER
MARIAN Bellamy  I spoke with granddaughter with patient present. They sent the following pictures.  I did try to schedule an appointment for Friday morning, but patient was unable to make it.  He is scheduled Monday at 1520.                If you need me to do anything further, please let me know.    Thank you,    Omaira MARTINEZ(R)

## 2022-12-12 ENCOUNTER — OFFICE VISIT (OUTPATIENT)
Dept: ORTHOPEDIC SURGERY | Facility: CLINIC | Age: 77
End: 2022-12-12

## 2022-12-12 VITALS
WEIGHT: 251.4 LBS | DIASTOLIC BLOOD PRESSURE: 70 MMHG | SYSTOLIC BLOOD PRESSURE: 120 MMHG | HEIGHT: 75 IN | BODY MASS INDEX: 31.26 KG/M2

## 2022-12-12 DIAGNOSIS — Z89.511 S/P BKA (BELOW KNEE AMPUTATION), RIGHT: Primary | ICD-10-CM

## 2022-12-12 DIAGNOSIS — Z89.422 HISTORY OF PARTIAL RAY AMPUTATION OF SECOND TOE OF LEFT FOOT: ICD-10-CM

## 2022-12-12 DIAGNOSIS — R09.89 DECREASED PULSES IN FEET: ICD-10-CM

## 2022-12-12 PROCEDURE — 99213 OFFICE O/P EST LOW 20 MIN: CPT | Performed by: ORTHOPAEDIC SURGERY

## 2023-04-10 ENCOUNTER — OFFICE VISIT (OUTPATIENT)
Dept: ORTHOPEDIC SURGERY | Facility: CLINIC | Age: 78
End: 2023-04-10
Payer: MEDICARE

## 2023-04-10 VITALS
BODY MASS INDEX: 31.25 KG/M2 | HEIGHT: 75 IN | WEIGHT: 251.32 LBS | SYSTOLIC BLOOD PRESSURE: 122 MMHG | DIASTOLIC BLOOD PRESSURE: 74 MMHG

## 2023-04-10 DIAGNOSIS — E11.42 TYPE 2 DIABETES MELLITUS WITH DIABETIC POLYNEUROPATHY, WITHOUT LONG-TERM CURRENT USE OF INSULIN: ICD-10-CM

## 2023-04-10 DIAGNOSIS — Z89.422 HISTORY OF PARTIAL RAY AMPUTATION OF SECOND TOE OF LEFT FOOT: ICD-10-CM

## 2023-04-10 DIAGNOSIS — R09.89 DECREASED PULSES IN FEET: ICD-10-CM

## 2023-04-10 DIAGNOSIS — Z89.511 S/P BKA (BELOW KNEE AMPUTATION), RIGHT: Primary | ICD-10-CM

## 2023-04-10 PROCEDURE — 99213 OFFICE O/P EST LOW 20 MIN: CPT | Performed by: ORTHOPAEDIC SURGERY

## 2023-04-10 PROCEDURE — 1159F MED LIST DOCD IN RCRD: CPT | Performed by: ORTHOPAEDIC SURGERY

## 2023-04-10 PROCEDURE — 3078F DIAST BP <80 MM HG: CPT | Performed by: ORTHOPAEDIC SURGERY

## 2023-04-10 PROCEDURE — 3074F SYST BP LT 130 MM HG: CPT | Performed by: ORTHOPAEDIC SURGERY

## 2023-04-10 PROCEDURE — 1160F RVW MEDS BY RX/DR IN RCRD: CPT | Performed by: ORTHOPAEDIC SURGERY

## 2023-04-10 NOTE — PROGRESS NOTES
ESTABLISHED PATIENT    Patient: Amol Aguirre  : 1945    Primary Care Provider: Donte Briones MD    Requesting Provider: As above    Follow-up (4 month follow up -- S/P (R) BKA (below knee amputation) )      History    Chief Complaint: Follow-up diabetic foot    History of Present Illness: He returns for follow-up of his left diabetic foot partial amputation and right below-knee amputation.  At last visit I recommended a new socket for his prosthesis.  The prosthetics shop did not make a new socket they added padding.  He still having problems with the leg and bottoming out.  I think it is imperative that he get a new molded socket.  I wrote another prescription for this.  Left foot is stable.  He has a tiny blood blister on the fourth toe but no open wounds and no signs of infection    Current Outpatient Medications on File Prior to Visit   Medication Sig Dispense Refill   • acetaminophen (TYLENOL) 325 MG tablet Take 2 tablets by mouth.     • allopurinol (ZYLOPRIM) 100 MG tablet Take 1 tablet by mouth 2 (Two) Times a Day.     • amLODIPine (NORVASC) 10 MG tablet Take 1 tablet by mouth Every Night.  0   • budesonide-formoterol (SYMBICORT) 80-4.5 MCG/ACT inhaler Inhale 2 puffs 2 (Two) Times a Day.     • bumetanide (BUMEX) 1 MG tablet Take 1 tablet by mouth Daily.     • citalopram (CeleXA) 10 MG tablet Take 1 tablet by mouth Daily.     • Comfort EZ Pen Needles 31G X 5 MM misc      • diphenhydrAMINE-acetaminophen (TYLENOL PM)  MG tablet per tablet Take 2 tablets by mouth.     • Eliquis 5 MG tablet tablet Take 1 tablet by mouth 2 (Two) Times a Day.     • EPINEPHrine (EPIPEN) 0.3 MG/0.3ML solution auto-injector injection Inject  into the appropriate muscle as directed by prescriber.     • flecainide (TAMBOCOR) 50 MG tablet Take 1 tablet by mouth Every 12 (Twelve) Hours.     • gabapentin (NEURONTIN) 600 MG tablet Take 1 tablet by mouth 2 (Two) Times a Day.     • Insulin Glargine (BASAGLAR  KWIKPEN) 100 UNIT/ML injection pen Inject 68 Units under the skin into the appropriate area as directed Daily.     • Jardiance 25 MG tablet Take 1 tablet by mouth Daily.     • magnesium oxide (MAG-OX) 400 MG tablet magnesium oxide 400 mg (241.3 mg magnesium) tablet     • Magnesium Oxide 400 (240 Mg) MG tablet Take 1 tablet by mouth Daily.     • metoclopramide (REGLAN) 10 MG tablet Take 1 tablet by mouth 2 (two) times a day.  8   • mupirocin (Bactroban) 2 % ointment Apply  topically to the appropriate area as directed Daily. 30 g 5   • nebivolol (BYSTOLIC) 10 MG tablet Take 5 mg by mouth 2 (two) times a day.     • NOVOLOG FLEXPEN 100 UNIT/ML solution pen-injector sc pen Inject 20 Units under the skin into the appropriate area as directed 3 (Three) Times a Day With Meals.     • olmesartan (BENICAR) 40 MG tablet Take 1 tablet by mouth Daily.     • pantoprazole (PROTONIX) 40 MG EC tablet Take 1 tablet by mouth Daily.  2   • promethazine (PHENERGAN) 25 MG tablet Take 1 tablet by mouth Every 6 (Six) Hours As Needed for Nausea or Vomiting.     • sodium zirconium cyclosilicate (Lokelma) 10 g pack Take 10 g by mouth.     • True Metrix Blood Glucose Test test strip TEST BLOOD SUGAR THREE TIMES DAILY AND AS NEEDED     • vitamin B-12 (CYANOCOBALAMIN) 1000 MCG tablet Take 1 tablet by mouth Daily.       No current facility-administered medications on file prior to visit.      Allergies   Allergen Reactions   • Chlorhexidine Shortness Of Breath, Itching, Rash and Anaphylaxis     Pt developed a rash, itching, and shortness of breath after receiving a CHG bath on 4/24/19.  Pt developed a rash, itching, and shortness of breath after receiving a CHG bath on 4/24/19.   • Latex Other (See Comments) and Swelling     Rash, hives, and tongue swelling  Rash, hives, and tongue swelling      Past Medical History:   Diagnosis Date   • Acid reflux    • Arthritis    • Arthritis of back 2015   • Asthma    • Atrial fibrillation/flutter    • CHF  (congestive heart failure)    • CTS (carpal tunnel syndrome) 2018   • Diabetes    • Fracture of ankle 2008   • Hypertension      Past Surgical History:   Procedure Laterality Date   • AMPUTATION DIGIT Left 10/15/2019    Procedure: AMPUTATE LEFT THIRD TOE;  Surgeon: Juju Juarez MD;  Location:  [facility];  Service: Orthopedics   • AMPUTATION DIGIT Left 01/07/2021    Procedure: amputate left first toe and metatarsal;  Surgeon: Juju Juarez MD;  Location:  [facility];  Service: Orthopedics;  Laterality: Left;   • AMPUTATION DIGIT Left 08/31/2021    Procedure: amputate left 2nd toe and metatarsal, close diabetic ulcer;  Surgeon: Juju Juarez MD;  Location:  [facility];  Service: Orthopedics;  Laterality: Left;   • AORTAGRAM N/A 04/30/2019    Procedure: AORTAGRAM WITH OR WITHOUT RUNOFFS;  Surgeon: Carrillo White MD;  Location: [facility] HYBRID OR 15;  Service: Vascular   • BELOW KNEE AMPUTATION Right 06/04/2019    Procedure: BELOW KNEE AMPUTATION RIGHT;  Surgeon: Juju Juarez MD;  Location:  [facility];  Service: Orthopedics   • CARDIAC CATHETERIZATION      NO INTERVENTION   • CARDIAC ELECTROPHYSIOLOGY PROCEDURE N/A 05/28/2021    Procedure: Device Implant;  Surgeon: Amol Foreman MD;  Location: [facility] CATH INVASIVE LOCATION;  Service: Cardiovascular;  Laterality: N/A;   • CATARACT EXTRACTION W/ INTRAOCULAR LENS IMPLANT Right 07/20/2020    Procedure: CATARACT PHACO EXTRACTION WITH INTRAOCULAR LENS IMPLANT RIGHT;  Surgeon: Jez Mas MD;  Location: [facility] OR;  Service: Ophthalmology;  Laterality: Right;   • CATARACT EXTRACTION W/ INTRAOCULAR LENS IMPLANT Left 08/03/2020    Procedure: CATARACT PHACO EXTRACTION WITH INTRAOCULAR LENS IMPLANT LEFT;  Surgeon: Jez Mas MD;  Location:  [facility];  Service: Ophthalmology;  Laterality: Left;   • CHOLECYSTECTOMY     • COLONOSCOPY     • FOOT SURGERY Left 10/15/2019    Amputate 3rd toe- DeGnore   • JOINT REPLACEMENT     • KNEE SURGERY Right     TKA  "    Family History   Problem Relation Age of Onset   • Cancer Mother    • Hypertension Mother    • Hypertension Father    • Heart attack Father       Social History     Socioeconomic History   • Marital status:    Tobacco Use   • Smoking status: Former     Types: Cigarettes     Start date: 1965     Quit date: 1975     Years since quittin.3   • Smokeless tobacco: Never   Vaping Use   • Vaping Use: Never used   Substance and Sexual Activity   • Alcohol use: No   • Drug use: No   • Sexual activity: Not Currently     Partners: Female     Birth control/protection: Abstinence        Review of Systems   Constitutional: Negative.    HENT: Negative.    Eyes: Negative.    Respiratory: Negative.    Cardiovascular: Negative.    Gastrointestinal: Negative.    Endocrine: Negative.    Genitourinary: Negative.    Musculoskeletal: Positive for arthralgias.   Skin: Negative.    Allergic/Immunologic: Negative.    Neurological: Negative.    Hematological: Negative.    Psychiatric/Behavioral: Negative.        The following portions of the patient's history were reviewed and updated as appropriate: allergies, current medications, past family history, past medical history, past social history, past surgical history and problem list.    Physical Exam:   /74   Ht 190.5 cm (75\")   Wt 114 kg (251 lb 5.2 oz)   BMI 31.41 kg/m²   Right below-knee amputation is irritated from bottoming out in the prosthesis but there is no open wounds    Left foot partial amputation but no open wounds there is a tiny blood blister on the fourth toe, no signs of infection    Medical Decision Making    Data Review:   none    Assessment/Plan/Diagnosis/Treatment Options:   1. S/P BKA (below knee amputation), right  As above I think he needs a new socket for his prosthesis.  This will help prevent higher amputation.  It will allow him to be more functional  I think it is imperative that he have the prosthetic remade.  This will allow him " to return to his normal high level of functioning.  He is at high risk for higher amputation.  The stump also needs to be controlled in 3 planes.  If remaking the socket is not sufficient we might have to reoperate.  But I think there is a good chance with remolding of the socket that he will have less pain, less erythema and better function.  2. History of partial ray amputation of second toe of left foot  Left foot is stable I will see him in 6 months    3. Decreased pulses in feet  Unchanged he has significant vascular disease    4. Type 2 diabetes mellitus with diabetic polyneuropathy, without long-term current use of insulin  Unchanged

## 2023-04-10 NOTE — LETTER
April 10, 2023     Donte Briones MD  [address] [place] [address]    Patient: Amol Aguirre   YOB: 1945   Date of Visit: 4/10/2023       Dear Dr. Anali MD:    Thank you for referring Amol Aguirre to me for evaluation. Below are the relevant portions of my assessment and plan of care.    If you have questions, please do not hesitate to call me. I look forward to following Amol along with you.         Sincerely,        Juju Juarez MD        CC: No Recipients    Juju Juarez MD  04/10/23 1608  Signed  ESTABLISHED PATIENT    Patient: Amol Aguirre  : 1945    Primary Care Provider: Donte Briones MD    Requesting Provider: As above    Follow-up (4 month follow up -- S/P (R) BKA (below knee amputation) )      History    Chief Complaint: Follow-up diabetic foot    History of Present Illness: He returns for follow-up of his left diabetic foot partial amputation and right below-knee amputation.  At last visit I recommended a new socket for his prosthesis.  The prosthetics shop did not make a new socket they added padding.  He still having problems with the leg and bottoming out.  I think it is imperative that he get a new molded socket.  I wrote another prescription for this.  Left foot is stable.  He has a tiny blood blister on the fourth toe but no open wounds and no signs of infection    Current Outpatient Medications on File Prior to Visit   Medication Sig Dispense Refill   • acetaminophen (TYLENOL) 325 MG tablet Take 2 tablets by mouth.     • allopurinol (ZYLOPRIM) 100 MG tablet Take 1 tablet by mouth 2 (Two) Times a Day.     • amLODIPine (NORVASC) 10 MG tablet Take 1 tablet by mouth Every Night.  0   • budesonide-formoterol (SYMBICORT) 80-4.5 MCG/ACT inhaler Inhale 2 puffs 2 (Two) Times a Day.     • bumetanide (BUMEX) 1 MG tablet Take 1 tablet by mouth Daily.     • citalopram (CeleXA) 10 MG tablet Take 1 tablet by mouth Daily.     • Comfort EZ Pen  Needles 31G X 5 MM misc      • diphenhydrAMINE-acetaminophen (TYLENOL PM)  MG tablet per tablet Take 2 tablets by mouth.     • Eliquis 5 MG tablet tablet Take 1 tablet by mouth 2 (Two) Times a Day.     • EPINEPHrine (EPIPEN) 0.3 MG/0.3ML solution auto-injector injection Inject  into the appropriate muscle as directed by prescriber.     • flecainide (TAMBOCOR) 50 MG tablet Take 1 tablet by mouth Every 12 (Twelve) Hours.     • gabapentin (NEURONTIN) 600 MG tablet Take 1 tablet by mouth 2 (Two) Times a Day.     • Insulin Glargine (BASAGLAR KWIKPEN) 100 UNIT/ML injection pen Inject 68 Units under the skin into the appropriate area as directed Daily.     • Jardiance 25 MG tablet Take 1 tablet by mouth Daily.     • magnesium oxide (MAG-OX) 400 MG tablet magnesium oxide 400 mg (241.3 mg magnesium) tablet     • Magnesium Oxide 400 (240 Mg) MG tablet Take 1 tablet by mouth Daily.     • metoclopramide (REGLAN) 10 MG tablet Take 1 tablet by mouth 2 (two) times a day.  8   • mupirocin (Bactroban) 2 % ointment Apply  topically to the appropriate area as directed Daily. 30 g 5   • nebivolol (BYSTOLIC) 10 MG tablet Take 5 mg by mouth 2 (two) times a day.     • NOVOLOG FLEXPEN 100 UNIT/ML solution pen-injector sc pen Inject 20 Units under the skin into the appropriate area as directed 3 (Three) Times a Day With Meals.     • olmesartan (BENICAR) 40 MG tablet Take 1 tablet by mouth Daily.     • pantoprazole (PROTONIX) 40 MG EC tablet Take 1 tablet by mouth Daily.  2   • promethazine (PHENERGAN) 25 MG tablet Take 1 tablet by mouth Every 6 (Six) Hours As Needed for Nausea or Vomiting.     • sodium zirconium cyclosilicate (Lokelma) 10 g pack Take 10 g by mouth.     • True Metrix Blood Glucose Test test strip TEST BLOOD SUGAR THREE TIMES DAILY AND AS NEEDED     • vitamin B-12 (CYANOCOBALAMIN) 1000 MCG tablet Take 1 tablet by mouth Daily.       No current facility-administered medications on file prior to visit.      Allergies    Allergen Reactions   • Chlorhexidine Shortness Of Breath, Itching, Rash and Anaphylaxis     Pt developed a rash, itching, and shortness of breath after receiving a CHG bath on 4/24/19.  Pt developed a rash, itching, and shortness of breath after receiving a CHG bath on 4/24/19.   • Latex Other (See Comments) and Swelling     Rash, hives, and tongue swelling  Rash, hives, and tongue swelling      Past Medical History:   Diagnosis Date   • Acid reflux    • Arthritis    • Arthritis of back 2015   • Asthma    • Atrial fibrillation/flutter    • CHF (congestive heart failure)    • CTS (carpal tunnel syndrome) 2018   • Diabetes    • Fracture of ankle 2008   • Hypertension      Past Surgical History:   Procedure Laterality Date   • AMPUTATION DIGIT Left 10/15/2019    Procedure: AMPUTATE LEFT THIRD TOE;  Surgeon: Juju Juarez MD;  Location: [facility] OR;  Service: Orthopedics   • AMPUTATION DIGIT Left 01/07/2021    Procedure: amputate left first toe and metatarsal;  Surgeon: Juju Juarez MD;  Location: [facility] OR;  Service: Orthopedics;  Laterality: Left;   • AMPUTATION DIGIT Left 08/31/2021    Procedure: amputate left 2nd toe and metatarsal, close diabetic ulcer;  Surgeon: Juju Juarez MD;  Location: [facility] OR;  Service: Orthopedics;  Laterality: Left;   • AORTAGRAM N/A 04/30/2019    Procedure: AORTAGRAM WITH OR WITHOUT RUNOFFS;  Surgeon: Carrillo White MD;  Location: [facility] HYBRID OR 15;  Service: Vascular   • BELOW KNEE AMPUTATION Right 06/04/2019    Procedure: BELOW KNEE AMPUTATION RIGHT;  Surgeon: Juju Juarez MD;  Location: [facility] OR;  Service: Orthopedics   • CARDIAC CATHETERIZATION      NO INTERVENTION   • CARDIAC ELECTROPHYSIOLOGY PROCEDURE N/A 05/28/2021    Procedure: Device Implant;  Surgeon: Amol Foreman MD;  Location: [facility] CATH INVASIVE LOCATION;  Service: Cardiovascular;  Laterality: N/A;   • CATARACT EXTRACTION W/ INTRAOCULAR LENS IMPLANT Right 07/20/2020    Procedure: CATARACT PHACO  "EXTRACTION WITH INTRAOCULAR LENS IMPLANT RIGHT;  Surgeon: Jez Mas MD;  Location: [facility] OR;  Service: Ophthalmology;  Laterality: Right;   • CATARACT EXTRACTION W/ INTRAOCULAR LENS IMPLANT Left 2020    Procedure: CATARACT PHACO EXTRACTION WITH INTRAOCULAR LENS IMPLANT LEFT;  Surgeon: Jez Mas MD;  Location: [facility] OR;  Service: Ophthalmology;  Laterality: Left;   • CHOLECYSTECTOMY     • COLONOSCOPY     • FOOT SURGERY Left 10/15/2019    Amputate 3rd toe- [place]   • JOINT REPLACEMENT     • KNEE SURGERY Right     TKA     Family History   Problem Relation Age of Onset   • Cancer Mother    • Hypertension Mother    • Hypertension Father    • Heart attack Father       Social History     Socioeconomic History   • Marital status:    Tobacco Use   • Smoking status: Former     Types: Cigarettes     Start date: 1965     Quit date: 1975     Years since quittin.3   • Smokeless tobacco: Never   Vaping Use   • Vaping Use: Never used   Substance and Sexual Activity   • Alcohol use: No   • Drug use: No   • Sexual activity: Not Currently     Partners: Female     Birth control/protection: Abstinence        Review of Systems   Constitutional: Negative.    HENT: Negative.    Eyes: Negative.    Respiratory: Negative.    Cardiovascular: Negative.    Gastrointestinal: Negative.    Endocrine: Negative.    Genitourinary: Negative.    Musculoskeletal: Positive for arthralgias.   Skin: Negative.    Allergic/Immunologic: Negative.    Neurological: Negative.    Hematological: Negative.    Psychiatric/Behavioral: Negative.        The following portions of the patient's history were reviewed and updated as appropriate: allergies, current medications, past family history, past medical history, past social history, past surgical history and problem list.    Physical Exam:   /74   Ht 190.5 cm (75\")   Wt 114 kg (251 lb 5.2 oz)   BMI 31.41 kg/m²   Right below-knee amputation is irritated from " bottoming out in the prosthesis but there is no open wounds    Left foot partial amputation but no open wounds there is a tiny blood blister on the fourth toe, no signs of infection    Medical Decision Making    Data Review:   none    Assessment/Plan/Diagnosis/Treatment Options:   1. S/P BKA (below knee amputation), right  As above I think he needs a new socket for his prosthesis.  This will help prevent higher amputation.  It will allow him to be more functional  I think it is imperative that he have the prosthetic remade.  This will allow him to return to his normal high level of functioning.  He is at high risk for higher amputation.  The stump also needs to be controlled in 3 planes.  If remaking the socket is not sufficient we might have to reoperate.  But I think there is a good chance with remolding of the socket that he will have less pain, less erythema and better function.  2. History of partial ray amputation of second toe of left foot  Left foot is stable I will see him in 6 months    3. Decreased pulses in feet  Unchanged he has significant vascular disease    4. Type 2 diabetes mellitus with diabetic polyneuropathy, without long-term current use of insulin  Unchanged

## 2023-04-20 ENCOUNTER — TELEPHONE (OUTPATIENT)
Dept: ORTHOPEDIC SURGERY | Facility: CLINIC | Age: 78
End: 2023-04-20

## 2023-04-20 NOTE — TELEPHONE ENCOUNTER
Caller: JERAMIE RAMIREZ     Relationship: [facility]     Best call back number: [phone]    What form or medical record are you requesting: PROGNOTES FROM 04/10/23     Who is requesting this form or medical record from you: [facility]     How would you like to receive the form or medical records (pick-up, mail, fax): FAX   If fax, what is the fax number: [phone]

## 2023-12-12 ENCOUNTER — TELEPHONE (OUTPATIENT)
Dept: ORTHOPEDIC SURGERY | Facility: CLINIC | Age: 78
End: 2023-12-12
Payer: MEDICARE

## 2023-12-12 NOTE — TELEPHONE ENCOUNTER
Patient's granddaughter, Kelly, called - patient has developed a spot on his right calf. He's S/P BKA on the right side. It was red and swollen yesterday, and today it looks scabbed over and is oozing a white-yellow color discharge. They recently adjusted his prosthetic leg, so she thinks it may have to do with that.    Kelly can be reached at [phone].

## 2023-12-12 NOTE — TELEPHONE ENCOUNTER
Contacted patients jessee Mendoza. She said that had gotten a new prosthetic leg and they believe it may have rubbed it and caused the problem. I let her know that I spoke with Kati Hairston PA-C and she advised they come in and see Dr. Hall. I scheduled her two appointments 12/15/23 at 8:30am and 12/18/23 @ 1pm. Kelly let me know he doesn't like to get up early and she will call after she speaks with him and cancel one of the appts. I let her know if they had anymore questions or concerns to give us a call.    - Fariha RT(R)

## 2023-12-18 ENCOUNTER — OFFICE VISIT (OUTPATIENT)
Dept: ORTHOPEDIC SURGERY | Facility: CLINIC | Age: 78
End: 2023-12-18
Payer: MEDICARE

## 2023-12-18 VITALS
SYSTOLIC BLOOD PRESSURE: 140 MMHG | WEIGHT: 234.8 LBS | DIASTOLIC BLOOD PRESSURE: 68 MMHG | BODY MASS INDEX: 29.19 KG/M2 | HEIGHT: 75 IN

## 2023-12-18 DIAGNOSIS — L97.909 DIABETIC ULCER OF LOWER EXTREMITY: Primary | ICD-10-CM

## 2023-12-18 DIAGNOSIS — E11.622 DIABETIC ULCER OF LOWER EXTREMITY: Primary | ICD-10-CM

## 2023-12-18 RX ORDER — HYDRALAZINE HYDROCHLORIDE 25 MG/1
25 TABLET, FILM COATED ORAL 3 TIMES DAILY
COMMUNITY
Start: 2023-12-14

## 2023-12-18 RX ORDER — BICALUTAMIDE 50 MG/1
TABLET, FILM COATED ORAL
COMMUNITY
Start: 2023-11-21

## 2023-12-18 RX ORDER — CEPHALEXIN 500 MG/1
500 CAPSULE ORAL 3 TIMES DAILY
COMMUNITY
Start: 2023-12-14

## 2023-12-18 RX ORDER — PHENOL 1.4 %
10 AEROSOL, SPRAY (ML) MUCOUS MEMBRANE DAILY
COMMUNITY

## 2023-12-18 NOTE — PROGRESS NOTES
"                          [facility] Office Follow Up     Office Follow Up Visit     Date: 12/18/2023   Patient Name: Amol Aguirre  MRN: [id number]  YOB: 1945  Chief Complaint:   Chief Complaint   Patient presents with    Follow-up     8 month follow up -- status post below knee amputation, right (6/4/19)     History of Present Illness:   Amol Aguirre is a 78 y.o. male who is here today for follow up for right BKA stump lateral wound.  Obtain new prosthesis about 1 month ago and following developed a wound over prominent fibular head.  Wound developed from rubbing on prosthesis and proceeded to a superficial abrasion.  Patient was given antibiotics which she has now completed.  Continues to wear prosthesis although wound does seem to be healing and getting better.      Subjective   I reviewed the patient's chief complaint, history of present illness, review of systems, past medical history, surgical history, family history, social history, medications and allergy list   Objective    Vital Signs:   Vitals:    12/18/23 1310   BP: 140/68   Weight: 107 kg (234 lb 12.8 oz)   Height: 190.5 cm (75\")     Body mass index is 29.35 kg/m².    Ortho Exam:  Right BKA stump well-healed.  There is a superficial abrasion with scab over the prominent fibular head.  No surrounding erythema or drainage or any signs of infection.  Area is slightly tender.  No erythema.    Results Review:  No new imaging    Assessment / Plan    Assessment/Plan:   Diagnoses and all orders for this visit:    1. Diabetic ulcer of lower extremity (Primary)      Patient with ulceration over prominent fibular head at previous BKA stump site that appears to be improving.  There is no signs of infection on examination today.  Seems very clear that ulcer is due to new prosthesis.  Counseled patient to stop wearing prosthesis immediately and ambulate in wheelchair, patient states he cannot use crutches or knee scooter.  " I contacted [facility] to let them know I was sending the patient to see them for prosthesis adjustment.  Counseled patient to contact [facility] as well to set up an appointment for prosthesis adjustment so that he can get new prosthesis that does not cause such a wound.  Patient to be in wheelchair until wound heals.  Will plan to see patient back in 3 weeks for reevaluation.  It was a pleasure seeing him today.    Follow Up:   Return in about 3 weeks (around 1/8/2024) for Recheck.      Anthony Hall MD  [facility] Orthopedic Surgeon

## 2024-01-08 ENCOUNTER — OFFICE VISIT (OUTPATIENT)
Dept: ORTHOPEDIC SURGERY | Facility: CLINIC | Age: 79
End: 2024-01-08
Payer: COMMERCIAL

## 2024-01-08 VITALS
DIASTOLIC BLOOD PRESSURE: 66 MMHG | WEIGHT: 235.89 LBS | BODY MASS INDEX: 29.33 KG/M2 | SYSTOLIC BLOOD PRESSURE: 160 MMHG | HEIGHT: 75 IN

## 2024-01-08 DIAGNOSIS — E11.622 DIABETIC ULCER OF LOWER EXTREMITY: Primary | ICD-10-CM

## 2024-01-08 DIAGNOSIS — L97.909 DIABETIC ULCER OF LOWER EXTREMITY: Primary | ICD-10-CM

## 2024-01-08 PROCEDURE — 99213 OFFICE O/P EST LOW 20 MIN: CPT | Performed by: ORTHOPAEDIC SURGERY

## 2024-01-08 NOTE — PROGRESS NOTES
"                          [facility]opa[facility] Surgery Office Follow Up     Office Follow Up Visit     Date: 01/08/2024   Patient Name: Amol Aguirre  MRN: [id number]  YOB: 1945  Chief Complaint:   Chief Complaint   Patient presents with    Follow-up     3 week f/u-- Diabetic ulcer of lower extremity     History of Present Illness:   Amol Aguirre is a 78 y.o. male who is here today for follow up for follow-up for right BKA stump lateral wound.  Has been wearing old prosthesis since last appointment on December 18.  States area of previous wound has completely healed.  Happy with progress.  Has not made appoint with [facility] orthopedics because the plan was to follow-up with them after today's appointment and discussion with me.    Subjective   I reviewed the patient's chief complaint, history of present illness, review of systems, past medical history, surgical history, family history, social history, medications and allergy list   Objective    Vital Signs:   Vitals:    01/08/24 1307   BP: 160/66   Weight: 107 kg (235 lb 14.3 oz)   Height: 190.5 cm (75\")     Body mass index is 29.48 kg/m².    Ortho Exam:  Right BKA stump well-healed.  Still prominent fibular head with no overlying wound or skin changes.  No signs of infection.    Results Review:  Cardioversion External in Cardiology Department  · Post cardioversion the patient displayed AV-paced.  · The cardioversion was successful.          Assessment / Plan    Assessment/Plan:   Diagnoses and all orders for this visit:    1. Diabetic ulcer of lower extremity (Primary)      Patient's ulceration is now completely resolved.  We discussed plan moving forward which is to continue to ambulate in old prosthesis while new prosthesis is being refitted and adjusted.  Counseled patient that once he starts wearing his new prosthesis to immediately stop wearing if it can cause the same problem to recur.  Will plan to see patient back on an as-needed " basis.  Was a pleasure seeing him today.    Follow Up:   Return if symptoms worsen or fail to improve.      Anthony Hall MD  [facility]

## 2024-03-12 ENCOUNTER — HOSPITAL ENCOUNTER (OUTPATIENT)
Dept: CARDIOLOGY | Facility: HOSPITAL | Age: 79
Setting detail: HOSPITAL OUTPATIENT SURGERY
Discharge: HOME | End: 2024-03-12
Attending: INTERNAL MEDICINE | Admitting: INTERNAL MEDICINE
Payer: MEDICARE

## 2024-03-12 VITALS
SYSTOLIC BLOOD PRESSURE: 111 MMHG | OXYGEN SATURATION: 98 % | BODY MASS INDEX: 27.98 KG/M2 | TEMPERATURE: 97.2 F | DIASTOLIC BLOOD PRESSURE: 46 MMHG | HEART RATE: 70 BPM | HEIGHT: 75 IN | RESPIRATION RATE: 14 BRPM | WEIGHT: 225 LBS

## 2024-03-12 DIAGNOSIS — I48.11 LONGSTANDING PERSISTENT ATRIAL FIBRILLATION: ICD-10-CM

## 2024-03-12 LAB
BUN BLDA-MCNC: 45 MG/DL (ref 8–26)
CA-I BLDA-SCNC: 1.25 MMOL/L (ref 1.2–1.32)
CHLORIDE BLDA-SCNC: 108 MMOL/L (ref 98–109)
CO2 BLDA-SCNC: 24 MMOL/L (ref 24–29)
CREAT BLDA-MCNC: 2.5 MG/DL (ref 0.6–1.3)
EGFRCR SERPLBLD CKD-EPI 2021: 25.2 ML/MIN/1.73
GLUCOSE BLDC GLUCOMTR-MCNC: 71 MG/DL (ref 70–130)
HCT VFR BLDA CALC: 39 % (ref 38–51)
HGB BLDA-MCNC: 13.3 G/DL (ref 12–17)
POTASSIUM BLDA-SCNC: 4.3 MMOL/L (ref 3.5–4.9)
SODIUM BLD-SCNC: 141 MMOL/L (ref 138–146)

## 2024-03-12 PROCEDURE — 85014 HEMATOCRIT: CPT

## 2024-03-12 PROCEDURE — 80047 BASIC METABLC PNL IONIZED CA: CPT

## 2024-03-12 PROCEDURE — 92960 CARDIOVERSION ELECTRIC EXT: CPT

## 2024-03-12 RX ORDER — CITALOPRAM HYDROBROMIDE 10 MG/1
10 TABLET ORAL DAILY
COMMUNITY

## 2024-03-12 RX ORDER — OLMESARTAN MEDOXOMIL 40 MG/1
40 TABLET ORAL DAILY
COMMUNITY
End: 2024-03-12 | Stop reason: HOSPADM

## 2024-03-12 NOTE — NURSING NOTE
Patient stated that he has only been taking his Eliquis once a day.  He stated that he could not afford the copay and that he was afraid he would run out.  He then stated that no one told him to take it twice a day.  I immediately notified Danay Farah.      The granddaughter then disclosed that he has not been taking his medications as instructed because he has been running out before a refill is due.  Danay Farah discussed instructions for meds with patient and granddaughter with instructions to call the office on Monday with blood pressures.    Procedure cancelled at this time.

## 2024-03-12 NOTE — H&P
[facility] - History & Physical     Amol Aguirre  1945  2508/1      03/12/24      Identification:  Amol Aguirre is a 79 y.o. male.      PCP:  Donte Briones MD        Chief Complaint: Persistent A-fib    Allergies:  is allergic to chlorhexidine and latex.    Medications Prior to Admission   Medication Sig Dispense Refill Last Dose    acetaminophen (TYLENOL) 325 MG tablet Take 2 tablets by mouth.       allopurinol (ZYLOPRIM) 100 MG tablet Take 1 tablet by mouth 2 (Two) Times a Day.       amLODIPine (NORVASC) 10 MG tablet Take 1 tablet by mouth Every Night.  0     bicalutamide (CASODEX) 50 MG tablet        budesonide-formoterol (SYMBICORT) 80-4.5 MCG/ACT inhaler Inhale 2 puffs 2 (Two) Times a Day.       bumetanide (BUMEX) 1 MG tablet Take 1 tablet by mouth Daily.       cephalexin (KEFLEX) 500 MG capsule Take 1 capsule by mouth 3 (Three) Times a Day.       Comfort EZ Pen Needles 31G X 5 MM misc        Eliquis 5 MG tablet tablet Take 1 tablet by mouth 2 (Two) Times a Day.       EPINEPHrine (EPIPEN) 0.3 MG/0.3ML solution auto-injector injection Inject  into the appropriate muscle as directed by prescriber.       flecainide (TAMBOCOR) 50 MG tablet Take 1 tablet by mouth Every 12 (Twelve) Hours.       gabapentin (NEURONTIN) 600 MG tablet Take 1 tablet by mouth 2 (Two) Times a Day.       hydrALAZINE (APRESOLINE) 25 MG tablet Take 1 tablet by mouth 3 (Three) Times a Day.       Insulin Degludec (TRESIBA FLEXTOUCH) 200 UNIT/ML solution pen-injector pen injection Inject 80 Units under the skin into the appropriate area as directed Daily.       Insulin Glargine (BASAGLAR KWIKPEN) 100 UNIT/ML injection pen Inject 68 Units under the skin into the appropriate area as directed Daily.       Jardiance 25 MG tablet Take 1 tablet by mouth Daily.       Magnesium Oxide 400 (240 Mg) MG tablet Take 1 tablet by mouth Daily.       Melatonin 10 MG tablet Take 1 tablet by mouth Daily.        metoclopramide (REGLAN) 10 MG tablet Take 1 tablet by mouth 2 (two) times a day.  8     mupirocin (Bactroban) 2 % ointment Apply  topically to the appropriate area as directed Daily. 30 g 5     nebivolol (BYSTOLIC) 10 MG tablet Take 0.5 tablets by mouth 2 (two) times a day.       pantoprazole (PROTONIX) 40 MG EC tablet Take 1 tablet by mouth Daily.  2     SITagliptin (JANUVIA) 25 MG tablet Take 1 tablet by mouth Daily.       sodium zirconium cyclosilicate (Lokelma) 10 g pack Take 10 g by mouth.       True Metrix Blood Glucose Test test strip TEST BLOOD SUGAR THREE TIMES DAILY AND AS NEEDED       vitamin B-12 (CYANOCOBALAMIN) 1000 MCG tablet Take 1 tablet by mouth Daily.          No current facility-administered medications for this encounter.      HPI:  Amol Aguirre is an 79-year-old CM with PMH SSS with [employer] pacemaker, persistent A-fib, HTN, HLP, former tobacco abuse, CKD, obesity who presents to [facility] today for elective ECV under the care of Dr. Foreman.  He was recently seen in the office and found to be in A-fib, symptomatic with dyspnea on exertion, palpitations and fatigue.  He is on dose adjusted Eliquis  along with Bystolic and flecainide.  He reports that he is fallen 2 or 3 times since starting on the blood thinner.  It always occurs at night when he is getting out of the bed to use the restroom in the middle the night.  He wakes up on the floor in his wheelchair has been knocked over.  He denies injury, denies hitting his head.        Social History     Socioeconomic History    Marital status:    Tobacco Use    Smoking status: Former     Current packs/day: 0.00     Types: Cigarettes     Start date: 1965     Quit date: 1975     Years since quittin.2    Smokeless tobacco: Never   Vaping Use    Vaping status: Never Used   Substance and Sexual Activity    Alcohol use: No    Drug use: No    Sexual activity: Not Currently     Partners: Female     Birth control/protection:  Abstinence     Family History   Problem Relation Age of Onset    Cancer Mother     Hypertension Mother     Hypertension Father     Heart attack Father      Past Surgical History:   Procedure Laterality Date    AMPUTATION DIGIT Left 10/15/2019    Procedure: AMPUTATE LEFT THIRD TOE;  Surgeon: Juju Juarez MD;  Location:  [facility] OR;  Service: Orthopedics    AMPUTATION DIGIT Left 01/07/2021    Procedure: amputate left first toe and metatarsal;  Surgeon: Juju Juarez MD;  Location:  [facility] OR;  Service: Orthopedics;  Laterality: Left;    AMPUTATION DIGIT Left 08/31/2021    Procedure: amputate left 2nd toe and metatarsal, close diabetic ulcer;  Surgeon: Juju Juarez MD;  Location:  [facility] OR;  Service: Orthopedics;  Laterality: Left;    AORTAGRAM N/A 04/30/2019    Procedure: AORTAGRAM WITH OR WITHOUT RUNOFFS;  Surgeon: Carrillo White MD;  Location: [facility] HYBRID OR 15;  Service: Vascular    BELOW KNEE AMPUTATION Right 06/04/2019    Procedure: BELOW KNEE AMPUTATION RIGHT;  Surgeon: Juju Juarez MD;  Location:  [facility] OR;  Service: Orthopedics    CARDIAC CATHETERIZATION      NO INTERVENTION    CARDIAC ELECTROPHYSIOLOGY PROCEDURE N/A 05/28/2021    Procedure: Device Implant;  Surgeon: Amol Foreman MD;  Location:  [facility] CATH INVASIVE LOCATION;  Service: Cardiovascular;  Laterality: N/A;    CATARACT EXTRACTION W/ INTRAOCULAR LENS IMPLANT Right 07/20/2020    Procedure: CATARACT PHACO EXTRACTION WITH INTRAOCULAR LENS IMPLANT RIGHT;  Surgeon: Jez Mas MD;  Location:  [facility];  Service: Ophthalmology;  Laterality: Right;    CATARACT EXTRACTION W/ INTRAOCULAR LENS IMPLANT Left 08/03/2020    Procedure: CATARACT PHACO EXTRACTION WITH INTRAOCULAR LENS IMPLANT LEFT;  Surgeon: Jez Mas MD;  Location:  [facility] OR;  Service: Ophthalmology;  Laterality: Left;    CHOLECYSTECTOMY      COLONOSCOPY      FOOT SURGERY Left 10/15/2019    Amputate 3rd toe- DeGnore    JOINT REPLACEMENT      KNEE SURGERY  "Right     TKA       Review of Systems:  Pertinent positives are listed above and in physical exam.  All others have been reviewed and are negative.     Objective:   Vitals:   vitals were not taken for this visit.  No intake or output data in the 24 hours ending 03/12/24 1055 vitals reviewed in room      Physical Exam:  General Appearance:    Alert, cooperative, in no acute distress   Head:    Normocephalic, without obvious abnormality, atraumatic   Eyes:            Lids and lashes normal, conjunctivae and sclerae normal, no   icterus, no pallor, corneas clear, PERRLA   Ears:    Ears appear intact with no abnormalities noted   Throat:   No oral lesions, no thrush, oral mucosa moist   Neck:   No adenopathy, supple, trachea midline, no thyromegaly, no carotid bruit, no JVD   Back:     No kyphosis present, no scoliosis present, no skin lesions,   erythema or scars, no tenderness to percussion or                  palpation,  range of motion normal   Lungs:     Clear to auscultation,respirations regular, even and               unlabored    Heart:    Regular rhythm and normal rate, normal S1 and S2, no         murmur, no gallop, no rub, no click   Abdomen:     Normal bowel sounds, no masses, no organomegaly, soft     nontender, nondistended, no guarding, no rebound               tenderness   Extremities:   Moves all extremities well,  no cyanosis, no redness, no edema.  Right LE prosthesis, right index finger amputation   Pulses:   Pulses palpable and equal bilaterally   Skin:   No bleeding, bruising or rash   Lymph nodes:   No palpable adenopathy   Neurologic:   Cranial nerves 2 - 12 grossly intact, sensation intact, DTR    present and equal bilaterally          Results Review:  I personally reviewed the patient's clinical results.              Invalid input(s): \"LABALBU\", \"PROT\"      Results from last 7 days   Lab Units 03/11/24  1043   INR  1.06                   Radiology:  Imaging Results (Last 72 Hours)       ** No " results found for the last 72 hours. **            Tele:  Paced    Assessment and Plan:    1.  79-year-old CM with persistent A-fib presents for ECV.  Risk and benefits have been reviewed with patient he is willing to proceed.  In further discussion with patient, he states he is only been taking his NOAC once a day secondary to cost issues and preserving pills.  We had to get the granddaughter in the room and she reports that compliance with medications has been ill at best.  At this time, we cannot proceed safely with ECV.  Procedure will be canceled, MD notified.    We will DC home in stable condition.  He is to stop all blood pressure medications and keep a BP log between now and Monday morning with results called our office.  Continue to take Eliquis twice daily and I will contact the staff in our office to seek out preauthorization with patient's pharmacy.  He has to be on Eliquis twice daily for 21 days minimum before rescheduling ECV.  All of this was explained and written down to granddaughter and patient and shared with the RN at bedside.              I discussed the patient's findings and my recommendations with the patient, any present family members, and the nursing staff.  Amol Foreman MD saw and examined patient, verified hx and PE, read all radiographic studies, reviewed labs and micro data, and formulated dx, plan for treatment and all medical decision making.      Gayle Farah PA-C for Amol Foreman MD  03/12/24 10:55 EDT

## 2024-04-17 ENCOUNTER — HOSPITAL ENCOUNTER (OUTPATIENT)
Dept: CARDIOLOGY | Facility: HOSPITAL | Age: 79
Discharge: HOME | End: 2024-04-17
Attending: INTERNAL MEDICINE | Admitting: INTERNAL MEDICINE
Payer: MEDICARE

## 2024-04-17 VITALS
OXYGEN SATURATION: 97 % | WEIGHT: 219.8 LBS | HEART RATE: 76 BPM | SYSTOLIC BLOOD PRESSURE: 135 MMHG | TEMPERATURE: 97.4 F | BODY MASS INDEX: 27.33 KG/M2 | HEIGHT: 75 IN | RESPIRATION RATE: 16 BRPM | DIASTOLIC BLOOD PRESSURE: 77 MMHG

## 2024-04-17 DIAGNOSIS — I48.11 LONGSTANDING PERSISTENT ATRIAL FIBRILLATION: ICD-10-CM

## 2024-04-17 LAB
ANION GAP SERPL CALCULATED.3IONS-SCNC: 11 MMOL/L (ref 5–15)
BUN SERPL-MCNC: 31 MG/DL (ref 8–23)
BUN/CREAT SERPL: 16.1 (ref 7–25)
CALCIUM SPEC-SCNC: 9.9 MG/DL (ref 8.6–10.5)
CHLORIDE SERPL-SCNC: 104 MMOL/L (ref 98–107)
CO2 SERPL-SCNC: 26 MMOL/L (ref 22–29)
CREAT SERPL-MCNC: 1.93 MG/DL (ref 0.76–1.27)
DEPRECATED RDW RBC AUTO: 50.3 FL (ref 37–54)
EGFRCR SERPLBLD CKD-EPI 2021: 34.4 ML/MIN/1.73
ERYTHROCYTE [DISTWIDTH] IN BLOOD BY AUTOMATED COUNT: 16.1 % (ref 12.3–15.4)
GLUCOSE SERPL-MCNC: 94 MG/DL (ref 65–99)
HCT VFR BLD AUTO: 39.8 % (ref 37.5–51)
HGB BLD-MCNC: 12.8 G/DL (ref 13–17.7)
MCH RBC QN AUTO: 27.2 PG (ref 26.6–33)
MCHC RBC AUTO-ENTMCNC: 32.2 G/DL (ref 31.5–35.7)
MCV RBC AUTO: 84.5 FL (ref 79–97)
PLATELET # BLD AUTO: 295 10*3/MM3 (ref 140–450)
PMV BLD AUTO: 9.3 FL (ref 6–12)
POTASSIUM SERPL-SCNC: 4.3 MMOL/L (ref 3.5–5.2)
QT INTERVAL: 464 MS
QTC INTERVAL: 528 MS
RBC # BLD AUTO: 4.71 10*6/MM3 (ref 4.14–5.8)
SODIUM SERPL-SCNC: 141 MMOL/L (ref 136–145)
WBC NRBC COR # BLD AUTO: 7.29 10*3/MM3 (ref 3.4–10.8)

## 2024-04-17 PROCEDURE — 92960 CARDIOVERSION ELECTRIC EXT: CPT

## 2024-04-17 PROCEDURE — 25010000002 FENTANYL CITRATE (PF) 50 MCG/ML SOLUTION: Performed by: INTERNAL MEDICINE

## 2024-04-17 PROCEDURE — 36415 COLL VENOUS BLD VENIPUNCTURE: CPT

## 2024-04-17 PROCEDURE — 80048 BASIC METABOLIC PNL TOTAL CA: CPT | Performed by: INTERNAL MEDICINE

## 2024-04-17 PROCEDURE — 85027 COMPLETE CBC AUTOMATED: CPT | Performed by: INTERNAL MEDICINE

## 2024-04-17 PROCEDURE — 93005 ELECTROCARDIOGRAM TRACING: CPT | Performed by: INTERNAL MEDICINE

## 2024-04-17 PROCEDURE — 25010000002 MIDAZOLAM PER 1 MG: Performed by: INTERNAL MEDICINE

## 2024-04-17 RX ORDER — MIDAZOLAM HYDROCHLORIDE 1 MG/ML
INJECTION INTRAMUSCULAR; INTRAVENOUS
Status: COMPLETED | OUTPATIENT
Start: 2024-04-17 | End: 2024-04-17

## 2024-04-17 RX ORDER — FENTANYL CITRATE 50 UG/ML
50-100 INJECTION, SOLUTION INTRAMUSCULAR; INTRAVENOUS ONCE AS NEEDED
Status: DISCONTINUED | OUTPATIENT
Start: 2024-04-17 | End: 2024-04-17

## 2024-04-17 RX ORDER — MIDAZOLAM HYDROCHLORIDE 1 MG/ML
2-20 INJECTION INTRAMUSCULAR; INTRAVENOUS ONCE AS NEEDED
Status: DISCONTINUED | OUTPATIENT
Start: 2024-04-17 | End: 2024-04-17 | Stop reason: HOSPADM

## 2024-04-17 RX ORDER — FLUMAZENIL 0.1 MG/ML
0.5 INJECTION INTRAVENOUS ONCE AS NEEDED
Status: DISCONTINUED | OUTPATIENT
Start: 2024-04-17 | End: 2024-04-17 | Stop reason: HOSPADM

## 2024-04-17 RX ORDER — NALOXONE HCL 0.4 MG/ML
0.4 VIAL (ML) INJECTION ONCE AS NEEDED
Status: DISCONTINUED | OUTPATIENT
Start: 2024-04-17 | End: 2024-04-17 | Stop reason: HOSPADM

## 2024-04-17 RX ORDER — FENTANYL CITRATE 50 UG/ML
50-200 INJECTION, SOLUTION INTRAMUSCULAR; INTRAVENOUS ONCE AS NEEDED
Status: DISCONTINUED | OUTPATIENT
Start: 2024-04-17 | End: 2024-04-17 | Stop reason: HOSPADM

## 2024-04-17 RX ORDER — FENTANYL CITRATE 50 UG/ML
INJECTION, SOLUTION INTRAMUSCULAR; INTRAVENOUS
Status: COMPLETED | OUTPATIENT
Start: 2024-04-17 | End: 2024-04-17

## 2024-04-17 RX ADMIN — FENTANYL CITRATE 50 MCG: 50 INJECTION, SOLUTION INTRAMUSCULAR; INTRAVENOUS at 13:06

## 2024-04-17 RX ADMIN — FENTANYL CITRATE 50 MCG: 50 INJECTION, SOLUTION INTRAMUSCULAR; INTRAVENOUS at 13:10

## 2024-04-17 RX ADMIN — MIDAZOLAM HYDROCHLORIDE 2 MG: 1 INJECTION, SOLUTION INTRAMUSCULAR; INTRAVENOUS at 13:06

## 2024-04-17 RX ADMIN — MIDAZOLAM HYDROCHLORIDE 2 MG: 1 INJECTION, SOLUTION INTRAMUSCULAR; INTRAVENOUS at 13:10

## 2024-04-17 NOTE — H&P
[facility] - History & Physical     Amol Aguirre  1945  2530/1      04/17/24      Identification:  Amol Aguirre is a 79 y.o. male.      PCP:  Sam Preston MD        Chief Complaint: Persistent A-fib    Allergies:  is allergic to chlorhexidine, latex, and tegaderm ag mesh [silver].    Medications Prior to Admission   Medication Sig Dispense Refill Last Dose    acetaminophen (TYLENOL) 325 MG tablet Take 2 tablets by mouth.       bicalutamide (CASODEX) 50 MG tablet        budesonide-formoterol (SYMBICORT) 80-4.5 MCG/ACT inhaler Inhale 2 puffs 2 (Two) Times a Day.       bumetanide (BUMEX) 1 MG tablet Take 1 tablet by mouth Daily.       citalopram (CeleXA) 10 MG tablet Take 1 tablet by mouth Daily.       Comfort EZ Pen Needles 31G X 5 MM misc        EPINEPHrine (EPIPEN) 0.3 MG/0.3ML solution auto-injector injection Inject  into the appropriate muscle as directed by prescriber.       insulin aspart (novoLOG FLEXPEN) 100 UNIT/ML solution pen-injector sc pen Inject 24 Units under the skin into the appropriate area as directed 3 (Three) Times a Day With Meals.       Insulin Glargine (BASAGLAR KWIKPEN) 100 UNIT/ML injection pen Inject 68 Units under the skin into the appropriate area as directed Daily.       Jardiance 25 MG tablet Take 1 tablet by mouth Daily.       metoclopramide (REGLAN) 10 MG tablet Take 1 tablet by mouth 2 (two) times a day.  8     pantoprazole (PROTONIX) 40 MG EC tablet Take 1 tablet by mouth Daily.  2     sodium zirconium cyclosilicate (Lokelma) 10 g pack Take 10 g by mouth Every Other Day.       True Metrix Blood Glucose Test test strip TEST BLOOD SUGAR THREE TIMES DAILY AND AS NEEDED          No current facility-administered medications for this encounter.      HPI:  Amol Aguirre is an 79-year-old CM with PMH HTN, IDDM, CKD, VHD, CARMONA/SOA, obesity, SSS with [employer] pacemaker, persistent A-fib, former tobacco abuse.  He was recently seen in the office  for routine follow-up and reported worsening SOA worse with exertion, decreased energy, mild chest pain.  Interrogation of device demonstrated A-fib.  He presents today to undergo elective ECV under the care of Dr. Foreman.          Social History     Socioeconomic History    Marital status:    Tobacco Use    Smoking status: Former     Current packs/day: 0.00     Types: Cigarettes     Start date: 1965     Quit date: 1975     Years since quittin.3    Smokeless tobacco: Never   Vaping Use    Vaping status: Never Used   Substance and Sexual Activity    Alcohol use: No    Drug use: No    Sexual activity: Not Currently     Partners: Female     Birth control/protection: Abstinence     Family History   Problem Relation Age of Onset    Cancer Mother     Hypertension Mother     Hypertension Father     Heart attack Father      Past Surgical History:   Procedure Laterality Date    AMPUTATION DIGIT Left 10/15/2019    Procedure: AMPUTATE LEFT THIRD TOE;  Surgeon: Juju Weber MD;  Location:  [facility] OR;  Service: Orthopedics    AMPUTATION DIGIT Left 2021    Procedure: amputate left first toe and metatarsal;  Surgeon: Juju Weber MD;  Location:  [facility] OR;  Service: Orthopedics;  Laterality: Left;    AMPUTATION DIGIT Left 2021    Procedure: amputate left 2nd toe and metatarsal, close diabetic ulcer;  Surgeon: Juju Weber MD;  Location:  [facility] OR;  Service: Orthopedics;  Laterality: Left;    AORTAGRAM N/A 2019    Procedure: AORTAGRAM WITH OR WITHOUT RUNOFFS;  Surgeon: Carrillo White MD;  Location:  [facility] HYBRID OR 15;  Service: Vascular    BELOW KNEE AMPUTATION Right 2019    Procedure: BELOW KNEE AMPUTATION RIGHT;  Surgeon: Juju Weber MD;  Location:  [facility] OR;  Service: Orthopedics    CARDIAC CATHETERIZATION      NO INTERVENTION    CARDIAC ELECTROPHYSIOLOGY PROCEDURE N/A 2021    Procedure: Device Implant;  Surgeon: Amol Foreman MD;  Location:  [facility] CATH  INVASIVE LOCATION;  Service: Cardiovascular;  Laterality: N/A;    CATARACT EXTRACTION W/ INTRAOCULAR LENS IMPLANT Right 07/20/2020    Procedure: CATARACT PHACO EXTRACTION WITH INTRAOCULAR LENS IMPLANT RIGHT;  Surgeon: Jez Mas MD;  Location: [facility] OR;  Service: Ophthalmology;  Laterality: Right;    CATARACT EXTRACTION W/ INTRAOCULAR LENS IMPLANT Left 08/03/2020    Procedure: CATARACT PHACO EXTRACTION WITH INTRAOCULAR LENS IMPLANT LEFT;  Surgeon: Jez Mas MD;  Location: [facility] OR;  Service: Ophthalmology;  Laterality: Left;    CHOLECYSTECTOMY      COLONOSCOPY      FOOT SURGERY Left 10/15/2019    Amputate 3rd toe- DeGnore    JOINT REPLACEMENT      KNEE SURGERY Right     TKA       Review of Systems:  Pertinent positives are listed above and in physical exam.  All others have been reviewed and are negative.     Objective:   Vitals:   vitals were not taken for this visit.  No intake or output data in the 24 hours ending 04/17/24 1128 vitals reviewed in room      Physical Exam:  General Appearance:    Alert, cooperative, in no acute distress   Head:    Normocephalic, without obvious abnormality, atraumatic   Eyes:            Lids and lashes normal, conjunctivae and sclerae normal, no   icterus, no pallor, corneas clear, PERRLA   Ears:    Ears appear intact with no abnormalities noted   Throat:   No oral lesions, no thrush, oral mucosa moist   Neck:   No adenopathy, supple, trachea midline, no thyromegaly, no carotid bruit, no JVD   Back:     No kyphosis present, no scoliosis present, no skin lesions,   erythema or scars, no tenderness to percussion or                  palpation,  range of motion normal   Lungs:     Clear to auscultation,respirations regular, even and               unlabored    Heart:    Regular rhythm and normal rate, normal S1 and S2, no         murmur, no gallop, no rub, no click   Abdomen:     Normal bowel sounds, no masses, no organomegaly, soft     nontender, nondistended, no  "guarding, no rebound               tenderness   Extremities:   Moves all extremities well,  no cyanosis, no redness, no edema   Pulses:   Pulses palpable and equal bilaterally   Skin:   No bleeding, bruising or rash   Lymph nodes:   No palpable adenopathy   Neurologic:   Cranial nerves 2 - 12 grossly intact, sensation intact, DTR    present and equal bilaterally          Results Review:  I personally reviewed the patient's clinical results.              Invalid input(s): \"LABALBU\", \"PROT\"                        Radiology:  Imaging Results (Last 72 Hours)       ** No results found for the last 72 hours. **            Tele: V paced    Assessment and Plan:    1.  79-year-old CM with persistent A-fib, SSS/[employer] pacemaker presents today for elective ECV.  Patient reports compliance with medications including his NOAC.  Risk and benefits have been reviewed with patient he is willing to proceed.  Further recommendations based on outcomes.  [employer] representative has been contacted to clear readings postprocedure.        I discussed the patient's findings and my recommendations with the patient, any present family members, and the nursing staff.  Amol Foreman MD saw and examined patient, verified hx and PE, read all radiographic studies, reviewed labs and micro data, and formulated dx, plan for treatment and all medical decision making.      Gayle Farah PA-C for Amol Foreman MD  04/17/24 11:28 EDT      "

## 2024-07-22 ENCOUNTER — OFFICE VISIT (OUTPATIENT)
Dept: ORTHOPEDIC SURGERY | Facility: CLINIC | Age: 79
End: 2024-07-22
Payer: MEDICARE

## 2024-07-22 ENCOUNTER — TELEPHONE (OUTPATIENT)
Dept: ORTHOPEDIC SURGERY | Facility: CLINIC | Age: 79
End: 2024-07-22
Payer: MEDICARE

## 2024-07-22 VITALS — WEIGHT: 235.2 LBS | HEIGHT: 75 IN | BODY MASS INDEX: 29.24 KG/M2

## 2024-07-22 DIAGNOSIS — L97.921 ULCER OF LEFT LOWER EXTREMITY, LIMITED TO BREAKDOWN OF SKIN: Primary | ICD-10-CM

## 2024-07-22 DIAGNOSIS — N18.4 STAGE 4 CHRONIC KIDNEY DISEASE: ICD-10-CM

## 2024-07-22 DIAGNOSIS — E11.42 TYPE 2 DIABETES MELLITUS WITH DIABETIC POLYNEUROPATHY, WITH LONG-TERM CURRENT USE OF INSULIN: ICD-10-CM

## 2024-07-22 DIAGNOSIS — Z79.4 TYPE 2 DIABETES MELLITUS WITH DIABETIC POLYNEUROPATHY, WITH LONG-TERM CURRENT USE OF INSULIN: ICD-10-CM

## 2024-07-22 RX ORDER — IPRATROPIUM BROMIDE AND ALBUTEROL SULFATE 2.5; .5 MG/3ML; MG/3ML
SOLUTION RESPIRATORY (INHALATION)
COMMUNITY

## 2024-07-22 RX ORDER — FLECAINIDE ACETATE 50 MG/1
50 TABLET ORAL
COMMUNITY
Start: 2024-06-03

## 2024-07-22 RX ORDER — ALLOPURINOL 100 MG/1
2 TABLET ORAL DAILY
COMMUNITY

## 2024-07-22 RX ORDER — AMLODIPINE BESYLATE 10 MG/1
10 TABLET ORAL DAILY
COMMUNITY
Start: 2024-06-11

## 2024-07-22 NOTE — TELEPHONE ENCOUNTER
Patient's granddaughter called because they spotted an open wound on his left ankle and its about the size of a carlos. They wanted to talk to medical to see if it can be looked at.     Please advise.

## 2024-07-22 NOTE — PROGRESS NOTES
[facility] – [place], [place] Orthopaedic Surgery Clinic Note    Subjective     Chief Complaint   Patient presents with    Left Ankle - Pain, Initial Evaluation        HPI  Amol Aguirre is a 79 y.o. male.  Patient's granddaughter contacted clinic earlier today due to open wound lateral aspect left ankle.  He did not recall any history of injury or trauma but he does have neuropathy so he might of hit it and not realized it.  He does use a wheelchair while he is at home and unsure if this is secondary to pressure sore from where he sits his foot on the wheelchair.  Granddaughter states she will look into it.  Patient with a history of right BKA.    Treatment earlier today was Bactroban and Band-Aid.    Patient denies any pain.    No reported fever, chills, night sweats or other constitutional symptoms.    Past Medical History:   Diagnosis Date    Acid reflux     Arthritis     Arthritis of back 2015    Asthma     Atrial fibrillation/flutter     CHF (congestive heart failure)     CTS (carpal tunnel syndrome) 2018    Diabetes     Fracture of ankle 2008    Hypertension       Past Surgical History:   Procedure Laterality Date    AMPUTATION DIGIT Left 10/15/2019    Procedure: AMPUTATE LEFT THIRD TOE;  Surgeon: Juju Juarez MD;  Location:  [facility];  Service: Orthopedics    AMPUTATION DIGIT Left 01/07/2021    Procedure: amputate left first toe and metatarsal;  Surgeon: Juju Juarez MD;  Location:  [facility];  Service: Orthopedics;  Laterality: Left;    AMPUTATION DIGIT Left 08/31/2021    Procedure: amputate left 2nd toe and metatarsal, close diabetic ulcer;  Surgeon: Juju Juarez MD;  Location:  [facility];  Service: Orthopedics;  Laterality: Left;    AORTAGRAM N/A 04/30/2019    Procedure: AORTAGRAM WITH OR WITHOUT RUNOFFS;  Surgeon: Carrillo White MD;  [facility]:  [facility];  Service: Vascular    BELOW KNEE AMPUTATION Right 06/04/2019    Procedure: BELOW KNEE AMPUTATION RIGHT;  Surgeon: Juju Juarez MD;  [facility]:   [facility];  Service: Orthopedics    CARDIAC CATHETERIZATION      NO INTERVENTION    CARDIAC ELECTROPHYSIOLOGY PROCEDURE N/A 2021    Procedure: Device Implant;  Surgeon: Amol Foreman MD;  [facility];  Service: Cardiovascular;  Laterality: N/A;    CATARACT EXTRACTION W/ INTRAOCULAR LENS IMPLANT Right 2020    Procedure: CATARACT PHACO EXTRACTION WITH INTRAOCULAR LENS IMPLANT RIGHT;  Surgeon: Jez Mas MD;  Location:  [facility];  Service: Ophthalmology;  Laterality: Right;    CATARACT EXTRACTION W/ INTRAOCULAR LENS IMPLANT Left 2020    Procedure: CATARACT PHACO EXTRACTION WITH INTRAOCULAR LENS IMPLANT LEFT;  Surgeon: Jez Mas MD;  Location:  [facility];  Service: Ophthalmology;  Laterality: Left;    CHOLECYSTECTOMY      COLONOSCOPY      FOOT SURGERY Left 10/15/2019    Amputate 3rd toe- [place]    JOINT REPLACEMENT      KNEE SURGERY Right     TKA      Family History   Problem Relation Age of Onset    Cancer Mother     Hypertension Mother     Hypertension Father     Heart attack Father      Social History     Socioeconomic History    Marital status:    Tobacco Use    Smoking status: Former     Current packs/day: 0.00     Types: Cigarettes     Start date: 1965     Quit date: 1975     Years since quittin.5    Smokeless tobacco: Never   Vaping Use    Vaping status: Never Used   Substance and Sexual Activity    Alcohol use: No    Drug use: No    Sexual activity: Not Currently     Partners: Female     Birth control/protection: Abstinence      Current Outpatient Medications on File Prior to Visit   Medication Sig Dispense Refill    acetaminophen (TYLENOL) 325 MG tablet Take 2 tablets by mouth As Needed.      allopurinol (ZYLOPRIM) 100 MG tablet Take 2 tablets by mouth Daily.      amLODIPine (NORVASC) 10 MG tablet Take 1 tablet by mouth Daily.      apixaban (ELIQUIS) 2.5 MG tablet tablet Take 1 tablet by mouth 2 (Two) Times a Day.       bicalutamide (CASODEX) 50 MG tablet Take 1 tablet by mouth Daily.      budesonide-formoterol (SYMBICORT) 80-4.5 MCG/ACT inhaler Inhale 2 puffs 2 (Two) Times a Day As Needed.      bumetanide (BUMEX) 1 MG tablet Take 1 tablet by mouth Daily.      citalopram (CeleXA) 10 MG tablet Take 1 tablet by mouth Daily.      Comfort EZ Pen Needles 31G X 5 MM misc       Cyanocobalamin ER 2000 MCG tablet controlled-release Take 1,000 mcg by mouth Daily.      diphenhydrAMINE-acetaminophen (TYLENOL PM)  MG tablet per tablet Take 2 tablets every day by oral route at bedtime.      EPINEPHrine (EPIPEN) 0.3 MG/0.3ML solution auto-injector injection Inject  into the appropriate muscle as directed by prescriber.      flecainide (TAMBOCOR) 50 MG tablet Take 1 tablet by mouth.      insulin aspart (novoLOG FLEXPEN) 100 UNIT/ML solution pen-injector sc pen Inject 15 Units under the skin into the appropriate area as directed 3 (Three) Times a Day With Meals.      Insulin Glargine (BASAGLAR KWIKPEN) 100 UNIT/ML injection pen Inject 68 Units under the skin into the appropriate area as directed Every Night.      ipratropium-albuterol (DUO-NEB) 0.5-2.5 mg/3 ml nebulizer Inhale 3 mL 4 times a day by nebulization route.      Jardiance 25 MG tablet Take 1 tablet by mouth Daily.      metoclopramide (REGLAN) 10 MG tablet Take 1 tablet by mouth 4 (Four) Times a Day Before Meals & at Bedtime.  8    mupirocin (BACTROBAN) 2 % ointment       pantoprazole (PROTONIX) 40 MG EC tablet Take 1 tablet by mouth Daily.  2    sodium zirconium cyclosilicate (Lokelma) 10 g pack Take 10 g by mouth Every Other Day. Powder mixed in water      True Metrix Blood Glucose Test test strip TEST BLOOD SUGAR THREE TIMES DAILY AND AS NEEDED       No current facility-administered medications on file prior to visit.      Allergies   Allergen Reactions    Chlorhexidine Shortness Of Breath, Itching, Rash and Anaphylaxis     Pt developed a rash, itching, and shortness of breath  "after receiving a CHG bath on 4/24/19.  Pt developed a rash, itching, and shortness of breath after receiving a CHG bath on 4/24/19.    Latex Other (See Comments) and Swelling     Rash, hives, and tongue swelling  Rash, hives, and tongue swelling    Procaine Anaphylaxis    Tegaderm Ag Mesh [Silver] Swelling and Rash     Pt developed a rash with swelling within minutes of application.        The following portions of the patient's history were reviewed and updated as appropriate: allergies, current medications, past family history, past medical history, past social history, past surgical history, and problem list.    Review of Systems   Constitutional: Negative.    HENT: Negative.     Eyes: Negative.    Respiratory: Negative.     Cardiovascular: Negative.    Gastrointestinal: Negative.    Endocrine: Negative.    Genitourinary: Negative.    Musculoskeletal:  Positive for arthralgias.   Skin: Negative.    Allergic/Immunologic: Negative.    Neurological: Negative.    Hematological: Negative.    Psychiatric/Behavioral: Negative.          Objective      Physical Exam  Ht 190.5 cm (75\")   Wt 107 kg (235 lb 3.2 oz)   BMI 29.40 kg/m²     Body mass index is 29.4 kg/m².    GENERAL APPEARANCE: awake, alert & oriented x 3, in no acute distress and well developed, well nourished  PSYCH: normal mood and affect  LUNGS:  breathing nonlabored, no wheezing  EYES: sclera anicteric, pupils equal  CARDIOVASCULAR: palpable pulses. Capillary refill less than 2 seconds           Ortho Exam  Right lateral ankle  Skin: 1 cm superficial wound noted.  No redness, warmth.  Minimal bleeding.  No purulence or fluctuance noted.  Superficial only.  Does not probe deep.  Sensory: Dense neuropathy           Imaging/Studies  Ordered left ankle plain films.  Imaging read/interpreted by Dr. Rivera.    Imaging Results (Last 7 Days)       Procedure Component Value Units Date/Time    XR Ankle 3+ View Left [[id number]] Resulted: 07/22/24 1638     Updated: " 07/22/24 1639    Narrative:      Left Ankle Radiographs  Indication: left ankle pain  Views: AP, mortise and lateral of the left ankle    Comparison: no prior studies available for review    Findings:   No acute bony abnormalities.  Vascular calcifications.  No unusual bony   features.            Laboratory data  4/23/2024: A1c 6.3, BUN 31, creatinine 1.93, GFR 39.  Assessment/Plan        ICD-10-CM ICD-9-CM   1. Ulcer of left lower extremity, limited to breakdown of skin  L97.921 707.10   2. Type 2 diabetes mellitus with diabetic polyneuropathy, with long-term current use of insulin  E11.42 250.60    Z79.4 357.2     V58.67   3. Stage 4 chronic kidney disease  N18.4 585.4       Orders Placed This Encounter   Procedures    XR Ankle 3+ View Left        -Superficial ulcer noted lateral aspect left ankle.  -Reviewed imaging--no evidence of bone compromise.  -Diabetes, stage IV kidney disease--patient to continue working with PCP for tighter blood sugar/glucose control.  -Dressed with Bactroban and Band-Aid.  Patient has Bactroban at home to use daily for wound healing.  -Follow up in 4 weeks for repeat evaluation--to come in sooner if symptoms worsen or change.  -Questions and concerns answered.      Kati Hairston PA-C  07/22/24  20:55 EDT               EMR Dragon/Transcription disclaimer:  Much of this encounter note is an electronic transcription of spoken language to printed text. Electronic transcription of spoken language may permit erroneous, or at times, nonsensical words or phrases to be inadvertently transcribed. Although I have reviewed the note for such errors, some may still exist.

## 2024-07-22 NOTE — TELEPHONE ENCOUNTER
I called and spoke with Kelly she stated they think the wound is from his ankle rubbing on the wheel chair but her was worried about it and wanted it to be looked at. They have been putting Bactroban on the wound and keeping it covered.   I put them on Kati's schedule for today at 3:40 she will call back if she can't make it at that time to reschedule to a time they can make it.     Misti Medina

## 2024-09-19 ENCOUNTER — HOSPITAL ENCOUNTER (EMERGENCY)
Facility: HOSPITAL | Age: 79
Discharge: HOME | End: 2024-09-19
Attending: EMERGENCY MEDICINE
Payer: MEDICARE

## 2024-09-19 VITALS
RESPIRATION RATE: 18 BRPM | SYSTOLIC BLOOD PRESSURE: 166 MMHG | BODY MASS INDEX: 28.92 KG/M2 | DIASTOLIC BLOOD PRESSURE: 72 MMHG | HEIGHT: 75 IN | TEMPERATURE: 97.8 F | OXYGEN SATURATION: 94 % | HEART RATE: 69 BPM | WEIGHT: 232.6 LBS

## 2024-09-19 DIAGNOSIS — T78.40XA ALLERGIC REACTION, INITIAL ENCOUNTER: ICD-10-CM

## 2024-09-19 DIAGNOSIS — T30.0 BURN: Primary | ICD-10-CM

## 2024-09-19 PROCEDURE — 99283 EMERGENCY DEPT VISIT LOW MDM: CPT

## 2024-09-19 PROCEDURE — 93005 ELECTROCARDIOGRAM TRACING: CPT | Performed by: EMERGENCY MEDICINE

## 2024-09-19 RX ORDER — GINSENG 100 MG
1 CAPSULE ORAL 2 TIMES DAILY
Qty: 13 G | Refills: 0 | Status: SHIPPED | OUTPATIENT
Start: 2024-09-19 | End: 2024-09-26

## 2024-09-19 RX ORDER — CETIRIZINE HYDROCHLORIDE 10 MG/1
10 TABLET ORAL ONCE
Status: COMPLETED | OUTPATIENT
Start: 2024-09-19 | End: 2024-09-19

## 2024-09-19 RX ORDER — BACITRACIN ZINC 500 [USP'U]/G
1 OINTMENT TOPICAL ONCE
Status: COMPLETED | OUTPATIENT
Start: 2024-09-19 | End: 2024-09-19

## 2024-09-19 RX ORDER — CETIRIZINE HYDROCHLORIDE 10 MG/1
5 TABLET ORAL DAILY
Qty: 4 TABLET | Refills: 0 | Status: SHIPPED | OUTPATIENT
Start: 2024-09-19 | End: 2024-09-26

## 2024-09-19 RX ORDER — FAMOTIDINE 20 MG/1
20 TABLET, FILM COATED ORAL ONCE
Status: COMPLETED | OUTPATIENT
Start: 2024-09-19 | End: 2024-09-19

## 2024-09-19 RX ORDER — FAMOTIDINE 20 MG/1
20 TABLET, FILM COATED ORAL 2 TIMES DAILY
Qty: 14 TABLET | Refills: 0 | Status: SHIPPED | OUTPATIENT
Start: 2024-09-19 | End: 2024-09-26

## 2024-09-19 RX ADMIN — FAMOTIDINE 20 MG: 20 TABLET, FILM COATED ORAL at 21:14

## 2024-09-19 RX ADMIN — CETIRIZINE HYDROCHLORIDE 10 MG: 10 TABLET, FILM COATED ORAL at 21:14

## 2024-09-19 RX ADMIN — BACITRACIN ZINC 1 APPLICATION: 500 OINTMENT TOPICAL at 21:14

## 2024-10-27 ENCOUNTER — HOSPITAL ENCOUNTER (EMERGENCY)
Facility: HOSPITAL | Age: 79
Discharge: ANOTHER HOSPITAL | End: 2024-10-27
Attending: STUDENT IN AN ORGANIZED HEALTH CARE EDUCATION/TRAINING PROGRAM | Admitting: STUDENT IN AN ORGANIZED HEALTH CARE EDUCATION/TRAINING PROGRAM
Payer: MEDICARE

## 2024-10-27 ENCOUNTER — APPOINTMENT (OUTPATIENT)
Dept: GENERAL RADIOLOGY | Facility: HOSPITAL | Age: 79
End: 2024-10-27
Payer: MEDICARE

## 2024-10-27 VITALS
OXYGEN SATURATION: 97 % | WEIGHT: 255 LBS | BODY MASS INDEX: 32.73 KG/M2 | RESPIRATION RATE: 18 BRPM | SYSTOLIC BLOOD PRESSURE: 192 MMHG | TEMPERATURE: 97.4 F | HEART RATE: 68 BPM | HEIGHT: 74 IN | DIASTOLIC BLOOD PRESSURE: 80 MMHG

## 2024-10-27 DIAGNOSIS — I96 GANGRENE OF FINGER: Primary | ICD-10-CM

## 2024-10-27 DIAGNOSIS — L03.011 CELLULITIS OF FINGER OF RIGHT HAND: ICD-10-CM

## 2024-10-27 LAB
ALBUMIN SERPL-MCNC: 3.8 G/DL (ref 3.5–5.2)
ALBUMIN/GLOB SERPL: 1.3 G/DL
ALP SERPL-CCNC: 110 U/L (ref 39–117)
ALT SERPL W P-5'-P-CCNC: 9 U/L (ref 1–41)
ANION GAP SERPL CALCULATED.3IONS-SCNC: 11.7 MMOL/L (ref 5–15)
AST SERPL-CCNC: 13 U/L (ref 1–40)
BASOPHILS # BLD AUTO: 0.04 10*3/MM3 (ref 0–0.2)
BASOPHILS NFR BLD AUTO: 0.6 % (ref 0–1.5)
BILIRUB SERPL-MCNC: 0.6 MG/DL (ref 0–1.2)
BUN SERPL-MCNC: 35 MG/DL (ref 8–23)
BUN/CREAT SERPL: 20.3 (ref 7–25)
CALCIUM SPEC-SCNC: 8.9 MG/DL (ref 8.6–10.5)
CHLORIDE SERPL-SCNC: 105 MMOL/L (ref 98–107)
CO2 SERPL-SCNC: 23.3 MMOL/L (ref 22–29)
CREAT SERPL-MCNC: 1.72 MG/DL (ref 0.76–1.27)
CRP SERPL-MCNC: 4.13 MG/DL (ref 0–0.5)
DEPRECATED RDW RBC AUTO: 40.4 FL (ref 37–54)
EGFRCR SERPLBLD CKD-EPI 2021: 39.4 ML/MIN/1.73
EOSINOPHIL # BLD AUTO: 0.16 10*3/MM3 (ref 0–0.4)
EOSINOPHIL NFR BLD AUTO: 2.4 % (ref 0.3–6.2)
ERYTHROCYTE [DISTWIDTH] IN BLOOD BY AUTOMATED COUNT: 12.6 % (ref 12.3–15.4)
ERYTHROCYTE [SEDIMENTATION RATE] IN BLOOD: 37 MM/HR (ref 0–20)
GLOBULIN UR ELPH-MCNC: 3 GM/DL
GLUCOSE SERPL-MCNC: 119 MG/DL (ref 65–99)
HCT VFR BLD AUTO: 33.1 % (ref 37.5–51)
HGB BLD-MCNC: 11 G/DL (ref 13–17.7)
IMM GRANULOCYTES # BLD AUTO: 0.02 10*3/MM3 (ref 0–0.05)
IMM GRANULOCYTES NFR BLD AUTO: 0.3 % (ref 0–0.5)
LYMPHOCYTES # BLD AUTO: 1.26 10*3/MM3 (ref 0.7–3.1)
LYMPHOCYTES NFR BLD AUTO: 18.9 % (ref 19.6–45.3)
MCH RBC QN AUTO: 28.9 PG (ref 26.6–33)
MCHC RBC AUTO-ENTMCNC: 33.2 G/DL (ref 31.5–35.7)
MCV RBC AUTO: 87.1 FL (ref 79–97)
MONOCYTES # BLD AUTO: 0.54 10*3/MM3 (ref 0.1–0.9)
MONOCYTES NFR BLD AUTO: 8.1 % (ref 5–12)
MRSA DNA SPEC QL NAA+PROBE: NORMAL
NEUTROPHILS NFR BLD AUTO: 4.64 10*3/MM3 (ref 1.7–7)
NEUTROPHILS NFR BLD AUTO: 69.7 % (ref 42.7–76)
NRBC BLD AUTO-RTO: 0 /100 WBC (ref 0–0.2)
PLATELET # BLD AUTO: 282 10*3/MM3 (ref 140–450)
PMV BLD AUTO: 9.1 FL (ref 6–12)
POTASSIUM SERPL-SCNC: 4.4 MMOL/L (ref 3.5–5.2)
PROCALCITONIN SERPL-MCNC: 0.13 NG/ML (ref 0–0.25)
PROT SERPL-MCNC: 6.8 G/DL (ref 6–8.5)
RBC # BLD AUTO: 3.8 10*6/MM3 (ref 4.14–5.8)
SODIUM SERPL-SCNC: 140 MMOL/L (ref 136–145)
WBC NRBC COR # BLD AUTO: 6.66 10*3/MM3 (ref 3.4–10.8)

## 2024-10-27 PROCEDURE — 25010000002 VANCOMYCIN 5 G RECONSTITUTED SOLUTION

## 2024-10-27 PROCEDURE — 25010000002 PIPERACILLIN SOD-TAZOBACTAM PER 1 G

## 2024-10-27 PROCEDURE — 99285 EMERGENCY DEPT VISIT HI MDM: CPT

## 2024-10-27 PROCEDURE — 80053 COMPREHEN METABOLIC PANEL: CPT

## 2024-10-27 PROCEDURE — 36415 COLL VENOUS BLD VENIPUNCTURE: CPT

## 2024-10-27 PROCEDURE — 25810000003 SODIUM CHLORIDE 0.9 % SOLUTION

## 2024-10-27 PROCEDURE — 73140 X-RAY EXAM OF FINGER(S): CPT

## 2024-10-27 PROCEDURE — 87040 BLOOD CULTURE FOR BACTERIA: CPT | Performed by: STUDENT IN AN ORGANIZED HEALTH CARE EDUCATION/TRAINING PROGRAM

## 2024-10-27 PROCEDURE — 85025 COMPLETE CBC W/AUTO DIFF WBC: CPT

## 2024-10-27 PROCEDURE — 84145 PROCALCITONIN (PCT): CPT

## 2024-10-27 PROCEDURE — 96367 TX/PROPH/DG ADDL SEQ IV INF: CPT

## 2024-10-27 PROCEDURE — 87641 MR-STAPH DNA AMP PROBE: CPT

## 2024-10-27 PROCEDURE — 85652 RBC SED RATE AUTOMATED: CPT

## 2024-10-27 PROCEDURE — 96365 THER/PROPH/DIAG IV INF INIT: CPT

## 2024-10-27 PROCEDURE — 86140 C-REACTIVE PROTEIN: CPT

## 2024-10-27 PROCEDURE — 99291 CRITICAL CARE FIRST HOUR: CPT

## 2024-10-27 RX ADMIN — VANCOMYCIN HYDROCHLORIDE 2500 MG: 500 INJECTION, POWDER, LYOPHILIZED, FOR SOLUTION INTRAVENOUS at 14:28

## 2024-10-27 RX ADMIN — PIPERACILLIN SODIUM AND TAZOBACTAM SODIUM 3.38 G: 3; .375 INJECTION, POWDER, LYOPHILIZED, FOR SOLUTION INTRAVENOUS at 14:12

## 2024-10-27 NOTE — ED NOTES
Att I contacted [facility] for transfer of pt, [facility] stated that they do not have a hand specialty, per provider I contacted  for possible transfer, we are currently waiting for a call back from [facility]

## 2024-10-27 NOTE — ED PROVIDER NOTES
Subjective  History of Present Illness:    This is a 79-year male presenting today for evaluation of finger injury, wound check, patient has a history significant for CHF, atrial fibrillation/flutter, carpal tunnel syndrome, diabetes, hypertension.  Presented approximately 1 month ago for a burn to the right distal phalanx, thinks he was taking a pizza out of the oven, has neuropathy in his fingers and did not know that he had burned his finger, it subsequently blistered up, and pop, went to urgent care and followed up with  hand who prescribed him some topical creams, and he was supposed to follow-up subsequently again with them, however skipped this appointment.  He reports over the last 3 to 4 days, the wound now has opened, has exposed tissue, had bloody discharge from the area this morning on his bandage, and has turned black at the distal phalanx.  He denies any fevers or systemic symptoms.  He has 2+ radial pulses on arrival.  No new injuries. Prior hx of multiple amputations.  States that he is up-to-date on his tetanus status.      Nurses Notes reviewed and agree, including vitals, allergies, social history and prior medical history.     REVIEW OF SYSTEMS: All systems reviewed and not pertinent unless noted.  Review of Systems   Constitutional:  Negative for fever.   Skin:  Positive for wound.   All other systems reviewed and are negative.      Past Medical History:   Diagnosis Date    Acid reflux     Arthritis     Arthritis of back 2015    Asthma     Atrial fibrillation/flutter     CHF (congestive heart failure)     CTS (carpal tunnel syndrome) 2018    Diabetes     Fracture of ankle 2008    Hypertension        Allergies:    Chlorhexidine, Latex, Procaine, and Tegaderm ag mesh [silver]      Past Surgical History:   Procedure Laterality Date    AMPUTATION DIGIT Left 10/15/2019    Procedure: AMPUTATE LEFT THIRD TOE;  Surgeon: Juju Weber MD;  Location: [facility];  Service: Orthopedics    AMPUTATION  DIGIT Left 2021    Procedure: amputate left first toe and metatarsal;  Surgeon: Juju Juarez MD;  [facility];  Service: Orthopedics;  Laterality: Left;    AMPUTATION DIGIT Left 2021    Procedure: amputate left 2nd toe and metatarsal, close diabetic ulcer;  Surgeon: Juju Juarez MD;  [facility];  Service: Orthopedics;  Laterality: Left;    AORTAGRAM N/A 2019    Procedure: AORTAGRAM WITH OR WITHOUT RUNOFFS;  Surgeon: Carrillo White MD;  [facility];  Service: Vascular    BELOW KNEE AMPUTATION Right 2019    Procedure: BELOW KNEE AMPUTATION RIGHT;  Surgeon: Juju Juarze MD;  [facility];  Service: Orthopedics    CARDIAC CATHETERIZATION      NO INTERVENTION    CARDIAC ELECTROPHYSIOLOGY PROCEDURE N/A 2021    Procedure: Device Implant;  Surgeon: Amol Foreman MD;  [facility];  Service: Cardiovascular;  Laterality: N/A;    CATARACT EXTRACTION W/ INTRAOCULAR LENS IMPLANT Right 2020    Procedure: CATARACT PHACO EXTRACTION WITH INTRAOCULAR LENS IMPLANT RIGHT;  Surgeon: Jez Mas MD;  Location:  [facility];  Service: Ophthalmology;  Laterality: Right;    CATARACT EXTRACTION W/ INTRAOCULAR LENS IMPLANT Left 2020    Procedure: CATARACT PHACO EXTRACTION WITH INTRAOCULAR LENS IMPLANT LEFT;  Surgeon: Jez Mas MD;  Location:  [facility];  Service: Ophthalmology;  Laterality: Left;    CHOLECYSTECTOMY      COLONOSCOPY      FOOT SURGERY Left 10/15/2019    Amputate 3rd toe- [place]    JOINT REPLACEMENT      KNEE SURGERY Right     TKA         Social History     Socioeconomic History    Marital status:    Tobacco Use    Smoking status: Former     Current packs/day: 0.00     Types: Cigarettes     Start date: 1965     Quit date: 1975     Years since quittin.8    Smokeless tobacco: Never   Vaping Use    Vaping status: Never Used   Substance and Sexual Activity    Alcohol  "use: No    Drug use: No    Sexual activity: Not Currently     Partners: Female     Birth control/protection: Abstinence         Family History   Problem Relation Age of Onset    Cancer Mother     Hypertension Mother     Hypertension Father     Heart attack Father        Objective  Physical Exam:  /64 (BP Location: Left arm, Patient Position: Sitting)   Pulse 68   Temp 97.4 °F (36.3 °C) (Oral)   Resp 18   Ht 188 cm (74\")   Wt 116 kg (255 lb)   SpO2 97%   BMI 32.74 kg/m²      Physical Exam  Vitals and nursing note reviewed.   Constitutional:       General: He is not in acute distress.     Appearance: He is obese.      Comments: Chronically ill-appearing   HENT:      Head: Normocephalic and atraumatic.      Nose: Nose normal.      Mouth/Throat:      Mouth: Mucous membranes are moist.      Pharynx: Oropharynx is clear.   Eyes:      Extraocular Movements: Extraocular movements intact.   Cardiovascular:      Pulses: Normal pulses.      Comments: Appears well-perfused  Pulmonary:      Effort: Pulmonary effort is normal.   Abdominal:      General: Abdomen is flat.   Musculoskeletal:         General: No swelling, tenderness or deformity. Normal range of motion.      Cervical back: Normal range of motion.   Skin:     General: Skin is warm.      Capillary Refill: Capillary refill takes less than 2 seconds.      Findings: Erythema present.      Comments: Patient has open wound at the distal phalanx, necrotic edges, exposed tissue, streaking erythema of the right middle finger, full range of motion.  2+ radial pulses.   Neurological:      General: No focal deficit present.      Mental Status: He is alert and oriented to person, place, and time.   Psychiatric:         Mood and Affect: Mood normal.         Behavior: Behavior normal.         Thought Content: Thought content normal.         Judgment: Judgment normal.               Procedures    ED Course:         Lab Results (last 24 hours)       Procedure Component " Value Units Date/Time    CBC Auto Differential [[id number]]  (Abnormal) Collected: 10/27/24 1249    Specimen: Blood Updated: 10/27/24 1254     WBC 6.66 10*3/mm3      RBC 3.80 10*6/mm3      Hemoglobin 11.0 g/dL      Hematocrit 33.1 %      MCV 87.1 fL      MCH 28.9 pg      MCHC 33.2 g/dL      RDW 12.6 %      RDW-SD 40.4 fl      MPV 9.1 fL      Platelets 282 10*3/mm3      Neutrophil % 69.7 %      Lymphocyte % 18.9 %      Monocyte % 8.1 %      Eosinophil % 2.4 %      Basophil % 0.6 %      Immature Grans % 0.3 %      Neutrophils, Absolute 4.64 10*3/mm3      Lymphocytes, Absolute 1.26 10*3/mm3      Monocytes, Absolute 0.54 10*3/mm3      Eosinophils, Absolute 0.16 10*3/mm3      Basophils, Absolute 0.04 10*3/mm3      Immature Grans, Absolute 0.02 10*3/mm3      nRBC 0.0 /100 WBC     Comprehensive Metabolic Panel [[id number]]  (Abnormal) Collected: 10/27/24 1249    Specimen: Blood Updated: 10/27/24 1312     Glucose 119 mg/dL      BUN 35 mg/dL      Creatinine 1.72 mg/dL      Sodium 140 mmol/L      Potassium 4.4 mmol/L      Chloride 105 mmol/L      CO2 23.3 mmol/L      Calcium 8.9 mg/dL      Total Protein 6.8 g/dL      Albumin 3.8 g/dL      ALT (SGPT) 9 U/L      AST (SGOT) 13 U/L      Alkaline Phosphatase 110 U/L      Total Bilirubin 0.6 mg/dL      Globulin 3.0 gm/dL      A/G Ratio 1.3 g/dL      BUN/Creatinine Ratio 20.3     Anion Gap 11.7 mmol/L      eGFR 39.4 mL/min/1.73     Narrative:      GFR Normal >60  Chronic Kidney Disease <60  Kidney Failure <15    The GFR formula is only valid for adults with stable renal function between ages 18 and 70.    C-reactive Protein [[id number]]  (Abnormal) Collected: 10/27/24 1249    Specimen: Blood Updated: 10/27/24 1312     C-Reactive Protein 4.13 mg/dL     Sedimentation Rate [[id number]]  (Abnormal) Collected: 10/27/24 1249    Specimen: Blood Updated: 10/27/24 1300     Sed Rate 37 mm/hr     Procalcitonin [[id number]]  (Normal) Collected: 10/27/24 1249    Specimen: Blood Updated: 10/27/24 1320  "    Procalcitonin 0.13 ng/mL     Narrative:      As a Marker for Sepsis (Non-Neonates):    1. <0.5 ng/mL represents a low risk of severe sepsis and/or septic shock.  2. >2 ng/mL represents a high risk of severe sepsis and/or septic shock.    As a Marker for Lower Respiratory Tract Infections that require antibiotic therapy:    PCT on Admission    Antibiotic Therapy       6-12 Hrs later    >0.5                Strongly Recommended  >0.25 - <0.5        Recommended   0.1 - 0.25          Discouraged              Remeasure/reassess PCT  <0.1                Strongly Discouraged     Remeasure/reassess PCT    As 28 day mortality risk marker: \"Change in Procalcitonin Result\" (>80% or <=80%) if Day 0 (or Day 1) and Day 4 values are available. Refer to http://www.[employer] [facility]-pct-calculator.com    Change in PCT <=80%  A decrease of PCT levels below or equal to 80% defines a positive change in PCT test result representing a higher risk for 28-day all-cause mortality of patients diagnosed with severe sepsis for septic shock.    Change in PCT >80%  A decrease of PCT levels of more than 80% defines a negative change in PCT result representing a lower risk for 28-day all-cause mortality of patients diagnosed with severe sepsis or septic shock.       MRSA Screen, PCR (Inpatient) - Swab, Nares [[id number]] Collected: 10/27/24 1348    Specimen: Swab from Nares Updated: 10/27/24 1351    Blood Culture With ZORAIDA - Blood, Arm, Left [[id number]] Collected: 10/27/24 1357    Specimen: Blood from Arm, Left Updated: 10/27/24 1404    Blood Culture With ZORAIDA - Blood, Hand, Left [[id number]] Collected: 10/27/24 1402    Specimen: Blood from Hand, Left Updated: 10/27/24 1404             XR Finger 2+ View Right    Result Date: 10/27/2024  PROCEDURE: XR FINGER 2+ VW RIGHT-  INDICATION:  middle finger open wound concern for gangrene infection rule out soft tissue gas osteo  COMPARISON:  None.  FINDINGS:  AP, oblique and lateral views of the right third finger " were obtained. No fracture is visualized. No convincing bone destruction. There is a soft tissue defect of the distal aspect of the right third finger. Significant soft tissue edema of the third digit is present. There are 2 radiopaque densities adjacent to the distal phalanx which could be artifact on the patient. Foreign body not excluded.      Impression: 1. No acute fracture or bone destruction. 2. Soft tissue defect of the distal third finger with questionable foreign body. 3. Significant soft tissue edema consistent with cellulitis.   This report was signed and finalized on 10/27/2024 1:24 PM by Oriana Ferro MD.          [facility]      Initial impression of presenting illness: This is a 79-year-old male presenting for evaluation of finger injury/burn 1 month ago with development of new wound    DDX: includes but is not limited to: Gangrene, dry gangrene, skin necrosis, necrotizing fasciitis, osteomyelitis, abscess, cellulitis, FTS, others    Patient arrives hemodynamically stable afebrile nontachycardic nontachypneic nonhypoxic with vitals interpreted by myself.     Pertinent features from physical exam: Patient has open wound at the distal phalanx, necrotic edges, exposed tissue, and what appears to be exposed bone through the defect, streaking erythema of the right middle finger along with diffuse swelling of this single digit, full range of motion.  2+ radial pulses..  Right lower extremity amputee, prior amputation of the middle aspect of the index finger of the right hand. Please see image below in reference to finger wound.       Initial diagnostic plan: CBC CMP ESR CRP procalcitonin x-ray of the finger    Results from initial plan were reviewed and interpreted by me revealing sed rate of 37, CBC no leukocytosis.  CMP with a creatinine of 1.72, GFR 39.4, glucose 119 BUN of 35, CRP of 4.13, sed rate of 37, procalcitonin normal.  He is not meeting SIRS or sepsis criteria, blood cultures pending.  X-ray  independently reviewed by myself with soft tissue defect appearing to have bone extending to the end of the soft tissue defect.    Diagnostic information from other sources: Record reviewed    Interventions / Re-evaluation: Given vancomycin and Zosyn for concern for dry gangrene of the right middle finger with surrounding cellulitis.  Stable for transfer.    Results/clinical rationale were discussed with patient at bedside, he is agreeable to be transferred to higher level of care for evaluation by hand surgery for suspected dry gangrene infection of the finger.    Consultations/Discussion of results with other physicians: Discussed with ED attending physician attempted to contact [facility], however they noted that they do not have a hand surgeon on-call, I then discussed with [facility].,  Dr. Moran accepting physician.  Patient will be transferred for evaluation by hand surgery and further workup.    Disposition plan: Transfer to [facility] emergency department.  Patient agreeable to plan.    45 minutes of critical care provided. This time excludes other billable procedures. Time does include preparation of documents, medical consultations, review of old records, and direct bedside care. Patient is at high risk for life-threatening deterioration due to gangrene of the finger requiring broad-spectrum antibiotics transfer to higher level of care.    -----    Final diagnoses:   Gangrene of finger   Cellulitis of finger of right hand          Haseeb Agee, JACK  10/27/24 [id number]

## 2024-10-27 NOTE — PROGRESS NOTES
"Pharmacy Consult-Vancomycin Dosing    Amol Aguirre is a  79 y.o. male receiving vancomycin therapy.     Indication: Skin and Soft Tissue Infection  Consulting Provider: JACK Agee    Goal AUC: 400-600 mg/:L*hr    Current Antimicrobial Therapy  Anti-Infectives (From admission, onward)      Ordered     Dose/Rate Route Frequency Start Stop    10/27/24 1340  vancomycin 2500 mg/500 mL 0.9% NS IVPB ([facility])        Ordering Provider: Haseeb Agee PA-C    2,500 mg  over 150 Minutes Intravenous Once 10/27/24 1356      10/27/24 1332  Pharmacy to dose vancomycin        Ordering Provider: Haseeb Agee PA-C     Does not apply Once 10/27/24 1348      10/27/24 1332  piperacillin-tazobactam (ZOSYN) IVPB 3.375 g IVPB in 100 mL NS ([facility])        Ordering Provider: Haseeb Agee PA-C    3.375 g  over 30 Minutes Intravenous Once 10/27/24 1348              Labs  Results from last 7 days   Lab Units 10/27/24  1249   WBC 10*3/mm3 6.66   CREATININE mg/dL 1.72*      Estimated Creatinine Clearance: 45.6 mL/min (A) (by C-G formula based on SCr of 1.72 mg/dL (H)).  Temp Readings from Last 1 Encounters:   10/27/24 97.4 °F (36.3 °C) (Oral)       Microbiology Culture results  Microbiology Results (last 10 days)       ** No results found for the last 240 hours. **            Evaluation of Dosing     Last Dose Received in the ED/Outside Facility: No  Is Patient on Dialysis or Renal Replacement: No    Ht - 188 cm (74\")  Wt - 116 kg (255 lb)    Evaluation of Level                      Insight[employer] AUC Calculation    Current AUC:   New start    Predicted Steady State AUC on Current Dose: New start  _________________________________    Predicted Steady State AUC on New Dose:   One time dose    Assessment/Plan    Pharmacy to dose vancomycin for skin and soft tissue infection. Goal -600 mg/L*hr.  Patient will receive loading dose of vancomycin 2500mg (~21.6mg/kg) IV on 10/27 @ ~1400.   Only one time dose ordered per consult    Thank " you for the consult,    Nino Nixon, CarineD, BCPS   10/27/24 13:47 EDT

## 2024-11-01 LAB
BACTERIA SPEC AEROBE CULT: NORMAL
BACTERIA SPEC AEROBE CULT: NORMAL

## 2024-11-20 ENCOUNTER — APPOINTMENT (OUTPATIENT)
Dept: GENERAL RADIOLOGY | Facility: HOSPITAL | Age: 79
End: 2024-11-20
Payer: MEDICARE

## 2024-11-20 ENCOUNTER — APPOINTMENT (OUTPATIENT)
Dept: CT IMAGING | Facility: HOSPITAL | Age: 79
End: 2024-11-20
Payer: MEDICARE

## 2024-11-20 ENCOUNTER — HOSPITAL ENCOUNTER (INPATIENT)
Facility: HOSPITAL | Age: 79
LOS: 2 days | Discharge: HOME | End: 2024-11-23
Attending: EMERGENCY MEDICINE | Admitting: INTERNAL MEDICINE
Payer: MEDICARE

## 2024-11-20 DIAGNOSIS — I10 HYPERTENSION, UNSPECIFIED TYPE: ICD-10-CM

## 2024-11-20 DIAGNOSIS — R41.82 ALTERED MENTAL STATUS, UNSPECIFIED ALTERED MENTAL STATUS TYPE: Primary | ICD-10-CM

## 2024-11-20 DIAGNOSIS — R41.0 CONFUSION: ICD-10-CM

## 2024-11-20 DIAGNOSIS — R29.6 MULTIPLE FALLS: ICD-10-CM

## 2024-11-20 DIAGNOSIS — E16.2 HYPOGLYCEMIA: ICD-10-CM

## 2024-11-20 DIAGNOSIS — J18.9 PNEUMONIA OF RIGHT LOWER LOBE DUE TO INFECTIOUS ORGANISM: ICD-10-CM

## 2024-11-20 DIAGNOSIS — Z78.9 FAILURE OF OUTPATIENT TREATMENT: ICD-10-CM

## 2024-11-20 PROBLEM — R53.1 GENERALIZED WEAKNESS: Status: ACTIVE | Noted: 2024-11-20

## 2024-11-20 LAB
ALBUMIN SERPL-MCNC: 3.8 G/DL (ref 3.5–5.2)
ALBUMIN/GLOB SERPL: 1.2 G/DL
ALP SERPL-CCNC: 97 U/L (ref 39–117)
ALT SERPL W P-5'-P-CCNC: 7 U/L (ref 1–41)
ANION GAP SERPL CALCULATED.3IONS-SCNC: 12 MMOL/L (ref 5–15)
AST SERPL-CCNC: 23 U/L (ref 1–40)
BACTERIA UR QL AUTO: NORMAL /HPF
BASOPHILS # BLD AUTO: 0.05 10*3/MM3 (ref 0–0.2)
BASOPHILS NFR BLD AUTO: 0.6 % (ref 0–1.5)
BILIRUB SERPL-MCNC: 0.5 MG/DL (ref 0–1.2)
BILIRUB UR QL STRIP: NEGATIVE
BUN SERPL-MCNC: 18 MG/DL (ref 8–23)
BUN/CREAT SERPL: 10.8 (ref 7–25)
CALCIUM SPEC-SCNC: 9.2 MG/DL (ref 8.6–10.5)
CHLORIDE SERPL-SCNC: 107 MMOL/L (ref 98–107)
CLARITY UR: CLEAR
CO2 SERPL-SCNC: 23 MMOL/L (ref 22–29)
COLOR UR: YELLOW
CREAT SERPL-MCNC: 1.66 MG/DL (ref 0.76–1.27)
D-LACTATE SERPL-SCNC: 1.2 MMOL/L (ref 0.5–2)
DEPRECATED RDW RBC AUTO: 44.5 FL (ref 37–54)
EGFRCR SERPLBLD CKD-EPI 2021: 41.2 ML/MIN/1.73
EOSINOPHIL # BLD AUTO: 0.07 10*3/MM3 (ref 0–0.4)
EOSINOPHIL NFR BLD AUTO: 0.9 % (ref 0.3–6.2)
ERYTHROCYTE [DISTWIDTH] IN BLOOD BY AUTOMATED COUNT: 14.1 % (ref 12.3–15.4)
GLOBULIN UR ELPH-MCNC: 3.2 GM/DL
GLUCOSE BLDC GLUCOMTR-MCNC: 145 MG/DL (ref 70–130)
GLUCOSE SERPL-MCNC: 191 MG/DL (ref 65–99)
GLUCOSE UR STRIP-MCNC: NEGATIVE MG/DL
HCT VFR BLD AUTO: 37.6 % (ref 37.5–51)
HGB BLD-MCNC: 11.9 G/DL (ref 13–17.7)
HGB UR QL STRIP.AUTO: ABNORMAL
HOLD SPECIMEN: NORMAL
HYALINE CASTS UR QL AUTO: NORMAL /LPF
IMM GRANULOCYTES # BLD AUTO: 0.02 10*3/MM3 (ref 0–0.05)
IMM GRANULOCYTES NFR BLD AUTO: 0.2 % (ref 0–0.5)
KETONES UR QL STRIP: NEGATIVE
LEUKOCYTE ESTERASE UR QL STRIP.AUTO: NEGATIVE
LYMPHOCYTES # BLD AUTO: 1.11 10*3/MM3 (ref 0.7–3.1)
LYMPHOCYTES NFR BLD AUTO: 13.7 % (ref 19.6–45.3)
MAGNESIUM SERPL-MCNC: 1.8 MG/DL (ref 1.6–2.4)
MCH RBC QN AUTO: 27.5 PG (ref 26.6–33)
MCHC RBC AUTO-ENTMCNC: 31.6 G/DL (ref 31.5–35.7)
MCV RBC AUTO: 86.8 FL (ref 79–97)
MONOCYTES # BLD AUTO: 0.56 10*3/MM3 (ref 0.1–0.9)
MONOCYTES NFR BLD AUTO: 6.9 % (ref 5–12)
NEUTROPHILS NFR BLD AUTO: 6.31 10*3/MM3 (ref 1.7–7)
NEUTROPHILS NFR BLD AUTO: 77.7 % (ref 42.7–76)
NITRITE UR QL STRIP: NEGATIVE
NRBC BLD AUTO-RTO: 0 /100 WBC (ref 0–0.2)
PH UR STRIP.AUTO: 7 [PH] (ref 5–8)
PLATELET # BLD AUTO: 285 10*3/MM3 (ref 140–450)
PMV BLD AUTO: 9.9 FL (ref 6–12)
POTASSIUM SERPL-SCNC: 4.7 MMOL/L (ref 3.5–5.2)
PROCALCITONIN SERPL-MCNC: 0.11 NG/ML (ref 0–0.25)
PROT SERPL-MCNC: 7 G/DL (ref 6–8.5)
PROT UR QL STRIP: ABNORMAL
RBC # BLD AUTO: 4.33 10*6/MM3 (ref 4.14–5.8)
RBC # UR STRIP: NORMAL /HPF
REF LAB TEST METHOD: NORMAL
SODIUM SERPL-SCNC: 142 MMOL/L (ref 136–145)
SP GR UR STRIP: 1.01 (ref 1–1.03)
SQUAMOUS #/AREA URNS HPF: NORMAL /HPF
TROPONIN T SERPL HS-MCNC: 37 NG/L
UROBILINOGEN UR QL STRIP: ABNORMAL
WBC # UR STRIP: NORMAL /HPF
WBC NRBC COR # BLD AUTO: 8.12 10*3/MM3 (ref 3.4–10.8)
WHOLE BLOOD HOLD COAG: NORMAL
WHOLE BLOOD HOLD SPECIMEN: NORMAL

## 2024-11-20 PROCEDURE — 87040 BLOOD CULTURE FOR BACTERIA: CPT | Performed by: EMERGENCY MEDICINE

## 2024-11-20 PROCEDURE — 70450 CT HEAD/BRAIN W/O DYE: CPT

## 2024-11-20 PROCEDURE — 85025 COMPLETE CBC W/AUTO DIFF WBC: CPT | Performed by: EMERGENCY MEDICINE

## 2024-11-20 PROCEDURE — 84484 ASSAY OF TROPONIN QUANT: CPT | Performed by: EMERGENCY MEDICINE

## 2024-11-20 PROCEDURE — 25010000002 CEFTRIAXONE PER 250 MG: Performed by: EMERGENCY MEDICINE

## 2024-11-20 PROCEDURE — 71045 X-RAY EXAM CHEST 1 VIEW: CPT

## 2024-11-20 PROCEDURE — 84145 PROCALCITONIN (PCT): CPT | Performed by: EMERGENCY MEDICINE

## 2024-11-20 PROCEDURE — 99291 CRITICAL CARE FIRST HOUR: CPT

## 2024-11-20 PROCEDURE — 93005 ELECTROCARDIOGRAM TRACING: CPT

## 2024-11-20 PROCEDURE — 80053 COMPREHEN METABOLIC PANEL: CPT | Performed by: EMERGENCY MEDICINE

## 2024-11-20 PROCEDURE — 83735 ASSAY OF MAGNESIUM: CPT | Performed by: EMERGENCY MEDICINE

## 2024-11-20 PROCEDURE — 83605 ASSAY OF LACTIC ACID: CPT | Performed by: EMERGENCY MEDICINE

## 2024-11-20 PROCEDURE — 36415 COLL VENOUS BLD VENIPUNCTURE: CPT

## 2024-11-20 PROCEDURE — 81001 URINALYSIS AUTO W/SCOPE: CPT | Performed by: EMERGENCY MEDICINE

## 2024-11-20 PROCEDURE — 93005 ELECTROCARDIOGRAM TRACING: CPT | Performed by: EMERGENCY MEDICINE

## 2024-11-20 PROCEDURE — 82948 REAGENT STRIP/BLOOD GLUCOSE: CPT

## 2024-11-20 RX ORDER — LABETALOL HYDROCHLORIDE 5 MG/ML
10 INJECTION, SOLUTION INTRAVENOUS ONCE
Status: COMPLETED | OUTPATIENT
Start: 2024-11-20 | End: 2024-11-20

## 2024-11-20 RX ORDER — SODIUM CHLORIDE 0.9 % (FLUSH) 0.9 %
10 SYRINGE (ML) INJECTION AS NEEDED
Status: DISCONTINUED | OUTPATIENT
Start: 2024-11-20 | End: 2024-11-23 | Stop reason: HOSPADM

## 2024-11-20 RX ORDER — DOXYCYCLINE 100 MG/1
100 CAPSULE ORAL ONCE
Status: COMPLETED | OUTPATIENT
Start: 2024-11-20 | End: 2024-11-20

## 2024-11-20 RX ADMIN — SODIUM CHLORIDE 2000 MG: 900 INJECTION INTRAVENOUS at 23:30

## 2024-11-20 RX ADMIN — Medication 10 MG: at 23:30

## 2024-11-20 RX ADMIN — DOXYCYCLINE 100 MG: 100 CAPSULE ORAL at 23:30

## 2024-11-21 ENCOUNTER — APPOINTMENT (OUTPATIENT)
Dept: CARDIOLOGY | Facility: HOSPITAL | Age: 79
End: 2024-11-21
Payer: MEDICARE

## 2024-11-21 LAB
ANION GAP SERPL CALCULATED.3IONS-SCNC: 11 MMOL/L (ref 5–15)
ASCENDING AORTA: 3.7 CM
BASOPHILS # BLD AUTO: 0.05 10*3/MM3 (ref 0–0.2)
BASOPHILS NFR BLD AUTO: 0.6 % (ref 0–1.5)
BH CV ECHO MEAS - AO MAX PG: 24.9 MMHG
BH CV ECHO MEAS - AO ROOT DIAM: 3.6 CM
BH CV ECHO MEAS - AO V2 MAX: 249.5 CM/SEC
BH CV ECHO MEAS - EF(MOD-BP): 64 %
BH CV ECHO MEAS - IVS/LVPW: 1 CM
BH CV ECHO MEAS - IVSD: 0.9 CM
BH CV ECHO MEAS - LA DIMENSION: 5.5 CM
BH CV ECHO MEAS - LAT PEAK E' VEL: 19.9 CM/SEC
BH CV ECHO MEAS - LV MAX PG: 3.6 MMHG
BH CV ECHO MEAS - LV MEAN PG: 2.1 MMHG
BH CV ECHO MEAS - LV V1 MAX: 94.9 CM/SEC
BH CV ECHO MEAS - LV V1 VTI: 22.2 CM
BH CV ECHO MEAS - LVIDD: 5.5 CM
BH CV ECHO MEAS - LVIDS: 3 CM
BH CV ECHO MEAS - LVOT DIAM: 2.1 CM
BH CV ECHO MEAS - LVPWD: 0.9 CM
BH CV ECHO MEAS - MED PEAK E' VEL: 12.5 CM/SEC
BH CV ECHO MEAS - MV A MAX VEL: 76.7 CM/SEC
BH CV ECHO MEAS - MV E MAX VEL: 110.6 CM/SEC
BH CV ECHO MEAS - MV E/A: 1.44
BH CV ECHO MEAS - MV MAX PG: 4.8 MMHG
BH CV ECHO MEAS - MV MEAN PG: 21 MMHG
BH CV ECHO MEAS - MV P1/2T: 49 MSEC
BH CV ECHO MEAS - MV V2 VTI: 26.5 CM
BH CV ECHO MEAS - RAP SYSTOLE: 3 MMHG
BH CV ECHO MEAS - RVSP: 23 MMHG
BH CV ECHO MEAS - TAPSE (>1.6): 2.32 CM
BH CV ECHO MEAS - TR MAX PG: 19.8 MMHG
BH CV ECHO MEAS - TR MAX VEL: 216.2 CM/SEC
BH CV ECHO MEASUREMENTS AVERAGE E/E' RATIO: 6.83
BH CV XLRA - TDI S': 12.1 CM/SEC
BUN SERPL-MCNC: 18 MG/DL (ref 8–23)
BUN/CREAT SERPL: 12.5 (ref 7–25)
CALCIUM SPEC-SCNC: 9 MG/DL (ref 8.6–10.5)
CHLORIDE SERPL-SCNC: 107 MMOL/L (ref 98–107)
CO2 SERPL-SCNC: 23 MMOL/L (ref 22–29)
CREAT SERPL-MCNC: 1.44 MG/DL (ref 0.76–1.27)
DEPRECATED RDW RBC AUTO: 44.9 FL (ref 37–54)
EGFRCR SERPLBLD CKD-EPI 2021: 48.8 ML/MIN/1.73
EOSINOPHIL # BLD AUTO: 0.23 10*3/MM3 (ref 0–0.4)
EOSINOPHIL NFR BLD AUTO: 2.9 % (ref 0.3–6.2)
ERYTHROCYTE [DISTWIDTH] IN BLOOD BY AUTOMATED COUNT: 14 % (ref 12.3–15.4)
GLUCOSE BLDC GLUCOMTR-MCNC: 107 MG/DL (ref 70–130)
GLUCOSE BLDC GLUCOMTR-MCNC: 113 MG/DL (ref 70–130)
GLUCOSE BLDC GLUCOMTR-MCNC: 125 MG/DL (ref 70–130)
GLUCOSE BLDC GLUCOMTR-MCNC: 158 MG/DL (ref 70–130)
GLUCOSE BLDC GLUCOMTR-MCNC: 39 MG/DL (ref 70–130)
GLUCOSE BLDC GLUCOMTR-MCNC: 39 MG/DL (ref 70–130)
GLUCOSE BLDC GLUCOMTR-MCNC: 45 MG/DL (ref 70–130)
GLUCOSE BLDC GLUCOMTR-MCNC: 46 MG/DL (ref 70–130)
GLUCOSE BLDC GLUCOMTR-MCNC: 97 MG/DL (ref 70–130)
GLUCOSE SERPL-MCNC: 36 MG/DL (ref 65–99)
HBA1C MFR BLD: 6 % (ref 4.8–5.6)
HCT VFR BLD AUTO: 35.4 % (ref 37.5–51)
HGB BLD-MCNC: 11.1 G/DL (ref 13–17.7)
IMM GRANULOCYTES # BLD AUTO: 0.02 10*3/MM3 (ref 0–0.05)
IMM GRANULOCYTES NFR BLD AUTO: 0.3 % (ref 0–0.5)
LEFT ATRIUM VOLUME INDEX: 28.6 ML/M2
LYMPHOCYTES # BLD AUTO: 1.48 10*3/MM3 (ref 0.7–3.1)
LYMPHOCYTES NFR BLD AUTO: 18.9 % (ref 19.6–45.3)
MCH RBC QN AUTO: 27.4 PG (ref 26.6–33)
MCHC RBC AUTO-ENTMCNC: 31.4 G/DL (ref 31.5–35.7)
MCV RBC AUTO: 87.4 FL (ref 79–97)
MONOCYTES # BLD AUTO: 0.74 10*3/MM3 (ref 0.1–0.9)
MONOCYTES NFR BLD AUTO: 9.5 % (ref 5–12)
NEUTROPHILS NFR BLD AUTO: 5.31 10*3/MM3 (ref 1.7–7)
NEUTROPHILS NFR BLD AUTO: 67.8 % (ref 42.7–76)
NRBC BLD AUTO-RTO: 0 /100 WBC (ref 0–0.2)
PLATELET # BLD AUTO: 288 10*3/MM3 (ref 140–450)
PMV BLD AUTO: 10.3 FL (ref 6–12)
POTASSIUM SERPL-SCNC: 4.2 MMOL/L (ref 3.5–5.2)
RBC # BLD AUTO: 4.05 10*6/MM3 (ref 4.14–5.8)
SODIUM SERPL-SCNC: 141 MMOL/L (ref 136–145)
WBC NRBC COR # BLD AUTO: 7.83 10*3/MM3 (ref 3.4–10.8)

## 2024-11-21 PROCEDURE — 93306 TTE W/DOPPLER COMPLETE: CPT

## 2024-11-21 PROCEDURE — 82948 REAGENT STRIP/BLOOD GLUCOSE: CPT

## 2024-11-21 PROCEDURE — 99223 1ST HOSP IP/OBS HIGH 75: CPT | Performed by: STUDENT IN AN ORGANIZED HEALTH CARE EDUCATION/TRAINING PROGRAM

## 2024-11-21 PROCEDURE — 97162 PT EVAL MOD COMPLEX 30 MIN: CPT

## 2024-11-21 PROCEDURE — 94640 AIRWAY INHALATION TREATMENT: CPT

## 2024-11-21 PROCEDURE — 25810000003 SODIUM CHLORIDE 0.9 % SOLUTION: Performed by: STUDENT IN AN ORGANIZED HEALTH CARE EDUCATION/TRAINING PROGRAM

## 2024-11-21 PROCEDURE — 25010000002 NICARDIPINE 2.5 MG/ML SOLUTION 10 ML VIAL: Performed by: STUDENT IN AN ORGANIZED HEALTH CARE EDUCATION/TRAINING PROGRAM

## 2024-11-21 PROCEDURE — 97535 SELF CARE MNGMENT TRAINING: CPT

## 2024-11-21 PROCEDURE — 83036 HEMOGLOBIN GLYCOSYLATED A1C: CPT | Performed by: STUDENT IN AN ORGANIZED HEALTH CARE EDUCATION/TRAINING PROGRAM

## 2024-11-21 PROCEDURE — 85025 COMPLETE CBC W/AUTO DIFF WBC: CPT | Performed by: STUDENT IN AN ORGANIZED HEALTH CARE EDUCATION/TRAINING PROGRAM

## 2024-11-21 PROCEDURE — 97166 OT EVAL MOD COMPLEX 45 MIN: CPT

## 2024-11-21 PROCEDURE — 94664 DEMO&/EVAL PT USE INHALER: CPT

## 2024-11-21 PROCEDURE — 80048 BASIC METABOLIC PNL TOTAL CA: CPT | Performed by: STUDENT IN AN ORGANIZED HEALTH CARE EDUCATION/TRAINING PROGRAM

## 2024-11-21 PROCEDURE — 25810000003 SODIUM CHLORIDE 0.9 % SOLUTION 250 ML FLEX CONT: Performed by: STUDENT IN AN ORGANIZED HEALTH CARE EDUCATION/TRAINING PROGRAM

## 2024-11-21 PROCEDURE — 25010000002 CEFTRIAXONE PER 250 MG: Performed by: STUDENT IN AN ORGANIZED HEALTH CARE EDUCATION/TRAINING PROGRAM

## 2024-11-21 RX ORDER — FLECAINIDE ACETATE 50 MG/1
50 TABLET ORAL EVERY 12 HOURS SCHEDULED
Status: DISCONTINUED | OUTPATIENT
Start: 2024-11-21 | End: 2024-11-23 | Stop reason: HOSPADM

## 2024-11-21 RX ORDER — SODIUM CHLORIDE 9 MG/ML
40 INJECTION, SOLUTION INTRAVENOUS AS NEEDED
Status: DISCONTINUED | OUTPATIENT
Start: 2024-11-21 | End: 2024-11-23 | Stop reason: HOSPADM

## 2024-11-21 RX ORDER — SODIUM CHLORIDE 9 MG/ML
100 INJECTION, SOLUTION INTRAVENOUS CONTINUOUS
Status: ACTIVE | OUTPATIENT
Start: 2024-11-21 | End: 2024-11-21

## 2024-11-21 RX ORDER — INSULIN LISPRO 100 [IU]/ML
3-14 INJECTION, SOLUTION INTRAVENOUS; SUBCUTANEOUS
Status: DISCONTINUED | OUTPATIENT
Start: 2024-11-21 | End: 2024-11-21

## 2024-11-21 RX ORDER — ACETAMINOPHEN 325 MG/1
650 TABLET ORAL EVERY 6 HOURS PRN
Status: DISCONTINUED | OUTPATIENT
Start: 2024-11-21 | End: 2024-11-23 | Stop reason: HOSPADM

## 2024-11-21 RX ORDER — BUDESONIDE AND FORMOTEROL FUMARATE DIHYDRATE 160; 4.5 UG/1; UG/1
2 AEROSOL RESPIRATORY (INHALATION)
Status: DISCONTINUED | OUTPATIENT
Start: 2024-11-21 | End: 2024-11-23 | Stop reason: HOSPADM

## 2024-11-21 RX ORDER — DOXYCYCLINE 100 MG/1
100 CAPSULE ORAL EVERY 12 HOURS SCHEDULED
Status: DISCONTINUED | OUTPATIENT
Start: 2024-11-21 | End: 2024-11-23 | Stop reason: HOSPADM

## 2024-11-21 RX ORDER — IBUPROFEN 600 MG/1
1 TABLET ORAL
Status: DISCONTINUED | OUTPATIENT
Start: 2024-11-21 | End: 2024-11-23 | Stop reason: HOSPADM

## 2024-11-21 RX ORDER — SODIUM CHLORIDE 0.9 % (FLUSH) 0.9 %
10 SYRINGE (ML) INJECTION EVERY 12 HOURS SCHEDULED
Status: DISCONTINUED | OUTPATIENT
Start: 2024-11-21 | End: 2024-11-23 | Stop reason: HOSPADM

## 2024-11-21 RX ORDER — DEXTROSE MONOHYDRATE 25 G/50ML
25 INJECTION, SOLUTION INTRAVENOUS
Status: DISCONTINUED | OUTPATIENT
Start: 2024-11-21 | End: 2024-11-23 | Stop reason: HOSPADM

## 2024-11-21 RX ORDER — PANTOPRAZOLE SODIUM 40 MG/1
40 TABLET, DELAYED RELEASE ORAL DAILY
Status: DISCONTINUED | OUTPATIENT
Start: 2024-11-21 | End: 2024-11-23 | Stop reason: HOSPADM

## 2024-11-21 RX ORDER — ONDANSETRON 2 MG/ML
4 INJECTION INTRAMUSCULAR; INTRAVENOUS EVERY 6 HOURS PRN
Status: DISCONTINUED | OUTPATIENT
Start: 2024-11-21 | End: 2024-11-23 | Stop reason: HOSPADM

## 2024-11-21 RX ORDER — NITROGLYCERIN 0.4 MG/1
0.4 TABLET SUBLINGUAL
Status: DISCONTINUED | OUTPATIENT
Start: 2024-11-21 | End: 2024-11-23 | Stop reason: HOSPADM

## 2024-11-21 RX ORDER — NICOTINE POLACRILEX 4 MG
15 LOZENGE BUCCAL
Status: DISCONTINUED | OUTPATIENT
Start: 2024-11-21 | End: 2024-11-23 | Stop reason: HOSPADM

## 2024-11-21 RX ORDER — AMLODIPINE BESYLATE 10 MG/1
10 TABLET ORAL
Status: DISCONTINUED | OUTPATIENT
Start: 2024-11-21 | End: 2024-11-23 | Stop reason: HOSPADM

## 2024-11-21 RX ORDER — METOPROLOL SUCCINATE 25 MG/1
25 TABLET, EXTENDED RELEASE ORAL
Status: DISCONTINUED | OUTPATIENT
Start: 2024-11-21 | End: 2024-11-23 | Stop reason: HOSPADM

## 2024-11-21 RX ORDER — SODIUM CHLORIDE 0.9 % (FLUSH) 0.9 %
10 SYRINGE (ML) INJECTION AS NEEDED
Status: DISCONTINUED | OUTPATIENT
Start: 2024-11-21 | End: 2024-11-23 | Stop reason: HOSPADM

## 2024-11-21 RX ORDER — LOSARTAN POTASSIUM 25 MG/1
25 TABLET ORAL
Status: DISCONTINUED | OUTPATIENT
Start: 2024-11-21 | End: 2024-11-23 | Stop reason: HOSPADM

## 2024-11-21 RX ADMIN — FLECAINIDE ACETATE 50 MG: 50 TABLET ORAL at 20:26

## 2024-11-21 RX ADMIN — ACETAMINOPHEN 650 MG: 325 TABLET ORAL at 10:18

## 2024-11-21 RX ADMIN — FLECAINIDE ACETATE 50 MG: 50 TABLET ORAL at 10:07

## 2024-11-21 RX ADMIN — APIXABAN 2.5 MG: 2.5 TABLET, FILM COATED ORAL at 10:08

## 2024-11-21 RX ADMIN — Medication 10 ML: at 10:08

## 2024-11-21 RX ADMIN — LOSARTAN POTASSIUM 25 MG: 25 TABLET, FILM COATED ORAL at 15:51

## 2024-11-21 RX ADMIN — PANTOPRAZOLE SODIUM 40 MG: 40 TABLET, DELAYED RELEASE ORAL at 10:08

## 2024-11-21 RX ADMIN — APIXABAN 2.5 MG: 2.5 TABLET, FILM COATED ORAL at 20:26

## 2024-11-21 RX ADMIN — Medication 10 ML: at 20:26

## 2024-11-21 RX ADMIN — NICARDIPINE HYDROCHLORIDE 5 MG/HR: 25 INJECTION, SOLUTION INTRAVENOUS at 04:58

## 2024-11-21 RX ADMIN — SODIUM CHLORIDE 100 ML/HR: 9 INJECTION, SOLUTION INTRAVENOUS at 04:57

## 2024-11-21 RX ADMIN — DOXYCYCLINE 100 MG: 100 CAPSULE ORAL at 20:26

## 2024-11-21 RX ADMIN — SODIUM CHLORIDE 2000 MG: 900 INJECTION INTRAVENOUS at 20:26

## 2024-11-21 RX ADMIN — METOPROLOL SUCCINATE 25 MG: 25 TABLET, EXTENDED RELEASE ORAL at 10:17

## 2024-11-21 RX ADMIN — DOXYCYCLINE 100 MG: 100 CAPSULE ORAL at 10:08

## 2024-11-21 RX ADMIN — BUDESONIDE AND FORMOTEROL FUMARATE DIHYDRATE 2 PUFF: 160; 4.5 AEROSOL RESPIRATORY (INHALATION) at 20:18

## 2024-11-21 RX ADMIN — AMLODIPINE BESYLATE 10 MG: 10 TABLET ORAL at 15:51

## 2024-11-21 NOTE — ED NOTES
Amol Aguirre    Nursing Report ED to Floor:  Mental status: A&OX4  Ambulatory status: WHEELCHAIR  Oxygen Therapy:  RA  Cardiac Rhythm: NSR  Admitted from: HOME  Safety Concerns:  FALL RISK   Social Issues: NA  ED Room #:  21    ED Nurse Phone Extension - [phone] or may call [phone].      HPI:   Chief Complaint   Patient presents with    Altered Mental Status       Past Medical History:  Past Medical History:   Diagnosis Date    Acid reflux     Arthritis     Arthritis of back 2015    Asthma     Atrial fibrillation/flutter     CHF (congestive heart failure)     CTS (carpal tunnel syndrome) 2018    Diabetes     Fracture of ankle 2008    Hypertension         Past Surgical History:  Past Surgical History:   Procedure Laterality Date    AMPUTATION DIGIT Left 10/15/2019    Procedure: AMPUTATE LEFT THIRD TOE;  Surgeon: Juju Juarez MD;  Location: [facility] OR;  Service: Orthopedics    AMPUTATION DIGIT Left 01/07/2021    Procedure: amputate left first toe and metatarsal;  Surgeon: Juju Juarez MD;  Location: [facility] OR;  Service: Orthopedics;  Laterality: Left;    AMPUTATION DIGIT Left 08/31/2021    Procedure: amputate left 2nd toe and metatarsal, close diabetic ulcer;  Surgeon: Juju Juarez MD;  Location: [facility] OR;  Service: Orthopedics;  Laterality: Left;    AORTAGRAM N/A 04/30/2019    Procedure: AORTAGRAM WITH OR WITHOUT RUNOFFS;  Surgeon: Carrillo White MD;  Location: [facility] HYBRID OR 15;  Service: Vascular    BELOW KNEE AMPUTATION Right 06/04/2019    Procedure: BELOW KNEE AMPUTATION RIGHT;  Surgeon: Juju Juarez MD;  Location: [facility] OR;  Service: Orthopedics    CARDIAC CATHETERIZATION      NO INTERVENTION    CARDIAC ELECTROPHYSIOLOGY PROCEDURE N/A 05/28/2021    Procedure: Device Implant;  Surgeon: Amol Foreman MD;  Location: [facility] CATH INVASIVE LOCATION;  Service: Cardiovascular;  Laterality: N/A;    CATARACT EXTRACTION W/ INTRAOCULAR LENS IMPLANT Right 07/20/2020    Procedure: CATARACT PHACO  "EXTRACTION WITH INTRAOCULAR LENS IMPLANT RIGHT;  Surgeon: Jez Mas MD;  Location: [facility] OR;  Service: Ophthalmology;  Laterality: Right;    CATARACT EXTRACTION W/ INTRAOCULAR LENS IMPLANT Left 08/03/2020    Procedure: CATARACT PHACO EXTRACTION WITH INTRAOCULAR LENS IMPLANT LEFT;  Surgeon: Jez Mas MD;  Location: [facility];  Service: Ophthalmology;  Laterality: Left;    CHOLECYSTECTOMY      COLONOSCOPY      FOOT SURGERY Left 10/15/2019    Amputate 3rd toe- [place]    JOINT REPLACEMENT      KNEE SURGERY Right     TKA        Admitting Doctor:   No admitting provider for patient encounter.    Consulting Provider(s):  Consults       No orders found from 10/22/2024 to 11/21/2024.             Admitting Diagnosis:   The primary encounter diagnosis was Altered mental status, unspecified altered mental status type. Diagnoses of Pneumonia of right lower lobe due to infectious organism, Hypertension, unspecified type, Confusion, Multiple falls, Failure of outpatient treatment, and Hypoglycemia were also pertinent to this visit.    Most Recent Vitals:   Vitals:    11/20/24 1917 11/20/24 1919 11/20/24 1930 11/20/24 2000   BP: (!) 209/96 (!) 209/96 (!) 201/91 (!) 200/97   BP Location:  Right arm     Patient Position:  Sitting     Pulse: 71 71 71 73   Resp:  19     SpO2: 98% 99% 98% 99%   Weight:  121 kg (266 lb)     Height:  190.5 cm (75\")         Active LDAs/IV Access:   Lines, Drains & Airways       Active LDAs       Name Placement date Placement time Site Days    Peripheral IV 11/20/24 1912 Anterior;Distal;Right;Upper Arm 11/20/24 1912  Arm  less than 1                    Labs (abnormal labs have a star):   Labs Reviewed   COMPREHENSIVE METABOLIC PANEL - Abnormal; Notable for the following components:       Result Value    Glucose 191 (*)     Creatinine 1.66 (*)     eGFR 41.2 (*)     All other components within normal limits    Narrative:     GFR Normal >60  Chronic Kidney Disease <60  Kidney Failure " "<15    The GFR formula is only valid for adults with stable renal function between ages 18 and 70.   SINGLE HS TROPONIN T - Abnormal; Notable for the following components:    HS Troponin T 37 (*)     All other components within normal limits    Narrative:     High Sensitive Troponin T Reference Range:  <14.0 ng/L- Negative Female for AMI  <22.0 ng/L- Negative Male for AMI  >=14 - Abnormal Female indicating possible myocardial injury.  >=22 - Abnormal Male indicating possible myocardial injury.   Clinicians would have to utilize clinical acumen, EKG, Troponin, and serial changes to determine if it is an Acute Myocardial Infarction or myocardial injury due to an underlying chronic condition.        CBC WITH AUTO DIFFERENTIAL - Abnormal; Notable for the following components:    Hemoglobin 11.9 (*)     Neutrophil % 77.7 (*)     Lymphocyte % 13.7 (*)     All other components within normal limits   URINALYSIS W/ MICROSCOPIC IF INDICATED (NO CULTURE) - Abnormal; Notable for the following components:    Blood, UA Trace (*)     Protein,  mg/dL (2+) (*)     All other components within normal limits   POCT GLUCOSE FINGERSTICK - Abnormal; Notable for the following components:    Glucose 145 (*)     All other components within normal limits   MAGNESIUM - Normal   PROCALCITONIN - Normal    Narrative:     As a Marker for Sepsis (Non-Neonates):    1. <0.5 ng/mL represents a low risk of severe sepsis and/or septic shock.  2. >2 ng/mL represents a high risk of severe sepsis and/or septic shock.    As a Marker for Lower Respiratory Tract Infections that require antibiotic therapy:    PCT on Admission    Antibiotic Therapy       6-12 Hrs later    >0.5                Strongly Recommended  >0.25 - <0.5        Recommended   0.1 - 0.25          Discouraged              Remeasure/reassess PCT  <0.1                Strongly Discouraged     Remeasure/reassess PCT    As 28 day mortality risk marker: \"Change in Procalcitonin Result\" (>80% " or <=80%) if Day 0 (or Day 1) and Day 4 values are available. Refer to http://[email or web address]-pct-calculator.com    Change in PCT <=80%  A decrease of PCT levels below or equal to 80% defines a positive change in PCT test result representing a higher risk for 28-day all-cause mortality of patients diagnosed with severe sepsis for septic shock.    Change in PCT >80%  A decrease of PCT levels of more than 80% defines a negative change in PCT result representing a lower risk for 28-day all-cause mortality of patients diagnosed with severe sepsis or septic shock.      LACTIC ACID, PLASMA - Normal   BLOOD CULTURE   BLOOD CULTURE   RAINBOW DRAW    Narrative:     The following orders were created for panel order [place] Draw.  Procedure                               Abnormality         Status                     ---------                               -----------         ------                     Green Top (Gel)[[id number]]                                  Final result               Lavender Top[[id number]]                                     Final result               Gold Top - SST[[id number]]                                   Final result               [place] Top[[id number]]                                         Final result               Light Blue Top[[id number]]                                   Final result                 Please view results for these tests on the individual orders.   URINALYSIS, MICROSCOPIC ONLY   POCT GLUCOSE FINGERSTICK   CBC AND DIFFERENTIAL    Narrative:     The following orders were created for panel order CBC & Differential.  Procedure                               Abnormality         Status                     ---------                               -----------         ------                     CBC Auto Differential[[id number]]        Abnormal            Final result                 Please view results for these tests on the individual orders.   GREEN TOP   LAVENDER TOP   GOLD TOP - SST   GRAY TOP    LIGHT BLUE TOP       Meds Given in ED:   Medications   sodium chloride 0.9 % flush 10 mL (has no administration in time range)   cefTRIAXone (ROCEPHIN) 2,000 mg in sodium chloride 0.9 % 100 mL MBP (2,000 mg Intravenous New Bag 11/20/24 2330)   labetalol (NORMODYNE,TRANDATE) injection 10 mg (10 mg Intravenous Given 11/20/24 2330)   doxycycline (MONODOX) capsule 100 mg (100 mg Oral Given 11/20/24 2330)           Last NIH score:                                                          Dysphagia screening results:        Joey Coma Scale:  No data recorded     CIWA:        Restraint Type:            Isolation Status:  No active isolations

## 2024-11-21 NOTE — NURSING NOTE
Patient admitted to room 477 with diagnosis of gen weakness. Patient is alert and oriented, right bka noted with 3 toes amputated on left foot. Patient noted to have a skin tear to left shin, small abrasion to 4th toe on left foot, no bleeding noted. Generalized bruising noted, patient reports from recent falls at home. Patient states he uses a wheelchair at home. Room orientation complete, call light within reach, bed alarm on and functioning.

## 2024-11-21 NOTE — CONSULTS
Cardiology Consult - [facility]    Amol Aguirre     [id number]  1945  [address] [place] [address]         Admission Date:  11/20/2024    Consultation Date:  11/21/24        PCP:  Sam Preston MD  Referring MD: Dr. Kerley, hospitalist  Consulting MD: Dr. Morales          CC: Generalized weakness    Reason for Consult: Hypertensive urgency, BP management        Allergies:  is allergic to chlorhexidine, latex, procaine, and tegaderm ag mesh [silver].    Medications Prior to Admission   Medication Sig Dispense Refill Last Dose/Taking    acetaminophen (TYLENOL) 325 MG tablet Take 2 tablets by mouth As Needed.       allopurinol (ZYLOPRIM) 100 MG tablet Take 2 tablets by mouth Daily.       amLODIPine (NORVASC) 10 MG tablet Take 1 tablet by mouth Daily.       apixaban (ELIQUIS) 2.5 MG tablet tablet Take 1 tablet by mouth 2 (Two) Times a Day.       bicalutamide (CASODEX) 50 MG tablet Take 1 tablet by mouth Daily.       budesonide-formoterol (SYMBICORT) 80-4.5 MCG/ACT inhaler Inhale 2 puffs 2 (Two) Times a Day As Needed.       bumetanide (BUMEX) 1 MG tablet Take 1 tablet by mouth Daily.       cetirizine (zyrTEC) 10 MG tablet Take 0.5 tablets by mouth Daily for 7 days. 4 tablet 0     citalopram (CeleXA) 10 MG tablet Take 1 tablet by mouth Daily.       Comfort EZ Pen Needles 31G X 5 MM misc        Cyanocobalamin ER 2000 MCG tablet controlled-release Take 1,000 mcg by mouth Daily.       diphenhydrAMINE-acetaminophen (TYLENOL PM)  MG tablet per tablet Take 2 tablets every day by oral route at bedtime.       EPINEPHrine (EPIPEN) 0.3 MG/0.3ML solution auto-injector injection Inject  into the appropriate muscle as directed by prescriber.       flecainide (TAMBOCOR) 50 MG tablet Take 1 tablet by mouth.       insulin aspart (novoLOG FLEXPEN) 100 UNIT/ML solution pen-injector sc pen Inject 15 Units under the skin into the appropriate area as directed 3 (Three) Times a Day With Meals.        Insulin Glargine (BASAGLAR KWIKPEN) 100 UNIT/ML injection pen Inject 68 Units under the skin into the appropriate area as directed Every Night.       ipratropium-albuterol (DUO-NEB) 0.5-2.5 mg/3 ml nebulizer Inhale 3 mL 4 times a day by nebulization route.       Jardiance 25 MG tablet Take 1 tablet by mouth Daily.       metoclopramide (REGLAN) 10 MG tablet Take 1 tablet by mouth 4 (Four) Times a Day Before Meals & at Bedtime.  8     mupirocin (BACTROBAN) 2 % ointment        pantoprazole (PROTONIX) 40 MG EC tablet Take 1 tablet by mouth Daily.  2     sodium zirconium cyclosilicate (Lokelma) 10 g pack Take 10 g by mouth Every Other Day. Powder mixed in water       True Metrix Blood Glucose Test test strip TEST BLOOD SUGAR THREE TIMES DAILY AND AS NEEDED          niCARdipine, 5-15 mg/hr, Last Rate: 5 mg/hr (11/21/24 0693)  sodium chloride, 100 mL/hr, Last Rate: 100 mL/hr (11/21/24 8799)          HPI:  Amol Aguirre is an 79-year-old CM with PMHx HTN, HLP, DM2, chronic HFpEF, CKD, SSS with PPM/PAF on chronic NOAC, recent history of osteomyelitis with amputation of right middle finger, PAD with right AKA.  Presents to [facility] ED with generalized weakness and confusion over the last 2 to 3 weeks.  When EMS arrived, blood glucose was 50.  He reports he was recently seen at local ED and diagnosed with UTI and pneumonia along with watery diarrhea.  Upon arrival to our ED here at [facility], he was found to be hypertensive /96, 98% O2 RA.  Creatinine 1.66.  CXR was unremarkable, CT head showed no acute processes.  He was given IV antibiotics and started on labetalol with admission to hospitalist services.  Cardiology has been asked to consult with patient for management of BP.    At time of consultation, patient's BP is now 129/69.  He is currently on a Cardene drip that is being weaned.  He is getting no other oral antihypertensives.  According to records, he was taking amlodipine 10 mg daily, Bumex 1 mg daily at home.  He  reports he checks his BP daily and it consistently runs 150-170 and he is asking us if this is high.  He denies chest pain or exertional angina, denies dyspnea.  He reports feeling much better this morning than he did last night.          Social History     Socioeconomic History    Marital status:    Tobacco Use    Smoking status: Former     Current packs/day: 0.00     Types: Cigarettes     Start date: 1965     Quit date: 1975     Years since quittin.9    Smokeless tobacco: Never   Vaping Use    Vaping status: Never Used   Substance and Sexual Activity    Alcohol use: No    Drug use: No    Sexual activity: Not Currently     Partners: Female     Birth control/protection: Abstinence     Family History   Problem Relation Age of Onset    Cancer Mother     Hypertension Mother     Hypertension Father     Heart attack Father      Past Surgical History:   Procedure Laterality Date    AMPUTATION DIGIT Left 10/15/2019    Procedure: AMPUTATE LEFT THIRD TOE;  Surgeon: Juju Juarez MD;  Location:  [facility] OR;  Service: Orthopedics    AMPUTATION DIGIT Left 2021    Procedure: amputate left first toe and metatarsal;  Surgeon: Juju Juarez MD;  Location:  [facility] OR;  Service: Orthopedics;  Laterality: Left;    AMPUTATION DIGIT Left 2021    Procedure: amputate left 2nd toe and metatarsal, close diabetic ulcer;  Surgeon: Juju Juarez MD;  Location:  [facility] OR;  Service: Orthopedics;  Laterality: Left;    AORTAGRAM N/A 2019    Procedure: AORTAGRAM WITH OR WITHOUT RUNOFFS;  Surgeon: Carrillo White MD;  Location:  [facility];  Service: Vascular    BELOW KNEE AMPUTATION Right 2019    Procedure: BELOW KNEE AMPUTATION RIGHT;  Surgeon: Juju Juarez MD;  Location:  [facility] OR;  Service: Orthopedics    CARDIAC CATHETERIZATION      NO INTERVENTION    CARDIAC ELECTROPHYSIOLOGY PROCEDURE N/A 2021    Procedure: Device Implant;  Surgeon: Amol Foreman MD;  Location:  [facility]  "CATH INVASIVE LOCATION;  Service: Cardiovascular;  Laterality: N/A;    CATARACT EXTRACTION W/ INTRAOCULAR LENS IMPLANT Right 07/20/2020    Procedure: CATARACT PHACO EXTRACTION WITH INTRAOCULAR LENS IMPLANT RIGHT;  Surgeon: Jez Mas MD;  Location: [facility] OR;  Service: Ophthalmology;  Laterality: Right;    CATARACT EXTRACTION W/ INTRAOCULAR LENS IMPLANT Left 08/03/2020    Procedure: CATARACT PHACO EXTRACTION WITH INTRAOCULAR LENS IMPLANT LEFT;  Surgeon: Jez Mas MD;  Location: [facility] OR;  Service: Ophthalmology;  Laterality: Left;    CHOLECYSTECTOMY      COLONOSCOPY      FOOT SURGERY Left 10/15/2019    Amputate 3rd toe- [place]    JOINT REPLACEMENT      KNEE SURGERY Right     TKA     ROS: Pertinent items are noted in HPI, all other systems reviewed and negative     Objective     height is 190.5 cm (75\") and weight is 112 kg (247 lb 12.8 oz). His oral temperature is 98.1 °F (36.7 °C). His blood pressure is 129/69 and his pulse is 67. His respiration is 17 and oxygen saturation is 96%.    Intake/Output Summary (Last 24 hours) at 11/21/2024 0838  Last data filed at 11/21/2024 0634  Gross per 24 hour   Intake --   Output 300 ml   Net -300 ml     Intake/Output                   11/20/24 0701 - 11/21/24 0700     [id number] Total              Intake    Total Intake -- -- --       Output    Urine  --  300 300    Total Output -- 300 300               11/20/24  1919 11/21/24  0430 11/21/24  0521   Weight: 121 kg (266 lb) 112 kg (247 lb 12.8 oz) 112 kg (247 lb 12.8 oz)          Physical Exam:  General Appearance:    Alert, cooperative, in no acute distress   Head:    Normocephalic, without obvious abnormality, atraumatic   Eyes:            Lids and lashes normal, conjunctivae and sclerae normal, no   icterus, no pallor, corneas clear, PERRLA   Ears:    Ears appear intact with no abnormalities noted   Throat:   No oral lesions, no thrush, oral mucosa moist   Neck:   No adenopathy, supple, trachea " midline, no thyromegaly, no carotid bruit, no JVD   Back:     No kyphosis present, no scoliosis present, no skin lesions,    erythema or scars, no tenderness to percussion or                   palpation, range of motion normal   Lungs:     Clear to auscultation,respirations regular, even and               unlabored    Heart:    Regular rhythm and normal rate, normal S1 and S2, no         murmur, no gallop, no rub, no click   Abdomen:     Normal bowel sounds, no masses, no organomegaly, soft     nontender, nondistended, no guarding, no rebound   tenderness   Extremities:   Moves all extremities well,  no cyanosis, no redness, no edema   Pulses:   Pulses palpable and equal bilaterally   Skin:   No bleeding, bruising or rash   Lymph nodes:   No palpable adenopathy   Neurologic:   Cranial nerves 2 - 12 grossly intact, sensation intact, DTR     present and equal bilaterally          Results Review:  I personally reviewed the patient's clinical results.  Results from last 7 days   Lab Units 11/21/24  0615   WBC 10*3/mm3 7.83   HEMOGLOBIN g/dL 11.1*   HEMATOCRIT % 35.4*   PLATELETS 10*3/mm3 288     Results from last 7 days   Lab Units 11/21/24  0615 11/20/24  1937   SODIUM mmol/L 141 142   POTASSIUM mmol/L 4.2 4.7   CHLORIDE mmol/L 107 107   CO2 mmol/L 23.0 23.0   BUN mg/dL 18 18   CREATININE mg/dL 1.44* 1.66*   CALCIUM mg/dL 9.0 9.2   BILIRUBIN mg/dL  --  0.5   ALK PHOS U/L  --  97   ALT (SGPT) U/L  --  7   AST (SGOT) U/L  --  23   GLUCOSE mg/dL 36* 191*     Results from last 7 days   Lab Units 11/21/24  0615   SODIUM mmol/L 141   POTASSIUM mmol/L 4.2   CHLORIDE mmol/L 107   CO2 mmol/L 23.0   BUN mg/dL 18   CREATININE mg/dL 1.44*   GLUCOSE mg/dL 36*   CALCIUM mg/dL 9.0                             Radiology:  Imaging Results (Last 72 Hours)       Procedure Component Value Units Date/Time    CT Head Without Contrast [[id number]] Collected: 11/20/24 2252     Updated: 11/20/24 2303    Narrative:      CT HEAD WO  CONTRAST    Date of Exam: 11/20/2024 10:26 PM EST    Indication: AMS, fall, head injury.    Comparison: None available    Technique: Axial CT images were obtained of the head without contrast administration.  Automated exposure control and iterative construction methods were used.      Findings:  The ventricles and sulci are proportionally prominent compatible with global cerebral volume loss. There is no mass effect or midline shift. There is no acute intracranial hemorrhage. There is no abnormal extra-axial fluid collection. The gray-white   matter differentiation is preserved. There is confluent periventricular and subcortical white matter hypodensity likely representing sequelae of chronic small vessel ischemic disease. There is a macroscopic fat containing lesion measuring 9 mm within the   right posterior ambient cistern between the posterior aspect of the midbrain and cerebellum. The calvarium is intact. The mastoid air cells and middle ears are well aerated. The visualized orbits and globes appear symmetric with thinning of the lenses   bilaterally.      Impression:      Impression:  1. No acute intracranial abnormality. Specifically, no evidence of acute hemorrhage, mass effect or midline shift.  2. Confluent periventricular and subcortical white matter hypodensity likely representing sequelae of chronic small vessel ischemic disease.          Electronically Signed: Domo Gaviria    11/20/2024 11:00 PM EST    Workstation ID: [id number]    XR Chest 1 View [[id number]] Collected: 11/20/24 2030     Updated: 11/20/24 2035    Narrative:      XR CHEST 1 VW    Date of Exam: 11/20/2024 7:19 PM EST    Indication: Weak/Dizzy/AMS triage protocol    Comparison: Chest radiograph dated 5/28/2021    Findings:  There is a left chest wall pacemaker with leads in expected position. The cardiomediastinal silhouette is within normal limits. There is an elevated right hemidiaphragm with associated right basilar atelectasis.  There is no definite pleural effusion.   There is no pneumothorax. There are degenerative changes of the thoracic spine.      Impression:      Impression:  1. Left chest wall pacemaker with leads in expected position.  2. Elevated right hemidiaphragm with right basilar airspace disease.      Electronically Signed: Domo Gaviria    11/20/2024 8:31 PM EST    Workstation ID: [id number]              Tele: NSR    Assessment:  -79-year-old CM presents to [facility] ED with progressive weakness over the last week worse in the last few days with recent UTI/pneumonia.  -Hypertensive urgency with poor BP control at home  -HLP  -DM2 with A1c of 6  -Recent amputation distal phalanx right middle finger with completed course of antibiotics for osteomyelitis  -Pneumonia  -Diarrhea  -PAF on Eliquis and flecainide at home.  SSS with permanent pacemaker  -Gout    Plan:  -Admitted to hospitalist services, appreciate their assistance.  -Continue Eliquis, flecainide.  -Wean Cardene.  Add Toprol-XL.  Discontinue home amlodipine.  Continue home Bumex, monitor daily labs.  -Cardiology will see again in AM.          I discussed the patient's findings and my recommendations with the patient, any present family members, and the nursing staff.  Eugene Morales MD saw and examined patient, verified hx and PE, read all radiographic studies, reviewed labs and micro data, and formulated dx, plan for treatment and all medical decision making.      Gayle Farah PA-C, working with Eugene Morales MD  11/21/24 08:38 EST

## 2024-11-21 NOTE — PROGRESS NOTES
[facility] Medicine Services  ADMISSION FOLLOW-UP NOTE          Patient admitted after midnight, H&P by my partner performed earlier on today's date reviewed.  Interim findings, labs, and charting also reviewed.        [facility] Problem List has been managed and updated to include any new diagnoses:  Active Hospital Problems    Diagnosis  POA    **Generalized weakness [R53.1]  Yes      Resolved Hospital Problems   No resolved problems to display.         ADDITIONAL PLAN:  - detailed assessment and plan from admission reviewed  - 79 y.o. male with atrial fibrillation on Eliquis, asthma, hypertension, gout, GERD, type 2 diabetes, heart failure preserved ejection fraction, CKD, pacemaker, right BKA, recent history of osteomyelitis distal phalanx of right middle finger with IV antibiotics and amputation.  He presented to [facility] ED on 11/20/2024 with concern for generalized weakness progressively worse over a few weeks and especially the last few days.  Recently seen at [place] ED told he had a UTI, pneumonia, CHF.  Day prior to presentation also feeling weak and family found him on the floor.  Says he had diarrhea but not completely watery.  He was found to have low blood sugar by EMS.  In the ED, he had elevated blood pressure.    This patient's problems and plans were partially entered by my partner and updated as appropriate by me 11/21/24.     Hypertensive emergency with possible hypertensive encephalopathy  -S/P cardene drip.  Continue amlodipine, metoprolol  -Echocardiogram pending.    Generalized weakness  -PT/OT.    -His family will bring his prosthetic leg    Pneumonia  -Continue Rocephin and doxycycline.    Diarrhea  -C. difficile pending if continued    Type 2 diabetes  Hypoglycemia  -A1c 6.0  -Hypoglycemic again this morning.  Hold insulin for now    Recent amputation distal phalanx right middle finger  -No symptoms in finger.  He completed IV antibiotics for this osteomyelitis  and had amputation.     Atrial fibrillation on Eliquis  Heart failure preserved ejection fraction    CKD    Expected Discharge   Expected Discharge Date: 11/23/2024; Expected Discharge Time:      Jerri Au MD  11/21/24

## 2024-11-21 NOTE — H&P
[facility] Medicine Services  HISTORY AND PHYSICAL    Patient Name: Amol Aguirre  : 1945  MRN: [id number]  Primary Care Physician: Sam Preston MD  Date of admission: 2024      Subjective   Subjective     Chief Complaint:  Generalized weakness    HPI:  Amol Aguirre is a 79 y.o. male with past medical history of atrial fibrillation on Eliquis, asthma, hypertension, gout, GERD, type 2 diabetes, heart failure preserved ejection fraction, CKD, pacemaker, right BKA, recent history of osteomyelitis distal phalanx of right middle finger with IV antibiotics and amputation.  Presented to [facility] ED on 2024 with concern for generalized weakness progressively worse over a few weeks and especially the last few days.  Had some intermittent confusion at home per family.  EMS reports that his blood sugar was around 50.  Recently seen at [place] ED told he had a UTI, pneumonia, CHF.  Day prior to presentation also feeling weak and family found him on the floor.  Says he had diarrhea but not completely watery.  Denied fevers, abdominal pain, vomiting, shortness of air.    ED summary: Hypertensive, other vital signs stable room air.  EKG sinus rhythm, first-degree AV block, nonspecific ST-T wave changes.  High sensitive troponin 37.  Creatinine 1.66.  Blood glucose 191.  Lactate not elevated.  Lipase elevated.  No leukocytosis.  Hemoglobin 11.  No pattern of infection on urinalysis.  Blood cultures collected.  CXR left chest wall pacemaker, elevated right hemidiaphragm with right basilar airspace disease.  CT head no acute intercranial normality, chronic small vessel ischemic disease.  He was provided Rocephin, doxycycline, labetalol.      Personal History     Past Medical History:   Diagnosis Date    Acid reflux     Arthritis     Arthritis of back     Asthma     Atrial fibrillation/flutter     CHF (congestive heart failure)     CTS (carpal tunnel syndrome) 2018    Diabetes      Fracture of ankle 2008    Hypertension            Past Surgical History:   Procedure Laterality Date    AMPUTATION DIGIT Left 10/15/2019    Procedure: AMPUTATE LEFT THIRD TOE;  Surgeon: Juju Juarez MD;  Location:  [facility] OR;  Service: Orthopedics    AMPUTATION DIGIT Left 01/07/2021    Procedure: amputate left first toe and metatarsal;  Surgeon: Juju Juarez MD;  Location:  [facility] OR;  Service: Orthopedics;  Laterality: Left;    AMPUTATION DIGIT Left 08/31/2021    Procedure: amputate left 2nd toe and metatarsal, close diabetic ulcer;  Surgeon: Juju Juarez MD;  Location:  [facility];  Service: Orthopedics;  Laterality: Left;    AORTAGRAM N/A 04/30/2019    Procedure: AORTAGRAM WITH OR WITHOUT RUNOFFS;  Surgeon: Carrillo White MD;  Location: [facility] HYBRID OR 15;  Service: Vascular    BELOW KNEE AMPUTATION Right 06/04/2019    Procedure: BELOW KNEE AMPUTATION RIGHT;  Surgeon: Juju Juarez MD;  Location:  [facility];  Service: Orthopedics    CARDIAC CATHETERIZATION      NO INTERVENTION    CARDIAC ELECTROPHYSIOLOGY PROCEDURE N/A 05/28/2021    Procedure: Device Implant;  Surgeon: Amol Foreman MD;  Location: [facility] CATH INVASIVE LOCATION;  Service: Cardiovascular;  Laterality: N/A;    CATARACT EXTRACTION W/ INTRAOCULAR LENS IMPLANT Right 07/20/2020    Procedure: CATARACT PHACO EXTRACTION WITH INTRAOCULAR LENS IMPLANT RIGHT;  Surgeon: Jez Mas MD;  Location:  [facility];  Service: Ophthalmology;  Laterality: Right;    CATARACT EXTRACTION W/ INTRAOCULAR LENS IMPLANT Left 08/03/2020    Procedure: CATARACT PHACO EXTRACTION WITH INTRAOCULAR LENS IMPLANT LEFT;  Surgeon: Jez Mas MD;  Location:  [facility];  Service: Ophthalmology;  Laterality: Left;    CHOLECYSTECTOMY      COLONOSCOPY      FOOT SURGERY Left 10/15/2019    Amputate 3rd toe- [place]    JOINT REPLACEMENT      KNEE SURGERY Right     TKA       Family History: family history includes Cancer in his mother; Heart attack in his  father; Hypertension in his father and mother.     Social History:  reports that he quit smoking about 49 years ago. His smoking use included cigarettes and cigarettes. He started smoking about 59 years ago. He has never used smokeless tobacco. He reports that he does not drink alcohol and does not use drugs.  Social History     Social History Narrative    Not on file       Medications:  Available home medication information reviewed.  BASAGLAR KWIKPEN, Cyanocobalamin ER, EPINEPHrine, Insulin Pen Needle, acetaminophen, allopurinol, amLODIPine, apixaban, bicalutamide, budesonide-formoterol, bumetanide, cetirizine, citalopram, diphenhydrAMINE-acetaminophen, empagliflozin, flecainide, glucose blood, insulin aspart, ipratropium-albuterol, metoclopramide, mupirocin, pantoprazole, and sodium zirconium cyclosilicate    Allergies   Allergen Reactions    Chlorhexidine Shortness Of Breath, Itching, Rash and Anaphylaxis     Pt developed a rash, itching, and shortness of breath after receiving a CHG bath on 4/24/19.  Pt developed a rash, itching, and shortness of breath after receiving a CHG bath on 4/24/19.    Latex Other (See Comments) and Swelling     Rash, hives, and tongue swelling  Rash, hives, and tongue swelling    Procaine Anaphylaxis    Tegaderm Ag Mesh [Silver] Swelling and Rash     Pt developed a rash with swelling within minutes of application.       Objective   Objective     Vital Signs:   Heart Rate:  [71-73] 73  Resp:  [19] 19  BP: (200-209)/(91-97) 200/97       Physical Exam  Constitutional:       General: He is not in acute distress.     Appearance: He is obese. He is ill-appearing. He is not toxic-appearing.   HENT:      Mouth/Throat:      Mouth: Mucous membranes are moist.   Eyes:      Extraocular Movements: Extraocular movements intact.   Cardiovascular:      Rate and Rhythm: Normal rate and regular rhythm.      Pulses: Normal pulses.      Heart sounds: Normal heart sounds.   Pulmonary:      Effort:  Pulmonary effort is normal.      Breath sounds: Normal breath sounds.   Abdominal:      Palpations: Abdomen is soft.      Tenderness: There is no abdominal tenderness.   Musculoskeletal:      Comments: Right BKA, multiple toes missing left foot, partial amputations bilateral fingers   Skin:     General: Skin is warm.   Neurological:      General: No focal deficit present.      Mental Status: He is alert and oriented to person, place, and time.   Psychiatric:         Mood and Affect: Mood normal.         Thought Content: Thought content normal.          Result Review:  I have personally reviewed the results from the time of this admission to 11/21/2024 00:05 EST and agree with these findings:  [x]  Laboratory list / accordion  [x]  Microbiology  [x]  Radiology  [x]  EKG/Telemetry   []  Cardiology/Vascular   []  Pathology  [x]  Old records  []  Other:        LAB RESULTS:      Lab 11/20/24 1937   WBC 8.12   HEMOGLOBIN 11.9*   HEMATOCRIT 37.6   PLATELETS 285   NEUTROS ABS 6.31   IMMATURE GRANS (ABS) 0.02   LYMPHS ABS 1.11   MONOS ABS 0.56   EOS ABS 0.07   MCV 86.8   PROCALCITONIN 0.11   LACTATE 1.2         Lab 11/20/24 1937 11/18/24  1408   SODIUM 142  --    POTASSIUM 4.7  --    CHLORIDE 107  --    CO2 23.0  --    ANION GAP 12.0  --    BUN 18  --    CREATININE 1.66*  --    EGFR 41.2*  --    GLUCOSE 191*  --    CALCIUM 9.2  --    MAGNESIUM 1.8 1.7*         Lab 11/20/24 1937   TOTAL PROTEIN 7.0   ALBUMIN 3.8   GLOBULIN 3.2   ALT (SGPT) 7   AST (SGOT) 23   BILIRUBIN 0.5   ALK PHOS 97         Lab 11/20/24 1937   HSTROP T 37*                 UA          11/18/2024    15:59 11/20/2024    19:56   Urinalysis   Squamous Epithelial Cells, UA 2-5     0-2    Specific Gravity, UA  1.009    Ketones, UA  Negative    Blood, UA  Trace    Leukocytes, UA  Negative    Nitrite, UA  Negative    RBC, UA >100     0-2    WBC, UA None Seen     0-2    Bacteria, UA  None Seen       Details          This result is from an external source.                Microbiology Results (last 10 days)       ** No results found for the last 240 hours. **            CT Head Without Contrast    Result Date: 11/20/2024  CT HEAD WO CONTRAST Date of Exam: 11/20/2024 10:26 PM EST Indication: AMS, fall, head injury. Comparison: None available Technique: Axial CT images were obtained of the head without contrast administration.  Automated exposure control and iterative construction methods were used. Findings: The ventricles and sulci are proportionally prominent compatible with global cerebral volume loss. There is no mass effect or midline shift. There is no acute intracranial hemorrhage. There is no abnormal extra-axial fluid collection. The gray-white matter differentiation is preserved. There is confluent periventricular and subcortical white matter hypodensity likely representing sequelae of chronic small vessel ischemic disease. There is a macroscopic fat containing lesion measuring 9 mm within the  right posterior ambient cistern between the posterior aspect of the midbrain and cerebellum. The calvarium is intact. The mastoid air cells and middle ears are well aerated. The visualized orbits and globes appear symmetric with thinning of the lenses bilaterally.     Impression: Impression: 1. No acute intracranial abnormality. Specifically, no evidence of acute hemorrhage, mass effect or midline shift. 2. Confluent periventricular and subcortical white matter hypodensity likely representing sequelae of chronic small vessel ischemic disease. Electronically Signed: Domo Calderonelor  11/20/2024 11:00 PM EST  Workstation ID: [id number]    XR Chest 1 View    Result Date: 11/20/2024  XR CHEST 1 VW Date of Exam: 11/20/2024 7:19 PM EST Indication: Weak/Dizzy/AMS triage protocol Comparison: Chest radiograph dated 5/28/2021 Findings: There is a left chest wall pacemaker with leads in expected position. The cardiomediastinal silhouette is within normal limits. There is an elevated right  hemidiaphragm with associated right basilar atelectasis. There is no definite pleural effusion. There is no pneumothorax. There are degenerative changes of the thoracic spine.     Impression: Impression: 1. Left chest wall pacemaker with leads in expected position. 2. Elevated right hemidiaphragm with right basilar airspace disease. Electronically Signed: Domo Calderonelor  11/20/2024 8:31 PM EST  Workstation ID: [id number]         Assessment & Plan   Assessment & Plan       Generalized weakness    Observation general floor admission 11/21/2024 with hypertensive urgency, generalized weakness with falls, pneumonia, intermittent confusion, hypoglycemia which resolved, diarrhea.    Hypertensive urgency  Cardene drip.  Echocardiogram.  Cardiology consultation, recommendations appreciated.  Hypertensive encephalopathy reasonably within differential.  Generalized weakness  PT/OT.  His family will bring his prosthetic leg in tomorrow.  Certainly multifactorial.  Pneumonia seems minor but could be contributing.  Dehydration and reduced p.o. intake with his diarrhea.  He was IV antibiotics for his osteomyelitis in his finger which she had amputated.  Altered mental status, resolved  At time of admission AAO X4.    Likely multifactorial.  Appears to be intermittent per family.  Pneumonia  Continue Rocephin and doxycycline.  Blood cultures.  No severe symptoms.  Diarrhea  C. difficile tested abundance of caution.  IVF overnight.  This could have contributed to generalized weakness and dehydration.  Possibly from his IV antibiotics he needed for finger osteomyelitis.  Type 2 diabetes  Hypoglycemia, resolved  Monitoring.  With his diarrhea use p.o. intake he has not been eating much.  Recent amputation distal phalanx right middle finger  No symptoms in finger, no steve erythema.  He completed IV antibiotics for this osteomyelitis and had amputation, which may have contributed to his diarrhea.      Chronic: Atrial fibrillation on  Eliquis, asthma, hypertension, gout, GERD, type 2 diabetes, heart failure preserved ejection fraction, CKD, pacemaker, right BKA, recent history of osteomyelitis distal phalanx of right middle finger with IV antibiotics and amputation.  Subcutaneous insulin protocol.  Continue other home medications.    VTE Prophylaxis: Continue home Eliquis  No VTE prophylaxis order currently exists.          CODE STATUS: DNR/DNI  There are no questions and answers to display.       Expected Discharge   Expected discharge date/ time has not been documented.     Brian Joseph Kerley, DO  11/21/24

## 2024-11-21 NOTE — THERAPY EVALUATION
Patient Name: Amol Aguirre  : 1945    MRN: [id number]                              Today's Date: 2024       Admit Date: 2024    Visit Dx:     ICD-10-CM ICD-9-CM   1. Altered mental status, unspecified altered mental status type  R41.82 780.97   2. Pneumonia of right lower lobe due to infectious organism  J18.9 486   3. Hypertension, unspecified type  I10 401.9   4. Confusion  R41.0 298.9   5. Multiple falls  R29.6 V15.88   6. Failure of outpatient treatment  Z78.9 V49.89   7. Hypoglycemia  E16.2 251.2     Patient Active Problem List   Diagnosis    Right foot pain    Decreased pulses in feet    Vascular calcification    Uncontrolled type 2 diabetes mellitus with hyperglycemia    Type 2 diabetes mellitus with diabetic polyneuropathy, with long-term current use of insulin    Mass of lesser toe of right foot    Blister (nonthermal), right foot, initial encounter    Diabetic foot ulcers    Diabetic ulcer of toe of left foot associated with type 2 diabetes mellitus, limited to breakdown of skin    Gangrene    S/P BKA (below knee amputation), right    HTN (hypertension)    CKD (chronic kidney disease)    Leukocytosis, likely reactive    Hyperkalemia    Anemia    Acute postoperative pain    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    left 3rd toe cellulitis    Cellulitis and abscess of toe of left foot    Amputation of toe of left foot    Diabetic ulcer of left ankle    Non-hospital acquired pressure injury of skin    Nuclear sclerotic cataract of right eye    Nuclear sclerotic cataract of left eye    Left foot pain    Diabetic foot ulcer    Sick sinus syndrome    Diabetic ulcer of left midfoot associated with type 2 diabetes mellitus, limited to breakdown of skin    Atrial fibrillation, permanent    Diabetic ulcer of lower extremity    Generalized weakness     Past Medical History:   Diagnosis Date    Acid reflux     Arthritis     Arthritis of back     Asthma     Atrial  fibrillation/flutter     CHF (congestive heart failure)     CTS (carpal tunnel syndrome) 2018    Diabetes     Fracture of ankle 2008    Hypertension      Past Surgical History:   Procedure Laterality Date    AMPUTATION DIGIT Left 10/15/2019    Procedure: AMPUTATE LEFT THIRD TOE;  Surgeon: Juju Juarez MD;  Location:  [facility];  Service: Orthopedics    AMPUTATION DIGIT Left 01/07/2021    Procedure: amputate left first toe and metatarsal;  Surgeon: Juju Juarez MD;  Location:  [facility];  Service: Orthopedics;  Laterality: Left;    AMPUTATION DIGIT Left 08/31/2021    Procedure: amputate left 2nd toe and metatarsal, close diabetic ulcer;  Surgeon: Juju Juarez MD;  Location:  [facility];  Service: Orthopedics;  Laterality: Left;    AORTAGRAM N/A 04/30/2019    Procedure: AORTAGRAM WITH OR WITHOUT RUNOFFS;  Surgeon: Carrillo White MD;  Location: [facility] HYBRID OR 15;  Service: Vascular    BELOW KNEE AMPUTATION Right 06/04/2019    Procedure: BELOW KNEE AMPUTATION RIGHT;  Surgeon: Juju Juarez MD;  Location:  [facility] OR;  Service: Orthopedics    CARDIAC CATHETERIZATION      NO INTERVENTION    CARDIAC ELECTROPHYSIOLOGY PROCEDURE N/A 05/28/2021    Procedure: Device Implant;  Surgeon: Amol Foreman MD;  Location:  [facility] CATH INVASIVE [facility];  Service: Cardiovascular;  Laterality: N/A;    CATARACT EXTRACTION W/ INTRAOCULAR LENS IMPLANT Right 07/20/2020    Procedure: CATARACT PHACO EXTRACTION WITH INTRAOCULAR LENS IMPLANT RIGHT;  Surgeon: Jez Mas MD;  Location:  [facility];  Service: Ophthalmology;  Laterality: Right;    CATARACT EXTRACTION W/ INTRAOCULAR LENS IMPLANT Left 08/03/2020    Procedure: CATARACT PHACO EXTRACTION WITH INTRAOCULAR LENS IMPLANT LEFT;  Surgeon: Jez Mas MD;  Location: [facility] OR;  Service: Ophthalmology;  Laterality: Left;    CHOLECYSTECTOMY      COLONOSCOPY      FOOT SURGERY Left 10/15/2019    Amputate 3rd toe- [place]    JOINT REPLACEMENT      KNEE SURGERY  Right     TKA      General Information       Row Name 11/21/24 [facility]          Physical Therapy Time and Intention    Document Type evaluation  -     Mode of Treatment physical therapy  -       Row Name 11/21/24 [facility]          General Information    Patient Profile Reviewed yes  -BA     Prior Level of Function independent:;all household mobility;community mobility;gait;transfer;w/c or scooter;bed mobility;ADL's;dependent:;cooking;cleaning;driving;shopping  Reported he performs squat-pivot t/f over to w/c then donns RLE prosthesis. Uses FWW for amb w/in home w/ RLE prosthesis donned & SPC for community distances. Hx mult falls, w/ 6 falls in last 5 dys. Granddtr provides cooking, cleaning, & transportation.  -BA     Existing Precautions/Restrictions fall;other (see comments)  remote R BKA with RLE prosthesis; remote L 1-3 toe amputations; recent R middle finger amputation; recent frequent falls with multiple skin tears/abrasions  -BA     Barriers to Rehab medically complex;previous functional deficit;impaired sensation  -       Row Name 11/21/24 [facility]          Living Environment    People in Home alone  -       Row Name 11/21/24 [facility]          Home Main Entrance    Number of Stairs, Main Entrance none;other (see comments)  has ramp  -       Row Name 11/21/24 1207          Stairs Within Home, Primary    Number of Stairs, Within Home, Primary none  -       Row Name 11/21/24 1207          Cognition    Orientation Status (Cognition) oriented x 3  -BA       Row Name 11/21/24 1207          Safety Issues/Impairments Affecting Functional Mobility    Safety Issues Affecting Function (Mobility) awareness of need for assistance;insight into deficits/self-awareness;judgment;safety precaution awareness;safety precautions follow-through/compliance;sequencing abilities  -BA     Impairments Affecting Function (Mobility) balance;coordination;endurance/activity tolerance;motor planning;postural/trunk control;sensation/sensory  awareness;strength  -               User Key  (r) = Recorded By, (t) = Taken By, (c) = Cosigned By      Initials Name Provider Type     Kiki Reyes, PT Physical Therapist                   Mobility       Row Name 11/21/24 1214          Bed Mobility    Bed Mobility supine-sit;scooting/bridging  -     Scooting/Bridging [place] (Bed Mobility) minimum assist (75% patient effort);2 person assist;verbal cues;nonverbal cues (demo/gesture)  -     Supine-Sit [place] (Bed Mobility) minimum assist (75% patient effort);moderate assist (50% patient effort);2 person assist;verbal cues;nonverbal cues (demo/gesture)  -     Assistive Device (Bed Mobility) bed rails;head of bed elevated  -     Comment, (Bed Mobility) Increased time and effort.  VCs/TCs for sequencing and hand placement.  Reported no dizziness upon sitting EOB.  -       Row Name 11/21/24 1214          Bed-Chair Transfer    Bed-Chair [place] (Transfers) minimum assist (75% patient effort);2 person assist;verbal cues;nonverbal cues (demo/gesture)  -     Assistive Device (Bed-Chair Transfers) other (see comments)  BUE support  -     Comment, (Bed-Chair Transfer) SPT from bed to chair toward L side with L knee blocking.  VCs/TCs for weight shifting with pivoting on LLE and for hand placement.  -       Row Name 11/21/24 1214          Sit-Stand Transfer    Sit-Stand [place] (Transfers) minimum assist (75% patient effort);2 person assist;verbal cues;nonverbal cues (demo/gesture)  -     Assistive Device (Sit-Stand Transfers) other (see comments)  BUE support  -     Comment, (Sit-Stand Transfer) STS x 2 reps from EOB and chair.  VCs/TCs for optimal pre-positioning, sequencing, hand placement, and upright posture.  -       Row Name 11/21/24 1214          Gait/Stairs (Locomotion)    Comment, (Gait/Stairs) Amb deferred d/t fatigue and pt does not currently have RLE prosthesis present.  Reported family is supposed to be bringing  RLE prosthesis to the hospital.  -               User Key  (r) = Recorded By, (t) = Taken By, (c) = Cosigned By      Initials Name Provider Type     Kiki Reyes PT Physical Therapist                   Obj/Interventions       Row Name 11/21/24 1302          Range of Motion Comprehensive    General Range of Motion bilateral lower extremity ROM WFL  -       Row Name 11/21/24 1309          Strength Comprehensive (MMT)    General Manual Muscle Testing (MMT) Assessment lower extremity strength deficits identified  -     Comment, General Manual Muscle Testing (MMT) Assessment Grossly L hip 4-/5, R hip 4/5, B knee 4/5, L ankle 4/5.  -       Row Name 11/21/24 1309          Motor Skills    Motor Skills coordination;functional endurance  -     Coordination gross motor deficit;bilateral;lower extremity;minimal impairment  -     Functional Endurance Decreased functional endurance.  Fatigues with activity.  -       Row Name 11/21/24 1302          Balance    Balance Assessment sitting static balance;sitting dynamic balance;standing static balance;standing dynamic balance  -     Static Sitting Balance standby assist  -     Dynamic Sitting Balance contact guard  -     Position, Sitting Balance unsupported;sitting edge of bed;sitting in chair  -     Static Standing Balance minimal assist;2-person assist;verbal cues  -     Dynamic Standing Balance minimal assist;2-person assist;verbal cues  -     Position/Device Used, Standing Balance supported;other (see comments)  BUE support  -     Comment, Balance Mild instability with sitting EOB with SBA/CGA for safety.  Mild unsteadiness with standing and transfers with BUE support; no overt LOB noted.  -       Row Name 11/21/24 1307          Sensory Assessment (Somatosensory)    Left LE Sensory Assessment light touch awareness;impaired;absent;other (see comments)  Reported decreased sensation to light touch with N/T from L knee down to L ankle and no  sensation from L ankle down to toes.  -BA     Right LE Sensory Assessment light touch awareness;impaired;other (see comments)  Reported N/T from R knee down to residual limb.  -BA               User Key  (r) = Recorded By, (t) = Taken By, (c) = Cosigned By      Initials Name Provider Type    Kiki Gallegos, PT Physical Therapist                   Goals/Plan       Row Name 11/21/24 1320          Bed Mobility Goal 1 (PT)    Activity/Assistive Device (Bed Mobility Goal 1, PT) sit to supine/supine to sit  -BA     [place] Level/Cues Needed (Bed Mobility Goal 1, PT) contact guard required  -BA     Time Frame (Bed Mobility Goal 1, PT) short term goal (STG);5 days  -BA       Row Name 11/21/24 1320          Transfer Goal 1 (PT)    Activity/Assistive Device (Transfer Goal 1, PT) sit-to-stand/stand-to-sit;bed-to-chair/chair-to-bed;wheelchair transfer;walker, rolling  -BA     [place] Level/Cues Needed (Transfer Goal 1, PT) contact guard required  -BA     Time Frame (Transfer Goal 1, PT) long term goal (LTG);10 days  -       Row Name 11/21/24 1320          Gait Training Goal 1 (PT)    Activity/Assistive Device (Gait Training Goal 1, PT) gait (walking locomotion);assistive device use;walker, rolling;other (see comments)  with RLE prosthesis donned  -       Row Name 11/21/24 1320          Therapy Assessment/Plan (PT)    Planned Therapy Interventions (PT) balance training;bed mobility training;gait training;home exercise program;motor coordination training;neuromuscular re-education;patient/family education;postural re-education;strengthening;transfer training  -               User Key  (r) = Recorded By, (t) = Taken By, (c) = Cosigned By      Initials Name Provider Type    Kiki Gallegos, CRISELDA Physical Therapist                   Clinical Impression       Row Name 11/21/24 1314          Pain    Pretreatment Pain Rating 0/10 - no pain  -BA     Posttreatment Pain Rating 0/10 - no pain  -       Row Name  11/21/24 1314          Plan of Care Review    Plan of Care Reviewed With patient  -BA     Outcome Evaluation PT initial Eval completed.  Pt presents with generalized weakness, BLE sensory impairments, balance deficits, decreased functional endurance, and decreased indep and safety with functional mobility compared to baseline.  Supine to sit with min/modAx2.  SPT from bed to chair with minAx2 and BUE support.  Pt currently did not have RLE prosthesis present at hospital this session; family supposed to be bringing in.  Ambulates with RLE prosthesis and either FWW or SPC at baseline.  Pt will benefit from skilled IP PT to improve indep and safety with mobility and promote return to PLOF.  Rec IPR upon d/c for best fxl outcome, as pt lives alone and has also had 6 falls in 5 days.  -BA       Row Name 11/21/24 1314          Therapy Assessment/Plan (PT)    Patient/Family Therapy Goals Statement (PT) to return home and to PLOF  -BA     Rehab Potential (PT) good  -BA     Criteria for Skilled Interventions Met (PT) yes;meets criteria;skilled treatment is necessary  -     Therapy Frequency (PT) daily  -BA     Predicted Duration of Therapy Intervention (PT) 10 days  -BA       Row Name 11/21/24 1314          Vital Signs    Pre Systolic BP Rehab 166  -BA     Pre Treatment Diastolic BP 86  -BA     Post Systolic BP Rehab 173  -BA     Post Treatment Diastolic BP 83  -BA     Pretreatment Heart Rate (beats/min) 73  -BA     Posttreatment Heart Rate (beats/min) 82  -BA     Pre SpO2 (%) 94  -BA     O2 Delivery Pre Treatment room air  -BA     O2 Delivery Intra Treatment room air  -BA     Post SpO2 (%) 99  -BA     O2 Delivery Post Treatment room air  -BA     Pre Patient Position Supine  -BA     Post Patient Position Sitting  -BA       Row Name 11/21/24 1314          Positioning and Restraints    Pre-Treatment Position in bed  -BA     Post Treatment Position chair  -BA     In Chair notified nsg;reclined;call light within  reach;encouraged to call for assist;exit alarm on;waffle cushion;on mechanical lift sling;legs elevated;L heel elevated  -               User Key  (r) = Recorded By, (t) = Taken By, (c) = Cosigned By      Initials Name Provider Type    Kiki Gallegos, PT Physical Therapist                   Outcome Measures       Row Name 11/21/24 1322 11/21/24 0435       How much help from another person do you currently need...    Turning from your back to your side while in flat bed without using bedrails? 2  -BA 3  -CB    Moving from lying on back to sitting on the side of a flat bed without bedrails? 2  -BA 3  -CB    Moving to and from a bed to a chair (including a wheelchair)? 3  -BA 2  -CB    Standing up from a chair using your arms (e.g., wheelchair, bedside chair)? 3  -BA 2  -CB    Climbing 3-5 steps with a railing? 1  -BA 1  -CB    To walk in hospital room? 2  -BA 1  -CB    AM-PAC 6 Clicks Score (PT) 13  -BA 12  -CB    Highest Level of Mobility Goal 4 --> Transfer to chair/commode  -BA 4 --> Transfer to chair/commode  -CB      Row Name 11/21/24 1322 11/21/24 1144       Functional Assessment    Outcome Measure Options AM-PAC 6 Clicks Basic Mobility (PT)  - AM-PAC 6 Clicks Daily Activity (OT)  -              User Key  (r) = Recorded By, (t) = Taken By, (c) = Cosigned By      Initials Name Provider Type    Nicole Barrera RN Registered Nurse    Ashleigh Domínguez OT Occupational Therapist    Kiki Gallegos, PT Physical Therapist                                 Physical Therapy Education       Title: PT OT SLP Therapies (In Progress)       Topic: Physical Therapy (In Progress)       Point: Mobility training (Done)       Learning Progress Summary            Patient Acceptance, E, VU,NR by DAGMAR at 11/21/2024 1323                      Point: Home exercise program (Not Started)       Learner Progress:  Not documented in this visit.              Point: Body mechanics (Done)       Learning Progress Summary             Patient Acceptance, E, VU,NR by DAGMAR at 11/21/2024 1323                      Point: Precautions (Done)       Learning Progress Summary            Patient Acceptance, E, VU,NR by  at 11/21/2024 1323                                      User Key       Initials Effective Dates Name Provider Type Discipline    DAGMAR 09/21/21 -  Kiki Reyes, PT Physical Therapist PT                  PT Recommendation and Plan  Planned Therapy Interventions (PT): balance training, bed mobility training, gait training, home exercise program, motor coordination training, neuromuscular re-education, patient/family education, postural re-education, strengthening, transfer training  Outcome Evaluation: PT initial Eval completed.  Pt presents with generalized weakness, BLE sensory impairments, balance deficits, decreased functional endurance, and decreased indep and safety with functional mobility compared to baseline.  Supine to sit with min/modAx2.  SPT from bed to chair with minAx2 and BUE support.  Pt currently did not have RLE prosthesis present at hospital this session; family supposed to be bringing in.  Ambulates with RLE prosthesis and either FWW or SPC at baseline.  Pt will benefit from skilled IP PT to improve indep and safety with mobility and promote return to PLOF.  Rec IPR upon d/c for best fxl outcome, as pt lives alone and has also had 6 falls in 5 days.     Time Calculation:   PT Evaluation Complexity  History, PT Evaluation Complexity: 3 or more personal factors and/or comorbidities  Examination of Body Systems (PT Eval Complexity): total of 3 or more elements  Clinical Presentation (PT Evaluation Complexity): evolving  Clinical Decision Making (PT Evaluation Complexity): moderate complexity  Overall Complexity (PT Evaluation Complexity): moderate complexity     PT Charges       Row Name 11/21/24 1323             Time Calculation    Start Time 1051  -BA      PT Received On 11/21/24  -      PT Goal Re-Cert Due  Date 12/01/24  -BA         Untimed Charges    PT Eval/Re-eval Minutes 61  -BA         Total Minutes    Untimed Charges Total Minutes 61  -BA       Total Minutes 61  -BA                User Key  (r) = Recorded By, (t) = Taken By, (c) = Cosigned By      Initials Name Provider Type    Kiki Gallegos, PT Physical Therapist                  Therapy Charges for Today       Code Description Service Date Service Provider Modifiers Qty    24155368491 HC PT EVAL MOD COMPLEXITY 4 11/21/2024 Kiki Reyes, PT GP 1            PT G-Codes  Outcome Measure Options: AM-PAC 6 Clicks Basic Mobility (PT)  AM-PAC 6 Clicks Score (PT): 13  AM-PAC 6 Clicks Score (OT): 19  PT Discharge Summary  Anticipated Discharge Disposition (PT): inpatient rehabilitation facility    Kiki Reyes, CRISELDA  11/21/2024

## 2024-11-21 NOTE — PLAN OF CARE
Goal Outcome Evaluation:  Plan of Care Reviewed With: patient           Outcome Evaluation: Pt's ADL independence limited d/t strength, endurance, and balance deficits. Pt would benefit from continued skilled IPOT services to address current functional deficits. Rec IRF at d/c for best functional outcomes, but will monitor pt progress closely.    Anticipated Discharge Disposition (OT): inpatient rehabilitation facility

## 2024-11-21 NOTE — PLAN OF CARE
Goal Outcome Evaluation:  Plan of Care Reviewed With: patient           Outcome Evaluation: PT initial Eval completed.  Pt presents with generalized weakness, BLE sensory impairments, balance deficits, decreased functional endurance, and decreased indep and safety with functional mobility compared to baseline.  Supine to sit with min/modAx2.  SPT from bed to chair with minAx2 and BUE support.  Pt currently did not have RLE prosthesis present at hospital this session; family supposed to be bringing in.  Ambulates with RLE prosthesis and either FWW or SPC at baseline.  Pt will benefit from skilled IP PT to improve indep and safety with mobility and promote return to PLOF.  Rec IPR upon d/c for best fxl outcome, as pt lives alone and has also had 6 falls in 5 days.    Anticipated Discharge Disposition (PT): inpatient rehabilitation facility

## 2024-11-21 NOTE — THERAPY EVALUATION
Patient Name: Amol Aguirre  : 1945    MRN: [id number]                              Today's Date: 2024       Admit Date: 2024    Visit Dx:     ICD-10-CM ICD-9-CM   1. Altered mental status, unspecified altered mental status type  R41.82 780.97   2. Pneumonia of right lower lobe due to infectious organism  J18.9 486   3. Hypertension, unspecified type  I10 401.9   4. Confusion  R41.0 298.9   5. Multiple falls  R29.6 V15.88   6. Failure of outpatient treatment  Z78.9 V49.89   7. Hypoglycemia  E16.2 251.2     Patient Active Problem List   Diagnosis    Right foot pain    Decreased pulses in feet    Vascular calcification    Uncontrolled type 2 diabetes mellitus with hyperglycemia    Type 2 diabetes mellitus with diabetic polyneuropathy, with long-term current use of insulin    Mass of lesser toe of right foot    Blister (nonthermal), right foot, initial encounter    Diabetic foot ulcers    Diabetic ulcer of toe of left foot associated with type 2 diabetes mellitus, limited to breakdown of skin    Gangrene    S/P BKA (below knee amputation), right    HTN (hypertension)    CKD (chronic kidney disease)    Leukocytosis, likely reactive    Hyperkalemia    Anemia    Acute postoperative pain    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    left 3rd toe cellulitis    Cellulitis and abscess of toe of left foot    Amputation of toe of left foot    Diabetic ulcer of left ankle    Non-hospital acquired pressure injury of skin    Nuclear sclerotic cataract of right eye    Nuclear sclerotic cataract of left eye    Left foot pain    Diabetic foot ulcer    Sick sinus syndrome    Diabetic ulcer of left midfoot associated with type 2 diabetes mellitus, limited to breakdown of skin    Atrial fibrillation, permanent    Diabetic ulcer of lower extremity    Generalized weakness     Past Medical History:   Diagnosis Date    Acid reflux     Arthritis     Arthritis of back     Asthma     Atrial  fibrillation/flutter     CHF (congestive heart failure)     CTS (carpal tunnel syndrome) 2018    Diabetes     Fracture of ankle 2008    Hypertension      Past Surgical History:   Procedure Laterality Date    AMPUTATION DIGIT Left 10/15/2019    Procedure: AMPUTATE LEFT THIRD TOE;  Surgeon: Juju Juarez MD;  Location:  [facility];  Service: Orthopedics    AMPUTATION DIGIT Left 01/07/2021    Procedure: amputate left first toe and metatarsal;  Surgeon: Juju Juarez MD;  Location:  [facility];  Service: Orthopedics;  Laterality: Left;    AMPUTATION DIGIT Left 08/31/2021    Procedure: amputate left 2nd toe and metatarsal, close diabetic ulcer;  Surgeon: Juju Juarez MD;  Location:  [facility];  Service: Orthopedics;  Laterality: Left;    AORTAGRAM N/A 04/30/2019    Procedure: AORTAGRAM WITH OR WITHOUT RUNOFFS;  Surgeon: Carrillo White MD;  Location: [facility] HYBRID OR 15;  Service: Vascular    BELOW KNEE AMPUTATION Right 06/04/2019    Procedure: BELOW KNEE AMPUTATION RIGHT;  Surgeon: Juju Juarez MD;  Location:  [facility];  Service: Orthopedics    CARDIAC CATHETERIZATION      NO INTERVENTION    CARDIAC ELECTROPHYSIOLOGY PROCEDURE N/A 05/28/2021    Procedure: Device Implant;  Surgeon: Amol Foreman MD;  Location:  [facility]ATH [facility]VASIVE [facility];  Service: Cardiovascular;  Laterality: N/A;    CATARACT EXTRACTION W/ INTRAOCULAR LENS IMPLANT Right 07/20/2020    Procedure: CATARACT PHACO EXTRACTION WITH INTRAOCULAR LENS IMPLANT RIGHT;  Surgeon: Jez Mas MD;  Location:  [facility];  Service: Ophthalmology;  Laterality: Right;    CATARACT EXTRACTION W/ INTRAOCULAR LENS IMPLANT Left 08/03/2020    Procedure: CATARACT PHACO EXTRACTION WITH INTRAOCULAR LENS IMPLANT LEFT;  Surgeon: Jez Mas MD;  Location: [facility] OR;  Service: Ophthalmology;  Laterality: Left;    CHOLECYSTECTOMY      COLONOSCOPY      FOOT SURGERY Left 10/15/2019    Amputate 3rd toe- [place]    JOINT REPLACEMENT      KNEE SURGERY  Right     TKA      General Information       Row Name 11/21/24 1135          OT Time and Intention    Document Type evaluation  -     Mode of Treatment occupational therapy  -       Row Name 11/21/24 1135          General Information    Patient Profile Reviewed yes  -     Prior Level of Function independent:;bed mobility;transfer;all household mobility;ADL's;dependent:;shopping;cooking;cleaning;driving  Pt reports he pivots to/from w/c, then ambulates with R prosthesis w/ SPC for household distances & RW for community distances. Pt admits to 5 falls within last 6 days pivoting to w/c d/t weakness. Pt relies on family for shopping, cleaning, & driving.  -     Existing Precautions/Restrictions fall;other (see comments)  Remote R BKA, multiple L toe amputations, multiple R finger amputations.  -     Barriers to Rehab medically complex;previous functional deficit  -       Row Name 11/21/24 1135          Living Environment    People in Home alone  -       Row Name 11/21/24 1135          Home Main Entrance    Number of Stairs, Main Entrance other (see comments)  ramp  -       Row Name 11/21/24 1135          Stairs Within Home, Primary    Number of Stairs, Within Home, Primary none  -       Row Name 11/21/24 1135          Cognition    Orientation Status (Cognition) oriented x 3  -       Row Name 11/21/24 1135          Safety Issues/Impairments Affecting Functional Mobility    Safety Issues Affecting Function (Mobility) awareness of need for assistance;insight into deficits/self-awareness;safety precaution awareness;safety precautions follow-through/compliance;sequencing abilities  -     Impairments Affecting Function (Mobility) balance;endurance/activity tolerance;strength;sensation/sensory awareness  -               User Key  (r) = Recorded By, (t) = Taken By, (c) = Cosigned By      Initials Name Provider Type     Ashleigh Bush OT Occupational Therapist                     Mobility/ADL's        Row Name 11/21/24 1140          Bed Mobility    Comment, (Bed Mobility) Deferred to PT  -[facility] Name 11/21/24 1140          Transfers    Transfers sit-stand transfer;stand-sit transfer  -     Comment, (Transfers) Pt able to maintain static standing position for ~1 minute. Pt reports family will be bringing R prosthesis today.  -[facility] Name 11/21/24 1140          Sit-Stand Transfer    Sit-Stand [place] (Transfers) minimum assist (75% patient effort);2 person assist;verbal cues  -     Assistive Device (Sit-Stand Transfers) other (see comments)  BUE support  -[facility] Name 11/21/24 1140          Stand-Sit Transfer    Stand-Sit [place] (Transfers) minimum assist (75% patient effort);2 person assist;verbal cues  -     Assistive Device (Stand-Sit Transfers) other (see comments)  -[facility] Name 11/21/24 1140          Activities of Daily Living    BADL Assessment/Intervention lower body dressing;upper body dressing;grooming  -[facility] 11/21/24 1140          Lower Body Dressing Assessment/Training    [place] Level (Lower Body Dressing) don;socks;standby assist  -     Position (Lower Body Dressing) unsupported sitting  -     Comment, (Lower Body Dressing) L sock  -[facility] Name 11/21/24 1140          Upper Body Dressing Assessment/Training    [place] Level (Upper Body Dressing) don;pajama/robe;minimum assist (75% patient effort)  -     Position (Upper Body Dressing) unsupported sitting  -[facility] Name 11/21/24 1140          Grooming Assessment/Training    [place] Level (Grooming) hair care, combing/brushing;set up  -     Position (Grooming) supported sitting  -               User Key  (r) = Recorded By, (t) = Taken By, (c) = Cosigned By      Initials Name Provider Type     Ashleigh Bush OT Occupational Therapist                   Obj/Interventions       [facility] Name 11/21/24 1141          Sensory Assessment (Somatosensory)    Sensory  Assessment (Somatosensory) UE sensation intact  -       Row Name 11/21/24 1141          Vision Assessment/Intervention    Visual Impairment/Limitations WFL  -       Row Name 11/21/24 1141          Range of Motion Comprehensive    General Range of Motion bilateral upper extremity ROM WNL  -     Comment, General Range of Motion LUE, R elbow/wrist/hand WFL, R shoulder grossly 50% of full flexion ROM.  -       Row Name 11/21/24 1141          Strength Comprehensive (MMT)    General Manual Muscle Testing (MMT) Assessment upper extremity strength deficits identified  -     Comment, General Manual Muscle Testing (MMT) Assessment BUE grossly 4/5  -       Row Name 11/21/24 1141          Balance    Balance Assessment sitting static balance;sitting dynamic balance;sit to stand dynamic balance;standing static balance  -     Static Sitting Balance standby assist  -     Dynamic Sitting Balance standby assist  -     Position, Sitting Balance unsupported;sitting in chair  -     Sit to Stand Dynamic Balance minimal assist;2-person assist;verbal cues  -     Static Standing Balance minimal assist;2-person assist;verbal cues  -     Position/Device Used, Standing Balance supported  -     Balance Interventions sitting;sit to stand;occupation based/functional task  -               User Key  (r) = Recorded By, (t) = Taken By, (c) = Cosigned By      Initials Name Provider Type     Ashleigh Bush OT Occupational Therapist                   Goals/Plan       [facility] Name 11/21/24 1143          Bed Mobility Goal 1 (OT)    Activity/Assistive Device (Bed Mobility Goal 1, OT) sit to supine;supine to sit  -     [place] Level/Cues Needed (Bed Mobility Goal 1, OT) standby assist  -     Time Frame (Bed Mobility Goal 1, OT) short term goal (STG);5 days  -     Progress/Outcomes (Bed Mobility Goal 1, OT) goal ongoing  -[facility] Name 11/21/24 1143          Transfer Goal 1 (OT)    Activity/Assistive Device  (Transfer Goal 1, OT) sit-to-stand/stand-to-sit;bed-to-chair/chair-to-bed;toilet;walker, rolling;other (see comments)  R prosthesis  -     [place] Level/Cues Needed (Transfer Goal 1, OT) contact guard required  -     Time Frame (Transfer Goal 1, OT) long term goal (LTG);10 days  -     Progress/Outcome (Transfer Goal 1, OT) goal ongoing  -       Row Name 11/21/24 1143          Toileting Goal 1 (OT)    Activity/Device (Toileting Goal 1, OT) adjust/manage clothing;perform perineal hygiene;commode;grab bar/safety frame  -     [place] Level/Cues Needed (Toileting Goal 1, OT) contact guard required  -     Time Frame (Toileting Goal 1, OT) long term goal (LTG);10 days  -     Progress/Outcome (Toileting Goal 1, OT) goal ongoing  -       Row Name 11/21/24 1148          Therapy Assessment/Plan (OT)    Planned Therapy Interventions (OT) activity tolerance training;adaptive equipment training;BADL retraining;functional balance retraining;IADL retraining;occupation/activity based interventions;patient/caregiver education/training;ROM/therapeutic exercise;transfer/mobility retraining;strengthening exercise  -               User Key  (r) = Recorded By, (t) = Taken By, (c) = Cosigned By      Initials Name Provider Type    Ashleigh Domínguez, JEROME Occupational Therapist                   Clinical Impression       Row Name 11/21/24 1144          Pain Assessment    Pretreatment Pain Rating 0/10 - no pain  -     Posttreatment Pain Rating 0/10 - no pain  -       Row Name 11/21/24 1148          Plan of Care Review    Plan of Care Reviewed With patient  -     Outcome Evaluation Pt's ADL independence limited d/t strength, endurance, and balance deficits. Pt would benefit from continued skilled IPOT services to address current functional deficits. Rec IRF at d/c for best functional outcomes, but will monitor pt progress closely.  -       Row Name 11/21/24 1149          Therapy Assessment/Plan (OT)     Patient/Family Therapy Goal Statement (OT) Return to PLOF  -     Rehab Potential (OT) good  -     Criteria for Skilled Therapeutic Interventions Met (OT) yes;skilled treatment is necessary  -     Therapy Frequency (OT) daily  -     Predicted Duration of Therapy Intervention (OT) 10 days  -       Row Name 11/21/24 1142          Therapy Plan Review/Discharge Plan (OT)    Anticipated Discharge Disposition (OT) inpatient rehabilitation facility  -       Row Name 11/21/24 1142          Vital Signs    O2 Delivery Pre Treatment room air  -     O2 Delivery Intra Treatment room air  -     O2 Delivery Post Treatment room air  -     Pre Patient Position Sitting  -     Intra Patient Position Standing  -     Post Patient Position Sitting  -       Row Name 11/21/24 1142          Positioning and Restraints    Pre-Treatment Position sitting in chair/recliner  -     Post Treatment Position chair  -     In Chair reclined;call light within reach;encouraged to call for assist;exit alarm on;waffle cushion;on mechanical lift sling;legs elevated  -               User Key  (r) = Recorded By, (t) = Taken By, (c) = Cosigned By      Initials Name Provider Type    Ashleigh Domínguez, OT Occupational Therapist                   Outcome Measures       Row Name 11/21/24 1144          How much help from another is currently needed...    Putting on and taking off regular lower body clothing? 3  -MC     Bathing (including washing, rinsing, and drying) 3  -MC     Toileting (which includes using toilet bed pan or urinal) 2  -MC     Putting on and taking off regular upper body clothing 3  -MC     Taking care of personal grooming (such as brushing teeth) 4  -MC     Eating meals 4  -MC     AM-PAC 6 Clicks Score (OT) 19  -       Row Name 11/21/24 0430          How much help from another person do you currently need...    Turning from your back to your side while in flat bed without using bedrails? 3  -CB     Moving from  lying on back to sitting on the side of a flat bed without bedrails? 3  -CB     Moving to and from a bed to a chair (including a wheelchair)? 2  -CB     Standing up from a chair using your arms (e.g., wheelchair, bedside chair)? 2  -CB     Climbing 3-5 steps with a railing? 1  -CB     To walk in hospital room? 1  -CB     AM-PAC 6 Clicks Score (PT) 12  -CB       Row Name 11/21/24 1144          Functional Assessment    Outcome Measure Options AM-PAC 6 Clicks Daily Activity (OT)  -               User Key  (r) = Recorded By, (t) = Taken By, (c) = Cosigned By      Initials Name Provider Type    CB Nicole Pandey RN Registered Nurse    Ashleigh Domínguez OT Occupational Therapist                    Occupational Therapy Education       Title: PT OT SLP Therapies (In Progress)       Topic: Occupational Therapy (In Progress)       Point: ADL training (In Progress)       Description:   Instruct learner(s) on proper safety adaptation and remediation techniques during self care or transfers.   Instruct in proper use of assistive devices.                  Learning Progress Summary            Patient Acceptance, E, NR by  at 11/21/2024 1145                      Point: Home exercise program (Not Started)       Description:   Instruct learner(s) on appropriate technique for monitoring, assisting and/or progressing therapeutic exercises/activities.                  Learner Progress:  Not documented in this visit.              Point: Precautions (In Progress)       Description:   Instruct learner(s) on prescribed precautions during self-care and functional transfers.                  Learning Progress Summary            Patient Acceptance, E, NR by  at 11/21/2024 1145                      Point: Body mechanics (In Progress)       Description:   Instruct learner(s) on proper positioning and spine alignment during self-care, functional mobility activities and/or exercises.                  Learning Progress Summary             Patient Acceptance, E, NR by  at 11/21/2024 1145                                      User Key       Initials Effective Dates Name Provider Type Discipline     10/14/22 -  Ashleigh Bush, OT Occupational Therapist OT                  OT Recommendation and Plan  Planned Therapy Interventions (OT): activity tolerance training, adaptive equipment training, BADL retraining, functional balance retraining, IADL retraining, occupation/activity based interventions, patient/caregiver education/training, ROM/therapeutic exercise, transfer/mobility retraining, strengthening exercise  Therapy Frequency (OT): daily  Plan of Care Review  Plan of Care Reviewed With: patient  Outcome Evaluation: Pt's ADL independence limited d/t strength, endurance, and balance deficits. Pt would benefit from continued skilled IPOT services to address current functional deficits. Rec IRF at d/c for best functional outcomes, but will monitor pt progress closely.     Time Calculation:   Evaluation Complexity (OT)  Review Occupational Profile/Medical/Therapy History Complexity: expanded/moderate complexity  Assessment, Occupational Performance/Identification of Deficit Complexity: 3-5 performance deficits  Clinical Decision Making Complexity (OT): detailed assessment/moderate complexity  Overall Complexity of Evaluation (OT): moderate complexity     Time Calculation- OT       Row Name 11/21/24 1145             Time Calculation- OT    OT Start Time 1114  -      OT Received On 11/21/24  -      OT Goal Re-Cert Due Date 12/01/24  -         Timed Charges    98215 - OT Therapeutic Activity Minutes 4  -      90700 - OT Self Care/Mgmt Minutes 6  -         Untimed Charges    OT Eval/Re-eval Minutes 31  -         Total Minutes    Timed Charges Total Minutes 10  -      Untimed Charges Total Minutes 31  -       Total Minutes 41  -MC                User Key  (r) = Recorded By, (t) = Taken By, (c) = Cosigned By      Initials Name Provider  Type     Ashleigh Bush OT Occupational Therapist                  Therapy Charges for Today       Code Description Service Date Service Provider Modifiers Qty    42253886613 HC OT SELF CARE/MGMT/TRAIN EA 15 MIN 11/21/2024 Ashleigh Bush OT GO 1    37214513290 HC OT EVAL MOD COMPLEXITY 3 11/21/2024 Ashleigh Bush OT GO 1                 Ashleigh Bush OT  11/21/2024

## 2024-11-21 NOTE — CASE MANAGEMENT/SOCIAL WORK
Discharge Planning Assessment  [facility]     Patient Name: Amol Aguirre  MRN: [id number]  Today's Date: 11/21/2024    Admit Date: 11/20/2024    Plan: IDP   Discharge Needs Assessment       Row Name 11/21/24 1501       Living Environment    People in Home alone    Current Living Arrangements home    Potentially Unsafe Housing Conditions none    Primary Care Provided by self    Provides Primary Care For no one    Family Caregiver if Needed grandchild(jose), adult    Family Caregiver Names Elinor Aponte, grand daughter [phone]    Quality of Family Relationships unable to assess    Able to Return to Prior Arrangements yes       Transition Planning    Patient/Family Anticipates Transition to home;home with help/services;inpatient rehabilitation facility    Patient/Family Anticipated Services at Transition     Transportation Anticipated family or friend will provide       Discharge Needs Assessment    Readmission Within the Last 30 Days no previous admission in last 30 days    Equipment Currently Used at Home cane, quad tip;walker, rolling;walker, standard;wheelchair;shower chair;bp cuff;glucometer    Concerns to be Addressed discharge planning                   Discharge Plan       Row Name 11/21/24 1507       Plan    Plan IDP    Patient/Family in Agreement with Plan yes    Plan Comments Spoke with patient at bedside to initiate discharge planning. Patient lives alone in [place] [place]. Prior to admission, was not current with any home health agency. Patient states he is independent with ADL's but his grandchildren assist with shopping and transportation. Patient uses a walker and wheelchair for assistance with mobility, and has access to a cane, shower chair, glucometer, and bp cuff at home. Confirmed PCP is Sam Preston MD. Verified insurance is Medicare A&B with secondary of [facility]. Patient has Rx coverage and has prescriptions filled at [facility]. Will await therapy notes and  recommendations to assist with proper discharge placement. CM will continue to follow and assist with discharge planning.    Final Discharge Disposition Code 01 - home or self-care                  Continued Care and Services - Admitted Since 11/20/2024    No active coordination exists for this encounter.       Expected Discharge Date and Time       Expected Discharge Date Expected Discharge Time    Nov 23, 2024            Demographic Summary       Row Name 11/21/24 5476       General Information    Admission Type inpatient    Arrived From emergency department    Reason for Consult discharge planning    Preferred Language English       Contact Information    Permission Granted to Share Info With ;family/designee                   Functional Status       Row Name 11/21/24 1501       Functional Status    Usual Activity Tolerance moderate    Current Activity Tolerance moderate       Functional Status, IADL    Medications independent    Meal Preparation independent    Housekeeping independent    Laundry independent    Shopping assistive person       Mental Status    General Appearance WDL WDL       Mental Status Summary    Recent Changes in Mental Status/Cognitive Functioning no changes       Employment/    Employment Status retired                   Psychosocial    No documentation.                  Abuse/Neglect    No documentation.                  Legal    No documentation.                  Substance Abuse    No documentation.                  Patient Forms    No documentation.                     Haseeb Toussaint RN

## 2024-11-21 NOTE — PLAN OF CARE
Problem: Adult Inpatient Plan of Care  Goal: Plan of Care Review  Outcome: Progressing  Goal: Patient-Specific Goal (Individualized)  Outcome: Progressing  Goal: Absence of Hospital-Acquired Illness or Injury  Outcome: Progressing  Goal: Optimal Comfort and Wellbeing  Outcome: Progressing  Goal: Readiness for Transition of Care  Outcome: Progressing  Intervention: Mutually Develop Transition Plan  Recent Flowsheet Documentation  Taken 11/21/2024 0440 by Nicole Pandey RN  Transportation Anticipated: family or friend will provide  Patient/Family Anticipated Services at Transition: home health care  Patient/Family Anticipates Transition to: home with family  Taken 11/21/2024 0432 by Nicole Pandey, RN  Equipment Currently Used at Home:   wheelchair   cane, quad     Problem: Fall Injury Risk  Goal: Absence of Fall and Fall-Related Injury  Outcome: Progressing     Problem: Skin Injury Risk Increased  Goal: Skin Health and Integrity  Outcome: Progressing     Problem: Comorbidity Management  Goal: Blood Glucose Level Within Target Range  Outcome: Progressing  Goal: Blood Pressure in Desired Range  Outcome: Progressing   Goal Outcome Evaluation:

## 2024-11-21 NOTE — ED PROVIDER NOTES
Subjective   History of Present Illness  Patient is a pleasant 79-year-old male, lives at home alone, presents to the emergency department with multiple complaints.  His daughter or granddaughter is in the room with him and provides much of the history.  They state that for the past 3 weeks he has had progressive decline in his strength and increasing confusion.  This decline has been especially pronounced over the past 4 days.  They state that on Sunday, 4 days ago, he had an episode of significant confusion and weakness.  Slept most of the day which is unusual for him.  They checked his blood glucose and it was 80 during the episode of confusion.  They debated calling EMS at that time but his mental status improved somewhat and they ended up not calling on Sunday.  On Monday the patient had a second fall and increased confusion.  They went to [facility] emergency department were a urinary tract infection, pneumonia, and CHF was diagnosed.  This is per the family's count of the events.  He states that he was given a dose of Rocephin and prescribed an oral antibiotic to take at home.  Yesterday he again was weak and family found him on the floor.  They took him to his primary care provider patient has been reluctant to come to the hospital but today had a another fall where family found him on the floor and this combined with his progressively worsening confusion throughout the week prompted the visit to the emergency department.  Patient himself currently denies pain.  He does admit that he hit his head on one of the falls this week but denies current headache.  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  Denies fever, chills, chest pain, current shortness of breath, abdominal pain, vomiting, diarrhea, or other acute complaint.        Review of Systems   All other systems reviewed and are negative.      Past Medical History:   Diagnosis Date     Acid reflux     Arthritis     Arthritis of back 2015    Asthma     Atrial fibrillation/flutter     CHF (congestive heart failure)     CTS (carpal tunnel syndrome) 2018    Diabetes     Fracture of ankle 2008    Hypertension        Allergies   Allergen Reactions    Chlorhexidine Shortness Of Breath, Itching, Rash and Anaphylaxis     Pt developed a rash, itching, and shortness of breath after receiving a CHG bath on 4/24/19.  Pt developed a rash, itching, and shortness of breath after receiving a CHG bath on 4/24/19.    Latex Other (See Comments) and Swelling     Rash, hives, and tongue swelling  Rash, hives, and tongue swelling    Procaine Anaphylaxis    Tegaderm Ag Mesh [Silver] Swelling and Rash     Pt developed a rash with swelling within minutes of application.       Past Surgical History:   Procedure Laterality Date    AMPUTATION DIGIT Left 10/15/2019    Procedure: AMPUTATE LEFT THIRD TOE;  Surgeon: Juju Juarez MD;  Location:  [facility] OR;  Service: Orthopedics    AMPUTATION DIGIT Left 01/07/2021    Procedure: amputate left first toe and metatarsal;  Surgeon: Juju Juarez MD;  Location:  [facility] OR;  Service: Orthopedics;  Laterality: Left;    AMPUTATION DIGIT Left 08/31/2021    Procedure: amputate left 2nd toe and metatarsal, close diabetic ulcer;  Surgeon: Juju Juarez MD;  Location:  [facility] OR;  Service: Orthopedics;  Laterality: Left;    AORTAGRAM N/A 04/30/2019    Procedure: AORTAGRAM WITH OR WITHOUT RUNOFFS;  Surgeon: Carrillo White MD;  Location:  [facility];  Service: Vascular    BELOW KNEE AMPUTATION Right 06/04/2019    Procedure: BELOW KNEE AMPUTATION RIGHT;  Surgeon: Juju Juarez MD;  Location:  [facility] OR;  Service: Orthopedics    CARDIAC CATHETERIZATION      NO INTERVENTION    CARDIAC ELECTROPHYSIOLOGY PROCEDURE N/A 05/28/2021    Procedure: Device Implant;  Surgeon: Amol Foreman MD;  Location:  [facility] CATH INVASIVE LOCATION;  Service: Cardiovascular;  Laterality: N/A;     CATARACT EXTRACTION W/ INTRAOCULAR LENS IMPLANT Right 2020    Procedure: CATARACT PHACO EXTRACTION WITH INTRAOCULAR LENS IMPLANT RIGHT;  Surgeon: Jez Mas MD;  Location: [facility] OR;  Service: Ophthalmology;  Laterality: Right;    CATARACT EXTRACTION W/ INTRAOCULAR LENS IMPLANT Left 2020    Procedure: CATARACT PHACO EXTRACTION WITH INTRAOCULAR LENS IMPLANT LEFT;  Surgeon: Jez Mas MD;  Location: [facility] OR;  Service: Ophthalmology;  Laterality: Left;    CHOLECYSTECTOMY      COLONOSCOPY      FOOT SURGERY Left 10/15/2019    Amputate 3rd toe- [place]    JOINT REPLACEMENT      KNEE SURGERY Right     TKA       Family History   Problem Relation Age of Onset    Cancer Mother     Hypertension Mother     Hypertension Father     Heart attack Father        Social History     Socioeconomic History    Marital status:    Tobacco Use    Smoking status: Former     Current packs/day: 0.00     Types: Cigarettes     Start date: 1965     Quit date: 1975     Years since quittin.9    Smokeless tobacco: Never   Vaping Use    Vaping status: Never Used   Substance and Sexual Activity    Alcohol use: No    Drug use: No    Sexual activity: Not Currently     Partners: Female     Birth control/protection: Abstinence           Objective   Physical Exam  Vitals and nursing note reviewed.   Constitutional:       Appearance: He is well-developed.   HENT:      Head: Normocephalic.   Eyes:      Extraocular Movements: Extraocular movements intact.      Pupils: Pupils are equal, round, and reactive to light.   Cardiovascular:      Rate and Rhythm: Normal rate and regular rhythm.      Heart sounds: Normal heart sounds. Murmur heard.   Pulmonary:      Effort: Pulmonary effort is normal.      Breath sounds: Normal breath sounds.   Abdominal:      General: Bowel sounds are normal.      Palpations: Abdomen is soft.   Musculoskeletal:         General: Normal range of motion.      Cervical back: Normal  range of motion.   Skin:     General: Skin is warm and dry.      Comments: Contusion, abrasion to left shin   Neurological:      Mental Status: He is alert.         Critical Care    Performed by: Samuel Meyer DO  Authorized by: Samuel Meyer DO    Critical care provider statement:     Critical care time (minutes):  35    Critical care time was exclusive of:  Separately billable procedures and treating other patients    Critical care was time spent personally by me on the following activities:  Ordering and performing treatments and interventions, ordering and review of laboratory studies, ordering and review of radiographic studies, pulse oximetry, re-evaluation of patient's condition, review of old charts, obtaining history from patient or surrogate, examination of patient, evaluation of patient's response to treatment, discussions with consultants and development of treatment plan with patient or surrogate    I assumed direction of critical care for this patient from another provider in my specialty: no      Care discussed with: admitting provider               ED Course      Recent Results (from the past 24 hours)   ECG 12 Lead Altered Mental Status    Collection Time: 11/20/24  7:27 PM   Result Value Ref Range    QT Interval 452 ms    QTC Interval 491 ms   Comprehensive Metabolic Panel    Collection Time: 11/20/24  7:37 PM    Specimen: Blood   Result Value Ref Range    Glucose 191 (H) 65 - 99 mg/dL    BUN 18 8 - 23 mg/dL    Creatinine 1.66 (H) 0.76 - 1.27 mg/dL    Sodium 142 136 - 145 mmol/L    Potassium 4.7 3.5 - 5.2 mmol/L    Chloride 107 98 - 107 mmol/L    CO2 23.0 22.0 - 29.0 mmol/L    Calcium 9.2 8.6 - 10.5 mg/dL    Total Protein 7.0 6.0 - 8.5 g/dL    Albumin 3.8 3.5 - 5.2 g/dL    ALT (SGPT) 7 1 - 41 U/L    AST (SGOT) 23 1 - 40 U/L    Alkaline Phosphatase 97 39 - 117 U/L    Total Bilirubin 0.5 0.0 - 1.2 mg/dL    Globulin 3.2 gm/dL    A/G Ratio 1.2 g/dL    BUN/Creatinine Ratio 10.8 7.0 - 25.0    Anion Gap  12.0 5.0 - 15.0 mmol/L    eGFR 41.2 (L) >60.0 mL/min/1.73   Single High Sensitivity Troponin T    Collection Time: 11/20/24  7:37 PM    Specimen: Blood   Result Value Ref Range    HS Troponin T 37 (H) <22 ng/L   Magnesium    Collection Time: 11/20/24  7:37 PM    Specimen: Blood   Result Value Ref Range    Magnesium 1.8 1.6 - 2.4 mg/dL   Green Top (Gel)    Collection Time: 11/20/24  7:37 PM   Result Value Ref Range    Extra Tube Hold for add-ons.    Lavender Top    Collection Time: 11/20/24  7:37 PM   Result Value Ref Range    Extra Tube hold for add-on    Gold Top - SST    Collection Time: 11/20/24  7:37 PM   Result Value Ref Range    Extra Tube Hold for add-ons.    Gray Top    Collection Time: 11/20/24  7:37 PM   Result Value Ref Range    Extra Tube Hold for add-ons.    Light Blue Top    Collection Time: 11/20/24  7:37 PM   Result Value Ref Range    Extra Tube Hold for add-ons.    CBC Auto Differential    Collection Time: 11/20/24  7:37 PM    Specimen: Blood   Result Value Ref Range    WBC 8.12 3.40 - 10.80 10*3/mm3    RBC 4.33 4.14 - 5.80 10*6/mm3    Hemoglobin 11.9 (L) 13.0 - 17.7 g/dL    Hematocrit 37.6 37.5 - 51.0 %    MCV 86.8 79.0 - 97.0 fL    MCH 27.5 26.6 - 33.0 pg    MCHC 31.6 31.5 - 35.7 g/dL    RDW 14.1 12.3 - 15.4 %    RDW-SD 44.5 37.0 - 54.0 fl    MPV 9.9 6.0 - 12.0 fL    Platelets 285 140 - 450 10*3/mm3    Neutrophil % 77.7 (H) 42.7 - 76.0 %    Lymphocyte % 13.7 (L) 19.6 - 45.3 %    Monocyte % 6.9 5.0 - 12.0 %    Eosinophil % 0.9 0.3 - 6.2 %    Basophil % 0.6 0.0 - 1.5 %    Immature Grans % 0.2 0.0 - 0.5 %    Neutrophils, Absolute 6.31 1.70 - 7.00 10*3/mm3    Lymphocytes, Absolute 1.11 0.70 - 3.10 10*3/mm3    Monocytes, Absolute 0.56 0.10 - 0.90 10*3/mm3    Eosinophils, Absolute 0.07 0.00 - 0.40 10*3/mm3    Basophils, Absolute 0.05 0.00 - 0.20 10*3/mm3    Immature Grans, Absolute 0.02 0.00 - 0.05 10*3/mm3    nRBC 0.0 0.0 - 0.2 /100 WBC   Procalcitonin    Collection Time: 11/20/24  7:37 PM    Specimen:  Blood   Result Value Ref Range    Procalcitonin 0.11 0.00 - 0.25 ng/mL   Lactic Acid, Plasma    Collection Time: 11/20/24  7:37 PM    Specimen: Blood   Result Value Ref Range    Lactate 1.2 0.5 - 2.0 mmol/L   POC Glucose Once    Collection Time: 11/20/24  7:41 PM    Specimen: Blood   Result Value Ref Range    Glucose 145 (H) 70 - 130 mg/dL   Urinalysis With Microscopic If Indicated (No Culture) - Urine, Clean Catch    Collection Time: 11/20/24  7:56 PM    Specimen: Urine, Clean Catch   Result Value Ref Range    Color, UA Yellow Yellow, Straw    Appearance, UA Clear Clear    pH, UA 7.0 5.0 - 8.0    Specific Gravity, UA 1.009 1.001 - 1.030    Glucose, UA Negative Negative    Ketones, UA Negative Negative    Bilirubin, UA Negative Negative    Blood, UA Trace (A) Negative    Protein,  mg/dL (2+) (A) Negative    Leuk Esterase, UA Negative Negative    Nitrite, UA Negative Negative    Urobilinogen, UA 0.2 E.U./dL 0.2 - 1.0 E.U./dL   Urinalysis, Microscopic Only - Urine, Clean Catch    Collection Time: 11/20/24  7:56 PM    Specimen: Urine, Clean Catch   Result Value Ref Range    RBC, UA 0-2 None Seen, 0-2 /HPF    WBC, UA 0-2 None Seen, 0-2 /HPF    Bacteria, UA None Seen None Seen, Trace /HPF    Squamous Epithelial Cells, UA 0-2 None Seen, 0-2 /HPF    Hyaline Casts, UA None Seen 0 - 6 /LPF    Methodology Automated Microscopy      Note: In addition to lab results from this visit, the labs listed above may include labs taken at another facility or during a different encounter within the last 24 hours. Please correlate lab times with ED admission and discharge times for further clarification of the services performed during this visit.    CT Head Without Contrast   Final Result   Impression:   1. No acute intracranial abnormality. Specifically, no evidence of acute hemorrhage, mass effect or midline shift.   2. Confluent periventricular and subcortical white matter hypodensity likely representing sequelae of chronic small  "vessel ischemic disease.               Electronically Signed: Domo Gaviria     11/20/2024 11:00 PM EST     Workstation ID: [id number]      XR Chest 1 View   Final Result   Impression:   1. Left chest wall pacemaker with leads in expected position.   2. Elevated right hemidiaphragm with right basilar airspace disease.         Electronically Signed: Domo Gaviria     11/20/2024 8:31 PM EST     Workstation ID: [id number]        Vitals:    11/20/24 1917 11/20/24 1919 11/20/24 1930 11/20/24 2000   BP: (!) 209/96 (!) 209/96 (!) 201/91 (!) 200/97   BP Location:  Right arm     Patient Position:  Sitting     Pulse: 71 71 71 73   Resp:  19     SpO2: 98% 99% 98% 99%   Weight:  121 kg (266 lb)     Height:  190.5 cm (75\")       Medications   sodium chloride 0.9 % flush 10 mL (has no administration in time range)   labetalol (NORMODYNE,TRANDATE) injection 10 mg (has no administration in time range)   cefTRIAXone (ROCEPHIN) 2,000 mg in sodium chloride 0.9 % 100 mL MBP (has no administration in time range)   doxycycline (MONODOX) capsule 100 mg (has no administration in time range)     ECG/EMG Results (last 24 hours)       Procedure Component Value Units Date/Time    ECG 12 Lead Altered Mental Status [[id number]] Collected: 11/20/24 1927     Updated: 11/20/24 1927     QT Interval 452 ms      QTC Interval 491 ms     Narrative:      Test Reason : Altered Mental Status  Blood Pressure :   */*   mmHG  Vent. Rate :  71 BPM     Atrial Rate :  71 BPM     P-R Int : 294 ms          QRS Dur : 100 ms      QT Int : 452 ms       P-R-T Axes :   *  23  93 degrees    QTcB Int : 491 ms    Sinus rhythm with 1st degree AV block  ST & T wave abnormality, consider lateral ischemia  Abnormal ECG  When compared with ECG of 17-Apr-2024 13:17,  TX interval has increased  ST less depressed in Anterior leads  T wave inversion less evident in Anterolateral leads    Referred By: EDMD           Confirmed By:           ECG 12 Lead Altered Mental Status "   Preliminary Result   Test Reason : Altered Mental Status   Blood Pressure :   */*   mmHG   Vent. Rate :  71 BPM     Atrial Rate :  71 BPM      P-R Int : 294 ms          QRS Dur : 100 ms       QT Int : 452 ms       P-R-T Axes :   *  23  93 degrees     QTcB Int : 491 ms      Sinus rhythm with 1st degree AV block   ST & T wave abnormality, consider lateral ischemia   Abnormal ECG   When compared with ECG of 17-Apr-2024 13:17,   OK interval has increased   ST less depressed in Anterior leads   T wave inversion less evident in Anterolateral leads      Referred By: EDMD           Confirmed By:       ECG 12 Lead ED Triage Standing Order; Weak / Dizzy / AMS    (Results Pending)                                                        Medical Decision Making  Complex presentation with complex decision making. Differential including CVA, acute psychosis, baseline poor cognition, hypertensive encephalopathy, medication reaction, drug reaction, seizure, ICH, MS, UTI, Metabolic disturbance, MI, PNA, and additional acute, emergent conditions considered. Extensive workup including imaging and labs, as discussed separately, performed and emergent condition managed.      Problems Addressed:  Altered mental status, unspecified altered mental status type: complicated acute illness or injury  Confusion: complicated acute illness or injury  Failure of outpatient treatment: complicated acute illness or injury  Hypertension, unspecified type: complicated acute illness or injury  Hypoglycemia: complicated acute illness or injury  Multiple falls: complicated acute illness or injury  Pneumonia of right lower lobe due to infectious organism: complicated acute illness or injury    Amount and/or Complexity of Data Reviewed  Labs: ordered. Decision-making details documented in ED Course.  Radiology: ordered and independent interpretation performed. Decision-making details documented in ED Course.  ECG/medicine tests: ordered and independent  interpretation performed. Decision-making details documented in ED Course.    Risk  Prescription drug management.  Decision regarding hospitalization.        Final diagnoses:   Altered mental status, unspecified altered mental status type   Pneumonia of right lower lobe due to infectious organism   Hypertension, unspecified type   Confusion   Multiple falls   Failure of outpatient treatment   Hypoglycemia       ED Disposition  ED Disposition       ED Disposition   Decision to Admit    Condition   --    Comment   --                  Samuel Meyer, DO  11/21/24 2153       Samuel Meyer,   11/21/24 215

## 2024-11-22 LAB
ANION GAP SERPL CALCULATED.3IONS-SCNC: 12 MMOL/L (ref 5–15)
BASOPHILS # BLD AUTO: 0.05 10*3/MM3 (ref 0–0.2)
BASOPHILS NFR BLD AUTO: 0.6 % (ref 0–1.5)
BUN SERPL-MCNC: 22 MG/DL (ref 8–23)
BUN/CREAT SERPL: 12.9 (ref 7–25)
C DIFF TOX GENS STL QL NAA+PROBE: NOT DETECTED
CALCIUM SPEC-SCNC: 9.1 MG/DL (ref 8.6–10.5)
CHLORIDE SERPL-SCNC: 107 MMOL/L (ref 98–107)
CO2 SERPL-SCNC: 23 MMOL/L (ref 22–29)
CREAT SERPL-MCNC: 1.71 MG/DL (ref 0.76–1.27)
DEPRECATED RDW RBC AUTO: 45.5 FL (ref 37–54)
EGFRCR SERPLBLD CKD-EPI 2021: 39.7 ML/MIN/1.73
EOSINOPHIL # BLD AUTO: 0.26 10*3/MM3 (ref 0–0.4)
EOSINOPHIL NFR BLD AUTO: 3.1 % (ref 0.3–6.2)
ERYTHROCYTE [DISTWIDTH] IN BLOOD BY AUTOMATED COUNT: 14.2 % (ref 12.3–15.4)
GLUCOSE BLDC GLUCOMTR-MCNC: 100 MG/DL (ref 70–130)
GLUCOSE BLDC GLUCOMTR-MCNC: 121 MG/DL (ref 70–130)
GLUCOSE BLDC GLUCOMTR-MCNC: 145 MG/DL (ref 70–130)
GLUCOSE BLDC GLUCOMTR-MCNC: 88 MG/DL (ref 70–130)
GLUCOSE SERPL-MCNC: 98 MG/DL (ref 65–99)
HCT VFR BLD AUTO: 33.2 % (ref 37.5–51)
HGB BLD-MCNC: 10.5 G/DL (ref 13–17.7)
IMM GRANULOCYTES # BLD AUTO: 0.04 10*3/MM3 (ref 0–0.05)
IMM GRANULOCYTES NFR BLD AUTO: 0.5 % (ref 0–0.5)
LYMPHOCYTES # BLD AUTO: 2.02 10*3/MM3 (ref 0.7–3.1)
LYMPHOCYTES NFR BLD AUTO: 24.3 % (ref 19.6–45.3)
MCH RBC QN AUTO: 27.6 PG (ref 26.6–33)
MCHC RBC AUTO-ENTMCNC: 31.6 G/DL (ref 31.5–35.7)
MCV RBC AUTO: 87.1 FL (ref 79–97)
MONOCYTES # BLD AUTO: 0.8 10*3/MM3 (ref 0.1–0.9)
MONOCYTES NFR BLD AUTO: 9.6 % (ref 5–12)
NEUTROPHILS NFR BLD AUTO: 5.14 10*3/MM3 (ref 1.7–7)
NEUTROPHILS NFR BLD AUTO: 61.9 % (ref 42.7–76)
NRBC BLD AUTO-RTO: 0 /100 WBC (ref 0–0.2)
PLATELET # BLD AUTO: 282 10*3/MM3 (ref 140–450)
PMV BLD AUTO: 9.9 FL (ref 6–12)
POTASSIUM SERPL-SCNC: 4.5 MMOL/L (ref 3.5–5.2)
QT INTERVAL: 452 MS
QTC INTERVAL: 491 MS
RBC # BLD AUTO: 3.81 10*6/MM3 (ref 4.14–5.8)
SODIUM SERPL-SCNC: 142 MMOL/L (ref 136–145)
WBC NRBC COR # BLD AUTO: 8.31 10*3/MM3 (ref 3.4–10.8)

## 2024-11-22 PROCEDURE — 94799 UNLISTED PULMONARY SVC/PX: CPT

## 2024-11-22 PROCEDURE — 87493 C DIFF AMPLIFIED PROBE: CPT | Performed by: STUDENT IN AN ORGANIZED HEALTH CARE EDUCATION/TRAINING PROGRAM

## 2024-11-22 PROCEDURE — 99232 SBSQ HOSP IP/OBS MODERATE 35: CPT | Performed by: NURSE PRACTITIONER

## 2024-11-22 PROCEDURE — 82948 REAGENT STRIP/BLOOD GLUCOSE: CPT

## 2024-11-22 PROCEDURE — 85025 COMPLETE CBC W/AUTO DIFF WBC: CPT | Performed by: STUDENT IN AN ORGANIZED HEALTH CARE EDUCATION/TRAINING PROGRAM

## 2024-11-22 PROCEDURE — 80048 BASIC METABOLIC PNL TOTAL CA: CPT | Performed by: STUDENT IN AN ORGANIZED HEALTH CARE EDUCATION/TRAINING PROGRAM

## 2024-11-22 PROCEDURE — 25010000002 CEFTRIAXONE PER 250 MG: Performed by: STUDENT IN AN ORGANIZED HEALTH CARE EDUCATION/TRAINING PROGRAM

## 2024-11-22 PROCEDURE — 94664 DEMO&/EVAL PT USE INHALER: CPT

## 2024-11-22 RX ORDER — L.ACID,PARA/B.BIFIDUM/S.THERM 8B CELL
1 CAPSULE ORAL DAILY
Status: DISCONTINUED | OUTPATIENT
Start: 2024-11-22 | End: 2024-11-23 | Stop reason: HOSPADM

## 2024-11-22 RX ADMIN — BUDESONIDE AND FORMOTEROL FUMARATE DIHYDRATE 2 PUFF: 160; 4.5 AEROSOL RESPIRATORY (INHALATION) at 20:48

## 2024-11-22 RX ADMIN — SODIUM CHLORIDE 2000 MG: 900 INJECTION INTRAVENOUS at 20:17

## 2024-11-22 RX ADMIN — DOXYCYCLINE 100 MG: 100 CAPSULE ORAL at 09:09

## 2024-11-22 RX ADMIN — AMLODIPINE BESYLATE 10 MG: 10 TABLET ORAL at 09:09

## 2024-11-22 RX ADMIN — DOXYCYCLINE 100 MG: 100 CAPSULE ORAL at 20:17

## 2024-11-22 RX ADMIN — BUDESONIDE AND FORMOTEROL FUMARATE DIHYDRATE 2 PUFF: 160; 4.5 AEROSOL RESPIRATORY (INHALATION) at 07:54

## 2024-11-22 RX ADMIN — ACETAMINOPHEN 650 MG: 325 TABLET ORAL at 18:05

## 2024-11-22 RX ADMIN — METOPROLOL SUCCINATE 25 MG: 25 TABLET, EXTENDED RELEASE ORAL at 09:12

## 2024-11-22 RX ADMIN — APIXABAN 2.5 MG: 2.5 TABLET, FILM COATED ORAL at 09:09

## 2024-11-22 RX ADMIN — FLECAINIDE ACETATE 50 MG: 50 TABLET ORAL at 20:17

## 2024-11-22 RX ADMIN — PANTOPRAZOLE SODIUM 40 MG: 40 TABLET, DELAYED RELEASE ORAL at 09:09

## 2024-11-22 RX ADMIN — FLECAINIDE ACETATE 50 MG: 50 TABLET ORAL at 09:09

## 2024-11-22 RX ADMIN — APIXABAN 2.5 MG: 2.5 TABLET, FILM COATED ORAL at 20:17

## 2024-11-22 RX ADMIN — Medication 10 ML: at 20:18

## 2024-11-22 RX ADMIN — Medication 1 CAPSULE: at 12:18

## 2024-11-22 RX ADMIN — LOSARTAN POTASSIUM 25 MG: 25 TABLET, FILM COATED ORAL at 09:09

## 2024-11-22 RX ADMIN — Medication 10 ML: at 09:09

## 2024-11-22 NOTE — PLAN OF CARE
Problem: Adult Inpatient Plan of Care  Goal: Plan of Care Review  Outcome: Progressing  Goal: Patient-Specific Goal (Individualized)  Outcome: Progressing  Goal: Absence of Hospital-Acquired Illness or Injury  Outcome: Progressing  Intervention: Identify and Manage Fall Risk  Recent Flowsheet Documentation  Taken 11/22/2024 0200 by Nicole Pandey RN  Safety Promotion/Fall Prevention:   fall prevention program maintained   safety round/check completed  Taken 11/22/2024 0000 by Nicole Pandey RN  Safety Promotion/Fall Prevention:   fall prevention program maintained   safety round/check completed  Taken 11/21/2024 2200 by Nicole Pandey RN  Safety Promotion/Fall Prevention:   fall prevention program maintained   safety round/check completed  Taken 11/21/2024 2024 by Nicole Pandey RN  Safety Promotion/Fall Prevention:   fall prevention program maintained   nonskid shoes/slippers when out of bed   safety round/check completed  Intervention: Prevent Skin Injury  Recent Flowsheet Documentation  Taken 11/22/2024 0200 by Nicole Pandey RN  Body Position: position changed independently  Taken 11/22/2024 0000 by Nicole Pandey RN  Body Position: position changed independently  Taken 11/21/2024 2200 by Nicole Pandey RN  Body Position: position changed independently  Taken 11/21/2024 2024 by Nicole Pandey RN  Body Position: position changed independently  Intervention: Prevent Infection  Recent Flowsheet Documentation  Taken 11/21/2024 2200 by Nicole Pandey RN  Infection Prevention:   environmental surveillance performed   equipment surfaces disinfected   hand hygiene promoted   rest/sleep promoted   single patient room provided  Taken 11/21/2024 2024 by Nicole Pandey RN  Infection Prevention:   environmental surveillance performed   equipment surfaces disinfected   hand hygiene promoted   rest/sleep promoted   single patient room provided  Goal: Optimal Comfort and Wellbeing  Outcome:  Progressing  Intervention: Provide Person-Centered Care  Recent Flowsheet Documentation  Taken 11/21/2024 2024 by Nicole Pandey RN  Trust Relationship/Rapport: care explained  Goal: Readiness for Transition of Care  Outcome: Progressing     Problem: Fall Injury Risk  Goal: Absence of Fall and Fall-Related Injury  Outcome: Progressing  Intervention: Promote Injury-Free Environment  Recent Flowsheet Documentation  Taken 11/22/2024 0200 by Nicole Pandey RN  Safety Promotion/Fall Prevention:   fall prevention program maintained   safety round/check completed  Taken 11/22/2024 0000 by Nicole Pandey RN  Safety Promotion/Fall Prevention:   fall prevention program maintained   safety round/check completed  Taken 11/21/2024 2200 by Nicole Pandey RN  Safety Promotion/Fall Prevention:   fall prevention program maintained   safety round/check completed  Taken 11/21/2024 2024 by Nicole Pandey RN  Safety Promotion/Fall Prevention:   fall prevention program maintained   nonskid shoes/slippers when out of bed   safety round/check completed     Problem: Skin Injury Risk Increased  Goal: Skin Health and Integrity  Outcome: Progressing  Intervention: Optimize Skin Protection  Recent Flowsheet Documentation  Taken 11/22/2024 0200 by Nicole Pandey RN  Activity Management: activity encouraged  Head of Bed (HOB) Positioning: HOB elevated  Taken 11/22/2024 0000 by Nicole Pandey RN  Activity Management: activity encouraged  Head of Bed (HOB) Positioning: HOB elevated  Taken 11/21/2024 2200 by Nicole Pandey RN  Activity Management: activity encouraged  Head of Bed (HOB) Positioning: HOB elevated  Taken 11/21/2024 2024 by Nicole Pandey RN  Activity Management: ambulated to bathroom  Head of Bed (HOB) Positioning: HOB elevated     Problem: Comorbidity Management  Goal: Blood Glucose Level Within Target Range  Outcome: Progressing  Goal: Blood Pressure in Desired Range  Outcome: Progressing   Goal Outcome  Evaluation:

## 2024-11-22 NOTE — CASE MANAGEMENT/SOCIAL WORK
Continued Stay Note   Paul     Patient Name: Amol Aguirre  MRN: [id number]  Today's Date: 11/22/2024    Admit Date: 11/20/2024    Plan: Home with [facility]   Discharge Plan       Row Name 11/22/24 1406       Plan    Plan Home with [facility]    Patient/Family in Agreement with Plan yes    Plan Comments Spoke with patient at bedside to discuss discharge plans. Therapy recommending IRF at d/c. Patient not interested in going to rehab and would like to retrun home with  at discharge. Called and spoke with [facility] who stated they could accpet patient for PT/OT/SN. Patient's plan is home with  at discharge. Patient states he has transport.  will continue to follow and assist with discharge planning.    Final Discharge Disposition Code 06 - home with home health care                   Discharge Codes    No documentation.                 Expected Discharge Date and Time       Expected Discharge Date Expected Discharge Time    Nov 23, 2024               Haseeb Toussaint RN

## 2024-11-22 NOTE — PROGRESS NOTES
[facility] Medicine Services  PROGRESS NOTE    Patient Name: Amol Aguirre  : 1945  MRN: [id number]    Date of Admission: 2024  Primary Care Physician: Sam Preston MD    Subjective   Subjective     CC:  Follow-up hypertensive emergency    HPI:  Patient was seen resting in chair in no apparent distress.  No acute events overnight per nursing.  He continues to have diarrhea and notes ongoing weakness.  No additional complaints at this time.      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, 1+ LLE edema   Psychiatric: flat affect, cooperative  Neurologic: Oriented x 3, moves all extremities, speech clear  Skin: warm, dry, no visible rash     Results Reviewed:  LAB RESULTS:      Lab 24  0614 24  0615 24   WBC 8.31 7.83 8.12   HEMOGLOBIN 10.5* 11.1* 11.9*   HEMATOCRIT 33.2* 35.4* 37.6   PLATELETS 282 288 285   NEUTROS ABS 5.14 5.31 6.31   IMMATURE GRANS (ABS) 0.04 0.02 0.02   LYMPHS ABS 2.02 1.48 1.11   MONOS ABS 0.80 0.74 0.56   EOS ABS 0.26 0.23 0.07   MCV 87.1 87.4 86.8   PROCALCITONIN  --   --  0.11   LACTATE  --   --  1.2   HSTROP T  --   --  37*         Lab 24  0614 24  0615 24  0206 24  1408   SODIUM 142 141  --  142  --    POTASSIUM 4.5 4.2  --  4.7  --    CHLORIDE 107 107  --  107  --    CO2 23.0 23.0  --  23.0  --    ANION GAP 12.0 11.0  --  12.0  --    BUN 22 18  --  18  --     CREATININE 1.71* 1.44*  --  1.66*  --    EGFR 39.7* 48.8*  --  41.2*  --    GLUCOSE 98 36*  --  191*  --    CALCIUM 9.1 9.0  --  9.2  --    MAGNESIUM  --   --   --  1.8 1.7*   HEMOGLOBIN A1C  --   --  6.00*  --   --          Lab 11/20/24 1937   TOTAL PROTEIN 7.0   ALBUMIN 3.8   GLOBULIN 3.2   ALT (SGPT) 7   AST (SGOT) 23   BILIRUBIN 0.5   ALK PHOS 97         Lab 11/20/24 1937   HSTROP T 37*                 Brief Urine Lab Results  (Last result in the past 365 days)        Color   Clarity   Blood   Leuk Est   Nitrite   Protein   CREAT   Urine HCG        11/20/24 1956 Yellow   Clear   Trace   Negative   Negative   100 mg/dL (2+)                   Microbiology Results Abnormal       None            CT Head Without Contrast    Result Date: 11/20/2024  CT HEAD WO CONTRAST Date of Exam: 11/20/2024 10:26 PM EST Indication: AMS, fall, head injury. Comparison: None available Technique: Axial CT images were obtained of the head without contrast administration.  Automated exposure control and iterative construction methods were used. Findings: The ventricles and sulci are proportionally prominent compatible with global cerebral volume loss. There is no mass effect or midline shift. There is no acute intracranial hemorrhage. There is no abnormal extra-axial fluid collection. The gray-white matter differentiation is preserved. There is confluent periventricular and subcortical white matter hypodensity likely representing sequelae of chronic small vessel ischemic disease. There is a macroscopic fat containing lesion measuring 9 mm within the  right posterior ambient cistern between the posterior aspect of the midbrain and cerebellum. The calvarium is intact. The mastoid air cells and middle ears are well aerated. The visualized orbits and globes appear symmetric with thinning of the lenses bilaterally.     Impression: Impression: 1. No acute intracranial abnormality. Specifically, no evidence of acute hemorrhage, mass effect  or midline shift. 2. Confluent periventricular and subcortical white matter hypodensity likely representing sequelae of chronic small vessel ischemic disease. Electronically Signed: Domo Everette  11/20/2024 11:00 PM EST  Workstation ID: [id number]    XR Chest 1 View    Result Date: 11/20/2024  XR CHEST 1 VW Date of Exam: 11/20/2024 7:19 PM EST Indication: Weak/Dizzy/AMS triage protocol Comparison: Chest radiograph dated 5/28/2021 Findings: There is a left chest wall pacemaker with leads in expected position. The cardiomediastinal silhouette is within normal limits. There is an elevated right hemidiaphragm with associated right basilar atelectasis. There is no definite pleural effusion. There is no pneumothorax. There are degenerative changes of the thoracic spine.     Impression: Impression: 1. Left chest wall pacemaker with leads in expected position. 2. Elevated right hemidiaphragm with right basilar airspace disease. Electronically Signed: Domo Gaviria  11/20/2024 8:31 PM EST  Workstation ID: [id number]     Results for orders placed during the hospital encounter of 11/20/24    Adult Transthoracic Echo Complete w/ Color, Spectral and Contrast if necessary per protocol    Interpretation Summary    Left ventricular systolic function is normal. Calculated left ventricular EF = 64%    The left atrial cavity is moderately dilated.    Estimated right ventricular systolic pressure from tricuspid regurgitation is normal (<35 mmHg). Calculated right ventricular systolic pressure from tricuspid regurgitation is 23 mmHg.      Current medications:  Scheduled Meds:amLODIPine, 10 mg, Oral, Q24H  apixaban, 2.5 mg, Oral, Q12H  budesonide-formoterol, 2 puff, Inhalation, BID - RT  cefTRIAXone, 2,000 mg, Intravenous, Q24H  doxycycline, 100 mg, Oral, Q12H  flecainide, 50 mg, Oral, Q12H  losartan, 25 mg, Oral, Q24H  metoprolol succinate XL, 25 mg, Oral, Q24H  pantoprazole, 40 mg, Oral, Daily  sodium chloride, 10 mL, Intravenous,  Q12H      Continuous Infusions:   PRN Meds:.  acetaminophen    Calcium Replacement - Follow Nurse / BPA Driven Protocol    dextrose    dextrose    glucagon (human recombinant)    Magnesium Standard Dose Replacement - Follow Nurse / BPA Driven Protocol    nitroglycerin    ondansetron    Phosphorus Replacement - Follow Nurse / BPA Driven Protocol    Potassium Replacement - Follow Nurse / BPA Driven Protocol    sodium chloride    sodium chloride    sodium chloride    Assessment & Plan   Assessment & Plan     Active Hospital Problems    Diagnosis  POA    **Generalized weakness [R53.1]  Yes      Resolved Hospital Problems   No resolved problems to display.        Brief Hospital Course to date:  Amol Aguirre is a 79 y.o. male with atrial fibrillation on Eliquis, asthma, hypertension, gout, GERD, type 2 diabetes, heart failure preserved ejection fraction, CKD, pacemaker, right BKA, recent history of osteomyelitis distal phalanx of right middle finger with IV antibiotics and amputation.  He presented to [facility] ED on 11/20/2024 with concern for generalized weakness progressively worse over a few weeks and especially the last few days.  Recently seen at [facility] ED told he had a UTI, pneumonia, CHF.  Day prior to presentation also feeling weak and family found him on the floor.  Says he had diarrhea but not completely watery.  He was found to have low blood sugar by EMS.  In the ED, he had elevated blood pressure.     This patient's problems and plans were partially entered by my partner and updated as appropriate by me 11/22/24.    Hypertensive emergency with possible hypertensive encephalopathy  -Echocardiogram revealed EF of 64%  -cardiology following   -S/P cardene infusion  -Continue amlodipine, metoprolol  -Started on Losartan on 11/21     CKD III  -baseline Cr ~1.7  -Creat 1.44 on 11/21  -Stared on Losartan 11/21  -Creat up to 1.71 this AM   -AM labs     Pneumonia  -CXR reviewed, concerning for right basilar airspace  disease  -Blood cultures pending  -MRSA PCR negative  -Continue Rocephin, doxycycline (day 3)  -Start Priobiotic   -Currently on RA      Diarrhea  -C. difficile pending   -Started Probiotic     Generalized weakness  -PT/OT.    -prosthetic leg in place       Type 2 diabetes  Hypoglycemia  -A1c 6.0  -Hypoglycemic again this morning.  Hold insulin for now     Recent amputation distal phalanx right middle finger  -No symptoms in finger.  He completed IV antibiotics for this osteomyelitis and had amputation.     Atrial fibrillation on Eliquis  Heart failure preserved ejection fraction     CKD     Expected Discharge   Expected Discharge Date: 11/23/2024; Expected Discharge Time:      VTE Prophylaxis:  Pharmacologic VTE prophylaxis orders are present.         AM-PAC 6 Clicks Score (PT): 17 (11/21/24 2024)    CODE STATUS:   Code Status and Medical Interventions: No CPR (Do Not Attempt to Resuscitate); Limited Support; No intubation (DNI)   Ordered at: 11/21/24 0018     Medical Intervention Limits:    No intubation (DNI)     Code Status (Patient has no pulse and is not breathing):    No CPR (Do Not Attempt to Resuscitate)     Medical Interventions (Patient has pulse or is breathing):    Limited Support       Teto Walton, VIPUL  11/22/24

## 2024-11-22 NOTE — PROGRESS NOTES
" [facility] - Progress Note    Amol Aguirre  1945  [id number]    11/22/24, 09:17 EST      Chief Complaint: Following for BP management.    Subjective:   BP much better controlled.  No confusion overnight.  Sitting up in bedside chair, alert and oriented x 3.  Reports feeling much better today.    Review of Systems:  Pertinent positives are listed above and in physical exam.  All others have been reviewed and are negative.    amLODIPine, 10 mg, Oral, Q24H  apixaban, 2.5 mg, Oral, Q12H  budesonide-formoterol, 2 puff, Inhalation, BID - RT  cefTRIAXone, 2,000 mg, Intravenous, Q24H  doxycycline, 100 mg, Oral, Q12H  flecainide, 50 mg, Oral, Q12H  losartan, 25 mg, Oral, Q24H  metoprolol succinate XL, 25 mg, Oral, Q24H  pantoprazole, 40 mg, Oral, Daily  sodium chloride, 10 mL, Intravenous, Q12H        Objective:  Vitals:   height is 190.5 cm (75\") and weight is 116 kg (256 lb). His oral temperature is 98.4 °F (36.9 °C). His blood pressure is 163/89 and his pulse is 73. His respiration is 16 and oxygen saturation is 99%.     Intake/Output Summary (Last 24 hours) at 11/22/2024 0917  Last data filed at 11/21/2024 2026  Gross per 24 hour   Intake 1517.33 ml   Output 200 ml   Net 1317.33 ml       Physical Exam:  General:  WN, NAD, A and O x3.  CV:  Normal S1,S2. No murmur, rub, or gallop.  Resp:  CTA Jace, equal, nonlabored.  Abd:  Soft, + BS, no organomegaly. Nontender to palpation.  Extrem:  No edema BLE, right AKA with prosthesis , 2+ pedal/PT pulses.            Results from last 7 days   Lab Units 11/22/24  0614   WBC 10*3/mm3 8.31   HEMOGLOBIN g/dL 10.5*   HEMATOCRIT % 33.2*   PLATELETS 10*3/mm3 282     Results from last 7 days   Lab Units 11/22/24  0614 11/21/24  0615 11/20/24  1937   SODIUM mmol/L 142   < > 142   POTASSIUM mmol/L 4.5   < > 4.7   CHLORIDE mmol/L 107   < > 107   CO2 mmol/L 23.0   < > 23.0   BUN mg/dL 22   < > 18   CREATININE mg/dL 1.71*   < > 1.66*   CALCIUM mg/dL 9.1   < > 9.2 "   BILIRUBIN mg/dL  --   --  0.5   ALK PHOS U/L  --   --  97   ALT (SGPT) U/L  --   --  7   AST (SGOT) U/L  --   --  23   GLUCOSE mg/dL 98   < > 191*    < > = values in this interval not displayed.                       Tele: NSR    Assessment:  -79-year-old CM presents to [facility] ED with progressive weakness over the last week worse in the last few days with recent UTI/pneumonia.  -Hypertensive urgency with poor BP control at home  -HLP  -DM2 with A1c of 6  -Recent amputation distal phalanx right middle finger with completed course of antibiotics for osteomyelitis  -Pneumonia  -Diarrhea  -PAF on Eliquis and flecainide at home.  SSS with permanent pacemaker  -Gout     Plan:  -Admitted to hospitalist services, appreciate their assistance.  -Continue Eliquis, flecainide.  -Continue Toprol-XL, continue home dose amlodipine and Bumex.  Started ARB on 11/21/2024 with slight rise in creatinine.  Continue and repeat labs in AM.  -Cardiology will see again on Monday.  Please call covering service over weekend if needed for new cardiac issues.    I discussed the patient's findings and my recommendations with the patient, any present family members, and the nursing staff.  Eugene Morales MD saw and examined patient, verified hx and PE, read all radiographic studies, reviewed labs and micro data, and formulated dx, plan for treatment and all medical decision making.      Gayle Farah PA-C  11/22/24, 09:17 EST

## 2024-11-23 ENCOUNTER — READMISSION MANAGEMENT (OUTPATIENT)
Dept: CALL CENTER | Facility: HOSPITAL | Age: 79
End: 2024-11-23
Payer: MEDICARE

## 2024-11-23 VITALS
HEART RATE: 70 BPM | RESPIRATION RATE: 18 BRPM | OXYGEN SATURATION: 95 % | DIASTOLIC BLOOD PRESSURE: 63 MMHG | SYSTOLIC BLOOD PRESSURE: 168 MMHG | BODY MASS INDEX: 31.83 KG/M2 | HEIGHT: 75 IN | WEIGHT: 256 LBS | TEMPERATURE: 98 F

## 2024-11-23 LAB
ANION GAP SERPL CALCULATED.3IONS-SCNC: 14 MMOL/L (ref 5–15)
BASOPHILS # BLD AUTO: 0.04 10*3/MM3 (ref 0–0.2)
BASOPHILS NFR BLD AUTO: 0.6 % (ref 0–1.5)
BUN SERPL-MCNC: 21 MG/DL (ref 8–23)
BUN/CREAT SERPL: 13.6 (ref 7–25)
CALCIUM SPEC-SCNC: 8.9 MG/DL (ref 8.6–10.5)
CHLORIDE SERPL-SCNC: 106 MMOL/L (ref 98–107)
CO2 SERPL-SCNC: 20 MMOL/L (ref 22–29)
CREAT SERPL-MCNC: 1.54 MG/DL (ref 0.76–1.27)
DEPRECATED RDW RBC AUTO: 44.9 FL (ref 37–54)
EGFRCR SERPLBLD CKD-EPI 2021: 45 ML/MIN/1.73
EOSINOPHIL # BLD AUTO: 0.28 10*3/MM3 (ref 0–0.4)
EOSINOPHIL NFR BLD AUTO: 4.1 % (ref 0.3–6.2)
ERYTHROCYTE [DISTWIDTH] IN BLOOD BY AUTOMATED COUNT: 14.2 % (ref 12.3–15.4)
GLUCOSE BLDC GLUCOMTR-MCNC: 101 MG/DL (ref 70–130)
GLUCOSE BLDC GLUCOMTR-MCNC: 137 MG/DL (ref 70–130)
GLUCOSE SERPL-MCNC: 126 MG/DL (ref 65–99)
HCT VFR BLD AUTO: 31.2 % (ref 37.5–51)
HGB BLD-MCNC: 10.1 G/DL (ref 13–17.7)
IMM GRANULOCYTES # BLD AUTO: 0.01 10*3/MM3 (ref 0–0.05)
IMM GRANULOCYTES NFR BLD AUTO: 0.1 % (ref 0–0.5)
LYMPHOCYTES # BLD AUTO: 1.74 10*3/MM3 (ref 0.7–3.1)
LYMPHOCYTES NFR BLD AUTO: 25.2 % (ref 19.6–45.3)
MCH RBC QN AUTO: 27.8 PG (ref 26.6–33)
MCHC RBC AUTO-ENTMCNC: 32.4 G/DL (ref 31.5–35.7)
MCV RBC AUTO: 86 FL (ref 79–97)
MONOCYTES # BLD AUTO: 0.69 10*3/MM3 (ref 0.1–0.9)
MONOCYTES NFR BLD AUTO: 10 % (ref 5–12)
NEUTROPHILS NFR BLD AUTO: 4.14 10*3/MM3 (ref 1.7–7)
NEUTROPHILS NFR BLD AUTO: 60 % (ref 42.7–76)
NRBC BLD AUTO-RTO: 0 /100 WBC (ref 0–0.2)
PLATELET # BLD AUTO: 255 10*3/MM3 (ref 140–450)
PMV BLD AUTO: 10.2 FL (ref 6–12)
POTASSIUM SERPL-SCNC: 4.2 MMOL/L (ref 3.5–5.2)
RBC # BLD AUTO: 3.63 10*6/MM3 (ref 4.14–5.8)
SODIUM SERPL-SCNC: 140 MMOL/L (ref 136–145)
WBC NRBC COR # BLD AUTO: 6.9 10*3/MM3 (ref 3.4–10.8)

## 2024-11-23 PROCEDURE — 94799 UNLISTED PULMONARY SVC/PX: CPT

## 2024-11-23 PROCEDURE — 99239 HOSP IP/OBS DSCHRG MGMT >30: CPT | Performed by: NURSE PRACTITIONER

## 2024-11-23 PROCEDURE — 94664 DEMO&/EVAL PT USE INHALER: CPT

## 2024-11-23 PROCEDURE — 80048 BASIC METABOLIC PNL TOTAL CA: CPT | Performed by: STUDENT IN AN ORGANIZED HEALTH CARE EDUCATION/TRAINING PROGRAM

## 2024-11-23 PROCEDURE — 85025 COMPLETE CBC W/AUTO DIFF WBC: CPT | Performed by: STUDENT IN AN ORGANIZED HEALTH CARE EDUCATION/TRAINING PROGRAM

## 2024-11-23 PROCEDURE — 82948 REAGENT STRIP/BLOOD GLUCOSE: CPT

## 2024-11-23 RX ORDER — CEFUROXIME AXETIL 500 MG/1
500 TABLET ORAL 2 TIMES DAILY
Qty: 4 TABLET | Refills: 0 | Status: SHIPPED | OUTPATIENT
Start: 2024-11-23

## 2024-11-23 RX ORDER — DOXYCYCLINE 100 MG/1
100 CAPSULE ORAL EVERY 12 HOURS SCHEDULED
Qty: 4 CAPSULE | Refills: 0 | Status: SHIPPED | OUTPATIENT
Start: 2024-11-23 | End: 2024-11-25

## 2024-11-23 RX ORDER — METOPROLOL SUCCINATE 25 MG/1
25 TABLET, EXTENDED RELEASE ORAL
Qty: 30 TABLET | Refills: 0 | Status: SHIPPED | OUTPATIENT
Start: 2024-11-24

## 2024-11-23 RX ORDER — L.ACID,PARA/B.BIFIDUM/S.THERM 8B CELL
1 CAPSULE ORAL DAILY
Qty: 30 EACH | Refills: 0 | Status: SHIPPED | OUTPATIENT
Start: 2024-11-24

## 2024-11-23 RX ORDER — LOSARTAN POTASSIUM 25 MG/1
25 TABLET ORAL
Qty: 30 TABLET | Refills: 0 | Status: SHIPPED | OUTPATIENT
Start: 2024-11-24

## 2024-11-23 RX ADMIN — METOPROLOL SUCCINATE 25 MG: 25 TABLET, EXTENDED RELEASE ORAL at 08:22

## 2024-11-23 RX ADMIN — LOSARTAN POTASSIUM 25 MG: 25 TABLET, FILM COATED ORAL at 08:22

## 2024-11-23 RX ADMIN — PANTOPRAZOLE SODIUM 40 MG: 40 TABLET, DELAYED RELEASE ORAL at 08:22

## 2024-11-23 RX ADMIN — Medication 10 ML: at 08:22

## 2024-11-23 RX ADMIN — BUDESONIDE AND FORMOTEROL FUMARATE DIHYDRATE 2 PUFF: 160; 4.5 AEROSOL RESPIRATORY (INHALATION) at 07:30

## 2024-11-23 RX ADMIN — Medication 1 CAPSULE: at 08:21

## 2024-11-23 RX ADMIN — DOXYCYCLINE 100 MG: 100 CAPSULE ORAL at 08:22

## 2024-11-23 RX ADMIN — APIXABAN 2.5 MG: 2.5 TABLET, FILM COATED ORAL at 08:22

## 2024-11-23 RX ADMIN — AMLODIPINE BESYLATE 10 MG: 10 TABLET ORAL at 08:22

## 2024-11-23 RX ADMIN — FLECAINIDE ACETATE 50 MG: 50 TABLET ORAL at 08:22

## 2024-11-23 NOTE — DISCHARGE SUMMARY
[facility] Medicine Services  DISCHARGE SUMMARY    Patient Name: Amol Aguirre  : 1945  MRN: [id number]    Date of Admission: 2024  7:10 PM  Date of Discharge:  24    Primary Care Physician: Sam Preston MD    Consults       Date and Time Order Name Status Description    2024 12:19 AM Inpatient Cardiology Consult Completed             Hospital Course     Presenting Problem: Hypertensive emergency    Active Hospital Problems    Diagnosis  POA    **Generalized weakness [R53.1]  Yes      Resolved Hospital Problems   No resolved problems to display.          Hospital Course:  Amol Aguirre is a 79 y.o. male with atrial fibrillation on Eliquis, asthma, hypertension, gout, GERD, type 2 diabetes, heart failure preserved ejection fraction, CKD, pacemaker, right BKA, recent history of osteomyelitis distal phalanx of right middle finger with IV antibiotics and amputation.  He presented to [facility] ED on 2024 with concern for generalized weakness progressively worse over a few weeks and especially the last few days.  Recently seen at [place] ED told he had a UTI, pneumonia, CHF.  Day prior to presentation also feeling weak and family found him on the floor.  Says he had diarrhea but not completely watery.  He was found to have low blood sugar by EMS.  In the ED, he had elevated blood pressure.  He was admitted due to hypertensive emergency and concern for pneumonia echocardiogram revealed EF of 64%.  Cardiology followed and assisted with medication adjustment.  Initially required Cardene infusion.  He was started on losartan with improvement in blood pressure.  Chest x-ray was concerning for right basilar airspace disease.  Blood cultures remain negative at 2 days.  He was treated with Rocephin and doxycycline while inpatient and will transition to Ceftin and doxycycline to complete 5-day regimen.  Diarrhea has improved.  C. difficile PCR was negative.   He  will continue on probiotic.  PT/OT evaluated recommended home with assist. The patient's presenting symptoms have improved and he will be discharged home today in stable condition     Hypertensive emergency with possible hypertensive encephalopathy  -Echocardiogram revealed EF of 64%  -cardiology following   -S/P cardene infusion  -Continue amlodipine, metoprolol, Bumex  -Started on Losartan on 11/21     CKD III  -baseline Cr ~1.7  -Stared on Losartan 11/21  -Creat improved to 1.54 this morning     Pneumonia  -CXR reviewed, concerning for right basilar airspace disease  -Blood cultures pending  -MRSA PCR negative  -S/p Rocephin, doxycycline x 3 days, transition to Ceftin/Doxy to complete 5-day regimen at discharge  -Start Priobiotic   -Currently on RA       Diarrhea  -C. difficile negative  -Started Probiotic      Generalized weakness  -PT/OT.    -Prosthesis at bedside      Type 2 diabetes  Hypoglycemia  -A1c 6.0  -Glucose improved, resume home meds     Recent amputation distal phalanx right middle finger  -No symptoms in finger.  He completed IV antibiotics for this osteomyelitis and had amputation.     Atrial fibrillation on Eliquis  Heart failure preserved ejection fraction      Discharge Follow Up Recommendations for outpatient labs/diagnostics:  Follow-up with PCP in 1 week.  Follow-up with cardiology in 4 to 6 weeks    Day of Discharge     HPI:   Patient was seen resting in bed no apparent distress.  No acute events overnight per nursing reports he is feeling much better and feels ready to discharge home.  We discussed the importance of taking all medications as prescribed and keeping all recommended follow-up appointments.  He expressed no additional concerns at this time.    Vital Signs:   Temp:  [97.8 °F (36.6 °C)-98.3 °F (36.8 °C)] 97.9 °F (36.6 °C)  Heart Rate:  [61-77] 61  Resp:  [16-18] 18  BP: (153-173)/(72-98) 163/74    Physical Exam:  Constitutional: No acute distress, awake, alert, chronically  ill-appearing, obese  HENT: NCAT, mucous membranes moist  Respiratory: Grossly clear, diminished at base, respiratory effort normal on room air  Cardiovascular: RRR, no murmurs, cap refill brisk   Gastrointestinal: Positive bowel sounds, soft, nontender, nondistended  Musculoskeletal: Chronic right BKA, 1+ LLE edema  Psychiatric: Appropriate affect, cooperative  Neurologic: Oriented x 3, moves all extremities, speech clear  Skin: warm, dry, no visible rash     Pertinent  and/or Most Recent Results     LAB RESULTS:      Lab 11/23/24  0517 11/22/24  0614 11/21/24  0615 11/20/24 1937   WBC 6.90 8.31 7.83 8.12   HEMOGLOBIN 10.1* 10.5* 11.1* 11.9*   HEMATOCRIT 31.2* 33.2* 35.4* 37.6   PLATELETS 255 282 288 285   NEUTROS ABS 4.14 5.14 5.31 6.31   IMMATURE GRANS (ABS) 0.01 0.04 0.02 0.02   LYMPHS ABS 1.74 2.02 1.48 1.11   MONOS ABS 0.69 0.80 0.74 0.56   EOS ABS 0.28 0.26 0.23 0.07   MCV 86.0 87.1 87.4 86.8   PROCALCITONIN  --   --   --  0.11   LACTATE  --   --   --  1.2         Lab 11/23/24  0517 11/22/24  0614 11/21/24  0615 11/21/24  0206 11/20/24  1937 11/18/24  1408   SODIUM 140 142 141  --  142  --    POTASSIUM 4.2 4.5 4.2  --  4.7  --    CHLORIDE 106 107 107  --  107  --    CO2 20.0* 23.0 23.0  --  23.0  --    ANION GAP 14.0 12.0 11.0  --  12.0  --    BUN 21 22 18  --  18  --    CREATININE 1.54* 1.71* 1.44*  --  1.66*  --    EGFR 45.0* 39.7* 48.8*  --  41.2*  --    GLUCOSE 126* 98 36*  --  191*  --    CALCIUM 8.9 9.1 9.0  --  9.2  --    MAGNESIUM  --   --   --   --  1.8 1.7*   HEMOGLOBIN A1C  --   --   --  6.00*  --   --          Lab 11/20/24 1937   TOTAL PROTEIN 7.0   ALBUMIN 3.8   GLOBULIN 3.2   ALT (SGPT) 7   AST (SGOT) 23   BILIRUBIN 0.5   ALK PHOS 97         Lab 11/20/24 1937   HSTROP T 37*                 Brief Urine Lab Results  (Last result in the past 365 days)        Color   Clarity   Blood   Leuk Est   Nitrite   Protein   CREAT   Urine HCG        11/20/24 1956 Yellow   Clear   Trace   Negative    Negative   100 mg/dL (2+)                 Microbiology Results (last 10 days)       Procedure Component Value - Date/Time    Clostridioides difficile Toxin - Stool, Per Rectum [[id number]]  (Normal) Collected: 11/22/24 0918    Lab Status: Final result Specimen: Stool from Per Rectum Updated: 11/22/24 1025    Narrative:      The following orders were created for panel order Clostridioides difficile Toxin - Stool, Per Rectum.  Procedure                               Abnormality         Status                     ---------                               -----------         ------                     Clostridioides difficile...[[id number]]  Normal              Final result                 Please view results for these tests on the individual orders.    Clostridioides difficile Toxin, PCR - Stool, Per Rectum [[id number]]  (Normal) Collected: 11/22/24 0918    Lab Status: Final result Specimen: Stool from Per Rectum Updated: 11/22/24 1025     Toxigenic C. difficile by PCR Not Detected    Narrative:      The result indicates the absence of toxigenic C. difficile from stool specimen.     Blood Culture - Blood, Arm, Left [[id number]]  (Normal) Collected: 11/20/24 2255    Lab Status: Preliminary result Specimen: Blood from Arm, Left Updated: 11/22/24 2345     Blood Culture No growth at 2 days    Narrative:      Less than seven (7) mL's of blood was collected.  Insufficient quantity may yield false negative results.    Blood Culture - Blood, Arm, Right [[id number]]  (Normal) Collected: 11/20/24 2250    Lab Status: Preliminary result Specimen: Blood from Arm, Right Updated: 11/22/24 2345     Blood Culture No growth at 2 days    Narrative:      Less than seven (7) mL's of blood was collected.  Insufficient quantity may yield false negative results.            CT Head Without Contrast    Result Date: 11/20/2024  CT HEAD WO CONTRAST Date of Exam: 11/20/2024 10:26 PM EST Indication: AMS, fall, head injury. Comparison: None available  Technique: Axial CT images were obtained of the head without contrast administration.  Automated exposure control and iterative construction methods were used. Findings: The ventricles and sulci are proportionally prominent compatible with global cerebral volume loss. There is no mass effect or midline shift. There is no acute intracranial hemorrhage. There is no abnormal extra-axial fluid collection. The gray-white matter differentiation is preserved. There is confluent periventricular and subcortical white matter hypodensity likely representing sequelae of chronic small vessel ischemic disease. There is a macroscopic fat containing lesion measuring 9 mm within the  right posterior ambient cistern between the posterior aspect of the midbrain and cerebellum. The calvarium is intact. The mastoid air cells and middle ears are well aerated. The visualized orbits and globes appear symmetric with thinning of the lenses bilaterally.     Impression: 1. No acute intracranial abnormality. Specifically, no evidence of acute hemorrhage, mass effect or midline shift. 2. Confluent periventricular and subcortical white matter hypodensity likely representing sequelae of chronic small vessel ischemic disease. Electronically Signed: Domo Everette  11/20/2024 11:00 PM EST  Workstation ID: [id number]    XR Chest 1 View    Result Date: 11/20/2024  XR CHEST 1 VW Date of Exam: 11/20/2024 7:19 PM EST Indication: Weak/Dizzy/AMS triage protocol Comparison: Chest radiograph dated 5/28/2021 Findings: There is a left chest wall pacemaker with leads in expected position. The cardiomediastinal silhouette is within normal limits. There is an elevated right hemidiaphragm with associated right basilar atelectasis. There is no definite pleural effusion. There is no pneumothorax. There are degenerative changes of the thoracic spine.     Impression: 1. Left chest wall pacemaker with leads in expected position. 2. Elevated right hemidiaphragm with  right basilar airspace disease. Electronically Signed: Domo Everette  11/20/2024 8:31 PM EST  Workstation ID: [id number]    CT Head Without Contrast    Result Date: 11/18/2024  HEAD CT     11/18/2024 2:09 PM HISTORY: Acute altered mental status with confusion. Acute weakness. COMPARISON: August 2, 2024. TECHNIQUE: Multiple axial CT images were performed from the foramen magnum to the vertex. This study was performed with techniques to keep radiation doses as low as reasonably achievable, (ALARA). Individualized dose reduction techniques using automated exposure control or adjustment of mA and/or kV according to the patient size were employed. FINDINGS: The ventricles are normal in size.  There is diffuse atrophy .  There is periventricular white matter change likely related to small vessel disease . There is  no evidence of hemorrhage  .  There is no mass effect or midline shift. Posterior fossa is without acute abnormality. 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   No extra-axial fluid is seen . There is a mucous retention cyst or polyp within the left maxillary sinus. No air-fluid levels are identified.    Atrophy without acute process. Images reviewed, interpreted, and dictated by Dr. Oriana Ferro. Transcribed by Tigre Bush PA-C    XR chest AP portable    Result Date: 11/18/2024   PORTABLE CHEST HISTORY: Generalized weakness COMPARISON: September 2024. FINDINGS: The heart is normal in size. The mediastinum is unremarkable. There is a small right pleural effusion with right lower lobe pneumonia. There is a trace left pleural effusion. There is no pneumothorax.     Small right pleural effusion with right lower lobe pneumonia.    Images reviewed, interpreted, and dictated by Dr. Tawanda Bourgeois. Transcribed by Misti Allen PA-C.     Results for orders placed during the hospital encounter of 10/09/19    Doppler Arterial Multi Level Lower Extremity - Bilateral CAR    Interpretation Summary  · Left  ankle-brachial index is 0.81 with posterior tibial measurement suggesting mild arterial occlusive disease and 1.58 with dorsalis pedis measurement which is nondiagnostic due to inability to adequately compress the vessels. Toe brachial index was not calculated.  · The patient is status post right below-knee amputation.      Results for orders placed during the hospital encounter of 10/09/19    Doppler Arterial Multi Level Lower Extremity - Bilateral CAR    Interpretation Summary  · Left ankle-brachial index is 0.81 with posterior tibial measurement suggesting mild arterial occlusive disease and 1.58 with dorsalis pedis measurement which is nondiagnostic due to inability to adequately compress the vessels. Toe brachial index was not calculated.  · The patient is status post right below-knee amputation.      Results for orders placed during the hospital encounter of 11/20/24    Adult Transthoracic Echo Complete w/ Color, Spectral and Contrast if necessary per protocol    Interpretation Summary    Left ventricular systolic function is normal. Calculated left ventricular EF = 64%    The left atrial cavity is moderately dilated.    Estimated right ventricular systolic pressure from tricuspid regurgitation is normal (<35 mmHg). Calculated right ventricular systolic pressure from tricuspid regurgitation is 23 mmHg.      Plan for Follow-up of Pending Labs/Results: Follow-up as directed  Pending Labs       Order Current Status    Blood Culture - Blood, Arm, Left Preliminary result    Blood Culture - Blood, Arm, Right Preliminary result          Discharge Details        Discharge Medications        New Medications        Instructions Start Date   cefuroxime 500 MG tablet  Commonly known as: CEFTIN   500 mg, Oral, 2 Times Daily      doxycycline 100 MG capsule  Commonly known as: MONODOX   100 mg, Oral, Every 12 Hours Scheduled      lactobacillus acidophilus capsule capsule   1 capsule, Oral, Daily   Start Date: November 24,  2024     losartan 25 MG tablet  Commonly known as: COZAAR   25 mg, Oral, Every 24 Hours Scheduled   Start Date: November 24, 2024     metoprolol succinate XL 25 MG 24 hr tablet  Commonly known as: TOPROL-XL   25 mg, Oral, Every 24 Hours Scheduled   Start Date: November 24, 2024            Continue These Medications        Instructions Start Date   acetaminophen 325 MG tablet  Commonly known as: TYLENOL   2 tablets, Oral, As Needed      allopurinol 100 MG tablet  Commonly known as: ZYLOPRIM   2 tablets, Oral, Daily      amLODIPine 10 MG tablet  Commonly known as: NORVASC   10 mg, Oral, Daily      apixaban 2.5 MG tablet tablet  Commonly known as: ELIQUIS   2.5 mg, Oral, 2 Times Daily      BASAGLAR KWIKPEN 100 UNIT/ML injection pen   68 Units, Subcutaneous, Nightly      bicalutamide 50 MG tablet  Commonly known as: CASODEX   Take 1 tablet by mouth Daily.      budesonide-formoterol 80-4.5 MCG/ACT inhaler  Commonly known as: SYMBICORT   2 puffs, Inhalation, 2 Times Daily PRN      bumetanide 1 MG tablet  Commonly known as: BUMEX   1 mg, Oral, Daily      cetirizine 10 MG tablet  Commonly known as: zyrTEC   5 mg, Oral, Daily      citalopram 10 MG tablet  Commonly known as: CeleXA   10 mg, Oral, Daily      Comfort EZ Pen Needles 31G X 5 MM misc  Generic drug: Insulin Pen Needle   No dose, route, or frequency recorded.      Cyanocobalamin ER 2000 MCG tablet controlled-release   1,000 mcg, Oral, Daily      EPINEPHrine 0.3 MG/0.3ML solution auto-injector injection  Commonly known as: EPIPEN   Intramuscular      flecainide 50 MG tablet  Commonly known as: TAMBOCOR   50 mg, Oral      insulin aspart 100 UNIT/ML solution pen-injector sc pen  Commonly known as: novoLOG FLEXPEN   15 Units, Subcutaneous, 3 Times Daily With Meals      ipratropium-albuterol 0.5-2.5 mg/3 ml nebulizer  Commonly known as: DUO-NEB   Inhale 3 mL 4 times a day by nebulization route.      Jardiance 25 MG tablet tablet  Generic drug: empagliflozin   1 tablet,  Oral, Daily      pantoprazole 40 MG EC tablet  Commonly known as: PROTONIX   40 mg, Oral, Daily      True Metrix Blood Glucose Test test strip  Generic drug: glucose blood   TEST BLOOD SUGAR THREE TIMES DAILY AND AS NEEDED             Stop These Medications      diphenhydrAMINE-acetaminophen  MG tablet per tablet  Commonly known as: TYLENOL PM     Lokelma 10 g packet  Generic drug: sodium zirconium cyclosilicate     metoclopramide 10 MG tablet  Commonly known as: REGLAN     mupirocin 2 % ointment  Commonly known as: BACTROBAN              Allergies   Allergen Reactions    Chlorhexidine Shortness Of Breath, Itching, Rash and Anaphylaxis     Pt developed a rash, itching, and shortness of breath after receiving a CHG bath on 4/24/19.  Pt developed a rash, itching, and shortness of breath after receiving a CHG bath on 4/24/19.    Latex Other (See Comments) and Swelling     Rash, hives, and tongue swelling  Rash, hives, and tongue swelling    Procaine Anaphylaxis    Tegaderm Ag Mesh [Silver] Swelling and Rash     Pt developed a rash with swelling within minutes of application.         Discharge Disposition:  Home or Self Care    Diet:  Hospital:  Diet Order   Procedures    Diet: Cardiac, Diabetic; Healthy Heart (2-3 Na+); Consistent Carbohydrate; Fluid Consistency: Thin (IDDSI 0)       Diet Instructions       Diet: Regular/House Diet, Cardiac Diets; Healthy Heart (2-3 Na+); Regular (IDDSI 7); Thin (IDDSI 0)      Discharge Diet:  Regular/House Diet  Cardiac Diets       Cardiac Diet: Healthy Heart (2-3 Na+)    Texture: Regular (IDDSI 7)    Fluid Consistency: Thin (IDDSI 0)             Activity:  Activity Instructions       Activity as Tolerated                 CODE STATUS:    Code Status and Medical Interventions: No CPR (Do Not Attempt to Resuscitate); Limited Support; No intubation (DNI)   Ordered at: 11/21/24 0018     Medical Intervention Limits:    No intubation (DNI)     Code Status (Patient has no pulse and is  not breathing):    No CPR (Do Not Attempt to Resuscitate)     Medical Interventions (Patient has pulse or is breathing):    Limited Support       No future appointments.    Additional Instructions for the Follow-ups that You Need to Schedule       Ambulatory Referral to Home Health   As directed      Face to Face Visit Date: 11/22/2024   Follow-up provider for Plan of Care?: I treated the patient in an acute care facility and will not continue treatment after discharge.   Follow-up provider: MILAD MCKINNEY [[id number]]   Reason/Clinical Findings: Generalized weakness   Describe mobility limitations that make leaving home difficult: impaired functional mobility, gait, balance, and endurance   Nursing/Therapeutic Services Requested: Skilled Nursing Physical Therapy Occupational Therapy   Skilled nursing orders: Neurovascular assessments Cardiopulmonary assessments   PT orders: Total joint pathway Therapeutic exercise Transfer training Strengthening Home safety assessment   Occupational orders: Activities of daily living Energy conservation Strengthening Home safety assessment   Frequency: 1 Week 1        Discharge Follow-up with PCP   As directed       Currently Documented PCP:    Milad Mckinney MD    PCP Phone Number:    [phone]     Follow Up Details: Follow-up with PCP in 1 week        Discharge Follow-up with Specified Provider: Follow-up with cardiology in 4 to 6 weeks   As directed      To: Follow-up with cardiology in 4 to 6 weeks                      VIPUL Saleh  11/23/24      Time Spent on Discharge:  I spent  41  minutes on this discharge activity which included: face-to-face encounter with the patient, reviewing the data in the system, coordination of the care with the nursing staff as well as consultants, documentation, and entering orders.

## 2024-11-23 NOTE — CASE MANAGEMENT/SOCIAL WORK
Case Management Discharge Note      Final Note: Pt is discharging home today. He is on room air. A referral for HH SN/PT/OT was accepted by Misti Caro and an order was entered in "[employer] Their office will contact the Pt to arrange HH visits. Family will provide transportation via private vehicle. No other discharge needs identified.         Selected Continued Care - Admitted Since 11/20/2024       Destination    No services have been selected for the patient.                Durable Medical Equipment    No services have been selected for the patient.                Dialysis/Infusion    No services have been selected for the patient.                Home Medical Care Coordination complete.      Service Provider Services Address Phone Fax Patient Preferred    [facility] [address] [facility] [address] [address] [phone] --              Therapy    No services have been selected for the patient.                Community Resources    No services have been selected for the patient.                Community & DME    No services have been selected for the patient.                    Transportation Services  Private: Car    Final Discharge Disposition Code: 06 - home with home health care

## 2024-11-23 NOTE — PLAN OF CARE
Problem: Adult Inpatient Plan of Care  Goal: Plan of Care Review  Outcome: Progressing  Goal: Patient-Specific Goal (Individualized)  Outcome: Progressing  Goal: Absence of Hospital-Acquired Illness or Injury  Outcome: Progressing  Intervention: Identify and Manage Fall Risk  Recent Flowsheet Documentation  Taken 11/22/2024 2000 by Pattie Preston RN  Safety Promotion/Fall Prevention:   activity supervised   assistive device/personal items within reach   clutter free environment maintained   fall prevention program maintained   gait belt   lighting adjusted   mobility aid in reach   muscle strengthening facilitated   nonskid shoes/slippers when out of bed   room organization consistent   safety round/check completed   toileting scheduled  Intervention: Prevent Skin Injury  Recent Flowsheet Documentation  Taken 11/22/2024 2000 by Pattie Preston RN  Body Position: position changed independently  Skin Protection:   incontinence pads utilized   transparent dressing maintained  Intervention: Prevent and Manage VTE (Venous Thromboembolism) Risk  Recent Flowsheet Documentation  Taken 11/22/2024 2000 by Pattie Preston RN  VTE Prevention/Management: (See MAR) other (see comments)  Intervention: Prevent Infection  Recent Flowsheet Documentation  Taken 11/22/2024 2000 by Pattie Preston RN  Infection Prevention: single patient room provided  Goal: Optimal Comfort and Wellbeing  Outcome: Progressing  Intervention: Provide Person-Centered Care  Recent Flowsheet Documentation  Taken 11/22/2024 2000 by Pattie Preston RN  Trust Relationship/Rapport:   choices provided   reassurance provided  Goal: Readiness for Transition of Care  Outcome: Progressing     Problem: Fall Injury Risk  Goal: Absence of Fall and Fall-Related Injury  Outcome: Progressing  Intervention: Identify and Manage Contributors  Recent Flowsheet Documentation  Taken 11/22/2024 2000 by Pattie Preston RN  Medication Review/Management:  medications reviewed  Self-Care Promotion: independence encouraged  Intervention: Promote Injury-Free Environment  Recent Flowsheet Documentation  Taken 11/22/2024 2000 by Pattie Preston RN  Safety Promotion/Fall Prevention:   activity supervised   assistive device/personal items within reach   clutter free environment maintained   fall prevention program maintained   gait belt   lighting adjusted   mobility aid in reach   muscle strengthening facilitated   nonskid shoes/slippers when out of bed   room organization consistent   safety round/check completed   toileting scheduled     Problem: Skin Injury Risk Increased  Goal: Skin Health and Integrity  Outcome: Progressing  Intervention: Optimize Skin Protection  Recent Flowsheet Documentation  Taken 11/22/2024 2000 by Pattie Preston RN  Activity Management: back to bed  Pressure Reduction Techniques: frequent weight shift encouraged  Head of Bed (HOB) Positioning: HOB elevated  Pressure Reduction Devices: chair cushion utilized  Skin Protection:   incontinence pads utilized   transparent dressing maintained  Intervention: Promote and Optimize Oral Intake  Recent Flowsheet Documentation  Taken 11/22/2024 2000 by Pattie Preston RN  Oral Nutrition Promotion: rest periods promoted     Problem: Comorbidity Management  Goal: Blood Glucose Level Within Target Range  Outcome: Progressing  Intervention: Monitor and Manage Glycemia  Recent Flowsheet Documentation  Taken 11/22/2024 2000 by Pattie Preston RN  Medication Review/Management: medications reviewed  Goal: Blood Pressure in Desired Range  Outcome: Progressing  Intervention: Maintain Blood Pressure Management  Recent Flowsheet Documentation  Taken 11/22/2024 2000 by Pattie Preston RN  Medication Review/Management: medications reviewed   Goal Outcome Evaluation:

## 2024-11-24 NOTE — OUTREACH NOTE
Prep Survey      Flowsheet Row Responses   [facility] patient discharged from? [facility]   Is LACE score < 7 ? No   Eligibility Readm Mgmt   Discharge diagnosis Generalized weakness   Does the patient have one of the following disease processes/diagnoses(primary or secondary)? Other   Does the patient have Home health ordered? Yes   What is the Home health agency?  [facility]   Is there a DME ordered? No   Prep survey completed? Yes            Trista SOLIS - Registered Nurse

## 2024-11-25 LAB
BACTERIA SPEC AEROBE CULT: NORMAL
BACTERIA SPEC AEROBE CULT: NORMAL

## 2024-12-02 ENCOUNTER — READMISSION MANAGEMENT (OUTPATIENT)
Dept: CALL CENTER | Facility: HOSPITAL | Age: 79
End: 2024-12-02
Payer: MEDICARE

## 2024-12-02 NOTE — OUTREACH NOTE
Medical Week 1 Survey      Flowsheet Row Responses   [facility] patient discharged from? [facility]   Does the patient have one of the following disease processes/diagnoses(primary or secondary)? Other   Week 1 attempt successful? Yes   Call start time 1120   Call end time 1140   Discharge diagnosis Generalized weakness   Is patient permission given to speak with other caregiver? Yes   List who call center can speak with Elinor   Person spoke with today (if not patient) and relationship Elinor   Medication alerts for this patient Grandchildren are organizing medication in daily box but pt is getting confused about if he took meds and tries to take when already took or doesn't take the meds, per Elinor.   Meds reviewed with patient/caregiver? Yes   Is the patient having any side effects they believe may be caused by any medication additions or changes? No   Does the patient have all medications ordered at discharge? Yes   Medication comments Per Elinor the pt finished his antibiotics.   Does the patient have a primary care provider?  Yes   Does the patient have an appointment with their PCP within 7 days of discharge? Greater than 7 days   What is preventing the patient from scheduling follow up appointments within 7 days of discharge? Earlier appointment not available   Nursing Interventions Verified appointment date/time/provider   What is the Home health agency?  [facility]    Has home health visited the patient within 72 hours of discharge? Yes   Home health comments Per Elinor, they are waiting on PT/OT evaluation, RN has done the intake appt on 11/26/24.   Psychosocial comments Family wanted pt to go to inpt rehab, Elinor reports that the pt lives alone but refused rehab.   Comments Per pt's Granddtr, Elinor, the pt remains confused, which is not his baseline. Pt continues with diarrhea, weakness that requires assistance with all transfers, pt uses wheelchair for ambulation, per Elinor. Patient has been sleeping more  than normal, having occasional urinary incontinence, which is new to the pt per Elinor. Patient having wax/wane glucose levels, low of 46 recently, per Elinor, family having to provide meals to pt. Pt lives alone, has 4 grandchildren that are alternating caregiving between their homes/jobs, Elinor reports difficulty in managing this long term and will discuss with PCP if this is the new baseline for patient. Per Elinor, the pt has not fallen since DC.Elinor denies fever/chills at this time, reports pt is tolerating po intake, but concerned about aspiration.Pt remains with cough.   Did the patient receive a copy of their discharge instructions? Yes   Nursing interventions Reviewed instructions with patient   What is the patient's perception of their health status since discharge? Same   Is the patient/caregiver able to teach back signs and symptoms related to disease process for when to call PCP? Yes   Is the patient/caregiver able to teach back signs and symptoms related to disease process for when to call 911? Yes   Is the patient/caregiver able to teach back the hierarchy of who to call/visit for symptoms/problems? PCP, Specialist, Home health nurse, Urgent Care, ED, 911 Yes   Week 1 call completed? Yes   Call end time 1140            [place] TSE - Registered Nurse

## 2024-12-11 ENCOUNTER — READMISSION MANAGEMENT (OUTPATIENT)
Dept: CALL CENTER | Facility: HOSPITAL | Age: 79
End: 2024-12-11
Payer: MEDICARE

## 2024-12-11 NOTE — OUTREACH NOTE
Medical Week 2 Survey      Flowsheet Row Responses   [facility], [place] patient discharged from? [place]   Does the patient have one of the following disease processes/diagnoses(primary or secondary)? Other   Week 2 attempt successful? Yes   Call start time 1042   Discharge diagnosis Generalized weakness   Call end time 1042   Is the patient taking all medications as directed (includes completed medication regime)? Yes   Does the patient have a primary care provider?  Yes   Comments regarding PCP seen PCP on 12/9   Has the patient kept scheduled appointments due by today? Yes   What is the Home health agency?  [facility]    Home health comments HH is still coming   Psychosocial issues? No   What is the patient's perception of their health status since discharge? Improving   Is the patient/caregiver able to teach back signs and symptoms related to disease process for when to call PCP? Yes   Is the patient/caregiver able to teach back signs and symptoms related to disease process for when to call 911? Yes   Is the patient/caregiver able to teach back the hierarchy of who to call/visit for symptoms/problems? PCP, Specialist, Home health nurse, Urgent Care, ED, 911 Yes   Week 2 Call Completed? Yes   Graduated Yes   Did the patient feel the follow up calls were helpful during their recovery period? Yes   Was the number of calls appropriate? Yes   Is the patient interested in additional calls from an ambulatory ? No   Would this patient benefit from a Referral to [facility] Social Work? No   Graduated/Revoked comments Doing well, denies any questions or concerns, has been to see his PCP and  is coming, no further calls needed.   Call end time 1042            Randi ROSAS - Registered Nurse

## 2025-02-17 ENCOUNTER — TRANSCRIBE ORDERS (OUTPATIENT)
Dept: ADMINISTRATIVE | Facility: HOSPITAL | Age: 80
End: 2025-02-17
Payer: MEDICARE

## 2025-02-17 DIAGNOSIS — R51.9 LEFT TEMPORAL HEADACHE: ICD-10-CM

## 2025-02-17 DIAGNOSIS — R25.1 TREMOR: Primary | ICD-10-CM

## 2025-04-24 ENCOUNTER — HOSPITAL ENCOUNTER (OUTPATIENT)
Dept: MRI IMAGING | Facility: HOSPITAL | Age: 80
Discharge: HOME | End: 2025-04-24
Admitting: INTERNAL MEDICINE
Payer: MEDICARE

## 2025-04-24 VITALS — SYSTOLIC BLOOD PRESSURE: 152 MMHG | OXYGEN SATURATION: 99 % | DIASTOLIC BLOOD PRESSURE: 71 MMHG | HEART RATE: 85 BPM

## 2025-04-24 DIAGNOSIS — R51.9 LEFT TEMPORAL HEADACHE: ICD-10-CM

## 2025-04-24 DIAGNOSIS — R25.1 TREMOR: ICD-10-CM

## 2025-04-24 PROCEDURE — 70551 MRI BRAIN STEM W/O DYE: CPT

## (undated) DEVICE — GLV SURG SIGNATURE TOUCH PF LTX 7 STRL

## (undated) DEVICE — PRECISION THIN, OFFSET (5.5 X 0.38 X 25.0MM)

## (undated) DEVICE — BNDG ELAS ELITE V/CLOSE 6IN 5YD LF STRL

## (undated) DEVICE — SOL IRRIG H2O 1000ML STRL

## (undated) DEVICE — DRAPE,TOP,102X53,STERILE: Brand: MEDLINE

## (undated) DEVICE — SYR LUERLOK 5CC

## (undated) DEVICE — SUT ETHLN 4/0 PS2 18IN 1667H

## (undated) DEVICE — GLIDEX™ COATED HYDROPHILIC GUIDEWIRE: Brand: MAGIC TORQUE™

## (undated) DEVICE — POST OP EYE CARE KIT: Brand: MEDLINE

## (undated) DEVICE — GUARDIAN LVC: Brand: GUARDIAN

## (undated) DEVICE — LEX CATH LAB MINOR: Brand: MEDLINE INDUSTRIES, INC.

## (undated) DEVICE — APPL CHLORAPREP TINTED 26ML TEAL

## (undated) DEVICE — UNDERGLV SURG BIOGEL INDICAT PF 61/2 GRN

## (undated) DEVICE — UNDERCAST PADDING: Brand: DEROYAL

## (undated) DEVICE — HANDPIECE SET WITH HIGH FLOW TIP AND SUCTION TUBE: Brand: INTERPULSE

## (undated) DEVICE — DECANT BG O JET

## (undated) DEVICE — PK ANGIO OR 10

## (undated) DEVICE — 3M(TM) TEGADERM(TM) IV TRANSPARENT FILM DRESSING WITH BORDER 1655: Brand: 3M™ TEGADERM™

## (undated) DEVICE — SPNG LAP PREWSH SFTPK 18X18IN STRL PK/5

## (undated) DEVICE — SYR SLP TP 10ML DISP

## (undated) DEVICE — DISPOSABLE TOURNIQUET CUFF SINGLE BLADDER, DUAL PORT AND QUICK CONNECT CONNECTOR: Brand: COLOR CUFF

## (undated) DEVICE — BNDG ELAS ELITE V/CLOSE 4IN 5YD LF STRL

## (undated) DEVICE — SUT MNCRYL 2/0 SH 27IN UD MCP417H

## (undated) DEVICE — SPNG GZ WOVN 4X4IN 12PLY 10/BX STRL

## (undated) DEVICE — SUT SILK 2/0 SH 30IN K833H

## (undated) DEVICE — GLV SURG SENSICARE MICRO PF LF 7.5 STRL

## (undated) DEVICE — ABDOMINAL PAD: Brand: CURITY

## (undated) DEVICE — 3M™ IOBAN™ 2 ANTIMICROBIAL INCISE DRAPE 6651EZ: Brand: IOBAN™ 2

## (undated) DEVICE — GLV SURG BIOGEL LTX PF 7 1/2

## (undated) DEVICE — NDL PERC 1PRT THNWALL W/BASEPLT 18G 7CM

## (undated) DEVICE — ST INF PRI SMRTSTE 20DRP 2VLV 24ML 117

## (undated) DEVICE — AVANTI + 4F STD W/GW: Brand: AVANTI

## (undated) DEVICE — APPL CHLORAPREP W/TINT 26ML BLU

## (undated) DEVICE — DRSNG SURESITE123 2.4X2.8IN

## (undated) DEVICE — PAD CAST SOF ROL NS 6IN

## (undated) DEVICE — PENCL E/S HNDSWCH ROCKRBTN HOLSTR 10FT

## (undated) DEVICE — Device

## (undated) DEVICE — CONTAINER,SPECIMEN,OR STERILE,4OZ: Brand: MEDLINE

## (undated) DEVICE — GOWN,NON-REINFORCED,SIRUS,SET IN SLV,XL: Brand: MEDLINE

## (undated) DEVICE — DRSNG TELFA PAD NONADH STR 1S 3X8IN

## (undated) DEVICE — PK EXTREM LOWR 10

## (undated) DEVICE — CATH GUIDE SOFTVU FLUSH HT PIG .035 4F 65CM

## (undated) DEVICE — INTRO TEAR AWAY/LVD W/SD PRT 8F 13CM

## (undated) DEVICE — CVR HNDL LT SURG ACCSSRY BLU STRL

## (undated) DEVICE — INTENDED USE FOR SURGICAL MARKING ON INTACT SKIN, ALSO PROVIDES A PERMANENT METHOD OF IDENTIFYING OBJECTS IN THE OPERATING ROOM: Brand: WRITESITE® REGULAR TIP SKIN MARKER

## (undated) DEVICE — GLV SURG SENSICARE W/ALOE PF LF 8 STRL

## (undated) DEVICE — 1010 S-DRAPE TOWEL DRAPE 10/BX: Brand: STERI-DRAPE™

## (undated) DEVICE — PATIENT RETURN ELECTRODE, SINGLE-USE, CONTACT QUALITY MONITORING, ADULT, WITH 9FT CORD, FOR PATIENTS WEIGING OVER 33LBS. (15KG): Brand: MEGADYNE

## (undated) DEVICE — CANN IRR/INJ AIR ANT CHAMBER 6MM BEND 27G

## (undated) DEVICE — PENCL ROCKRSWCH MEGADYNE W/HOLSTR 10FT SS

## (undated) DEVICE — CVR HNDL LIGHT RIGID

## (undated) DEVICE — LIMB HOLDER, WRIST/ANKLE: Brand: DEROYAL

## (undated) DEVICE — ARM SLING II: Brand: DEROYAL

## (undated) DEVICE — INTENDED FOR TISSUE SEPARATION, AND OTHER PROCEDURES THAT REQUIRE A SHARP SURGICAL BLADE TO PUNCTURE OR CUT.: Brand: BARD-PARKER ® SAFETYLOCK CARBON RIB-BACK BLADES

## (undated) DEVICE — EYE PAK* 1033 NON-WOVEN OPHTHALMIC DRAPE APERTURE POUCHES: Brand: ALCON EYE-PAK

## (undated) DEVICE — SYR LL TP 10ML STRL

## (undated) DEVICE — SUT SILK 2/0 TIES 18IN A185H

## (undated) DEVICE — 3M™ IOBAN™ 2 ANTIMICROBIAL INCISE DRAPE 6650EZ: Brand: IOBAN™ 2

## (undated) DEVICE — HP CONCL INTREPID COAX I/A CRV .3MM

## (undated) DEVICE — TUBING, SUCTION, 1/4" X 10', STRAIGHT: Brand: MEDLINE

## (undated) DEVICE — KT DRN EVAC WND PVC PCH WTROC RND 10F400

## (undated) DEVICE — 15 DEG. MICROKNIFE - 3MM: Brand: SHARPOINT

## (undated) DEVICE — SPNG GZ STRL 2S 4X4 12PLY

## (undated) DEVICE — TBG INJ CONTRL EXCITE RA 1200PSI 48IN

## (undated) DEVICE — ST EXT IV SMARTSITE 2VLV SP M LL 5ML IV1

## (undated) DEVICE — 3M™ WARMING BLANKET, UPPER BODY, 10 PER CASE, 42268: Brand: BAIR HUGGER™

## (undated) DEVICE — ANTIBACTERIAL UNDYED BRAIDED (POLYGLACTIN 910), SYNTHETIC ABSORBABLE SUTURE: Brand: COATED VICRYL

## (undated) DEVICE — CLEARCUT® HP2 SLIT KNIFE INTREPID MICRO-COAXIAL SYSTEM 2.4 DB: Brand: CLEARCUT®; INTREPID

## (undated) DEVICE — STRYKER PERFORMANCE SERIES SAGITTAL BLADE: Brand: STRYKER PERFORMANCE SERIES

## (undated) DEVICE — DELFI MATCHING LIMB PROTECTION SLEEVES (MLPS) HELP PROTECT THE PATIENT’S LIMB FROM POSSIBLE WRINKLING, PINCHING AND SHEARING OF SKIN AND SOFT TISSUES OF THE LIMB.EACH DELFI MATCHING LIMB PROTECTION SLEEVE IS INTENDED FOR USE WITH A SPECIFIC DELFI TOURNIQUET CUFF. THIS SLEEVE IS SPECIFICALLY FOR THIGH.: Brand: MATCHING LIMB PROTECTION SLEEVES - VARI-FIT